# Patient Record
Sex: MALE | Race: WHITE | Employment: UNEMPLOYED | URBAN - METROPOLITAN AREA
[De-identification: names, ages, dates, MRNs, and addresses within clinical notes are randomized per-mention and may not be internally consistent; named-entity substitution may affect disease eponyms.]

---

## 2017-02-03 ENCOUNTER — OFFICE VISIT (OUTPATIENT)
Dept: FAMILY MEDICINE | Facility: CLINIC | Age: 57
End: 2017-02-03
Payer: COMMERCIAL

## 2017-02-03 VITALS
TEMPERATURE: 97.1 F | WEIGHT: 249 LBS | SYSTOLIC BLOOD PRESSURE: 142 MMHG | DIASTOLIC BLOOD PRESSURE: 86 MMHG | OXYGEN SATURATION: 93 % | BODY MASS INDEX: 35.73 KG/M2 | HEART RATE: 106 BPM

## 2017-02-03 DIAGNOSIS — H61.23 BILATERAL IMPACTED CERUMEN: Primary | ICD-10-CM

## 2017-02-03 DIAGNOSIS — I10 HYPERTENSION GOAL BP (BLOOD PRESSURE) < 140/90: ICD-10-CM

## 2017-02-03 PROCEDURE — 69209 REMOVE IMPACTED EAR WAX UNI: CPT | Performed by: PHYSICIAN ASSISTANT

## 2017-02-03 PROCEDURE — 99213 OFFICE O/P EST LOW 20 MIN: CPT | Mod: 25 | Performed by: PHYSICIAN ASSISTANT

## 2017-02-03 NOTE — MR AVS SNAPSHOT
After Visit Summary   2/3/2017    Junito Wang    MRN: [id number]           Patient Information     Date Of Birth          1960        Visit Information        Provider Department      2/3/2017 1:20 PM Tameka Reed PA-C [facility]        Today's Diagnoses     Bilateral impacted cerumen    -  1       Care Instructions      Earwax, Home Treatment    Everyone produces earwax from the lining of the ear canal. It serves to lubricate and protect the ear. The wax that forms in the canal naturally moves toward the outside of the ear and falls out. Sometimes the ear canal may contain too much wax. This can cause a blockage and loss of hearing. Directions are given below for home treatment.  Home care  If your doctor has advised you to remove a wax blockage yourself, follow these directions:    Unless a medicine was prescribed, you may use an over-the-counter product made for clearing earwax. These contain carbamide peroxide. Lie down with the blocked ear facing upward. Apply one dropper full of medicine and wait a few minutes. Grasp the outer ear and wiggle it to help the solution enter the canal.    Lean over a sink or basin with the blocked ear facing downward. Use a bulb syringe filled with warm (not hot or cold) water to rinse the ear several times. Use gentle pressure only.    If you are having trouble draining the water out of your ear canal, put a few drops of rubbing alcohol (isopropyl alcohol) into the ear canal. This will help remove the remaining water.    Repeat this procedure once a day for up to three days, or until your hearing is back to normal. Do not use this treatment for more than three days in a row.  Don ts    Don t use cold water to rinse the ear. This will make you dizzy.    Don t perform this procedure if you have an ear infection.    Don t perform this procedure if you have a ruptured eardrum.    Don t use cotton swabs, matches, hairpins, keys, or other  objects to  clean  the ear canal. This can cause infection of the ear canal or rupture the eardrum. Because of their size and shape, cotton swabs can push earwax deeper into the ear canal instead of removing it.  Follow-up care  Follow up with your health care provider if you are not improving after three cleaning attempts, or as advised.  When to seek medical advice  Call your health care provider right away if any of these occur:    Worsening ear pain    Fever of 101 F (38.3 C) or higher, or as directed by your health care provider    Hearing does not return to normal after three days of treatment    Fluid drainage or bleeding from the ear canal    Swelling, redness, or tenderness of the outer ear    Headache, neck pain, or stiff neck    4737-8187 [employer]. [address] [place] [address], [place] [address]. All rights reserved. This information is not intended as a substitute for professional medical care. Always follow your healthcare professional's instructions.              Follow-ups after your visit        Who to contact     If you have questions or need follow up information about today's clinic visit or your schedule please contact [facility] directly at [phone].  Normal or non-critical lab and imaging results will be communicated to you by MyChart, letter or phone within 4 business days after the clinic has received the results. If you do not hear from us within 7 days, please contact the clinic through Letsgofordinnerhart or phone. If you have a critical or abnormal lab result, we will notify you by phone as soon as possible.  Submit refill requests through ThemBid or call your pharmacy and they will forward the refill request to us. Please allow 3 business days for your refill to be completed.          Additional Information About Your Visit        LetsgofordinnerharReverbNation Information     ThemBid gives you secure access to your electronic health record. If you see a primary care provider, you can also  send messages to your care team and make appointments. If you have questions, please call your primary care clinic.  If you do not have a primary care provider, please call [phone] and they will assist you.        Care EveryWhere ID     This is your Care EveryWhere ID. This could be used by other organizations to access your [facility] medical records  [id number]        Your Vitals Were     Pulse Temperature Pulse Oximetry             106 97.1  F (36.2  C) (Oral) 93%          Blood Pressure from Last 3 Encounters:   02/03/17 142/86   07/27/16 156/86   11/23/15 135/85    Weight from Last 3 Encounters:   02/03/17 249 lb (112.946 kg)   07/27/16 246 lb 6.4 oz (111.766 kg)   11/23/15 247 lb (112.038 kg)              We Performed the Following     REMOVE COMFORT RICHARDSON        Primary Care Provider Office Phone # Fax #    Florian Wagner [phone]       [facility] [address] [facility] [address]        Thank you!     Thank you for choosing [facility]  for your care. Our goal is always to provide you with excellent care. Hearing back from our patients is one way we can continue to improve our services. Please take a few minutes to complete the written survey that you may receive in the mail after your visit with us. Thank you!             Your Updated Medication List - Protect others around you: Learn how to safely use, store and throw away your medicines at www.disposemymeds.org.          This list is accurate as of: 2/3/17  2:34 PM.  Always use your most recent med list.                   Brand Name Dispense Instructions for use    amLODIPine 10 MG tablet    NORVASC    90 tablet    Take 1 tablet (10 mg) by mouth daily       EXCEDRIN PO      PRN       GAVISCON PO          hydrochlorothiazide 25 MG tablet    HYDRODIURIL    30 tablet    Take 1 tablet (25 mg) by mouth daily       lisinopril 10 MG tablet    PRINIVIL/ZESTRIL    60 tablet    Take 1 tablet (10 mg)  by mouth 2 times daily       Multi-vitamin Tabs tablet   Generic drug:  multivitamin, therapeutic with minerals      1 TABLET DAILY

## 2017-02-03 NOTE — PROGRESS NOTES
SUBJECTIVE:                                                    Junito Wang is a 56 year old male who presents to clinic today for the following health issues:      ENT Symptoms             Symptoms: cc Present Absent Comment   Fever/Chills   x    Fatigue   x    Muscle Aches   x    Eye Irritation  x  Very watery   Sneezing   x    Nasal Akira/Drg   x    Sinus Pressure/Pain  x  Little, the last couple of days - maxillary pressure - causing few headaches - intermittent   Loss of smell   x    Dental pain  x     Sore Throat   x    Swollen Glands  x     Ear Pain/Fullness  x  R ear - feels fulls, and sometimes gets a buzzing sound every so often   Cough   x    Wheeze   x    Chest Pain   x    Shortness of breath   x    Rash   x    Other   x       Symptom duration:  1.5 weeks   Symptom severity:  mild   Treatments tried:  nothing   Contacts:  none     He has only been taking 1 tablet of his lisinopril daily due to running out of insurance. He now has insurance and is picking up a refill of his medication.     Problem list and histories reviewed & adjusted, as indicated.  Additional history: as documented    Patient Active Problem List   Diagnosis     Hypertension goal BP (blood pressure) < 140/90     Past Surgical History   Procedure Laterality Date     Abdomen surgery       fatty tissue     Appendectomy         Social History   Substance Use Topics     Smoking status: Current Every Day Smoker -- 1.00 packs/day for 35 years     Types: Cigarettes     Smokeless tobacco: Not on file      Comment: I am still on the fence with this but may need to quit     Alcohol Use: Yes     Family History   Problem Relation Age of Onset     Hypertension Mother      Neurologic Disorder Mother      stroke     HEART DISEASE Father      Heart Failure Father      Hypertension Brother      Hypertension Sister      Coronary Artery Disease Father       of heart attack     Coronary Artery Disease Brother      Quadruple Bi-pass      Coronary Artery Disease Sister      Stent     Hypertension Sister      Hypertension Brother      Hyperlipidemia Mother      Hyperlipidemia Brother      Hyperlipidemia Sister      CEREBROVASCULAR DISEASE Mother       of stroke     Thyroid Disease Mother      Coronary Artery Disease Sister      Stent     Coronary Artery Disease Brother      Quadruple Bi-pass     Hypertension Sister      Hypertension Brother      Hyperlipidemia Sister      Hyperlipidemia Brother          Current Outpatient Prescriptions   Medication Sig Dispense Refill     lisinopril (PRINIVIL/ZESTRIL) 10 MG tablet Take 1 tablet (10 mg) by mouth 2 times daily 60 tablet 1     hydrochlorothiazide (HYDRODIURIL) 25 MG tablet Take 1 tablet (25 mg) by mouth daily 30 tablet 1     amLODIPine (NORVASC) 10 MG tablet Take 1 tablet (10 mg) by mouth daily 90 tablet 0     Alum Hydroxide-Mag Carbonate (GAVISCON PO)        MULTI-VITAMIN OR TABS 1 TABLET DAILY       EXCEDRIN OR PRN       No Known Allergies  BP Readings from Last 3 Encounters:   17 142/86   16 156/86   11/23/15 135/85    Wt Readings from Last 3 Encounters:   17 249 lb (112.946 kg)   16 246 lb 6.4 oz (111.766 kg)   11/23/15 247 lb (112.038 kg)                  Problem list, Medication list, Allergies, and Medical/Social/Surgical histories reviewed in [facility] and updated as appropriate.    ROS:  Constitutional, HEENT, cardiovascular, pulmonary, and integumentary systems are negative, except as otherwise noted.    OBJECTIVE:                                                    /86 mmHg  Pulse 106  Temp(Src) 97.1  F (36.2  C) (Oral)  Wt 249 lb (112.946 kg)  SpO2 93%  Body mass index is 35.73 kg/(m^2).  GENERAL: healthy, alert and no distress  EYES: Eyes grossly normal to inspection  HENT: normal cephalic/atraumatic, both ears: occluded with wax, nose and mouth without ulcers or lesions, oropharynx clear and oral mucous membranes moist  NECK: no adenopathy, no asymmetry,  masses, or scars and thyroid normal to palpation  RESP: lungs clear to auscultation - no rales, rhonchi or wheezes  CV: regular rate and rhythm, normal S1 S2, no S3 or S4, no murmur, click or rub, no peripheral edema and peripheral pulses strong  MS: no gross musculoskeletal defects noted, no edema  SKIN: no suspicious lesions or rashes    Re-examination after ear flushing by medical assistant: cerumen was removed. Ear canals are clear and TM's unremarkable. Patient tolerating well.    Diagnostic Test Results:  none      ASSESSMENT/PLAN:                                                        ICD-10-CM    1. Bilateral impacted cerumen H61.23 REMOVE IMPACTED CERUMEN   2. Hypertension goal BP (blood pressure) < 140/90 I10      Patient was advised to start taking his blood pressure medication as prescribed and return to the pharmacy in 1 week for a recheck of his BP. He understood and agreed.     Patient Instructions     Earwax, Home Treatment    Everyone produces earwax from the lining of the ear canal. It serves to lubricate and protect the ear. The wax that forms in the canal naturally moves toward the outside of the ear and falls out. Sometimes the ear canal may contain too much wax. This can cause a blockage and loss of hearing. Directions are given below for home treatment.  Home care  If your doctor has advised you to remove a wax blockage yourself, follow these directions:    Unless a medicine was prescribed, you may use an over-the-counter product made for clearing earwax. These contain carbamide peroxide. Lie down with the blocked ear facing upward. Apply one dropper full of medicine and wait a few minutes. Grasp the outer ear and wiggle it to help the solution enter the canal.    Lean over a sink or basin with the blocked ear facing downward. Use a bulb syringe filled with warm (not hot or cold) water to rinse the ear several times. Use gentle pressure only.    If you are having trouble draining the water out  of your ear canal, put a few drops of rubbing alcohol (isopropyl alcohol) into the ear canal. This will help remove the remaining water.    Repeat this procedure once a day for up to three days, or until your hearing is back to normal. Do not use this treatment for more than three days in a row.  Don ts    Don t use cold water to rinse the ear. This will make you dizzy.    Don t perform this procedure if you have an ear infection.    Don t perform this procedure if you have a ruptured eardrum.    Don t use cotton swabs, matches, hairpins, keys, or other objects to  clean  the ear canal. This can cause infection of the ear canal or rupture the eardrum. Because of their size and shape, cotton swabs can push earwax deeper into the ear canal instead of removing it.  Follow-up care  Follow up with your health care provider if you are not improving after three cleaning attempts, or as advised.  When to seek medical advice  Call your health care provider right away if any of these occur:    Worsening ear pain    Fever of 101 F (38.3 C) or higher, or as directed by your health care provider    Hearing does not return to normal after three days of treatment    Fluid drainage or bleeding from the ear canal    Swelling, redness, or tenderness of the outer ear    Headache, neck pain, or stiff neck    6384-4257 [employer]. [address] [place] [address], [place] [address]. All rights reserved. This information is not intended as a substitute for professional medical care. Always follow your healthcare professional's instructions.              Tameka Reed PA-C  [facility]

## 2017-02-03 NOTE — PATIENT INSTRUCTIONS
Earwax, Home Treatment    Everyone produces earwax from the lining of the ear canal. It serves to lubricate and protect the ear. The wax that forms in the canal naturally moves toward the outside of the ear and falls out. Sometimes the ear canal may contain too much wax. This can cause a blockage and loss of hearing. Directions are given below for home treatment.  Home care  If your doctor has advised you to remove a wax blockage yourself, follow these directions:    Unless a medicine was prescribed, you may use an over-the-counter product made for clearing earwax. These contain carbamide peroxide. Lie down with the blocked ear facing upward. Apply one dropper full of medicine and wait a few minutes. Grasp the outer ear and wiggle it to help the solution enter the canal.    Lean over a sink or basin with the blocked ear facing downward. Use a bulb syringe filled with warm (not hot or cold) water to rinse the ear several times. Use gentle pressure only.    If you are having trouble draining the water out of your ear canal, put a few drops of rubbing alcohol (isopropyl alcohol) into the ear canal. This will help remove the remaining water.    Repeat this procedure once a day for up to three days, or until your hearing is back to normal. Do not use this treatment for more than three days in a row.  Don ts    Don t use cold water to rinse the ear. This will make you dizzy.    Don t perform this procedure if you have an ear infection.    Don t perform this procedure if you have a ruptured eardrum.    Don t use cotton swabs, matches, hairpins, keys, or other objects to  clean  the ear canal. This can cause infection of the ear canal or rupture the eardrum. Because of their size and shape, cotton swabs can push earwax deeper into the ear canal instead of removing it.  Follow-up care  Follow up with your health care provider if you are not improving after three cleaning attempts, or as advised.  When to seek medical  advice  Call your health care provider right away if any of these occur:    Worsening ear pain    Fever of 101 F (38.3 C) or higher, or as directed by your health care provider    Hearing does not return to normal after three days of treatment    Fluid drainage or bleeding from the ear canal    Swelling, redness, or tenderness of the outer ear    Headache, neck pain, or stiff neck    2535-4163 [employer]. [address] [place] [address]. All rights reserved. This information is not intended as a substitute for professional medical care. Always follow your healthcare professional's instructions.

## 2017-02-03 NOTE — NURSING NOTE
"Chief Complaint   Patient presents with     Otalgia       Initial /86 mmHg  Pulse 106  Temp(Src) 97.1  F (36.2  C) (Oral)  Wt 249 lb (112.946 kg)  SpO2 93% Estimated body mass index is 35.73 kg/(m^2) as calculated from the following:    Height as of 7/27/16: 5' 10\" (1.778 m).    Weight as of this encounter: 249 lb (112.946 kg).  BP completed using cuff size: andrews Urbina MA    "

## 2017-03-01 ENCOUNTER — ALLIED HEALTH/NURSE VISIT (OUTPATIENT)
Dept: FAMILY MEDICINE | Facility: CLINIC | Age: 57
End: 2017-03-01
Payer: COMMERCIAL

## 2017-03-01 VITALS — DIASTOLIC BLOOD PRESSURE: 74 MMHG | HEART RATE: 104 BPM | SYSTOLIC BLOOD PRESSURE: 136 MMHG

## 2017-03-01 DIAGNOSIS — Z01.30 BP CHECK: Primary | ICD-10-CM

## 2017-03-01 DIAGNOSIS — I10 ESSENTIAL HYPERTENSION WITH GOAL BLOOD PRESSURE LESS THAN 140/90: ICD-10-CM

## 2017-03-01 PROCEDURE — 99207 ZZC NO CHARGE NURSE ONLY: CPT | Performed by: FAMILY MEDICINE

## 2017-03-01 NOTE — TELEPHONE ENCOUNTER
Left message on voicemail for him to call back Olga RANDALL, [phone]   Per OV note was to have checked his bp at [place] pharmacy in 2 weeks (had run out of meds and his bp was elevated at appointment)

## 2017-03-01 NOTE — PROGRESS NOTES
Junito Wang was evaluated in a [facility] on March 1, 2017 at which time his blood pressure was:    BP Readings from Last 3 Encounters:   03/01/17 136/74   02/03/17 142/86   07/27/16 156/86       Reviewed lifestyle modifications for blood pressure control and reduction: including making healthy food choices, managing weight, getting regular exercise, smoking cessation, reducing alcohol consumption, monitoring blood pressure regularly.     Junito Wang is not experiencing symptoms.    Follow-Up: BP is at goal of < 140/90mmHg (patient 18+ years of age with or without diabetes).  Recommended follow-up at pharmacy in 6 months.     Completed by:  Agustin Marshall RPh.  [facility]  [phone]

## 2017-03-01 NOTE — MR AVS SNAPSHOT
After Visit Summary   3/1/2017    Junito Wang    MRN: [id number]           Patient Information     Date Of Birth          1960        Visit Information        Provider Department      3/1/2017 3:26 PM Florian Wagner MD [facility]        Today's Diagnoses     BP check    -  1       Follow-ups after your visit        Who to contact     If you have questions or need follow up information about today's clinic visit or your schedule please contact [facility] directly at [phone].  Normal or non-critical lab and imaging results will be communicated to you by MyChart, letter or phone within 4 business days after the clinic has received the results. If you do not hear from us within 7 days, please contact the clinic through Screenleaphart or phone. If you have a critical or abnormal lab result, we will notify you by phone as soon as possible.  Submit refill requests through Clipcopia or call your pharmacy and they will forward the refill request to us. Please allow 3 business days for your refill to be completed.          Additional Information About Your Visit        MyChart Information     Clipcopia gives you secure access to your electronic health record. If you see a primary care provider, you can also send messages to your care team and make appointments. If you have questions, please call your primary care clinic.  If you do not have a primary care provider, please call [phone] and they will assist you.        Care EveryWhere ID     This is your Care EveryWhere ID. This could be used by other organizations to access your [facility] medical records  [id number]        Your Vitals Were     Pulse                   104            Blood Pressure from Last 3 Encounters:   03/01/17 136/74   02/03/17 142/86   07/27/16 156/86    Weight from Last 3 Encounters:   02/03/17 249 lb (112.9 kg)   07/27/16 246 lb 6.4 oz (111.8 kg)   11/23/15 247 lb (112 kg)              Today, you  had the following     No orders found for display       Primary Care Provider Office Phone # Fax #    Florian Vinh Wagner [phone]       [facility] [address] [facility] [address]        Thank you!     Thank you for choosing [facility]  for your care. Our goal is always to provide you with excellent care. Hearing back from our patients is one way we can continue to improve our services. Please take a few minutes to complete the written survey that you may receive in the mail after your visit with us. Thank you!             Your Updated Medication List - Protect others around you: Learn how to safely use, store and throw away your medicines at www.disposemymeds.org.          This list is accurate as of: 3/1/17  3:28 PM.  Always use your most recent med list.                   Brand Name Dispense Instructions for use    amLODIPine 10 MG tablet    NORVASC    90 tablet    Take 1 tablet (10 mg) by mouth daily       EXCEDRIN PO      PRN       GAVISCON PO          hydrochlorothiazide 25 MG tablet    HYDRODIURIL    30 tablet    Take 1 tablet (25 mg) by mouth daily       lisinopril 10 MG tablet    PRINIVIL/ZESTRIL    60 tablet    Take 1 tablet (10 mg) by mouth 2 times daily       Multi-vitamin Tabs tablet   Generic drug:  multivitamin, therapeutic with minerals      1 TABLET DAILY

## 2017-03-02 RX ORDER — LISINOPRIL 10 MG/1
10 TABLET ORAL 2 TIMES DAILY
Qty: 60 TABLET | Refills: 0 | Status: SHIPPED | OUTPATIENT
Start: 2017-03-02 | End: 2017-05-15

## 2017-03-16 DIAGNOSIS — I10 ESSENTIAL HYPERTENSION WITH GOAL BLOOD PRESSURE LESS THAN 140/90: ICD-10-CM

## 2017-03-16 RX ORDER — AMLODIPINE BESYLATE 10 MG/1
10 TABLET ORAL DAILY
Qty: 90 TABLET | Refills: 0 | Status: SHIPPED | OUTPATIENT
Start: 2017-03-16 | End: 2017-06-10

## 2017-03-29 NOTE — PROGRESS NOTES
SUBJECTIVE:                                                    Junito Wang is a 56 year old male who presents to clinic today for the following health issues:    Ear Pain      Duration: 3 weeks off and on, last 8 days constant    Description  Left ear pain, feels like fluid in ear at times, pressure, feels like face/neck is swelling, headaches    Severity: moderate    Accompanying signs and symptoms: fevers at times    History (predisposing factors):  Frequent sinus infections and ear infections    Precipitating or alleviating factors: None    Therapies tried and outcome:  Aspirin      Problem list and histories reviewed & adjusted, as indicated.  Additional history: as documented    Patient Active Problem List   Diagnosis     Hypertension goal BP (blood pressure) < 140/90     Past Surgical History:   Procedure Laterality Date     ABDOMEN SURGERY      fatty tissue     APPENDECTOMY         Social History   Substance Use Topics     Smoking status: Current Every Day Smoker     Packs/day: 1.00     Years: 35.00     Types: Cigarettes     Smokeless tobacco: Not on file      Comment: I am still on the fence with this but may need to quit     Alcohol use Yes     Family History   Problem Relation Age of Onset     Hypertension Mother      Neurologic Disorder Mother      stroke     Hyperlipidemia Mother      CEREBROVASCULAR DISEASE Mother       of stroke     Thyroid Disease Mother      HEART DISEASE Father      Heart Failure Father      Coronary Artery Disease Father       of heart attack     Hypertension Brother      Coronary Artery Disease Brother      Quadruple Bi-pass/Quadruple Bi-pass     Hypertension Sister      Coronary Artery Disease Sister      Stent/Stent     Hypertension Brother      Hyperlipidemia Brother      Hypertension Sister      Hyperlipidemia Sister          Current Outpatient Prescriptions   Medication Sig Dispense Refill     fluticasone (FLONASE) 50 MCG/ACT spray Spray 1-2 sprays into  both nostrils daily 1 Bottle 11     amLODIPine (NORVASC) 10 MG tablet Take 1 tablet (10 mg) by mouth daily 90 tablet 0     lisinopril (PRINIVIL/ZESTRIL) 10 MG tablet Take 1 tablet (10 mg) by mouth 2 times daily 60 tablet 0     hydrochlorothiazide (HYDRODIURIL) 25 MG tablet Take 1 tablet (25 mg) by mouth daily 30 tablet 1     Alum Hydroxide-Mag Carbonate (GAVISCON PO)        MULTI-VITAMIN OR TABS 1 TABLET DAILY       EXCEDRIN OR PRN       No Known Allergies    ROS:  Constitutional, HEENT, cardiovascular, pulmonary, gi and gu systems are negative, except as otherwise noted.    OBJECTIVE:                                                    /88  Pulse 120  Temp 97.8  F (36.6  C) (Oral)  Wt 247 lb (112 kg)  SpO2 96%  BMI 35.44 kg/m2  Body mass index is 35.44 kg/(m^2).  GENERAL: healthy, alert and no distress  EYES: Eyes grossly normal to inspection, PERRL and conjunctivae and sclerae normal  HENT: ear canals and TM's normal, nose and mouth without ulcers or lesions  NECK: no adenopathy, no asymmetry, masses, or scars and thyroid normal to palpation  RESP: lungs clear to auscultation - no rales, rhonchi or wheezes  CV: regular rate and rhythm, normal S1 S2, no S3 or S4, no murmur, click or rub, no peripheral edema and peripheral pulses strong    Diagnostic Test Results:  none      ASSESSMENT/PLAN:                                                        ICD-10-CM    1. ETD (eustachian tube dysfunction), left H69.82 fluticasone (FLONASE) 50 MCG/ACT spray     OTOLARYNGOLOGY REFERRAL   Warning signs discussed.  side effects discussed  Symptomatic treatment: such as fluids,  OTC acetaminophen and /or non-steroidal anti-inflammatory medication.  Follow up  1-2 wks as needed  If not improving follow up with ENT.     Syo Han PA-C  [facility]

## 2017-03-30 ENCOUNTER — OFFICE VISIT (OUTPATIENT)
Dept: FAMILY MEDICINE | Facility: CLINIC | Age: 57
End: 2017-03-30
Payer: COMMERCIAL

## 2017-03-30 VITALS
SYSTOLIC BLOOD PRESSURE: 133 MMHG | TEMPERATURE: 97.8 F | DIASTOLIC BLOOD PRESSURE: 88 MMHG | BODY MASS INDEX: 35.44 KG/M2 | OXYGEN SATURATION: 96 % | HEART RATE: 120 BPM | WEIGHT: 247 LBS

## 2017-03-30 DIAGNOSIS — H69.92 ETD (EUSTACHIAN TUBE DYSFUNCTION), LEFT: Primary | ICD-10-CM

## 2017-03-30 PROCEDURE — 99213 OFFICE O/P EST LOW 20 MIN: CPT | Performed by: PHYSICIAN ASSISTANT

## 2017-03-30 RX ORDER — FLUTICASONE PROPIONATE 50 MCG
1-2 SPRAY, SUSPENSION (ML) NASAL DAILY
Qty: 1 BOTTLE | Refills: 11 | Status: SHIPPED | OUTPATIENT
Start: 2017-03-30 | End: 2017-10-30

## 2017-03-30 NOTE — NURSING NOTE
"Chief Complaint   Patient presents with     Ear Problem       Initial /88  Pulse 120  Temp 97.8  F (36.6  C) (Oral)  Wt 247 lb (112 kg)  SpO2 96%  BMI 35.44 kg/m2 Estimated body mass index is 35.44 kg/(m^2) as calculated from the following:    Height as of 7/27/16: 5' 10\" (1.778 m).    Weight as of this encounter: 247 lb (112 kg).  Medication Reconciliation: complete  Ursula Kearns CMA    "

## 2017-04-01 DIAGNOSIS — I10 ESSENTIAL HYPERTENSION WITH GOAL BLOOD PRESSURE LESS THAN 140/90: ICD-10-CM

## 2017-04-03 RX ORDER — HYDROCHLOROTHIAZIDE 25 MG/1
TABLET ORAL
Qty: 90 TABLET | Refills: 0 | Status: SHIPPED | OUTPATIENT
Start: 2017-04-03 | End: 2017-10-30

## 2017-04-11 ENCOUNTER — TELEPHONE (OUTPATIENT)
Dept: FAMILY MEDICINE | Facility: CLINIC | Age: 57
End: 2017-04-11

## 2017-04-11 NOTE — TELEPHONE ENCOUNTER
Panel Management Review      Patient has the following on his problem list:     Hypertension   Last three blood pressure readings:  BP Readings from Last 3 Encounters:   03/30/17 133/88   03/01/17 136/74   02/03/17 142/86     Blood pressure: Passed    HTN Guidelines:  Age 18-59 BP range:  Less than 140/90  Age 60-85 with Diabetes:  Less than 140/90  Age 60-85 without Diabetes:  less than 150/90      Composite cancer screening  Chart review shows that this patient is due/due soon for the following Fecal Colorectal (FIT)  Summary:    Patient is due/failing the following:   FIT    Action needed:   Patient needs non-fasting lab only appointment    Type of outreach:    Sent letter.    Questions for provider review:    None                                                                                                                                    Dm Roy CMA       Chart routed to closed .

## 2017-04-11 NOTE — LETTER
[facility]  [address] [facility] [address]  Phone: [phone]    04/11/17    Junito Wang  [address] [address] [facility] [address]      To whom it may concern:     Our records indicate that you have not scheduled for a(n)fit test which was recommended by your health care team. Monitoring and managing your preventative and chronic health conditions are very important to us.     If you have received your health care elsewhere, please provide us with that information so it can be documented in your chart.    Please call [phone] or message us through your [employer] account to schedule an appointment or provide information for your chart.     I look forward to seeing you and working with you on your health care needs.       *If you have already scheduled an appointment, please disregard this reminder      Sincerely,      Florian Wagner MD/esme

## 2017-04-24 ENCOUNTER — TELEPHONE (OUTPATIENT)
Dept: FAMILY MEDICINE | Facility: CLINIC | Age: 57
End: 2017-04-24

## 2017-04-24 DIAGNOSIS — Z12.11 COLON CANCER SCREENING: Primary | ICD-10-CM

## 2017-04-24 NOTE — TELEPHONE ENCOUNTER
Panel Management Review      Patient has the following on his problem list:     Hypertension   Last three blood pressure readings:  BP Readings from Last 3 Encounters:   03/30/17 133/88   03/01/17 136/74   02/03/17 142/86     Blood pressure: Passed    HTN Guidelines:  Age 18-59 BP range:  Less than 140/90  Age 60-85 with Diabetes:  Less than 140/90  Age 60-85 without Diabetes:  less than 150/90      Composite cancer screening  Chart review shows that this patient is due/due soon for the following Fecal Colorectal (FIT)  Summary:    Patient is due/failing the following:   FIT    Action needed:   Fit test     Type of outreach:    None, routed to provider for review.    Questions for provider review:    Please sign orders and close encounter                                                                                                                                    Dm Roy CMA       Chart routed to Provider .

## 2017-05-15 DIAGNOSIS — I10 ESSENTIAL HYPERTENSION WITH GOAL BLOOD PRESSURE LESS THAN 140/90: ICD-10-CM

## 2017-05-16 RX ORDER — LISINOPRIL 10 MG/1
TABLET ORAL
Qty: 180 TABLET | Refills: 0 | Status: SHIPPED | OUTPATIENT
Start: 2017-05-16 | End: 2017-10-30

## 2017-06-10 DIAGNOSIS — I10 ESSENTIAL HYPERTENSION WITH GOAL BLOOD PRESSURE LESS THAN 140/90: ICD-10-CM

## 2017-06-12 RX ORDER — AMLODIPINE BESYLATE 10 MG/1
TABLET ORAL
Qty: 30 TABLET | Refills: 2 | Status: SHIPPED | OUTPATIENT
Start: 2017-06-12 | End: 2017-09-20

## 2017-09-20 DIAGNOSIS — I10 ESSENTIAL HYPERTENSION WITH GOAL BLOOD PRESSURE LESS THAN 140/90: ICD-10-CM

## 2017-09-21 RX ORDER — AMLODIPINE BESYLATE 10 MG/1
10 TABLET ORAL DAILY
Qty: 30 TABLET | Refills: 0 | Status: SHIPPED | OUTPATIENT
Start: 2017-09-21 | End: 2017-10-24

## 2017-10-18 DIAGNOSIS — I10 HYPERTENSION GOAL BP (BLOOD PRESSURE) < 140/90: ICD-10-CM

## 2017-10-18 LAB
ANION GAP SERPL CALCULATED.3IONS-SCNC: 6 MMOL/L (ref 3–14)
BUN SERPL-MCNC: 18 MG/DL (ref 7–30)
CALCIUM SERPL-MCNC: 9.3 MG/DL (ref 8.5–10.1)
CHLORIDE SERPL-SCNC: 96 MMOL/L (ref 94–109)
CO2 SERPL-SCNC: 30 MMOL/L (ref 20–32)
CREAT SERPL-MCNC: 0.96 MG/DL (ref 0.66–1.25)
GFR SERPL CREATININE-BSD FRML MDRD: 81 ML/MIN/1.7M2
GLUCOSE SERPL-MCNC: 122 MG/DL (ref 70–99)
POTASSIUM SERPL-SCNC: 4.4 MMOL/L (ref 3.4–5.3)
SODIUM SERPL-SCNC: 132 MMOL/L (ref 133–144)

## 2017-10-18 PROCEDURE — 36415 COLL VENOUS BLD VENIPUNCTURE: CPT | Performed by: FAMILY MEDICINE

## 2017-10-18 PROCEDURE — 80048 BASIC METABOLIC PNL TOTAL CA: CPT | Performed by: FAMILY MEDICINE

## 2017-10-25 ASSESSMENT — ENCOUNTER SYMPTOMS: HYPERTENSION: 1

## 2017-10-25 NOTE — PROGRESS NOTES
SUBJECTIVE:                                                    Junito Wang is a 57 year old male who presents to clinic today for the following health issues:       Hypertension      History of Present Illness     Hypertension:     Outpatient blood pressures:  Are not being checked    Dietary sodium intake::  Low salt diet    Diet:  Regular (no restrictions)  Frequency of exercise:  1 day/week  Duration of exercise:  15-30 minutes  Taking medications regularly:  Yes  Medication side effects:  None  Additional concerns today:  YES      SUBJECTIVE:  57 year oldyear old male enters with a hx of hypertension.  Pt. Has been compliant with medications and medications were reviewed.  No side effects. No chest pain or sob. Low sodium diet.    Current Outpatient Prescriptions:      amLODIPine (NORVASC) 10 MG tablet, Take 1 tablet (10 mg) by mouth daily Patient needs provider appointment for more refills, Disp: 15 tablet, Rfl: 0     lisinopril (PRINIVIL/ZESTRIL) 10 MG tablet, TAKE 1 TABLET BY MOUTH 2 TIMES DAILY, Disp: 180 tablet, Rfl: 0     hydrochlorothiazide (HYDRODIURIL) 25 MG tablet, TAKE 1 TABLET BY MOUTH DAILY, Disp: 90 tablet, Rfl: 0     Alum Hydroxide-Mag Carbonate (GAVISCON PO), , Disp: , Rfl:      MULTI-VITAMIN OR TABS, 1 TABLET DAILY, Disp: , Rfl:      EXCEDRIN OR, PRN, Disp: , Rfl:   Past Medical History:   Diagnosis Date     Arthritis      Hypertension      Orders Only on 10/18/2017   Component Date Value Ref Range Status     Sodium 10/18/2017 132* 133 - 144 mmol/L Final     Potassium 10/18/2017 4.4  3.4 - 5.3 mmol/L Final     Chloride 10/18/2017 96  94 - 109 mmol/L Final     Carbon Dioxide 10/18/2017 30  20 - 32 mmol/L Final     Anion Gap 10/18/2017 6  3 - 14 mmol/L Final     Glucose 10/18/2017 122* 70 - 99 mg/dL Final    Non Fasting     Urea Nitrogen 10/18/2017 18  7 - 30 mg/dL Final     Creatinine 10/18/2017 0.96  0.66 - 1.25 mg/dL Final     GFR Estimate 10/18/2017 81  >60 mL/min/1.7m2 Final    Non  " GFR Calc     GFR Estimate If Black 10/18/2017 >90  >60 mL/min/1.7m2 Final    African American GFR Calc     Calcium 10/18/2017 9.3  8.5 - 10.1 mg/dL Final      Reviewed health maintenance  Patient Active Problem List   Diagnosis     Hypertension goal BP (blood pressure) < 140/90     Advanced directives, counseling/discussion         OBJECTIVE:  no apparent distress  /82  Pulse 111  Temp 97.6  F (36.4  C) (Oral)  Ht 5' 10\" (1.778 m)  Wt 242 lb (109.8 kg)  SpO2 97%  BMI 34.72 kg/m2     Head: Normocephalic. No masses, lesions, tenderness or abnormalities.  Neck::Neck supple. No adenopathy. Thyroid symmetric, normal size.    Cardiovascular: negative. No lifts, heaves, or thrills. RRR. No murmurs, clicks gallops or rubs  Respiratory. Good diaphragmatic excursion. Lungs clear  Gastrointestinal:Abdomen soft, non-tender.  No masses, organomegaly    Orders Only on 10/18/2017   Component Date Value Ref Range Status     Sodium 10/18/2017 132* 133 - 144 mmol/L Final     Potassium 10/18/2017 4.4  3.4 - 5.3 mmol/L Final     Chloride 10/18/2017 96  94 - 109 mmol/L Final     Carbon Dioxide 10/18/2017 30  20 - 32 mmol/L Final     Anion Gap 10/18/2017 6  3 - 14 mmol/L Final     Glucose 10/18/2017 122* 70 - 99 mg/dL Final    Non Fasting     Urea Nitrogen 10/18/2017 18  7 - 30 mg/dL Final     Creatinine 10/18/2017 0.96  0.66 - 1.25 mg/dL Final     GFR Estimate 10/18/2017 81  >60 mL/min/1.7m2 Final    Non  GFR Calc     GFR Estimate If Black 10/18/2017 >90  >60 mL/min/1.7m2 Final    African American GFR Calc     Calcium 10/18/2017 9.3  8.5 - 10.1 mg/dL Final         ICD-10-CM    1. Essential hypertension with goal blood pressure less than 140/90 I10 amLODIPine (NORVASC) 10 MG tablet     lisinopril (PRINIVIL/ZESTRIL) 10 MG tablet     hydrochlorothiazide (HYDRODIURIL) 25 MG tablet    PLAN: Follow up in 1 year         Problem list and histories reviewed & adjusted, as indicated.  Additional " history: as documented  Answers for HPI/ROS submitted by the patient on 10/23/2017   PHQ-2 Score: 0

## 2017-10-30 ENCOUNTER — OFFICE VISIT (OUTPATIENT)
Dept: FAMILY MEDICINE | Facility: CLINIC | Age: 57
End: 2017-10-30
Payer: COMMERCIAL

## 2017-10-30 VITALS
SYSTOLIC BLOOD PRESSURE: 139 MMHG | HEIGHT: 70 IN | OXYGEN SATURATION: 97 % | TEMPERATURE: 97.6 F | DIASTOLIC BLOOD PRESSURE: 82 MMHG | HEART RATE: 111 BPM | BODY MASS INDEX: 34.65 KG/M2 | WEIGHT: 242 LBS

## 2017-10-30 DIAGNOSIS — I10 ESSENTIAL HYPERTENSION WITH GOAL BLOOD PRESSURE LESS THAN 140/90: ICD-10-CM

## 2017-10-30 PROBLEM — Z71.89 ADVANCED DIRECTIVES, COUNSELING/DISCUSSION: Status: ACTIVE | Noted: 2017-10-30

## 2017-10-30 PROCEDURE — 99213 OFFICE O/P EST LOW 20 MIN: CPT | Performed by: FAMILY MEDICINE

## 2017-10-30 PROCEDURE — 82274 ASSAY TEST FOR BLOOD FECAL: CPT | Performed by: FAMILY MEDICINE

## 2017-10-30 RX ORDER — HYDROCHLOROTHIAZIDE 25 MG/1
25 TABLET ORAL DAILY
Qty: 90 TABLET | Refills: 3 | Status: SHIPPED | OUTPATIENT
Start: 2017-10-30 | End: 2019-01-31

## 2017-10-30 RX ORDER — AMLODIPINE BESYLATE 10 MG/1
10 TABLET ORAL DAILY
Qty: 90 TABLET | Refills: 3 | Status: SHIPPED | OUTPATIENT
Start: 2017-10-30 | End: 2019-01-03

## 2017-10-30 RX ORDER — LISINOPRIL 10 MG/1
TABLET ORAL
Qty: 180 TABLET | Refills: 3 | Status: SHIPPED | OUTPATIENT
Start: 2017-10-30 | End: 2019-01-03

## 2017-10-30 NOTE — NURSING NOTE
"Chief Complaint   Patient presents with     Hypertension       Initial BP (!) 176/93  Pulse 111  Temp 97.6  F (36.4  C) (Oral)  Ht 5' 10\" (1.778 m)  Wt 242 lb (109.8 kg)  SpO2 97%  BMI 34.72 kg/m2 Estimated body mass index is 34.72 kg/(m^2) as calculated from the following:    Height as of this encounter: 5' 10\" (1.778 m).    Weight as of this encounter: 242 lb (109.8 kg).  Medication Reconciliation: complete  Dm Roy CMA    "

## 2017-10-30 NOTE — MR AVS SNAPSHOT
"              After Visit Summary   10/30/2017    Junito Wang    MRN: [id number]           Patient Information     Date Of Birth          1960        Visit Information        Provider Department      10/30/2017 12:15 PM Florian Wagner MD [facility]        Today's Diagnoses     Essential hypertension with goal blood pressure less than 140/90           Follow-ups after your visit        Who to contact     If you have questions or need follow up information about today's clinic visit or your schedule please contact [facility] directly at [phone].  Normal or non-critical lab and imaging results will be communicated to you by Flipp[employer]art, letter or phone within 4 business days after the clinic has received the results. If you do not hear from us within 7 days, please contact the clinic through [employer] or phone. If you have a critical or abnormal lab result, we will notify you by phone as soon as possible.  Submit refill requests through [employer] or call your pharmacy and they will forward the refill request to us. Please allow 3 business days for your refill to be completed.          Additional Information About Your Visit        MyChart Information     [employer] gives you secure access to your electronic health record. If you see a primary care provider, you can also send messages to your care team and make appointments. If you have questions, please call your primary care clinic.  If you do not have a primary care provider, please call [phone] and they will assist you.        Care EveryWhere ID     This is your Care EveryWhere ID. This could be used by other organizations to access your [facility] medical records  [id number]        Your Vitals Were     Pulse Temperature Height Pulse Oximetry BMI (Body Mass Index)       111 97.6  F (36.4  C) (Oral) 5' 10\" (1.778 m) 97% 34.72 kg/m2        Blood Pressure from Last 3 Encounters:   10/30/17 139/82   03/30/17 133/88   03/01/17 " 136/74    Weight from Last 3 Encounters:   10/30/17 242 lb (109.8 kg)   03/30/17 247 lb (112 kg)   02/03/17 249 lb (112.9 kg)              Today, you had the following     No orders found for display         Today's Medication Changes          These changes are accurate as of: 10/30/17 12:30 PM.  If you have any questions, ask your nurse or doctor.               These medicines have changed or have updated prescriptions.        Dose/Directions    hydrochlorothiazide 25 MG tablet   Commonly known as:  HYDRODIURIL   This may have changed:  See the new instructions.   Used for:  Essential hypertension with goal blood pressure less than 140/90   Changed by:  Florian Wagner MD        Dose:  25 mg   Take 1 tablet (25 mg) by mouth daily   Quantity:  90 tablet   Refills:  3       lisinopril 10 MG tablet   Commonly known as:  PRINIVIL/ZESTRIL   This may have changed:  See the new instructions.   Used for:  Essential hypertension with goal blood pressure less than 140/90   Changed by:  Florian Wagner MD        TAKE 1 TABLET BY MOUTH 2 TIMES DAILY   Quantity:  180 tablet   Refills:  3            Where to get your medicines      These medications were sent to [id number] - [place], [place] - [address]  [address] [place] [address]    Hours:  test fax sent successfully 7/31/03  Phone:  [phone]     amLODIPine 10 MG tablet    hydrochlorothiazide 25 MG tablet    lisinopril 10 MG tablet                Primary Care Provider Office Phone # Fax #    Florian Wagner [phone] [address] [facility] [address]        Equal Access to Services     [facility] AH: Hadii aad ku hadasho Soomaali, waaxda luqadaha, qaybta kaalmada adeegyada, charito wood. So [facility] [phone].    ATENCIÓN: Si habla español, tiene a dong disposición servicios gratuitos de asistencia lingüística. Llame al [phone].    We comply with  applicable federal civil rights laws and [place] laws. We do not discriminate on the basis of race, color, national origin, age, disability, sex, sexual orientation, or gender identity.            Thank you!     Thank you for choosing [facility]  for your care. Our goal is always to provide you with excellent care. Hearing back from our patients is one way we can continue to improve our services. Please take a few minutes to complete the written survey that you may receive in the mail after your visit with us. Thank you!             Your Updated Medication List - Protect others around you: Learn how to safely use, store and throw away your medicines at www.disposemymeds.org.          This list is accurate as of: 10/30/17 12:30 PM.  Always use your most recent med list.                   Brand Name Dispense Instructions for use Diagnosis    amLODIPine 10 MG tablet    NORVASC    90 tablet    Take 1 tablet (10 mg) by mouth daily Patient needs provider appointment for more refills    Essential hypertension with goal blood pressure less than 140/90       EXCEDRIN PO      PRN        GAVISCON PO           hydrochlorothiazide 25 MG tablet    HYDRODIURIL    90 tablet    Take 1 tablet (25 mg) by mouth daily    Essential hypertension with goal blood pressure less than 140/90       lisinopril 10 MG tablet    PRINIVIL/ZESTRIL    180 tablet    TAKE 1 TABLET BY MOUTH 2 TIMES DAILY    Essential hypertension with goal blood pressure less than 140/90       Multi-vitamin Tabs tablet   Generic drug:  multivitamin, therapeutic with minerals      1 TABLET DAILY

## 2017-11-01 DIAGNOSIS — Z12.11 COLON CANCER SCREENING: ICD-10-CM

## 2017-11-02 LAB — HEMOCCULT STL QL IA: NEGATIVE

## 2018-01-07 ENCOUNTER — OFFICE VISIT (OUTPATIENT)
Dept: URGENT CARE | Facility: URGENT CARE | Age: 58
End: 2018-01-07
Payer: COMMERCIAL

## 2018-01-07 VITALS
OXYGEN SATURATION: 95 % | BODY MASS INDEX: 34.58 KG/M2 | HEART RATE: 111 BPM | WEIGHT: 241 LBS | DIASTOLIC BLOOD PRESSURE: 90 MMHG | SYSTOLIC BLOOD PRESSURE: 150 MMHG | TEMPERATURE: 97.3 F

## 2018-01-07 DIAGNOSIS — J01.90 ACUTE SINUSITIS WITH SYMPTOMS > 10 DAYS: Primary | ICD-10-CM

## 2018-01-07 PROCEDURE — 99213 OFFICE O/P EST LOW 20 MIN: CPT | Performed by: NURSE PRACTITIONER

## 2018-01-07 NOTE — NURSING NOTE
"Chief Complaint   Patient presents with     URI     cold 2 weeks, cough, headache, congestion       Initial /90  Pulse 111  Temp 97.3  F (36.3  C) (Oral)  Wt 241 lb (109.3 kg)  SpO2 95%  BMI 34.58 kg/m2 Estimated body mass index is 34.58 kg/(m^2) as calculated from the following:    Height as of 10/30/17: 5' 10\" (1.778 m).    Weight as of this encounter: 241 lb (109.3 kg).  Medication Reconciliation: complete     HUSSEIN WRIGHT      "

## 2018-01-07 NOTE — PROGRESS NOTES
SUBJECTIVE:                                                    Junito Wang is a 57 year old male who presents to clinic today for the following health issues:      RESPIRATORY SYMPTOMS      Duration: 2 weeks    Description  cough, headache and congestion/ sinus presusre pain    Severity: moderate, worse over the last 4-5 days    Accompanying signs and symptoms: None    History (predisposing factors):  none    Precipitating or alleviating factors: None    Therapies tried and outcome:  Robitussin dm for cough, decongestant    Problem list and histories reviewed & adjusted, as indicated.  Additional history: as documented    Patient Active Problem List   Diagnosis     Hypertension goal BP (blood pressure) < 140/90     Advanced directives, counseling/discussion     Past Surgical History:   Procedure Laterality Date     ABDOMEN SURGERY      fatty tissue     APPENDECTOMY         Social History   Substance Use Topics     Smoking status: Current Every Day Smoker     Packs/day: 1.00     Years: 36.00     Types: Cigarettes     Smokeless tobacco: Never Used      Comment: I am still on the fence with this but may need to quit     Alcohol use Yes     Family History   Problem Relation Age of Onset     Hypertension Mother      Neurologic Disorder Mother      stroke     Hyperlipidemia Mother      CEREBROVASCULAR DISEASE Mother       of stroke     Thyroid Disease Mother      HEART DISEASE Father      Heart Failure Father      Coronary Artery Disease Father       of heart attack     Hypertension Brother      Coronary Artery Disease Brother      Quadruple Bi-pass/Quadruple Bi-pass     Hypertension Sister      Coronary Artery Disease Sister      Stent/Stent     Hypertension Brother      Hyperlipidemia Brother      Hypertension Sister      Hyperlipidemia Sister              ROS:  Constitutional, HEENT, cardiovascular, pulmonary, gi and gu systems are negative, except as otherwise noted.      OBJECTIVE:   /90   Pulse 111  Temp 97.3  F (36.3  C) (Oral)  Wt 241 lb (109.3 kg)  SpO2 95%  BMI 34.58 kg/m2  Body mass index is 34.58 kg/(m^2).  GENERAL: alert and no distress  EYES: Eyes grossly normal to inspection, PERRL and conjunctivae and sclerae normal  HENT: normal cephalic/atraumatic, ear canals and TM's normal, nasal mucosa edematous , rhinorrhea yellow, oropharynx clear, oral mucous membranes moist and sinuses: frontal tenderness on bilateral  NECK: no adenopathy, no asymmetry, masses, or scars and thyroid normal to palpation  RESP: lungs clear to auscultation - no rales, rhonchi or wheezes  CV: regular rate and rhythm, normal S1 S2, no S3 or S4, no murmur, click or rub, no peripheral edema and peripheral pulses strong        ASSESSMENT/PLAN:     1. Acute sinusitis with symptoms > 10 days    - amoxicillin-clavulanate (AUGMENTIN) 875-125 MG per tablet; Take 1 tablet by mouth 2 times daily  Dispense: 20 tablet; Refill: 0    See Patient Instructions    ALEXEI Butler [facility]

## 2018-01-07 NOTE — MR AVS SNAPSHOT
After Visit Summary   1/7/2018    Junito Wang    MRN: [id number]           Patient Information     Date Of Birth          1960        Visit Information        Provider Department      1/7/2018 2:00 PM Lauren Zarate APRN CNP [facility]        Today's Diagnoses     Acute sinusitis with symptoms > 10 days    -  1       Follow-ups after your visit        Who to contact     If you have questions or need follow up information about today's clinic visit or your schedule please contact [facility] directly at [phone].  Normal or non-critical lab and imaging results will be communicated to you by WalkMehart, letter or phone within 4 business days after the clinic has received the results. If you do not hear from us within 7 days, please contact the clinic through [employer] or phone. If you have a critical or abnormal lab result, we will notify you by phone as soon as possible.  Submit refill requests through [employer]ang or call your pharmacy and they will forward the refill request to us. Please allow 3 business days for your refill to be completed.          Additional Information About Your Visit        MyChart Information     [employer]ang gives you secure access to your electronic health record. If you see a primary care provider, you can also send messages to your care team and make appointments. If you have questions, please call your primary care clinic.  If you do not have a primary care provider, please call [phone] and they will assist you.        Care EveryWhere ID     This is your Care EveryWhere ID. This could be used by other organizations to access your [place] medical records  [id number]        Your Vitals Were     Pulse Temperature Pulse Oximetry BMI (Body Mass Index)          111 97.3  F (36.3  C) (Oral) 95% 34.58 kg/m2         Blood Pressure from Last 3 Encounters:   01/07/18 150/90   10/30/17 139/82   03/30/17 133/88    Weight from Last 3 Encounters:    01/07/18 241 lb (109.3 kg)   10/30/17 242 lb (109.8 kg)   03/30/17 247 lb (112 kg)              Today, you had the following     No orders found for display         Today's Medication Changes          These changes are accurate as of: 1/7/18  3:04 PM.  If you have any questions, ask your nurse or doctor.               Start taking these medicines.        Dose/Directions    amoxicillin-clavulanate 875-125 MG per tablet   Commonly known as:  AUGMENTIN   Used for:  Acute sinusitis with symptoms > 10 days   Started by:  Lauren Zarate APRN CNP        Dose:  1 tablet   Take 1 tablet by mouth 2 times daily   Quantity:  20 tablet   Refills:  0            Where to get your medicines      These medications were sent to [id number] - [place], [place] - [address]  [address] [place] [address]    Hours:  test fax sent successfully 7/31/03  Phone:  [phone]     amoxicillin-clavulanate 875-125 MG per tablet                Primary Care Provider Office Phone # Fax #    Florian Wagner [phone] [id number] [facility] [address]        Equal Access to Services     CORI GREGORY AH: Hadii aad ku hadasho Soomaali, waaxda luqadaha, qaybta kaalmada adeegyada, waxay idiin hayshengn rc garcia . So [facility] [phone].    ATENCIÓN: Si habla español, tiene a dong disposición servicios gratuitos de asistencia lingüística. Llame al [phone].    We comply with applicable federal civil rights laws and [place] laws. We do not discriminate on the basis of race, color, national origin, age, disability, sex, sexual orientation, or gender identity.            Thank you!     Thank you for choosing [facility]  for your care. Our goal is always to provide you with excellent care. Hearing back from our patients is one way we can continue to improve our services. Please take a few minutes to complete the written survey that you may receive in the  mail after your visit with us. Thank you!             Your Updated Medication List - Protect others around you: Learn how to safely use, store and throw away your medicines at www.disposemymeds.org.          This list is accurate as of: 1/7/18  3:04 PM.  Always use your most recent med list.                   Brand Name Dispense Instructions for use Diagnosis    amLODIPine 10 MG tablet    NORVASC    90 tablet    Take 1 tablet (10 mg) by mouth daily Patient needs provider appointment for more refills    Essential hypertension with goal blood pressure less than 140/90       amoxicillin-clavulanate 875-125 MG per tablet    AUGMENTIN    20 tablet    Take 1 tablet by mouth 2 times daily    Acute sinusitis with symptoms > 10 days       EXCEDRIN PO      PRN        GAVISCON PO           hydrochlorothiazide 25 MG tablet    HYDRODIURIL    90 tablet    Take 1 tablet (25 mg) by mouth daily    Essential hypertension with goal blood pressure less than 140/90       lisinopril 10 MG tablet    PRINIVIL/ZESTRIL    180 tablet    TAKE 1 TABLET BY MOUTH 2 TIMES DAILY    Essential hypertension with goal blood pressure less than 140/90       Multi-vitamin Tabs tablet   Generic drug:  multivitamin, therapeutic with minerals      1 TABLET DAILY

## 2019-01-22 ENCOUNTER — DOCUMENTATION ONLY (OUTPATIENT)
Dept: LAB | Facility: CLINIC | Age: 59
End: 2019-01-22

## 2019-01-22 DIAGNOSIS — Z12.11 SPECIAL SCREENING FOR MALIGNANT NEOPLASMS, COLON: ICD-10-CM

## 2019-01-22 DIAGNOSIS — I10 HYPERTENSION GOAL BP (BLOOD PRESSURE) < 140/90: Primary | ICD-10-CM

## 2019-01-23 NOTE — PROGRESS NOTES
Please review and sign Pending Pre-visit Labs in [facility]. Labs 01/25/19 and BP OV 01/31/19   [address]

## 2019-01-25 DIAGNOSIS — I10 HYPERTENSION GOAL BP (BLOOD PRESSURE) < 140/90: ICD-10-CM

## 2019-01-25 LAB
ANION GAP SERPL CALCULATED.3IONS-SCNC: 10 MMOL/L (ref 3–14)
BUN SERPL-MCNC: 11 MG/DL (ref 7–30)
CALCIUM SERPL-MCNC: 8.8 MG/DL (ref 8.5–10.1)
CHLORIDE SERPL-SCNC: 102 MMOL/L (ref 94–109)
CO2 SERPL-SCNC: 25 MMOL/L (ref 20–32)
CREAT SERPL-MCNC: 0.94 MG/DL (ref 0.66–1.25)
GFR SERPL CREATININE-BSD FRML MDRD: 90 ML/MIN/{1.73_M2}
GLUCOSE SERPL-MCNC: 131 MG/DL (ref 70–99)
POTASSIUM SERPL-SCNC: 3.9 MMOL/L (ref 3.4–5.3)
SODIUM SERPL-SCNC: 137 MMOL/L (ref 133–144)

## 2019-01-25 PROCEDURE — 80048 BASIC METABOLIC PNL TOTAL CA: CPT | Performed by: FAMILY MEDICINE

## 2019-01-25 PROCEDURE — 36415 COLL VENOUS BLD VENIPUNCTURE: CPT | Performed by: FAMILY MEDICINE

## 2019-01-31 ENCOUNTER — OFFICE VISIT (OUTPATIENT)
Dept: FAMILY MEDICINE | Facility: CLINIC | Age: 59
End: 2019-01-31
Payer: COMMERCIAL

## 2019-01-31 VITALS
OXYGEN SATURATION: 97 % | RESPIRATION RATE: 18 BRPM | SYSTOLIC BLOOD PRESSURE: 183 MMHG | TEMPERATURE: 97.9 F | WEIGHT: 248 LBS | HEIGHT: 69 IN | HEART RATE: 104 BPM | DIASTOLIC BLOOD PRESSURE: 95 MMHG | BODY MASS INDEX: 36.73 KG/M2

## 2019-01-31 DIAGNOSIS — E66.01 MORBID OBESITY (H): ICD-10-CM

## 2019-01-31 DIAGNOSIS — R73.9 HIGH BLOOD SUGAR: Primary | ICD-10-CM

## 2019-01-31 DIAGNOSIS — I10 ESSENTIAL HYPERTENSION WITH GOAL BLOOD PRESSURE LESS THAN 140/90: ICD-10-CM

## 2019-01-31 LAB — HBA1C MFR BLD: 5.4 % (ref 0–5.6)

## 2019-01-31 PROCEDURE — 99214 OFFICE O/P EST MOD 30 MIN: CPT | Performed by: FAMILY MEDICINE

## 2019-01-31 PROCEDURE — 83036 HEMOGLOBIN GLYCOSYLATED A1C: CPT | Performed by: FAMILY MEDICINE

## 2019-01-31 PROCEDURE — 36415 COLL VENOUS BLD VENIPUNCTURE: CPT | Performed by: FAMILY MEDICINE

## 2019-01-31 RX ORDER — AMLODIPINE BESYLATE 10 MG/1
10 TABLET ORAL DAILY
Qty: 90 TABLET | Refills: 1 | Status: SHIPPED | OUTPATIENT
Start: 2019-01-31 | End: 2020-05-15

## 2019-01-31 RX ORDER — HYDROCHLOROTHIAZIDE 25 MG/1
25 TABLET ORAL DAILY
Qty: 90 TABLET | Refills: 1 | Status: SHIPPED | OUTPATIENT
Start: 2019-01-31 | End: 2020-05-15

## 2019-01-31 RX ORDER — LISINOPRIL 10 MG/1
TABLET ORAL
Qty: 180 TABLET | Refills: 1 | Status: SHIPPED | OUTPATIENT
Start: 2019-01-31 | End: 2020-05-15

## 2019-01-31 ASSESSMENT — MIFFLIN-ST. JEOR: SCORE: 1940.3

## 2019-01-31 ASSESSMENT — PAIN SCALES - GENERAL: PAINLEVEL: NO PAIN (0)

## 2019-01-31 NOTE — PROGRESS NOTES
SUBJECTIVE:  58 year oldyear old male enters with  hypertension.  Pt. Has been compliant with medications and medications were reviewed.  No side effects. No chest pain or sob. Low sodium diet. 4-8 beers per night    Current Outpatient Medications:      Alum Hydroxide-Mag Carbonate (GAVISCON PO), , Disp: , Rfl:      amLODIPine (NORVASC) 10 MG tablet, Take 1 tablet (10 mg) by mouth daily, Disp: 90 tablet, Rfl: 1     EXCEDRIN OR, PRN, Disp: , Rfl:      hydrochlorothiazide (HYDRODIURIL) 25 MG tablet, Take 1 tablet (25 mg) by mouth daily, Disp: 90 tablet, Rfl: 1     lisinopril (PRINIVIL/ZESTRIL) 10 MG tablet, TAKE ONE TABLET BY MOUTH TWICE DAILY, Disp: 180 tablet, Rfl: 1     MULTI-VITAMIN OR TABS, 1 TABLET DAILY, Disp: , Rfl:   Past Medical History:   Diagnosis Date     Arthritis      Hypertension      Orders Only on 01/25/2019   Component Date Value Ref Range Status     Sodium 01/25/2019 137  133 - 144 mmol/L Final     Potassium 01/25/2019 3.9  3.4 - 5.3 mmol/L Final     Chloride 01/25/2019 102  94 - 109 mmol/L Final     Carbon Dioxide 01/25/2019 25  20 - 32 mmol/L Final     Anion Gap 01/25/2019 10  3 - 14 mmol/L Final     Glucose 01/25/2019 131* 70 - 99 mg/dL Final    Fasting specimen     Urea Nitrogen 01/25/2019 11  7 - 30 mg/dL Final     Creatinine 01/25/2019 0.94  0.66 - 1.25 mg/dL Final     GFR Estimate 01/25/2019 90  >60 mL/min/[1.73_m2] Final    Comment: Non  GFR Calc  Starting 12/18/2018, serum creatinine based estimated GFR (eGFR) will be   calculated using the Chronic Kidney Disease Epidemiology Collaboration   (CKD-EPI) equation.       GFR Estimate If Black 01/25/2019 >90  >60 mL/min/[1.73_m2] Final    Comment:  GFR Calc  Starting 12/18/2018, serum creatinine based estimated GFR (eGFR) will be   calculated using the Chronic Kidney Disease Epidemiology Collaboration   (CKD-EPI) equation.       Calcium 01/25/2019 8.8  8.5 - 10.1 mg/dL Final      Reviewed health  "maintenance  Patient Active Problem List   Diagnosis     Hypertension goal BP (blood pressure) < 140/90     Advanced directives, counseling/discussion     Obesity (BMI 35.0-39.9) with comorbidity (H)         OBJECTIVE:  no apparent distress  BP (!) 183/95   Pulse 104   Temp 97.9  F (36.6  C) (Oral)   Resp 18   Ht 1.753 m (5' 9\")   Wt 112.5 kg (248 lb)   SpO2 97%   BMI 36.62 kg/m       Head: Normocephalic. No masses, lesions, tenderness or abnormalities.  Neck::Neck supple. No adenopathy. Thyroid symmetric, normal size.    Cardiovascular: negative. No lifts, heaves, or thrills. RRR. No murmurs, clicks gallops or rubs  Respiratory. Good diaphragmatic excursion. Lungs clear  Gastrointestinal:Abdomen soft, non-tender.  No masses, organomegaly    Orders Only on 01/25/2019   Component Date Value Ref Range Status     Sodium 01/25/2019 137  133 - 144 mmol/L Final     Potassium 01/25/2019 3.9  3.4 - 5.3 mmol/L Final     Chloride 01/25/2019 102  94 - 109 mmol/L Final     Carbon Dioxide 01/25/2019 25  20 - 32 mmol/L Final     Anion Gap 01/25/2019 10  3 - 14 mmol/L Final     Glucose 01/25/2019 131* 70 - 99 mg/dL Final    Fasting specimen     Urea Nitrogen 01/25/2019 11  7 - 30 mg/dL Final     Creatinine 01/25/2019 0.94  0.66 - 1.25 mg/dL Final     GFR Estimate 01/25/2019 90  >60 mL/min/[1.73_m2] Final    Comment: Non  GFR Calc  Starting 12/18/2018, serum creatinine based estimated GFR (eGFR) will be   calculated using the Chronic Kidney Disease Epidemiology Collaboration   (CKD-EPI) equation.       GFR Estimate If Black 01/25/2019 >90  >60 mL/min/[1.73_m2] Final    Comment:  GFR Calc  Starting 12/18/2018, serum creatinine based estimated GFR (eGFR) will be   calculated using the Chronic Kidney Disease Epidemiology Collaboration   (CKD-EPI) equation.       Calcium 01/25/2019 8.8  8.5 - 10.1 mg/dL Final         ICD-10-CM    1. High blood sugar R73.9 Hemoglobin A1c   2. Essential hypertension " with goal blood pressure less than 140/90 I10 amLODIPine (NORVASC) 10 MG tablet     hydrochlorothiazide (HYDRODIURIL) 25 MG tablet     lisinopril (PRINIVIL/ZESTRIL) 10 MG tablet   3. Morbid obesity (H) E66.01     PLAN: Follow up in 6 months   stop alcohol

## 2019-01-31 NOTE — NURSING NOTE
"Chief Complaint   Patient presents with     Hypertension       Initial BP (!) 183/95   Pulse 104   Temp 97.9  F (36.6  C) (Oral)   Resp 18   Ht 1.753 m (5' 9\")   Wt 112.5 kg (248 lb)   SpO2 97%   BMI 36.62 kg/m   Estimated body mass index is 36.62 kg/m  as calculated from the following:    Height as of this encounter: 1.753 m (5' 9\").    Weight as of this encounter: 112.5 kg (248 lb).  Medication Reconciliation: complete  Jennifer Katz M.A.    "

## 2019-04-22 ENCOUNTER — TELEPHONE (OUTPATIENT)
Dept: FAMILY MEDICINE | Facility: CLINIC | Age: 59
End: 2019-04-22

## 2019-04-22 NOTE — TELEPHONE ENCOUNTER
Panel Management Review      Patient has the following on his problem list:   Hypertension   Last three blood pressure readings:  BP Readings from Last 3 Encounters:   01/31/19 (!) 183/95   01/07/18 150/90   10/30/17 139/82     Blood pressure: FAILED    HTN Guidelines:  Less than 140/90      Composite cancer screening  Chart review shows that this patient is due/due soon for the following None  Summary:    Patient is due/failing the following:   Due in June    Action needed:   None needed    Type of outreach:    none needed    Questions for provider review:    None                                                                                                                                    Jennifer Katz M.A.       Chart routed to n/a .

## 2019-07-29 ENCOUNTER — TELEPHONE (OUTPATIENT)
Dept: FAMILY MEDICINE | Facility: CLINIC | Age: 59
End: 2019-07-29

## 2019-07-29 NOTE — TELEPHONE ENCOUNTER
Panel Management Review      Patient has the following on his problem list:     Hypertension   Last three blood pressure readings:  BP Readings from Last 3 Encounters:   01/31/19 (!) 183/95   01/07/18 150/90   10/30/17 139/82     Blood pressure: FAILED    HTN Guidelines:  Less than 140/90      Composite cancer screening  Chart review shows that this patient is due/due soon for the following None  Summary:    Patient is due/failing the following:   BP CHECK    Action needed:   Patient needs office visit for bp.    Type of outreach:    Sent [employer] message. and Sent letter.    Questions for provider review:    None                                                                                                                                    Jennifer Katz M.A.       Chart routed to n/a .

## 2020-02-24 ENCOUNTER — HEALTH MAINTENANCE LETTER (OUTPATIENT)
Age: 60
End: 2020-02-24

## 2020-05-15 ENCOUNTER — VIRTUAL VISIT (OUTPATIENT)
Dept: FAMILY MEDICINE | Facility: CLINIC | Age: 60
End: 2020-05-15
Payer: COMMERCIAL

## 2020-05-15 DIAGNOSIS — E87.1 HYPONATREMIA: ICD-10-CM

## 2020-05-15 DIAGNOSIS — R73.9 HIGH BLOOD SUGAR: ICD-10-CM

## 2020-05-15 DIAGNOSIS — I10 ESSENTIAL HYPERTENSION WITH GOAL BLOOD PRESSURE LESS THAN 140/90: ICD-10-CM

## 2020-05-15 DIAGNOSIS — J32.9 SINUSITIS, UNSPECIFIED CHRONICITY, UNSPECIFIED LOCATION: Primary | ICD-10-CM

## 2020-05-15 PROCEDURE — 99214 OFFICE O/P EST MOD 30 MIN: CPT | Mod: 95 | Performed by: PHYSICIAN ASSISTANT

## 2020-05-15 RX ORDER — LISINOPRIL 10 MG/1
TABLET ORAL
Qty: 180 TABLET | Refills: 1 | Status: SHIPPED | OUTPATIENT
Start: 2020-05-15 | End: 2020-11-16

## 2020-05-15 RX ORDER — FLUTICASONE PROPIONATE 50 MCG
1 SPRAY, SUSPENSION (ML) NASAL DAILY
Qty: 1 G | Refills: 0 | Status: SHIPPED | OUTPATIENT
Start: 2020-05-15 | End: 2020-08-03

## 2020-05-15 RX ORDER — HYDROCHLOROTHIAZIDE 25 MG/1
25 TABLET ORAL DAILY
Qty: 90 TABLET | Refills: 1 | Status: SHIPPED | OUTPATIENT
Start: 2020-05-15 | End: 2020-08-03

## 2020-05-15 RX ORDER — AMLODIPINE BESYLATE 10 MG/1
10 TABLET ORAL DAILY
Qty: 90 TABLET | Refills: 1 | Status: SHIPPED | OUTPATIENT
Start: 2020-05-15 | End: 2020-11-16

## 2020-05-15 NOTE — PROGRESS NOTES
Called and spoke to the patient @ [phone]. I have scheduled him for a fasting lab appt. And a BP check on 5/29/2020 through our drive up clinic.  Lauren Vo TC

## 2020-05-15 NOTE — PROGRESS NOTES
"Junito Wang is a 59 year old male who is being evaluated via a billable telephone visit.      The patient has been notified of following:     \"This telephone visit will be conducted via a call between you and your physician/provider. We have found that certain health care needs can be provided without the need for a physical exam.  This service lets us provide the care you need with a short phone conversation.  If a prescription is necessary we can send it directly to your pharmacy.  If lab work is needed we can place an order for that and you can then stop by our lab to have the test done at a later time.    Telephone visits are billed at different rates depending on your insurance coverage. During this emergency period, for some insurers they may be billed the same as an in-person visit.  Please reach out to your insurance provider with any questions.    If during the course of the call the physician/provider feels a telephone visit is not appropriate, you will not be charged for this service.\"    Patient has given verbal consent for Telephone visit?  Yes    What phone number would you like to be contacted at? [phone]    How would you like to obtain your AVS? Shaylahart    Ace     Junito Wang is a 59 year old male who presents to clinic today for the following health issues:    HPI  Hypertension Follow-up    Do you check your blood pressure regularly outside of the clinic? Yes     Are you following a low salt diet? No    Are your blood pressures ever more than 140 on the top number (systolic) OR more   than 90 on the bottom number (diastolic), for example 140/90? Yes  Has been monitoring recently gotten - 151/92, 148/92, 133/81, 143/88, 140/88, 139/86, 136/83, 136/82  Pt stopped taking all of his bp meds- ran out.     History of \"heavy beer\" drinking. Stopped 3 wks ago.     SINUS SYMPTOMS      Duration: 1.5 month    Description  Nasal congestion, upper teeth pain, facial pressure, mouth tastes " sweet    Severity: moderate - getting worse    Accompanying signs and symptoms: diarrhea     History (predisposing factors):  none    Precipitating or alleviating factors: None    Therapies tried and outcome:  none    Patient Active Problem List   Diagnosis     Hypertension goal BP (blood pressure) < 140/90     Advanced directives, counseling/discussion     Obesity (BMI 35.0-39.9) with comorbidity (H)     Past Surgical History:   Procedure Laterality Date     ABDOMEN SURGERY      fatty tissue     APPENDECTOMY         Social History     Tobacco Use     Smoking status: Current Every Day Smoker     Packs/day: 1.00     Years: 36.00     Pack years: 36.00     Types: Cigarettes     Smokeless tobacco: Never Used     Tobacco comment: I am still on the fence with this but may need to quit   Substance Use Topics     Alcohol use: Yes     Family History   Problem Relation Age of Onset     Hypertension Mother      Neurologic Disorder Mother         stroke     Hyperlipidemia Mother      Cerebrovascular Disease Mother          of stroke     Thyroid Disease Mother      Heart Disease Father      Heart Failure Father      Coronary Artery Disease Father          of heart attack     Hypertension Brother      Coronary Artery Disease Brother         Quadruple Bi-pass/Quadruple Bi-pass     Hypertension Sister      Coronary Artery Disease Sister         Stent/Stent     Hypertension Brother      Hyperlipidemia Brother      Hypertension Sister      Hyperlipidemia Sister          Current Outpatient Medications   Medication Sig Dispense Refill     amLODIPine (NORVASC) 10 MG tablet Take 1 tablet (10 mg) by mouth daily 90 tablet 1     amoxicillin-clavulanate (AUGMENTIN) 875-125 MG tablet Take 1 tablet by mouth 2 times daily for 10 days 20 tablet 0     fluticasone (FLONASE) 50 MCG/ACT nasal spray Spray 1 spray into both nostrils daily 1 g 0     hydrochlorothiazide (HYDRODIURIL) 25 MG tablet Take 1 tablet (25 mg) by mouth daily 90  tablet 1     lisinopril (ZESTRIL) 10 MG tablet TAKE ONE TABLET BY MOUTH TWICE DAILY 180 tablet 1     MULTI-VITAMIN OR TABS 1 TABLET DAILY       Alum Hydroxide-Mag Carbonate (GAVISCON PO)        EXCEDRIN OR PRN       No Known Allergies  BP Readings from Last 3 Encounters:   01/31/19 (!) 183/95   01/07/18 150/90   10/30/17 139/82    Wt Readings from Last 3 Encounters:   01/31/19 112.5 kg (248 lb)   01/07/18 109.3 kg (241 lb)   10/30/17 109.8 kg (242 lb)         Reviewed and updated as needed this visit by Provider  Tobacco  Allergies  Meds  Problems  Med Hx  Surg Hx  Fam Hx         Review of Systems   Constitutional, HEENT, cardiovascular, pulmonary, gi and gu systems are negative, except as otherwise noted.       Objective   Reported vitals:  There were no vitals taken for this visit.   healthy, alert and no distress  PSYCH: Alert and oriented times 3; coherent speech, normal   rate and volume, able to articulate logical thoughts, able   to abstract reason, no tangential thoughts, no hallucinations   or delusions  His affect is normal  RESP: No cough, no audible wheezing, able to talk in full sentences  Remainder of exam unable to be completed due to telephone visits    Diagnostic Test Results:  Labs reviewed in Epic        Assessment/Plan:    ICD-10-CM    1. Sinusitis, unspecified chronicity, unspecified location  J32.9 amoxicillin-clavulanate (AUGMENTIN) 875-125 MG tablet     fluticasone (FLONASE) 50 MCG/ACT nasal spray   2. Essential hypertension with goal blood pressure less than 140/90  I10 Lipid panel reflex to direct LDL Fasting     **Comprehensive metabolic panel FUTURE anytime     amLODIPine (NORVASC) 10 MG tablet     hydrochlorothiazide (HYDRODIURIL) 25 MG tablet     lisinopril (ZESTRIL) 10 MG tablet   Talk to patient but his concerns at this point we will get him treated for a sinus infection with Augmentin and Flonase.  We will get him back on his blood pressure medicine along with future fasting  labs in about 2 weeks with a blood pressure check with ancillary service with her drive up service.  If everything appears to be stable that we will recheck his blood pressure in 6 months    Return in about 2 weeks (around 5/29/2020) for Lab Work- Fasting, BP check with Ancillary.      Phone call duration:  5 minutes    Soy Han PA-C

## 2020-05-29 ENCOUNTER — ALLIED HEALTH/NURSE VISIT (OUTPATIENT)
Dept: NURSING | Facility: CLINIC | Age: 60
End: 2020-05-29
Payer: COMMERCIAL

## 2020-05-29 VITALS — DIASTOLIC BLOOD PRESSURE: 69 MMHG | HEART RATE: 109 BPM | SYSTOLIC BLOOD PRESSURE: 114 MMHG

## 2020-05-29 DIAGNOSIS — I10 HYPERTENSION GOAL BP (BLOOD PRESSURE) < 140/90: Primary | ICD-10-CM

## 2020-05-29 DIAGNOSIS — I10 ESSENTIAL HYPERTENSION WITH GOAL BLOOD PRESSURE LESS THAN 140/90: ICD-10-CM

## 2020-05-29 LAB
ALBUMIN SERPL-MCNC: 4.2 G/DL (ref 3.4–5)
ALP SERPL-CCNC: 117 U/L (ref 40–150)
ALT SERPL W P-5'-P-CCNC: 58 U/L (ref 0–70)
ANION GAP SERPL CALCULATED.3IONS-SCNC: 9 MMOL/L (ref 3–14)
AST SERPL W P-5'-P-CCNC: 33 U/L (ref 0–45)
BILIRUB SERPL-MCNC: 0.4 MG/DL (ref 0.2–1.3)
BUN SERPL-MCNC: 15 MG/DL (ref 7–30)
CALCIUM SERPL-MCNC: 9.2 MG/DL (ref 8.5–10.1)
CHLORIDE SERPL-SCNC: 94 MMOL/L (ref 94–109)
CHOLEST SERPL-MCNC: 197 MG/DL
CO2 SERPL-SCNC: 26 MMOL/L (ref 20–32)
CREAT SERPL-MCNC: 0.94 MG/DL (ref 0.66–1.25)
GFR SERPL CREATININE-BSD FRML MDRD: 88 ML/MIN/{1.73_M2}
GLUCOSE SERPL-MCNC: 121 MG/DL (ref 70–99)
HDLC SERPL-MCNC: 41 MG/DL
LDLC SERPL CALC-MCNC: 126 MG/DL
NONHDLC SERPL-MCNC: 156 MG/DL
POTASSIUM SERPL-SCNC: 3.6 MMOL/L (ref 3.4–5.3)
PROT SERPL-MCNC: 7.4 G/DL (ref 6.8–8.8)
SODIUM SERPL-SCNC: 129 MMOL/L (ref 133–144)
TRIGL SERPL-MCNC: 152 MG/DL

## 2020-05-29 PROCEDURE — 80053 COMPREHEN METABOLIC PANEL: CPT | Performed by: PHYSICIAN ASSISTANT

## 2020-05-29 PROCEDURE — 99207 ZZC NO CHARGE NURSE ONLY: CPT

## 2020-05-29 PROCEDURE — 36415 COLL VENOUS BLD VENIPUNCTURE: CPT | Performed by: PHYSICIAN ASSISTANT

## 2020-05-29 PROCEDURE — 80061 LIPID PANEL: CPT | Performed by: PHYSICIAN ASSISTANT

## 2020-05-29 NOTE — PROGRESS NOTES
Junito Wang is a 59 year old patient who comes in today for a Blood Pressure check.  Initial BP:  /69   Pulse 109      Disposition: results routed to provider  BP Readings from Last 3 Encounters:   05/29/20 114/69   01/31/19 (!) 183/95   01/07/18 150/90   iTla Steinberg CMA

## 2020-05-29 NOTE — PROGRESS NOTES
Patient consents to receive outdoor care: Yes    Upon arrival, patient instructed to proceed to designated location, place vehicle in park, turn off, and remove keys     If we are unable to safely and ergonomically able to provide care- is the patient able to safely able to get out of car and transfer to a chair? Yes    [facility]

## 2020-06-10 ENCOUNTER — ALLIED HEALTH/NURSE VISIT (OUTPATIENT)
Dept: NURSING | Facility: CLINIC | Age: 60
End: 2020-06-10
Payer: COMMERCIAL

## 2020-06-10 VITALS — DIASTOLIC BLOOD PRESSURE: 77 MMHG | SYSTOLIC BLOOD PRESSURE: 119 MMHG

## 2020-06-10 DIAGNOSIS — R73.9 HIGH BLOOD SUGAR: ICD-10-CM

## 2020-06-10 DIAGNOSIS — Z01.30 BP CHECK: Primary | ICD-10-CM

## 2020-06-10 DIAGNOSIS — E87.1 HYPONATREMIA: ICD-10-CM

## 2020-06-10 LAB
ALBUMIN SERPL-MCNC: 4 G/DL (ref 3.4–5)
ALP SERPL-CCNC: 105 U/L (ref 40–150)
ALT SERPL W P-5'-P-CCNC: 32 U/L (ref 0–70)
ANION GAP SERPL CALCULATED.3IONS-SCNC: 7 MMOL/L (ref 3–14)
AST SERPL W P-5'-P-CCNC: 15 U/L (ref 0–45)
BILIRUB SERPL-MCNC: 0.2 MG/DL (ref 0.2–1.3)
BUN SERPL-MCNC: 12 MG/DL (ref 7–30)
CALCIUM SERPL-MCNC: 9.5 MG/DL (ref 8.5–10.1)
CHLORIDE SERPL-SCNC: 107 MMOL/L (ref 94–109)
CO2 SERPL-SCNC: 24 MMOL/L (ref 20–32)
CREAT SERPL-MCNC: 0.92 MG/DL (ref 0.66–1.25)
GFR SERPL CREATININE-BSD FRML MDRD: >90 ML/MIN/{1.73_M2}
GLUCOSE SERPL-MCNC: 134 MG/DL (ref 70–99)
HBA1C MFR BLD: 5.2 % (ref 0–5.6)
POTASSIUM SERPL-SCNC: 4 MMOL/L (ref 3.4–5.3)
PROT SERPL-MCNC: 7.1 G/DL (ref 6.8–8.8)
SODIUM SERPL-SCNC: 138 MMOL/L (ref 133–144)

## 2020-06-10 PROCEDURE — 83036 HEMOGLOBIN GLYCOSYLATED A1C: CPT | Performed by: PHYSICIAN ASSISTANT

## 2020-06-10 PROCEDURE — 80053 COMPREHEN METABOLIC PANEL: CPT | Performed by: PHYSICIAN ASSISTANT

## 2020-06-10 PROCEDURE — 99207 ZZC DROP WITH A PROCEDURE: CPT | Mod: 25

## 2020-06-10 PROCEDURE — 36415 COLL VENOUS BLD VENIPUNCTURE: CPT | Performed by: PHYSICIAN ASSISTANT

## 2020-06-10 NOTE — PROGRESS NOTES
Patient consents to receive outdoor care: Yes    Upon arrival, patient instructed to proceed to designated location, place vehicle in park, turn off, and remove keys     If we are unable to safely and ergonomically able to provide care- is the patient able to safely able to get out of car and transfer to a chair? Yes    Patient came in for a blood pressure check. 119/77    MEMO [place]  .

## 2020-06-10 NOTE — PROGRESS NOTES
Patient consents to receive outdoor care: YES    YES    If we are unable to safely and ergonomically able to provide care- is the patient able to safely able to get out of car and transfer to a chair? YES    NO   Patient would like to receive their AVS UNKNOWN

## 2020-06-11 NOTE — RESULT ENCOUNTER NOTE
MrVerónica Wang,    All of your labs were normal/near normal for you.    Please contact the clinic if you have additional questions.  Thank you.    Sincerely,    Soy Han PA-C

## 2020-08-03 ENCOUNTER — OFFICE VISIT (OUTPATIENT)
Dept: FAMILY MEDICINE | Facility: CLINIC | Age: 60
End: 2020-08-03
Payer: COMMERCIAL

## 2020-08-03 VITALS
HEART RATE: 114 BPM | WEIGHT: 221 LBS | DIASTOLIC BLOOD PRESSURE: 85 MMHG | SYSTOLIC BLOOD PRESSURE: 129 MMHG | OXYGEN SATURATION: 98 % | TEMPERATURE: 98.9 F | BODY MASS INDEX: 32.64 KG/M2

## 2020-08-03 DIAGNOSIS — H92.01 RIGHT EAR PAIN: ICD-10-CM

## 2020-08-03 DIAGNOSIS — R19.5 DARK STOOLS: ICD-10-CM

## 2020-08-03 DIAGNOSIS — R10.13 EPIGASTRIC PAIN: Primary | ICD-10-CM

## 2020-08-03 LAB
BASOPHILS # BLD AUTO: 0 10E9/L (ref 0–0.2)
BASOPHILS NFR BLD AUTO: 0.4 %
DIFFERENTIAL METHOD BLD: ABNORMAL
EOSINOPHIL # BLD AUTO: 0.2 10E9/L (ref 0–0.7)
EOSINOPHIL NFR BLD AUTO: 1.6 %
ERYTHROCYTE [DISTWIDTH] IN BLOOD BY AUTOMATED COUNT: 18.5 % (ref 10–15)
HCT VFR BLD AUTO: 41.8 % (ref 40–53)
HGB BLD-MCNC: 13 G/DL (ref 13.3–17.7)
LYMPHOCYTES # BLD AUTO: 1.9 10E9/L (ref 0.8–5.3)
LYMPHOCYTES NFR BLD AUTO: 16.5 %
MCH RBC QN AUTO: 23.4 PG (ref 26.5–33)
MCHC RBC AUTO-ENTMCNC: 31.1 G/DL (ref 31.5–36.5)
MCV RBC AUTO: 75 FL (ref 78–100)
MONOCYTES # BLD AUTO: 0.8 10E9/L (ref 0–1.3)
MONOCYTES NFR BLD AUTO: 6.8 %
NEUTROPHILS # BLD AUTO: 8.4 10E9/L (ref 1.6–8.3)
NEUTROPHILS NFR BLD AUTO: 74.7 %
PLATELET # BLD AUTO: 424 10E9/L (ref 150–450)
RBC # BLD AUTO: 5.56 10E12/L (ref 4.4–5.9)
WBC # BLD AUTO: 11.3 10E9/L (ref 4–11)

## 2020-08-03 PROCEDURE — 85025 COMPLETE CBC W/AUTO DIFF WBC: CPT | Performed by: NURSE PRACTITIONER

## 2020-08-03 PROCEDURE — 99214 OFFICE O/P EST MOD 30 MIN: CPT | Performed by: NURSE PRACTITIONER

## 2020-08-03 PROCEDURE — 80053 COMPREHEN METABOLIC PANEL: CPT | Performed by: NURSE PRACTITIONER

## 2020-08-03 PROCEDURE — 36415 COLL VENOUS BLD VENIPUNCTURE: CPT | Performed by: NURSE PRACTITIONER

## 2020-08-03 NOTE — PROGRESS NOTES
"Subjective     Junito Wang is a 60 year old male who presents to clinic today for the following health issues:    HPI       Abdominal Pain      Duration: on and off since May    Description (location/character/radiation): feels like a pinch, bloated       Associated flank pain: None    Intensity:  moderate    Accompanying signs and symptoms:        Fever/Chills: no        Gas/Bloating: YES       Nausea/vomitting: YES       Diarrhea: no        Dysuria or Hematuria: no     History (previous similar pain/trauma/previous testing): black stools May 1st for a few days    Precipitating or alleviating factors:       Pain worse with eating/BM/urination: pain an hour after meals       Pain relieved by BM: YES    Therapies tried and outcome: None    LMP:  not applicable    Patient reports several months of epigastric pain.  Pain is sharp, \"like a pinch\".  He feels bloated, lots of gurgling in his stomach.  Makes him not want to eat.  Has lost 27 lbs since last clinic weight 01/31/2019.   In May of this year he had several days of dark black stools that were diarrhea.  He felt weak and dizzy at that time.  He fell once and landed on his back.  No loss of consciousness, denies hitting his head.  Since then stools have been normal, formed.   Occasionally has some nausea, no vomiting.  He reports EGD many years ago.  Was told he had a hiatal hernia.  Was on Prilosec a long time ago, no longer taking.  He denies any frequent NSAID use.   Was drinking 10-12 beers per day leading up to May of this year, quit drinking when he became sick.  He now has a beer every now and then, but denies daily drinking.           Also reports his right ear hurts.   On-going since treated for a sinus infection on 5/15/2020.  Treated with augmentin and flonase at that time. Sinus symptoms resolved.  No fever or chills.  No ringing in his ear.         Patient Active Problem List   Diagnosis     Hypertension goal BP (blood pressure) < 140/90     " Advanced directives, counseling/discussion     Obesity (BMI 35.0-39.9) with comorbidity (H)     Past Surgical History:   Procedure Laterality Date     ABDOMEN SURGERY      fatty tissue     APPENDECTOMY         Social History     Tobacco Use     Smoking status: Current Every Day Smoker     Packs/day: 1.00     Years: 36.00     Pack years: 36.00     Types: Cigarettes     Smokeless tobacco: Never Used     Tobacco comment: I am still on the fence with this but may need to quit   Substance Use Topics     Alcohol use: Yes     Family History   Problem Relation Age of Onset     Hypertension Mother      Neurologic Disorder Mother         stroke     Hyperlipidemia Mother      Cerebrovascular Disease Mother          of stroke     Thyroid Disease Mother      Heart Disease Father      Heart Failure Father      Coronary Artery Disease Father          of heart attack     Hypertension Brother      Coronary Artery Disease Brother         Quadruple Bi-pass/Quadruple Bi-pass     Hypertension Sister      Coronary Artery Disease Sister         Stent/Stent     Hypertension Brother      Hyperlipidemia Brother      Hypertension Sister      Hyperlipidemia Sister          Current Outpatient Medications   Medication Sig Dispense Refill     amLODIPine (NORVASC) 10 MG tablet Take 1 tablet (10 mg) by mouth daily 90 tablet 1     lisinopril (ZESTRIL) 10 MG tablet TAKE ONE TABLET BY MOUTH TWICE DAILY 180 tablet 1     MULTI-VITAMIN OR TABS 1 TABLET DAILY       omeprazole (PRILOSEC) 20 MG DR capsule Take 1 capsule (20 mg) by mouth daily 30 capsule 1     Alum Hydroxide-Mag Carbonate (GAVISCON PO)        EXCEDRIN OR PRN       No Known Allergies    Reviewed and updated as needed this visit by Provider  Tobacco  Allergies  Meds  Problems  Med Hx  Surg Hx  Fam Hx         Review of Systems   Constitutional, HEENT, cardiovascular, pulmonary, GI, , musculoskeletal, neuro, skin, endocrine and psych systems are negative, except as  otherwise noted.      Objective    /85   Pulse 114   Temp 98.9  F (37.2  C)   Wt 100.2 kg (221 lb)   SpO2 98%   BMI 32.64 kg/m    Body mass index is 32.64 kg/m .  Physical Exam   GENERAL: healthy, alert and no distress  EYES: Eyes grossly normal to inspection, PERRL and conjunctivae and sclerae normal  HENT: ear canals and TM's normal, nose and mouth without ulcers or lesions  NECK: no adenopathy, no asymmetry, masses, or scars and thyroid normal to palpation  RESP: lungs clear to auscultation - no rales, rhonchi or wheezes  CV: regular rate and rhythm, normal S1 S2, no S3 or S4, no murmur, click or rub, no peripheral edema and peripheral pulses strong  ABDOMEN: soft, nontender, no hepatosplenomegaly, no masses and bowel sounds normal  MS: no gross musculoskeletal defects noted, no edema  SKIN: no suspicious lesions or rashes  NEURO: Normal strength and tone, mentation intact and speech normal  PSYCH: mentation appears normal, affect normal/bright    Diagnostic Test Results:  Labs reviewed in Epic  CBC RESULTS:   Recent Labs   Lab Test 08/03/20  1520   WBC 11.3*   RBC 5.56   HGB 13.0*   HCT 41.8   MCV 75*   MCH 23.4*   MCHC 31.1*   RDW 18.5*        Recent Labs   Lab Test 08/03/20  1520 06/10/20  1119    138   POTASSIUM 4.0 4.0   CHLORIDE 104 107   CO2 25 24   ANIONGAP 7 7   * 134*   BUN 12 12   CR 0.88 0.92   ARDEN 8.9 9.5     Liver Function Studies -   Recent Labs   Lab Test 08/03/20  1520   PROTTOTAL 7.4   ALBUMIN 3.9   BILITOTAL 0.3   ALKPHOS 131   AST 17   ALT 26           Assessment & Plan       ICD-10-CM    1. Epigastric pain  R10.13 Comprehensive metabolic panel     CBC with platelets and differential     omeprazole (PRILOSEC) 20 MG DR capsule     GASTROENTEROLOGY ADULT REF PROCEDURE ONLY   2. Dark stools  R19.5 Comprehensive metabolic panel     CBC with platelets and differential     omeprazole (PRILOSEC) 20 MG DR capsule     GASTROENTEROLOGY ADULT REF PROCEDURE ONLY   3. Right  ear pain  H92.01      Labs stable, hemoglobin just slightly low.  Will have him start omeprazole, avoid alcohol and NSAIDs.  Referral to GI for upper and lower endoscopy placed.  He was instructed on symptoms that would warrant urgent evaluation.      Exam of right ear normal.   Asked him to resume Flonase and can try OTC decongestants.  If continuing to have pain, would refer to ENT.            See Patient Instructions  Patient Instructions   Will check several labs today.   Will get you set up upper and lower endoscopy.  Start omeprazole 20 mg once daily, take on an empty stomach in the morning and wait 30 minutes to eat.  Avoid alcohol or NSAIDS (ibuprofen, aleve, aspirin) until we know what is going on.          Return in about 4 weeks (around 8/31/2020) for If failure to improve.    Negra Kim, CNP  [facility]

## 2020-08-03 NOTE — PATIENT INSTRUCTIONS
Will check several labs today.   Will get you set up upper and lower endoscopy.  Start omeprazole 20 mg once daily, take on an empty stomach in the morning and wait 30 minutes to eat.  Avoid alcohol or NSAIDS (ibuprofen, aleve, aspirin) until we know what is going on.

## 2020-08-04 DIAGNOSIS — D64.9 ANEMIA, UNSPECIFIED TYPE: Primary | ICD-10-CM

## 2020-08-04 LAB
ALBUMIN SERPL-MCNC: 3.9 G/DL (ref 3.4–5)
ALP SERPL-CCNC: 131 U/L (ref 40–150)
ALT SERPL W P-5'-P-CCNC: 26 U/L (ref 0–70)
ANION GAP SERPL CALCULATED.3IONS-SCNC: 7 MMOL/L (ref 3–14)
AST SERPL W P-5'-P-CCNC: 17 U/L (ref 0–45)
BILIRUB SERPL-MCNC: 0.3 MG/DL (ref 0.2–1.3)
BUN SERPL-MCNC: 12 MG/DL (ref 7–30)
CALCIUM SERPL-MCNC: 8.9 MG/DL (ref 8.5–10.1)
CHLORIDE SERPL-SCNC: 104 MMOL/L (ref 94–109)
CO2 SERPL-SCNC: 25 MMOL/L (ref 20–32)
CREAT SERPL-MCNC: 0.88 MG/DL (ref 0.66–1.25)
GFR SERPL CREATININE-BSD FRML MDRD: >90 ML/MIN/{1.73_M2}
GLUCOSE SERPL-MCNC: 104 MG/DL (ref 70–99)
POTASSIUM SERPL-SCNC: 4 MMOL/L (ref 3.4–5.3)
PROT SERPL-MCNC: 7.4 G/DL (ref 6.8–8.8)
SODIUM SERPL-SCNC: 136 MMOL/L (ref 133–144)

## 2020-12-13 ENCOUNTER — HEALTH MAINTENANCE LETTER (OUTPATIENT)
Age: 60
End: 2020-12-13

## 2021-01-01 ENCOUNTER — PRE VISIT (OUTPATIENT)
Dept: RADIOLOGY | Facility: CLINIC | Age: 61
End: 2021-01-01

## 2021-01-01 ENCOUNTER — OFFICE VISIT (OUTPATIENT)
Dept: FAMILY MEDICINE | Facility: CLINIC | Age: 61
End: 2021-01-01
Payer: COMMERCIAL

## 2021-01-01 ENCOUNTER — INFUSION THERAPY VISIT (OUTPATIENT)
Dept: INFUSION THERAPY | Facility: CLINIC | Age: 61
End: 2021-01-01
Attending: NURSE PRACTITIONER
Payer: COMMERCIAL

## 2021-01-01 ENCOUNTER — VIRTUAL VISIT (OUTPATIENT)
Dept: ONCOLOGY | Facility: CLINIC | Age: 61
End: 2021-01-01
Attending: NURSE PRACTITIONER
Payer: COMMERCIAL

## 2021-01-01 ENCOUNTER — HOSPITAL ENCOUNTER (OUTPATIENT)
Dept: MEDSURG UNIT | Facility: CLINIC | Age: 61
End: 2021-12-23
Attending: RADIOLOGY
Payer: COMMERCIAL

## 2021-01-01 ENCOUNTER — HOME INFUSION (PRE-WILLOW HOME INFUSION) (OUTPATIENT)
Dept: PHARMACY | Facility: CLINIC | Age: 61
End: 2021-01-01

## 2021-01-01 ENCOUNTER — INFUSION THERAPY VISIT (OUTPATIENT)
Dept: INFUSION THERAPY | Facility: CLINIC | Age: 61
End: 2021-01-01
Attending: STUDENT IN AN ORGANIZED HEALTH CARE EDUCATION/TRAINING PROGRAM
Payer: COMMERCIAL

## 2021-01-01 ENCOUNTER — APPOINTMENT (OUTPATIENT)
Dept: LAB | Facility: CLINIC | Age: 61
End: 2021-01-01
Attending: STUDENT IN AN ORGANIZED HEALTH CARE EDUCATION/TRAINING PROGRAM
Payer: COMMERCIAL

## 2021-01-01 ENCOUNTER — LAB (OUTPATIENT)
Dept: ONCOLOGY | Facility: CLINIC | Age: 61
End: 2021-01-01
Payer: COMMERCIAL

## 2021-01-01 ENCOUNTER — ANCILLARY PROCEDURE (OUTPATIENT)
Dept: CT IMAGING | Facility: CLINIC | Age: 61
End: 2021-01-01
Attending: STUDENT IN AN ORGANIZED HEALTH CARE EDUCATION/TRAINING PROGRAM
Payer: COMMERCIAL

## 2021-01-01 ENCOUNTER — DOCUMENTATION ONLY (OUTPATIENT)
Dept: PHARMACY | Facility: CLINIC | Age: 61
End: 2021-01-01
Payer: COMMERCIAL

## 2021-01-01 ENCOUNTER — IMMUNIZATION (OUTPATIENT)
Dept: NURSING | Facility: CLINIC | Age: 61
End: 2021-01-01
Payer: COMMERCIAL

## 2021-01-01 ENCOUNTER — HOSPITAL ENCOUNTER (OUTPATIENT)
Dept: MRI IMAGING | Facility: CLINIC | Age: 61
End: 2021-12-15
Attending: RADIOLOGY
Payer: COMMERCIAL

## 2021-01-01 ENCOUNTER — TELEPHONE (OUTPATIENT)
Dept: VASCULAR SURGERY | Facility: CLINIC | Age: 61
End: 2021-01-01

## 2021-01-01 ENCOUNTER — INFUSION THERAPY VISIT (OUTPATIENT)
Dept: INFUSION THERAPY | Facility: CLINIC | Age: 61
End: 2021-01-01
Payer: COMMERCIAL

## 2021-01-01 ENCOUNTER — DOCUMENTATION ONLY (OUTPATIENT)
Dept: PHARMACY | Facility: CLINIC | Age: 61
End: 2021-01-01

## 2021-01-01 ENCOUNTER — MYC MEDICAL ADVICE (OUTPATIENT)
Dept: ONCOLOGY | Facility: CLINIC | Age: 61
End: 2021-01-01

## 2021-01-01 ENCOUNTER — VIRTUAL VISIT (OUTPATIENT)
Dept: ONCOLOGY | Facility: CLINIC | Age: 61
End: 2021-01-01
Attending: STUDENT IN AN ORGANIZED HEALTH CARE EDUCATION/TRAINING PROGRAM
Payer: COMMERCIAL

## 2021-01-01 ENCOUNTER — HOSPITAL ENCOUNTER (OUTPATIENT)
Dept: MRI IMAGING | Facility: CLINIC | Age: 61
End: 2021-12-15
Attending: STUDENT IN AN ORGANIZED HEALTH CARE EDUCATION/TRAINING PROGRAM
Payer: COMMERCIAL

## 2021-01-01 ENCOUNTER — HOSPITAL ENCOUNTER (OUTPATIENT)
Dept: NUCLEAR MEDICINE | Facility: CLINIC | Age: 61
Setting detail: NUCLEAR MEDICINE
Discharge: HOME | End: 2021-08-25
Attending: RADIOLOGY | Admitting: RADIOLOGY
Payer: COMMERCIAL

## 2021-01-01 ENCOUNTER — OFFICE VISIT (OUTPATIENT)
Dept: RADIATION ONCOLOGY | Facility: CLINIC | Age: 61
End: 2021-01-01
Attending: RADIOLOGY
Payer: COMMERCIAL

## 2021-01-01 ENCOUNTER — OFFICE VISIT (OUTPATIENT)
Dept: RADIOLOGY | Facility: CLINIC | Age: 61
End: 2021-01-01
Attending: RADIOLOGY
Payer: COMMERCIAL

## 2021-01-01 ENCOUNTER — HOSPITAL ENCOUNTER (OUTPATIENT)
Dept: MRI IMAGING | Facility: CLINIC | Age: 61
End: 2021-12-23
Attending: RADIOLOGY
Payer: COMMERCIAL

## 2021-01-01 ENCOUNTER — IMMUNIZATION (OUTPATIENT)
Dept: NURSING | Facility: CLINIC | Age: 61
End: 2021-01-01
Attending: FAMILY MEDICINE
Payer: COMMERCIAL

## 2021-01-01 ENCOUNTER — APPOINTMENT (OUTPATIENT)
Dept: MEDSURG UNIT | Facility: CLINIC | Age: 61
End: 2021-01-01
Attending: RADIOLOGY
Payer: COMMERCIAL

## 2021-01-01 ENCOUNTER — TEAM CONFERENCE (OUTPATIENT)
Dept: VASCULAR SURGERY | Facility: CLINIC | Age: 61
End: 2021-01-01

## 2021-01-01 ENCOUNTER — ANCILLARY PROCEDURE (OUTPATIENT)
Dept: MRI IMAGING | Facility: CLINIC | Age: 61
End: 2021-01-01
Attending: RADIOLOGY
Payer: COMMERCIAL

## 2021-01-01 ENCOUNTER — APPOINTMENT (OUTPATIENT)
Dept: MEDSURG UNIT | Facility: CLINIC | Age: 61
End: 2021-01-01
Attending: NURSE PRACTITIONER
Payer: COMMERCIAL

## 2021-01-01 ENCOUNTER — HOME INFUSION (PRE-WILLOW HOME INFUSION) (OUTPATIENT)
Dept: PHARMACY | Facility: CLINIC | Age: 61
End: 2021-01-01
Payer: COMMERCIAL

## 2021-01-01 ENCOUNTER — HEALTH MAINTENANCE LETTER (OUTPATIENT)
Age: 61
End: 2021-01-01

## 2021-01-01 ENCOUNTER — LAB (OUTPATIENT)
Dept: LAB | Facility: CLINIC | Age: 61
End: 2021-01-01
Attending: RADIOLOGY
Payer: COMMERCIAL

## 2021-01-01 ENCOUNTER — OFFICE VISIT (OUTPATIENT)
Dept: UROLOGY | Facility: CLINIC | Age: 61
End: 2021-01-01
Payer: COMMERCIAL

## 2021-01-01 ENCOUNTER — HOME INFUSION (PRE-WILLOW HOME INFUSION) (OUTPATIENT)
Dept: CONSULT | Facility: CLINIC | Age: 61
End: 2021-01-01
Payer: COMMERCIAL

## 2021-01-01 ENCOUNTER — HOSPITAL ENCOUNTER (OUTPATIENT)
Dept: MRI IMAGING | Facility: CLINIC | Age: 61
Discharge: HOME | End: 2021-09-13
Attending: RADIOLOGY | Admitting: RADIOLOGY
Payer: COMMERCIAL

## 2021-01-01 ENCOUNTER — TELEPHONE (OUTPATIENT)
Dept: ONCOLOGY | Facility: CLINIC | Age: 61
End: 2021-01-01

## 2021-01-01 ENCOUNTER — LAB (OUTPATIENT)
Dept: LAB | Facility: CLINIC | Age: 61
End: 2021-01-01
Payer: COMMERCIAL

## 2021-01-01 ENCOUNTER — APPOINTMENT (OUTPATIENT)
Dept: ONCOLOGY | Facility: CLINIC | Age: 61
End: 2021-01-01
Payer: COMMERCIAL

## 2021-01-01 ENCOUNTER — APPOINTMENT (OUTPATIENT)
Dept: GENERAL RADIOLOGY | Facility: CLINIC | Age: 61
End: 2021-01-01
Payer: COMMERCIAL

## 2021-01-01 ENCOUNTER — APPOINTMENT (OUTPATIENT)
Dept: INTERVENTIONAL RADIOLOGY/VASCULAR | Facility: CLINIC | Age: 61
End: 2021-01-01
Attending: RADIOLOGY
Payer: COMMERCIAL

## 2021-01-01 ENCOUNTER — HOSPITAL ENCOUNTER (OUTPATIENT)
Facility: CLINIC | Age: 61
Discharge: HOME | End: 2021-09-10
Attending: RADIOLOGY | Admitting: RADIOLOGY
Payer: COMMERCIAL

## 2021-01-01 ENCOUNTER — APPOINTMENT (OUTPATIENT)
Dept: LAB | Facility: CLINIC | Age: 61
End: 2021-01-01
Attending: NURSE PRACTITIONER
Payer: COMMERCIAL

## 2021-01-01 ENCOUNTER — HOSPITAL ENCOUNTER (OUTPATIENT)
Dept: NUCLEAR MEDICINE | Facility: CLINIC | Age: 61
Setting detail: NUCLEAR MEDICINE
Discharge: HOME | End: 2021-09-10
Attending: RADIOLOGY | Admitting: RADIOLOGY
Payer: COMMERCIAL

## 2021-01-01 ENCOUNTER — HOSPITAL ENCOUNTER (OUTPATIENT)
Facility: CLINIC | Age: 61
Discharge: HOME | End: 2021-08-25
Attending: RADIOLOGY | Admitting: RADIOLOGY
Payer: COMMERCIAL

## 2021-01-01 VITALS
HEART RATE: 105 BPM | BODY MASS INDEX: 24.35 KG/M2 | WEIGHT: 169.7 LBS | OXYGEN SATURATION: 96 % | TEMPERATURE: 98.9 F | DIASTOLIC BLOOD PRESSURE: 68 MMHG | SYSTOLIC BLOOD PRESSURE: 103 MMHG

## 2021-01-01 VITALS
BODY MASS INDEX: 23.79 KG/M2 | DIASTOLIC BLOOD PRESSURE: 83 MMHG | WEIGHT: 165.8 LBS | RESPIRATION RATE: 16 BRPM | OXYGEN SATURATION: 100 % | TEMPERATURE: 96.8 F | HEART RATE: 91 BPM | SYSTOLIC BLOOD PRESSURE: 129 MMHG

## 2021-01-01 VITALS
WEIGHT: 178.4 LBS | BODY MASS INDEX: 25.6 KG/M2 | RESPIRATION RATE: 16 BRPM | TEMPERATURE: 98 F | DIASTOLIC BLOOD PRESSURE: 77 MMHG | HEART RATE: 95 BPM | OXYGEN SATURATION: 98 % | SYSTOLIC BLOOD PRESSURE: 130 MMHG

## 2021-01-01 VITALS
DIASTOLIC BLOOD PRESSURE: 81 MMHG | RESPIRATION RATE: 20 BRPM | HEART RATE: 122 BPM | OXYGEN SATURATION: 98 % | SYSTOLIC BLOOD PRESSURE: 135 MMHG | TEMPERATURE: 99 F

## 2021-01-01 VITALS
HEIGHT: 70 IN | SYSTOLIC BLOOD PRESSURE: 135 MMHG | HEART RATE: 73 BPM | DIASTOLIC BLOOD PRESSURE: 99 MMHG | BODY MASS INDEX: 24.91 KG/M2 | OXYGEN SATURATION: 99 % | WEIGHT: 174 LBS | TEMPERATURE: 98.3 F | RESPIRATION RATE: 18 BRPM

## 2021-01-01 VITALS
HEART RATE: 120 BPM | WEIGHT: 174 LBS | DIASTOLIC BLOOD PRESSURE: 84 MMHG | BODY MASS INDEX: 24.97 KG/M2 | SYSTOLIC BLOOD PRESSURE: 155 MMHG | OXYGEN SATURATION: 98 % | TEMPERATURE: 97.1 F

## 2021-01-01 VITALS
HEART RATE: 93 BPM | SYSTOLIC BLOOD PRESSURE: 117 MMHG | WEIGHT: 165 LBS | DIASTOLIC BLOOD PRESSURE: 87 MMHG | RESPIRATION RATE: 16 BRPM | OXYGEN SATURATION: 99 % | BODY MASS INDEX: 23.68 KG/M2

## 2021-01-01 VITALS
OXYGEN SATURATION: 96 % | DIASTOLIC BLOOD PRESSURE: 87 MMHG | WEIGHT: 172.3 LBS | RESPIRATION RATE: 18 BRPM | HEART RATE: 97 BPM | TEMPERATURE: 98.3 F | SYSTOLIC BLOOD PRESSURE: 129 MMHG | BODY MASS INDEX: 24.72 KG/M2

## 2021-01-01 VITALS
BODY MASS INDEX: 24.42 KG/M2 | SYSTOLIC BLOOD PRESSURE: 125 MMHG | TEMPERATURE: 97.8 F | OXYGEN SATURATION: 99 % | WEIGHT: 170.2 LBS | RESPIRATION RATE: 18 BRPM | HEART RATE: 89 BPM | DIASTOLIC BLOOD PRESSURE: 78 MMHG

## 2021-01-01 VITALS
TEMPERATURE: 98.2 F | RESPIRATION RATE: 16 BRPM | WEIGHT: 176.4 LBS | SYSTOLIC BLOOD PRESSURE: 135 MMHG | DIASTOLIC BLOOD PRESSURE: 82 MMHG | BODY MASS INDEX: 25.31 KG/M2 | HEART RATE: 93 BPM | OXYGEN SATURATION: 98 %

## 2021-01-01 VITALS
DIASTOLIC BLOOD PRESSURE: 79 MMHG | TEMPERATURE: 97.3 F | HEART RATE: 108 BPM | OXYGEN SATURATION: 99 % | BODY MASS INDEX: 23.99 KG/M2 | RESPIRATION RATE: 16 BRPM | SYSTOLIC BLOOD PRESSURE: 120 MMHG | WEIGHT: 167.2 LBS

## 2021-01-01 VITALS
HEART RATE: 80 BPM | SYSTOLIC BLOOD PRESSURE: 125 MMHG | RESPIRATION RATE: 18 BRPM | OXYGEN SATURATION: 97 % | DIASTOLIC BLOOD PRESSURE: 102 MMHG

## 2021-01-01 VITALS
OXYGEN SATURATION: 97 % | WEIGHT: 167.1 LBS | BODY MASS INDEX: 23.98 KG/M2 | SYSTOLIC BLOOD PRESSURE: 117 MMHG | RESPIRATION RATE: 16 BRPM | TEMPERATURE: 97.7 F | HEART RATE: 99 BPM | DIASTOLIC BLOOD PRESSURE: 77 MMHG

## 2021-01-01 VITALS
SYSTOLIC BLOOD PRESSURE: 115 MMHG | BODY MASS INDEX: 24.25 KG/M2 | HEART RATE: 85 BPM | TEMPERATURE: 98.6 F | OXYGEN SATURATION: 98 % | RESPIRATION RATE: 16 BRPM | DIASTOLIC BLOOD PRESSURE: 78 MMHG | WEIGHT: 169 LBS

## 2021-01-01 VITALS
BODY MASS INDEX: 24.34 KG/M2 | WEIGHT: 170 LBS | SYSTOLIC BLOOD PRESSURE: 143 MMHG | HEIGHT: 70 IN | RESPIRATION RATE: 16 BRPM | OXYGEN SATURATION: 97 % | HEART RATE: 120 BPM | DIASTOLIC BLOOD PRESSURE: 91 MMHG | TEMPERATURE: 97.9 F

## 2021-01-01 VITALS
HEART RATE: 72 BPM | OXYGEN SATURATION: 96 % | SYSTOLIC BLOOD PRESSURE: 126 MMHG | DIASTOLIC BLOOD PRESSURE: 89 MMHG | RESPIRATION RATE: 20 BRPM

## 2021-01-01 VITALS
HEART RATE: 98 BPM | OXYGEN SATURATION: 100 % | WEIGHT: 171 LBS | BODY MASS INDEX: 24.54 KG/M2 | DIASTOLIC BLOOD PRESSURE: 82 MMHG | RESPIRATION RATE: 16 BRPM | SYSTOLIC BLOOD PRESSURE: 120 MMHG

## 2021-01-01 VITALS
BODY MASS INDEX: 25.48 KG/M2 | OXYGEN SATURATION: 99 % | HEART RATE: 79 BPM | WEIGHT: 178 LBS | HEIGHT: 70 IN | RESPIRATION RATE: 16 BRPM | SYSTOLIC BLOOD PRESSURE: 120 MMHG | DIASTOLIC BLOOD PRESSURE: 81 MMHG | TEMPERATURE: 98.1 F

## 2021-01-01 VITALS
DIASTOLIC BLOOD PRESSURE: 71 MMHG | BODY MASS INDEX: 25.55 KG/M2 | OXYGEN SATURATION: 98 % | RESPIRATION RATE: 16 BRPM | HEART RATE: 84 BPM | SYSTOLIC BLOOD PRESSURE: 109 MMHG | WEIGHT: 178.1 LBS | TEMPERATURE: 98.2 F

## 2021-01-01 DIAGNOSIS — C78.7 LIVER METASTASES: ICD-10-CM

## 2021-01-01 DIAGNOSIS — C15.9 METASTASIS FROM ESOPHAGEAL CANCER (H): ICD-10-CM

## 2021-01-01 DIAGNOSIS — C79.51 BONE METASTASIS: ICD-10-CM

## 2021-01-01 DIAGNOSIS — C79.9 METASTASIS FROM ESOPHAGEAL CANCER (H): ICD-10-CM

## 2021-01-01 DIAGNOSIS — C15.5 MALIGNANT NEOPLASM OF LOWER THIRD OF ESOPHAGUS (H): Primary | ICD-10-CM

## 2021-01-01 DIAGNOSIS — C79.31 METASTASIS TO BRAIN (H): Primary | ICD-10-CM

## 2021-01-01 DIAGNOSIS — C78.7 LIVER METASTASES: Primary | ICD-10-CM

## 2021-01-01 DIAGNOSIS — C15.9 MALIGNANT NEOPLASM OF ESOPHAGUS, UNSPECIFIED LOCATION (H): ICD-10-CM

## 2021-01-01 DIAGNOSIS — R21 RASH: Primary | ICD-10-CM

## 2021-01-01 DIAGNOSIS — C79.31 METASTASIS TO BRAIN (H): ICD-10-CM

## 2021-01-01 DIAGNOSIS — R11.0 NAUSEA: ICD-10-CM

## 2021-01-01 DIAGNOSIS — G56.32 RADIAL NEUROPATHY, LEFT: ICD-10-CM

## 2021-01-01 DIAGNOSIS — C15.5 MALIGNANT NEOPLASM OF LOWER THIRD OF ESOPHAGUS (H): ICD-10-CM

## 2021-01-01 DIAGNOSIS — Z11.59 ENCOUNTER FOR SCREENING FOR OTHER VIRAL DISEASES: ICD-10-CM

## 2021-01-01 DIAGNOSIS — C15.9 METASTASIS FROM ESOPHAGEAL CANCER (H): Primary | ICD-10-CM

## 2021-01-01 DIAGNOSIS — C79.9 METASTASIS FROM ESOPHAGEAL CANCER (H): Primary | ICD-10-CM

## 2021-01-01 DIAGNOSIS — R23.4 ACUTE DESQUAMATIVE ERUPTION OF SKIN: ICD-10-CM

## 2021-01-01 DIAGNOSIS — K12.31 MUCOSITIS DUE TO CHEMOTHERAPY: ICD-10-CM

## 2021-01-01 DIAGNOSIS — R33.9 INCOMPLETE BLADDER EMPTYING: Primary | ICD-10-CM

## 2021-01-01 DIAGNOSIS — D64.9 ANEMIA, UNSPECIFIED TYPE: ICD-10-CM

## 2021-01-01 DIAGNOSIS — N48.1 BALANITIS: ICD-10-CM

## 2021-01-01 DIAGNOSIS — C79.31 BRAIN METASTASIS: Primary | ICD-10-CM

## 2021-01-01 DIAGNOSIS — C79.51 BONE METASTASIS: Primary | ICD-10-CM

## 2021-01-01 DIAGNOSIS — C79.31 BRAIN METASTASIS: ICD-10-CM

## 2021-01-01 DIAGNOSIS — C15.9 MALIGNANT NEOPLASM OF ESOPHAGUS, UNSPECIFIED LOCATION (H): Primary | ICD-10-CM

## 2021-01-01 LAB
ALBUMIN SERPL-MCNC: 3.5 G/DL (ref 3.4–5)
ALBUMIN SERPL-MCNC: 3.5 G/DL (ref 3.4–5)
ALBUMIN SERPL-MCNC: 3.6 G/DL (ref 3.4–5)
ALBUMIN SERPL-MCNC: 3.7 G/DL (ref 3.4–5)
ALBUMIN SERPL-MCNC: 3.8 G/DL (ref 3.4–5)
ALBUMIN SERPL-MCNC: 3.8 G/DL (ref 3.4–5)
ALBUMIN SERPL-MCNC: 3.9 G/DL (ref 3.4–5)
ALBUMIN UR-MCNC: NEGATIVE MG/DL
ALBUMIN UR-MCNC: NEGATIVE MG/DL
ALP SERPL-CCNC: 104 U/L (ref 40–150)
ALP SERPL-CCNC: 114 U/L (ref 40–150)
ALP SERPL-CCNC: 115 U/L (ref 40–150)
ALP SERPL-CCNC: 124 U/L (ref 40–150)
ALP SERPL-CCNC: 128 U/L (ref 40–150)
ALP SERPL-CCNC: 129 U/L (ref 40–150)
ALP SERPL-CCNC: 144 U/L (ref 40–150)
ALP SERPL-CCNC: 147 U/L (ref 40–150)
ALP SERPL-CCNC: 85 U/L (ref 40–150)
ALP SERPL-CCNC: 93 U/L (ref 40–150)
ALP SERPL-CCNC: 94 U/L (ref 40–150)
ALT SERPL W P-5'-P-CCNC: 16 U/L (ref 0–70)
ALT SERPL W P-5'-P-CCNC: 16 U/L (ref 0–70)
ALT SERPL W P-5'-P-CCNC: 17 U/L
ALT SERPL W P-5'-P-CCNC: 17 U/L (ref 0–70)
ALT SERPL W P-5'-P-CCNC: 18 U/L (ref 0–70)
ALT SERPL W P-5'-P-CCNC: 18 U/L (ref 0–70)
ALT SERPL W P-5'-P-CCNC: 20 U/L (ref 0–70)
ALT SERPL W P-5'-P-CCNC: 21 U/L (ref 0–70)
ALT SERPL W P-5'-P-CCNC: 22 U/L (ref 0–70)
ALT SERPL W P-5'-P-CCNC: 27 U/L (ref 0–70)
ALT SERPL W P-5'-P-CCNC: 60 U/L (ref 0–70)
ANION GAP SERPL CALCULATED.3IONS-SCNC: 3 MMOL/L (ref 3–14)
ANION GAP SERPL CALCULATED.3IONS-SCNC: 3 MMOL/L (ref 3–14)
ANION GAP SERPL CALCULATED.3IONS-SCNC: 4 MMOL/L (ref 3–14)
ANION GAP SERPL CALCULATED.3IONS-SCNC: 6 MMOL/L (ref 3–14)
ANION GAP SERPL CALCULATED.3IONS-SCNC: 6 MMOL/L (ref 3–14)
APTT PPP: 26 SECONDS (ref 22–38)
APTT PPP: 27 SECONDS (ref 22–38)
AST SERPL W P-5'-P-CCNC: 13 U/L (ref 0–45)
AST SERPL W P-5'-P-CCNC: 13 U/L (ref 0–45)
AST SERPL W P-5'-P-CCNC: 16 U/L (ref 0–45)
AST SERPL W P-5'-P-CCNC: 16 U/L (ref 0–45)
AST SERPL W P-5'-P-CCNC: 18 U/L (ref 0–45)
AST SERPL W P-5'-P-CCNC: 18 U/L (ref 0–45)
AST SERPL W P-5'-P-CCNC: 20 U/L (ref 0–45)
AST SERPL W P-5'-P-CCNC: 20 U/L (ref 0–45)
AST SERPL W P-5'-P-CCNC: 24 U/L (ref 0–45)
AST SERPL W P-5'-P-CCNC: 32 U/L (ref 0–45)
AST SERPL W P-5'-P-CCNC: 52 U/L (ref 0–45)
BASOPHILS # BLD AUTO: 0 10E3/UL (ref 0–0.2)
BASOPHILS # BLD AUTO: 0.1 10E3/UL (ref 0–0.2)
BASOPHILS # BLD MANUAL: 0.2 10E3/UL (ref 0–0.2)
BASOPHILS NFR BLD AUTO: 1 %
BASOPHILS NFR BLD AUTO: 2 %
BASOPHILS NFR BLD AUTO: 2 %
BASOPHILS NFR BLD AUTO: 3 %
BASOPHILS NFR BLD MANUAL: 2 %
BILIRUB DIRECT SERPL-MCNC: 0.1 MG/DL (ref 0–0.2)
BILIRUB DIRECT SERPL-MCNC: <0.1 MG/DL (ref 0–0.2)
BILIRUB DIRECT SERPL-MCNC: <0.1 MG/DL (ref 0–0.2)
BILIRUB SERPL-MCNC: 0.1 MG/DL (ref 0.2–1.3)
BILIRUB SERPL-MCNC: 0.2 MG/DL (ref 0.2–1.3)
BILIRUB SERPL-MCNC: 0.2 MG/DL (ref 0.2–1.3)
BILIRUB SERPL-MCNC: 0.3 MG/DL (ref 0.2–1.3)
BILIRUB SERPL-MCNC: 0.3 MG/DL (ref 0.2–1.3)
BILIRUB SERPL-MCNC: 0.4 MG/DL (ref 0.2–1.3)
BILIRUB SERPL-MCNC: 0.4 MG/DL (ref 0.2–1.3)
BILIRUB SERPL-MCNC: 0.5 MG/DL (ref 0.2–1.3)
BILIRUB SERPL-MCNC: 0.6 MG/DL (ref 0.2–1.3)
BILIRUB SERPL-MCNC: 0.6 MG/DL (ref 0.2–1.3)
BILIRUB SERPL-MCNC: 0.7 MG/DL (ref 0.2–1.3)
BUN SERPL-MCNC: 11 MG/DL (ref 7–30)
BUN SERPL-MCNC: 13 MG/DL (ref 7–30)
BUN SERPL-MCNC: 13 MG/DL (ref 7–30)
BUN SERPL-MCNC: 14 MG/DL (ref 7–30)
BUN SERPL-MCNC: 16 MG/DL (ref 7–30)
BUN SERPL-MCNC: 16 MG/DL (ref 7–30)
BUN SERPL-MCNC: 18 MG/DL (ref 7–30)
BUN SERPL-MCNC: 19 MG/DL (ref 7–30)
CALCIUM SERPL-MCNC: 8 MG/DL (ref 8.5–10.1)
CALCIUM SERPL-MCNC: 8.6 MG/DL (ref 8.5–10.1)
CALCIUM SERPL-MCNC: 8.7 MG/DL (ref 8.5–10.1)
CALCIUM SERPL-MCNC: 8.8 MG/DL (ref 8.5–10.1)
CALCIUM SERPL-MCNC: 9 MG/DL (ref 8.5–10.1)
CALCIUM SERPL-MCNC: 9.1 MG/DL (ref 8.5–10.1)
CALCIUM SERPL-MCNC: 9.1 MG/DL (ref 8.5–10.1)
CALCIUM SERPL-MCNC: 9.3 MG/DL (ref 8.5–10.1)
CHLORIDE BLD-SCNC: 107 MMOL/L (ref 94–109)
CHLORIDE BLD-SCNC: 108 MMOL/L
CHLORIDE BLD-SCNC: 108 MMOL/L (ref 94–109)
CHLORIDE BLD-SCNC: 109 MMOL/L (ref 94–109)
CHLORIDE BLD-SCNC: 109 MMOL/L (ref 94–109)
CHLORIDE BLD-SCNC: 110 MMOL/L (ref 94–109)
CO2 SERPL-SCNC: 24 MMOL/L (ref 20–32)
CO2 SERPL-SCNC: 25 MMOL/L (ref 20–32)
CO2 SERPL-SCNC: 25 MMOL/L (ref 20–32)
CO2 SERPL-SCNC: 26 MMOL/L (ref 20–32)
CO2 SERPL-SCNC: 26 MMOL/L (ref 20–32)
CO2 SERPL-SCNC: 27 MMOL/L (ref 20–32)
CO2 SERPL-SCNC: 28 MMOL/L (ref 20–32)
CREAT SERPL-MCNC: 0.62 MG/DL
CREAT SERPL-MCNC: 0.66 MG/DL (ref 0.66–1.25)
CREAT SERPL-MCNC: 0.67 MG/DL (ref 0.66–1.25)
CREAT SERPL-MCNC: 0.7 MG/DL (ref 0.66–1.25)
CREAT SERPL-MCNC: 0.7 MG/DL (ref 0.66–1.25)
CREAT SERPL-MCNC: 0.72 MG/DL (ref 0.66–1.25)
CREAT SERPL-MCNC: 0.73 MG/DL (ref 0.66–1.25)
CREAT SERPL-MCNC: 0.76 MG/DL (ref 0.66–1.25)
CREAT SERPL-MCNC: 0.79 MG/DL (ref 0.66–1.25)
CREAT SERPL-MCNC: 0.82 MG/DL (ref 0.66–1.25)
EOSINOPHIL # BLD AUTO: 0.1 10E3/UL (ref 0–0.7)
EOSINOPHIL # BLD AUTO: 0.2 10E3/UL (ref 0–0.7)
EOSINOPHIL # BLD AUTO: 0.3 10E3/UL (ref 0–0.7)
EOSINOPHIL # BLD AUTO: 0.4 10E3/UL (ref 0–0.7)
EOSINOPHIL # BLD AUTO: 0.5 10E3/UL (ref 0–0.7)
EOSINOPHIL # BLD AUTO: 0.8 10E3/UL (ref 0–0.7)
EOSINOPHIL # BLD MANUAL: 0.2 10E3/UL (ref 0–0.7)
EOSINOPHIL NFR BLD AUTO: 11 %
EOSINOPHIL NFR BLD AUTO: 17 %
EOSINOPHIL NFR BLD AUTO: 2 %
EOSINOPHIL NFR BLD AUTO: 3 %
EOSINOPHIL NFR BLD AUTO: 3 %
EOSINOPHIL NFR BLD AUTO: 4 %
EOSINOPHIL NFR BLD AUTO: 5 %
EOSINOPHIL NFR BLD AUTO: 6 %
EOSINOPHIL NFR BLD AUTO: 6 %
EOSINOPHIL NFR BLD AUTO: 8 %
EOSINOPHIL NFR BLD AUTO: 9 %
EOSINOPHIL NFR BLD MANUAL: 3 %
ERYTHROCYTE [DISTWIDTH] IN BLOOD BY AUTOMATED COUNT: 15.6 % (ref 10–15)
ERYTHROCYTE [DISTWIDTH] IN BLOOD BY AUTOMATED COUNT: 15.7 % (ref 10–15)
ERYTHROCYTE [DISTWIDTH] IN BLOOD BY AUTOMATED COUNT: 16.1 % (ref 10–15)
ERYTHROCYTE [DISTWIDTH] IN BLOOD BY AUTOMATED COUNT: 16.4 % (ref 10–15)
ERYTHROCYTE [DISTWIDTH] IN BLOOD BY AUTOMATED COUNT: 16.5 % (ref 10–15)
ERYTHROCYTE [DISTWIDTH] IN BLOOD BY AUTOMATED COUNT: 16.9 % (ref 10–15)
ERYTHROCYTE [DISTWIDTH] IN BLOOD BY AUTOMATED COUNT: 16.9 % (ref 10–15)
ERYTHROCYTE [DISTWIDTH] IN BLOOD BY AUTOMATED COUNT: 17 % (ref 10–15)
ERYTHROCYTE [DISTWIDTH] IN BLOOD BY AUTOMATED COUNT: 17.2 % (ref 10–15)
ERYTHROCYTE [DISTWIDTH] IN BLOOD BY AUTOMATED COUNT: 18.8 % (ref 10–15)
ERYTHROCYTE [DISTWIDTH] IN BLOOD BY AUTOMATED COUNT: 20.2 % (ref 10–15)
GFR SERPL CREATININE-BSD FRML MDRD: >90 ML/MIN/1.73M2
GLUCOSE BLD-MCNC: 102 MG/DL (ref 70–99)
GLUCOSE BLD-MCNC: 103 MG/DL (ref 70–99)
GLUCOSE BLD-MCNC: 103 MG/DL (ref 70–99)
GLUCOSE BLD-MCNC: 105 MG/DL (ref 70–99)
GLUCOSE BLD-MCNC: 106 MG/DL (ref 70–99)
GLUCOSE BLD-MCNC: 107 MG/DL (ref 70–99)
GLUCOSE BLD-MCNC: 109 MG/DL (ref 79–116)
GLUCOSE BLD-MCNC: 111 MG/DL (ref 70–99)
GLUCOSE BLD-MCNC: 92 MG/DL (ref 70–99)
GLUCOSE BLD-MCNC: 94 MG/DL (ref 70–99)
GLUCOSE BLD-MCNC: 98 MG/DL (ref 70–99)
HCT VFR BLD AUTO: 34.8 % (ref 40–53)
HCT VFR BLD AUTO: 36.2 % (ref 40–53)
HCT VFR BLD AUTO: 36.2 % (ref 40–53)
HCT VFR BLD AUTO: 36.5 % (ref 40–53)
HCT VFR BLD AUTO: 36.9 % (ref 40–53)
HCT VFR BLD AUTO: 37.2 % (ref 40–53)
HCT VFR BLD AUTO: 37.5 % (ref 40–53)
HCT VFR BLD AUTO: 38.3 % (ref 40–53)
HCT VFR BLD AUTO: 38.5 % (ref 40–53)
HCT VFR BLD AUTO: 38.8 % (ref 40–53)
HCT VFR BLD AUTO: 39.1 % (ref 40–53)
HCT VFR BLD AUTO: 39.1 % (ref 40–53)
HCT VFR BLD AUTO: 39.3 % (ref 40–53)
HGB BLD-MCNC: 11.2 G/DL (ref 13.3–17.7)
HGB BLD-MCNC: 11.3 G/DL (ref 13.3–17.7)
HGB BLD-MCNC: 11.4 G/DL (ref 13.3–17.7)
HGB BLD-MCNC: 11.5 G/DL (ref 13.3–17.7)
HGB BLD-MCNC: 11.7 G/DL (ref 13.3–17.7)
HGB BLD-MCNC: 11.8 G/DL (ref 13.3–17.7)
HGB BLD-MCNC: 12.2 G/DL (ref 13.3–17.7)
HGB BLD-MCNC: 12.2 G/DL (ref 13.3–17.7)
HGB BLD-MCNC: 12.3 G/DL (ref 13.3–17.7)
HGB BLD-MCNC: 12.4 G/DL (ref 13.3–17.7)
HGB BLD-MCNC: 12.4 G/DL (ref 13.3–17.7)
HGB BLD-MCNC: 12.5 G/DL (ref 13.3–17.7)
HGB BLD-MCNC: 12.7 G/DL (ref 13.3–17.7)
HOLD SPECIMEN: NORMAL
HOLD SPECIMEN: NORMAL
IMM GRANULOCYTES # BLD: 0 10E3/UL
IMM GRANULOCYTES # BLD: 0.2 10E3/UL
IMM GRANULOCYTES NFR BLD: 0 %
IMM GRANULOCYTES NFR BLD: 1 %
IMM GRANULOCYTES NFR BLD: 3 %
INR PPP: 1.01 (ref 0.85–1.15)
INR PPP: 1.03 (ref 0.85–1.15)
INR PPP: 1.1 (ref 0.85–1.15)
KOH PREPARATION: NORMAL
KOH PREPARATION: NORMAL
LYMPHOCYTES # BLD AUTO: 0.4 10E3/UL (ref 0.8–5.3)
LYMPHOCYTES # BLD AUTO: 0.5 10E3/UL (ref 0.8–5.3)
LYMPHOCYTES # BLD AUTO: 0.5 10E3/UL (ref 0.8–5.3)
LYMPHOCYTES # BLD AUTO: 0.6 10E3/UL (ref 0.8–5.3)
LYMPHOCYTES # BLD AUTO: 0.7 10E3/UL (ref 0.8–5.3)
LYMPHOCYTES # BLD AUTO: 0.8 10E3/UL (ref 0.8–5.3)
LYMPHOCYTES # BLD AUTO: 1.2 10E3/UL (ref 0.8–5.3)
LYMPHOCYTES # BLD MANUAL: 1.4 10E3/UL (ref 0.8–5.3)
LYMPHOCYTES NFR BLD AUTO: 10 %
LYMPHOCYTES NFR BLD AUTO: 11 %
LYMPHOCYTES NFR BLD AUTO: 12 %
LYMPHOCYTES NFR BLD AUTO: 12 %
LYMPHOCYTES NFR BLD AUTO: 13 %
LYMPHOCYTES NFR BLD AUTO: 15 %
LYMPHOCYTES NFR BLD AUTO: 16 %
LYMPHOCYTES NFR BLD AUTO: 17 %
LYMPHOCYTES NFR BLD AUTO: 17 %
LYMPHOCYTES NFR BLD AUTO: 23 %
LYMPHOCYTES NFR BLD AUTO: 25 %
LYMPHOCYTES NFR BLD MANUAL: 19 %
MAGNESIUM SERPL-MCNC: 2.1 MG/DL (ref 1.6–2.3)
MAGNESIUM SERPL-MCNC: 2.2 MG/DL (ref 1.6–2.3)
MAGNESIUM SERPL-MCNC: 2.3 MG/DL (ref 1.6–2.3)
MAGNESIUM SERPL-MCNC: 2.3 MG/DL (ref 1.6–2.3)
MAGNESIUM SERPL-MCNC: 2.4 MG/DL (ref 1.6–2.3)
MAGNESIUM SERPL-MCNC: 2.5 MG/DL (ref 1.6–2.3)
MAGNESIUM SERPL-MCNC: 2.5 MG/DL (ref 1.6–2.3)
MCH RBC QN AUTO: 28.1 PG (ref 26.5–33)
MCH RBC QN AUTO: 28.6 PG (ref 26.5–33)
MCH RBC QN AUTO: 28.6 PG (ref 26.5–33)
MCH RBC QN AUTO: 28.7 PG (ref 26.5–33)
MCH RBC QN AUTO: 28.8 PG (ref 26.5–33)
MCH RBC QN AUTO: 28.9 PG (ref 26.5–33)
MCH RBC QN AUTO: 28.9 PG (ref 26.5–33)
MCH RBC QN AUTO: 29 PG (ref 26.5–33)
MCH RBC QN AUTO: 29.1 PG (ref 26.5–33)
MCHC RBC AUTO-ENTMCNC: 30.9 G/DL (ref 31.5–36.5)
MCHC RBC AUTO-ENTMCNC: 31.2 G/DL (ref 31.5–36.5)
MCHC RBC AUTO-ENTMCNC: 31.5 G/DL (ref 31.5–36.5)
MCHC RBC AUTO-ENTMCNC: 31.7 G/DL (ref 31.5–36.5)
MCHC RBC AUTO-ENTMCNC: 31.7 G/DL (ref 31.5–36.5)
MCHC RBC AUTO-ENTMCNC: 31.8 G/DL (ref 31.5–36.5)
MCHC RBC AUTO-ENTMCNC: 31.9 G/DL (ref 31.5–36.5)
MCHC RBC AUTO-ENTMCNC: 32 G/DL (ref 31.5–36.5)
MCHC RBC AUTO-ENTMCNC: 32 G/DL (ref 31.5–36.5)
MCHC RBC AUTO-ENTMCNC: 32.2 G/DL (ref 31.5–36.5)
MCHC RBC AUTO-ENTMCNC: 32.3 G/DL (ref 31.5–36.5)
MCHC RBC AUTO-ENTMCNC: 32.5 G/DL (ref 31.5–36.5)
MCHC RBC AUTO-ENTMCNC: 32.5 G/DL (ref 31.5–36.5)
MCV RBC AUTO: 88 FL (ref 78–100)
MCV RBC AUTO: 89 FL (ref 78–100)
MCV RBC AUTO: 90 FL (ref 78–100)
MCV RBC AUTO: 91 FL (ref 78–100)
MCV RBC AUTO: 92 FL (ref 78–100)
MCV RBC AUTO: 93 FL (ref 78–100)
MONOCYTES # BLD AUTO: 0.2 10E3/UL (ref 0–1.3)
MONOCYTES # BLD AUTO: 0.3 10E3/UL (ref 0–1.3)
MONOCYTES # BLD AUTO: 0.3 10E3/UL (ref 0–1.3)
MONOCYTES # BLD AUTO: 0.4 10E3/UL (ref 0–1.3)
MONOCYTES # BLD AUTO: 0.4 10E3/UL (ref 0–1.3)
MONOCYTES # BLD AUTO: 0.5 10E3/UL (ref 0–1.3)
MONOCYTES # BLD AUTO: 0.6 10E3/UL (ref 0–1.3)
MONOCYTES # BLD AUTO: 0.7 10E3/UL (ref 0–1.3)
MONOCYTES # BLD AUTO: 0.8 10E3/UL (ref 0–1.3)
MONOCYTES # BLD MANUAL: 0.5 10E3/UL (ref 0–1.3)
MONOCYTES NFR BLD AUTO: 10 %
MONOCYTES NFR BLD AUTO: 11 %
MONOCYTES NFR BLD AUTO: 12 %
MONOCYTES NFR BLD AUTO: 14 %
MONOCYTES NFR BLD AUTO: 5 %
MONOCYTES NFR BLD AUTO: 9 %
MONOCYTES NFR BLD MANUAL: 7 %
MYELOCYTES # BLD MANUAL: 0.1 10E3/UL
MYELOCYTES NFR BLD MANUAL: 1 %
NEUTROPHILS # BLD AUTO: 1.5 10E3/UL (ref 1.6–8.3)
NEUTROPHILS # BLD AUTO: 1.9 10E3/UL (ref 1.6–8.3)
NEUTROPHILS # BLD AUTO: 2.3 10E3/UL (ref 1.6–8.3)
NEUTROPHILS # BLD AUTO: 2.4 10E3/UL (ref 1.6–8.3)
NEUTROPHILS # BLD AUTO: 2.6 10E3/UL (ref 1.6–8.3)
NEUTROPHILS # BLD AUTO: 2.7 10E3/UL (ref 1.6–8.3)
NEUTROPHILS # BLD AUTO: 3.3 10E3/UL (ref 1.6–8.3)
NEUTROPHILS # BLD AUTO: 3.5 10E3/UL (ref 1.6–8.3)
NEUTROPHILS # BLD AUTO: 3.5 10E3/UL (ref 1.6–8.3)
NEUTROPHILS # BLD AUTO: 4 10E3/UL (ref 1.6–8.3)
NEUTROPHILS # BLD AUTO: 5.2 10E3/UL (ref 1.6–8.3)
NEUTROPHILS # BLD MANUAL: 5.1 10E3/UL (ref 1.6–8.3)
NEUTROPHILS NFR BLD AUTO: 58 %
NEUTROPHILS NFR BLD AUTO: 58 %
NEUTROPHILS NFR BLD AUTO: 62 %
NEUTROPHILS NFR BLD AUTO: 65 %
NEUTROPHILS NFR BLD AUTO: 65 %
NEUTROPHILS NFR BLD AUTO: 67 %
NEUTROPHILS NFR BLD AUTO: 68 %
NEUTROPHILS NFR BLD AUTO: 68 %
NEUTROPHILS NFR BLD AUTO: 70 %
NEUTROPHILS NFR BLD AUTO: 72 %
NEUTROPHILS NFR BLD AUTO: 74 %
NEUTROPHILS NFR BLD MANUAL: 68 %
NRBC # BLD AUTO: 0 10E3/UL
NRBC BLD AUTO-RTO: 0 /100
PATH REV: ABNORMAL
PLAT MORPH BLD: ABNORMAL
PLATELET # BLD AUTO: 217 10E3/UL (ref 150–450)
PLATELET # BLD AUTO: 246 10E3/UL (ref 150–450)
PLATELET # BLD AUTO: 250 10E3/UL (ref 150–450)
PLATELET # BLD AUTO: 250 10E3/UL (ref 150–450)
PLATELET # BLD AUTO: 258 10E3/UL (ref 150–450)
PLATELET # BLD AUTO: 267 10E3/UL (ref 150–450)
PLATELET # BLD AUTO: 267 10E3/UL (ref 150–450)
PLATELET # BLD AUTO: 272 10E3/UL (ref 150–450)
PLATELET # BLD AUTO: 277 10E3/UL (ref 150–450)
PLATELET # BLD AUTO: 281 10E3/UL (ref 150–450)
PLATELET # BLD AUTO: 288 10E3/UL (ref 150–450)
PLATELET # BLD AUTO: 293 10E3/UL (ref 150–450)
PLATELET # BLD AUTO: 311 10E3/UL (ref 150–450)
POTASSIUM BLD-SCNC: 3.9 MMOL/L (ref 3.4–5.3)
POTASSIUM BLD-SCNC: 3.9 MMOL/L (ref 3.4–5.3)
POTASSIUM BLD-SCNC: 4 MMOL/L (ref 3.4–5.3)
POTASSIUM BLD-SCNC: 4 MMOL/L (ref 3.4–5.3)
POTASSIUM BLD-SCNC: 4.1 MMOL/L (ref 3.4–5.3)
POTASSIUM BLD-SCNC: 4.2 MMOL/L (ref 3.4–5.3)
POTASSIUM BLD-SCNC: 4.3 MMOL/L (ref 3.4–5.3)
PROT SERPL-MCNC: 6.5 G/DL (ref 6.8–8.8)
PROT SERPL-MCNC: 6.6 G/DL (ref 6.8–8.8)
PROT SERPL-MCNC: 6.7 G/DL (ref 6.8–8.8)
PROT SERPL-MCNC: 6.8 G/DL (ref 6.8–8.8)
PROT SERPL-MCNC: 6.8 G/DL (ref 6.8–8.8)
PROT SERPL-MCNC: 7 G/DL (ref 6.8–8.8)
PROT SERPL-MCNC: 7.2 G/DL (ref 6.8–8.8)
RBC # BLD AUTO: 3.88 10E6/UL (ref 4.4–5.9)
RBC # BLD AUTO: 3.89 10E6/UL (ref 4.4–5.9)
RBC # BLD AUTO: 4 10E6/UL (ref 4.4–5.9)
RBC # BLD AUTO: 4.03 10E6/UL (ref 4.4–5.9)
RBC # BLD AUTO: 4.06 10E6/UL (ref 4.4–5.9)
RBC # BLD AUTO: 4.09 10E6/UL (ref 4.4–5.9)
RBC # BLD AUTO: 4.25 10E6/UL (ref 4.4–5.9)
RBC # BLD AUTO: 4.25 10E6/UL (ref 4.4–5.9)
RBC # BLD AUTO: 4.29 10E6/UL (ref 4.4–5.9)
RBC # BLD AUTO: 4.31 10E6/UL (ref 4.4–5.9)
RBC # BLD AUTO: 4.33 10E6/UL (ref 4.4–5.9)
RBC # BLD AUTO: 4.37 10E6/UL (ref 4.4–5.9)
RBC # BLD AUTO: 4.39 10E6/UL (ref 4.4–5.9)
RBC MORPH BLD: ABNORMAL
SARS-COV-2 RNA RESP QL NAA+PROBE: NEGATIVE
SODIUM SERPL-SCNC: 138 MMOL/L (ref 133–144)
SODIUM SERPL-SCNC: 138 MMOL/L (ref 133–144)
SODIUM SERPL-SCNC: 139 MMOL/L (ref 133–144)
SODIUM SERPL-SCNC: 140 MMOL/L (ref 133–144)
SODIUM SERPL-SCNC: 141 MMOL/L (ref 133–144)
WBC # BLD AUTO: 2.6 10E3/UL (ref 4–11)
WBC # BLD AUTO: 2.8 10E3/UL (ref 4–11)
WBC # BLD AUTO: 3.4 10E3/UL (ref 4–11)
WBC # BLD AUTO: 3.8 10E3/UL (ref 4–11)
WBC # BLD AUTO: 4.4 10E3/UL (ref 4–11)
WBC # BLD AUTO: 4.5 10E3/UL (ref 4–11)
WBC # BLD AUTO: 4.5 10E3/UL (ref 4–11)
WBC # BLD AUTO: 4.7 10E3/UL (ref 4–11)
WBC # BLD AUTO: 4.8 10E3/UL (ref 4–11)
WBC # BLD AUTO: 4.8 10E3/UL (ref 4–11)
WBC # BLD AUTO: 5.1 10E3/UL (ref 4–11)
WBC # BLD AUTO: 6 10E3/UL (ref 4–11)
WBC # BLD AUTO: 7.5 10E3/UL (ref 4–11)

## 2021-01-01 PROCEDURE — 85025 COMPLETE CBC W/AUTO DIFF WBC: CPT | Performed by: INTERNAL MEDICINE

## 2021-01-01 PROCEDURE — 999N000134 HC STATISTIC PP CARE STAGE 3

## 2021-01-01 PROCEDURE — 99213 OFFICE O/P EST LOW 20 MIN: CPT | Performed by: PHYSICIAN ASSISTANT

## 2021-01-01 PROCEDURE — 71260 CT THORAX DX C+: CPT | Mod: TC | Performed by: RADIOLOGY

## 2021-01-01 PROCEDURE — 77371 SRS MULTISOURCE: CPT | Performed by: RADIOLOGY

## 2021-01-01 PROCEDURE — 99215 OFFICE O/P EST HI 40 MIN: CPT | Mod: 95 | Performed by: NURSE PRACTITIONER

## 2021-01-01 PROCEDURE — 99152 MOD SED SAME PHYS/QHP 5/>YRS: CPT | Mod: GC | Performed by: RADIOLOGY

## 2021-01-01 PROCEDURE — 99207 PR NO CHARGE LOS: CPT

## 2021-01-01 PROCEDURE — C1887 CATHETER, GUIDING: HCPCS

## 2021-01-01 PROCEDURE — 81003 URINALYSIS AUTO W/O SCOPE: CPT | Performed by: STUDENT IN AN ORGANIZED HEALTH CARE EDUCATION/TRAINING PROGRAM

## 2021-01-01 PROCEDURE — 99215 OFFICE O/P EST HI 40 MIN: CPT | Performed by: STUDENT IN AN ORGANIZED HEALTH CARE EDUCATION/TRAINING PROGRAM

## 2021-01-01 PROCEDURE — 255N000002 HC RX 255 OP 636: Performed by: RADIOLOGY

## 2021-01-01 PROCEDURE — 999N001193 HC VIDEO/TELEPHONE VISIT; NO CHARGE

## 2021-01-01 PROCEDURE — 36247 INS CATH ABD/L-EXT ART 3RD: CPT | Mod: GC | Performed by: RADIOLOGY

## 2021-01-01 PROCEDURE — 96375 TX/PRO/DX INJ NEW DRUG ADDON: CPT

## 2021-01-01 PROCEDURE — 272N000504 HC NEEDLE CR4

## 2021-01-01 PROCEDURE — A9581 GADOXETATE DISODIUM INJ: HCPCS | Mod: JW | Performed by: RADIOLOGY

## 2021-01-01 PROCEDURE — 77334 RADIATION TREATMENT AID(S): CPT | Mod: 26 | Performed by: RADIOLOGY

## 2021-01-01 PROCEDURE — 83735 ASSAY OF MAGNESIUM: CPT | Performed by: NURSE PRACTITIONER

## 2021-01-01 PROCEDURE — 258N000003 HC RX IP 258 OP 636: Performed by: STUDENT IN AN ORGANIZED HEALTH CARE EDUCATION/TRAINING PROGRAM

## 2021-01-01 PROCEDURE — 96375 TX/PRO/DX INJ NEW DRUG ADDON: CPT | Performed by: NURSE PRACTITIONER

## 2021-01-01 PROCEDURE — 272N000143 HC KIT CR3

## 2021-01-01 PROCEDURE — 83735 ASSAY OF MAGNESIUM: CPT | Performed by: INTERNAL MEDICINE

## 2021-01-01 PROCEDURE — 85004 AUTOMATED DIFF WBC COUNT: CPT | Performed by: STUDENT IN AN ORGANIZED HEALTH CARE EDUCATION/TRAINING PROGRAM

## 2021-01-01 PROCEDURE — 96417 CHEMO IV INFUS EACH ADDL SEQ: CPT

## 2021-01-01 PROCEDURE — 99212 OFFICE O/P EST SF 10 MIN: CPT | Mod: GC | Performed by: RADIOLOGY

## 2021-01-01 PROCEDURE — 80053 COMPREHEN METABOLIC PANEL: CPT | Performed by: STUDENT IN AN ORGANIZED HEALTH CARE EDUCATION/TRAINING PROGRAM

## 2021-01-01 PROCEDURE — 74183 MRI ABD W/O CNTR FLWD CNTR: CPT | Performed by: RADIOLOGY

## 2021-01-01 PROCEDURE — 96367 TX/PROPH/DG ADDL SEQ IV INF: CPT

## 2021-01-01 PROCEDURE — 91300 PR COVID VAC PFIZER DIL RECON 30 MCG/0.3 ML IM: CPT

## 2021-01-01 PROCEDURE — 90682 RIV4 VACC RECOMBINANT DNA IM: CPT | Performed by: STUDENT IN AN ORGANIZED HEALTH CARE EDUCATION/TRAINING PROGRAM

## 2021-01-01 PROCEDURE — 70553 MRI BRAIN STEM W/O & W/DYE: CPT | Mod: 26 | Performed by: RADIOLOGY

## 2021-01-01 PROCEDURE — 258N000003 HC RX IP 258 OP 636: Performed by: NURSE PRACTITIONER

## 2021-01-01 PROCEDURE — 250N000011 HC RX IP 250 OP 636: Performed by: RADIOLOGY

## 2021-01-01 PROCEDURE — 36247 INS CATH ABD/L-EXT ART 3RD: CPT

## 2021-01-01 PROCEDURE — 96413 CHEMO IV INFUSION 1 HR: CPT

## 2021-01-01 PROCEDURE — 36415 COLL VENOUS BLD VENIPUNCTURE: CPT

## 2021-01-01 PROCEDURE — C1760 CLOSURE DEV, VASC: HCPCS

## 2021-01-01 PROCEDURE — 77300 RADIATION THERAPY DOSE PLAN: CPT | Performed by: RADIOLOGY

## 2021-01-01 PROCEDURE — 96416 CHEMO PROLONG INFUSE W/PUMP: CPT | Performed by: NURSE PRACTITIONER

## 2021-01-01 PROCEDURE — 77370 RADIATION PHYSICS CONSULT: CPT | Performed by: RADIOLOGY

## 2021-01-01 PROCEDURE — 70552 MRI BRAIN STEM W/DYE: CPT

## 2021-01-01 PROCEDURE — 250N000011 HC RX IP 250 OP 636: Performed by: STUDENT IN AN ORGANIZED HEALTH CARE EDUCATION/TRAINING PROGRAM

## 2021-01-01 PROCEDURE — U0005 INFEC AGEN DETEC AMPLI PROBE: HCPCS

## 2021-01-01 PROCEDURE — 70553 MRI BRAIN STEM W/O & W/DYE: CPT

## 2021-01-01 PROCEDURE — 85025 COMPLETE CBC W/AUTO DIFF WBC: CPT | Performed by: NURSE PRACTITIONER

## 2021-01-01 PROCEDURE — 96413 CHEMO IV INFUSION 1 HR: CPT | Performed by: NURSE PRACTITIONER

## 2021-01-01 PROCEDURE — 80048 BASIC METABOLIC PNL TOTAL CA: CPT | Performed by: NURSE PRACTITIONER

## 2021-01-01 PROCEDURE — A9585 GADOBUTROL INJECTION: HCPCS | Performed by: RADIOLOGY

## 2021-01-01 PROCEDURE — 83735 ASSAY OF MAGNESIUM: CPT | Performed by: STUDENT IN AN ORGANIZED HEALTH CARE EDUCATION/TRAINING PROGRAM

## 2021-01-01 PROCEDURE — 74183 MRI ABD W/O CNTR FLWD CNTR: CPT | Mod: 26 | Performed by: RADIOLOGY

## 2021-01-01 PROCEDURE — 250N000013 HC RX MED GY IP 250 OP 250 PS 637: Performed by: STUDENT IN AN ORGANIZED HEALTH CARE EDUCATION/TRAINING PROGRAM

## 2021-01-01 PROCEDURE — 74177 CT ABD & PELVIS W/CONTRAST: CPT | Performed by: RADIOLOGY

## 2021-01-01 PROCEDURE — 250N000009 HC RX 250: Performed by: STUDENT IN AN ORGANIZED HEALTH CARE EDUCATION/TRAINING PROGRAM

## 2021-01-01 PROCEDURE — C9113 INJ PANTOPRAZOLE SODIUM, VIA: HCPCS | Performed by: NURSE PRACTITIONER

## 2021-01-01 PROCEDURE — 36248 INS CATH ABD/L-EXT ART ADDL: CPT

## 2021-01-01 PROCEDURE — 78830 RP LOCLZJ TUM SPECT W/CT 1: CPT

## 2021-01-01 PROCEDURE — 85730 THROMBOPLASTIN TIME PARTIAL: CPT | Performed by: NURSE PRACTITIONER

## 2021-01-01 PROCEDURE — 77263 THER RADIOLOGY TX PLNG CPLX: CPT | Performed by: RADIOLOGY

## 2021-01-01 PROCEDURE — 74177 CT ABD & PELVIS W/CONTRAST: CPT | Mod: TC | Performed by: RADIOLOGY

## 2021-01-01 PROCEDURE — 36248 INS CATH ABD/L-EXT ART ADDL: CPT | Mod: XS | Performed by: RADIOLOGY

## 2021-01-01 PROCEDURE — 85610 PROTHROMBIN TIME: CPT | Performed by: NURSE PRACTITIONER

## 2021-01-01 PROCEDURE — 77300 RADIATION THERAPY DOSE PLAN: CPT | Mod: 26 | Performed by: STUDENT IN AN ORGANIZED HEALTH CARE EDUCATION/TRAINING PROGRAM

## 2021-01-01 PROCEDURE — C1769 GUIDE WIRE: HCPCS

## 2021-01-01 PROCEDURE — 77432 STEREOTACTIC RADIATION TRMT: CPT | Performed by: RADIOLOGY

## 2021-01-01 PROCEDURE — 82310 ASSAY OF CALCIUM: CPT | Performed by: NURSE PRACTITIONER

## 2021-01-01 PROCEDURE — 77470 SPECIAL RADIATION TREATMENT: CPT | Performed by: RADIOLOGY

## 2021-01-01 PROCEDURE — 77261 THER RADIOLOGY TX PLNG SMPL: CPT | Mod: GC | Performed by: RADIOLOGY

## 2021-01-01 PROCEDURE — 96367 TX/PROPH/DG ADDL SEQ IV INF: CPT | Performed by: NURSE PRACTITIONER

## 2021-01-01 PROCEDURE — 37243 VASC EMBOLIZE/OCCLUDE ORGAN: CPT | Mod: GC | Performed by: RADIOLOGY

## 2021-01-01 PROCEDURE — U0003 INFECTIOUS AGENT DETECTION BY NUCLEIC ACID (DNA OR RNA); SEVERE ACUTE RESPIRATORY SYNDROME CORONAVIRUS 2 (SARS-COV-2) (CORONAVIRUS DISEASE [COVID-19]), AMPLIFIED PROBE TECHNIQUE, MAKING USE OF HIGH THROUGHPUT TECHNOLOGIES AS DESCRIBED BY CMS-2020-01-R: HCPCS

## 2021-01-01 PROCEDURE — 99207 PR NO CHARGE NURSE ONLY: CPT

## 2021-01-01 PROCEDURE — 82248 BILIRUBIN DIRECT: CPT | Performed by: NURSE PRACTITIONER

## 2021-01-01 PROCEDURE — 80076 HEPATIC FUNCTION PANEL: CPT | Performed by: STUDENT IN AN ORGANIZED HEALTH CARE EDUCATION/TRAINING PROGRAM

## 2021-01-01 PROCEDURE — 272N000192 HC ACCESSORY CR2

## 2021-01-01 PROCEDURE — 77295 3-D RADIOTHERAPY PLAN: CPT | Mod: 26 | Performed by: RADIOLOGY

## 2021-01-01 PROCEDURE — 278N000001 HC RX 278: Performed by: RADIOLOGY

## 2021-01-01 PROCEDURE — 37243 VASC EMBOLIZE/OCCLUDE ORGAN: CPT

## 2021-01-01 PROCEDURE — 272N000566 HC SHEATH CR3

## 2021-01-01 PROCEDURE — 77300 RADIATION THERAPY DOSE PLAN: CPT | Mod: 26 | Performed by: RADIOLOGY

## 2021-01-01 PROCEDURE — 36591 DRAW BLOOD OFF VENOUS DEVICE: CPT

## 2021-01-01 PROCEDURE — 999N000102 HC STATISTIC NO CHARGE CLINIC VISIT

## 2021-01-01 PROCEDURE — 96416 CHEMO PROLONG INFUSE W/PUMP: CPT | Performed by: INTERNAL MEDICINE

## 2021-01-01 PROCEDURE — 99214 OFFICE O/P EST MOD 30 MIN: CPT | Mod: 95 | Performed by: NURSE PRACTITIONER

## 2021-01-01 PROCEDURE — 78830 RP LOCLZJ TUM SPECT W/CT 1: CPT | Mod: 26

## 2021-01-01 PROCEDURE — 250N000009 HC RX 250: Performed by: RADIOLOGY

## 2021-01-01 PROCEDURE — 85610 PROTHROMBIN TIME: CPT

## 2021-01-01 PROCEDURE — 0002A PR COVID VAC PFIZER DIL RECON 30 MCG/0.3 ML IM: CPT

## 2021-01-01 PROCEDURE — 96417 CHEMO IV INFUS EACH ADDL SEQ: CPT | Performed by: NURSE PRACTITIONER

## 2021-01-01 PROCEDURE — 75774 ARTERY X-RAY EACH VESSEL: CPT | Mod: 26 | Performed by: RADIOLOGY

## 2021-01-01 PROCEDURE — 96413 CHEMO IV INFUSION 1 HR: CPT | Performed by: INTERNAL MEDICINE

## 2021-01-01 PROCEDURE — 80053 COMPREHEN METABOLIC PANEL: CPT | Performed by: INTERNAL MEDICINE

## 2021-01-01 PROCEDURE — 80053 COMPREHEN METABOLIC PANEL: CPT

## 2021-01-01 PROCEDURE — 99153 MOD SED SAME PHYS/QHP EA: CPT

## 2021-01-01 PROCEDURE — 96375 TX/PRO/DX INJ NEW DRUG ADDON: CPT | Performed by: INTERNAL MEDICINE

## 2021-01-01 PROCEDURE — 77334 RADIATION TREATMENT AID(S): CPT | Performed by: RADIOLOGY

## 2021-01-01 PROCEDURE — 75726 ARTERY X-RAYS ABDOMEN: CPT | Mod: 26 | Performed by: RADIOLOGY

## 2021-01-01 PROCEDURE — G0463 HOSPITAL OUTPT CLINIC VISIT: HCPCS

## 2021-01-01 PROCEDURE — 36591 DRAW BLOOD OFF VENOUS DEVICE: CPT | Performed by: NURSE PRACTITIONER

## 2021-01-01 PROCEDURE — 79445 NUCLEAR RX INTRA-ARTERIAL: CPT | Mod: 26 | Performed by: RADIOLOGY

## 2021-01-01 PROCEDURE — 74183 MRI ABD W/O CNTR FLWD CNTR: CPT

## 2021-01-01 PROCEDURE — 250N000011 HC RX IP 250 OP 636: Performed by: NURSE PRACTITIONER

## 2021-01-01 PROCEDURE — 76937 US GUIDE VASCULAR ACCESS: CPT

## 2021-01-01 PROCEDURE — 77470 SPECIAL RADIATION TREATMENT: CPT | Mod: 26 | Performed by: RADIOLOGY

## 2021-01-01 PROCEDURE — 0001A PR COVID VAC PFIZER DIL RECON 30 MCG/0.3 ML IM: CPT

## 2021-01-01 PROCEDURE — 36591 DRAW BLOOD OFF VENOUS DEVICE: CPT | Performed by: STUDENT IN AN ORGANIZED HEALTH CARE EDUCATION/TRAINING PROGRAM

## 2021-01-01 PROCEDURE — 75726 ARTERY X-RAYS ABDOMEN: CPT

## 2021-01-01 PROCEDURE — 85025 COMPLETE CBC W/AUTO DIFF WBC: CPT | Performed by: STUDENT IN AN ORGANIZED HEALTH CARE EDUCATION/TRAINING PROGRAM

## 2021-01-01 PROCEDURE — 75774 ARTERY X-RAY EACH VESSEL: CPT | Mod: XU,XS

## 2021-01-01 PROCEDURE — 75774 ARTERY X-RAY EACH VESSEL: CPT | Mod: XU

## 2021-01-01 PROCEDURE — G0008 ADMIN INFLUENZA VIRUS VAC: HCPCS | Performed by: STUDENT IN AN ORGANIZED HEALTH CARE EDUCATION/TRAINING PROGRAM

## 2021-01-01 PROCEDURE — 70552 MRI BRAIN STEM W/DYE: CPT | Mod: 26 | Performed by: RADIOLOGY

## 2021-01-01 PROCEDURE — 85025 COMPLETE CBC W/AUTO DIFF WBC: CPT

## 2021-01-01 PROCEDURE — 99152 MOD SED SAME PHYS/QHP 5/>YRS: CPT

## 2021-01-01 PROCEDURE — 250N000013 HC RX MED GY IP 250 OP 250 PS 637: Performed by: RADIOLOGY

## 2021-01-01 PROCEDURE — A9540 TC99M MAA: HCPCS | Performed by: RADIOLOGY

## 2021-01-01 PROCEDURE — 80053 COMPREHEN METABOLIC PANEL: CPT | Performed by: NURSE PRACTITIONER

## 2021-01-01 PROCEDURE — 75726 ARTERY X-RAYS ABDOMEN: CPT | Mod: XU

## 2021-01-01 PROCEDURE — 343N000001 HC RX 343: Performed by: RADIOLOGY

## 2021-01-01 PROCEDURE — 99204 OFFICE O/P NEW MOD 45 MIN: CPT | Mod: 95 | Performed by: RADIOLOGY

## 2021-01-01 PROCEDURE — 76937 US GUIDE VASCULAR ACCESS: CPT | Mod: 26 | Performed by: RADIOLOGY

## 2021-01-01 PROCEDURE — 999N000142 HC STATISTIC PROCEDURE PREP ONLY

## 2021-01-01 PROCEDURE — 87220 TISSUE EXAM FOR FUNGI: CPT | Performed by: FAMILY MEDICINE

## 2021-01-01 PROCEDURE — 85027 COMPLETE CBC AUTOMATED: CPT | Performed by: NURSE PRACTITIONER

## 2021-01-01 PROCEDURE — 82947 ASSAY GLUCOSE BLOOD QUANT: CPT | Performed by: FAMILY MEDICINE

## 2021-01-01 PROCEDURE — 71260 CT THORAX DX C+: CPT | Performed by: RADIOLOGY

## 2021-01-01 PROCEDURE — 99213 OFFICE O/P EST LOW 20 MIN: CPT | Performed by: FAMILY MEDICINE

## 2021-01-01 PROCEDURE — 79445 NUCLEAR RX INTRA-ARTERIAL: CPT | Mod: 26 | Performed by: STUDENT IN AN ORGANIZED HEALTH CARE EDUCATION/TRAINING PROGRAM

## 2021-01-01 PROCEDURE — 36248 INS CATH ABD/L-EXT ART ADDL: CPT | Mod: GC | Performed by: RADIOLOGY

## 2021-01-01 PROCEDURE — 77295 3-D RADIOTHERAPY PLAN: CPT | Performed by: RADIOLOGY

## 2021-01-01 PROCEDURE — C2616 BRACHYTX, NON-STR,YTTRIUM-90: HCPCS | Performed by: RADIOLOGY

## 2021-01-01 PROCEDURE — S0028 INJECTION, FAMOTIDINE, 20 MG: HCPCS | Performed by: NURSE PRACTITIONER

## 2021-01-01 PROCEDURE — 99215 OFFICE O/P EST HI 40 MIN: CPT | Mod: 95 | Performed by: STUDENT IN AN ORGANIZED HEALTH CARE EDUCATION/TRAINING PROGRAM

## 2021-01-01 PROCEDURE — 36415 COLL VENOUS BLD VENIPUNCTURE: CPT | Performed by: FAMILY MEDICINE

## 2021-01-01 RX ORDER — ONDANSETRON 2 MG/ML
4 INJECTION INTRAMUSCULAR; INTRAVENOUS EVERY 6 HOURS PRN
Status: CANCELLED
Start: 2021-01-01

## 2021-01-01 RX ORDER — ALBUTEROL SULFATE 90 UG/1
1-2 AEROSOL, METERED RESPIRATORY (INHALATION)
Status: CANCELLED
Start: 2021-01-01

## 2021-01-01 RX ORDER — EPINEPHRINE 1 MG/ML
0.3 INJECTION, SOLUTION INTRAMUSCULAR; SUBCUTANEOUS EVERY 5 MIN PRN
Status: CANCELLED | OUTPATIENT
Start: 2021-01-01

## 2021-01-01 RX ORDER — HEPARIN SODIUM (PORCINE) LOCK FLUSH IV SOLN 100 UNIT/ML 100 UNIT/ML
5 SOLUTION INTRAVENOUS
Status: CANCELLED | OUTPATIENT
Start: 2021-01-01

## 2021-01-01 RX ORDER — HYDROMORPHONE HYDROCHLORIDE 2 MG/1
2-4 TABLET ORAL EVERY 4 HOURS PRN
Status: DISCONTINUED | OUTPATIENT
Start: 2021-01-01 | End: 2021-01-01 | Stop reason: HOSPADM

## 2021-01-01 RX ORDER — HEPARIN SODIUM,PORCINE 10 UNIT/ML
5 VIAL (ML) INTRAVENOUS
Status: CANCELLED | OUTPATIENT
Start: 2022-01-01

## 2021-01-01 RX ORDER — NALOXONE HYDROCHLORIDE 0.4 MG/ML
0.2 INJECTION, SOLUTION INTRAMUSCULAR; INTRAVENOUS; SUBCUTANEOUS
Status: CANCELLED | OUTPATIENT
Start: 2021-01-01

## 2021-01-01 RX ORDER — METHYLPREDNISOLONE SODIUM SUCCINATE 125 MG/2ML
125 INJECTION, POWDER, LYOPHILIZED, FOR SOLUTION INTRAMUSCULAR; INTRAVENOUS
Status: CANCELLED
Start: 2021-01-01

## 2021-01-01 RX ORDER — HEPARIN SODIUM (PORCINE) LOCK FLUSH IV SOLN 100 UNIT/ML 100 UNIT/ML
5 SOLUTION INTRAVENOUS
Status: DISCONTINUED | OUTPATIENT
Start: 2021-01-01 | End: 2021-01-01 | Stop reason: HOSPADM

## 2021-01-01 RX ORDER — HEPARIN SODIUM,PORCINE 10 UNIT/ML
5 VIAL (ML) INTRAVENOUS
Status: CANCELLED | OUTPATIENT
Start: 2021-01-01

## 2021-01-01 RX ORDER — LORAZEPAM 2 MG/ML
0.5 INJECTION INTRAMUSCULAR EVERY 4 HOURS PRN
Status: CANCELLED | OUTPATIENT
Start: 2021-01-01

## 2021-01-01 RX ORDER — MEPERIDINE HYDROCHLORIDE 25 MG/ML
25 INJECTION INTRAMUSCULAR; INTRAVENOUS; SUBCUTANEOUS EVERY 30 MIN PRN
Status: CANCELLED | OUTPATIENT
Start: 2021-01-01

## 2021-01-01 RX ORDER — ALBUTEROL SULFATE 0.83 MG/ML
2.5 SOLUTION RESPIRATORY (INHALATION)
Status: CANCELLED | OUTPATIENT
Start: 2021-01-01

## 2021-01-01 RX ORDER — FLUMAZENIL 0.1 MG/ML
0.2 INJECTION, SOLUTION INTRAVENOUS
Status: DISCONTINUED | OUTPATIENT
Start: 2021-01-01 | End: 2021-01-01 | Stop reason: HOSPADM

## 2021-01-01 RX ORDER — DIPHENHYDRAMINE HYDROCHLORIDE 50 MG/ML
50 INJECTION INTRAMUSCULAR; INTRAVENOUS
Status: CANCELLED
Start: 2021-01-01

## 2021-01-01 RX ORDER — DIPHENHYDRAMINE HCL 25 MG
50 CAPSULE ORAL ONCE
Status: CANCELLED
Start: 2021-01-01

## 2021-01-01 RX ORDER — FENTANYL CITRATE 50 UG/ML
25-50 INJECTION, SOLUTION INTRAMUSCULAR; INTRAVENOUS EVERY 5 MIN PRN
Status: DISCONTINUED | OUTPATIENT
Start: 2021-01-01 | End: 2021-01-01 | Stop reason: HOSPADM

## 2021-01-01 RX ORDER — DIPHENHYDRAMINE HCL 25 MG
50 CAPSULE ORAL ONCE
Status: COMPLETED | OUTPATIENT
Start: 2021-01-01 | End: 2021-01-01

## 2021-01-01 RX ORDER — MEPERIDINE HYDROCHLORIDE 25 MG/ML
25 INJECTION INTRAMUSCULAR; INTRAVENOUS; SUBCUTANEOUS EVERY 30 MIN PRN
Status: CANCELLED | OUTPATIENT
Start: 2022-01-01

## 2021-01-01 RX ORDER — ONDANSETRON 2 MG/ML
4 INJECTION INTRAMUSCULAR; INTRAVENOUS ONCE
Status: COMPLETED | OUTPATIENT
Start: 2021-01-01 | End: 2021-01-01

## 2021-01-01 RX ORDER — AMPICILLIN AND SULBACTAM 2; 1 G/1; G/1
3 INJECTION, POWDER, FOR SOLUTION INTRAMUSCULAR; INTRAVENOUS
Status: COMPLETED | OUTPATIENT
Start: 2021-01-01 | End: 2021-01-01

## 2021-01-01 RX ORDER — PALONOSETRON 0.05 MG/ML
0.25 INJECTION, SOLUTION INTRAVENOUS ONCE
Status: CANCELLED
Start: 2021-01-01

## 2021-01-01 RX ORDER — ZOLEDRONIC ACID 0.04 MG/ML
4 INJECTION, SOLUTION INTRAVENOUS ONCE
Status: CANCELLED
Start: 2021-01-01 | End: 2021-01-01

## 2021-01-01 RX ORDER — SODIUM CHLORIDE 9 MG/ML
1000 INJECTION, SOLUTION INTRAVENOUS CONTINUOUS PRN
Status: CANCELLED
Start: 2021-01-01

## 2021-01-01 RX ORDER — ONDANSETRON 2 MG/ML
4 INJECTION INTRAMUSCULAR; INTRAVENOUS EVERY 6 HOURS PRN
Status: DISCONTINUED | OUTPATIENT
Start: 2021-01-01 | End: 2021-01-01 | Stop reason: HOSPADM

## 2021-01-01 RX ORDER — HEPARIN SODIUM (PORCINE) LOCK FLUSH IV SOLN 100 UNIT/ML 100 UNIT/ML
5 SOLUTION INTRAVENOUS EVERY 8 HOURS
Status: DISCONTINUED | OUTPATIENT
Start: 2021-01-01 | End: 2021-01-01 | Stop reason: HOSPADM

## 2021-01-01 RX ORDER — ZOLEDRONIC ACID 0.04 MG/ML
4 INJECTION, SOLUTION INTRAVENOUS ONCE
Status: COMPLETED | OUTPATIENT
Start: 2021-01-01 | End: 2021-01-01

## 2021-01-01 RX ORDER — NALOXONE HYDROCHLORIDE 0.4 MG/ML
.1-.4 INJECTION, SOLUTION INTRAMUSCULAR; INTRAVENOUS; SUBCUTANEOUS
Status: CANCELLED | OUTPATIENT
Start: 2021-01-01

## 2021-01-01 RX ORDER — CIPROFLOXACIN 500 MG/1
500 TABLET, FILM COATED ORAL DAILY
Qty: 14 TABLET | Refills: 0 | Status: SHIPPED | OUTPATIENT
Start: 2021-01-01 | End: 2021-01-01

## 2021-01-01 RX ORDER — HEPARIN SODIUM (PORCINE) LOCK FLUSH IV SOLN 100 UNIT/ML 100 UNIT/ML
500 SOLUTION INTRAVENOUS ONCE
Status: COMPLETED | OUTPATIENT
Start: 2021-01-01 | End: 2021-01-01

## 2021-01-01 RX ORDER — NALOXONE HYDROCHLORIDE 0.4 MG/ML
0.4 INJECTION, SOLUTION INTRAMUSCULAR; INTRAVENOUS; SUBCUTANEOUS
Status: DISCONTINUED | OUTPATIENT
Start: 2021-01-01 | End: 2021-01-01 | Stop reason: HOSPADM

## 2021-01-01 RX ORDER — METHYLPREDNISOLONE 4 MG
4 TABLET, DOSE PACK ORAL 2 TIMES DAILY
Qty: 21 TABLET | Refills: 0 | Status: SHIPPED | OUTPATIENT
Start: 2021-01-01 | End: 2021-01-01

## 2021-01-01 RX ORDER — NALOXONE HYDROCHLORIDE 0.4 MG/ML
0.2 INJECTION, SOLUTION INTRAMUSCULAR; INTRAVENOUS; SUBCUTANEOUS
Status: DISCONTINUED | OUTPATIENT
Start: 2021-01-01 | End: 2021-01-01 | Stop reason: HOSPADM

## 2021-01-01 RX ORDER — PALONOSETRON 0.05 MG/ML
0.25 INJECTION, SOLUTION INTRAVENOUS ONCE
Status: COMPLETED | OUTPATIENT
Start: 2021-01-01 | End: 2021-01-01

## 2021-01-01 RX ORDER — GADOBUTROL 604.72 MG/ML
7.5 INJECTION INTRAVENOUS ONCE
Status: COMPLETED | OUTPATIENT
Start: 2021-01-01 | End: 2021-01-01

## 2021-01-01 RX ORDER — HEPARIN SODIUM 200 [USP'U]/100ML
2 INJECTION, SOLUTION INTRAVENOUS CONTINUOUS PRN
Status: DISCONTINUED | OUTPATIENT
Start: 2021-01-01 | End: 2021-01-01 | Stop reason: HOSPADM

## 2021-01-01 RX ORDER — SODIUM CHLORIDE 9 MG/ML
INJECTION, SOLUTION INTRAVENOUS CONTINUOUS
Status: DISCONTINUED | OUTPATIENT
Start: 2021-01-01 | End: 2021-01-01 | Stop reason: HOSPADM

## 2021-01-01 RX ORDER — ALBUTEROL SULFATE 0.83 MG/ML
2.5 SOLUTION RESPIRATORY (INHALATION)
Status: CANCELLED | OUTPATIENT
Start: 2022-01-01

## 2021-01-01 RX ORDER — ZOLEDRONIC ACID 0.04 MG/ML
4 INJECTION, SOLUTION INTRAVENOUS ONCE
Status: DISCONTINUED | OUTPATIENT
Start: 2021-01-01 | End: 2021-01-01 | Stop reason: HOSPADM

## 2021-01-01 RX ORDER — ACETAMINOPHEN 325 MG/1
650 TABLET ORAL EVERY 4 HOURS PRN
Status: DISCONTINUED | OUTPATIENT
Start: 2021-01-01 | End: 2021-01-01 | Stop reason: HOSPADM

## 2021-01-01 RX ORDER — HEPARIN SODIUM 200 [USP'U]/100ML
1 INJECTION, SOLUTION INTRAVENOUS CONTINUOUS PRN
Status: DISCONTINUED | OUTPATIENT
Start: 2021-01-01 | End: 2021-01-01 | Stop reason: HOSPADM

## 2021-01-01 RX ORDER — HEPARIN SODIUM (PORCINE) LOCK FLUSH IV SOLN 100 UNIT/ML 100 UNIT/ML
5 SOLUTION INTRAVENOUS
Status: CANCELLED | OUTPATIENT
Start: 2022-01-01

## 2021-01-01 RX ORDER — HYDROCODONE BITARTRATE AND ACETAMINOPHEN 5; 325 MG/1; MG/1
1-2 TABLET ORAL EVERY 6 HOURS PRN
Status: DISCONTINUED | OUTPATIENT
Start: 2021-01-01 | End: 2021-01-01 | Stop reason: HOSPADM

## 2021-01-01 RX ORDER — HEPARIN SODIUM (PORCINE) LOCK FLUSH IV SOLN 100 UNIT/ML 100 UNIT/ML
5 SOLUTION INTRAVENOUS ONCE
Status: COMPLETED | OUTPATIENT
Start: 2021-01-01 | End: 2021-01-01

## 2021-01-01 RX ORDER — LIDOCAINE 40 MG/G
1 CREAM TOPICAL SEE ADMIN INSTRUCTIONS
Status: COMPLETED | OUTPATIENT
Start: 2021-01-01 | End: 2021-01-01

## 2021-01-01 RX ORDER — GABAPENTIN 100 MG/1
200 CAPSULE ORAL AT BEDTIME
Qty: 120 CAPSULE | Refills: 3 | Status: SHIPPED | OUTPATIENT
Start: 2021-01-01 | End: 2022-01-01

## 2021-01-01 RX ORDER — ZOLEDRONIC ACID 0.04 MG/ML
4 INJECTION, SOLUTION INTRAVENOUS ONCE
Status: CANCELLED
Start: 2022-01-01 | End: 2022-01-01

## 2021-01-01 RX ORDER — LORAZEPAM 0.5 MG/1
.5-1 TABLET ORAL
Status: DISCONTINUED | OUTPATIENT
Start: 2021-01-01 | End: 2021-01-01 | Stop reason: HOSPADM

## 2021-01-01 RX ORDER — LIDOCAINE 40 MG/G
CREAM TOPICAL
Status: DISCONTINUED | OUTPATIENT
Start: 2021-01-01 | End: 2021-01-01 | Stop reason: HOSPADM

## 2021-01-01 RX ORDER — HEPARIN SODIUM (PORCINE) LOCK FLUSH IV SOLN 100 UNIT/ML 100 UNIT/ML
500 SOLUTION INTRAVENOUS SEE ADMIN INSTRUCTIONS
Status: DISCONTINUED | OUTPATIENT
Start: 2021-01-01 | End: 2021-01-01 | Stop reason: HOSPADM

## 2021-01-01 RX ORDER — IODIXANOL 320 MG/ML
150 INJECTION, SOLUTION INTRAVASCULAR ONCE
Status: COMPLETED | OUTPATIENT
Start: 2021-01-01 | End: 2021-01-01

## 2021-01-01 RX ORDER — HEPARIN SODIUM (PORCINE) LOCK FLUSH IV SOLN 100 UNIT/ML 100 UNIT/ML
5 SOLUTION INTRAVENOUS
Status: COMPLETED | OUTPATIENT
Start: 2021-01-01 | End: 2021-01-01

## 2021-01-01 RX ORDER — PROCHLORPERAZINE MALEATE 10 MG
10 TABLET ORAL EVERY 6 HOURS PRN
Qty: 30 TABLET | Refills: 2 | Status: SHIPPED | OUTPATIENT
Start: 2021-01-01 | End: 2022-01-01

## 2021-01-01 RX ORDER — PANTOPRAZOLE SODIUM 40 MG/1
40 TABLET, DELAYED RELEASE ORAL DAILY
Qty: 30 TABLET | Refills: 0 | Status: SHIPPED | OUTPATIENT
Start: 2021-01-01 | End: 2021-01-01

## 2021-01-01 RX ORDER — LORAZEPAM 0.5 MG/1
0.5 TABLET ORAL EVERY 4 HOURS PRN
Qty: 30 TABLET | Refills: 2 | Status: SHIPPED | OUTPATIENT
Start: 2021-01-01 | End: 2022-01-01

## 2021-01-01 RX ORDER — NALOXONE HYDROCHLORIDE 0.4 MG/ML
0.2 INJECTION, SOLUTION INTRAMUSCULAR; INTRAVENOUS; SUBCUTANEOUS
Status: CANCELLED | OUTPATIENT
Start: 2022-01-01

## 2021-01-01 RX ORDER — GABAPENTIN 300 MG/1
300 CAPSULE ORAL AT BEDTIME
Qty: 30 CAPSULE | Refills: 1 | Status: CANCELLED | OUTPATIENT
Start: 2021-01-01

## 2021-01-01 RX ORDER — OXYCODONE HYDROCHLORIDE 5 MG/1
5-10 TABLET ORAL EVERY 6 HOURS PRN
Qty: 10 TABLET | Refills: 0 | Status: SHIPPED | OUTPATIENT
Start: 2021-01-01 | End: 2021-01-01

## 2021-01-01 RX ORDER — ONDANSETRON 2 MG/ML
4 INJECTION INTRAMUSCULAR; INTRAVENOUS
Status: DISCONTINUED | OUTPATIENT
Start: 2021-01-01 | End: 2021-01-01 | Stop reason: HOSPADM

## 2021-01-01 RX ORDER — ALBUTEROL SULFATE 90 UG/1
1-2 AEROSOL, METERED RESPIRATORY (INHALATION)
Status: CANCELLED
Start: 2022-01-01

## 2021-01-01 RX ORDER — ONDANSETRON 4 MG/1
8 TABLET, ORALLY DISINTEGRATING ORAL EVERY 6 HOURS PRN
Status: DISCONTINUED | OUTPATIENT
Start: 2021-01-01 | End: 2021-01-01 | Stop reason: HOSPADM

## 2021-01-01 RX ORDER — ONDANSETRON 4 MG/1
4-8 TABLET, FILM COATED ORAL EVERY 6 HOURS PRN
Qty: 40 TABLET | Refills: 0 | Status: SHIPPED | OUTPATIENT
Start: 2021-01-01 | End: 2022-01-01

## 2021-01-01 RX ORDER — HEPARIN SODIUM,PORCINE 10 UNIT/ML
5 VIAL (ML) INTRAVENOUS
Status: COMPLETED | OUTPATIENT
Start: 2021-01-01 | End: 2021-01-01

## 2021-01-01 RX ORDER — DIPHENHYDRAMINE HYDROCHLORIDE 50 MG/ML
50 INJECTION INTRAMUSCULAR; INTRAVENOUS
Status: CANCELLED
Start: 2022-01-01

## 2021-01-01 RX ORDER — GADOBUTROL 604.72 MG/ML
10 INJECTION INTRAVENOUS ONCE
Status: COMPLETED | OUTPATIENT
Start: 2021-01-01 | End: 2021-01-01

## 2021-01-01 RX ORDER — IOPAMIDOL 755 MG/ML
100 INJECTION, SOLUTION INTRAVASCULAR ONCE
Status: COMPLETED | OUTPATIENT
Start: 2021-01-01 | End: 2021-01-01

## 2021-01-01 RX ORDER — METHYLPREDNISOLONE SODIUM SUCCINATE 125 MG/2ML
125 INJECTION, POWDER, LYOPHILIZED, FOR SOLUTION INTRAMUSCULAR; INTRAVENOUS
Status: CANCELLED
Start: 2022-01-01

## 2021-01-01 RX ORDER — LORAZEPAM 0.5 MG/1
.5-2 TABLET ORAL
Status: COMPLETED | OUTPATIENT
Start: 2021-01-01 | End: 2021-01-01

## 2021-01-01 RX ORDER — IOPAMIDOL 755 MG/ML
104 INJECTION, SOLUTION INTRAVASCULAR ONCE
Status: COMPLETED | OUTPATIENT
Start: 2021-01-01 | End: 2021-01-01

## 2021-01-01 RX ORDER — EPINEPHRINE 1 MG/ML
0.3 INJECTION, SOLUTION INTRAMUSCULAR; SUBCUTANEOUS EVERY 5 MIN PRN
Status: CANCELLED | OUTPATIENT
Start: 2022-01-01

## 2021-01-01 RX ORDER — IOPAMIDOL 755 MG/ML
86 INJECTION, SOLUTION INTRAVASCULAR ONCE
Status: COMPLETED | OUTPATIENT
Start: 2021-01-01 | End: 2021-01-01

## 2021-01-01 RX ORDER — BACITRACIN ZINC 500 [USP'U]/G
OINTMENT TOPICAL 2 TIMES DAILY
Qty: 113.4 G | Refills: 2 | Status: SHIPPED | OUTPATIENT
Start: 2021-01-01 | End: 2022-01-01

## 2021-01-01 RX ADMIN — FAMOTIDINE 20 MG: 10 INJECTION INTRAVENOUS at 09:23

## 2021-01-01 RX ADMIN — FAMOTIDINE 20 MG: 10 INJECTION INTRAVENOUS at 11:13

## 2021-01-01 RX ADMIN — SODIUM CHLORIDE 250 ML: 9 INJECTION, SOLUTION INTRAVENOUS at 13:50

## 2021-01-01 RX ADMIN — ONDANSETRON 4 MG: 2 INJECTION INTRAMUSCULAR; INTRAVENOUS at 08:52

## 2021-01-01 RX ADMIN — INFLUENZA A VIRUS A/WISCONSIN/588/2019 (H1N1) RECOMBINANT HEMAGGLUTININ ANTIGEN, INFLUENZA A VIRUS A/TASMANIA/503/2020 (H3N2) RECOMBINANT HEMAGGLUTININ ANTIGEN, INFLUENZA B VIRUS B/WASHINGTON/02/2019 RECOMBINANT HEMAGGLUTININ ANTIGEN, AND INFLUENZA B VIRUS B/PHUKET/3073/2013 RECOMBINANT HEMAGGLUTININ ANTIGEN 0.5 ML: 45; 45; 45; 45 INJECTION INTRAMUSCULAR at 09:58

## 2021-01-01 RX ADMIN — MIDAZOLAM 1 MG: 1 INJECTION INTRAMUSCULAR; INTRAVENOUS at 12:06

## 2021-01-01 RX ADMIN — HEPARIN SODIUM (PORCINE) LOCK FLUSH IV SOLN 100 UNIT/ML 5 ML: 100 SOLUTION at 08:15

## 2021-01-01 RX ADMIN — FENTANYL CITRATE 25 MCG: 50 INJECTION, SOLUTION INTRAMUSCULAR; INTRAVENOUS at 12:13

## 2021-01-01 RX ADMIN — HEPARIN SODIUM (PORCINE) LOCK FLUSH IV SOLN 100 UNIT/ML 5 ML: 100 SOLUTION at 08:47

## 2021-01-01 RX ADMIN — MIDAZOLAM 0.5 MG: 1 INJECTION INTRAMUSCULAR; INTRAVENOUS at 10:57

## 2021-01-01 RX ADMIN — Medication 250 ML: at 08:57

## 2021-01-01 RX ADMIN — Medication 5 ML: at 07:38

## 2021-01-01 RX ADMIN — MIDAZOLAM 1 MG: 1 INJECTION INTRAMUSCULAR; INTRAVENOUS at 10:53

## 2021-01-01 RX ADMIN — Medication 5 ML: at 08:51

## 2021-01-01 RX ADMIN — SODIUM CHLORIDE 250 ML: 9 INJECTION, SOLUTION INTRAVENOUS at 09:22

## 2021-01-01 RX ADMIN — Medication 5 ML: at 16:18

## 2021-01-01 RX ADMIN — MIDAZOLAM 0.5 MG: 1 INJECTION INTRAMUSCULAR; INTRAVENOUS at 12:00

## 2021-01-01 RX ADMIN — BUPIVACAINE HYDROCHLORIDE 30 ML: 7.5 INJECTION, SOLUTION EPIDURAL; RETROBULBAR at 06:05

## 2021-01-01 RX ADMIN — HEPARIN SODIUM 2 BAG: 200 INJECTION, SOLUTION INTRAVENOUS at 11:45

## 2021-01-01 RX ADMIN — PANTOPRAZOLE SODIUM 40 MG: 40 INJECTION, POWDER, FOR SOLUTION INTRAVENOUS at 09:02

## 2021-01-01 RX ADMIN — FENTANYL CITRATE 25 MCG: 50 INJECTION, SOLUTION INTRAMUSCULAR; INTRAVENOUS at 11:14

## 2021-01-01 RX ADMIN — Medication 250 ML: at 08:51

## 2021-01-01 RX ADMIN — FENTANYL CITRATE 25 MCG: 50 INJECTION, SOLUTION INTRAMUSCULAR; INTRAVENOUS at 12:01

## 2021-01-01 RX ADMIN — FENTANYL CITRATE 50 MCG: 50 INJECTION, SOLUTION INTRAMUSCULAR; INTRAVENOUS at 12:32

## 2021-01-01 RX ADMIN — Medication 5 ML: at 15:47

## 2021-01-01 RX ADMIN — MIDAZOLAM 0.5 MG: 1 INJECTION INTRAMUSCULAR; INTRAVENOUS at 11:14

## 2021-01-01 RX ADMIN — IODIXANOL 120 ML: 320 INJECTION, SOLUTION INTRAVASCULAR at 12:56

## 2021-01-01 RX ADMIN — FENTANYL CITRATE 25 MCG: 50 INJECTION, SOLUTION INTRAMUSCULAR; INTRAVENOUS at 10:57

## 2021-01-01 RX ADMIN — Medication 1000 ML: at 12:30

## 2021-01-01 RX ADMIN — MIDAZOLAM 1 MG: 1 INJECTION INTRAMUSCULAR; INTRAVENOUS at 12:32

## 2021-01-01 RX ADMIN — IOPAMIDOL 86 ML: 755 INJECTION, SOLUTION INTRAVASCULAR at 13:17

## 2021-01-01 RX ADMIN — MIDAZOLAM 0.5 MG: 1 INJECTION INTRAMUSCULAR; INTRAVENOUS at 12:01

## 2021-01-01 RX ADMIN — ONDANSETRON 4 MG: 2 INJECTION INTRAMUSCULAR; INTRAVENOUS at 09:38

## 2021-01-01 RX ADMIN — MIDAZOLAM 0.5 MG: 1 INJECTION INTRAMUSCULAR; INTRAVENOUS at 13:12

## 2021-01-01 RX ADMIN — DIPHENHYDRAMINE HYDROCHLORIDE 50 MG: 25 CAPSULE ORAL at 09:22

## 2021-01-01 RX ADMIN — HEPARIN SODIUM (PORCINE) LOCK FLUSH IV SOLN 100 UNIT/ML 5 ML: 100 SOLUTION at 08:32

## 2021-01-01 RX ADMIN — FENTANYL CITRATE 25 MCG: 50 INJECTION, SOLUTION INTRAMUSCULAR; INTRAVENOUS at 12:00

## 2021-01-01 RX ADMIN — DIPHENHYDRAMINE HYDROCHLORIDE 50 MG: 25 CAPSULE ORAL at 13:49

## 2021-01-01 RX ADMIN — FENTANYL CITRATE 25 MCG: 50 INJECTION, SOLUTION INTRAMUSCULAR; INTRAVENOUS at 11:52

## 2021-01-01 RX ADMIN — DEXAMETHASONE SODIUM PHOSPHATE 20 MG: 10 INJECTION, SOLUTION INTRAMUSCULAR; INTRAVENOUS at 13:58

## 2021-01-01 RX ADMIN — ONDANSETRON 4 MG: 2 INJECTION INTRAMUSCULAR; INTRAVENOUS at 10:42

## 2021-01-01 RX ADMIN — AMPICILLIN AND SULBACTAM 3 G: 1; 2 INJECTION, POWDER, FOR SOLUTION INTRAMUSCULAR; INTRAVENOUS at 09:02

## 2021-01-01 RX ADMIN — FENTANYL CITRATE 50 MCG: 50 INJECTION, SOLUTION INTRAMUSCULAR; INTRAVENOUS at 10:53

## 2021-01-01 RX ADMIN — DEXAMETHASONE SODIUM PHOSPHATE 20 MG: 10 INJECTION, SOLUTION INTRAMUSCULAR; INTRAVENOUS at 09:25

## 2021-01-01 RX ADMIN — Medication 5 ML: at 11:35

## 2021-01-01 RX ADMIN — FENTANYL CITRATE 25 MCG: 50 INJECTION, SOLUTION INTRAMUSCULAR; INTRAVENOUS at 12:32

## 2021-01-01 RX ADMIN — GADOBUTROL 8 ML: 604.72 INJECTION INTRAVENOUS at 13:06

## 2021-01-01 RX ADMIN — HEPARIN 5 ML: 100 SYRINGE at 10:08

## 2021-01-01 RX ADMIN — LORAZEPAM 1 MG: 0.5 TABLET ORAL at 06:05

## 2021-01-01 RX ADMIN — HYDROCORTISONE SODIUM SUCCINATE 100 MG: 100 INJECTION, POWDER, FOR SOLUTION INTRAMUSCULAR; INTRAVENOUS at 09:02

## 2021-01-01 RX ADMIN — SODIUM CHLORIDE 600 MG: 9 INJECTION, SOLUTION INTRAVENOUS at 10:04

## 2021-01-01 RX ADMIN — LIDOCAINE HYDROCHLORIDE 7 ML: 10 INJECTION, SOLUTION EPIDURAL; INFILTRATION; INTRACAUDAL; PERINEURAL at 12:09

## 2021-01-01 RX ADMIN — FENTANYL CITRATE 25 MCG: 50 INJECTION, SOLUTION INTRAMUSCULAR; INTRAVENOUS at 12:28

## 2021-01-01 RX ADMIN — PACLITAXEL 156 MG: 6 INJECTION, SOLUTION INTRAVENOUS at 10:35

## 2021-01-01 RX ADMIN — Medication 101.2 MCI.: at 14:22

## 2021-01-01 RX ADMIN — SODIUM CHLORIDE: 9 INJECTION, SOLUTION INTRAVENOUS at 09:57

## 2021-01-01 RX ADMIN — FENTANYL CITRATE 25 MCG: 50 INJECTION, SOLUTION INTRAMUSCULAR; INTRAVENOUS at 13:12

## 2021-01-01 RX ADMIN — ONDANSETRON 4 MG: 2 INJECTION INTRAMUSCULAR; INTRAVENOUS at 10:59

## 2021-01-01 RX ADMIN — HEPARIN SODIUM (PORCINE) LOCK FLUSH IV SOLN 100 UNIT/ML 5 ML: 100 SOLUTION at 08:46

## 2021-01-01 RX ADMIN — GADOBUTROL 7.5 ML: 604.72 INJECTION INTRAVENOUS at 09:58

## 2021-01-01 RX ADMIN — Medication 5 ML: at 15:35

## 2021-01-01 RX ADMIN — MIDAZOLAM 0.5 MG: 1 INJECTION INTRAMUSCULAR; INTRAVENOUS at 12:32

## 2021-01-01 RX ADMIN — IOPAMIDOL 100 ML: 755 INJECTION, SOLUTION INTRAVASCULAR at 11:51

## 2021-01-01 RX ADMIN — SODIUM CHLORIDE: 9 INJECTION, SOLUTION INTRAVENOUS at 09:02

## 2021-01-01 RX ADMIN — ZOLEDRONIC ACID 4 MG: 0.04 INJECTION, SOLUTION INTRAVENOUS at 09:00

## 2021-01-01 RX ADMIN — FENTANYL CITRATE 50 MCG: 50 INJECTION, SOLUTION INTRAMUSCULAR; INTRAVENOUS at 12:06

## 2021-01-01 RX ADMIN — MIDAZOLAM 0.5 MG: 1 INJECTION INTRAMUSCULAR; INTRAVENOUS at 12:13

## 2021-01-01 RX ADMIN — KIT FOR THE PREPARATION OF TECHNETIUM TC 99M ALBUMIN AGGREGATED 4 MILLICURIE: 2.5 INJECTION, POWDER, FOR SOLUTION INTRAVENOUS at 12:05

## 2021-01-01 RX ADMIN — Medication 5 ML: at 13:14

## 2021-01-01 RX ADMIN — MIDAZOLAM 0.5 MG: 1 INJECTION INTRAMUSCULAR; INTRAVENOUS at 11:53

## 2021-01-01 RX ADMIN — ONDANSETRON 4 MG: 2 INJECTION INTRAMUSCULAR; INTRAVENOUS at 10:07

## 2021-01-01 RX ADMIN — LIDOCAINE HYDROCHLORIDE 8 ML: 10 INJECTION, SOLUTION EPIDURAL; INFILTRATION; INTRACAUDAL; PERINEURAL at 11:15

## 2021-01-01 RX ADMIN — MIDAZOLAM 0.5 MG: 1 INJECTION INTRAMUSCULAR; INTRAVENOUS at 12:28

## 2021-01-01 RX ADMIN — Medication 5 ML: at 06:19

## 2021-01-01 RX ADMIN — PALONOSETRON 0.25 MG: 0.05 INJECTION, SOLUTION INTRAVENOUS at 12:54

## 2021-01-01 RX ADMIN — Medication 5 ML: at 13:57

## 2021-01-01 RX ADMIN — PACLITAXEL 156 MG: 6 INJECTION, SOLUTION INTRAVENOUS at 12:56

## 2021-01-01 RX ADMIN — DEXAMETHASONE SODIUM PHOSPHATE 20 MG: 10 INJECTION, SOLUTION INTRAMUSCULAR; INTRAVENOUS at 11:16

## 2021-01-01 RX ADMIN — ONDANSETRON 4 MG: 2 INJECTION INTRAMUSCULAR; INTRAVENOUS at 08:53

## 2021-01-01 RX ADMIN — SODIUM CHLORIDE 600 MG: 9 INJECTION, SOLUTION INTRAVENOUS at 11:48

## 2021-01-01 RX ADMIN — FAMOTIDINE 20 MG: 10 INJECTION INTRAVENOUS at 13:54

## 2021-01-01 RX ADMIN — GADOBUTROL 7.5 ML: 604.72 INJECTION INTRAVENOUS at 07:45

## 2021-01-01 RX ADMIN — PACLITAXEL 156 MG: 6 INJECTION, SOLUTION INTRAVENOUS at 14:27

## 2021-01-01 RX ADMIN — ONDANSETRON 4 MG: 2 INJECTION INTRAMUSCULAR; INTRAVENOUS at 10:06

## 2021-01-01 RX ADMIN — IODIXANOL 150 ML: 320 INJECTION, SOLUTION INTRAVASCULAR at 13:15

## 2021-01-01 RX ADMIN — HEPARIN SODIUM (PORCINE) LOCK FLUSH IV SOLN 100 UNIT/ML 5 ML: 100 SOLUTION at 08:14

## 2021-01-01 RX ADMIN — IOPAMIDOL 104 ML: 755 INJECTION, SOLUTION INTRAVASCULAR at 11:32

## 2021-01-01 RX ADMIN — LIDOCAINE 1 G: 40 CREAM TOPICAL at 06:05

## 2021-01-01 RX ADMIN — ZOLEDRONIC ACID 4 MG: 0.04 INJECTION, SOLUTION INTRAVENOUS at 08:57

## 2021-01-01 RX ADMIN — DIPHENHYDRAMINE HYDROCHLORIDE 50 MG: 25 CAPSULE ORAL at 11:11

## 2021-01-01 RX ADMIN — HEPARIN SODIUM (PORCINE) LOCK FLUSH IV SOLN 100 UNIT/ML 500 UNITS: 100 SOLUTION at 08:11

## 2021-01-01 RX ADMIN — HEPARIN SODIUM (PORCINE) LOCK FLUSH IV SOLN 100 UNIT/ML 500 UNITS: 100 SOLUTION at 11:38

## 2021-01-01 RX ADMIN — ZOLEDRONIC ACID 4 MG: 0.04 INJECTION, SOLUTION INTRAVENOUS at 10:46

## 2021-01-01 RX ADMIN — HEPARIN SODIUM 2 BAG: 200 INJECTION, SOLUTION INTRAVENOUS at 11:23

## 2021-01-01 RX ADMIN — ZOLEDRONIC ACID 4 MG: 0.04 INJECTION, SOLUTION INTRAVENOUS at 09:44

## 2021-01-01 ASSESSMENT — PAIN SCALES - GENERAL
PAINLEVEL: MILD PAIN (2)
PAINLEVEL: NO PAIN (1)
PAINLEVEL: MILD PAIN (2)
PAINLEVEL: NO PAIN (1)
PAINLEVEL: NO PAIN (0)
PAINLEVEL: MILD PAIN (2)
PAINLEVEL: NO PAIN (1)
PAINLEVEL: NO PAIN (0)
PAINLEVEL: MILD PAIN (2)
PAINLEVEL: NO PAIN (1)
PAINLEVEL: NO PAIN (0)
PAINLEVEL: MILD PAIN (2)

## 2021-01-01 ASSESSMENT — MIFFLIN-ST. JEOR
SCORE: 1587.36
SCORE: 1605.51
SCORE: 1623.65

## 2021-01-01 ASSESSMENT — ENCOUNTER SYMPTOMS
CONSTIPATION: 1
TINGLING: 1
CONSTITUTIONAL NEGATIVE: 1
FOCAL WEAKNESS: 1
COUGH: 1
BACK PAIN: 1
EYES NEGATIVE: 1
PSYCHIATRIC NEGATIVE: 1
CARDIOVASCULAR NEGATIVE: 1

## 2021-01-15 ENCOUNTER — ANCILLARY PROCEDURE (OUTPATIENT)
Dept: GENERAL RADIOLOGY | Facility: CLINIC | Age: 61
End: 2021-01-15
Attending: FAMILY MEDICINE
Payer: COMMERCIAL

## 2021-01-15 ENCOUNTER — OFFICE VISIT (OUTPATIENT)
Dept: FAMILY MEDICINE | Facility: CLINIC | Age: 61
End: 2021-01-15
Payer: COMMERCIAL

## 2021-01-15 VITALS
OXYGEN SATURATION: 98 % | BODY MASS INDEX: 28.49 KG/M2 | HEIGHT: 70 IN | SYSTOLIC BLOOD PRESSURE: 106 MMHG | DIASTOLIC BLOOD PRESSURE: 72 MMHG | TEMPERATURE: 98.2 F | WEIGHT: 199 LBS | HEART RATE: 84 BPM

## 2021-01-15 DIAGNOSIS — Z23 NEED FOR PROPHYLACTIC VACCINATION AND INOCULATION AGAINST INFLUENZA: ICD-10-CM

## 2021-01-15 DIAGNOSIS — Z11.59 ENCOUNTER FOR SCREENING FOR OTHER VIRAL DISEASES: Primary | ICD-10-CM

## 2021-01-15 DIAGNOSIS — M54.42 LOW BACK PAIN WITH LEFT-SIDED SCIATICA, UNSPECIFIED BACK PAIN LATERALITY, UNSPECIFIED CHRONICITY: Primary | ICD-10-CM

## 2021-01-15 DIAGNOSIS — M25.552 HIP PAIN, LEFT: ICD-10-CM

## 2021-01-15 PROCEDURE — 73502 X-RAY EXAM HIP UNI 2-3 VIEWS: CPT | Mod: LT | Performed by: RADIOLOGY

## 2021-01-15 PROCEDURE — 90682 RIV4 VACC RECOMBINANT DNA IM: CPT | Performed by: FAMILY MEDICINE

## 2021-01-15 PROCEDURE — 99213 OFFICE O/P EST LOW 20 MIN: CPT | Mod: 25 | Performed by: FAMILY MEDICINE

## 2021-01-15 PROCEDURE — 90471 IMMUNIZATION ADMIN: CPT | Performed by: FAMILY MEDICINE

## 2021-01-15 PROCEDURE — 72100 X-RAY EXAM L-S SPINE 2/3 VWS: CPT | Performed by: RADIOLOGY

## 2021-01-15 ASSESSMENT — MIFFLIN-ST. JEOR: SCORE: 1715.97

## 2021-01-15 NOTE — PROGRESS NOTES
Assessment & Plan    I had a long discussion with the patient about his condition , diagnosis treatment options. We discussed diffenetial diagnosis, and expected course.    Patient has hx of epigastric pain and was referred for EGD but unfortunely did not follow up, discussed with patient to avoid NSAIDs until EGD is done  No red flags  noted on exam or history   Will obtain X ray for lower back and right hip to r/o fracture or dislocation  Consider  referral to physical therapy   Low back pain with left-sided sciatica, unspecified back pain laterality, unspecified chronicity    - XR Lumbar Spine 2/3 Views    Hip pain, left    - XR Hip Left 2-3 Views    Need for prophylactic vaccination and inoculation against influenza    - INFLUENZA QUAD, RECOMBINANT, P-FREE (RIV4) (FLUBLOCK) [60066]                              No follow-ups on file.    Jase Tarango MD  [facility]    Ace Wild is a 60 year old who presents to clinic today for the following health issues     HPI       Musculoskeletal problem/pain  Onset/Duration: mid september  Description  Location: thigh/leg - left, has moved down into foot  Joint Swelling: no  Redness: no  Pain: YES  Warmth: no  Intensity:  moderate  Progression of Symptoms:  improving  Accompanying signs and symptoms:   Fevers: no  Numbness/tingling/weakness: YES- numbness, tingling  History  Trauma to the area: unsure, fell on back in 05/2020 unsure if that has anything to do with it  Recent illness:  no  Previous similar problem: no  Previous evaluation:  no  Precipitating or alleviating factors:  Aggravating factors include: sitting and walking  Therapies tried and outcome: heat, ice, exercises and acetaminophen    Pain in the left buttock and hip , started 09/2020.  He had a fall in 05/2020 , he on his back. He was better after  that .  Now he started to have pain and is constant.   He reports lower back pain ,  No incontinence of stool or urine   He  "reports numbness in the left thigh and gluteal area.  He is currently not working and is looking  for job       Patient was seen on 08/03/2020 for epigastirc pain and was referred for EGD but did not follow up.EGD was not obtained. Advised to call to schedule EGD, he has the pone number and is going to call to schedule .  Wt Readings from Last 4 Encounters:   01/15/21 90.3 kg (199 lb)   08/03/20 100.2 kg (221 lb)   01/31/19 112.5 kg (248 lb)   01/07/18 109.3 kg (241 lb)       Review of Systems   Constitutional, HEENT, cardiovascular, pulmonary, gi and gu systems are negative, except as otherwise noted.      Objective    /72   Pulse 84   Temp 98.2  F (36.8  C) (Oral)   Ht 1.765 m (5' 9.5\")   Wt 90.3 kg (199 lb)   SpO2 98%   BMI 28.97 kg/m    Body mass index is 28.97 kg/m .  Physical Exam                   "

## 2021-01-19 RX ORDER — SODIUM, POTASSIUM,MAG SULFATES 17.5-3.13G
1 SOLUTION, RECONSTITUTED, ORAL ORAL SEE ADMIN INSTRUCTIONS
Qty: 2 BOTTLE | Refills: 0 | Status: SHIPPED | OUTPATIENT
Start: 2021-01-19 | End: 2021-02-04

## 2021-01-19 RX ORDER — ACETAMINOPHEN 500 MG
500-1000 TABLET ORAL EVERY 6 HOURS PRN
COMMUNITY
End: 2021-03-16

## 2021-01-19 RX ORDER — BISACODYL 5 MG
5 TABLET, DELAYED RELEASE (ENTERIC COATED) ORAL SEE ADMIN INSTRUCTIONS
Qty: 1 TABLET | Refills: 0 | Status: SHIPPED | OUTPATIENT
Start: 2021-01-19 | End: 2021-02-04

## 2021-01-19 ASSESSMENT — MIFFLIN-ST. JEOR: SCORE: 1715.97

## 2021-01-22 ENCOUNTER — OFFICE VISIT (OUTPATIENT)
Dept: LAB | Facility: CLINIC | Age: 61
End: 2021-01-22
Payer: COMMERCIAL

## 2021-01-22 DIAGNOSIS — Z11.59 ENCOUNTER FOR SCREENING FOR OTHER VIRAL DISEASES: ICD-10-CM

## 2021-01-22 PROCEDURE — U0005 INFEC AGEN DETEC AMPLI PROBE: HCPCS | Performed by: SURGERY

## 2021-01-22 PROCEDURE — U0003 INFECTIOUS AGENT DETECTION BY NUCLEIC ACID (DNA OR RNA); SEVERE ACUTE RESPIRATORY SYNDROME CORONAVIRUS 2 (SARS-COV-2) (CORONAVIRUS DISEASE [COVID-19]), AMPLIFIED PROBE TECHNIQUE, MAKING USE OF HIGH THROUGHPUT TECHNOLOGIES AS DESCRIBED BY CMS-2020-01-R: HCPCS | Performed by: SURGERY

## 2021-01-23 LAB
SARS-COV-2 RNA RESP QL NAA+PROBE: NOT DETECTED
SPECIMEN SOURCE: NORMAL

## 2021-01-26 ENCOUNTER — HOSPITAL ENCOUNTER (OUTPATIENT)
Facility: AMBULATORY SURGERY CENTER | Age: 61
Discharge: HOME | End: 2021-01-26
Attending: SURGERY | Admitting: SURGERY
Payer: COMMERCIAL

## 2021-01-26 ENCOUNTER — TRANSFERRED RECORDS (OUTPATIENT)
Dept: HEALTH INFORMATION MANAGEMENT | Facility: CLINIC | Age: 61
End: 2021-01-26

## 2021-01-26 VITALS
DIASTOLIC BLOOD PRESSURE: 78 MMHG | HEIGHT: 70 IN | OXYGEN SATURATION: 100 % | WEIGHT: 199 LBS | BODY MASS INDEX: 28.49 KG/M2 | RESPIRATION RATE: 18 BRPM | SYSTOLIC BLOOD PRESSURE: 109 MMHG | HEART RATE: 84 BPM

## 2021-01-26 DIAGNOSIS — Z12.11 SCREEN FOR COLON CANCER: ICD-10-CM

## 2021-01-26 DIAGNOSIS — K22.89 MASS OF ESOPHAGUS DETERMINED BY ENDOSCOPY: Primary | ICD-10-CM

## 2021-01-26 LAB
COLONOSCOPY: NORMAL
UPPER GI ENDOSCOPY: NORMAL

## 2021-01-26 PROCEDURE — G8907 PT DOC NO EVENTS ON DISCHARG: HCPCS

## 2021-01-26 PROCEDURE — 88377 M/PHMTRC ALYS ISHQUANT/SEMIQ: CPT | Performed by: MEDICAL GENETICS

## 2021-01-26 PROCEDURE — 45385 COLONOSCOPY W/LESION REMOVAL: CPT

## 2021-01-26 PROCEDURE — 88360 TUMOR IMMUNOHISTOCHEM/MANUAL: CPT | Mod: 59 | Performed by: PATHOLOGY

## 2021-01-26 PROCEDURE — 45385 COLONOSCOPY W/LESION REMOVAL: CPT | Performed by: SURGERY

## 2021-01-26 PROCEDURE — 88342 IMHCHEM/IMCYTCHM 1ST ANTB: CPT | Performed by: PATHOLOGY

## 2021-01-26 PROCEDURE — G8918 PT W/O PREOP ORDER IV AB PRO: HCPCS

## 2021-01-26 PROCEDURE — 99153 MOD SED SAME PHYS/QHP EA: CPT | Mod: 59 | Performed by: SURGERY

## 2021-01-26 PROCEDURE — 88341 IMHCHEM/IMCYTCHM EA ADD ANTB: CPT | Performed by: PATHOLOGY

## 2021-01-26 PROCEDURE — 99152 MOD SED SAME PHYS/QHP 5/>YRS: CPT | Mod: 59 | Performed by: SURGERY

## 2021-01-26 PROCEDURE — 43239 EGD BIOPSY SINGLE/MULTIPLE: CPT

## 2021-01-26 PROCEDURE — 43239 EGD BIOPSY SINGLE/MULTIPLE: CPT | Mod: 51 | Performed by: SURGERY

## 2021-01-26 PROCEDURE — 99N1019 PR STATISTIC H-FISH PROCESS B/S: Performed by: SURGERY

## 2021-01-26 PROCEDURE — 99N1020 PR STATISTIC H-SEND OUTS PREP: Performed by: SURGERY

## 2021-01-26 PROCEDURE — 88305 TISSUE EXAM BY PATHOLOGIST: CPT | Performed by: PATHOLOGY

## 2021-01-26 RX ORDER — NALOXONE HYDROCHLORIDE 0.4 MG/ML
0.2 INJECTION, SOLUTION INTRAMUSCULAR; INTRAVENOUS; SUBCUTANEOUS
Status: DISCONTINUED | OUTPATIENT
Start: 2021-01-26 | End: 2021-01-27 | Stop reason: HOSPADM

## 2021-01-26 RX ORDER — LIDOCAINE 40 MG/G
CREAM TOPICAL
Status: DISCONTINUED | OUTPATIENT
Start: 2021-01-26 | End: 2021-01-27 | Stop reason: HOSPADM

## 2021-01-26 RX ORDER — NALOXONE HYDROCHLORIDE 0.4 MG/ML
0.4 INJECTION, SOLUTION INTRAMUSCULAR; INTRAVENOUS; SUBCUTANEOUS
Status: DISCONTINUED | OUTPATIENT
Start: 2021-01-26 | End: 2021-01-27 | Stop reason: HOSPADM

## 2021-01-26 RX ORDER — ONDANSETRON 2 MG/ML
4 INJECTION INTRAMUSCULAR; INTRAVENOUS
Status: DISCONTINUED | OUTPATIENT
Start: 2021-01-26 | End: 2021-01-27 | Stop reason: HOSPADM

## 2021-01-26 RX ORDER — ONDANSETRON 4 MG/1
4 TABLET, ORALLY DISINTEGRATING ORAL EVERY 6 HOURS PRN
Status: DISCONTINUED | OUTPATIENT
Start: 2021-01-26 | End: 2021-01-27 | Stop reason: HOSPADM

## 2021-01-26 RX ORDER — FENTANYL CITRATE 50 UG/ML
INJECTION, SOLUTION INTRAMUSCULAR; INTRAVENOUS PRN
Status: DISCONTINUED | OUTPATIENT
Start: 2021-01-26 | End: 2021-01-26 | Stop reason: HOSPADM

## 2021-01-26 RX ORDER — PROCHLORPERAZINE MALEATE 10 MG
10 TABLET ORAL EVERY 6 HOURS PRN
Status: DISCONTINUED | OUTPATIENT
Start: 2021-01-26 | End: 2021-01-27 | Stop reason: HOSPADM

## 2021-01-26 RX ORDER — ONDANSETRON 2 MG/ML
4 INJECTION INTRAMUSCULAR; INTRAVENOUS EVERY 6 HOURS PRN
Status: DISCONTINUED | OUTPATIENT
Start: 2021-01-26 | End: 2021-01-27 | Stop reason: HOSPADM

## 2021-01-26 RX ORDER — FLUMAZENIL 0.1 MG/ML
0.2 INJECTION, SOLUTION INTRAVENOUS
Status: DISCONTINUED | OUTPATIENT
Start: 2021-01-26 | End: 2021-01-27 | Stop reason: HOSPADM

## 2021-01-26 RX ORDER — SODIUM CHLORIDE, SODIUM LACTATE, POTASSIUM CHLORIDE, CALCIUM CHLORIDE 600; 310; 30; 20 MG/100ML; MG/100ML; MG/100ML; MG/100ML
INJECTION, SOLUTION INTRAVENOUS CONTINUOUS PRN
Status: COMPLETED | OUTPATIENT
Start: 2021-01-26 | End: 2021-01-26

## 2021-01-27 ENCOUNTER — ANCILLARY PROCEDURE (OUTPATIENT)
Dept: CT IMAGING | Facility: CLINIC | Age: 61
End: 2021-01-27
Attending: SURGERY
Payer: COMMERCIAL

## 2021-01-27 DIAGNOSIS — K22.89 MASS OF ESOPHAGUS DETERMINED BY ENDOSCOPY: ICD-10-CM

## 2021-01-27 PROCEDURE — 74177 CT ABD & PELVIS W/CONTRAST: CPT | Mod: TC | Performed by: RADIOLOGY

## 2021-01-27 PROCEDURE — 71260 CT THORAX DX C+: CPT | Mod: TC | Performed by: RADIOLOGY

## 2021-01-27 RX ORDER — IOPAMIDOL 755 MG/ML
97 INJECTION, SOLUTION INTRAVASCULAR ONCE
Status: COMPLETED | OUTPATIENT
Start: 2021-01-27 | End: 2021-01-27

## 2021-01-27 RX ADMIN — IOPAMIDOL 97 ML: 755 INJECTION, SOLUTION INTRAVASCULAR at 14:49

## 2021-01-29 DIAGNOSIS — C15.9 ADENOCARCINOMA OF ESOPHAGUS (H): Primary | ICD-10-CM

## 2021-01-29 LAB — COPATH REPORT: NORMAL

## 2021-02-01 ENCOUNTER — PRE VISIT (OUTPATIENT)
Dept: ONCOLOGY | Facility: CLINIC | Age: 61
End: 2021-02-01

## 2021-02-01 ENCOUNTER — TELEPHONE (OUTPATIENT)
Dept: SURGERY | Facility: CLINIC | Age: 61
End: 2021-02-01

## 2021-02-01 NOTE — TELEPHONE ENCOUNTER
Called patient to let him know since his newly diagnosed esophageal cancer it is common for patients to see both thoracic surgery and medical oncology to come up with a treatment plan. He will meet with thoracic surgery to see if surgery is an option. He is a bit overwhelmed with the process. No further questions or concerns at this time.    Sent a message to get him scheduled to see thoracic surgery

## 2021-02-01 NOTE — TELEPHONE ENCOUNTER
ONCOLOGY INTAKE: Records Information      APPT INFORMATION:  Referring provider:  Baldo PARHAM   Referring provider s clinic:  [facility]  Reason for visit/diagnosis:  esophageal CA, per IB schedule with thoracic surgery  Has patient been notified of appointment date and time?: Yes    RECORDS INFORMATION:  Were the records received with the referral (via [employer])? No    Has patient been seen for any external appt for this diagnosis? NA    If yes, where? NA    ADDITIONAL INFORMATION:  None

## 2021-02-01 NOTE — TELEPHONE ENCOUNTER
ONCOLOGY INTAKE: Records Information      APPT INFORMATION:  Referring provider:  Dr. Dominguez  Referring provider s clinic:  [facility]  Reason for visit/diagnosis:  Adenocarcinoma of esophagus  Has patient been notified of appointment date and time?: yes    RECORDS INFORMATION:  Were the records received with the referral (via [employer])? no    Has patient been seen for any external appt for this diagnosis? no    If yes, where? n/a    Has patient had any imaging or procedures outside of [place] for this condition? no      If Yes, where? n/a    ADDITIONAL INFORMATION:  none

## 2021-02-02 NOTE — TELEPHONE ENCOUNTER
RECORDS STATUS - ALL OTHER DIAGNOSIS      RECORDS RECEIVED FROM: [facility]   DATE RECEIVED: 2/2/21   NOTES STATUS DETAILS   OFFICE NOTE from referring provider Nain Salcedo MD in [facility]    OFFICE NOTE from medical oncologist     DISCHARGE SUMMARY from hospital NA    DISCHARGE REPORT from the ER NA    OPERATIVE REPORT Epic 1/26/21: Colonoscopy   MEDICATION LIST [facility]    CLINICAL TRIAL TREATMENTS TO DATE     LABS     PATHOLOGY REPORTS [facility] 1/26/21: Surg Path   ANYTHING RELATED TO DIAGNOSIS Epic    GENONOMIC TESTING     TYPE:     IMAGING (NEED IMAGES & REPORT)     CT SCANS PACS 1/27/21: [facility]   MRI     MAMMO     ULTRASOUND     PET

## 2021-02-03 DIAGNOSIS — C15.9 ADENOCARCINOMA OF ESOPHAGUS (H): Primary | ICD-10-CM

## 2021-02-04 ENCOUNTER — PRE VISIT (OUTPATIENT)
Dept: SURGERY | Facility: CLINIC | Age: 61
End: 2021-02-04

## 2021-02-04 ENCOUNTER — VIRTUAL VISIT (OUTPATIENT)
Dept: SURGERY | Facility: CLINIC | Age: 61
End: 2021-02-04
Attending: STUDENT IN AN ORGANIZED HEALTH CARE EDUCATION/TRAINING PROGRAM
Payer: COMMERCIAL

## 2021-02-04 DIAGNOSIS — C15.9 ADENOCARCINOMA OF ESOPHAGUS (H): Primary | ICD-10-CM

## 2021-02-04 PROCEDURE — 999N001193 HC VIDEO/TELEPHONE VISIT; NO CHARGE

## 2021-02-04 PROCEDURE — 99204 OFFICE O/P NEW MOD 45 MIN: CPT | Mod: 95 | Performed by: STUDENT IN AN ORGANIZED HEALTH CARE EDUCATION/TRAINING PROGRAM

## 2021-02-04 NOTE — LETTER
"    2/4/2021         RE: Junito Wang  [address] [address] [address]        Dear Colleague,    Thank you for referring your patient, Junito Wang, to the [facility]. Please see a copy of my visit note below.    Junito is a 60 year old who is being evaluated via a billable video visit.      How would you like to obtain your AVS? MyChart  If the video visit is dropped, the invitation should be resent by: Send to e-mail at: [email or web address]  Will anyone else be joining your video visit? No      Vitals - Patient Reported  Weight (Patient Reported): 90.3 kg (199 lb)  Height (Patient Reported): 177.8 cm (5' 10\")  BMI (Based on Pt Reported Ht/Wt): 28.55  Pain Score: Mild Pain (3)  Pain Loc: Abdomen    Negra Bonilla CMA February 4, 2021  8:56 AM     Video Start Time: 12:46 PM  Video-Visit Details    Type of service:  Video Visit    Video End Time:12:46 PM    Originating Location (pt. Location): Home    Distant Location (provider location):  [facility]     Platform used for Video Visit: AmWell    THORACIC SURGERY - NEW PATIENT OFFICE VISIT      Dear Dr. Wagner, Florian Justice,    I saw Mr. Wang in consultation for the evaluation and treatment of a newly diagnosed esophageal cancer.     HPI  Mr. Junito Wang is a 60 year old year-old male who was referred for findings of esophagus and GE junction ulcerated mass on Endoscopy and pathology was consistent with adenocarcinoma of the esophagus.  Patient states that he started having stomach discomfort in August 2020 when he started feeling that his stomach did not feel the same after eating food, he would feel better after burping a few times.  He did mention that he had 2 black stools in May 2020 which resolved on it's own.  Since October 2020, he started having difficulty swallowing food which progressively worsened over time. At present, he can swallow liquids and eat some crackers but most " of his diet consists of liquids (juice, water, milk, ensure etc.)  Also, he stated that he has lost 25 lbs weight since August, without diet or exercise.  On further questioning about chest wall lump seen on CT, he states that he had a pimple 40 years ago which was drained at that time and he had this swelling appear after that but denies significant increase in size of swelling for many years. Has occasional discomfort in the swelling which does not bother him much.  Denies respiratory symptoms, change in voice, diarrhoea, constipation, blood in stools or urinary symptoms.    Previsit Tests   CT chest      IMPRESSION: 1. A few enlarged mediastinal lymph nodes including 2.0 cm short axis subcarinal lymph node, likely metastatic.  2. A few scattered hypoattenuating foci in the liver for example 1.6 cm lesion in the right hepatic lobe, these are indeterminant, worrisome for metastases. Can be further evaluated with abdominal MRI or PET/CT.  3. Numerous small pulmonary nodules measure up to 4 mm, these are indeterminant, cannot completely exclude metastatic nodules, recommend short-term follow-up to ensure stability.  4. 4.4 cm lytic lesion centered in the posterior aspect of the left sacral ala, worrisome for osseous metastasis.  5. 5.6 cm hypoattenuating lesion of the subcutaneous tissue of the anterior chest wall, indeterminant, could represent benign lesion like sebaceous cyst versus less likely malignant/metastatic.  Also has left hip pain radiating to leg for which he is seeing another doctor.    Upper GI Endoscopy (1/26/2021):  A medium-large sized, ulcerating mass with bleeding was found in the distal esophagus, 36 to 40 cm from the incisors. The mass was partially obstructing and circumferential. Lumen appeared to be 5-6 mm at best however was able to eventually pass scope through. Biopsies were taken with a cold forceps for histology from esophageal portion as well as from the cardia with retroflexion.      Surgical pathology:  A. STOMACH, CARDIA MASS, BIOPSY:   - Gastric cardia columnar type mucosa with high-grade dysplasia (at least)     B. DISTAL ESOPHAGUS, BIOPSY:   - Moderately differentiated adenocarcinoma   - Detached fragments of esophageal squamous mucosa     C. SIGMOID COLON POLYP, HOT SNARED, POLYPECTOMY:   - Tubular adenoma   - Negative for high-grade dysplasia     D. 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    PLAN  I spent a total of 45 minutes with Mr. Wang, more than 50% of which were spent in counseling, coordination of care, and face-to-face time. I reviewed the plan as follows:    We discussed that his staging work up is not complete yet.  CT is suggestive of possible liver and bone mets.  He  may not be a surgical candidate if he has liver/bone mets.  If PET is negative, he still may need Froy-adjuvant Chemoradiation before planning any surgical intervention      - PET scan which is supposed to be scheduled today afternoon as per patient  -may need liver nodule biopsy if PET suggestive of metastatic disease  -advised about nutritional optimization and weight checks to meet his nutritional requirements  -also mentioned a  possible need of feeding tube placement in future if his nutrition deteriorates further due to worsening dysphagia    They had all their questions answered and were in agreement with the plan.  I appreciate the opportunity to participate in the care of your patient and will keep you updated.    Again, thank you for allowing me to participate in the care of your patient.      Sincerely,      Leann Bourgeois MD

## 2021-02-04 NOTE — PROGRESS NOTES
"Junito is a 60 year old who is being evaluated via a billable video visit.      How would you like to obtain your AVS? MyChart  If the video visit is dropped, the invitation should be resent by: Send to e-mail at: [email or web address]  Will anyone else be joining your video visit? No      Vitals - Patient Reported  Weight (Patient Reported): 90.3 kg (199 lb)  Height (Patient Reported): 177.8 cm (5' 10\")  BMI (Based on Pt Reported Ht/Wt): 28.55  Pain Score: Mild Pain (3)  Pain Loc: Abdomen    Negra Bonilla CMA February 4, 2021  8:56 AM     Video Start Time: 12:46 PM  Video-Visit Details    Type of service:  Video Visit    Video End Time:12:46 PM    Originating Location (pt. Location): Home    Distant Location (provider location):  [facility] CANCER [facility]     Platform used for Video Visit: [facility]    THORACIC SURGERY - NEW PATIENT OFFICE VISIT      Dear Dr. Wagner, Florian Justice,    I saw Mr. Wang in consultation for the evaluation and treatment of a newly diagnosed esophageal cancer.     HPI  Mr. Junito Wang is a 60 year old year-old male who was referred for findings of esophagus and GE junction ulcerated mass on Endoscopy and pathology was consistent with adenocarcinoma of the esophagus.  Patient states that he started having stomach discomfort in August 2020 when he started feeling that his stomach did not feel the same after eating food, he would feel better after burping a few times.  He did mention that he had 2 black stools in May 2020 which resolved on it's own.  Since October 2020, he started having difficulty swallowing food which progressively worsened over time. At present, he can swallow liquids and eat some crackers but most of his diet consists of liquids (juice, water, milk, ensure etc.)  Also, he stated that he has lost 25 lbs weight since August, without diet or exercise.  On further questioning about chest wall lump seen on CT, he states that he had a pimple 40 years ago " which was drained at that time and he had this swelling appear after that but denies significant increase in size of swelling for many years. Has occasional discomfort in the swelling which does not bother him much.  Denies respiratory symptoms, change in voice, diarrhoea, constipation, blood in stools or urinary symptoms.    Previsit Tests   CT chest      IMPRESSION: 1. A few enlarged mediastinal lymph nodes including 2.0 cm short axis subcarinal lymph node, likely metastatic.  2. A few scattered hypoattenuating foci in the liver for example 1.6 cm lesion in the right hepatic lobe, these are indeterminant, worrisome for metastases. Can be further evaluated with abdominal MRI or PET/CT.  3. Numerous small pulmonary nodules measure up to 4 mm, these are indeterminant, cannot completely exclude metastatic nodules, recommend short-term follow-up to ensure stability.  4. 4.4 cm lytic lesion centered in the posterior aspect of the left sacral ala, worrisome for osseous metastasis.  5. 5.6 cm hypoattenuating lesion of the subcutaneous tissue of the anterior chest wall, indeterminant, could represent benign lesion like sebaceous cyst versus less likely malignant/metastatic.  Also has left hip pain radiating to leg for which he is seeing another doctor.    Upper GI Endoscopy (1/26/2021):  A medium-large sized, ulcerating mass with bleeding was found in the distal esophagus, 36 to 40 cm from the incisors. The mass was partially obstructing and circumferential. Lumen appeared to be 5-6 mm at best however was able to eventually pass scope through. Biopsies were taken with a cold forceps for histology from esophageal portion as well as from the cardia with retroflexion.     Surgical pathology:  A. STOMACH, CARDIA MASS, BIOPSY:   - Gastric cardia columnar type mucosa with high-grade dysplasia (at least)     B. DISTAL ESOPHAGUS, BIOPSY:   - Moderately differentiated adenocarcinoma   - Detached fragments of esophageal squamous  mucosa     C. SIGMOID COLON POLYP, HOT SNARED, POLYPECTOMY:   - Tubular adenoma   - Negative for high-grade dysplasia     D. 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    PLAN  I spent a total of 45 minutes with Mr. Wang, more than 50% of which were spent in counseling, coordination of care, and face-to-face time. I reviewed the plan as follows:    We discussed that his staging work up is not complete yet.  CT is suggestive of possible liver and bone mets.  He  may not be a surgical candidate if he has liver/bone mets.  If PET is negative, he still may need Froy-adjuvant Chemoradiation before planning any surgical intervention      - PET scan which is supposed to be scheduled today afternoon as per patient  -may need liver nodule biopsy if PET suggestive of metastatic disease  -advised about nutritional optimization and weight checks to meet his nutritional requirements  -also mentioned a possible need of feeding tube placement in future if his nutrition deteriorates further due to worsening dysphagia    They had all their questions answered and were in agreement with the plan.  I appreciate the opportunity to participate in the care of your  patient and will keep you updated.  Sincerely,

## 2021-02-08 ENCOUNTER — VIRTUAL VISIT (OUTPATIENT)
Dept: ONCOLOGY | Facility: CLINIC | Age: 61
End: 2021-02-08
Attending: SURGERY
Payer: COMMERCIAL

## 2021-02-08 DIAGNOSIS — G56.32 RADIAL NEUROPATHY, LEFT: ICD-10-CM

## 2021-02-08 DIAGNOSIS — C15.9 ADENOCARCINOMA OF ESOPHAGUS (H): ICD-10-CM

## 2021-02-08 DIAGNOSIS — R64 CANCER CACHEXIA (H): Primary | ICD-10-CM

## 2021-02-08 PROCEDURE — 999N001193 HC VIDEO/TELEPHONE VISIT; NO CHARGE

## 2021-02-08 PROCEDURE — 99205 OFFICE O/P NEW HI 60 MIN: CPT | Mod: 95 | Performed by: STUDENT IN AN ORGANIZED HEALTH CARE EDUCATION/TRAINING PROGRAM

## 2021-02-08 RX ORDER — GABAPENTIN 250 MG/5ML
250 SOLUTION ORAL AT BEDTIME
Qty: 470 ML | Refills: 1 | Status: SHIPPED | OUTPATIENT
Start: 2021-02-08 | End: 2022-01-01

## 2021-02-08 NOTE — PROGRESS NOTES
MEDICAL ONCOLOGY INITIAL CONSULT NOTE    PATIENT NAME: Junito Wang  ENCOUNTER DATE: 2/7/2021    Care Team  Primary Oncologist: Jose Steven MD    REASON FOR CURRENT VISIT: New diagnosis of esophageal adenocarcinoma    HISTORY OF PRESENT ILLNESS:  Mr. 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), possible metastasis of liver and bone  (AJCC 8th edition)  -MMR proficient, HER2 by IHC is 2+, FISH pending  -PD-L1 CPS- 5-10%  -Full staging pending    Treatment:  Anticipating systemic treatment with chemo-immunotherapy if stage 4 is proven      Intent of treatment: Palliative/Curative    Oncologic course:  August 2020- stomach discomfort, belching   October 2020- progressive dysphagia with solids   1/26/21- distal esophageal biopsy shows Moderately differentiated adenocarcinoma; MMR normal expression, PDL1 expression is low with TPS 2%, CPS 5-10, Her2 is pending  1/27/21- CT CAP- Enlarged  2 cm subcarinal lymph node and 1.4 cm short axis right subcarinal lymph node, focal thickening of the superior wall along the right side of the mid esophagus. Numerous small pulmonary nodules, 3 mm nodule in the superior segment of the left lower lobe, 3 mm nodule in the anterior aspect of the right upper lobe , 4 mm nodule in the medial aspect of the right upper lobe and 4 mm right lower lobe nodule. Hypoattenuating foci in the liver, 1.6 cm hypoattenuating/hypoenhancing lesion in hepatic segment 8 and 1.4 cm lesion in hepatic segment 5 , indeterminate, likely metastatic.  The prostate gland is mildly enlarged measuring 5.3 cm in transverse diameter. Multiple prominent but not significantly enlarged retroperitoneal lymph nodes, indeterminate, could be reactive. 4.4 x 4.3 cm lytic lesion centered in the posterior aspect of the left sacral ala (series 3 image 243), 4.7 x 4.0 x 5.6 cm (axial and CC dimensions,  respectively) hypoattenuating lesion in the subcutaneous tissue of the anterior chest wall just anterior to the mediastinum, indeterminate, could represent sebaceous cyst.  2/4/21- Saw Dr. Bourgeois- who ordered PEt/CT      Interval Hx:    Mr. Wang presents today as a new patient after esophageal biopsy showed adenocarcinoma. He reports 3-6 months of progressive dysphagia. Last summer, he was able to eat hamburgers and meats without issue. In December, he noticed he could not longer easily eat noodle casseroles. Over the past several months, he has had progressive issues with regurgitating food. He currently can tolerate only liquids such as ensure, pedialyte, milk and water. Occasionally he can eat a peanut butter sandwich if he chews it quite thoroughly, but not always. With eating, he feels like he can't get food down until he burps or hiccups and the pressures is relieved and he can eat some more. He has lost about 45 pounds of weight over the past two years. January 2019 he weighed 248lbs. In August 2020 he weighed 221lbs, and in January 2021 he weighed 199lbs.     He has also noticed a gradually worsening left hip pain which makes it difficult for him to walk.  He first noticed the hip pain in September 2020. It was sharp, shooting pains, mostly at night. Pain is worse with leaning forward, improves when he sits back. He used to be very active shoveling snow and raking the leaves in his yard this past fall, but for the past 3-4 months he has been limited in his activities due to hip pain. He has tried ibuprofen but that does not help.      Regarding his social history, Mr. Wang reports that he is not working currently. 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 He left that job due to increasing difficulty lifting 70 pounds loads due to aging. Previously he was an acountant for 25 years. Then, he took care of his mother for 3-4 years. He lives in his own home  in [place]. His brother, Miguelangel, lives with him. He has never been  and has no children.     REVIEW OF SYSTEMS: 14 point ROS negative other than the symptoms noted above in the HPI.    PAST MEDICAL AND SURGICAL HISTORY:   Active Ambulatory Problems     Diagnosis Date Noted     Hypertension goal BP (blood pressure) < 140/90 2015     Advanced directives, counseling/discussion 10/30/2017     Obesity (BMI 35.0-39.9) with comorbidity (H) 2019     Resolved Ambulatory Problems     Diagnosis Date Noted     No Resolved Ambulatory Problems     Past Medical History:   Diagnosis Date     Arthritis      Hypertension        SOCIAL HISTORY:   Social History     Tobacco Use     Smoking status: Current Every Day Smoker     Packs/day: 1.00     Years: 36.00     Pack years: 36.00     Types: Cigarettes     Smokeless tobacco: Never Used     Tobacco comment: I am still on the fence with this but may need to quit   Substance Use Topics     Alcohol use: Yes     Comment: occasional     Drug use: No      He smoke 1 pack per day of cigarettes. He started smoking at age 17-18. Has quit before, longest time was 6 months. He has not had any alcohol for 6 weeks due to belly pain. He only drinks beer, stomach no longer tolerates. Prior to that, six 12 oz cans of beer per day.       FAMILY HISTORY:   Family History   Problem Relation Age of Onset     Hypertension Mother      Neurologic Disorder Mother         stroke     Hyperlipidemia Mother      Cerebrovascular Disease Mother          of stroke     Thyroid Disease Mother      Heart Disease Father      Heart Failure Father      Coronary Artery Disease Father          of heart attack     Hypertension Brother      Coronary Artery Disease Brother         Quadruple Bi-pass/Quadruple Bi-pass     Hypertension Sister      Coronary Artery Disease Sister         Stent/Stent     Hypertension Brother      Hyperlipidemia Brother      Hypertension Sister      Hyperlipidemia Sister         ALLERGIES: No Known Allergies    CURRENT MEDICATIONS:   Current Outpatient Medications:      acetaminophen (TYLENOL) 500 MG tablet, Take 500-1,000 mg by mouth every 6 hours as needed for mild pain, Disp: , Rfl:      amLODIPine (NORVASC) 10 MG tablet, TAKE ONE TABLET BY MOUTH ONE TIME DAILY , Disp: 90 tablet, Rfl: 2     lisinopril (ZESTRIL) 10 MG tablet, TAKE ONE TABLET BY MOUTH TWICE DAILY, Disp: 180 tablet, Rfl: 2     MULTI-VITAMIN OR TABS, 1 TABLET DAILY, Disp: , Rfl:      Multiple Vitamin (MULTIVITAMIN PO), Take by mouth daily, Disp: , Rfl:     PHYSICAL EXAMINATION:  Vital signs: There were no vitals taken for this visit.  ECOG performance status of 0-1. Fatigue 0.  GENERAL: Well-nourished healthy-appearing man in chair, no acute distress.   HEENT: No icterus, no pallor. Moist mucous membranes.   LUNGS: Normal work of breathing. No audible wheezing or stridor  NEUROLOGIC: Awake, alert, no dysarthria, No focal deficits observed from waist up    LABORATORY DATA:     CBC RESULTS:   Recent Labs   Lab Test 08/03/20  1520   WBC 11.3*   HGB 13.0*   HCT 41.8   MCV 75*   MCHC 31.1*   RDW 18.5*           Last Comprehensive Metabolic Panel:  Sodium   Date Value Ref Range Status   08/03/2020 136 133 - 144 mmol/L Final   06/10/2020 138 133 - 144 mmol/L Final   05/29/2020 129 (L) 133 - 144 mmol/L Final     Potassium   Date Value Ref Range Status   08/03/2020 4.0 3.4 - 5.3 mmol/L Final   06/10/2020 4.0 3.4 - 5.3 mmol/L Final   05/29/2020 3.6 3.4 - 5.3 mmol/L Final     Chloride   Date Value Ref Range Status   08/03/2020 104 94 - 109 mmol/L Final   06/10/2020 107 94 - 109 mmol/L Final   05/29/2020 94 94 - 109 mmol/L Final     Carbon Dioxide   Date Value Ref Range Status   08/03/2020 25 20 - 32 mmol/L Final   06/10/2020 24 20 - 32 mmol/L Final   05/29/2020 26 20 - 32 mmol/L Final     Anion Gap   Date Value Ref Range Status   08/03/2020 7 3 - 14 mmol/L Final   06/10/2020 7 3 - 14 mmol/L Final   05/29/2020 9 3 - 14  mmol/L Final     Glucose   Date Value Ref Range Status   08/03/2020 104 (H) 70 - 99 mg/dL Final   06/10/2020 134 (H) 70 - 99 mg/dL Final     Comment:     Fasting specimen   05/29/2020 121 (H) 70 - 99 mg/dL Final     Urea Nitrogen   Date Value Ref Range Status   08/03/2020 12 7 - 30 mg/dL Final   06/10/2020 12 7 - 30 mg/dL Final   05/29/2020 15 7 - 30 mg/dL Final     Creatinine   Date Value Ref Range Status   08/03/2020 0.88 0.66 - 1.25 mg/dL Final   06/10/2020 0.92 0.66 - 1.25 mg/dL Final   05/29/2020 0.94 0.66 - 1.25 mg/dL Final     GFR Estimate   Date Value Ref Range Status   08/03/2020 >90 >60 mL/min/[1.73_m2] Final     Comment:     Non  GFR Calc  Starting 12/18/2018, serum creatinine based estimated GFR (eGFR) will be   calculated using the Chronic Kidney Disease Epidemiology Collaboration   (CKD-EPI) equation.     06/10/2020 >90 >60 mL/min/[1.73_m2] Final     Comment:     Non  GFR Calc  Starting 12/18/2018, serum creatinine based estimated GFR (eGFR) will be   calculated using the Chronic Kidney Disease Epidemiology Collaboration   (CKD-EPI) equation.     05/29/2020 88 >60 mL/min/[1.73_m2] Final     Comment:     Non  GFR Calc  Starting 12/18/2018, serum creatinine based estimated GFR (eGFR) will be   calculated using the Chronic Kidney Disease Epidemiology Collaboration   (CKD-EPI) equation.       Calcium   Date Value Ref Range Status   08/03/2020 8.9 8.5 - 10.1 mg/dL Final   06/10/2020 9.5 8.5 - 10.1 mg/dL Final   05/29/2020 9.2 8.5 - 10.1 mg/dL Final     Bilirubin Total   Date Value Ref Range Status   08/03/2020 0.3 0.2 - 1.3 mg/dL Final   06/10/2020 0.2 0.2 - 1.3 mg/dL Final   05/29/2020 0.4 0.2 - 1.3 mg/dL Final     Alkaline Phosphatase   Date Value Ref Range Status   08/03/2020 131 40 - 150 U/L Final   06/10/2020 105 40 - 150 U/L Final   05/29/2020 117 40 - 150 U/L Final     ALT   Date Value Ref Range Status   08/03/2020 26 0 - 70 U/L Final   06/10/2020 32 0  - 70 U/L Final   05/29/2020 58 0 - 70 U/L Final     AST   Date Value Ref Range Status   08/03/2020 17 0 - 45 U/L Final   06/10/2020 15 0 - 45 U/L Final   05/29/2020 33 0 - 45 U/L Final       IMAGING STUDIES:    CT CHEST, ABDOMEN, PELVIS WITH CONTRAST  1/27/2021 2:52 PM     HISTORY: Esophageal cancer, initial workup; mass on endoscopy likely  malignant; Mass of esophagus determined by endoscopy.     TECHNIQUE: CT scan obtained of the chest, abdomen, and pelvis with  oral and IV contrast. 97 mL Isovue-370 IV injected. Radiation dose for  this scan was reduced using automated exposure control, adjustment of  the mA and/or kV according to patient size, or iterative  reconstruction technique.     COMPARISON:  Chest x-ray on 6/14/2015.     FINDINGS:  Chest/mediastinum: Thyroid gland is unremarkable. No cardiomegaly or  significant pericardial effusion. A few enlarged mediastinal lymph  nodes, for example there is 2 cm short axis subcarinal lymph node  (series 3 image 77) and 1.4 cm short axis right subcarinal lymph node  (series 3 image 83), likely metastatic. There is focal thickening of  the superior wall along the right side of the mid esophagus (series 3  image 110), could represent the known esophageal malignancy. Small  hiatal hernia.     Lung/pleura: No pleural effusion or pneumothorax. Numerous small  pulmonary nodules, for example 3 mm nodule in the superior segment of  the left lower lobe (series 5 image 84), 3 mm nodule in the anterior  aspect of the right upper lobe (series 5 image 102), 4 mm nodule in  the medial aspect of the right upper lobe (series 5 image 119) and 4  mm right lower lobe nodule (series 5 image 212).     Abdomen/pelvis: A few hypoattenuating foci in the liver, for example  1.6 cm hypoattenuating/hypoenhancing lesion in hepatic segment 8  (series 3 image 128) and 1.4 cm lesion in hepatic segment 5 (series 3  image 183), indeterminate, likely metastatic. The gallbladder is  unremarkable. No  main pancreatic ductal dilatation or definite solid  pancreatic mass. No splenomegaly. No discrete adrenal nodule. No  radiodense kidney stones or hydronephrosis in either kidney. There is  1.9 cm renal cyst arising from the interpolar region of the left  kidney (series 3 image 178).     No bony dilated bowel loops. No significant free fluid in the abdomen  and pelvis. No free peritoneal or portal venous gas. The prostate  gland is mildly enlarged measuring 5.3 cm in transverse diameter.  Scattered atherosclerotic vascular calcification of the abdominal  aorta and iliac vessels. Multiple prominent but not significantly  enlarged retroperitoneal lymph nodes, indeterminate, could be  reactive.     Bones and soft tissue: There is 4.4 x 4.3 cm lytic lesion centered in  the posterior aspect of the left sacral ala (series 3 image 243),  likely metastatic. There is 4.7 x 4.0 x 5.6 cm (axial and CC  dimensions, respectively) hypoattenuating lesion in the subcutaneous  tissue of the anterior chest wall just anterior to the mediastinum,  indeterminate, could represent sebaceous cyst.                                                                      IMPRESSION: In this patient with history of esophageal cancer, there  is;  1. A few enlarged mediastinal lymph nodes including 2.0 cm short axis  subcarinal lymph node, likely metastatic.  2. A few scattered hypoattenuating foci in the liver for example 1.6  cm lesion in the right hepatic lobe, these are indeterminant,  worrisome for metastases. Can be further evaluated with abdominal MRI  or PET/CT.  3. Numerous small pulmonary nodules measure up to 4 mm, these are  indeterminant, cannot completely exclude metastatic nodules, recommend  short-term follow-up to ensure stability.  4. 4.4 cm lytic lesion centered in the posterior aspect of the left  sacral ala, worrisome for osseous metastasis.  5. 5.6 cm hypoattenuating lesion of the subcutaneous tissue of the  anterior chest  wall, indeterminant, could represent benign lesion like  sebaceous cyst versus less likely malignant/metastatic.    ASSESSMENT AND PLAN:    Junito Wang is a 60 year old man with a history of hypertension, current smoker, who presented with 6 months of progressive dysphagia and 3-4 months of worsening left hip pain who was found to have esophageal adenocarcinoma with imaging concerning for a left sacral lytic bony metastasis.    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 This needs to be proven by biopsy. We will arrange for biopsy of the left hip lesion. I discussed about he diagnosis, natural history and prognosis of presumed stage 4 esophogeal cancer. Typically esophageal cancer does not invade the bone, so while this bone lesion most likely represents metastatic disease, there is a very rare but extant possibility that this symptomatic bone lesion represents a secondary primary. He has PET/CT planned for this Friday. If cancer is confined to esophagus, surgery could be an option with potential for curative treatment. If the PET/CT shows that metastatic disease, and is proven by biopsy surgery would not be an option, and we would focus instead on treatment with chemotherapy with the intention to slow progression and improve quality of life, without expectation of cure.  He verbalized understanding and is in agreement with the plan.    Plan  - Hip biopsy  - Follow-up in 1 week after the hip biopsy  - Will likely need repeat COVID-19 testing prior to the hip biopsy  - Agree with PET/CT for staging    # Left hip pain  Concerning for neuropathic pain associated with tumor compressing on nerves.   - Trial gabapentin oral solution once daily at bedtime   - If this is helpful, can  "then increase to three times daily    # Severe malnutrition  Significant weight loss associated with progressive disease over the past year.   - Referral to Nutritionist to review nutrition needs, especially in setting of plan for chemotherapy in the future  - Moving forward, could consider esophgeal stent vs enteral nutrition to supplement nutrition if unable to take sufficient orally    Patient seen and discussed with attending, Dr. Steven.    Debbie Quijano MD  Internal Medicine, PGY-2  Pager: [phone]          Junito is a 60 year old who is being evaluated via a billable video visit.      How would you like to obtain your AVS? MyChart  If the video visit is dropped, the invitation should be resent by: Send to e-mail at: [email or web address]  Will anyone else be joining your video visit? No    Vitals - Patient Reported  Weight (Patient Reported): 90.3 kg (199 lb)  Height (Patient Reported): 176.5 cm (5' 9.5\")  BMI (Based on Pt Reported Ht/Wt): 28.97  Pain Score: Mild Pain (3)  Pain Loc: (PATIENT REPORTS PAIN IN LEFT HIP)      I have reviewed and updated patient's allergy and medication list.    Concerns: NONE  Refills: NONE      Cherelle Peña CMA    Video Start Time: 3:08  Video-Visit Details    Type of service:  Video Visit    Video End Time:3:50    Originating Location (pt. Location): Home    Distant Location (provider location):  Home    Platform used for Video Visit: [employer]      "

## 2021-02-08 NOTE — LETTER
2/8/2021         RE: Junito Wang  [id number] [facility] [id number]        Dear Colleague,    Thank you for referring your patient, Junito Wang, to the [facility]. Please see a copy of my visit note below.        MEDICAL ONCOLOGY INITIAL CONSULT NOTE    PATIENT NAME: Junito Wang  ENCOUNTER DATE: 2/7/2021    Care Team  Primary Oncologist: Jose Steven MD    REASON FOR CURRENT VISIT: New diagnosis of esophageal adenocarcinoma    HISTORY OF PRESENT ILLNESS:  Mr. 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), possible metastasis of liver and bone  (AJCC 8th edition)  -MMR proficient, HER2 by IHC is 2+, FISH pending  -PD-L1 CPS- 5-10%  -Full staging pending    Treatment:  Anticipating systemic treatment with chemo-immunotherapy if stage 4 is proven      Intent of treatment: Palliative/Curative    Oncologic course:  August 2020- stomach discomfort, belching   October 2020- progressive dysphagia with solids   1/26/21- distal esophageal biopsy shows Moderately differentiated adenocarcinoma; MMR normal expression, PDL1 expression is low with TPS 2%, CPS 5-10, Her2 is pending  1/27/21- CT CAP- Enlarged  2 cm subcarinal lymph node and 1.4 cm short axis right subcarinal lymph node, focal thickening of the superior wall along the right side of the mid esophagus. Numerous small pulmonary nodules, 3 mm nodule in the superior segment of the left lower lobe, 3 mm nodule in the anterior aspect of the right upper lobe , 4 mm nodule in the medial aspect of the right upper lobe and 4 mm right lower lobe nodule. Hypoattenuating foci in the liver, 1.6 cm hypoattenuating/hypoenhancing lesion in hepatic segment 8 and 1.4 cm lesion in hepatic segment 5 , indeterminate, likely metastatic.  The prostate gland is mildly enlarged measuring 5.3 cm in transverse diameter.  Multiple prominent but not significantly enlarged retroperitoneal lymph nodes, indeterminate, could be reactive. 4.4 x 4.3 cm lytic lesion centered in the posterior aspect of the left sacral ala (series 3 image 243), 4.7 x 4.0 x 5.6 cm (axial and CC dimensions, respectively) hypoattenuating lesion in the subcutaneous tissue of the anterior chest wall just anterior to the mediastinum, indeterminate, could represent sebaceous cyst.  2/4/21- Saw Dr. Bourgeois- who ordered PEt/CT      Interval Hx:    Mr. Wang presents today as a new patient after esophageal biopsy showed adenocarcinoma. He reports 3-6 months of progressive dysphagia. Last summer, he was able to eat hamburgers and meats without issue. In December, he noticed he could not longer easily eat noodle casseroles. Over the past several months, he has had progressive issues with regurgitating food. He currently can tolerate only liquids such as ensure, pedialyte, milk and water. Occasionally he can eat a peanut butter sandwich if he chews it quite thoroughly, but not always. With eating, he feels like he can't get food down until he burps or hiccups and the pressures is relieved and he can eat some more. He has lost about 45 pounds of weight over the past two years. January 2019 he weighed 248lbs. In August 2020 he weighed 221lbs, and in January 2021 he weighed 199lbs.     He has also noticed a gradually worsening left hip pain which makes it difficult for him to walk.  He first noticed the hip pain in September 2020. It was sharp, shooting pains, mostly at night. Pain is worse with leaning forward, improves when he sits back. He used to be very active shoveling snow and raking the leaves in his yard this past fall, but for the past 3-4 months he has been limited in his activities due to hip pain. He has tried ibuprofen but that does not help.      Regarding his social history, Mr. Wang reports that he is not working currently. He most recently worked in late  2018--as a  and  for a construction company. He left that job due to increasing difficulty lifting 70 pounds loads due to aging. Previously he was an acountant for 25 years. Then, he took care of his mother for 3-4 years. He lives in his own home in [place]. His brother, Miguelangel, lives with him. He has never been  and has no children.     REVIEW OF SYSTEMS: 14 point ROS negative other than the symptoms noted above in the HPI.    PAST MEDICAL AND SURGICAL HISTORY:   Active Ambulatory Problems     Diagnosis Date Noted     Hypertension goal BP (blood pressure) < 140/90 2015     Advanced directives, counseling/discussion 10/30/2017     Obesity (BMI 35.0-39.9) with comorbidity (H) 2019     Resolved Ambulatory Problems     Diagnosis Date Noted     No Resolved Ambulatory Problems     Past Medical History:   Diagnosis Date     Arthritis      Hypertension        SOCIAL HISTORY:   Social History     Tobacco Use     Smoking status: Current Every Day Smoker     Packs/day: 1.00     Years: 36.00     Pack years: 36.00     Types: Cigarettes     Smokeless tobacco: Never Used     Tobacco comment: I am still on the fence with this but may need to quit   Substance Use Topics     Alcohol use: Yes     Comment: occasional     Drug use: No      He smoke 1 pack per day of cigarettes. He started smoking at age [age]. Has quit before, longest time was 6 months. He has not had any alcohol for 6 weeks due to belly pain. He only drinks beer, stomach no longer tolerates. Prior to that, six 12 oz cans of beer per day.       FAMILY HISTORY:   Family History   Problem Relation Age of Onset     Hypertension Mother      Neurologic Disorder Mother         stroke     Hyperlipidemia Mother      Cerebrovascular Disease Mother          of stroke     Thyroid Disease Mother      Heart Disease Father      Heart Failure Father      Coronary Artery Disease Father          of heart attack     Hypertension Brother       Coronary Artery Disease Brother         Quadruple Bi-pass/Quadruple Bi-pass     Hypertension Sister      Coronary Artery Disease Sister         Stent/Stent     Hypertension Brother      Hyperlipidemia Brother      Hypertension Sister      Hyperlipidemia Sister        ALLERGIES: No Known Allergies    CURRENT MEDICATIONS:   Current Outpatient Medications:      acetaminophen (TYLENOL) 500 MG tablet, Take 500-1,000 mg by mouth every 6 hours as needed for mild pain, Disp: , Rfl:      amLODIPine (NORVASC) 10 MG tablet, TAKE ONE TABLET BY MOUTH ONE TIME DAILY , Disp: 90 tablet, Rfl: 2     lisinopril (ZESTRIL) 10 MG tablet, TAKE ONE TABLET BY MOUTH TWICE DAILY, Disp: 180 tablet, Rfl: 2     MULTI-VITAMIN OR TABS, 1 TABLET DAILY, Disp: , Rfl:      Multiple Vitamin (MULTIVITAMIN PO), Take by mouth daily, Disp: , Rfl:     PHYSICAL EXAMINATION:  Vital signs: There were no vitals taken for this visit.  ECOG performance status of 0-1. Fatigue 0.  GENERAL: Well-nourished healthy-appearing man in chair, no acute distress.   HEENT: No icterus, no pallor. Moist mucous membranes.   LUNGS: Normal work of breathing. No audible wheezing or stridor  NEUROLOGIC: Awake, alert, no dysarthria, No focal deficits observed from waist up    LABORATORY DATA:     CBC RESULTS:   Recent Labs   Lab Test 08/03/20  1520   WBC 11.3*   HGB 13.0*   HCT 41.8   MCV 75*   MCHC 31.1*   RDW 18.5*           Last Comprehensive Metabolic Panel:  Sodium   Date Value Ref Range Status   08/03/2020 136 133 - 144 mmol/L Final   06/10/2020 138 133 - 144 mmol/L Final   05/29/2020 129 (L) 133 - 144 mmol/L Final     Potassium   Date Value Ref Range Status   08/03/2020 4.0 3.4 - 5.3 mmol/L Final   06/10/2020 4.0 3.4 - 5.3 mmol/L Final   05/29/2020 3.6 3.4 - 5.3 mmol/L Final     Chloride   Date Value Ref Range Status   08/03/2020 104 94 - 109 mmol/L Final   06/10/2020 107 94 - 109 mmol/L Final   05/29/2020 94 94 - 109 mmol/L Final     Carbon Dioxide   Date Value  Ref Range Status   08/03/2020 25 20 - 32 mmol/L Final   06/10/2020 24 20 - 32 mmol/L Final   05/29/2020 26 20 - 32 mmol/L Final     Anion Gap   Date Value Ref Range Status   08/03/2020 7 3 - 14 mmol/L Final   06/10/2020 7 3 - 14 mmol/L Final   05/29/2020 9 3 - 14 mmol/L Final     Glucose   Date Value Ref Range Status   08/03/2020 104 (H) 70 - 99 mg/dL Final   06/10/2020 134 (H) 70 - 99 mg/dL Final     Comment:     Fasting specimen   05/29/2020 121 (H) 70 - 99 mg/dL Final     Urea Nitrogen   Date Value Ref Range Status   08/03/2020 12 7 - 30 mg/dL Final   06/10/2020 12 7 - 30 mg/dL Final   05/29/2020 15 7 - 30 mg/dL Final     Creatinine   Date Value Ref Range Status   08/03/2020 0.88 0.66 - 1.25 mg/dL Final   06/10/2020 0.92 0.66 - 1.25 mg/dL Final   05/29/2020 0.94 0.66 - 1.25 mg/dL Final     GFR Estimate   Date Value Ref Range Status   08/03/2020 >90 >60 mL/min/[1.73_m2] Final     Comment:     Non  GFR Calc  Starting 12/18/2018, serum creatinine based estimated GFR (eGFR) will be   calculated using the Chronic Kidney Disease Epidemiology Collaboration   (CKD-EPI) equation.     06/10/2020 >90 >60 mL/min/[1.73_m2] Final     Comment:     Non  GFR Calc  Starting 12/18/2018, serum creatinine based estimated GFR (eGFR) will be   calculated using the Chronic Kidney Disease Epidemiology Collaboration   (CKD-EPI) equation.     05/29/2020 88 >60 mL/min/[1.73_m2] Final     Comment:     Non  GFR Calc  Starting 12/18/2018, serum creatinine based estimated GFR (eGFR) will be   calculated using the Chronic Kidney Disease Epidemiology Collaboration   (CKD-EPI) equation.       Calcium   Date Value Ref Range Status   08/03/2020 8.9 8.5 - 10.1 mg/dL Final   06/10/2020 9.5 8.5 - 10.1 mg/dL Final   05/29/2020 9.2 8.5 - 10.1 mg/dL Final     Bilirubin Total   Date Value Ref Range Status   08/03/2020 0.3 0.2 - 1.3 mg/dL Final   06/10/2020 0.2 0.2 - 1.3 mg/dL Final   05/29/2020 0.4 0.2 - 1.3  mg/dL Final     Alkaline Phosphatase   Date Value Ref Range Status   08/03/2020 131 40 - 150 U/L Final   06/10/2020 105 40 - 150 U/L Final   05/29/2020 117 40 - 150 U/L Final     ALT   Date Value Ref Range Status   08/03/2020 26 0 - 70 U/L Final   06/10/2020 32 0 - 70 U/L Final   05/29/2020 58 0 - 70 U/L Final     AST   Date Value Ref Range Status   08/03/2020 17 0 - 45 U/L Final   06/10/2020 15 0 - 45 U/L Final   05/29/2020 33 0 - 45 U/L Final       IMAGING STUDIES:    CT CHEST, ABDOMEN, PELVIS WITH CONTRAST  1/27/2021 2:52 PM     HISTORY: Esophageal cancer, initial workup; mass on endoscopy likely  malignant; Mass of esophagus determined by endoscopy.     TECHNIQUE: CT scan obtained of the chest, abdomen, and pelvis with  oral and IV contrast. 97 mL Isovue-370 IV injected. Radiation dose for  this scan was reduced using automated exposure control, adjustment of  the mA and/or kV according to patient size, or iterative  reconstruction technique.     COMPARISON:  Chest x-ray on 6/14/2015.     FINDINGS:  Chest/mediastinum: Thyroid gland is unremarkable. No cardiomegaly or  significant pericardial effusion. A few enlarged mediastinal lymph  nodes, for example there is 2 cm short axis subcarinal lymph node  (series 3 image 77) and 1.4 cm short axis right subcarinal lymph node  (series 3 image 83), likely metastatic. There is focal thickening of  the superior wall along the right side of the mid esophagus (series 3  image 110), could represent the known esophageal malignancy. Small  hiatal hernia.     Lung/pleura: No pleural effusion or pneumothorax. Numerous small  pulmonary nodules, for example 3 mm nodule in the superior segment of  the left lower lobe (series 5 image 84), 3 mm nodule in the anterior  aspect of the right upper lobe (series 5 image 102), 4 mm nodule in  the medial aspect of the right upper lobe (series 5 image 119) and 4  mm right lower lobe nodule (series 5 image 212).     Abdomen/pelvis: A few  hypoattenuating foci in the liver, for example  1.6 cm hypoattenuating/hypoenhancing lesion in hepatic segment 8  (series 3 image 128) and 1.4 cm lesion in hepatic segment 5 (series 3  image 183), indeterminate, likely metastatic. The gallbladder is  unremarkable. No main pancreatic ductal dilatation or definite solid  pancreatic mass. No splenomegaly. No discrete adrenal nodule. No  radiodense kidney stones or hydronephrosis in either kidney. There is  1.9 cm renal cyst arising from the interpolar region of the left  kidney (series 3 image 178).     No bony dilated bowel loops. No significant free fluid in the abdomen  and pelvis. No free peritoneal or portal venous gas. The prostate  gland is mildly enlarged measuring 5.3 cm in transverse diameter.  Scattered atherosclerotic vascular calcification of the abdominal  aorta and iliac vessels. Multiple prominent but not significantly  enlarged retroperitoneal lymph nodes, indeterminate, could be  reactive.     Bones and soft tissue: There is 4.4 x 4.3 cm lytic lesion centered in  the posterior aspect of the left sacral ala (series 3 image 243),  likely metastatic. There is 4.7 x 4.0 x 5.6 cm (axial and CC  dimensions, respectively) hypoattenuating lesion in the subcutaneous  tissue of the anterior chest wall just anterior to the mediastinum,  indeterminate, could represent sebaceous cyst.                                                                      IMPRESSION: In this patient with history of esophageal cancer, there  is;  1. A few enlarged mediastinal lymph nodes including 2.0 cm short axis  subcarinal lymph node, likely metastatic.  2. A few scattered hypoattenuating foci in the liver for example 1.6  cm lesion in the right hepatic lobe, these are indeterminant,  worrisome for metastases. Can be further evaluated with abdominal MRI  or PET/CT.  3. Numerous small pulmonary nodules measure up to 4 mm, these are  indeterminant, cannot completely exclude  metastatic nodules, recommend  short-term follow-up to ensure stability.  4. 4.4 cm lytic lesion centered in the posterior aspect of the left  sacral ala, worrisome for osseous metastasis.  5. 5.6 cm hypoattenuating lesion of the subcutaneous tissue of the  anterior chest wall, indeterminant, could represent benign lesion like  sebaceous cyst versus less likely malignant/metastatic.    ASSESSMENT AND PLAN:    Junito Wang is a 60 year old man with a history of hypertension, current smoker, who presented with 6 months of progressive dysphagia and 3-4 months of worsening left hip pain who was found to have esophageal adenocarcinoma with imaging concerning for a left sacral lytic bony metastasis.    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 This needs to be proven by biopsy. We will arrange for biopsy of the left hip lesion. I discussed about he diagnosis, natural history and prognosis of presumed stage 4 esophogeal cancer. Typically esophageal cancer does not invade the bone, so while this bone lesion most likely represents metastatic disease, there is a very rare but extant possibility that this symptomatic bone lesion represents a secondary primary. He has PET/CT planned for this Friday. If cancer is confined to esophagus, surgery could be an option with potential for curative treatment. If the PET/CT shows that metastatic disease, and is proven by biopsy surgery would not be an option, and we would focus instead on treatment with chemotherapy with the intention to slow progression and improve quality of life, without expectation of cure.  He verbalized understanding and is in agreement with the plan.    Plan  - Hip biopsy  - Follow-up in 1 week after the hip biopsy  - Will  "likely need repeat COVID-19 testing prior to the hip biopsy  - Agree with PET/CT for staging    # Left hip pain  Concerning for neuropathic pain associated with tumor compressing on nerves.   - Trial gabapentin oral solution once daily at bedtime   - If this is helpful, can then increase to three times daily    # Severe malnutrition  Significant weight loss associated with progressive disease over the past year.   - Referral to Nutritionist to review nutrition needs, especially in setting of plan for chemotherapy in the future  - Moving forward, could consider esophgeal stent vs enteral nutrition to supplement nutrition if unable to take sufficient orally    Patient seen and discussed with attending, Dr. Steven.    Debbie Quijano MD  Internal Medicine, PGY-2  Pager: [phone]      Chart review: 20 minutes  Virtual visit: 45 minutes  Care coordination: 10 minutes      Junito is a 60 year old who is being evaluated via a billable video visit.      How would you like to obtain your AVS? MyChart  If the video visit is dropped, the invitation should be resent by: Send to e-mail at: [email or web address]  Will anyone else be joining your video visit? No    Vitals - Patient Reported  Weight (Patient Reported): 90.3 kg (199 lb)  Height (Patient Reported): 176.5 cm (5' 9.5\")  BMI (Based on Pt Reported Ht/Wt): 28.97  Pain Score: Mild Pain (3)  Pain Loc: (PATIENT REPORTS PAIN IN LEFT HIP)      I have reviewed and updated patient's allergy and medication list.    Concerns: NONE  Refills: NONE      Cherelle Peña CMA    Video Start Time: 3:08  Video-Visit Details    Type of service:  Video Visit    Video End Time:3:50    Originating Location (pt. Location): Home    Distant Location (provider location):  Home    Platform used for Video Visit: AmWell          Again, thank you for allowing me to participate in the care of your patient.        Sincerely,        Jose Steven MD    "

## 2021-02-08 NOTE — PROGRESS NOTES
Hypertension.     Colonoscopy and endoscopy -- cancer in esophagus    Pain in left hip, radiates down left side to foot. No medications for that.     Can't take big pills because oesphogeal opening is too small    Ensure, pedialyte, milk, water.     Has had issues with other foods. For example, tried to eat a can of tuna, then threw up.     Gradually worse since November 2020 until now. Haven't been able ot eat a steak in a long time. Started noticing issues with tuna caseroles since ecember 2020. 2 weeks ago, had a peanut buter sandwich and that was fine.     Appendectomy 20 years ago  Tonsillectomy.     Follows up with doctors when he needs to. But, doesn't go in frequently.      Has hip pain, difficulty walking. Shovels snow. Raking. In the past 3-4 months, increasing leg pain.     Not working currently. Worked until late 2018, hasn't found another job since. 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     Smoke cigarettes 1PPD, Started at age [age]. Has quit before, longest time was 6 months.   No alcohol for 6 weeks. Only drinks beer, stomach no longer tolerates. Prior to that, 6 12 oz cans per day.     Lives in a house in [place] with his brother, Miguelangel. Never been , no children.      In the past 2-3 months, noticed symptoms of difficulty swallowing solid foods dramatically worse.    First noticed hip pain in September 2020. It was sharp, shooting pains, mostly at night. Pain is worse with leaning forward, improves when he sits back.     With eating, feels like he can't get food down until he burps or hiccups and the pressures is relieved and he can eat some more. He has lost about 45 pounds of weight over the past two years. January 2019 was 248. August 2020 he was 221, January 2021 he was 199.       # Esophageal cancer  # Left hip lesion noted on CT   Determination of treatment  course depends on staging. PET/CT planned for this Friday. If cancer is confined to esophagus, surgery could be an option with potential for curative treatment. If the PET/CT shows that metastatic disease, surgery would not be an option, and we would focus instead on treatment with chemotherapy with the intention to slow progression and improve quality of life, without expectation of cure. Mr. Wang's disease is concerning for metastatic disease, initial CT shows possible tumor in liver and in left hip bone. To further evaluate if this is truly Stage IV disease, it would be helpful to biopsy the lesion on the hip. Typically esophageal cancer does not invade the bone, so while this bone lesion most likely represents metastatic disease, there is a very rare but extant possibility that this symptomatic bone lesion represents a secondary primary.   - Hip biopsy  - Follow-up in 1 week after the hip biopsy  - Will likely need repeat COVID-19 testing prior to the hip biopsy  - Agree with PET/CT for staging    # Left hip pain  Concerning for neuropathic pain associated with tumor compressing on nerves.   - Trial gabapentin oral solution once daily at bedtime   - If this is helpful, can then increase to three times daily    # Severe malnutrition  Significant weight loss associated with progressive disease over the past year.   - Referral to Nutritionist to review nutrition needs, especially in setting of plan for chemotherapy in the future  - Moving forward, could consider enteral nutrition to supplement nutrition if unable to take sufficient orally

## 2021-02-09 DIAGNOSIS — K22.89 MASS OF ESOPHAGUS DETERMINED BY ENDOSCOPY: Primary | ICD-10-CM

## 2021-02-09 NOTE — PROGRESS NOTES
"Outpatient IR Biopsy Referral    Junito is a 60 year old male with history of hypertension, current smoker, who presented with 6 months of progressive dysphagia and 3-4 months of worsening left hip pain who was found to have esophageal adenocarcinoma with imaging concerning for a left sacral lytic bony metastasis. Request for IR biopsy of liver lesion or pelvic lesion. Team specifies for hip lesion due to the fact that it is likely metastatic disease but although less likely may represent a second primary.    PET/CT scheduled for this Friday.    Case and imaging was reviewed with Dr. Coker from IR and biopsy of the left pelvic mass is approved. S3 I 239     In ordered the procedure and surgical path. Requesting team to enter any additional requested studies or make IR aware- Epic message sent. Please use the IR order set \"IR RAD Biopsy or Fluid Aspirate Specimens\" to select your necessary diagnostic labs and pend for admission. If there are labs you desire that are not found in this order set or you have questions regarding specific diagnostic labs please call the associated lab personnel. IR will not enter diagnostic labs on the day of the procedure.     Message to IR schedulers to coordinate.    Irene Mart PA-C  Interventional Radiology   IR on-call pager: [phone]      "

## 2021-02-11 DIAGNOSIS — Z11.59 ENCOUNTER FOR SCREENING FOR OTHER VIRAL DISEASES: Primary | ICD-10-CM

## 2021-02-12 ENCOUNTER — HOSPITAL ENCOUNTER (OUTPATIENT)
Dept: PET IMAGING | Facility: CLINIC | Age: 61
Discharge: HOME | End: 2021-02-12
Attending: CLINICAL NURSE SPECIALIST | Admitting: CLINICAL NURSE SPECIALIST
Payer: COMMERCIAL

## 2021-02-12 DIAGNOSIS — C15.9 ADENOCARCINOMA OF ESOPHAGUS (H): ICD-10-CM

## 2021-02-12 LAB
RADIOLOGIST FLAGS: NORMAL
RADIOLOGIST FLAGS: NORMAL

## 2021-02-12 PROCEDURE — 78816 PET IMAGE W/CT FULL BODY: CPT | Mod: PI

## 2021-02-12 PROCEDURE — 343N000001 HC RX 343: Performed by: CLINICAL NURSE SPECIALIST

## 2021-02-12 PROCEDURE — 71260 CT THORAX DX C+: CPT

## 2021-02-12 PROCEDURE — 74177 CT ABD & PELVIS W/CONTRAST: CPT | Mod: 26

## 2021-02-12 PROCEDURE — 250N000011 HC RX IP 250 OP 636: Performed by: CLINICAL NURSE SPECIALIST

## 2021-02-12 PROCEDURE — 71260 CT THORAX DX C+: CPT | Mod: 26

## 2021-02-12 PROCEDURE — 78816 PET IMAGE W/CT FULL BODY: CPT | Mod: 26

## 2021-02-12 PROCEDURE — A9552 F18 FDG: HCPCS | Performed by: CLINICAL NURSE SPECIALIST

## 2021-02-12 RX ORDER — IOPAMIDOL 755 MG/ML
5-140 INJECTION, SOLUTION INTRAVASCULAR ONCE
Status: COMPLETED | OUTPATIENT
Start: 2021-02-12 | End: 2021-02-12

## 2021-02-12 RX ADMIN — IOPAMIDOL 122 ML: 755 INJECTION, SOLUTION INTRAVENOUS at 14:59

## 2021-02-12 RX ADMIN — FLUDEOXYGLUCOSE F-18 12.03 MCI.: 500 INJECTION, SOLUTION INTRAVENOUS at 14:03

## 2021-02-17 DIAGNOSIS — C15.9 ADENOCARCINOMA OF ESOPHAGUS (H): Primary | ICD-10-CM

## 2021-02-22 ENCOUNTER — VIRTUAL VISIT (OUTPATIENT)
Dept: ONCOLOGY | Facility: CLINIC | Age: 61
End: 2021-02-22
Attending: STUDENT IN AN ORGANIZED HEALTH CARE EDUCATION/TRAINING PROGRAM
Payer: COMMERCIAL

## 2021-02-22 DIAGNOSIS — C15.9 ADENOCARCINOMA OF ESOPHAGUS (H): Primary | ICD-10-CM

## 2021-02-22 DIAGNOSIS — Z11.59 ENCOUNTER FOR SCREENING FOR OTHER VIRAL DISEASES: ICD-10-CM

## 2021-02-22 DIAGNOSIS — R64 CANCER CACHEXIA (H): ICD-10-CM

## 2021-02-22 LAB
SARS-COV-2 RNA RESP QL NAA+PROBE: NORMAL
SPECIMEN SOURCE: NORMAL

## 2021-02-22 PROCEDURE — U0003 INFECTIOUS AGENT DETECTION BY NUCLEIC ACID (DNA OR RNA); SEVERE ACUTE RESPIRATORY SYNDROME CORONAVIRUS 2 (SARS-COV-2) (CORONAVIRUS DISEASE [COVID-19]), AMPLIFIED PROBE TECHNIQUE, MAKING USE OF HIGH THROUGHPUT TECHNOLOGIES AS DESCRIBED BY CMS-2020-01-R: HCPCS | Performed by: PHYSICIAN ASSISTANT

## 2021-02-22 PROCEDURE — U0005 INFEC AGEN DETEC AMPLI PROBE: HCPCS | Performed by: PHYSICIAN ASSISTANT

## 2021-02-22 PROCEDURE — 97802 MEDICAL NUTRITION INDIV IN: CPT | Mod: TEL | Performed by: DIETITIAN, REGISTERED

## 2021-02-22 NOTE — LETTER
"    2/22/2021         RE: Junito Wang  [address] [facility] [address]        Dear Colleague,    Thank you for referring your patient, Junito Wang, to the [facility]. Please see a copy of my visit note below.    The patient has been notified of the following:      \"We have found that certain health care needs can be provided without the need for a face to face visit.  This service lets us provide the care you need with a phone conversation.       I will have full access to your [place] medical record during this entire phone call.   I will be taking notes for your medical record.      Since this is like an office visit, we will bill your insurance company for this service.       There are potential benefits and risks of telephone visits (e.g. limits to patient confidentiality) that differ from in-person visits.?  Confidentiality still applies for telephone services, and nobody will record the visit.  It is important to be in a quiet, private space that is free of distractions (including cell phone or other devices) during the visit.??      If during the course of the call I believe a telephone visit is not appropriate, you will not be charged for this service\"     Consent has been obtained for this service by care team member: Yes     CLINICAL NUTRITION SERVICES - ASSESSMENT NOTE    Junito Wang 60 year old referred for MNT related to esophageal cancer    Time Spent: 60 minutes  Visit Type: phone  Referring Physician: Maurizio 2/8/21  Pt accompanied by: self      NUTRITION HISTORY  Factors affecting nutrition intake include:swallowing difficulties  Current diet: sips of ONS (Ensure Plus)  Current appetite/intake: poor      Due to inability to meet nutrition needs via po intake and cancer cachexia, recommend the following per MD discretion:     RECOMMENDATIONS FOR MD/PROVIDER TO ORDER:   Enteral Nutrition   Formula: Isosource 1.5 rob  Volume: 7 cartons/day (1750mL, " "1330ml free water)  Provisions:  2625kcal (35kcal/kg), 119g protein (1.5g/kg), 308g CHO, 26g fiber        Suggested Tube feeding schedule via gravity feeding  Day 1: 1/2 carton formula TID spread 3-4 hours apart   Day 2: 1/2 carton formula TID spread 3-4 hours apart   Day 3: 1 cartons formula TID spread 3-4 hours apart    Day 4: 1 cartons formula TID spread 3-4 hours apart    Day 5: 1 1/2 cartons formula TID spread 3-4 hours apart    Day 6: 1 1/2 cartons formula TID spread 3-4 hours apart    Day 7: 2 cartons formula TID spread 3-4 hours apart; plus 1 carton add'l prn    Flush with 30-60mL water before and after each feeding  Flush with additional 120 mL water QID for additional hydration      Suspect patient may be at risk for refeeding syndrome, thus, will need to monitor labs daily (phos, K+, Mg)    ANTHROPOMETRICS  Height: 69\"  Weight: 199 lbs/90kg  BMI: 29  Weight Status:  Overweight BMI 25-29.9  IBW: 166 lbs (124%)  Weight History: down ~50 lb x past 2 years; down 20 lb x past 5 months (10%)  Wt Readings from Last 9 Encounters:   01/19/21 90.3 kg (199 lb)   01/15/21 90.3 kg (199 lb)   08/03/20 100.2 kg (221 lb)   01/31/19 112.5 kg (248 lb)   01/07/18 109.3 kg (241 lb)   10/30/17 109.8 kg (242 lb)   03/30/17 112 kg (247 lb)   02/03/17 112.9 kg (249 lb)   07/27/16 111.8 kg (246 lb 6.4 oz)     Dosing Weight: 80kg (adjusted)    Medications/vitamins/minerals/herbals:   Reviewed    Labs:   Labs reviewed    NUTRITION FOCUSED PHYSICAL ASSESSMENT FOR DIAGNOSING MALNUTRITION:  Not observed   Consult for education only      ASSESSED NUTRITION NEEDS:  Estimated Energy Needs: 7496-8493 kcals (30-35 Kcal/Kg)  Justification: repletion/increased needs for impending cancer treatment  Estimated Protein Needs: 100-125 grams protein (1.2-1.5 g pro/Kg)  Justification: Repletion/increased needs for impending cancer treatment  Estimated Fluid Needs: 2500  mL   Justification: maintenance    MALNUTRITION:  % Weight Loss:  > 10% in 6 " months (severe malnutrition)  % Intake:  </= 50% for >/= 1 month (severe malnutrition)  Subcutaneous Fat Loss:  None observed  Muscle Loss:  None observed  Fluid Retention:  None noted    Malnutrition Diagnosis: Severe malnutrition  In Context of:  Acute illness or injury on Chronic illness or disease    NUTRITION DIAGNOSIS:  Inadequate oral intake related to dysphagia as evidenced by 10% wt loss x past 5 months with inability to take more than 25% of est nutrition needs PO    INTERVENTIONS  Provided written & verbal education:   - Reviewed oral nutrition supplement options (Mass Pro weight maggy). Encouraged utilizing these ONS in home made shakes/smoothies to prevent flavor fatigue.  Encouraged pt to start consuming 2 ONS or home made shakes/smoothies daily.       Pt verbalize understanding of materials provided during consult.   Patient Understanding: Excellent  Expected patient engagement: Excellent     Implementation  Collaboration and Referral of Nutrition care - message sent to Dr. Steven regarding suggestions for EN via G tube.    Goals  1.  Aim for 2 Mass Pro wt gain shakes/day (GNC) for ~1300cal each    Follow-Up Plans: Pt has RD contact information for questions.        MONITORING AND EVALUATION:  -Food/beverage intake  -EN access  -Weight trends    Kari Brown, MISAEL, LD

## 2021-02-22 NOTE — PROGRESS NOTES
"The patient has been notified of the following:      \"We have found that certain health care needs can be provided without the need for a face to face visit.  This service lets us provide the care you need with a phone conversation.       I will have full access to your [place] medical record during this entire phone call.   I will be taking notes for your medical record.      Since this is like an office visit, we will bill your insurance company for this service.       There are potential benefits and risks of telephone visits (e.g. limits to patient confidentiality) that differ from in-person visits.?  Confidentiality still applies for telephone services, and nobody will record the visit.  It is important to be in a quiet, private space that is free of distractions (including cell phone or other devices) during the visit.??      If during the course of the call I believe a telephone visit is not appropriate, you will not be charged for this service\"     Consent has been obtained for this service by care team member: Yes     CLINICAL NUTRITION SERVICES - ASSESSMENT NOTE    Junito Wang 60 year old referred for MNT related to esophageal cancer    Time Spent: 60 minutes  Visit Type: phone  Referring Physician: Maurizio 2/8/21  Pt accompanied by: self      NUTRITION HISTORY  Factors affecting nutrition intake include:swallowing difficulties  Current diet: sips of ONS (Ensure Plus)  Current appetite/intake: poor      Due to inability to meet nutrition needs via po intake and cancer cachexia, recommend the following per MD discretion:     RECOMMENDATIONS FOR MD/PROVIDER TO ORDER:   Enteral Nutrition   Formula: Isosource 1.5 rob  Volume: 7 cartons/day (1750mL, 1330ml free water)  Provisions:  2625kcal (35kcal/kg), 119g protein (1.5g/kg), 308g CHO, 26g fiber        Suggested Tube feeding schedule via gravity feeding  Day 1: 1/2 carton formula TID spread 3-4 hours apart   Day 2: 1/2 carton formula TID spread 3-4 hours " "apart   Day 3: 1 cartons formula TID spread 3-4 hours apart    Day 4: 1 cartons formula TID spread 3-4 hours apart    Day 5: 1 1/2 cartons formula TID spread 3-4 hours apart    Day 6: 1 1/2 cartons formula TID spread 3-4 hours apart    Day 7: 2 cartons formula TID spread 3-4 hours apart; plus 1 carton add'l prn    Flush with 30-60mL water before and after each feeding  Flush with additional 120 mL water QID for additional hydration      Suspect patient may be at risk for refeeding syndrome, thus, will need to monitor labs daily (phos, K+, Mg)    ANTHROPOMETRICS  Height: 69\"  Weight: 199 lbs/90kg  BMI: 29  Weight Status:  Overweight BMI 25-29.9  IBW: 166 lbs (124%)  Weight History: down ~50 lb x past 2 years; down 20 lb x past 5 months (10%)  Wt Readings from Last 9 Encounters:   01/19/21 90.3 kg (199 lb)   01/15/21 90.3 kg (199 lb)   08/03/20 100.2 kg (221 lb)   01/31/19 112.5 kg (248 lb)   01/07/18 109.3 kg (241 lb)   10/30/17 109.8 kg (242 lb)   03/30/17 112 kg (247 lb)   02/03/17 112.9 kg (249 lb)   07/27/16 111.8 kg (246 lb 6.4 oz)     Dosing Weight: 80kg (adjusted)    Medications/vitamins/minerals/herbals:   Reviewed    Labs:   Labs reviewed    NUTRITION FOCUSED PHYSICAL ASSESSMENT FOR DIAGNOSING MALNUTRITION:  Not observed   Consult for education only      ASSESSED NUTRITION NEEDS:  Estimated Energy Needs: 1398-8368 kcals (30-35 Kcal/Kg)  Justification: repletion/increased needs for impending cancer treatment  Estimated Protein Needs: 100-125 grams protein (1.2-1.5 g pro/Kg)  Justification: Repletion/increased needs for impending cancer treatment  Estimated Fluid Needs: 2500  mL   Justification: maintenance    MALNUTRITION:  % Weight Loss:  > 10% in 6 months (severe malnutrition)  % Intake:  </= 50% for >/= 1 month (severe malnutrition)  Subcutaneous Fat Loss:  None observed  Muscle Loss:  None observed  Fluid Retention:  None noted    Malnutrition Diagnosis: Severe malnutrition  In Context of:  Acute illness " or injury on Chronic illness or disease    NUTRITION DIAGNOSIS:  Inadequate oral intake related to dysphagia as evidenced by 10% wt loss x past 5 months with inability to take more than 25% of est nutrition needs PO    INTERVENTIONS  Provided written & verbal education:   - Reviewed oral nutrition supplement options (Mass Pro weight maggy). Encouraged utilizing these ONS in home made shakes/smoothies to prevent flavor fatigue.  Encouraged pt to start consuming 2 ONS or home made shakes/smoothies daily.       Pt verbalize understanding of materials provided during consult.   Patient Understanding: Excellent  Expected patient engagement: Excellent     Implementation  Collaboration and Referral of Nutrition care - message sent to Dr. Steven regarding suggestions for EN via G tube.    Goals  1.  Aim for 2 Mass Pro wt gain shakes/day (GNC) for ~1300cal each    Follow-Up Plans: Pt has RD contact information for questions.        MONITORING AND EVALUATION:  -Food/beverage intake  -EN access  -Weight trends    Kari Brown RD, LD

## 2021-02-23 LAB
LABORATORY COMMENT REPORT: NORMAL
SARS-COV-2 RNA RESP QL NAA+PROBE: NEGATIVE
SPECIMEN SOURCE: NORMAL

## 2021-02-24 ENCOUNTER — TELEPHONE (OUTPATIENT)
Dept: INTERVENTIONAL RADIOLOGY/VASCULAR | Facility: CLINIC | Age: 61
End: 2021-02-24

## 2021-02-24 ENCOUNTER — DOCUMENTATION ONLY (OUTPATIENT)
Dept: INTERVENTIONAL RADIOLOGY/VASCULAR | Facility: CLINIC | Age: 61
End: 2021-02-24

## 2021-02-24 NOTE — PROGRESS NOTES
INTERVENTIONAL RADIOLOGY CONSULT    Junito Wang is a 60 year old male with metastatic esophageal cancer, dysphagia and malnutrition. 10% weight loss in 5 months. IR consulted for G tube placement for nutrition.    Reviewed with Jim Puckett MD with approval for G tube placement. There is a safe window on CT. Consider scheduling on same day as left sacral mass biopsy.        Den Rachel PA-C  Interventional Radiology  [phone] (daytime IR pager)

## 2021-02-25 ENCOUNTER — HOSPITAL ENCOUNTER (OUTPATIENT)
Dept: MRI IMAGING | Facility: CLINIC | Age: 61
Discharge: HOME | End: 2021-02-25
Attending: STUDENT IN AN ORGANIZED HEALTH CARE EDUCATION/TRAINING PROGRAM | Admitting: STUDENT IN AN ORGANIZED HEALTH CARE EDUCATION/TRAINING PROGRAM
Payer: COMMERCIAL

## 2021-02-25 ENCOUNTER — PREP FOR PROCEDURE (OUTPATIENT)
Dept: SURGERY | Facility: CLINIC | Age: 61
End: 2021-02-25

## 2021-02-25 DIAGNOSIS — C15.5 MALIGNANT NEOPLASM OF LOWER THIRD OF ESOPHAGUS (H): Primary | ICD-10-CM

## 2021-02-25 DIAGNOSIS — C15.9 ADENOCARCINOMA OF ESOPHAGUS (H): ICD-10-CM

## 2021-02-25 DIAGNOSIS — R13.19 ESOPHAGEAL DYSPHAGIA: ICD-10-CM

## 2021-02-25 PROCEDURE — 255N000002 HC RX 255 OP 636: Performed by: STUDENT IN AN ORGANIZED HEALTH CARE EDUCATION/TRAINING PROGRAM

## 2021-02-25 PROCEDURE — A9585 GADOBUTROL INJECTION: HCPCS | Performed by: STUDENT IN AN ORGANIZED HEALTH CARE EDUCATION/TRAINING PROGRAM

## 2021-02-25 PROCEDURE — 70553 MRI BRAIN STEM W/O & W/DYE: CPT | Mod: 26 | Performed by: STUDENT IN AN ORGANIZED HEALTH CARE EDUCATION/TRAINING PROGRAM

## 2021-02-25 PROCEDURE — 70553 MRI BRAIN STEM W/O & W/DYE: CPT

## 2021-02-25 RX ORDER — GADOBUTROL 604.72 MG/ML
10 INJECTION INTRAVENOUS ONCE
Status: COMPLETED | OUTPATIENT
Start: 2021-02-25 | End: 2021-02-25

## 2021-02-25 RX ADMIN — GADOBUTROL 9 ML: 604.72 INJECTION INTRAVENOUS at 13:44

## 2021-02-26 ENCOUNTER — APPOINTMENT (OUTPATIENT)
Dept: MEDSURG UNIT | Facility: CLINIC | Age: 61
End: 2021-02-26
Attending: STUDENT IN AN ORGANIZED HEALTH CARE EDUCATION/TRAINING PROGRAM
Payer: COMMERCIAL

## 2021-02-26 ENCOUNTER — APPOINTMENT (OUTPATIENT)
Dept: INTERVENTIONAL RADIOLOGY/VASCULAR | Facility: CLINIC | Age: 61
End: 2021-02-26
Attending: PHYSICIAN ASSISTANT
Payer: COMMERCIAL

## 2021-02-26 ENCOUNTER — HOSPITAL ENCOUNTER (OUTPATIENT)
Facility: CLINIC | Age: 61
Discharge: HOME | End: 2021-02-26
Attending: STUDENT IN AN ORGANIZED HEALTH CARE EDUCATION/TRAINING PROGRAM | Admitting: RADIOLOGY
Payer: COMMERCIAL

## 2021-02-26 VITALS
RESPIRATION RATE: 16 BRPM | WEIGHT: 177 LBS | BODY MASS INDEX: 25.34 KG/M2 | HEART RATE: 80 BPM | OXYGEN SATURATION: 97 % | DIASTOLIC BLOOD PRESSURE: 67 MMHG | TEMPERATURE: 96.8 F | SYSTOLIC BLOOD PRESSURE: 101 MMHG | HEIGHT: 70 IN

## 2021-02-26 DIAGNOSIS — K22.89 MASS OF ESOPHAGUS DETERMINED BY ENDOSCOPY: ICD-10-CM

## 2021-02-26 LAB
BASOPHILS # BLD AUTO: 0.1 10E9/L (ref 0–0.2)
BASOPHILS NFR BLD AUTO: 0.9 %
DIFFERENTIAL METHOD BLD: ABNORMAL
EOSINOPHIL # BLD AUTO: 0.1 10E9/L (ref 0–0.7)
EOSINOPHIL NFR BLD AUTO: 1.6 %
ERYTHROCYTE [DISTWIDTH] IN BLOOD BY AUTOMATED COUNT: 16.7 % (ref 10–15)
HCT VFR BLD AUTO: 40.4 % (ref 40–53)
HGB BLD-MCNC: 12.5 G/DL (ref 13.3–17.7)
IMM GRANULOCYTES # BLD: 0 10E9/L (ref 0–0.4)
IMM GRANULOCYTES NFR BLD: 0.5 %
INR PPP: 1.12 (ref 0.86–1.14)
LYMPHOCYTES # BLD AUTO: 1.7 10E9/L (ref 0.8–5.3)
LYMPHOCYTES NFR BLD AUTO: 18.6 %
MCH RBC QN AUTO: 24.2 PG (ref 26.5–33)
MCHC RBC AUTO-ENTMCNC: 30.9 G/DL (ref 31.5–36.5)
MCV RBC AUTO: 78 FL (ref 78–100)
MONOCYTES # BLD AUTO: 0.7 10E9/L (ref 0–1.3)
MONOCYTES NFR BLD AUTO: 7.3 %
NEUTROPHILS # BLD AUTO: 6.3 10E9/L (ref 1.6–8.3)
NEUTROPHILS NFR BLD AUTO: 71.1 %
NRBC # BLD AUTO: 0 10*3/UL
NRBC BLD AUTO-RTO: 0 /100
PLATELET # BLD AUTO: 406 10E9/L (ref 150–450)
RBC # BLD AUTO: 5.17 10E12/L (ref 4.4–5.9)
WBC # BLD AUTO: 8.9 10E9/L (ref 4–11)

## 2021-02-26 PROCEDURE — 20220 BONE BIOPSY TROCAR/NDL SUPFC: CPT | Mod: GC | Performed by: RADIOLOGY

## 2021-02-26 PROCEDURE — 88307 TISSUE EXAM BY PATHOLOGIST: CPT | Mod: TC | Performed by: STUDENT IN AN ORGANIZED HEALTH CARE EDUCATION/TRAINING PROGRAM

## 2021-02-26 PROCEDURE — 99152 MOD SED SAME PHYS/QHP 5/>YRS: CPT | Mod: GC | Performed by: RADIOLOGY

## 2021-02-26 PROCEDURE — 77012 CT SCAN FOR NEEDLE BIOPSY: CPT | Mod: 26 | Performed by: RADIOLOGY

## 2021-02-26 PROCEDURE — 88307 TISSUE EXAM BY PATHOLOGIST: CPT | Mod: 26 | Performed by: PATHOLOGY

## 2021-02-26 PROCEDURE — 85025 COMPLETE CBC W/AUTO DIFF WBC: CPT | Performed by: PHYSICIAN ASSISTANT

## 2021-02-26 PROCEDURE — 85610 PROTHROMBIN TIME: CPT | Performed by: PHYSICIAN ASSISTANT

## 2021-02-26 PROCEDURE — 272N000653 HC COIL/EMBOLIC DEVICE CR8

## 2021-02-26 PROCEDURE — 77012 CT SCAN FOR NEEDLE BIOPSY: CPT

## 2021-02-26 PROCEDURE — 250N000011 HC RX IP 250 OP 636: Performed by: STUDENT IN AN ORGANIZED HEALTH CARE EDUCATION/TRAINING PROGRAM

## 2021-02-26 PROCEDURE — 250N000009 HC RX 250: Performed by: STUDENT IN AN ORGANIZED HEALTH CARE EDUCATION/TRAINING PROGRAM

## 2021-02-26 PROCEDURE — 999N000133 HC STATISTIC PP CARE STAGE 2

## 2021-02-26 PROCEDURE — 272N000505 HC NEEDLE CR5

## 2021-02-26 PROCEDURE — 258N000003 HC RX IP 258 OP 636: Performed by: PHYSICIAN ASSISTANT

## 2021-02-26 PROCEDURE — 99152 MOD SED SAME PHYS/QHP 5/>YRS: CPT

## 2021-02-26 RX ORDER — LIDOCAINE 40 MG/G
CREAM TOPICAL
Status: DISCONTINUED | OUTPATIENT
Start: 2021-02-26 | End: 2021-02-26 | Stop reason: HOSPADM

## 2021-02-26 RX ORDER — NALOXONE HYDROCHLORIDE 0.4 MG/ML
0.2 INJECTION, SOLUTION INTRAMUSCULAR; INTRAVENOUS; SUBCUTANEOUS
Status: DISCONTINUED | OUTPATIENT
Start: 2021-02-26 | End: 2021-02-26 | Stop reason: HOSPADM

## 2021-02-26 RX ORDER — FLUMAZENIL 0.1 MG/ML
0.2 INJECTION, SOLUTION INTRAVENOUS
Status: DISCONTINUED | OUTPATIENT
Start: 2021-02-26 | End: 2021-02-26 | Stop reason: HOSPADM

## 2021-02-26 RX ORDER — FENTANYL CITRATE 50 UG/ML
25-50 INJECTION, SOLUTION INTRAMUSCULAR; INTRAVENOUS EVERY 5 MIN PRN
Status: DISCONTINUED | OUTPATIENT
Start: 2021-02-26 | End: 2021-02-26 | Stop reason: HOSPADM

## 2021-02-26 RX ORDER — NALOXONE HYDROCHLORIDE 0.4 MG/ML
0.4 INJECTION, SOLUTION INTRAMUSCULAR; INTRAVENOUS; SUBCUTANEOUS
Status: DISCONTINUED | OUTPATIENT
Start: 2021-02-26 | End: 2021-02-26 | Stop reason: HOSPADM

## 2021-02-26 RX ORDER — SODIUM CHLORIDE 9 MG/ML
INJECTION, SOLUTION INTRAVENOUS CONTINUOUS
Status: DISCONTINUED | OUTPATIENT
Start: 2021-02-26 | End: 2021-02-26 | Stop reason: HOSPADM

## 2021-02-26 RX ADMIN — MIDAZOLAM 0.5 MG: 1 INJECTION INTRAMUSCULAR; INTRAVENOUS at 13:31

## 2021-02-26 RX ADMIN — FENTANYL CITRATE 25 MCG: 50 INJECTION, SOLUTION INTRAMUSCULAR; INTRAVENOUS at 13:31

## 2021-02-26 RX ADMIN — LIDOCAINE HYDROCHLORIDE 3 ML: 10 INJECTION, SOLUTION EPIDURAL; INFILTRATION; INTRACAUDAL; PERINEURAL at 13:36

## 2021-02-26 RX ADMIN — SODIUM CHLORIDE: 9 INJECTION, SOLUTION INTRAVENOUS at 12:01

## 2021-02-26 ASSESSMENT — MIFFLIN-ST. JEOR: SCORE: 1624.12

## 2021-02-26 NOTE — PROGRESS NOTES
Patient tolerated recovery stage well. VSS, puncture site low back, left of spine,  clean/dry/intact, no hematoma, and denies pain. Patient tolerated PO food fluids, does not tolerate food.  Teaching was done and discharge instructions were given. Patient ambulated and PIV was removed. Patient discharged from the hospital via wheel chair to home with his brother.

## 2021-02-26 NOTE — PROGRESS NOTES
Returned from IR to 2A post procedure.  Denies pain.  Puncture site low back just to left of spine C/D/I.  Provided with po  fluids, does not want to attempt food as he has been unable to eat lately.

## 2021-02-26 NOTE — PROGRESS NOTES
Prepped for bone biopsy.  Has constant left hip pain, currently at 3/10, which he describes as sharp aching.  Brother available for post procedure transport.  H & P current.  Needs consent.  Labs pending.    Has difficulty eating:  Has lost 15+ pounds since January, food gags him, he is able to tolerate small amounts fluids.  Has noticed increase in fatigue over last month.

## 2021-02-26 NOTE — IR NOTE
Patient Name: Junito Wang  Medical Record Number: [id number]  Today's Date: 2/26/2021    Procedure: CT guided bone biopsy  Proceduralist: Dr RIVAS White, Dr. TONIA Trinh    Sedation start time: 1330  Sedation end time: 1347  Sedation medications administered: 0.5 mg versed, 25 mcg fentanyl  Total sedation time: 17 min      Procedure start time: 1329  Procedure end time: 1347    Report given to: Belén HAMMOND   : n/a    Other Notes: Pt arrived to IR room CT2 from . Consent reviewed, pt confirmed. Pt denies any questions or concerns regarding procedure. Pt positioned prone and monitored per protocol.4 cores taken, specimens sent as ordered. Site cleansed and dressed per protocol. Pt tolerated procedure without any noted complications. Pt transferred back to .

## 2021-02-26 NOTE — IP AVS SNAPSHOT
[facility] [address] [address]                                    After Visit Summary   2/26/2021    Junito Wang    MRN: [id number]           After Visit Summary Signature Page    I have received my discharge instructions, and my questions have been answered. I have discussed any challenges I see with this plan with the nurse or doctor.    ..........................................................................................................................................  Patient/Patient Representative Signature      ..........................................................................................................................................  Patient Representative Print Name and Relationship to Patient    ..................................................               ................................................  Date                                   Time    ..........................................................................................................................................  Reviewed by Signature/Title    ...................................................              ..............................................  Date                                               Time          22EPIC Rev 08/18

## 2021-02-26 NOTE — PROCEDURES
[facility]    Procedure: IR Procedure Note    Date/Time: 2/26/2021 1:50 PM  Performed by: Dwain Trinh MD  Authorized by: Dwain Trinh MD   IR Fellow Physician:  Radiology Resident Physician: Ziggy  Other(s) attending procedure: Christopher    UNIVERSAL PROTOCOL   Site Marked: Yes  Prior Images Obtained and Reviewed:  Yes  Required items: Required blood products, implants, devices and special equipment available    Patient identity confirmed:  Verbally with patient, arm band, provided demographic data and hospital-assigned identification number  Patient was reevaluated immediately before administering moderate or deep sedation or anesthesia  Confirmation Checklist:  Patient's identity using two indicators, relevant allergies, procedure was appropriate and matched the consent or emergent situation and correct equipment/implants were available  Time out: Immediately prior to the procedure a time out was called    Universal Protocol: the Joint Commission Universal Protocol was followed    Preparation: Patient was prepped and draped in usual sterile fashion    ESBL (mL):  1         ANESTHESIA    Anesthesia: Local infiltration  Local Anesthetic:  Lidocaine 1% without epinephrine  Anesthetic Total (mL):  5      SEDATION    Patient Sedated: Yes    Sedation:  Midazolam and fentanyl  Vital signs: Vital signs monitored during sedation    See dictated procedure note for full details.  Findings: 25 mcg fentanyl  0.5 mg versed     Specimens: core needle biopsy specimens sent for pathological analysis    Complications: None    Condition: Stable    Plan: 2A. Please see discharge orders     PROCEDURE   Patient Tolerance:  Patient tolerated the procedure well with no immediate complications  Describe Procedure: CT guided sacral mass biopsy  Length of time physician/provider present for 1:1 monitoring during sedation: 20

## 2021-02-26 NOTE — DISCHARGE INSTRUCTIONS
[facility]    Interventional Radiology  Patient Instructions Following Biopsy    AFTER YOU GO HOME  ? If you were given sedation DO NOT drive or operate machinery at home or at work for at least 24 hours  ? DO relax and take it easy for 48 hours, no strenuous activity for 24 hours  ? DO drink plenty of fluids  ? DO resume your regular diet, unless otherwise instructed by your Primary Physician  ? Keep the dressing dry and in place for 24 hours.  ? DO NOT SMOKE FOR AT LEAST 24 HOURS, if you have been given any medications that were to help you relax or sedate you during your procedure  ? DO NOT drink alcoholic beverages the day of your procedure  ? DO NOT do any strenuous exercise or lifting (> 10 lbs) for at least 7 days following your procedure  ? DO NOT take a bath or shower for at least 12 hours following your procedure  ? Remove dressing after shower the next day. Replace with Band aid for 2 days.  Never leave a wet dressing in place.  ? DO NOT make any important or legal decisions for 24 hours following your procedure  ? There should be minimum drainage from the biopsy site    CALL THE PHYSICIAN IF:  ? You start bleeding from the procedure site.  If you do start to bleed from that site, lie down flat and hold pressure on the site for a minimum of 10 minutes.  Your physician will tell you if you need to return to the hospital  ? You develop nausea or vomiting  ? You have excessive swelling, redness, or tenderness at the site  ? You have drainage that looks like it is infected.  ? You experience severe pain  ? You develop hives or a rash or unexplained itching  ? You develop shortness of breath  ? You develop a temperature of 101 degrees F or greater  ?     [facility] INTERVENTIONAL RADIOLOGY DEPARTMENT  Procedure Physician:         Dr. White          Date of procedure: February 26, 2021  Telephone Numbers: [phone] Monday-Friday 8:00 am to 4:30 pm  [phone] After 4:30 pm Monday-Friday,  Weekends & Holidays.   Ask for the Interventional Radiologist on call.  Someone is on call 24 hrs/day  [facility] toll free number: [phone] Monday-Friday 8:00 am to 4:30 pm  [facility] Emergency Dept: [phone]

## 2021-03-01 ENCOUNTER — VIRTUAL VISIT (OUTPATIENT)
Dept: ONCOLOGY | Facility: CLINIC | Age: 61
End: 2021-03-01
Attending: DIETITIAN, REGISTERED
Payer: COMMERCIAL

## 2021-03-01 ENCOUNTER — HOSPITAL ENCOUNTER (OUTPATIENT)
Facility: CLINIC | Age: 61
End: 2021-03-01
Attending: THORACIC SURGERY (CARDIOTHORACIC VASCULAR SURGERY) | Admitting: THORACIC SURGERY (CARDIOTHORACIC VASCULAR SURGERY)
Payer: COMMERCIAL

## 2021-03-01 ENCOUNTER — TELEPHONE (OUTPATIENT)
Dept: SURGERY | Facility: CLINIC | Age: 61
End: 2021-03-01

## 2021-03-01 DIAGNOSIS — Z11.59 ENCOUNTER FOR SCREENING FOR OTHER VIRAL DISEASES: ICD-10-CM

## 2021-03-01 DIAGNOSIS — R64 CANCER CACHEXIA (H): ICD-10-CM

## 2021-03-01 DIAGNOSIS — C15.9 ADENOCARCINOMA OF ESOPHAGUS (H): Primary | ICD-10-CM

## 2021-03-01 DIAGNOSIS — C15.5 MALIGNANT NEOPLASM OF LOWER THIRD OF ESOPHAGUS (H): ICD-10-CM

## 2021-03-01 DIAGNOSIS — R13.19 ESOPHAGEAL DYSPHAGIA: ICD-10-CM

## 2021-03-01 LAB — COPATH REPORT: NORMAL

## 2021-03-01 PROCEDURE — 97803 MED NUTRITION INDIV SUBSEQ: CPT | Mod: TEL | Performed by: DIETITIAN, REGISTERED

## 2021-03-01 PROCEDURE — 97803 MED NUTRITION INDIV SUBSEQ: CPT | Mod: 95 | Performed by: DIETITIAN, REGISTERED

## 2021-03-01 NOTE — PROGRESS NOTES
"The patient has been notified of the following:      \"We have found that certain health care needs can be provided without the need for a face to face visit.  This service lets us provide the care you need with a phone conversation.       I will have full access to your [place] medical record during this entire phone call.   I will be taking notes for your medical record.      Since this is like an office visit, we will bill your insurance company for this service.       There are potential benefits and risks of telephone visits (e.g. limits to patient confidentiality) that differ from in-person visits.?  Confidentiality still applies for telephone services, and nobody will record the visit.  It is important to be in a quiet, private space that is free of distractions (including cell phone or other devices) during the visit.??      If during the course of the call I believe a telephone visit is not appropriate, you will not be charged for this service\"     Consent has been obtained for this service by care team member: Yes     CLINICAL NUTRITION SERVICES - REASSESSMENT NOTE   EVALUATION OF PREVIOUS PLAN OF CARE:   Referring Physician: Maurizio 2/8/21  Time spent with patient: 15 minutes.    Current diet: liquids only  Current /intake: poor  PEG Tube: scheduled on 3/9/21 through thoracic       Monitoring from previous assessment (2/22/21):  -Food/beverage intake - Junito has been drinking 1 Ensure Plus and 1 quart of water daily.  He tells me that he is not able to tolerate more than one bottle a day as it takes him almost 8-10 hours to drink one bottle without gagging and vomiting.  He takes a small sip and if he's not able to burp, he will gag and vomit. Overtime if he is able to burp, he is can take another sip.    He has not obtained Mass Pro Weight Gain powder  He has been tolerating about 4 cups of water (10-12 hours throughout the day).    -EN access - scheduled for 3/9/21; team working on getting is placed " sooner  -Weight trends - down 22 lb x past one month, 12% wt loss  Wt Readings from Last 7 Encounters:   02/26/21 80.3 kg (177 lb)   01/19/21 90.3 kg (199 lb)   01/15/21 90.3 kg (199 lb)   08/03/20 100.2 kg (221 lb)   01/31/19 112.5 kg (248 lb)   01/07/18 109.3 kg (241 lb)   10/30/17 109.8 kg (242 lb)     Previous Goals:   1.  Aim for 2 Mass Pro wt gain shakes/day (GNC) for ~1300cal each - not met  Evaluation: Not met   Previous Nutrition Diagnosis:   Inadequate oral intake related to dysphagia as evidenced by 10% wt loss x past 5 months with inability to take more than 25% of est nutrition needs PO  Evaluation: Declining   NEW FINDINGS:   Ongoing weight loss  CURRENT NUTRITION DIAGNOSIS   Inadequate oral intake related to dysphagia as evidenced by 12% wt loss x past one month with inability to take more than 15% of estimated nutrition needs    INTERVENTIONS   Recommendations / Nutrition Prescription   1. Enteral Nutrition; PEG tube placement as soon as possible (EN recs provided on 2/22); pt is at high risk for refeeding syndrome.   2. Strive for 3-4 Ensure Plus/day and >6 cups water until feeding tube in place  Interventions:  --Reviewed nutrition shakes - Mass Pro Weight Gain for Higinio from [employer]; encouraged to obtain these shakes for increase in calories with less volume.   --Discussed current PO intake/tolerance and risk with refeeding once initiation of    --Collaboration and Referral of Nutrition care with MD and RNCC    Goal:    1.  Aim for 2 Mass Pro wt gain shakes/day (GNC) for ~1300cal each     Follow-Up Plans: RD to follow-up in one week        MONITORING AND EVALUATION:  -Food/beverage intake  -EN access  -Weight trends     Kari Brown, MISAEL, LD

## 2021-03-01 NOTE — TELEPHONE ENCOUNTER
Spoke with patient to schedule procedure with Dr. Federico Zavala   Procedure was scheduled on 03/09 at [facility]  Patient will have H&P with At [facility] (Florian Wagner) PCP    Patient is aware a COVID-19 test is needed before their procedure. The test should be with-in 4 days of their procedure.   Test Details: Date 03/05 Location [facility]    Patient is aware a / is needed day of surgery.   Surgery letter was sent via [employer], patient has my direct contact information for any further questions.

## 2021-03-01 NOTE — LETTER
"    3/1/2021         RE: Junito Wang  [id number] [facility] [id number]        Dear Colleague,    Thank you for referring your patient, Junito Wang, to the [facility]. Please see a copy of my visit note below.    The patient has been notified of the following:      \"We have found that certain health care needs can be provided without the need for a face to face visit.  This service lets us provide the care you need with a phone conversation.       I will have full access to your [facility] medical record during this entire phone call.   I will be taking notes for your medical record.      Since this is like an office visit, we will bill your insurance company for this service.       There are potential benefits and risks of telephone visits (e.g. limits to patient confidentiality) that differ from in-person visits.?  Confidentiality still applies for telephone services, and nobody will record the visit.  It is important to be in a quiet, private space that is free of distractions (including cell phone or other devices) during the visit.??      If during the course of the call I believe a telephone visit is not appropriate, you will not be charged for this service\"     Consent has been obtained for this service by care team member: Yes     CLINICAL NUTRITION SERVICES - REASSESSMENT NOTE   EVALUATION OF PREVIOUS PLAN OF CARE:   Referring Physician: Maurizio 2/8/21  Time spent with patient: 15 minutes.    Current diet: liquids only  Current /intake: poor  PEG Tube: scheduled on 3/9/21 through thoracic       Monitoring from previous assessment (2/22/21):  -Food/beverage intake - Junito has been drinking 1 Ensure Plus and 1 quart of water daily.  He tells me that he is not able to tolerate more than one bottle a day as it takes him almost 8-10 hours to drink one bottle without gagging and vomiting.  He takes a small sip and if he's not able to burp, he will gag and vomit. " Overtime if he is able to burp, he is can take another sip.    He has not obtained Mass Pro Weight Gain powder  He has been tolerating about 4 cups of water (10-12 hours throughout the day).    -EN access - scheduled for 3/9/21; team working on getting is placed sooner  -Weight trends - down 22 lb x past one month, 12% wt loss  Wt Readings from Last 7 Encounters:   02/26/21 80.3 kg (177 lb)   01/19/21 90.3 kg (199 lb)   01/15/21 90.3 kg (199 lb)   08/03/20 100.2 kg (221 lb)   01/31/19 112.5 kg (248 lb)   01/07/18 109.3 kg (241 lb)   10/30/17 109.8 kg (242 lb)     Previous Goals:   1.  Aim for 2 Mass Pro wt gain shakes/day ([facility]) for ~1300cal each - not met  Evaluation: Not met   Previous Nutrition Diagnosis:   Inadequate oral intake related to dysphagia as evidenced by 10% wt loss x past 5 months with inability to take more than 25% of est nutrition needs PO  Evaluation: Declining   NEW FINDINGS:   Ongoing weight loss  CURRENT NUTRITION DIAGNOSIS   Inadequate oral intake related to dysphagia as evidenced by 12% wt loss x past one month with inability to take more than 15% of estimated nutrition needs    INTERVENTIONS   Recommendations / Nutrition Prescription   1. Enteral Nutrition; PEG tube placement as soon as possible (EN recs provided on 2/22); pt is at high risk for refeeding syndrome.   2. Strive for 3-4 Ensure Plus/day and >6 cups water until feeding tube in place  Interventions:  --Reviewed nutrition shakes - Mass Pro Weight Gain for Higinio from [employer]; encouraged to obtain these shakes for increase in calories with less volume.   --Discussed current PO intake/tolerance and risk with refeeding once initiation of    --Collaboration and Referral of Nutrition care with MD and RNCC    Goal:    1.  Aim for 2 Mass Pro wt gain shakes/day ([facility]) for ~1300cal each     Follow-Up Plans: RD to follow-up in one week        MONITORING AND EVALUATION:  -Food/beverage intake  -EN access  -Weight trends     Kari Ram  MISAEL Brown, LD

## 2021-03-02 ENCOUNTER — PATIENT OUTREACH (OUTPATIENT)
Dept: ONCOLOGY | Facility: CLINIC | Age: 61
End: 2021-03-02

## 2021-03-02 ENCOUNTER — APPOINTMENT (OUTPATIENT)
Dept: GENERAL RADIOLOGY | Facility: CLINIC | Age: 61
End: 2021-03-02
Attending: NEUROLOGICAL SURGERY
Payer: COMMERCIAL

## 2021-03-02 ENCOUNTER — HOSPITAL ENCOUNTER (INPATIENT)
Facility: CLINIC | Age: 61
LOS: 8 days | Discharge: HOME | End: 2021-03-10
Attending: EMERGENCY MEDICINE | Admitting: STUDENT IN AN ORGANIZED HEALTH CARE EDUCATION/TRAINING PROGRAM
Payer: COMMERCIAL

## 2021-03-02 ENCOUNTER — TELEPHONE (OUTPATIENT)
Dept: ONCOLOGY | Facility: CLINIC | Age: 61
End: 2021-03-02

## 2021-03-02 ENCOUNTER — PREP FOR PROCEDURE (OUTPATIENT)
Dept: SURGERY | Facility: CLINIC | Age: 61
End: 2021-03-02

## 2021-03-02 ENCOUNTER — ANESTHESIA (OUTPATIENT)
Dept: SURGERY | Facility: CLINIC | Age: 61
End: 2021-03-02
Payer: COMMERCIAL

## 2021-03-02 ENCOUNTER — APPOINTMENT (OUTPATIENT)
Dept: ULTRASOUND IMAGING | Facility: CLINIC | Age: 61
End: 2021-03-02
Attending: EMERGENCY MEDICINE
Payer: COMMERCIAL

## 2021-03-02 ENCOUNTER — ANESTHESIA EVENT (OUTPATIENT)
Dept: SURGERY | Facility: CLINIC | Age: 61
End: 2021-03-02
Payer: COMMERCIAL

## 2021-03-02 ENCOUNTER — APPOINTMENT (OUTPATIENT)
Dept: MRI IMAGING | Facility: CLINIC | Age: 61
End: 2021-03-02
Attending: EMERGENCY MEDICINE
Payer: COMMERCIAL

## 2021-03-02 DIAGNOSIS — C15.5 MALIGNANT NEOPLASM OF LOWER THIRD OF ESOPHAGUS (H): ICD-10-CM

## 2021-03-02 DIAGNOSIS — I10 ESSENTIAL HYPERTENSION WITH GOAL BLOOD PRESSURE LESS THAN 140/90: ICD-10-CM

## 2021-03-02 DIAGNOSIS — M54.50 ACUTE LOW BACK PAIN, UNSPECIFIED BACK PAIN LATERALITY, UNSPECIFIED WHETHER SCIATICA PRESENT: ICD-10-CM

## 2021-03-02 DIAGNOSIS — R29.898 LEFT LEG WEAKNESS: ICD-10-CM

## 2021-03-02 DIAGNOSIS — R13.19 ESOPHAGEAL DYSPHAGIA: ICD-10-CM

## 2021-03-02 DIAGNOSIS — D72.829 LEUKOCYTOSIS, UNSPECIFIED TYPE: ICD-10-CM

## 2021-03-02 DIAGNOSIS — C15.9 MALIGNANT NEOPLASM OF ESOPHAGUS, UNSPECIFIED LOCATION (H): ICD-10-CM

## 2021-03-02 DIAGNOSIS — D64.9 ANEMIA, UNSPECIFIED TYPE: ICD-10-CM

## 2021-03-02 DIAGNOSIS — R33.9 URINE RETENTION: ICD-10-CM

## 2021-03-02 LAB
ABO + RH BLD: NORMAL
ABO + RH BLD: NORMAL
ALBUMIN SERPL-MCNC: 3.2 G/DL (ref 3.4–5)
ALBUMIN UR-MCNC: 30 MG/DL
ALP SERPL-CCNC: 234 U/L (ref 40–150)
ALT SERPL W P-5'-P-CCNC: 24 U/L (ref 0–70)
ANION GAP SERPL CALCULATED.3IONS-SCNC: 8 MMOL/L (ref 3–14)
APPEARANCE UR: CLEAR
AST SERPL W P-5'-P-CCNC: 39 U/L (ref 0–45)
BASOPHILS # BLD AUTO: 0 10E9/L (ref 0–0.2)
BASOPHILS NFR BLD AUTO: 0.1 %
BILIRUB SERPL-MCNC: 0.7 MG/DL (ref 0.2–1.3)
BILIRUB UR QL STRIP: NEGATIVE
BLD GP AB SCN SERPL QL: NORMAL
BLOOD BANK CMNT PATIENT-IMP: NORMAL
BUN SERPL-MCNC: 26 MG/DL (ref 7–30)
CALCIUM SERPL-MCNC: 9.6 MG/DL (ref 8.5–10.1)
CHLORIDE SERPL-SCNC: 101 MMOL/L (ref 94–109)
CO2 SERPL-SCNC: 23 MMOL/L (ref 20–32)
COLOR UR AUTO: YELLOW
CREAT SERPL-MCNC: 0.69 MG/DL (ref 0.66–1.25)
DIFFERENTIAL METHOD BLD: ABNORMAL
EOSINOPHIL # BLD AUTO: 0 10E9/L (ref 0–0.7)
EOSINOPHIL NFR BLD AUTO: 0 %
ERYTHROCYTE [DISTWIDTH] IN BLOOD BY AUTOMATED COUNT: 17.5 % (ref 10–15)
FLUAV RNA RESP QL NAA+PROBE: NEGATIVE
FLUBV RNA RESP QL NAA+PROBE: NEGATIVE
GFR SERPL CREATININE-BSD FRML MDRD: >90 ML/MIN/{1.73_M2}
GLUCOSE SERPL-MCNC: 104 MG/DL (ref 70–99)
GLUCOSE UR STRIP-MCNC: NEGATIVE MG/DL
HCT VFR BLD AUTO: 37.3 % (ref 40–53)
HGB BLD-MCNC: 11.6 G/DL (ref 13.3–17.7)
HGB UR QL STRIP: NEGATIVE
IMM GRANULOCYTES # BLD: 0.1 10E9/L (ref 0–0.4)
IMM GRANULOCYTES NFR BLD: 0.8 %
INTERPRETATION ECG - MUSE: NORMAL
KETONES UR STRIP-MCNC: 40 MG/DL
LABORATORY COMMENT REPORT: NORMAL
LEUKOCYTE ESTERASE UR QL STRIP: NEGATIVE
LYMPHOCYTES # BLD AUTO: 0.9 10E9/L (ref 0.8–5.3)
LYMPHOCYTES NFR BLD AUTO: 5.4 %
MCH RBC QN AUTO: 23.6 PG (ref 26.5–33)
MCHC RBC AUTO-ENTMCNC: 31.1 G/DL (ref 31.5–36.5)
MCV RBC AUTO: 76 FL (ref 78–100)
MONOCYTES # BLD AUTO: 1.1 10E9/L (ref 0–1.3)
MONOCYTES NFR BLD AUTO: 6.8 %
MUCOUS THREADS #/AREA URNS LPF: PRESENT /LPF
NEUTROPHILS # BLD AUTO: 13.9 10E9/L (ref 1.6–8.3)
NEUTROPHILS NFR BLD AUTO: 86.9 %
NITRATE UR QL: POSITIVE
NRBC # BLD AUTO: 0 10*3/UL
NRBC BLD AUTO-RTO: 0 /100
PH UR STRIP: 6 PH (ref 5–7)
PLATELET # BLD AUTO: 424 10E9/L (ref 150–450)
POTASSIUM SERPL-SCNC: 3.6 MMOL/L (ref 3.4–5.3)
PROT SERPL-MCNC: 7.5 G/DL (ref 6.8–8.8)
RADIOLOGIST FLAGS: ABNORMAL
RBC # BLD AUTO: 4.91 10E12/L (ref 4.4–5.9)
RBC #/AREA URNS AUTO: <1 /HPF (ref 0–2)
RSV RNA SPEC QL NAA+PROBE: NEGATIVE
SARS-COV-2 RNA RESP QL NAA+PROBE: NEGATIVE
SODIUM SERPL-SCNC: 133 MMOL/L (ref 133–144)
SOURCE: ABNORMAL
SP GR UR STRIP: 1.03 (ref 1–1.03)
SPECIMEN EXP DATE BLD: NORMAL
SPECIMEN SOURCE: NORMAL
SPERM #/AREA URNS HPF: PRESENT /HPF
SQUAMOUS #/AREA URNS AUTO: 0 /HPF (ref 0–1)
TSH SERPL DL<=0.005 MIU/L-ACNC: 1.4 MU/L (ref 0.4–4)
UROBILINOGEN UR STRIP-MCNC: NORMAL MG/DL (ref 0–2)
WBC # BLD AUTO: 16 10E9/L (ref 4–11)
WBC #/AREA URNS AUTO: 1 /HPF (ref 0–5)

## 2021-03-02 PROCEDURE — 88307 TISSUE EXAM BY PATHOLOGIST: CPT | Mod: TC | Performed by: NEUROLOGICAL SURGERY

## 2021-03-02 PROCEDURE — 85384 FIBRINOGEN ACTIVITY: CPT | Performed by: STUDENT IN AN ORGANIZED HEALTH CARE EDUCATION/TRAINING PROGRAM

## 2021-03-02 PROCEDURE — 93971 EXTREMITY STUDY: CPT | Mod: 26 | Performed by: RADIOLOGY

## 2021-03-02 PROCEDURE — 86901 BLOOD TYPING SEROLOGIC RH(D): CPT | Performed by: STUDENT IN AN ORGANIZED HEALTH CARE EDUCATION/TRAINING PROGRAM

## 2021-03-02 PROCEDURE — 96365 THER/PROPH/DIAG IV INF INIT: CPT | Performed by: EMERGENCY MEDICINE

## 2021-03-02 PROCEDURE — 85025 COMPLETE CBC W/AUTO DIFF WBC: CPT | Performed by: EMERGENCY MEDICINE

## 2021-03-02 PROCEDURE — 250N000009 HC RX 250: Performed by: NEUROLOGICAL SURGERY

## 2021-03-02 PROCEDURE — 00NY0ZZ RELEASE LUMBAR SPINAL CORD, OPEN APPROACH: ICD-10-PCS | Performed by: NEUROLOGICAL SURGERY

## 2021-03-02 PROCEDURE — 258N000003 HC RX IP 258 OP 636

## 2021-03-02 PROCEDURE — 360N000084 HC SURGERY LEVEL 4 W/ FLUORO, PER MIN: Performed by: NEUROLOGICAL SURGERY

## 2021-03-02 PROCEDURE — 99207 PR CDG-HISTORY COMP: MEETS EXP. PROBLEM FOCUSED-DOWN CODED LACK OF ROS: CPT | Performed by: STUDENT IN AN ORGANIZED HEALTH CARE EDUCATION/TRAINING PROGRAM

## 2021-03-02 PROCEDURE — A9585 GADOBUTROL INJECTION: HCPCS | Performed by: EMERGENCY MEDICINE

## 2021-03-02 PROCEDURE — 250N000011 HC RX IP 250 OP 636: Performed by: STUDENT IN AN ORGANIZED HEALTH CARE EDUCATION/TRAINING PROGRAM

## 2021-03-02 PROCEDURE — 72158 MRI LUMBAR SPINE W/O & W/DYE: CPT | Mod: 26 | Performed by: STUDENT IN AN ORGANIZED HEALTH CARE EDUCATION/TRAINING PROGRAM

## 2021-03-02 PROCEDURE — 370N000017 HC ANESTHESIA TECHNICAL FEE, PER MIN: Performed by: NEUROLOGICAL SURGERY

## 2021-03-02 PROCEDURE — 272N000004 HC RX 272: Performed by: NEUROLOGICAL SURGERY

## 2021-03-02 PROCEDURE — 250N000009 HC RX 250: Performed by: STUDENT IN AN ORGANIZED HEALTH CARE EDUCATION/TRAINING PROGRAM

## 2021-03-02 PROCEDURE — 87088 URINE BACTERIA CULTURE: CPT | Performed by: EMERGENCY MEDICINE

## 2021-03-02 PROCEDURE — 250N000025 HC SEVOFLURANE, PER MIN: Performed by: NEUROLOGICAL SURGERY

## 2021-03-02 PROCEDURE — 250N000011 HC RX IP 250 OP 636

## 2021-03-02 PROCEDURE — 93971 EXTREMITY STUDY: CPT | Mod: LT

## 2021-03-02 PROCEDURE — 81001 URINALYSIS AUTO W/SCOPE: CPT | Performed by: EMERGENCY MEDICINE

## 2021-03-02 PROCEDURE — 72158 MRI LUMBAR SPINE W/O & W/DYE: CPT

## 2021-03-02 PROCEDURE — 84443 ASSAY THYROID STIM HORMONE: CPT | Performed by: EMERGENCY MEDICINE

## 2021-03-02 PROCEDURE — 85379 FIBRIN DEGRADATION QUANT: CPT | Performed by: STUDENT IN AN ORGANIZED HEALTH CARE EDUCATION/TRAINING PROGRAM

## 2021-03-02 PROCEDURE — 87086 URINE CULTURE/COLONY COUNT: CPT | Performed by: EMERGENCY MEDICINE

## 2021-03-02 PROCEDURE — C9803 HOPD COVID-19 SPEC COLLECT: HCPCS | Performed by: EMERGENCY MEDICINE

## 2021-03-02 PROCEDURE — 99285 EMERGENCY DEPT VISIT HI MDM: CPT | Performed by: EMERGENCY MEDICINE

## 2021-03-02 PROCEDURE — 87636 SARSCOV2 & INF A&B AMP PRB: CPT | Performed by: EMERGENCY MEDICINE

## 2021-03-02 PROCEDURE — 120N000002 HC R&B MED SURG/OB UMMC

## 2021-03-02 PROCEDURE — 99285 EMERGENCY DEPT VISIT HI MDM: CPT | Mod: 25 | Performed by: EMERGENCY MEDICINE

## 2021-03-02 PROCEDURE — 250N000006 HC OR RX SURGIFLO W/THROMBIN KIT 2ML 1991 OPNP: Performed by: NEUROLOGICAL SURGERY

## 2021-03-02 PROCEDURE — 255N000002 HC RX 255 OP 636: Performed by: EMERGENCY MEDICINE

## 2021-03-02 PROCEDURE — 86900 BLOOD TYPING SEROLOGIC ABO: CPT | Performed by: STUDENT IN AN ORGANIZED HEALTH CARE EDUCATION/TRAINING PROGRAM

## 2021-03-02 PROCEDURE — 250N000011 HC RX IP 250 OP 636: Performed by: NEUROLOGICAL SURGERY

## 2021-03-02 PROCEDURE — 250N000009 HC RX 250

## 2021-03-02 PROCEDURE — 88307 TISSUE EXAM BY PATHOLOGIST: CPT | Mod: 26 | Performed by: PATHOLOGY

## 2021-03-02 PROCEDURE — 250N000011 HC RX IP 250 OP 636: Performed by: EMERGENCY MEDICINE

## 2021-03-02 PROCEDURE — 86850 RBC ANTIBODY SCREEN: CPT | Performed by: STUDENT IN AN ORGANIZED HEALTH CARE EDUCATION/TRAINING PROGRAM

## 2021-03-02 PROCEDURE — 85610 PROTHROMBIN TIME: CPT | Performed by: STUDENT IN AN ORGANIZED HEALTH CARE EDUCATION/TRAINING PROGRAM

## 2021-03-02 PROCEDURE — 80053 COMPREHEN METABOLIC PANEL: CPT | Performed by: EMERGENCY MEDICINE

## 2021-03-02 PROCEDURE — 00BT0ZZ EXCISION OF SPINAL MENINGES, OPEN APPROACH: ICD-10-PCS | Performed by: NEUROLOGICAL SURGERY

## 2021-03-02 PROCEDURE — 710N000010 HC RECOVERY PHASE 1, LEVEL 2, PER MIN: Performed by: NEUROLOGICAL SURGERY

## 2021-03-02 PROCEDURE — 99221 1ST HOSP IP/OBS SF/LOW 40: CPT | Mod: AI | Performed by: STUDENT IN AN ORGANIZED HEALTH CARE EDUCATION/TRAINING PROGRAM

## 2021-03-02 PROCEDURE — 87186 SC STD MICRODIL/AGAR DIL: CPT | Performed by: EMERGENCY MEDICINE

## 2021-03-02 PROCEDURE — 85730 THROMBOPLASTIN TIME PARTIAL: CPT | Performed by: STUDENT IN AN ORGANIZED HEALTH CARE EDUCATION/TRAINING PROGRAM

## 2021-03-02 PROCEDURE — 272N000001 HC OR GENERAL SUPPLY STERILE: Performed by: NEUROLOGICAL SURGERY

## 2021-03-02 PROCEDURE — 999N000179 XR SURGERY CARM FLUORO LESS THAN 5 MIN W STILLS: Mod: TC

## 2021-03-02 RX ORDER — CEFAZOLIN SODIUM 2 G/100ML
2 INJECTION, SOLUTION INTRAVENOUS SEE ADMIN INSTRUCTIONS
Status: DISCONTINUED | OUTPATIENT
Start: 2021-03-02 | End: 2021-03-03

## 2021-03-02 RX ORDER — HYDROMORPHONE HYDROCHLORIDE 1 MG/ML
.3-.5 INJECTION, SOLUTION INTRAMUSCULAR; INTRAVENOUS; SUBCUTANEOUS EVERY 5 MIN PRN
Status: CANCELLED | OUTPATIENT
Start: 2021-03-02

## 2021-03-02 RX ORDER — GADOBUTROL 604.72 MG/ML
7.5 INJECTION INTRAVENOUS ONCE
Status: COMPLETED | OUTPATIENT
Start: 2021-03-02 | End: 2021-03-02

## 2021-03-02 RX ORDER — SODIUM CHLORIDE, SODIUM LACTATE, POTASSIUM CHLORIDE, CALCIUM CHLORIDE 600; 310; 30; 20 MG/100ML; MG/100ML; MG/100ML; MG/100ML
INJECTION, SOLUTION INTRAVENOUS CONTINUOUS
Status: CANCELLED | OUTPATIENT
Start: 2021-03-02

## 2021-03-02 RX ORDER — FENTANYL CITRATE 50 UG/ML
25-50 INJECTION, SOLUTION INTRAMUSCULAR; INTRAVENOUS
Status: CANCELLED | OUTPATIENT
Start: 2021-03-02

## 2021-03-02 RX ORDER — NALOXONE HYDROCHLORIDE 0.4 MG/ML
0.4 INJECTION, SOLUTION INTRAMUSCULAR; INTRAVENOUS; SUBCUTANEOUS
Status: CANCELLED | OUTPATIENT
Start: 2021-03-02 | End: 2021-03-03

## 2021-03-02 RX ORDER — PROPOFOL 10 MG/ML
INJECTION, EMULSION INTRAVENOUS PRN
Status: DISCONTINUED | OUTPATIENT
Start: 2021-03-02 | End: 2021-03-03

## 2021-03-02 RX ORDER — CEFAZOLIN SODIUM 2 G/100ML
2 INJECTION, SOLUTION INTRAVENOUS
Status: DISCONTINUED | OUTPATIENT
Start: 2021-03-02 | End: 2021-03-03

## 2021-03-02 RX ORDER — NALOXONE HYDROCHLORIDE 0.4 MG/ML
0.2 INJECTION, SOLUTION INTRAMUSCULAR; INTRAVENOUS; SUBCUTANEOUS
Status: CANCELLED | OUTPATIENT
Start: 2021-03-02 | End: 2021-03-03

## 2021-03-02 RX ORDER — ONDANSETRON 2 MG/ML
4 INJECTION INTRAMUSCULAR; INTRAVENOUS EVERY 30 MIN PRN
Status: CANCELLED | OUTPATIENT
Start: 2021-03-02

## 2021-03-02 RX ORDER — FENTANYL CITRATE 50 UG/ML
INJECTION, SOLUTION INTRAMUSCULAR; INTRAVENOUS PRN
Status: DISCONTINUED | OUTPATIENT
Start: 2021-03-02 | End: 2021-03-03

## 2021-03-02 RX ORDER — ONDANSETRON 2 MG/ML
INJECTION INTRAMUSCULAR; INTRAVENOUS PRN
Status: DISCONTINUED | OUTPATIENT
Start: 2021-03-02 | End: 2021-03-03

## 2021-03-02 RX ORDER — CEFTRIAXONE 1 G/1
1 INJECTION, POWDER, FOR SOLUTION INTRAMUSCULAR; INTRAVENOUS ONCE
Status: COMPLETED | OUTPATIENT
Start: 2021-03-02 | End: 2021-03-02

## 2021-03-02 RX ORDER — SODIUM CHLORIDE, SODIUM LACTATE, POTASSIUM CHLORIDE, CALCIUM CHLORIDE 600; 310; 30; 20 MG/100ML; MG/100ML; MG/100ML; MG/100ML
INJECTION, SOLUTION INTRAVENOUS CONTINUOUS PRN
Status: DISCONTINUED | OUTPATIENT
Start: 2021-03-02 | End: 2021-03-03

## 2021-03-02 RX ORDER — ONDANSETRON 4 MG/1
4 TABLET, ORALLY DISINTEGRATING ORAL EVERY 30 MIN PRN
Status: CANCELLED | OUTPATIENT
Start: 2021-03-02

## 2021-03-02 RX ORDER — DEXAMETHASONE SODIUM PHOSPHATE 4 MG/ML
INJECTION, SOLUTION INTRA-ARTICULAR; INTRALESIONAL; INTRAMUSCULAR; INTRAVENOUS; SOFT TISSUE PRN
Status: DISCONTINUED | OUTPATIENT
Start: 2021-03-02 | End: 2021-03-03

## 2021-03-02 RX ADMIN — PHENYLEPHRINE HYDROCHLORIDE 100 MCG: 10 INJECTION INTRAVENOUS at 22:45

## 2021-03-02 RX ADMIN — PHENYLEPHRINE HYDROCHLORIDE 100 MCG: 10 INJECTION INTRAVENOUS at 23:11

## 2021-03-02 RX ADMIN — SODIUM CHLORIDE, POTASSIUM CHLORIDE, SODIUM LACTATE AND CALCIUM CHLORIDE: 600; 310; 30; 20 INJECTION, SOLUTION INTRAVENOUS at 21:33

## 2021-03-02 RX ADMIN — CEFTRIAXONE 1 G: 1 INJECTION, POWDER, FOR SOLUTION INTRAMUSCULAR; INTRAVENOUS at 20:51

## 2021-03-02 RX ADMIN — ROCURONIUM BROMIDE 80 MG: 10 INJECTION INTRAVENOUS at 21:37

## 2021-03-02 RX ADMIN — HYDROMORPHONE HYDROCHLORIDE 0.5 MG: 1 INJECTION, SOLUTION INTRAMUSCULAR; INTRAVENOUS; SUBCUTANEOUS at 22:50

## 2021-03-02 RX ADMIN — PHENYLEPHRINE HYDROCHLORIDE 100 MCG: 10 INJECTION INTRAVENOUS at 23:37

## 2021-03-02 RX ADMIN — PHENYLEPHRINE HYDROCHLORIDE 100 MCG: 10 INJECTION INTRAVENOUS at 21:50

## 2021-03-02 RX ADMIN — GADOBUTROL 7.5 ML: 604.72 INJECTION INTRAVENOUS at 18:34

## 2021-03-02 RX ADMIN — SUGAMMADEX 200 MG: 100 INJECTION, SOLUTION INTRAVENOUS at 23:56

## 2021-03-02 RX ADMIN — FENTANYL CITRATE 100 MCG: 50 INJECTION, SOLUTION INTRAMUSCULAR; INTRAVENOUS at 21:37

## 2021-03-02 RX ADMIN — DEXAMETHASONE SODIUM PHOSPHATE 8 MG: 4 INJECTION, SOLUTION INTRA-ARTICULAR; INTRALESIONAL; INTRAMUSCULAR; INTRAVENOUS; SOFT TISSUE at 22:19

## 2021-03-02 RX ADMIN — ONDANSETRON 4 MG: 2 INJECTION INTRAMUSCULAR; INTRAVENOUS at 23:18

## 2021-03-02 RX ADMIN — PHENYLEPHRINE HYDROCHLORIDE 50 MCG: 10 INJECTION INTRAVENOUS at 21:37

## 2021-03-02 RX ADMIN — PHENYLEPHRINE HYDROCHLORIDE 100 MCG: 10 INJECTION INTRAVENOUS at 22:58

## 2021-03-02 RX ADMIN — PROPOFOL 130 MG: 10 INJECTION, EMULSION INTRAVENOUS at 21:37

## 2021-03-02 ASSESSMENT — MIFFLIN-ST. JEOR: SCORE: 1596.9

## 2021-03-02 NOTE — ED PROVIDER NOTES
ED Provider Note  [facility]      History     Chief Complaint   Patient presents with     Fatigue     HPI  Junito Wang is a 60 year old male with a history of recent diagnosis of metastatic cancer; esophageal cancer with spread to the liver, left hip, lymph nodes, and possibly temple.  He had a left hip biopsy on Friday, and has increasing pain in his back since the biopsy.  Yesterday he started developed left leg weakness as well and is having difficulty walking so he is crawling around the house. Difficulty voiding. Subjective decreased sensation as well.  No fevers or chills, no other trauma. However he did say when he was walking he felt a crack in his back; this occurred yesterday though after the weakness began.  No UTI symptoms,    Past Medical History  Past Medical History:   Diagnosis Date     Arthritis      Hypertension      Malignant neoplasm of lower third of esophagus (H) 3/1/2021     Past Surgical History:   Procedure Laterality Date     ABDOMEN SURGERY  1992    fatty tissue     APPENDECTOMY  1987     COLONOSCOPY WITH CO2 INSUFFLATION N/A 1/26/2021    Procedure: COLONOSCOPY, WITH CO2 INSUFFLATION;  Surgeon: Nain Dominguez MD;  Location: [facility] OR     COMBINED ESOPHAGOSCOPY, GASTROSCOPY, DUODENOSCOPY (EGD) WITH CO2 INSUFFLATION N/A 1/26/2021    Procedure: ESOPHAGOGASTRODUODENOSCOPY, WITH CO2 INSUFFLATION;  Surgeon: Nain Dominguez MD;  Location: [facility] OR     ESOPHAGOSCOPY, GASTROSCOPY, DUODENOSCOPY (EGD), COMBINED N/A 1/26/2021    Procedure: Esophagogastroduodenoscopy, With Biopsy;  Surgeon: Nain Dominguez MD;  Location: [facility] OR     GI SURGERY      Upper Endoscopy     acetaminophen (TYLENOL) 500 MG tablet  amLODIPine (NORVASC) 10 MG tablet  gabapentin (NEURONTIN) 250 MG/5ML solution  lisinopril (ZESTRIL) 10 MG tablet  MULTI-VITAMIN OR TABS      No Known Allergies  Family History  Family History   Problem Relation Age of Onset     Hypertension Mother       "Neurologic Disorder Mother         stroke     Hyperlipidemia Mother      Cerebrovascular Disease Mother          of stroke     Thyroid Disease Mother      Heart Disease Father      Heart Failure Father      Coronary Artery Disease Father          of heart attack     Hypertension Brother      Coronary Artery Disease Brother         Quadruple Bi-pass/Quadruple Bi-pass     Hypertension Sister      Coronary Artery Disease Sister         Stent/Stent     Hypertension Brother      Hyperlipidemia Brother      Hypertension Sister      Hyperlipidemia Sister      Social History   Social History     Tobacco Use     Smoking status: Current Every Day Smoker     Packs/day: 1.00     Years: 36.00     Pack years: 36.00     Types: Cigarettes     Smokeless tobacco: Never Used     Tobacco comment: I am still on the fence with this but may need to quit   Substance Use Topics     Alcohol use: Yes     Comment: occasional     Drug use: No      Past medical history, past surgical history, medications, allergies, family history, and social history were reviewed with the patient. No additional pertinent items.       Review of Systems  A complete review of systems was performed with pertinent positives and negatives noted in the HPI, and all other systems negative.    Physical Exam   BP: 92/58  Pulse: 112  Temp: 99  F (37.2  C)  Resp: 20  Height: 177.8 cm (5' 10\")  Weight: 77.6 kg (171 lb)  SpO2: 98 %  Physical Exam  Gen: Alert and responsive, in no acute distress  Head: Atraumatic  Neuro: CNII-XII in tact, Upper extremities 5/5 strength, sensation in tact, Right lower extremity 5/5 strength sensation in tack. Left lower extremity 4/5 strength throughout, sensation decreased to light touch on left lower extremity, otherwise intact on the other limbs.  CV: tachycardic, regular rhythm  Resp: breathing comfortably, no accessory  Muscle use  Back: Midline tenderness over lower L-spine, no C or T spine tenderness, no CVA tenderness  Abd: No " abdominal tenderness, soft       ED Course         Results for orders placed or performed during the hospital encounter of 03/02/21   CBC with platelets differential     Status: Abnormal   Result Value Ref Range    WBC 16.0 (H) 4.0 - 11.0 10e9/L    RBC Count 4.91 4.4 - 5.9 10e12/L    Hemoglobin 11.6 (L) 13.3 - 17.7 g/dL    Hematocrit 37.3 (L) 40.0 - 53.0 %    MCV 76 (L) 78 - 100 fl    MCH 23.6 (L) 26.5 - 33.0 pg    MCHC 31.1 (L) 31.5 - 36.5 g/dL    RDW 17.5 (H) 10.0 - 15.0 %    Platelet Count 424 150 - 450 10e9/L    Diff Method Automated Method     % Neutrophils 86.9 %    % Lymphocytes 5.4 %    % Monocytes 6.8 %    % Eosinophils 0.0 %    % Basophils 0.1 %    % Immature Granulocytes 0.8 %    Nucleated RBCs 0 0 /100    Absolute Neutrophil 13.9 (H) 1.6 - 8.3 10e9/L    Absolute Lymphocytes 0.9 0.8 - 5.3 10e9/L    Absolute Monocytes 1.1 0.0 - 1.3 10e9/L    Absolute Eosinophils 0.0 0.0 - 0.7 10e9/L    Absolute Basophils 0.0 0.0 - 0.2 10e9/L    Abs Immature Granulocytes 0.1 0 - 0.4 10e9/L    Absolute Nucleated RBC 0.0    Comprehensive metabolic panel     Status: Abnormal   Result Value Ref Range    Sodium 133 133 - 144 mmol/L    Potassium 3.6 3.4 - 5.3 mmol/L    Chloride 101 94 - 109 mmol/L    Carbon Dioxide 23 20 - 32 mmol/L    Anion Gap 8 3 - 14 mmol/L    Glucose 104 (H) 70 - 99 mg/dL    Urea Nitrogen 26 7 - 30 mg/dL    Creatinine 0.69 0.66 - 1.25 mg/dL    GFR Estimate >90 >60 mL/min/[1.73_m2]    GFR Estimate If Black >90 >60 mL/min/[1.73_m2]    Calcium 9.6 8.5 - 10.1 mg/dL    Bilirubin Total 0.7 0.2 - 1.3 mg/dL    Albumin 3.2 (L) 3.4 - 5.0 g/dL    Protein Total 7.5 6.8 - 8.8 g/dL    Alkaline Phosphatase 234 (H) 40 - 150 U/L    ALT 24 0 - 70 U/L    AST 39 0 - 45 U/L   EKG 12-lead, tracing only     Status: None (Preliminary result)   Result Value Ref Range    Interpretation ECG Click View Image link to view waveform and result      Medications - No data to display     Assessments & Plan (with Medical Decision Making)  "  Concern for  Cauda-Equina Syndrome    Immediately after evaluation of patient, risks and benefits of MRI were discussed with patient to evaluate for cauda equina syndrome.  Patient has no fevers or chills to suggest infectious etiology. No UTI symptoms. Basic labs and UA were ordered and pending, as well as ultrasound given his physicians concern for DVT. Patient declined analgesia.  He is awaiting labs and imaging at this time. He will be signed out to oncoming provider \"Crystal\" to follow up on his lab results and disposition. Patient will likely be coming into the hospital for further evaluation and care.     I have reviewed the nursing notes. I have reviewed the findings, diagnosis, plan and need for follow up with the patient.    New Prescriptions    No medications on file       Final diagnoses:   None       --  Justa Lemus  [facility] EMERGENCY DEPARTMENT  3/2/2021     Justa Lemus MD  03/05/21 1143    "

## 2021-03-02 NOTE — TELEPHONE ENCOUNTER
Reason for Call:  Other call back    Detailed comments: pt called in asking to speak to you. Said you spoke yesterday    Phone Number Patient can be reached at: Home number on file [phone] (home)     Best Time asap    Can we leave a detailed message on this number? YES    Call taken on 3/2/2021 at 9:34 AM by Kierra Haile

## 2021-03-02 NOTE — ED NOTES
"     Emergency Department Patient Sign-out       Brief HPI:  This is a 60 year old male signed out to me by Dr. Lemus .  See initial ED Provider note for details of the presentation.            Significant Events prior to my assuming care: The pt is a 60 yom with PMH of esophageal cancer metastatic to hip, lymph nodes (diagnosed in January) who presented with low back pain and new weakness/numbness in L leg. A/w fatigue, shakiness. Unable to walk. Has biopsy on Friday. No fevers or chills. Recent Covid test prior to biopsy was negative. Labs, MRI L spine, and LE US pending. Pt to be admitted when workup complete for pain control.       Exam:   Patient Vitals for the past 24 hrs:   BP Temp Temp src Pulse Resp SpO2 Height Weight   03/02/21 1700 108/76 -- -- 95 -- 100 % -- --   03/02/21 1645 -- -- -- 94 -- 100 % -- --   03/02/21 1630 104/74 -- -- 99 -- -- -- --   03/02/21 1530 102/73 -- -- 95 -- -- -- --   03/02/21 1500 100/69 -- -- 99 -- -- -- --   03/02/21 1419 92/58 99  F (37.2  C) Oral 112 20 98 % 1.778 m (5' 10\") 77.6 kg (171 lb)           ED RESULTS:   Results for orders placed or performed during the hospital encounter of 03/02/21 (from the past 24 hour(s))   CBC with platelets differential     Status: Abnormal    Collection Time: 03/02/21  2:26 PM   Result Value Ref Range    WBC 16.0 (H) 4.0 - 11.0 10e9/L    RBC Count 4.91 4.4 - 5.9 10e12/L    Hemoglobin 11.6 (L) 13.3 - 17.7 g/dL    Hematocrit 37.3 (L) 40.0 - 53.0 %    MCV 76 (L) 78 - 100 fl    MCH 23.6 (L) 26.5 - 33.0 pg    MCHC 31.1 (L) 31.5 - 36.5 g/dL    RDW 17.5 (H) 10.0 - 15.0 %    Platelet Count 424 150 - 450 10e9/L    Diff Method Automated Method     % Neutrophils 86.9 %    % Lymphocytes 5.4 %    % Monocytes 6.8 %    % Eosinophils 0.0 %    % Basophils 0.1 %    % Immature Granulocytes 0.8 %    Nucleated RBCs 0 0 /100    Absolute Neutrophil 13.9 (H) 1.6 - 8.3 10e9/L    Absolute Lymphocytes 0.9 0.8 - 5.3 10e9/L    Absolute Monocytes 1.1 0.0 - 1.3 " 10e9/L    Absolute Eosinophils 0.0 0.0 - 0.7 10e9/L    Absolute Basophils 0.0 0.0 - 0.2 10e9/L    Abs Immature Granulocytes 0.1 0 - 0.4 10e9/L    Absolute Nucleated RBC 0.0    Comprehensive metabolic panel     Status: Abnormal    Collection Time: 03/02/21  2:26 PM   Result Value Ref Range    Sodium 133 133 - 144 mmol/L    Potassium 3.6 3.4 - 5.3 mmol/L    Chloride 101 94 - 109 mmol/L    Carbon Dioxide 23 20 - 32 mmol/L    Anion Gap 8 3 - 14 mmol/L    Glucose 104 (H) 70 - 99 mg/dL    Urea Nitrogen 26 7 - 30 mg/dL    Creatinine 0.69 0.66 - 1.25 mg/dL    GFR Estimate >90 >60 mL/min/[1.73_m2]    GFR Estimate If Black >90 >60 mL/min/[1.73_m2]    Calcium 9.6 8.5 - 10.1 mg/dL    Bilirubin Total 0.7 0.2 - 1.3 mg/dL    Albumin 3.2 (L) 3.4 - 5.0 g/dL    Protein Total 7.5 6.8 - 8.8 g/dL    Alkaline Phosphatase 234 (H) 40 - 150 U/L    ALT 24 0 - 70 U/L    AST 39 0 - 45 U/L   TSH with free T4 reflex     Status: None    Collection Time: 03/02/21  2:26 PM   Result Value Ref Range    TSH 1.40 0.40 - 4.00 mU/L   EKG 12-lead, tracing only     Status: None    Collection Time: 03/02/21  2:38 PM   Result Value Ref Range    Interpretation ECG Click View Image link to view waveform and result    US Lower Extremity Venous Duplex Left     Status: None    Collection Time: 03/02/21  3:48 PM    Narrative    EXAMINATION: DOPPLER VENOUS ULTRASOUND OF THE LEFT LOWER EXTREMITY,  3/2/2021 3:48 PM     COMPARISON: None.    HISTORY: Left leg pain and weakness, evaluate for DVT    TECHNIQUE:  Gray-scale evaluation with compression, spectral flow, and  color Doppler assessment of the deep venous system of the left leg  from groin to knee, and then at the ankle.    FINDINGS:  In the left lower extremity, the common femoral, femoral, popliteal  and posterior tibial veins demonstrate normal compressibility and  blood flow.    The right common femoral vein was evaluated for comparison, and  demonstrates normal blood flow and waveforms, and is without  evidence  of thrombus.      Impression    IMPRESSION:  No evidence left lower extremity deep venous thrombosis.    I have personally reviewed the examination and initial interpretation  and I agree with the findings.    AKANKSHA PIMENTEL MD   Asymptomatic Influenza A/B & SARS-CoV2 (COVID-19) Virus PCR Multiplex     Status: None    Collection Time: 03/02/21  4:40 PM    Specimen: Nasopharyngeal   Result Value Ref Range    Flu A/B & SARS-COV-2 PCR Source Nasopharyngeal     SARS-CoV-2 PCR Result NEGATIVE     Influenza A PCR Negative NEG^Negative    Influenza B PCR Negative NEG^Negative    Respiratory Syncytial Virus PCR Negative NEG^Negative    Flu A/B & SARS-CoV-2 PCR Comment (Note)    MR Lumbar Spine w/o & w Contrast     Status: Abnormal (Preliminary result)    Collection Time: 03/02/21  6:35 PM   Result Value Ref Range    Radiologist flags Metastatic disease causing severe spinal canal (Urgent)     Impression    Impression:   1. Posteriorly infiltrating heterogeneously enhancing mass within the  lumbar spinal canal causing multilevel spinal cord narrowing, severe  from L2-3 inferiorly suspicious for metastatic disease.  2. Increased size of the large sacral mass consistent with  biopsy-proven metastatic adenocarcinoma which obliterates the left  S1-S3 neural foramina.    [Urgent Result: Metastatic disease causing severe spinal canal  stenosis]    Finding was identified on 3/2/2021 6:47 PM.     Dr. Gonzalez was contacted by Dr. Mosley at 3/2/2021 7:16 PM and  verbalized understanding of the urgent finding.          ED MEDICATIONS:   Medications - No data to display      Impression:    ICD-10-CM    1. Left leg weakness  R29.898 TSH with free T4 reflex     Asymptomatic Influenza A/B & SARS-CoV2 (COVID-19) Virus PCR Multiplex   2. Acute low back pain, unspecified back pain laterality, unspecified whether sciatica present  M54.5    3. Malignant neoplasm of esophagus, unspecified location (H)  C15.9    4. Leukocytosis,  unspecified type  D72.829    5. Anemia, unspecified type  D64.9        Plan:    Pending studies include labs, MRI, US.    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 Sacral mass appears increased in size also.     1929 pt d/w neurosurgery, they will come see the patient to the emergency department.  Agree with plan to admit to internal medicine.  No further recommendations at this time    1948 Again discussed pt with radiology, resident discussed pt with radiology attending, based on rapid increase in size seen on MRI, findings likely represent epidural hematoma rather than metastatic disease, likely related ot recent biopsy. This is causing the central canal stenosis as discussed above.    Discussed patient with his RN, patient was unable to urinate in the emergency department, straight catheterization for urine collection was performed and over 1100 mL was drained from the bladder.  Will place Andrews catheter.  This is increases concern for urinary retention, potentially due to epidural hematoma/developing cauda equina. Will d/w neurosurgery.     Urinalysis shows ketonuria, proteinuria, positive nitrites. Antibiotics were ordered to cover for possible UTI. Urine culture is pending.    2002 Pt d/w NSG, they have seen the pt in the ER, updated them on new MRI read and urinary retention. Still recommend admission to IM/oncology service. No further recommendations at this time. Will call back with further recommendations after disuccsion with attending physician.     Patient discussed with internal medicine/oncology, to be admitted to their service for further management. Plan was discussed with patient who understands and agrees with plan.    2055 received call  from neurosurgery, they will plan to take the patient directly to the OR from the ER for decompression.    MD Carlos Lynch Michelle C, MD  03/02/21 2019       Crystal Gonzalez MD  03/02/21 2055

## 2021-03-02 NOTE — TELEPHONE ENCOUNTER
Patient was rescheduled to tomorrow 03/03 with Dr. Curiel due to urgency.   Patient stated he was on his way to ED due to feeling very bad.     Message was sent to his care team.

## 2021-03-02 NOTE — ED TRIAGE NOTES
"Pt comes in feeling \"shaky at times,\" fatigued, decreased appetite, back pain, leg pain. Pt also reports on Monday he woke up and couldn't walk. Left leg was numb below knee cap. In triage, pt observed to be lifting left leg manually in order to move it. Pt had biopsy on Friday and is wondering if he has a blood clot in the leg. Pt has been crawling at home due to inability to walk.  "

## 2021-03-02 NOTE — PROGRESS NOTES
Nutrition Services:     Returned call to Junito today.  He tells me that he already spoke with me this morning but had mistaken and had actually spoken with Carolee Chappell, MELANIE.      Junito tells me that he is advised to go to the ED with his current state of health.  He and his brother are headed there now.     No immediate action needed by writer.  Will follow-up with Junito in one week.     Kari Brown RD, [facility] [place]  [phone]

## 2021-03-02 NOTE — PROGRESS NOTES
RN CARE COORDINATION    Outgoing Call:    Received IB from Galina, with Thoracic Team, notifying us she spoke with Junito this morning about the plan for his  PEG tube to be placed on 3/3 and he told her he was going to go the ED because he was not feeling well and his family was concerned about him.     Spoke with Junito. He stated he has barely been eating for the past month d/t difficulty swallowing. The most he has been able to eat has been 1.5 bottles of Ensure. Most days he is unable to take in more than a few sips of Ensure and water a day. Today he has developed shaking and is feeling extremely weak. He is dizzy at times. He has not had a BM for almost two weeks. Minimal urine output. Junito stated he is not currently taking any medications d/t inability to swallow.     In addition to the poor PO intake, Junito stated he has been unable to walk since the morning of 3/1. He stated he had a left pelvic bone biopsy on 2/26 and the next day developed numbness in his left leg below his knee and soreness in his upper left leg and lower bilateral back. He was able on ambulate on 2/27 and 2/28 but woke up yesterday and was unable to walk. He has been crawling around his home or walking with help from family.       Plan:    Spoke with Dr. Steven. He recommended Junito report to the [facility] ED for work-up for possible cord compression and FTT. Junito is agreeable to plan and stated his brother will bring him within the next 90 minutes. Reassured Junito he will continue to be monitored by oncology team while inpatient and follow-up with Dr. Steven will be scheduled upon discharge.         Report given to the [facility] ED provider.          GEOFFREY HatchN, RN  RN Care Coordinator  [facility]

## 2021-03-03 ENCOUNTER — APPOINTMENT (OUTPATIENT)
Dept: GENERAL RADIOLOGY | Facility: CLINIC | Age: 61
End: 2021-03-03
Attending: NURSE PRACTITIONER
Payer: COMMERCIAL

## 2021-03-03 DIAGNOSIS — C15.9 ADENOCARCINOMA OF ESOPHAGUS (H): Primary | ICD-10-CM

## 2021-03-03 PROBLEM — R33.9 URINE RETENTION: Status: ACTIVE | Noted: 2021-03-03

## 2021-03-03 PROBLEM — M54.50 ACUTE LOW BACK PAIN, UNSPECIFIED BACK PAIN LATERALITY, UNSPECIFIED WHETHER SCIATICA PRESENT: Status: ACTIVE | Noted: 2021-03-03

## 2021-03-03 PROBLEM — D64.9 ANEMIA, UNSPECIFIED TYPE: Status: ACTIVE | Noted: 2021-03-03

## 2021-03-03 PROBLEM — D72.829 LEUKOCYTOSIS, UNSPECIFIED TYPE: Status: ACTIVE | Noted: 2021-03-03

## 2021-03-03 PROBLEM — R29.898 LEFT LEG WEAKNESS: Status: ACTIVE | Noted: 2021-03-03

## 2021-03-03 LAB
ALBUMIN SERPL-MCNC: 2.4 G/DL (ref 3.4–5)
ALP SERPL-CCNC: 173 U/L (ref 40–150)
ALT SERPL W P-5'-P-CCNC: 29 U/L (ref 0–70)
ANION GAP SERPL CALCULATED.3IONS-SCNC: 6 MMOL/L (ref 3–14)
APTT PPP: 23 SEC (ref 22–37)
AST SERPL W P-5'-P-CCNC: 37 U/L (ref 0–45)
BASOPHILS # BLD AUTO: 0 10E9/L (ref 0–0.2)
BASOPHILS NFR BLD AUTO: 0.1 %
BILIRUB SERPL-MCNC: 0.9 MG/DL (ref 0.2–1.3)
BUN SERPL-MCNC: 25 MG/DL (ref 7–30)
CALCIUM SERPL-MCNC: 8.6 MG/DL (ref 8.5–10.1)
CHLORIDE SERPL-SCNC: 109 MMOL/L (ref 94–109)
CO2 SERPL-SCNC: 24 MMOL/L (ref 20–32)
CREAT SERPL-MCNC: 0.57 MG/DL (ref 0.66–1.25)
D DIMER PPP FEU-MCNC: 3.7 UG/ML FEU (ref 0–0.5)
DIFFERENTIAL METHOD BLD: ABNORMAL
EOSINOPHIL # BLD AUTO: 0 10E9/L (ref 0–0.7)
EOSINOPHIL NFR BLD AUTO: 0 %
ERYTHROCYTE [DISTWIDTH] IN BLOOD BY AUTOMATED COUNT: 17.2 % (ref 10–15)
FIBRINOGEN PPP-MCNC: 663 MG/DL (ref 200–420)
GFR SERPL CREATININE-BSD FRML MDRD: >90 ML/MIN/{1.73_M2}
GLUCOSE BLDC GLUCOMTR-MCNC: 115 MG/DL (ref 70–99)
GLUCOSE BLDC GLUCOMTR-MCNC: 139 MG/DL (ref 70–99)
GLUCOSE SERPL-MCNC: 123 MG/DL (ref 70–99)
HCT VFR BLD AUTO: 31.1 % (ref 40–53)
HGB BLD-MCNC: 9.5 G/DL (ref 13.3–17.7)
IMM GRANULOCYTES # BLD: 0.1 10E9/L (ref 0–0.4)
IMM GRANULOCYTES NFR BLD: 0.6 %
INR PPP: 1.32 (ref 0.86–1.14)
INR PPP: 1.37 (ref 0.86–1.14)
LYMPHOCYTES # BLD AUTO: 0.5 10E9/L (ref 0.8–5.3)
LYMPHOCYTES NFR BLD AUTO: 5 %
MAGNESIUM SERPL-MCNC: 2.4 MG/DL (ref 1.6–2.3)
MCH RBC QN AUTO: 23.5 PG (ref 26.5–33)
MCHC RBC AUTO-ENTMCNC: 30.5 G/DL (ref 31.5–36.5)
MCV RBC AUTO: 77 FL (ref 78–100)
MONOCYTES # BLD AUTO: 0.4 10E9/L (ref 0–1.3)
MONOCYTES NFR BLD AUTO: 3.7 %
NEUTROPHILS # BLD AUTO: 9.7 10E9/L (ref 1.6–8.3)
NEUTROPHILS NFR BLD AUTO: 90.6 %
NRBC # BLD AUTO: 0 10*3/UL
NRBC BLD AUTO-RTO: 0 /100
PHOSPHATE SERPL-MCNC: 3.2 MG/DL (ref 2.5–4.5)
PLATELET # BLD AUTO: 348 10E9/L (ref 150–450)
POTASSIUM SERPL-SCNC: 4.2 MMOL/L (ref 3.4–5.3)
POTASSIUM SERPL-SCNC: 4.2 MMOL/L (ref 3.4–5.3)
PROT SERPL-MCNC: 6 G/DL (ref 6.8–8.8)
RBC # BLD AUTO: 4.05 10E12/L (ref 4.4–5.9)
SODIUM SERPL-SCNC: 138 MMOL/L (ref 133–144)
TRIGL SERPL-MCNC: 105 MG/DL
TROPONIN I SERPL-MCNC: <0.015 UG/L (ref 0–0.04)
WBC # BLD AUTO: 10.7 10E9/L (ref 4–11)

## 2021-03-03 PROCEDURE — 36569 INSJ PICC 5 YR+ W/O IMAGING: CPT

## 2021-03-03 PROCEDURE — 250N000011 HC RX IP 250 OP 636: Performed by: NURSE PRACTITIONER

## 2021-03-03 PROCEDURE — 84478 ASSAY OF TRIGLYCERIDES: CPT | Performed by: STUDENT IN AN ORGANIZED HEALTH CARE EDUCATION/TRAINING PROGRAM

## 2021-03-03 PROCEDURE — 71045 X-RAY EXAM CHEST 1 VIEW: CPT | Mod: 26

## 2021-03-03 PROCEDURE — 258N000003 HC RX IP 258 OP 636: Performed by: STUDENT IN AN ORGANIZED HEALTH CARE EDUCATION/TRAINING PROGRAM

## 2021-03-03 PROCEDURE — 250N000013 HC RX MED GY IP 250 OP 250 PS 637: Performed by: STUDENT IN AN ORGANIZED HEALTH CARE EDUCATION/TRAINING PROGRAM

## 2021-03-03 PROCEDURE — 120N000002 HC R&B MED SURG/OB UMMC

## 2021-03-03 PROCEDURE — 999N000065 XR CHEST PORT 1 VW

## 2021-03-03 PROCEDURE — 250N000013 HC RX MED GY IP 250 OP 250 PS 637: Performed by: NEUROLOGICAL SURGERY

## 2021-03-03 PROCEDURE — 36415 COLL VENOUS BLD VENIPUNCTURE: CPT | Performed by: STUDENT IN AN ORGANIZED HEALTH CARE EDUCATION/TRAINING PROGRAM

## 2021-03-03 PROCEDURE — 272N000458 ZZ HC KIT, 5 FR DL BIOFLO OPEN ENDED PICC

## 2021-03-03 PROCEDURE — 84484 ASSAY OF TROPONIN QUANT: CPT | Performed by: STUDENT IN AN ORGANIZED HEALTH CARE EDUCATION/TRAINING PROGRAM

## 2021-03-03 PROCEDURE — 250N000013 HC RX MED GY IP 250 OP 250 PS 637: Performed by: NURSE PRACTITIONER

## 2021-03-03 PROCEDURE — 99223 1ST HOSP IP/OBS HIGH 75: CPT | Mod: GC | Performed by: INTERNAL MEDICINE

## 2021-03-03 PROCEDURE — 80053 COMPREHEN METABOLIC PANEL: CPT | Performed by: STUDENT IN AN ORGANIZED HEALTH CARE EDUCATION/TRAINING PROGRAM

## 2021-03-03 PROCEDURE — 250N000009 HC RX 250: Performed by: NEUROLOGICAL SURGERY

## 2021-03-03 PROCEDURE — 250N000009 HC RX 250: Performed by: STUDENT IN AN ORGANIZED HEALTH CARE EDUCATION/TRAINING PROGRAM

## 2021-03-03 PROCEDURE — 74018 RADEX ABDOMEN 1 VIEW: CPT

## 2021-03-03 PROCEDURE — 84100 ASSAY OF PHOSPHORUS: CPT | Performed by: STUDENT IN AN ORGANIZED HEALTH CARE EDUCATION/TRAINING PROGRAM

## 2021-03-03 PROCEDURE — 250N000011 HC RX IP 250 OP 636: Performed by: STUDENT IN AN ORGANIZED HEALTH CARE EDUCATION/TRAINING PROGRAM

## 2021-03-03 PROCEDURE — 74018 RADEX ABDOMEN 1 VIEW: CPT | Mod: 26 | Performed by: RADIOLOGY

## 2021-03-03 PROCEDURE — 999N001017 HC STATISTIC GLUCOSE BY METER IP

## 2021-03-03 PROCEDURE — 83735 ASSAY OF MAGNESIUM: CPT | Performed by: STUDENT IN AN ORGANIZED HEALTH CARE EDUCATION/TRAINING PROGRAM

## 2021-03-03 PROCEDURE — 85610 PROTHROMBIN TIME: CPT | Performed by: STUDENT IN AN ORGANIZED HEALTH CARE EDUCATION/TRAINING PROGRAM

## 2021-03-03 PROCEDURE — 84132 ASSAY OF SERUM POTASSIUM: CPT | Performed by: STUDENT IN AN ORGANIZED HEALTH CARE EDUCATION/TRAINING PROGRAM

## 2021-03-03 PROCEDURE — 85025 COMPLETE CBC W/AUTO DIFF WBC: CPT | Performed by: STUDENT IN AN ORGANIZED HEALTH CARE EDUCATION/TRAINING PROGRAM

## 2021-03-03 RX ORDER — HYDROMORPHONE HYDROCHLORIDE 1 MG/ML
.3-.5 INJECTION, SOLUTION INTRAMUSCULAR; INTRAVENOUS; SUBCUTANEOUS EVERY 5 MIN PRN
Status: DISCONTINUED | OUTPATIENT
Start: 2021-03-03 | End: 2021-03-03 | Stop reason: HOSPADM

## 2021-03-03 RX ORDER — HYDROMORPHONE HYDROCHLORIDE 1 MG/ML
0.3 INJECTION, SOLUTION INTRAMUSCULAR; INTRAVENOUS; SUBCUTANEOUS
Status: DISCONTINUED | OUTPATIENT
Start: 2021-03-03 | End: 2021-03-10 | Stop reason: HOSPADM

## 2021-03-03 RX ORDER — ONDANSETRON 2 MG/ML
4-8 INJECTION INTRAMUSCULAR; INTRAVENOUS EVERY 6 HOURS PRN
Status: DISCONTINUED | OUTPATIENT
Start: 2021-03-03 | End: 2021-03-10 | Stop reason: HOSPADM

## 2021-03-03 RX ORDER — OXYCODONE HCL 5 MG/5 ML
5-10 SOLUTION, ORAL ORAL
Status: DISCONTINUED | OUTPATIENT
Start: 2021-03-03 | End: 2021-03-10 | Stop reason: HOSPADM

## 2021-03-03 RX ORDER — POLYETHYLENE GLYCOL 3350 17 G/17G
17 POWDER, FOR SOLUTION ORAL DAILY
Status: DISCONTINUED | OUTPATIENT
Start: 2021-03-03 | End: 2021-03-03

## 2021-03-03 RX ORDER — PROCHLORPERAZINE 25 MG
25 SUPPOSITORY, RECTAL RECTAL EVERY 12 HOURS PRN
Status: DISCONTINUED | OUTPATIENT
Start: 2021-03-03 | End: 2021-03-10 | Stop reason: HOSPADM

## 2021-03-03 RX ORDER — CALCIUM CARBONATE 500 MG/1
500 TABLET, CHEWABLE ORAL 3 TIMES DAILY PRN
Status: DISCONTINUED | OUTPATIENT
Start: 2021-03-03 | End: 2021-03-10 | Stop reason: HOSPADM

## 2021-03-03 RX ORDER — AMOXICILLIN 250 MG
3 CAPSULE ORAL 2 TIMES DAILY
Status: DISCONTINUED | OUTPATIENT
Start: 2021-03-03 | End: 2021-03-03

## 2021-03-03 RX ORDER — LABETALOL HYDROCHLORIDE 5 MG/ML
10-40 INJECTION, SOLUTION INTRAVENOUS EVERY 10 MIN PRN
Status: DISCONTINUED | OUTPATIENT
Start: 2021-03-03 | End: 2021-03-10 | Stop reason: HOSPADM

## 2021-03-03 RX ORDER — METHOCARBAMOL 750 MG/1
750 TABLET, FILM COATED ORAL 4 TIMES DAILY
Status: DISCONTINUED | OUTPATIENT
Start: 2021-03-03 | End: 2021-03-10 | Stop reason: HOSPADM

## 2021-03-03 RX ORDER — FENTANYL CITRATE 50 UG/ML
25-50 INJECTION, SOLUTION INTRAMUSCULAR; INTRAVENOUS
Status: DISCONTINUED | OUTPATIENT
Start: 2021-03-03 | End: 2021-03-03 | Stop reason: HOSPADM

## 2021-03-03 RX ORDER — GABAPENTIN 250 MG/5ML
250 SOLUTION ORAL AT BEDTIME
Status: DISCONTINUED | OUTPATIENT
Start: 2021-03-03 | End: 2021-03-10 | Stop reason: HOSPADM

## 2021-03-03 RX ORDER — ONDANSETRON 2 MG/ML
4 INJECTION INTRAMUSCULAR; INTRAVENOUS EVERY 30 MIN PRN
Status: DISCONTINUED | OUTPATIENT
Start: 2021-03-03 | End: 2021-03-03 | Stop reason: HOSPADM

## 2021-03-03 RX ORDER — SODIUM CHLORIDE, SODIUM LACTATE, POTASSIUM CHLORIDE, CALCIUM CHLORIDE 600; 310; 30; 20 MG/100ML; MG/100ML; MG/100ML; MG/100ML
INJECTION, SOLUTION INTRAVENOUS CONTINUOUS
Status: DISCONTINUED | OUTPATIENT
Start: 2021-03-03 | End: 2021-03-03 | Stop reason: HOSPADM

## 2021-03-03 RX ORDER — PROCHLORPERAZINE MALEATE 10 MG
10 TABLET ORAL EVERY 6 HOURS PRN
Status: DISCONTINUED | OUTPATIENT
Start: 2021-03-03 | End: 2021-03-10 | Stop reason: HOSPADM

## 2021-03-03 RX ORDER — LIDOCAINE 40 MG/G
CREAM TOPICAL
Status: DISCONTINUED | OUTPATIENT
Start: 2021-03-03 | End: 2021-03-10 | Stop reason: HOSPADM

## 2021-03-03 RX ORDER — OXYCODONE HYDROCHLORIDE 5 MG/1
5-10 TABLET ORAL
Status: DISCONTINUED | OUTPATIENT
Start: 2021-03-03 | End: 2021-03-03

## 2021-03-03 RX ORDER — NALOXONE HYDROCHLORIDE 0.4 MG/ML
0.4 INJECTION, SOLUTION INTRAMUSCULAR; INTRAVENOUS; SUBCUTANEOUS
Status: DISCONTINUED | OUTPATIENT
Start: 2021-03-03 | End: 2021-03-03

## 2021-03-03 RX ORDER — HEPARIN SODIUM,PORCINE 10 UNIT/ML
5-10 VIAL (ML) INTRAVENOUS EVERY 24 HOURS
Status: DISCONTINUED | OUTPATIENT
Start: 2021-03-03 | End: 2021-03-10 | Stop reason: HOSPADM

## 2021-03-03 RX ORDER — HYDRALAZINE HYDROCHLORIDE 20 MG/ML
10-20 INJECTION INTRAMUSCULAR; INTRAVENOUS EVERY 30 MIN PRN
Status: DISCONTINUED | OUTPATIENT
Start: 2021-03-03 | End: 2021-03-10 | Stop reason: HOSPADM

## 2021-03-03 RX ORDER — ACETAMINOPHEN 325 MG/10.15ML
975 LIQUID ORAL EVERY 8 HOURS
Status: DISPENSED | OUTPATIENT
Start: 2021-03-03 | End: 2021-03-06

## 2021-03-03 RX ORDER — ONDANSETRON 4 MG/1
4 TABLET, ORALLY DISINTEGRATING ORAL EVERY 30 MIN PRN
Status: DISCONTINUED | OUTPATIENT
Start: 2021-03-03 | End: 2021-03-03 | Stop reason: HOSPADM

## 2021-03-03 RX ORDER — AMOXICILLIN 250 MG
2 CAPSULE ORAL 2 TIMES DAILY
Status: DISCONTINUED | OUTPATIENT
Start: 2021-03-03 | End: 2021-03-03

## 2021-03-03 RX ORDER — SODIUM CHLORIDE 9 MG/ML
INJECTION, SOLUTION INTRAVENOUS CONTINUOUS
Status: ACTIVE | OUTPATIENT
Start: 2021-03-03 | End: 2021-03-03

## 2021-03-03 RX ORDER — AMLODIPINE BESYLATE 10 MG/1
10 TABLET ORAL DAILY
Status: DISCONTINUED | OUTPATIENT
Start: 2021-03-03 | End: 2021-03-03

## 2021-03-03 RX ORDER — LISINOPRIL 10 MG/1
10 TABLET ORAL 2 TIMES DAILY
Status: DISCONTINUED | OUTPATIENT
Start: 2021-03-03 | End: 2021-03-03

## 2021-03-03 RX ORDER — BISACODYL 10 MG
10 SUPPOSITORY, RECTAL RECTAL DAILY PRN
Status: DISCONTINUED | OUTPATIENT
Start: 2021-03-03 | End: 2021-03-04

## 2021-03-03 RX ORDER — ACETAMINOPHEN 325 MG/10.15ML
650 LIQUID ORAL EVERY 4 HOURS PRN
Status: DISCONTINUED | OUTPATIENT
Start: 2021-03-06 | End: 2021-03-10 | Stop reason: HOSPADM

## 2021-03-03 RX ORDER — FAMOTIDINE 40 MG/5ML
20 POWDER, FOR SUSPENSION ORAL 2 TIMES DAILY
Status: DISCONTINUED | OUTPATIENT
Start: 2021-03-03 | End: 2021-03-10 | Stop reason: HOSPADM

## 2021-03-03 RX ORDER — LIDOCAINE 4 G/G
2 PATCH TOPICAL
Status: DISCONTINUED | OUTPATIENT
Start: 2021-03-03 | End: 2021-03-10 | Stop reason: HOSPADM

## 2021-03-03 RX ORDER — DEXTROSE MONOHYDRATE 100 MG/ML
INJECTION, SOLUTION INTRAVENOUS CONTINUOUS PRN
Status: DISCONTINUED | OUTPATIENT
Start: 2021-03-03 | End: 2021-03-07

## 2021-03-03 RX ORDER — ACETAMINOPHEN 325 MG/1
650 TABLET ORAL EVERY 4 HOURS PRN
Status: DISCONTINUED | OUTPATIENT
Start: 2021-03-06 | End: 2021-03-03

## 2021-03-03 RX ORDER — NALOXONE HYDROCHLORIDE 0.4 MG/ML
0.2 INJECTION, SOLUTION INTRAMUSCULAR; INTRAVENOUS; SUBCUTANEOUS
Status: DISCONTINUED | OUTPATIENT
Start: 2021-03-03 | End: 2021-03-03

## 2021-03-03 RX ORDER — AMOXICILLIN 250 MG
1 CAPSULE ORAL 2 TIMES DAILY
Status: DISCONTINUED | OUTPATIENT
Start: 2021-03-03 | End: 2021-03-03

## 2021-03-03 RX ORDER — HEPARIN SODIUM,PORCINE 10 UNIT/ML
2-5 VIAL (ML) INTRAVENOUS
Status: ACTIVE | OUTPATIENT
Start: 2021-03-03 | End: 2021-03-06

## 2021-03-03 RX ORDER — HEPARIN SODIUM,PORCINE 10 UNIT/ML
5-10 VIAL (ML) INTRAVENOUS
Status: DISCONTINUED | OUTPATIENT
Start: 2021-03-03 | End: 2021-03-10 | Stop reason: HOSPADM

## 2021-03-03 RX ORDER — ACETAMINOPHEN 325 MG/1
975 TABLET ORAL EVERY 8 HOURS
Status: DISCONTINUED | OUTPATIENT
Start: 2021-03-03 | End: 2021-03-03

## 2021-03-03 RX ORDER — NALOXONE HYDROCHLORIDE 0.4 MG/ML
0.4 INJECTION, SOLUTION INTRAMUSCULAR; INTRAVENOUS; SUBCUTANEOUS
Status: DISCONTINUED | OUTPATIENT
Start: 2021-03-03 | End: 2021-03-05

## 2021-03-03 RX ORDER — ONDANSETRON 4 MG/1
4-8 TABLET, ORALLY DISINTEGRATING ORAL EVERY 6 HOURS PRN
Status: DISCONTINUED | OUTPATIENT
Start: 2021-03-03 | End: 2021-03-10 | Stop reason: HOSPADM

## 2021-03-03 RX ORDER — POLYETHYLENE GLYCOL 3350 17 G/17G
17 POWDER, FOR SOLUTION ORAL 3 TIMES DAILY
Status: DISCONTINUED | OUTPATIENT
Start: 2021-03-03 | End: 2021-03-10 | Stop reason: HOSPADM

## 2021-03-03 RX ORDER — NALOXONE HYDROCHLORIDE 0.4 MG/ML
0.2 INJECTION, SOLUTION INTRAMUSCULAR; INTRAVENOUS; SUBCUTANEOUS
Status: DISCONTINUED | OUTPATIENT
Start: 2021-03-03 | End: 2021-03-05

## 2021-03-03 RX ORDER — LIDOCAINE 40 MG/G
CREAM TOPICAL
Status: ACTIVE | OUTPATIENT
Start: 2021-03-03 | End: 2021-03-06

## 2021-03-03 RX ADMIN — BISACODYL 10 MG: 10 SUPPOSITORY RECTAL at 06:42

## 2021-03-03 RX ADMIN — LIDOCAINE HYDROCHLORIDE 1 ML: 10 INJECTION, SOLUTION EPIDURAL; INFILTRATION; INTRACAUDAL; PERINEURAL at 18:04

## 2021-03-03 RX ADMIN — I.V. FAT EMULSION 250 ML: 20 EMULSION INTRAVENOUS at 21:18

## 2021-03-03 RX ADMIN — POLYETHYLENE GLYCOL 3350 17 G: 17 POWDER, FOR SOLUTION ORAL at 13:44

## 2021-03-03 RX ADMIN — Medication: at 21:19

## 2021-03-03 RX ADMIN — DOCUSATE SODIUM 286 ML: 50 LIQUID ORAL at 10:24

## 2021-03-03 RX ADMIN — SODIUM CHLORIDE: 9 INJECTION, SOLUTION INTRAVENOUS at 01:32

## 2021-03-03 RX ADMIN — SENNOSIDES A AND B 15 ML: 415.36 LIQUID ORAL at 21:34

## 2021-03-03 RX ADMIN — Medication 10 ML: at 19:00

## 2021-03-03 RX ADMIN — GABAPENTIN 250 MG: 250 SUSPENSION ORAL at 21:33

## 2021-03-03 RX ADMIN — ACETAMINOPHEN 975 MG: 325 SOLUTION ORAL at 18:59

## 2021-03-03 RX ADMIN — SODIUM CHLORIDE: 9 INJECTION, SOLUTION INTRAVENOUS at 08:19

## 2021-03-03 RX ADMIN — HYDROMORPHONE HYDROCHLORIDE 0.5 MG: 1 INJECTION, SOLUTION INTRAMUSCULAR; INTRAVENOUS; SUBCUTANEOUS at 00:33

## 2021-03-03 RX ADMIN — POLYETHYLENE GLYCOL 3350 17 G: 17 POWDER, FOR SOLUTION ORAL at 20:06

## 2021-03-03 ASSESSMENT — ACTIVITIES OF DAILY LIVING (ADL)
CONCENTRATING,_REMEMBERING_OR_MAKING_DECISIONS_DIFFICULTY: NO
WALKING_OR_CLIMBING_STAIRS_DIFFICULTY: YES
HEARING_DIFFICULTY_OR_DEAF: NO
ADLS_ACUITY_SCORE: 18
ADLS_ACUITY_SCORE: 16
EATING/SWALLOWING: EATING;SWALLOWING LIQUIDS;SWALLOWING SOLID FOOD
ADLS_ACUITY_SCORE: 18
DIFFICULTY_EATING/SWALLOWING: YES
WEAR_GLASSES_OR_BLIND: YES
ADLS_ACUITY_SCORE: 18
DIFFICULTY_COMMUNICATING: NO
PATIENT_/_FAMILY_COMMUNICATION_STYLE: SPOKEN LANGUAGE (ENGLISH OR BILINGUAL)
TOILETING_ISSUES: YES
DRESSING/BATHING_DIFFICULTY: NO
ADLS_ACUITY_SCORE: 17
VISION_MANAGEMENT: GLASSES

## 2021-03-03 ASSESSMENT — VISUAL ACUITY: OU: BASELINE;GLASSES

## 2021-03-03 NOTE — CONSULTS
RADIATION ONCOLOGY CONSULT NOTE  Date of Visit: Mar 2, 2021    Junito Wang  MRN: [id number]  : 1960    Junito Wang is being seen today for initial consultation at the request of Dr. Natarajan ref. provider found for consideration of postoperative radiotherapy for the lumbar spine in the setting diffusely metastatic esophageal adenocarcinoma. All pertinent labs, imaging, and pathology findings have been reviewed.     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 Wang is a 60 year old current smoking gentleman with a new diagnosis of metastatic esophageal adenocarcinoma (Siewert III).  His oncologic diagnosis came to light in 2020 when he noticed progressive dysphagia first with solid food.  This is associated with a 45 pound weight loss over 2 years, a total of 20% body weight loss. He had also noticed hip pain starting in 2020 that was making it progressively more challenging for him to walk.  This pain woke him up at night.  Pain was on remitted with oral analgesics.  Pain noted to be sharp, and shooting.     He underwent upper endoscopy on 2021 with Dr. Kim who noted a medium to large sized ulcerating mass with bleeding in the distal esophagus located 36 to 40 cm from the incisors.  The mass was partially obstructing and circumferential.  There was associated hematin material in the cardia.  Pathology from this demonstrated moderately differentiated adenocarcinoma.  PD-L1 low.  HER2-kishan by IHC was equivocal score 2+.     Staging workup included PET/CT on 21 that demonstrated hypermetabolic mass in the distal esophagus extending into the gastric cardia with an SUV max of 16.7.  There were multiple hypermetabolic mediastinal lymph nodes in both the paraesophageal and subcarinal spaces.  There was also a hypoenhancing  hypermetabolic mass in the liver in segment 7 measuring 1.8 x 1.7 cm with SUV max 8.3.  There are 2 additional nodules with FDG avidity 1 in segment 5 and the other in the margins of segments 5 and 6.  The patient also had a hypermetabolic lytic mass in the left sacrum extending into the left presacral space that measured 5.7 x 3.8 x 7.5 cm with an SUV max of 10.8.  The mass obliterated the left S1-3 neural foramina with possible mass-effect on the exiting nerves from S4.  The mass was noted to be eroding through the left sacroiliac articulation.  There was also a hypermetabolic mass in the proximal right humerus measuring 2.1 x 1.6 cm with SUV max 7.2.  There was also an hypermetabolic nodule in the medial left temporal lobe was also noted.  This led work-up to also include an MRI of the brain on 2/25/21 that demonstrated a 1.9 x 1.4 x 1.2 cm enhancing lesion in the left choroid plexus adjacent to the left mesial temporal lobe.     He is established oncologic care with Dr. Steven of medical oncology.  Given the fact that the patient was having significant bony pain Dr. Steven recommended he undergo bone biopsy of the left pelvic mass. Bone biopsy of the left pelvic mass on 2/26/21 (Specimen #[id number]); pathology demonstrated metastatic adenocarcinoma, consistent with known esophageal carcinoma.  The tentative plan for this likely metastatic diagnosis was for initiation of palliative chemotherapy/immunotherapy.  The patient stated that there was a plan for elective feeding tube placement given his poor nutritional status.     Regarding the patient's inpatient admission, the patient was noted to have progressive weakness in the left leg to the point where he was actually needing to be able to crawl to get around the house.  This was also associated with numbness across the entire foot and around mostly the lateral aspect of his leg below the knee.  He had also noted some difficulty urinating since the biopsy as  well.  Taken together this led him to present to the emergency department.  In the emergency room, saddle anesthesia was also noted.  MRI of the lumbar spine on 3/2/2021 demonstrated a new posteriorly infiltrating T2 flair hyperintense appearance with internal septations within the lumbar spinal cord causing severe compression of the thecal sac and spinal canal extending from T12 down to the sacrum.  This appeared new in comparison to the recent PET/CT, which made the findings most suggestive of an early subacute epidural hematoma.  Neurosurgery was consulted who recommended emergent laminectomy and decompression of the epidural hematoma.  Intraoperatively, it was noted that there was abnormal discolored tissue seen compressing the cord once the dura was unroofed.  Tumor was noted to be clearly eroding the dura caudally, thus a muscle patch graft was placed over it surface and DuraSeal injected above it.  No epidural hematoma was noted in the operative report.    The patient is seen this morning at bedside to discuss postoperative radiotherapy to the lumbar spine.  He has normal neurologic function of his lower extremities.  The patient mass mainly about if he would be able to eat regular food again.     REVIEW OF SYSTEMS: A 12 point review of systems was obtained. Pertinent findings are noted in the HPI and are otherwise unremarkable.     CHEMOTHERAPY HISTORY: no  PACEMAKER: no  PAST RADIATION THERAPY HISTORY: no    PAST MEDICAL HISTORY:  - Arthritis  - Hypertension  - Esophageal cancer as above    PAST SURGICAL HISTORY:  - Appendectomy  - Abdomen surgery    ALLERGIES:  - No known allergies    MEDICATIONS:  No current outpatient medications on file.     FAMILY HISTORY:  - No known family history of cancer     SOCIAL HISTORY:  - Single, resides in [place], [place]  - Current 1 pack per day smoker, x 36 years  - Alcohol: occasional  - Illicits: no    PHYSICAL EXAM:  VITALS: /67 (BP Location: Right arm)   Pulse  "70   Temp 96.3  F (35.7  C) (Oral)   Resp 16   Ht 1.778 m (5' 10\")   Wt 77.6 kg (171 lb)   SpO2 98%   BMI 24.54 kg/m    GEN: Appears well, alert, oriented, remains flat on back  HEENT: EOMI, normal conjunctiva  NECK: Supple, full ROM, no cervical or clavicular lymphadenopathy  CV: RRR, no murmurs/rubs/gallops, warm and well-perfused  RESP: CTAB, no wheezes/rales/rhonchi, breathing comfortably on room air  ABDOMEN: Soft, NT, ND, bowel sounds present  SKIN: Normal color and turgor  NEURO: able to move bilateral lower extremities, gait not assessed, hemovac in place  PSYCH: Appropriate mood and affect    ECOG PERFORMANCE STATUS: 0-1    RADIOLOGY/IMAGING:             IMPRESSION: 60-year-old gentleman with metastatic esophageal adenocarcinoma of the distal esophagus and GE junction metastatic to liver, bone, brain.  He has lost more than 20% body weight.  He is now status post laminectomy and decompression with neurosurgery for cauda equina syndrome, for which his neurologic function has improved drastically.    RECOMMENDATION:  . The patient has undergone decompression for epidural tumor with resolution of neurologic compromise, and now is neurologically intact.   We recommend postoperataive radiotherapy once his wound has healed    He has diffusely metastatic disease including the liver right supraclavicular node and brain and bone. We discussed with him the need for postoperative radiotherapy in about a 2weeks  time following surgery. This will require likely 5-10 sessions treatment. The patient resides in [place] so we will have him set up for consult and simulation at [place].  An appointment has been made for March11th     The patient also likely will benefit from radiosurgery (  gamma knife)  to the right temporal  ( choroid plexus )  lesion, which  can be arranged when he is seen at  next week . He will  will be able to receive at [facility]. The patient was not aware of his brain MRI findings " which we broached the discussion of, but did not go into significant detail.    We discussed with Mr. Wang that pending discussion with medical oncology, that there is a role at times for palliative radiotherapy to the esophageal primary in the setting of significant dysphagia, which the patient has, but the tumor is very large and over a long length .       We will discuss with Dr. Steven, his outpatient medical oncologist, his plans for chemotherapy/immunotherapy.  Also we wonder if stenting is possible.   We believe he is scheduled to have a  jejunostomy feeding tube     The patient was seen and discussed with staff, Dr. Qureshi. Thank you for involving us in the care of this patient.  Please feel free to contact us with questions or concerns at any time.    Kalli Lopez MD PGY2  Department of Radiation Oncology  [phone]    I was personally present with the resident during the history and exam.  I   discussed the case with the resident and agree with the findings and plan   of care as documented in the resident's note.    Emilia Qureshi   [phone]     CC  Patient Care Team:  Florian Wagner MD as PCP - General (Family Practice)  Jase Tarango MD as Assigned PCP  Roderick Sánchez, RN as Specialty Care Coordinator (Oncology)  Jose Steven MD as MD (Hematology & Oncology)  Jose Steven MD as Assigned Cancer Care Provider

## 2021-03-03 NOTE — BRIEF OP NOTE
[facility]    Brief Operative Note    Pre-operative diagnosis: Cauda equina syndrome (H) [G83.4]  Post-operative diagnosis Same as pre-operative diagnosis    Procedure: Procedure(s):  Lumbar 2 to Lumbar 5 Laminectomy  Surgeon: Surgeon(s) and Role:     * Soy Gusman MD - Primary     * Leschke, John M, MD - Resident - Assisting  Anesthesia: General   Estimated blood loss: 100cc  Drains: Hemovac  Specimens:   ID Type Source Tests Collected by Time Destination   A : posterior muscle mass Tissue Back SURGICAL PATHOLOGY EXAM Soy Gusman MD 3/2/2021 10:26 PM    B : Epidural Mass Tissue Back SURGICAL PATHOLOGY EXAM Soy Gusman MD 3/2/2021 11:04 PM      Findings:   epidural tumor with dural invasion at L5-S1. dural defect repaired with muscle graft and duraseal. .  Complications: None.  Implants: * No implants in log *

## 2021-03-03 NOTE — PROGRESS NOTES
Discussed with neurosurgery team ;  Patient is going for OR overnight for decompression and will be neurosurgery as primary for acute post op management for now       REJI Wu  Internal Medicine Hospitalist service

## 2021-03-03 NOTE — PLAN OF CARE
PT Cancel/HOLD 6A: Per chart, pt on bedrest orders. Not appropriate for therapy at this time. Will initiate once pt no longer on bedrest

## 2021-03-03 NOTE — PROGRESS NOTES
CLINICAL NUTRITION SERVICES - ASSESSMENT NOTE     Nutrition Prescription    RECOMMENDATIONS FOR MDs/PROVIDERS TO ORDER:  - Recommend ordering aggressive electrolyte replacement d/t high refeeding risk with initiation of PN therapy.  - Once enteral access obtained and tube ready to use, please order consult for RD to order TF per MNT protocol.    Malnutrition Status:    Severe malnutrition in the context of acute on chronic illness    Recommendations already ordered by Registered Dietitian (RD):  - Order entered to initiate PN with goal vol of 1920 ml/day with 110 g Dex daily (374 kcal), 115 g AA daily (460 kcal) and 250 ml of 20% IV lipids daily = 1334 kcals/day (17 kcal/kg/day), 1.5 g PRO/kg/day, GIR 1.0 with 37% kcals from Fat.  Goal Dex = 300 g (to meet 25 kcals/kg)  - Add thiamine 100 mg and folate 1 mg to PN bag d/t refeeding risk.  - Order lab add on to check current TG level    Future/Additional Recommendations:  - Monitor lytes for refeeding with initiation and adv of PN to goal dextrose of 300 g daily.  - Monitor wt trends and need to adjusted estimated nutritional needs if wt loss persists   - Monitor for ability to place PEG and transition to enteral feedings with TF of Isosource 1.5 with goal of 65 ml/hr to provide Isosource 1.5 @ goal 65 ml/hr (1560 ml/day) to provide 2340 kcals (30 kcal/kg/day), 106 g PRO (1.4 g/kg/day), 1186 ml free H2O, 275 g CHO and 23 g Fiber daily.       REASON FOR ASSESSMENT  Junito Wang is a/an 60 year old male assessed by the dietitian for positive Admission Nutrition Risk Screen for wt loss of 34 lbs or more and eating poorly d/t decreased appetite and Provider Order - metastatic cancer with concern for malnutrition; s/p emergent spinal decompression and consult d/t hx of esophageal CA, was supposed to get PEG today but had to be cancelled due to emergent surgery.  Thoracic surgery stating unable to place until next week.  Can't tolerate NG due to pain, please advise.   -  "F/U consult received for Pharmacy/Nutrition to start & manage PN d/t h/o esophageal Ca per RD recommendation to NP.     NUTRITION HISTORY  Unable to obtain from pt secondary to sleeping at time of visit. Per chart review, noted pt has been followed by outpatient RD regarding MNT related to esophageal cancer with initial phone visit on 2/22/21 and again on 3/1/21. Reported that pt was only taking sips of Ensure Plus for over the past month d/t swallowing difficulties and per last phone call on 2/22, reported that pt was to increase intake to 1 can per day.  Per H&P, reported that pt has has difficulty eating d/t esophageal adenocarcinoma and hasn't eaten any solid foods in approximately 2 mos.     CURRENT NUTRITION ORDERS  Diet: NPO as of 0930 today. Previously on a CL diet with adv to Regular as of 0415 today and had been NPO on admit (3/2)    Intake/Tolerance: Unknown, no meals ordered yet this admission.  - Reported that pt unable to swallow anything    LABS  K+ 4.2 (WNL), Mg++ 2.4 (High) and PO4 3.2 (WNL)   (5/29/20)  (+) Ketones 40 per UA on 3/2; reflective of starvation ketosis based on nutrition hx.   Cr 0.57 (low); reflective of poor muscle mass    MEDICATIONS  Lytes replacement for K+, Mg++ and PO4 ordered    ANTHROPOMETRICS  Height: 177.8 cm (5' 10\")  Most Recent Weight: 77.6 kg (171 lb) - 3/2 (admit)  IBW: 75.5 kg  BMI: Normal BMI  Weight History: Per review of EMR, wt loss 50 lbs (23 % loss) x past 7 mos.   Wt Readings from Last 10 Encounters:   03/02/21 77.6 kg (171 lb)   02/26/21 80.3 kg (177 lb)   01/19/21 90.3 kg (199 lb)   01/15/21 90.3 kg (199 lb)   08/03/20 100.2 kg (221 lb)   01/31/19 112.5 kg (248 lb)   01/07/18 109.3 kg (241 lb)   10/30/17 109.8 kg (242 lb)   03/30/17 112 kg (247 lb)   02/03/17 112.9 kg (249 lb)       Dosing Weight: 78 kg (based on admit wt of 77.6 kg on 3/2)    ASSESSED NUTRITION NEEDS  Estimated Energy Needs:9887-3860 kcals/day (25 - 30 kcals/kg)  Justification: " Maintenance  Estimated Protein Needs:  grams protein/day (1.2 - 1.5 grams of pro/kg)  Justification: Post-op and Repletion  Estimated Fluid Needs: (1 mL/kcal)   Justification: Maintenance    PHYSICAL FINDINGS  See malnutrition section below.  Unable to assess at this time     MALNUTRITION  % Intake: </=50% for >/= 1 month (severe)  % Weight Loss: > 10% in 6 months (severe)  Subcutaneous Fat Loss: Unable to assess d/t pt sleeping at time of visit  Muscle Loss: Unable to assess d/t pt sleeping at time of visit  Fluid Accumulation/Edema: None noted per chart review  Malnutrition Diagnosis: Severe malnutrition in the context of acute on chronic illness    NUTRITION DIAGNOSIS  Inadequate protein-energy intake related to altered swallow function inhibiting oral intakes as evidenced by wt loss 50 lbs (23 % loss) x past 7 mos, consuming < 50 % of po intakes ofr > past month, positive ketones on admit, NPO status and consult received for PN therapy until enteral access can be obtained.    INTERVENTIONS  Implementation  Nutrition Education: Unable to provide at this time  Collaboration with NP regarding nutrition POC d/t inability to place PEG for enteral feeding at this time. Recommended placing PICC line for initiation of PN therapy as a bridge for providing a means of nutritional intakes until enteral feeding access can be obtained.    Goals  Total avg nutritional intake to meet a minimum of 25 kcal/kg and 1.2 g PRO/kg daily (per dosing wt 78 kg).     Monitoring/Evaluation  Progress toward goals will be monitored and evaluated per protocol.  Umm Grady RD,LD  6A pager [phone]

## 2021-03-03 NOTE — PROGRESS NOTES
Status: s/p L2-L5 laminectomy d/t epidural hemmorhage and enlarging sacral pass causing severe spinal stenosis. Came up to 6A at 0330  Vitals: VSS. Soft BPs at times. 2L NC.    Neuros: A&Ox4.  LLE 4/5.  AOE 5/5.  Denies N/T since post-op. Follows commands.  IV: L PIV infusing NS @ 100mL/hr  Labs/Electrolytes: WDL  Resp/trach: 2L NC.  Denies SOB  Diet: NPO ex meds.  Refusing meds/pills, has pain after swallowing d/t esophageal cancer MD aware.  Per previous shift report, has not eated solid foods in over 2 months.  Bowel status: LBM 2/16, per patient report.  BS hypo.  Tender on palpation to R upper and lower quadrants (ascending colon area), firmness present.  Pink lady and Water enemas given.  No results thus far.  Took Miralax in water at 1345.   : Garza in place d/t flat bedrest.  Will check with primary team to verify that garza can still be in place, charge nurse aware.    Skin: Blanchable redness to coccyx with minor dry skin peeling, prior to admission wound. Mepilex in place. Golf ball sized mass/cyst on chest. Back incision covered with primapore, drainage marked. Hemovac to full suction.  Pain: abdomen pain with palpation, otherwise no pain.  Back pain with turning.  Declining pain meds.  Activity: Flat bedrest, okay to log roll.    Social: BrotherVerónica Whiet is designated visitor. No updates given this shift.   Plan: PEG placement sometime this admission. Thorasic surgery consult done today.  Dr. Grant from Radiation therapy will follow up with patient next Thursday in [place].

## 2021-03-03 NOTE — ANESTHESIA POSTPROCEDURE EVALUATION
Patient: Junito Wang    Procedure(s):  Lumbar 2 to Lumbar 5 Laminectomy    Diagnosis:Cauda equina syndrome (H) [G83.4]  Diagnosis Additional Information: No value filed.    Anesthesia Type:  General    Note:  Disposition: Admission   Postop Pain Control: Uneventful            Sign Out: Well controlled pain   PONV: No   Neuro/Psych: Uneventful            Sign Out: Acceptable/Baseline neuro status   Airway/Respiratory: Uneventful            Sign Out: Acceptable/Baseline resp. status   CV/Hemodynamics: Uneventful            Sign Out: Acceptable CV status   Other NRE: NONE   DID A NON-ROUTINE EVENT OCCUR? No         Last vitals:  Vitals:    03/02/21 1645 03/02/21 1700 03/02/21 1730   BP:  108/76 101/76   Pulse: 94 95 90   Resp:      Temp:      SpO2: 100% 100% 100%       Last vitals prior to Anesthesia Care Transfer:  CRNA VITALS  3/2/2021 2331 - 3/3/2021 0016      3/3/2021             Resp Rate (observed):  (!) 1          Electronically Signed By: Soy Mendoza MD  March 3, 2021  12:16 AM

## 2021-03-03 NOTE — H&P
[facility]    History and Physical - Hospitalist Service       Date of Admission:  3/2/2021    Assessment & Plan   Junito Wang is a 60-year-old male past medical history of hypertension, and now recently diagnosed esophageal adenocarcinoma following 3 to 6 months of progressive dysphagia associated with 45 pound weight loss in the last 1 year. He presented to the ED with LLE weakness found to have early subacute epidural hemorrhage and an enlarging sacral mass invading the lumbosacral plexus obliterating the L S1-S3 foramina.    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  - Consult oncology    Severe malnutrition evidenced by weight loss associated with progressive malignancy present on presentation  - Consult nutrition    Hypertension  -Hold antihypertensives given soft pressures on admission    Mild neutrophilic leukocytosis, less concerning for septic process at this time  -hold off from additional antibiotics unless change in hemodynamics or sign of infection  -follow cultures    Elevated alkaline phosphatase, mild stable, 234       Diet: NPO for Medical/Clinical Reasons Except for: Meds, Ice Chips    DVT Prophylaxis: Pneumatic Compression Devices  Andrews Catheter: in place, indication:    Code Status: Full Code      Disposition Plan   Expected discharge: 4 - 7 days, recommended to transitional care unit once recovered from surgery and  evaluated by PT / OT.  Entered: Edgardo Arana DO 03/02/2021, 11:16 PM     The patient's care was discussed with the Patient.    Edgardo Arana DO  [facility]  Contact information available via [facility] Paging/Directory  Please see sign in/sign out for up to date coverage information    ______________________________________________________________________    Chief Complaint   Lower extremity weakness.    History is obtained from the patient    History of Present Illness   60-year-old male past medical history of hypertension, and now recently diagnosed esophageal adenocarcinoma following 3 to 6 months of progressive dysphagia associated with 45 pound weight loss in the last 1 year.    Patient presented to the emergency room today after having at least 1 day of left leg weakness with difficulty walking compensated by crawling around the house.  He noted some decrease in sensation in that leg as well.  He did not have any traumatic events that led to this stating it was spontaneous.     In the emergency room he was afebrile mildly tachycardic mildly hypotensive on presentation but this resolved. He was seen by neurosurgery who felt prudent to take patient to the OR for surgical management.     At bedside he states ongoing LLE weakness for last two days that is associated with decreased mobility and makes it hard to ambulate.    Review of Systems    The 5 point Review of Systems is negative other than noted in the HPI or here.     Past Medical History    I have reviewed this patient's medical history and updated it with pertinent information if needed.   Past Medical History:   Diagnosis Date     Arthritis      Hypertension      Malignant neoplasm of lower third of esophagus (H) 3/1/2021       Past Surgical History   I have reviewed this patient's surgical history and updated it with pertinent information if needed.  Past Surgical History:   Procedure Laterality Date      ABDOMEN SURGERY  1992    fatty tissue     APPENDECTOMY       COLONOSCOPY WITH CO2 INSUFFLATION N/A 2021    Procedure: COLONOSCOPY, WITH CO2 INSUFFLATION;  Surgeon: Nain Dominguez MD;  Location: [facility] OR     COMBINED ESOPHAGOSCOPY, GASTROSCOPY, DUODENOSCOPY (EGD) WITH CO2 INSUFFLATION N/A 2021    Procedure: ESOPHAGOGASTRODUODENOSCOPY, WITH CO2 INSUFFLATION;  Surgeon: Nain Dominguez MD;  Location: [facility] OR     ESOPHAGOSCOPY, GASTROSCOPY, DUODENOSCOPY (EGD), COMBINED N/A 2021    Procedure: Esophagogastroduodenoscopy, With Biopsy;  Surgeon: Nain Dominguez MD;  Location: [facility] OR     GI SURGERY      Upper Endoscopy       Social History   I have reviewed this patient's social history and updated it with pertinent information if needed.  Social History     Tobacco Use     Smoking status: Current Every Day Smoker     Packs/day: 1.00     Years: 36.00     Pack years: 36.00     Types: Cigarettes     Smokeless tobacco: Never Used     Tobacco comment: I am still on the fence with this but may need to quit   Substance Use Topics     Alcohol use: Yes     Comment: occasional     Drug use: No       Family History   I have reviewed this patient's family history and updated it with pertinent information if needed.  Family History   Problem Relation Age of Onset     Hypertension Mother      Neurologic Disorder Mother         stroke     Hyperlipidemia Mother      Cerebrovascular Disease Mother          of stroke     Thyroid Disease Mother      Heart Disease Father      Heart Failure Father      Coronary Artery Disease Father          of heart attack     Hypertension Brother      Coronary Artery Disease Brother         Quadruple Bi-pass/Quadruple Bi-pass     Hypertension Sister      Coronary Artery Disease Sister         Stent/Stent     Hypertension Brother      Hyperlipidemia Brother      Hypertension Sister      Hyperlipidemia Sister        Prior to Admission Medications   Prior to Admission  Medications   Prescriptions Last Dose Informant Patient Reported? Taking?   MULTI-VITAMIN OR TABS   Yes No   Si TABLET DAILY   acetaminophen (TYLENOL) 500 MG tablet   Yes No   Sig: Take 500-1,000 mg by mouth every 6 hours as needed for mild pain   amLODIPine (NORVASC) 10 MG tablet   No No   Sig: TAKE ONE TABLET BY MOUTH ONE TIME DAILY    gabapentin (NEURONTIN) 250 MG/5ML solution   No No   Sig: Take 5 mLs (250 mg) by mouth At Bedtime   lisinopril (ZESTRIL) 10 MG tablet   No No   Sig: TAKE ONE TABLET BY MOUTH TWICE DAILY      Facility-Administered Medications: None     Allergies   No Known Allergies    Physical Exam   Vital Signs: Temp: 99  F (37.2  C) Temp src: Oral BP: 101/76 Pulse: 90   Resp: 20 SpO2: 100 % O2 Device: None (Room air)    Weight: 171 lbs 0 oz    General: non-distressed adult  HEENT: Normocephalic, atraumatic, pupils equal round reactive, extraocular muscles intact, membranes moist  CV: normal capillary refill, heart regular rate and rhythm  Respiratory: Non-labored breathing, bronchovesicular lung sounds  Abdominal: soft, non-tender, no rebound, no guarding, no rigidity, +bowel sounds  Genitourinary: no suprapubic tenderness  Musculoskeletal: normal bulk and tone  Skin: no rash, normal turgor  Neurologic: no facial droop, CN 2-12 intact, upper extremities 5/5 strength, RLE 5/5 strength, LLE 3-4/5 strength, sensation in tact to light touch on extremities, no cerebellar signs (no dissdiadochokinesia, normal heel to shin and finger to nose test).  Psychiatric: normal mood and affect    Data   Data reviewed today: I reviewed all medications, new labs and imaging results over the last 24 hours. I personally reviewed no images or EKG's today.    Recent Labs   Lab 21  1426 21  1142   WBC 16.0* 8.9   HGB 11.6* 12.5*   MCV 76* 78    406   INR  --  1.12     --    POTASSIUM 3.6  --    CHLORIDE 101  --    CO2 23  --    BUN 26  --    CR 0.69  --    ANIONGAP 8  --    ARDEN 9.6  --     *  --    ALBUMIN 3.2*  --    PROTTOTAL 7.5  --    BILITOTAL 0.7  --    ALKPHOS 234*  --    ALT 24  --    AST 39  --

## 2021-03-03 NOTE — ANESTHESIA PREPROCEDURE EVALUATION
Anesthesia Pre-Procedure Evaluation    Patient: Junito Wang   MRN: [id number] : 1960        Preoperative Diagnosis: Cauda equina syndrome (H) [G83.4]   Procedure : Procedure(s):  LAMINECTOMY, SPINE, LUMBAR, 3 OR MORE LEVELS, POSTERIOR APPROACH, USING MICROSCOPE     Past Medical History:   Diagnosis Date     Arthritis      Hypertension      Malignant neoplasm of lower third of esophagus (H) 3/1/2021      Past Surgical History:   Procedure Laterality Date     ABDOMEN SURGERY      fatty tissue     APPENDECTOMY       COLONOSCOPY WITH CO2 INSUFFLATION N/A 2021    Procedure: COLONOSCOPY, WITH CO2 INSUFFLATION;  Surgeon: Nain Dominguez MD;  Location: [facility]     COMBINED ESOPHAGOSCOPY, GASTROSCOPY, DUODENOSCOPY (EGD) WITH CO2 INSUFFLATION N/A 2021    Procedure: ESOPHAGOGASTRODUODENOSCOPY, WITH CO2 INSUFFLATION;  Surgeon: Nain Dominguez MD;  Location: [facility]     ESOPHAGOSCOPY, GASTROSCOPY, DUODENOSCOPY (EGD), COMBINED N/A 2021    Procedure: Esophagogastroduodenoscopy, With Biopsy;  Surgeon: Nain Dominguez MD;  Location: [facility] OR     GI SURGERY      Upper Endoscopy      No Known Allergies   Social History     Tobacco Use     Smoking status: Current Every Day Smoker     Packs/day: 1.00     Years: 36.00     Pack years: 36.00     Types: Cigarettes     Smokeless tobacco: Never Used     Tobacco comment: I am still on the fence with this but may need to quit   Substance Use Topics     Alcohol use: Yes     Comment: occasional      Wt Readings from Last 1 Encounters:   21 77.6 kg (171 lb)        Anesthesia Evaluation   Pt has had prior anesthetic. Type: General.    No history of anesthetic complications       ROS/MED HX  ENT/Pulmonary:  - neg pulmonary ROS     Neurologic: Comment: Lumbar epidural hematoma      Cardiovascular:  - neg cardiovascular ROS   (+) hypertension-----    METS/Exercise Tolerance:     Hematologic:  - neg hematologic  ROS     Musculoskeletal:  - neg  musculoskeletal ROS     GI/Hepatic:  - neg GI/hepatic ROS     Renal/Genitourinary:  - neg Renal ROS     Endo:  - neg endo ROS     Psychiatric/Substance Use:  - neg psychiatric ROS     Infectious Disease:  - neg infectious disease ROS     Malignancy:   (+) Malignancy, History of GI.    Other:  - neg other ROS          Physical Exam    Airway  airway exam normal           Respiratory Devices and Support         Dental  no notable dental history         Cardiovascular   cardiovascular exam normal          Pulmonary   pulmonary exam normal                OUTSIDE LABS:  CBC:   Lab Results   Component Value Date    WBC 16.0 (H) 03/02/2021    WBC 8.9 02/26/2021    HGB 11.6 (L) 03/02/2021    HGB 12.5 (L) 02/26/2021    HCT 37.3 (L) 03/02/2021    HCT 40.4 02/26/2021     03/02/2021     02/26/2021     BMP:   Lab Results   Component Value Date     03/02/2021     08/03/2020    POTASSIUM 3.6 03/02/2021    POTASSIUM 4.0 08/03/2020    CHLORIDE 101 03/02/2021    CHLORIDE 104 08/03/2020    CO2 23 03/02/2021    CO2 25 08/03/2020    BUN 26 03/02/2021    BUN 12 08/03/2020    CR 0.69 03/02/2021    CR 0.88 08/03/2020     (H) 03/02/2021     (H) 08/03/2020     COAGS:   Lab Results   Component Value Date    INR 1.12 02/26/2021     POC: No results found for: BGM, HCG, HCGS  HEPATIC:   Lab Results   Component Value Date    ALBUMIN 3.2 (L) 03/02/2021    PROTTOTAL 7.5 03/02/2021    ALT 24 03/02/2021    AST 39 03/02/2021    ALKPHOS 234 (H) 03/02/2021    BILITOTAL 0.7 03/02/2021     OTHER:   Lab Results   Component Value Date    A1C 5.2 06/10/2020    ARDEN 9.6 03/02/2021    TSH 1.40 03/02/2021       Anesthesia Plan    ASA Status:  3, emergent    NPO Status:  NPO Appropriate    Anesthesia Type: General.     - Airway: ETT   Induction: Intravenous.   Maintenance: Inhalation.   Techniques and Equipment:     - Lines/Monitors: 2nd IV     Consents    Anesthesia Plan(s) and associated risks, benefits, and realistic  alternatives discussed. Questions answered and patient/representative(s) expressed understanding.     - Discussed with:  Patient      - Extended Intubation/Ventilatory Support Discussed: No.      - Patient is DNR/DNI Status: No    Use of blood products discussed: No .     Postoperative Care    Pain management: IV analgesics.   PONV prophylaxis: Ondansetron (or other 5HT-3)     Comments:                Soy Mendoza MD

## 2021-03-03 NOTE — H&P
[facility]       NEUROSURGERY H&P NOTE    HPI: Junito Wang is a 60 year old male with a recent diagnosis of esophageal adenocarcinoma.  He underwent biopsy of a sacral lesion with IR on Friday.  Since then he noted progressive weakness in his left leg to the point where he needed to crawl to get around his house he also noted numbness and his entire foot and around mostly the lateral aspect of his leg below the knee.  He also noticed he started having trouble peeing since the biopsy as well.  Due to these findings he presented to the emergency department.  He also notes saddle anesthesia.  He denies any trouble with his bowels though he has not been eating well enough due to his esophageal problems so he has not been having as regular bowel movements as he usually does.  He never had any episodes of bowel incontinence.  He denies any other symptoms at this time.      PAST MEDICAL HISTORY:   Past Medical History:   Diagnosis Date     Arthritis      Hypertension      Malignant neoplasm of lower third of esophagus (H) 3/1/2021       PAST SURGICAL HISTORY:   Past Surgical History:   Procedure Laterality Date     ABDOMEN SURGERY  1992    fatty tissue     APPENDECTOMY  1987     COLONOSCOPY WITH CO2 INSUFFLATION N/A 1/26/2021    Procedure: COLONOSCOPY, WITH CO2 INSUFFLATION;  Surgeon: Nain Dominguez MD;  Location: [facility] OR     COMBINED ESOPHAGOSCOPY, GASTROSCOPY, DUODENOSCOPY (EGD) WITH CO2 INSUFFLATION N/A 1/26/2021    Procedure: ESOPHAGOGASTRODUODENOSCOPY, WITH CO2 INSUFFLATION;  Surgeon: Nain Dominguez MD;  Location:  OR     ESOPHAGOSCOPY, GASTROSCOPY, DUODENOSCOPY (EGD), COMBINED N/A 1/26/2021    Procedure: Esophagogastroduodenoscopy, With Biopsy;  Surgeon: Nain Dominguez MD;  Location:  OR     GI SURGERY      Upper Endoscopy       FAMILY HISTORY:   Family History   Problem Relation Age of Onset     Hypertension Mother      Neurologic Disorder  "Mother         stroke     Hyperlipidemia Mother      Cerebrovascular Disease Mother          of stroke     Thyroid Disease Mother      Heart Disease Father      Heart Failure Father      Coronary Artery Disease Father          of heart attack     Hypertension Brother      Coronary Artery Disease Brother         Quadruple Bi-pass/Quadruple Bi-pass     Hypertension Sister      Coronary Artery Disease Sister         Stent/Stent     Hypertension Brother      Hyperlipidemia Brother      Hypertension Sister      Hyperlipidemia Sister        SOCIAL HISTORY:   Social History     Tobacco Use     Smoking status: Current Every Day Smoker     Packs/day: 1.00     Years: 36.00     Pack years: 36.00     Types: Cigarettes     Smokeless tobacco: Never Used     Tobacco comment: I am still on the fence with this but may need to quit   Substance Use Topics     Alcohol use: Yes     Comment: occasional       MEDICATIONS:  Medications Prior to Admission   Medication Sig Dispense Refill Last Dose     acetaminophen (TYLENOL) 500 MG tablet Take 500-1,000 mg by mouth every 6 hours as needed for mild pain        amLODIPine (NORVASC) 10 MG tablet TAKE ONE TABLET BY MOUTH ONE TIME DAILY  90 tablet 2      gabapentin (NEURONTIN) 250 MG/5ML solution Take 5 mLs (250 mg) by mouth At Bedtime 470 mL 1      lisinopril (ZESTRIL) 10 MG tablet TAKE ONE TABLET BY MOUTH TWICE DAILY 180 tablet 2      MULTI-VITAMIN OR TABS 1 TABLET DAILY          Allergies:  No Known Allergies    ROS: 10 point ROS were all negative except for pertinent positives noted in my HPI.    Physical exam:   Blood pressure 101/76, pulse 90, temperature 99  F (37.2  C), temperature source Oral, resp. rate 20, height 1.778 m (5' 10\"), weight 77.6 kg (171 lb), SpO2 100 %.  CV: HR and BP as noted above  PULM: breathing comfortably  ABD: soft, non-distended  NEUROLOGIC:  -- Awake; Alert; oriented x 3  -- Follows commands briskly  -- Speech fluent, spontaneous. No aphasia or " dysarthria.  -- no gaze preference. No apparent hemineglect.  Cranial Nerves:  -- PERRL 3-2mm bilat and brisk, extraocular movements intact  -- face symmetrical, tongue midline  -- palate elevates symmetrically, uvula midline  -- hearing grossly intact bilat  -- Trapezii 5/5 strength bilat symmetric    Motor:  Normal bulk / tone; no tremor, rigidity, or bradykinesia.  No muscle wasting or fasciculations     Delt Bi Tri Hand Flexion/  Extension Iliopsoas Quadriceps Hamstrings Tibialis Anterior Gastroc    C5 C6 C7 C8/T1 L2 L3 L4-S1 L4 S1   R 5 5 5 5 5 5 5 5 5   L 5 5 5 5 5 5 4 4 2   Sensory:  Numbness present along the majority of his left leg in an L5/S1 distribution    Reflexes:  No hyperreflexia noted    Gait: Deferred    No sensation in the left perianal area.  Rectal tone intact      LABS:  Recent Labs   Lab 03/02/21  1426      POTASSIUM 3.6   CHLORIDE 101   CO2 23   ANIONGAP 8   *   BUN 26   CR 0.69   ARDEN 9.6       Recent Labs   Lab 03/02/21  1426   WBC 16.0*   RBC 4.91   HGB 11.6*   HCT 37.3*   MCV 76*   MCH 23.6*   MCHC 31.1*   RDW 17.5*          IMAGING:  Recent Results (from the past 24 hour(s))   US Lower Extremity Venous Duplex Left    Narrative    EXAMINATION: DOPPLER VENOUS ULTRASOUND OF THE LEFT LOWER EXTREMITY,  3/2/2021 3:48 PM     COMPARISON: None.    HISTORY: Left leg pain and weakness, evaluate for DVT    TECHNIQUE:  Gray-scale evaluation with compression, spectral flow, and  color Doppler assessment of the deep venous system of the left leg  from groin to knee, and then at the ankle.    FINDINGS:  In the left lower extremity, the common femoral, femoral, popliteal  and posterior tibial veins demonstrate normal compressibility and  blood flow.    The right common femoral vein was evaluated for comparison, and  demonstrates normal blood flow and waveforms, and is without evidence  of thrombus.      Impression    IMPRESSION:  No evidence left lower extremity deep venous  thrombosis.    I have personally reviewed the examination and initial interpretation  and I agree with the findings.    AKANKSHA PIMENTEL MD   MR Lumbar Spine w/o & w Contrast   Result Value    Radiologist flags Epidural hematoma causing severe spinal canal (Urgent)    Narrative    MR LUMBAR SPINE W/O & W CONTRAST 3/2/2021 6:35 PM    Provided History: Lumbar radiculopathy, cancer or infection suspected.    Comparison: CT images of recent biopsy from 2/26/2021 and PET/CT  2/12/2021    Technique: Sagittal T1, T2, STIR, axial T1 without contrast. Following  contrast administration, axial and sagittal fat-saturated T1-weighted  images were obtained.    Contrast: 7.5mL Gadavist    Findings:     Redemonstration of a large heterogeneously enhancing left sacral mass  consistent with biopsy-proven metastatic adenocarcinoma. The mass  erodes into the left sacroiliac articulation and extends into the left  presacral space and posteriorly involves the iliopsoas musculature  measuring approximately 6.3 x 4.9 x 9.5 cm, increased in size from the  PET CT where it measured approximately 5.7 x 3.8 x 7.5 cm. The mass  infiltrates the lumbosacral plexus and obliterates the left S1-S3  neural foramina.    Extending superiorly from sacral mass, there is new expansile  appearance involving the anterior and posterior epidural space from  T12 to the S1 biopsy site. This appearance demonstrates heterogeneous  T2/FLAIR hyperintensity with septations, foci of intrinsic T1  hyperintensity and predominantly heterogeneous and peripheral  enhancement. The lesion causes severe compression of the thecal sac  and severe spinal canal narrowing from L2 to S1.    Also visualized is a enhancing right iliac lesion measuring  approximately 3.0 x 1.3 cm (series 8 image 10), this was also  hypermetabolic on recent PET CT 2/12/2021.    Regarding numbering convention, there are 5 lumbar-type vertebrae  assumed for the purposes of this dictation.  The tip of  the conus  medullaris is at L1.  Regarding alignment, the lumbar vertebral column  is normally aligned.  There is mild multilevel disc narrowing.   Regarding bone marrow signal intensity, no abnormality is visualized  on STIR images      Impression    Impression:   1. New posteriorly infiltrating T2/FLAIR hyperintense appearance with  internal septations within the lumbar spinal canal causing severe  compression on the thecal sac and spinal canal from T12 down to the  sacral level. Retrospectively, there was no lesion corresponding to  this area on recent PET/CT and in the CT scan of recent biopsy. In the  setting of recent sacral mass biopsy, above mentioned imaging features  are most suggestive of an early subacute epidural hematoma. Abscess is  considered unlikely given the lack of fever and rapid onset of  findings. Metastatic infiltration of the epidural space is also  unlikely in such a short period time.    2. Mildly increased size of the large sacral mass consistent with  biopsy-proven metastatic adenocarcinoma, likely from intratumoral  hemorrhage. The tumor invades the lumbosacral plexus and obliterates  the left S1 S3 neural foramina.    [Urgent Result: Epidural hematoma causing severe spinal canal  stenosis]    Finding was identified on 3/2/2021 6:47 PM.     Dr. Gonzalez was contacted by Dr. Mosley at 3/2/2021 7:16 PM and  verbalized understanding of the urgent finding.     I have personally reviewed the examination and initial interpretation  and I agree with the findings.    NASIM WATSON MD   XR Surgery DONTRELL Fluoro L/T 5 Min w Stills    Narrative    This exam was marked as non-reportable because it will not be read by a   radiologist or a [facility] non-radiologist provider.         XR Abdomen Port 1 View    Narrative    Exam: XR ABDOMEN PORT 1 , 3/3/2021 9:10 AM    Indication: constipation, s/p laminectomy for cauda equina, no bowel  movement for 2 weeks    Comparison: PET/CT of  2/12/2021.    Findings:   Portable supine abdominal radiograph. Spinal catheter in place  overlying the spine and left abdomen, not completely included. There  is a moderate to large volume of stool mainly in the right and  transverse colon. Gas-filled nondistended colonic loops in the splenic  flexure region with gas present to the rectum. No air filled distended  small bowel loops. Mottled gas in the stomach. Laminectomy changes in  the spine noted. Please see recent MRI and PET/CT for osseous lesions,  not as well delineated on the radiograph. There is slight callus  formation surrounding right lateral ninth rib. Free air is not well  evaluated on supine imaging.      Impression    Impression: Moderate to large volume of stool consistent with  constipation. No small bowel obstruction. Postsurgical changes in the  spine with catheter in place.    AKANKSHA PIMENTEL MD       ASSESSMENT:  60 year old male with cauda equina syndrome.  Bladder noted to be distended on MRI.  He was unable to void, bladder scan showed 1100 cc.    In addition to the assessment of diagnoses detailed above, this 60 year old male  patient admitted from the Emergency Department has the following conditions contributing to the complexity of their medical care:    Severe malnutrition and Metastatic cancer,    PLAN:  Emergent OR for laminectomy and decompression  NPO  Pre-op labs  Will need oncology and radiation oncology assistance in post-operative management    The patient was discussed with Dr. Leschke, neurosurgery chief resident, and Dr. Gusman, neurosurgery staff, and they agree with the above.    Ed Bach MD  Neurosurgery Resident, PGY-2    Please contact neurosurgery resident on call with questions.    Dial * * *[phone], enter [phone] when prompted.

## 2021-03-03 NOTE — PLAN OF CARE
Status: s/p L2-L5 laminectomy d/t epidural hemmorhage and enlarging sacral pass causing severe spinal stenosis. Came up to 6A at 0330.   Vitals: Soft Bps 90s/60s. MAP maintained above 65. 2L NC. Not wearing capno d/t not capturing respirations d/t mouth breathing.   Neuros: A&Ox4. LLE 4 AOE 5. Denies N/T since surgery. Follows commands.  IV: PIV infusing NS @100ml/hr   Labs/Electrolytes: UA abnormal, cultures in progress. Coagulation labs abnormal as well.   Resp/trach: 2L NC. Denies SOB   Diet: Clear liquids. Pt unable to really swallow anything d/t esophageal cancer. Has not eaten solid foods in about 2 months.   Bowel status: BS active in LUE. All other quadrants, absent. No BM for 2 weeks. Tender on palpation to ascending colon.  : Garza. Adequate output   Skin: 2 RN skin check done. Blanchable redness to coccyx with minor dry skin peeling, prior to admission wound. Mepilex in place. Golf ball sized mass/cyst on chest. Back incision covered with primapore, drainage marked. Hemovac to full suction.   Pain: Denies.    Activity: Flat bedrest but okay to log roll. Prior to admission, was crawling because both of his legs progressively became numb.   Social: BrotherVerónica White is designated visitor. No updates given overnight  Plan: PEG placement sometime this admission, day team to decide when. Continue to follow POC.     [facility]:  [facility]  Belongings/meds:  Wallet, cell phone, clothes and shoes  2 RN Skin Assessment Completed by:  Leana DICKEY and Kellie MACK  Non-intact findings documented (yes/no/NA): Yes. Back incision covered with primapore, Hemovac, golf ball size mass on sternum, blanchable coccyx with small area that looks to be like a healed previous PI.     Notable events this shift: Pt doesn't feel constipated but has not had a BM in over 2 weeks. Tender and mass (likely stool) in abdomen is easily palpable. Paged neurosurgery to clarify if requiring garza and they said to keep it d/t retention. Suppository  entered. Will give if pharmacy verifies it prior to shift change.

## 2021-03-03 NOTE — ED NOTES
[facility]   ED Nurse to Floor Handoff     Junito Wang is a 60 year old male who speaks English and lives alone,  in a home  They arrived in the ED by car from home    ED Chief Complaint: Fatigue    ED Dx;   Final diagnoses:   Left leg weakness   Acute low back pain, unspecified back pain laterality, unspecified whether sciatica present   Malignant neoplasm of esophagus, unspecified location (H)   Leukocytosis, unspecified type   Anemia, unspecified type   Urine retention         Needed?: No    Allergies: No Known Allergies.  Past Medical Hx:   Past Medical History:   Diagnosis Date     Arthritis      Hypertension      Malignant neoplasm of lower third of esophagus (H) 3/1/2021      Baseline Mental status: WDL  Current Mental Status changes: at basesline    Infection present or suspected this encounter: yes urinary and cultures pending  Sepsis suspected: No  Isolation type: No active isolations  Patient tested for COVID 19 prior to admission: YES     Activity level - Baseline/Home:  Independent  Activity Level - Current:   Stand with Assist    Bariatric equipment needed?: No    In the ED these meds were given:   Medications   cefTRIAXone (ROCEPHIN) 1 g vial to attach to  mL bag for ADULTS or NS 50 mL bag for PEDS (has no administration in time range)   gadobutrol (GADAVIST) injection 7.5 mL (7.5 mLs Intravenous Given 3/2/21 1834)       Drips running?  No    Home pump  No    Current LDAs  Peripheral IV 01/27/21 Anterior;Right (Active)   Number of days: 34       Labs results:   Labs Ordered and Resulted from Time of ED Arrival Up to the Time of Departure from the ED   CBC WITH PLATELETS DIFFERENTIAL - Abnormal; Notable for the following components:       Result Value    WBC 16.0 (*)     Hemoglobin 11.6 (*)     Hematocrit 37.3 (*)     MCV 76 (*)     MCH 23.6 (*)     MCHC 31.1 (*)     RDW 17.5 (*)     Absolute Neutrophil 13.9 (*)     All other components  within normal limits   COMPREHENSIVE METABOLIC PANEL - Abnormal; Notable for the following components:    Glucose 104 (*)     Albumin 3.2 (*)     Alkaline Phosphatase 234 (*)     All other components within normal limits   ROUTINE UA WITH MICROSCOPIC - Abnormal; Notable for the following components:    Ketones Urine 40 (*)     Protein Albumin Urine 30 (*)     Nitrite Urine Positive (*)     Mucous Urine Present (*)     sperm Present (*)     All other components within normal limits   TSH WITH FREE T4 REFLEX   INFLUENZA A/B & SARS-COV2 PCR MULTIPLEX   PERIPHERAL IV CATHETER   CARDIAC CONTINUOUS MONITORING   IP ACUTE INDWELLING URINARY CATHETER (CARRION)   URINE CULTURE AEROBIC BACTERIAL       Imaging Studies:   Recent Results (from the past 24 hour(s))   US Lower Extremity Venous Duplex Left    Narrative    EXAMINATION: DOPPLER VENOUS ULTRASOUND OF THE LEFT LOWER EXTREMITY,  3/2/2021 3:48 PM     COMPARISON: None.    HISTORY: Left leg pain and weakness, evaluate for DVT    TECHNIQUE:  Gray-scale evaluation with compression, spectral flow, and  color Doppler assessment of the deep venous system of the left leg  from groin to knee, and then at the ankle.    FINDINGS:  In the left lower extremity, the common femoral, femoral, popliteal  and posterior tibial veins demonstrate normal compressibility and  blood flow.    The right common femoral vein was evaluated for comparison, and  demonstrates normal blood flow and waveforms, and is without evidence  of thrombus.      Impression    IMPRESSION:  No evidence left lower extremity deep venous thrombosis.    I have personally reviewed the examination and initial interpretation  and I agree with the findings.    AKANKSHA PIMENTEL MD   MR Lumbar Spine w/o & w Contrast   Result Value    Radiologist flags Epidural hematoma causing severe spinal canal (Urgent)    Narrative    MR LUMBAR SPINE W/O & W CONTRAST 3/2/2021 6:35 PM    Provided History: Lumbar radiculopathy, cancer or  infection suspected.    Comparison: CT images of recent biopsy from 2/26/2021 and PET/CT  2/12/2021    Technique: Sagittal T1, T2, STIR, axial T1 without contrast. Following  contrast administration, axial and sagittal fat-saturated T1-weighted  images were obtained.    Contrast: 7.5mL Gadavist    Findings:     Redemonstration of a large heterogeneously enhancing left sacral mass  consistent with biopsy-proven metastatic adenocarcinoma. The mass  erodes into the left sacroiliac articulation and extends into the left  presacral space and posteriorly involves the iliopsoas musculature  measuring approximately 6.3 x 4.9 x 9.5 cm, increased in size from the  PET CT where it measured approximately 5.7 x 3.8 x 7.5 cm. The mass  infiltrates the lumbosacral plexus and obliterates the left S1-S3  neural foramina.    Extending superiorly from sacral mass, there is new expansile  appearance involving the anterior and posterior epidural space from  T12 to the S1 biopsy site. This appearance demonstrates heterogeneous  T2/FLAIR hyperintensity with septations, foci of intrinsic T1  hyperintensity and predominantly heterogeneous and peripheral  enhancement. The lesion causes severe compression of the thecal sac  and severe spinal canal narrowing from L2 to S1.    Also visualized is a enhancing right iliac lesion measuring  approximately 3.0 x 1.3 cm (series 8 image 10), this was also  hypermetabolic on recent PET CT 2/12/2021.    Regarding numbering convention, there are 5 lumbar-type vertebrae  assumed for the purposes of this dictation.  The tip of the conus  medullaris is at L1.  Regarding alignment, the lumbar vertebral column  is normally aligned.  There is mild multilevel disc narrowing.   Regarding bone marrow signal intensity, no abnormality is visualized  on STIR images      Impression    Impression:   1. New posteriorly infiltrating T2/FLAIR hyperintense appearance with  internal septations within the lumbar spinal canal  "causing severe  compression on the thecal sac and spinal canal from T12 down to the  sacral level. Retrospectively, there was no lesion corresponding to  this area on recent PET/CT and in the CT scan of recent biopsy. In the  setting of recent sacral mass biopsy, above mentioned imaging features  are most suggestive of an early subacute epidural hematoma. Abscess is  considered unlikely given the lack of fever and rapid onset of  findings. Metastatic infiltration of the epidural space is also  unlikely in such a short period time.    2. Mildly increased size of the large sacral mass consistent with  biopsy-proven metastatic adenocarcinoma, likely from intratumoral  hemorrhage. The tumor invades the lumbosacral plexus and obliterates  the left S1 S3 neural foramina.    [Urgent Result: Epidural hematoma causing severe spinal canal  stenosis]    Finding was identified on 3/2/2021 6:47 PM.     Dr. Gonzalez was contacted by Dr. Mosley at 3/2/2021 7:16 PM and  verbalized understanding of the urgent finding.     I have personally reviewed the examination and initial interpretation  and I agree with the findings.    NASIM WATSON MD       Recent vital signs:   /76   Pulse 90   Temp 99  F (37.2  C) (Oral)   Resp 20   Ht 1.778 m (5' 10\")   Wt 77.6 kg (171 lb)   SpO2 100%   BMI 24.54 kg/m      Catrachita Coma Scale Score: 15 (03/02/21 1431)       Cardiac Rhythm: Other  Pt needs tele? No  Skin/wound Issues: Cyst on chest    Code Status: Full Code    Pain control: pt had none    Nausea control: pt had none    Abnormal labs/tests/findings requiring intervention: see results    Family present during ED course? No   Family Comments/Social Situation comments:     Tasks needing completion: None    Luci Wyatt, RN    [phone] [facility]      "

## 2021-03-03 NOTE — OP NOTE
OPERATION REPORT    DATE OF PROCEDURE 3/2/2021     STAFF SURGEON:   Soy Gusman MD      RESIDENT SURGEON:  John (Jack) Leschke, MD     PREOPERATIVE DIAGNOSIS:    Cauda equina syndrome   Lumbar spinal stenosis   Epidural metastatic disease     POSTOPERATIVE DIAGNOSIS: same      PROCEDURES PERFORMED:    Lumbar decomrpession L2-5 for for resection of metastatic disease     FINDINGS:  diffuse epidural tumor with erosion of caudal dura at L5-S1. Well decompressed. Dural defect replaced with muscle graft and duraseal.     ESTIMATED BLOOD LOSS:  100cc     INDICATIONS FOR OPERATION:  The patient is a 60 year old with known esophageal adenocarcinoma who presented with <24h of inability to ambulate with acute onset left leg weakness/urinary retention with perianal sensory deficits. MRI showed extensive epidural signal change suggestive of tumor vs. Hemorrhage. Emergent surgery was offered in a attempt to preserve functional ability. The risks, benefits and alternatives were discussed with the patient and written consent was obtained for the above operation.      DESCRIPTION OF PROCEDURE:  The patient was brought to the operating room where general endotracheal anesthesia was induced.  IV access obtained.  The patient was positioned prone on the Patrick frame. All pressure points were padded. The incision was planned and prepped and draped in a standard fashion after localization with a cross table xray.  Perioperative antibiotics were administered.      After conducting appropriate time-out, 10cc of lidocaine with epinephrine was infiltrated in the planned incision extending from L2-5.     The incision was opened with a #10 blade and subperiosteal dissection was carried out with Monopolar cautery. Hemostasis was achieved with use of the bipolar cautery. Dissection was carried out across the laminae at L2-5 and a repeat xray confirmed our level.     The laminectomy was then performed. The high-speed drill was used to thin  the gutters in the lamina until the ligamentum flavum could be unroofed. The B1 with the footplate was used to detach each lamina sequentially. A combination of curettes and Kerrison instruments were used to methodically remove ligamentum flavum and expose the dura.      The dura was unroofed and several areas of abnormal discolored tissue were seen compressing the cord. These were removed with currettes and penfield instruments. The dura was well decompressed after these maneuvers. The tumor had clearly eroded the dura caudally therefore a muscle patch graft was placed over its surface and duraseal was injected above it.     A medium hemovac was placed in the epidural space and tunneled beneath the fascia.      The cavity was irrigated and hemostasis was achieved.      Closure was performed with 0 vicryl in the fascia, 2-0 vicryl for the subcutaneous layer and 3-0 Monocryl for the skin.      The wound was cleaned and a sterile dressing was applied.       The patient was transferred to the postanesthesia care unit.       Counts were correct and there were no complications during the procedure.     Dr. Gusman was present during the operation        John Leschke, MD  Resident Physician   Neurosurgery Service

## 2021-03-03 NOTE — ANESTHESIA CARE TRANSFER NOTE
Patient: Junito Wang    Procedure(s):  Lumbar 2 to Lumbar 5 Laminectomy    Diagnosis: Cauda equina syndrome (H) [G83.4]  Diagnosis Additional Information: No value filed.    Anesthesia Type:   No value filed.     Note:    Oropharynx: oropharynx clear of all foreign objects  Level of Consciousness: awake  Oxygen Supplementation: nasal cannula    Independent Airway: airway patency satisfactory and stable  Dentition: dentition unchanged  Vital Signs Stable: post-procedure vital signs reviewed and stable  Report to RN Given: handoff report given  Patient transferred to: PACU  Comments: VSS. Patient comfortable. All questions answered to RN.   Handoff Report: Identifed the Patient, Identified the Reponsible Provider, Reviewed the pertinent medical history, Discussed the surgical course, Reviewed Intra-OP anesthesia mangement and issues during anesthesia, Set expectations for post-procedure period and Allowed opportunity for questions and acknowledgement of understanding      Vitals: (Last set prior to Anesthesia Care Transfer)  CRNA VITALS  3/2/2021 2331 - 3/3/2021 0010      3/3/2021             Resp Rate (observed):  (!) 1        Electronically Signed By: Heriberto Jones MD  March 3, 2021  12:10 AM

## 2021-03-03 NOTE — CONSULTS
Oncology  Consult Note   Date of Service: 03/03/2021    Patient: Junito Wang  MRN: [id number]  Admission Date: 3/2/2021  Hospital Day # 1  Cancer Diagnosis: Metastatic esophageal adenocarcinoma   Primary Outpatient Oncologist: Dr. Steven  Current Treatment Plan: Treatment regimen pending     Reason for Consult: Esophageal cancer with spinal metastasis      History of Present Illness:    Junito Wang is a 60 year old man with a history of metastatic esophageal adenocarcinoma diagnosed on esophageal biopsy 1/26/2021. He first noted some epigastric pain and mild difficulty with eating in Fall 2020. His primary care provider prescribed him a PPI and scheduled him for an EGD and colonoscopy. By the time the endoscopy was schedule in November 2020, the patient's stomach was feeling much better and then his left leg started to bother him, so he cancelled the endoscopy appointment. He underwent EGD and colonoscopy on 1/26/21 which showed partially obstructing esophageal tumor which was biopsied and showed moderately differentiated adenocarcinoma. He was seen in clinic (video visit) with Dr. Steven on 2/8/21 to establish care. At that time, he was referred for hip biopsy and PET/CT. PET/CT revealed concern for brain metastasis, so MRI Brain was ordered. Hip biopsy was completed on 2/26/21, and pathology showed adenocarcinoma consistent with esophageal primary. Mr. Wang states that since his hip biopsy, he has had increasing back pain and developed left leg weakness and decreased sensation on 3/1, to the point that he had been crawling on his floor to get around his house. He presented to the ED on 3/2 and was found to have cauda equina syndrome. He was taken to OR overnight with Neurosurgery for laminectomy and decompression.     This morning he states that he is feeling much better. He is able to lift his left leg, and most of his sensation has returned. He does report some pain this morning associated with a  "having a XR plate placed under his back. He has not been able to pass stool. He does report an area of decreased sensation remains on his left outer hip/buttock area.       Oncologic History:  Per Dr. Steven's most recent outpatient clinic note on 2/8/21:  \"August 2020- stomach discomfort, belching   October 2020- progressive dysphagia with solids   1/26/21- distal esophageal biopsy shows Moderately differentiated adenocarcinoma; MMR normal expression, PDL1 expression is low with TPS 2%, CPS 5-10, Her2 is pending  1/27/21- CT CAP- Enlarged  2 cm subcarinal lymph node and 1.4 cm short axis right subcarinal lymph node, focal thickening of the superior wall along the right side of the mid esophagus. Numerous small pulmonary nodules, 3 mm nodule in the superior segment of the left lower lobe, 3 mm nodule in the anterior aspect of the right upper lobe , 4 mm nodule in the medial aspect of the right upper lobe and 4 mm right lower lobe nodule. Hypoattenuating foci in the liver, 1.6 cm hypoattenuating/hypoenhancing lesion in hepatic segment 8 and 1.4 cm lesion in hepatic segment 5 , indeterminate, likely metastatic.  The prostate gland is mildly enlarged measuring 5.3 cm in transverse diameter. Multiple prominent but not significantly enlarged retroperitoneal lymph nodes, indeterminate, could be reactive. 4.4 x 4.3 cm lytic lesion centered in the posterior aspect of the left sacral ala (series 3 image 243), 4.7 x 4.0 x 5.6 cm (axial and CC dimensions, respectively) hypoattenuating lesion in the subcutaneous tissue of the anterior chest wall just anterior to the mediastinum, indeterminate, could represent sebaceous cyst.  2/4/21- Saw Dr. Bourgeois- who ordered PET/CT\"  2/12/21: PET/CT showed hypermetabolic nodules in the left temporal lobe and a right level 4A lymph node, as well as multiple hypermetabolic mediastinal lymph nodes which are similar to slightly increased from comparison on 1/26/21, and and hypermetabolic masses " (range of 0.6 to 1.8 cm diameter) in the liver. Additionally there are hypermetabolic lytic mass (5.7 x 3.8 x 7.5 cm) in the left sacrum extending into the left presacral space and obliterating the left S1-3 neural foramina with likely mass effect on exiting nerves of S4. This mass erodes into the left sacroiliac articulation. Additionally, there is a lytic hypermetabolic mass (1.7 x 0.9 cm)within the posterior right iliac bone near the right sacroiliac joint and another mass (2.1 x 1.6 cm) in the proximal right humerus.   2/25/21: MRI Brain shows enhancing lesion of left choroid plexus (1.9 x 1.4 x 1.2 cm), without mass effect or midline shift  2/26/21: CT guided biopsy of left sacral mass-- 4 core biopsies -- pathology consistent with metastatic adenocarcinoma, consistent with known esophageal carcinoma.   3/2/21: Subacute epidural hematoma seen on MRI Lumbar spine 3/2/21--concerning for complication of sacral biopsy based on imaging. Patient taken to OR emergently with findings of epidural tumor with dural invasion at L5-S1, dural defect repaired with muscle graft and duraseal.     No history of blood clots or anticoagulation.    Review of Systems: Pertinent positive and negative systems described in HPI; the remainder of the 14 systems are negative    Past Medical History:  Past Medical History:   Diagnosis Date     Arthritis      Hypertension      Malignant neoplasm of lower third of esophagus (H) 3/1/2021       Past Surgical History:  Past Surgical History:   Procedure Laterality Date     ABDOMEN SURGERY  1992    fatty tissue     APPENDECTOMY  1987     COLONOSCOPY WITH CO2 INSUFFLATION N/A 1/26/2021    Procedure: COLONOSCOPY, WITH CO2 INSUFFLATION;  Surgeon: Nain Dominguez MD;  Location:  OR     COMBINED ESOPHAGOSCOPY, GASTROSCOPY, DUODENOSCOPY (EGD) WITH CO2 INSUFFLATION N/A 1/26/2021    Procedure: ESOPHAGOGASTRODUODENOSCOPY, WITH CO2 INSUFFLATION;  Surgeon: Nain Dominguez MD;  Location:   OR     ESOPHAGOSCOPY, GASTROSCOPY, DUODENOSCOPY (EGD), COMBINED N/A 2021    Procedure: Esophagogastroduodenoscopy, With Biopsy;  Surgeon: Nain Dominguez MD;  Location:  OR     GI SURGERY      Upper Endoscopy       Social History:  Social History     Socioeconomic History     Marital status: Single     Spouse name: None     Number of children: None     Years of education: None     Highest education level: None   Occupational History     None   Social Needs     Financial resource strain: None     Food insecurity     Worry: None     Inability: None     Transportation needs     Medical: None     Non-medical: None   Tobacco Use     Smoking status: Current Every Day Smoker     Packs/day: 1.00     Years: 36.00     Pack years: 36.00     Types: Cigarettes     Smokeless tobacco: Never Used     Tobacco comment: I am still on the fence with this but may need to quit   Substance and Sexual Activity     Alcohol use: Yes     Comment: occasional     Drug use: No     Sexual activity: Never   Lifestyle     Physical activity     Days per week: None     Minutes per session: None     Stress: None   Relationships     Social connections     Talks on phone: None     Gets together: None     Attends [employer] service: None     Active member of club or organization: None     Attends meetings of clubs or organizations: None     Relationship status: None     Intimate partner violence     Fear of current or ex partner: None     Emotionally abused: None     Physically abused: None     Forced sexual activity: None   Other Topics Concern     Parent/sibling w/ CABG, MI or angioplasty before 65F 55M? Yes     Comment: Father   Social History Narrative     None        Family History  Family History   Problem Relation Age of Onset     Hypertension Mother      Neurologic Disorder Mother         stroke     Hyperlipidemia Mother      Cerebrovascular Disease Mother          of stroke     Thyroid Disease Mother      Heart Disease Father   "    Heart Failure Father      Coronary Artery Disease Father          of heart attack     Hypertension Brother      Coronary Artery Disease Brother         Quadruple Bi-pass/Quadruple Bi-pass     Hypertension Sister      Coronary Artery Disease Sister         Stent/Stent     Hypertension Brother      Hyperlipidemia Brother      Hypertension Sister      Hyperlipidemia Sister        Outpatient Medications:  No current facility-administered medications on file prior to encounter.   acetaminophen (TYLENOL) 500 MG tablet, Take 500-1,000 mg by mouth every 6 hours as needed for mild pain  amLODIPine (NORVASC) 10 MG tablet, TAKE ONE TABLET BY MOUTH ONE TIME DAILY   gabapentin (NEURONTIN) 250 MG/5ML solution, Take 5 mLs (250 mg) by mouth At Bedtime  lisinopril (ZESTRIL) 10 MG tablet, TAKE ONE TABLET BY MOUTH TWICE DAILY  MULTI-VITAMIN OR TABS, 1 TABLET DAILY         Physical Exam:    BP 93/68   Pulse 71   Temp 95.8  F (35.4  C) (Oral)   Resp 18   Ht 1.778 m (5' 10\")   Wt 77.6 kg (171 lb)   SpO2 99%   BMI 24.54 kg/m    Gen: Well appearing, in NAD  HEENT: EOMI, PERRL, mmm, oropharynx clear  CV: Normal rate, regular rhythm. No m/r/g  Pulm: CTAB, no wheezing, normal work of breathing  Abd: Soft, non-distended, non-tender, bowel sounds present  Ext: Warm and well perfused. No lower extremity edema  Skin: No rash, cyanosis or petechial lesion  Neuro: Alert and answering questions appropriately. No facial asymmetry. Moving all extremities. Able to lift either leg against gravity and against slight resistance bilaterally with equal strength. Dorsiflexion of left foot slightly weaker than right foot. Sensation intact at bilateral ankles, knees, thighs. Does report area of decreased sensation on left lateral hip.     Labs & Studies: I personally reviewed the following studies:  ROUTINE LABS (Last four results):  CMP  Recent Labs   Lab 21  0537 21  1426    133   POTASSIUM 4.2  4.2 3.6   CHLORIDE 109 101 "   CO2 24 23   ANIONGAP 6 8   * 104*   BUN 25 26   CR 0.57* 0.69   GFRESTIMATED >90 >90   GFRESTBLACK >90 >90   ARDEN 8.6 9.6   MAG 2.4*  --    PHOS 3.2  --    PROTTOTAL 6.0* 7.5   ALBUMIN 2.4* 3.2*   BILITOTAL 0.9 0.7   ALKPHOS 173* 234*   AST 37 39   ALT 29 24     CBC  Recent Labs   Lab 03/03/21  0537 03/02/21  1426 02/26/21  1142   WBC 10.7 16.0* 8.9   RBC 4.05* 4.91 5.17   HGB 9.5* 11.6* 12.5*   HCT 31.1* 37.3* 40.4   MCV 77* 76* 78   MCH 23.5* 23.6* 24.2*   MCHC 30.5* 31.1* 30.9*   RDW 17.2* 17.5* 16.7*    424 406     INR  Recent Labs   Lab 03/03/21  0537 03/02/21  2341 02/26/21  1142   INR 1.37* 1.32* 1.12       MRI Brain 2/25/21:  Impression: Enhancing lesion in the left choroid plexus adjacent to the medial left temporal lobe, measuring 1.9 x 1.4 x 1.2 cm, concerning for metastasis.    PET/CT 2/26/21:  IMPRESSION: In this patient with a new diagnosis of adenocarcinoma of the esophagus:  1. Hypermetabolic distal esophageal mass extending into the gastroesophageal junction and gastric cardia consistent with the biopsy-proven adenocarcinoma.  2. Evidence of stage IVb metastatic disease:  2a. Hypermetabolic metastatic adenopathy in the neck, mediastinum, and upper abdomen.  2b. Hypermetabolic intrahepatic metastases.  2c. Hypermetabolic intracranial nodule in the left temporal lobe, likely metastatic. Recommend further evaluation with MRI brain with contrast.   2d. Hypermetabolic intraosseous lesions in the pelvis, including large sacral mass obliterating the left S1-3 neural foramina.  3. Unchanged sub-6 mm pulmonary nodules, indeterminant, cannot exclude metastases.    MRI Lumbar spine 3/2/21:  Impression:   1. New posteriorly infiltrating T2/FLAIR hyperintense appearance with internal septations within the lumbar spinal canal causing severe compression on the thecal sac and spinal canal from T12 down to the sacral level. Retrospectively, there was no lesion corresponding to this area on recent  PET/CT and in the CT scan of recent biopsy. In the setting of recent sacral mass biopsy, above mentioned imaging features are most suggestive of an early subacute epidural hematoma. Abscess is considered unlikely given the lack of fever and rapid onset of findings. Metastatic infiltration of the epidural space is also unlikely in such a short period time.      2. Mildly increased size of the large sacral mass consistent with biopsy-proven metastatic adenocarcinoma, likely from intratumoral hemorrhage. The tumor invades the lumbosacral plexus and obliterates the left S1 S3 neural foramina.     Assessment & Plan:   Juntio Wang is a 60 year old man with stage IV esophageal adenocarcinoma with metastasis to mediastinal lymph nodes, brain, left hip with spinal canal invasion.     Stage IV esophageal adenocarcinoma   Given progression of disease, goal of treatment would be to prolong life and reduce symptoms rather than curative. Per most recent outpatient note, given that cancer has been confirmed stage 4, no role for surgical resection, and will be planning for chemotherapy with possible radiation therapy for symptom control as an outpatient.     Recommendations:   - Appreciate Neurosurgery primary team cares regarding cauda equina syndrome  - Patient has PEG tube placement schedule for 3/5/21--this will be important to have placed prior to beginning chemotherapy--please let us know if there is a contraindication to PEG tube placement in the setting of this recent neurosurgery.     We will continue to follow this patient. Please do not hesitate to page with any questions or concerns.    Patient was seen and plan of care was discussed with attending physician Dr. Garrison.    Debbie Quijano MD  Internal Medicine, PGY-2  Pager: [phone]    Physician Attestation   I, Flor Garrison MD, saw this patient with the resident and agree with the resident/fellow's findings and plan of care as documented in  the note.      I personally reviewed vital signs, medications, labs and imaging.    Key findings: 59 yo male with esophageal adenocarcinoma metastatic to lymph nodes, liver, brain and bone admitted with cord compression at the levels of T12 through the sacrum, he is now s/p lumbar decompression and resection of metastatic disease at L2-L5.  On exam today continues to have LLE weakness.  He has been seen by radiation oncology and radiation in approximately 1 month advised.  We discussed with patient plan for palliative chemotherapy +/- immunotherapy.  Discussed in cases of significant dysphagia will also recommend palliative radiation to the esophagus.  He expresses current dysphagia and is awaiting PEG tube placement.    Flor Garrison MD  Date of Service (when I saw the patient): 03/03/21    90 minutes spent on the date of the encounter doing chart review, review of outside records, review of test results, interpretation of tests, patient visit, documentation and discussion with other provider(s).

## 2021-03-03 NOTE — OR NURSING
"Text page sent to Dr. Ed Bach, on call neurosurgery resident, stating:    \"Please call ROBERT Mai RN, re: Martita and activity restriction clarification. Thank you [phone]\"     @[id number] - Dr. Bach returned page and states patient is to remain FLAT and ok to log roll.  "

## 2021-03-03 NOTE — PLAN OF CARE
OT 6A: Cancel, pt not yet appropriate, on bedrest orders. OT will initiate once pt is no longer on bedrest.

## 2021-03-03 NOTE — OR NURSING
Dr. Ed Bach, on call neurosurgery resident, at bedside to assess patient. Patient had questions regarding PEG tube insertion that was suppose to be planned for today (3/3/2021) and also medications in pill form. Per Dr. Bach, the team will need to discuss PEG tube placement and decide if it should be delayed a day or two. Writing RN notified Dr. Bach, that patient has not been able to swallow any pills so it is possible home medications will need to be switched to liquid/IV form.

## 2021-03-03 NOTE — PROVIDER NOTIFICATION
No BM result from pink lady enema.  Patient is not taking any pills orally, has pain after swallowing from esophageal CA.  Lauren Sigala from Neurosurgery was notified.

## 2021-03-03 NOTE — ANESTHESIA PROCEDURE NOTES
Airway   Date/Time: 3/2/2021 9:39 PM   Patient location during procedure: OR  Staff -   Anesthesiologist:  Soy Mendoza MD  Resident/Fellow: Heriberto Jones MD  Performed By: resident    Indications and Patient Condition  Indications for airway management: pro-procedural  Induction type:RSIMask difficulty assessment: 1 - vent by mask    Final Airway Details  Final airway type: endotracheal airway  Successful airway:ETT - single  Endotracheal Airway Details   ETT size (mm): 7.5  Cuffed: yes  Successful intubation technique: direct laryngoscopy  Grade View of Cords: 1  Adjucts: stylet  Measured from: gums/teeth  Secured at (cm): 21  Secured with: pink tape (tegaderm)  Bite block used: None    Post intubation assessment   Placement verified by: capnometry and equal breath sounds   Number of attempts at approach: 1  Number of other approaches attempted: 0  Secured with:pink tape (tegaderm)  Ease of procedure: easy  Dentition: Intact

## 2021-03-03 NOTE — PROCEDURES
[facility]    Double Lumen PICC Placement    Date/Time: 3/3/2021 5:56 PM  Performed by: Macey Francisco RN  Authorized by: Soy Gusman MD   Indications: vascular access    UNIVERSAL PROTOCOL   Site Marked: Yes  Prior Images Obtained and Reviewed:  Yes  Required items: Required blood products, implants, devices and special equipment available    Patient identity confirmed:  Verbally with patient and arm band  NA - No sedation, light sedation, or local anesthesia  Confirmation Checklist:  Patient's identity using two indicators, relevant allergies, procedure was appropriate and matched the consent or emergent situation and correct equipment/implants were available  Time out: Immediately prior to the procedure a time out was called    Universal Protocol: the Joint Commission Universal Protocol was followed    Preparation: Patient was prepped and draped in usual sterile fashion           ANESTHESIA    Local Anesthetic: Lidocaine 1% without epinephrine  Anesthetic Total (mL):  1      SEDATION    Patient Sedated: No        Preparation: skin prepped with ChloraPrep  Skin prep agent: skin prep agent completely dried prior to procedure  Sterile barriers: maximum sterile barriers were used: cap, mask, sterile gown, sterile gloves, and large sterile sheet  Hand hygiene: hand hygiene performed prior to central venous catheter insertion  Type of line used: Power PICC  Catheter type: double lumen  Lumen type: non-valved  Catheter size: 5 Fr  Brand: Bard  Lot number: [id number]  Placement method: venipuncture, MST, ultrasound and tip confirmation system  Number of attempts: 1  Successful placement: yes  Orientation: right  Location: brachial vein (medial) (Vein Diameter 0.67)  Arm circumference: adults 10 cm  Extremity circumference: 26  Visible catheter length: 3  Total catheter length: 41  Dressing and securement: blood cleaned with CHG, blood removed, chlorhexidine disc  applied, site cleaned, statlock and sterile dressing applied  Post procedure assessment: blood return through all ports, free fluid flow and placement verified by x-ray  PROCEDURE   Patient Tolerance:  Patient tolerated the procedure well with no immediate complications

## 2021-03-04 ENCOUNTER — APPOINTMENT (OUTPATIENT)
Dept: GENERAL RADIOLOGY | Facility: CLINIC | Age: 61
End: 2021-03-04
Attending: NURSE PRACTITIONER
Payer: COMMERCIAL

## 2021-03-04 LAB
ALBUMIN SERPL-MCNC: 2.2 G/DL (ref 3.4–5)
ALP SERPL-CCNC: 157 U/L (ref 40–150)
ALT SERPL W P-5'-P-CCNC: 53 U/L (ref 0–70)
ANION GAP SERPL CALCULATED.3IONS-SCNC: 5 MMOL/L (ref 3–14)
AST SERPL W P-5'-P-CCNC: 53 U/L (ref 0–45)
BILIRUB DIRECT SERPL-MCNC: 0.1 MG/DL (ref 0–0.2)
BILIRUB SERPL-MCNC: 0.3 MG/DL (ref 0.2–1.3)
BUN SERPL-MCNC: 21 MG/DL (ref 7–30)
CALCIUM SERPL-MCNC: 8.5 MG/DL (ref 8.5–10.1)
CHLORIDE SERPL-SCNC: 108 MMOL/L (ref 94–109)
CO2 SERPL-SCNC: 24 MMOL/L (ref 20–32)
CREAT SERPL-MCNC: 0.51 MG/DL (ref 0.66–1.25)
ERYTHROCYTE [DISTWIDTH] IN BLOOD BY AUTOMATED COUNT: 17.3 % (ref 10–15)
GFR SERPL CREATININE-BSD FRML MDRD: >90 ML/MIN/{1.73_M2}
GLUCOSE SERPL-MCNC: 120 MG/DL (ref 70–99)
HCT VFR BLD AUTO: 30.5 % (ref 40–53)
HGB BLD-MCNC: 9.5 G/DL (ref 13.3–17.7)
INR PPP: 1.59 (ref 0.86–1.14)
MAGNESIUM SERPL-MCNC: 2.6 MG/DL (ref 1.6–2.3)
MCH RBC QN AUTO: 24.4 PG (ref 26.5–33)
MCHC RBC AUTO-ENTMCNC: 31.1 G/DL (ref 31.5–36.5)
MCV RBC AUTO: 78 FL (ref 78–100)
PHOSPHATE SERPL-MCNC: 1.9 MG/DL (ref 2.5–4.5)
PHOSPHATE SERPL-MCNC: 2.2 MG/DL (ref 2.5–4.5)
PLATELET # BLD AUTO: 323 10E9/L (ref 150–450)
POTASSIUM SERPL-SCNC: 3.6 MMOL/L (ref 3.4–5.3)
PROT SERPL-MCNC: 5.8 G/DL (ref 6.8–8.8)
RBC # BLD AUTO: 3.89 10E12/L (ref 4.4–5.9)
SODIUM SERPL-SCNC: 137 MMOL/L (ref 133–144)
WBC # BLD AUTO: 10.9 10E9/L (ref 4–11)

## 2021-03-04 PROCEDURE — 250N000009 HC RX 250: Performed by: NEUROLOGICAL SURGERY

## 2021-03-04 PROCEDURE — 80053 COMPREHEN METABOLIC PANEL: CPT | Performed by: NEUROLOGICAL SURGERY

## 2021-03-04 PROCEDURE — 36592 COLLECT BLOOD FROM PICC: CPT | Performed by: THORACIC SURGERY (CARDIOTHORACIC VASCULAR SURGERY)

## 2021-03-04 PROCEDURE — 85027 COMPLETE CBC AUTOMATED: CPT | Performed by: NEUROLOGICAL SURGERY

## 2021-03-04 PROCEDURE — 120N000002 HC R&B MED SURG/OB UMMC

## 2021-03-04 PROCEDURE — 99407 BEHAV CHNG SMOKING > 10 MIN: CPT

## 2021-03-04 PROCEDURE — 83735 ASSAY OF MAGNESIUM: CPT | Performed by: NEUROLOGICAL SURGERY

## 2021-03-04 PROCEDURE — 84100 ASSAY OF PHOSPHORUS: CPT | Performed by: NEUROLOGICAL SURGERY

## 2021-03-04 PROCEDURE — 250N000013 HC RX MED GY IP 250 OP 250 PS 637: Performed by: STUDENT IN AN ORGANIZED HEALTH CARE EDUCATION/TRAINING PROGRAM

## 2021-03-04 PROCEDURE — 250N000011 HC RX IP 250 OP 636: Performed by: STUDENT IN AN ORGANIZED HEALTH CARE EDUCATION/TRAINING PROGRAM

## 2021-03-04 PROCEDURE — 999N000004 HC CANCELLED SURGERY UP TO 46-60 MINS: Performed by: THORACIC SURGERY (CARDIOTHORACIC VASCULAR SURGERY)

## 2021-03-04 PROCEDURE — 999N000033 HC STATISTIC CHRONIC PULMONARY DISEASE SPECIALIST

## 2021-03-04 PROCEDURE — 250N000013 HC RX MED GY IP 250 OP 250 PS 637: Performed by: NEUROLOGICAL SURGERY

## 2021-03-04 PROCEDURE — 258N000003 HC RX IP 258 OP 636: Performed by: THORACIC SURGERY (CARDIOTHORACIC VASCULAR SURGERY)

## 2021-03-04 PROCEDURE — 999N000141 HC STATISTIC PRE-PROCEDURE NURSING ASSESSMENT: Performed by: THORACIC SURGERY (CARDIOTHORACIC VASCULAR SURGERY)

## 2021-03-04 PROCEDURE — 258N000003 HC RX IP 258 OP 636: Performed by: STUDENT IN AN ORGANIZED HEALTH CARE EDUCATION/TRAINING PROGRAM

## 2021-03-04 PROCEDURE — 99233 SBSQ HOSP IP/OBS HIGH 50: CPT | Mod: GC | Performed by: INTERNAL MEDICINE

## 2021-03-04 PROCEDURE — 82248 BILIRUBIN DIRECT: CPT | Performed by: NEUROLOGICAL SURGERY

## 2021-03-04 PROCEDURE — 250N000011 HC RX IP 250 OP 636: Performed by: NURSE PRACTITIONER

## 2021-03-04 PROCEDURE — 250N000009 HC RX 250: Performed by: THORACIC SURGERY (CARDIOTHORACIC VASCULAR SURGERY)

## 2021-03-04 PROCEDURE — 74018 RADEX ABDOMEN 1 VIEW: CPT | Mod: 26 | Performed by: RADIOLOGY

## 2021-03-04 PROCEDURE — 36415 COLL VENOUS BLD VENIPUNCTURE: CPT | Performed by: NEUROLOGICAL SURGERY

## 2021-03-04 PROCEDURE — 84100 ASSAY OF PHOSPHORUS: CPT | Performed by: THORACIC SURGERY (CARDIOTHORACIC VASCULAR SURGERY)

## 2021-03-04 PROCEDURE — 85610 PROTHROMBIN TIME: CPT | Performed by: NEUROLOGICAL SURGERY

## 2021-03-04 PROCEDURE — 74018 RADEX ABDOMEN 1 VIEW: CPT

## 2021-03-04 PROCEDURE — 250N000013 HC RX MED GY IP 250 OP 250 PS 637: Performed by: NURSE PRACTITIONER

## 2021-03-04 RX ORDER — NICOTINE 21 MG/24HR
1 PATCH, TRANSDERMAL 24 HOURS TRANSDERMAL DAILY
Status: DISCONTINUED | OUTPATIENT
Start: 2021-03-04 | End: 2021-03-04

## 2021-03-04 RX ORDER — BISACODYL 10 MG
10 SUPPOSITORY, RECTAL RECTAL DAILY
Status: DISCONTINUED | OUTPATIENT
Start: 2021-03-05 | End: 2021-03-10 | Stop reason: HOSPADM

## 2021-03-04 RX ORDER — NICOTINE 21 MG/24HR
1 PATCH, TRANSDERMAL 24 HOURS TRANSDERMAL DAILY
Status: DISCONTINUED | OUTPATIENT
Start: 2021-03-04 | End: 2021-03-10 | Stop reason: HOSPADM

## 2021-03-04 RX ADMIN — I.V. FAT EMULSION 250 ML: 20 EMULSION INTRAVENOUS at 22:09

## 2021-03-04 RX ADMIN — FAMOTIDINE 20 MG: 40 POWDER, FOR SUSPENSION ORAL at 08:57

## 2021-03-04 RX ADMIN — BISACODYL 10 MG: 10 SUPPOSITORY RECTAL at 08:57

## 2021-03-04 RX ADMIN — FAMOTIDINE 20 MG: 40 POWDER, FOR SUSPENSION ORAL at 21:48

## 2021-03-04 RX ADMIN — SODIUM PHOSPHATE, MONOBASIC, MONOHYDRATE 9 MMOL: 276; 142 INJECTION, SOLUTION INTRAVENOUS at 11:20

## 2021-03-04 RX ADMIN — Medication 5 ML: at 09:37

## 2021-03-04 RX ADMIN — PHYTONADIONE 2 MG: 10 INJECTION, EMULSION INTRAMUSCULAR; INTRAVENOUS; SUBCUTANEOUS at 12:37

## 2021-03-04 RX ADMIN — GABAPENTIN 250 MG: 250 SUSPENSION ORAL at 21:48

## 2021-03-04 RX ADMIN — SENNOSIDES A AND B 15 ML: 415.36 LIQUID ORAL at 21:50

## 2021-03-04 RX ADMIN — DOCUSATE SODIUM 286 ML: 50 LIQUID ORAL at 15:07

## 2021-03-04 RX ADMIN — POLYETHYLENE GLYCOL 3350 17 G: 17 POWDER, FOR SOLUTION ORAL at 08:56

## 2021-03-04 RX ADMIN — Medication: at 22:09

## 2021-03-04 RX ADMIN — Medication 1 PATCH: at 18:45

## 2021-03-04 ASSESSMENT — ACTIVITIES OF DAILY LIVING (ADL)
ADLS_ACUITY_SCORE: 21
ADLS_ACUITY_SCORE: 21
ADLS_ACUITY_SCORE: 19
ADLS_ACUITY_SCORE: 21
ADLS_ACUITY_SCORE: 21

## 2021-03-04 NOTE — PLAN OF CARE
S/p L2-L5 laminectomy for metastatic neoplasm. Hx malignant neoplasm of esophagus. Back incision covered, dressing marked no change. Accordion drain to suction patent. Neruos: LLE 4/5 with numbness improving. VSS on RA. Tylenol for pain, patient states pain minimal. PICC red line TPN with lipids, purple line HL. NPO ex. meds, has some difficulty/pain with swallowing. No BM since 2/16, CT shows stool burden. Available BM medication given. Had 1 medium mucus BM. RLQ abdomin firm and  tender, BS active. Andrews patent. Strict flat bedrest. Assist with reposition q2h. Plan PEG  placement in near  future. Continue to monitor.

## 2021-03-04 NOTE — PLAN OF CARE
S/p L2-L5 laminectomy for metastatic neoplasm. Back incision covered, dressing marked no change. Accordion drain to suction patent; drainage serosanguinous this shift. Neruos: LLE 4/5 with numbness improving. VSS. Denies pain this shift. R DL PICC red line TPN with lipids, purple line TKO.  NPO ex. meds, has some difficulty/pain with swallowing; pain to stomach and problems with regurgitation with swallowing only small amts; plan for swallow eval tomorrow.  No BM since 2/16, CT shows stool burden; RLQ abdomin firm and  tender, BS active. Available BM medication given as well as pink lady enema- with no results as of yet. Andrews patent. Strict flat bedrest until this evening. Assist with reposition q2h. Continue to monitor.

## 2021-03-04 NOTE — PROGRESS NOTES
[facility], [place]   03/04/2021  Neurosurgery Progress Note:    Assessment:  Junito Wang is a 60 year old male with esophageal cancer who presented with Cauda Equina due to tumor compression. He is no s/p laminectomy and decompression. Exam improving after surgery.    Plan:  - Serial neuro exams  - Pain control  - Hemovac  - Flat for 48 hours  - Advance diet as tolerated  - Appreciate onc and rad/onc assistance  - PEG Planning  - Monitor hemovac  - Bowel regimen  - PRN antiemetics  - IVF until taking adequate PO  - PT/OT  - SCDs for DVT proph    -----------------------------------  Ed Bach MD  Neurosurgery Resident, PGY-2    Please contact neurosurgery resident on call with questions.    Dial * * *[phone], enter [phone] when prompted.   -----------------------------------    Interval History: MEGHANA    Objective:   Temp:  [95.8  F (35.4  C)-98.5  F (36.9  C)] 96.5  F (35.8  C)  Pulse:  [63-73] 70  Resp:  [12-20] 16  BP: ()/(57-78) 104/57  SpO2:  [91 %-100 %] 95 %  I/O last 3 completed shifts:  In: 2695 [P.O.:350; I.V.:2345]  Out: 915 [Urine:780; Drains:135]    Gen: Appears comfortable, NAD  Wound: clean, dry, intact  Neurologic:  - Alert & Oriented to person, place, time, and situation  - Follows commands briskly  - Speech fluent, spontaneous. No aphasia or dysarthria.  - No gaze preference. No apparent hemineglect.  - PERRL, EOMI  - Face symmetric     Del Tr Bi WE WF Gr   R 5 5 5 5 5 5   L 5 5 5 5 5 5    HF KE KF DF PF    R 5 5 5 5 5    L 5 5 5 4+ 4      Sensation improving in left leg    LABS:  Recent Labs   Lab 03/03/21  0537 03/02/21  1426    133   POTASSIUM 4.2  4.2 3.6   CHLORIDE 109 101   CO2 24 23   ANIONGAP 6 8   * 104*   BUN 25 26   CR 0.57* 0.69   ARDEN 8.6 9.6       Recent Labs   Lab 03/03/21  0537   WBC 10.7   RBC 4.05*   HGB 9.5*   HCT 31.1*   MCV 77*   MCH 23.5*   MCHC 30.5*   RDW 17.2*          IMAGING:  Recent Results (from the past 24  hour(s))   XR Abdomen Port 1 View    Narrative    Exam: XR ABDOMEN PORT 1 VW, 3/3/2021 9:10 AM    Indication: constipation, s/p laminectomy for cauda equina, no bowel  movement for 2 weeks    Comparison: PET/CT of 2/12/2021.    Findings:   Portable supine abdominal radiograph. Spinal catheter in place  overlying the spine and left abdomen, not completely included. There  is a moderate to large volume of stool mainly in the right and  transverse colon. Gas-filled nondistended colonic loops in the splenic  flexure region with gas present to the rectum. No air filled distended  small bowel loops. Mottled gas in the stomach. Laminectomy changes in  the spine noted. Please see recent MRI and PET/CT for osseous lesions,  not as well delineated on the radiograph. There is slight callus  formation surrounding right lateral ninth rib. Free air is not well  evaluated on supine imaging.      Impression    Impression: Moderate to large volume of stool consistent with  constipation. No small bowel obstruction. Postsurgical changes in the  spine with catheter in place.    AKANKSHA PIMENTEL MD   XR Chest Port 1 View    Narrative    XR CHEST PORT 1 VW  3/3/2021 6:26 PM      HISTORY: PICC line    COMPARISON: 6/14/2015    FINDINGS: Frontal upright view of the chest. Right arm PICC tip  terminates in the low SVC. The cardiac silhouette size and pulmonary  vasculature are within normal limits. There is no significant pleural  effusion or pneumothorax. There are no focal pulmonary opacities. The  visualized upper abdomen and bones appear normal.      Impression    IMPRESSION:   1. Right arm PICC tip terminates in the lower SVC.  2. No acute cardiopulmonary abnormality.    I have personally reviewed the examination and initial interpretation  and I agree with the findings.    ROSIO RIOS MD

## 2021-03-04 NOTE — CONSULTS
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 He is improving after surgery but needs enteral access due worsening dysphagia and severe malnutrition.    Reason for Consult: Patient identified as current everyday smoker per patient chart.     Patient open to sharing about his tobacco use and in learning about more options and resources for quitting, staying quit, and in developing a relapse prevention plan.     Symptoms of craving or withdrawal: Patient is currently experiencing symptoms of withdrawal.  Specifically, restlessness, sleeplessness, anxiety, and intense cravings to smoke. Requesting Nicotine Replacement Therapy while inpatient to help manage symptoms of withdrawal and cravings.     Motivational Interviewing:     MI Intervention: Expressed Empathy/Understanding, Supported Autonomy, Collaboration, Evocation, Permission to raise concern or advise, Open-ended questions, Reflections: simple and complex, Change talk (evoked) and Reframe    During consult, patient very tearful regarding prognosis, expressing desire to go home as soon as possible, admitting to frequently thinking about having cigarette.     Patients last cigarette/vape/e-cig/smokeless tobacco: Day of admission.      Patients main reason for smoking include: Social, physical, emotional,     Top Reasons to quit smoking:  "Recognizes that now, with diagnosis, he should quit.     Quit Attempts: Patient has had multiple quit attempts in the past, the last one was in 2018 when he used the cut-back method and was down to 3-4 cigarettes a day. This lasted for 3-4 days when he found himself smoking 1 ppd again.     Triggers: Anxiety, boredom, routine    Types of Tobacco and Amount   Cigarettes 1 ppd   E-Cigs    Smokeless Tobacco    Cigars    Pipes    Waterpipes      Fagerstrom Test for Nicotine Dependence   How soon after waking do you smoke your first cigarette Within 5 minutes=3  5-30 minutes=2  31-60 minute=1  >61 minutes=0      1        Do you find it difficult to refrain from smoking in places where it is forbidden? e.g. [employer], restaurants, etc? Yes=1  No=0   1        Which cigarette would you hate to give up? The first in the morning=1  Any other=0 1        How many cigarettes a day do you smoke? 10 or less=0  11-20=1  21-30=2  31 or more=3 1   Do you smoke more frequently in the morning?   Yes=1  No=0 0   Do you smoke even if you are sick in bed most of the day? Yes=1  No=0 1                                                                                                                                         Total Score 5   SCORE 1-2 = Low Dependence          3-4 = Low to Mod Dependence     5-7 = Moderate Dependence       8+ = High Dependence     Stage of Behavior Change:   Pre-contemplation - No intention    Contemplation - Change on the horizon x   Preparation - Getting Ready    Action - Consistently changed (within 6 months)    Maintenance - Staying quit (more than 6 months)    Relapse - Recycling      Patients Motivation to Quit Scale    Importance (1-10): 9-10   Confidence  (1-10): 7   Can Patient Imagine a Future without Smoking: Not sure   Quit Attempt Date:  TBD   Final Quit Date:  TBD     Education/Recommendations    Education:    -Provided educational workbook, \"Quitting for Good with Treatment and Support.\" Discussed " health risks of continued smoking.   -Provided motivation and encouragement.   -Shared that it's never too late to quit and that the benefits are immedate and profoundly impactful. Shared that slipping up here and there doesn't necessarily mean he failed; rather, it's an opportunity to reflect and modify his behaviors and habits and to employ alternate strategies to deal with strong triggers.    Recommendations:   -Encouraged patient to use workbook to help him/her understand why he/she smokes, come up with 5 reasons to quit, and to imagine what his/her future looks like without smoking. -Encouraged him/her to develop strategies to distract himself/herself to get past cravings.     Developed Smoking Cessation Relapse Prevention Plan that includes recommendations of:     -Due to patients inability to swallow at this point, patient will not be able to utilize gum, lozenges or pharmacotherapy for his NRT and cessation plan. Would recommend 21 mg patch to assist patient in cessation efforts.     - 21 mg patch daily, continue the initial patch for 4-6 weeks of smoking abstinence based on withdrawal symptoms.Taper every 4-6 weeks in 7 mg steps as tolerated.     -Agrees to participate in our post-hosp smoking cessation follow-up calls for treatment and support, starting at 48 hrs post discharge, then at 14 days, 1 month, and at 6 months.     Time Spent: I spent 45 minutes with patient. Will notify provider of recommendations.    -Will continue to follow patient.     Kierra Jones, RRT, CTTS  Chronic Pulmonary Disease Specialist  Office: [phone]  Pager: [phone]  Hours: M-F 8-4:30

## 2021-03-04 NOTE — PROGRESS NOTES
Oncology Daily Progress Note   Date of Service: 03/04/2021    Patient: Junito Wang  MRN: [id number]  Admission Date: 3/2/2021  Hospital Day # 2  Cancer Diagnosis: Metastatic esophageal adenocarcinoma   Primary Outpatient Oncologist: Dr. 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     Stage IV esophageal adenocarcinoma   Given progression of disease, goal of treatment would be to prolong life and reduce symptoms rather than curative. Per most recent outpatient note, given that cancer has been confirmed stage 4, no role for surgical resection, and will be planning for palliative chemoimmunotherapy with radiation therapy for symptom control as an outpatient. Discussed with patient the possibility of esophageal stent vs XRT for esophageal debulking to address dysphagia. Patient agrees that trial of radiation therapy would be preferable and if this is not helpful, then would pursue stent placement. Discussed patient's care with primary oncologist, Dr. Steven, who agrees that next step for treatment as outpatient would include chemotherapy and immunotherapy, with addition of radiation therapy, after patient has had time to adequately heal from neurosurgery.   - Patient prefers to pursue outpatient radiation therapy for dysphagia first before proceeding to esophageal stent as second line treatment for dysphagia.     Coagulopathy  Elevated INR in the absence of anticoagulation. Concerning for malnutrition with likely Vitamin K deficiency.   - Recommend  "Alexandra Brock is a 45 y.o. female who presents for  evaluation of   Chief Complaint   Patient presents with   • Diabetes       Referring provider    No referring provider defined for this encounter.    Primary Care Provider    Jose Stephens MD\\      44 y o female    Nontoxic MNG  Duration , since Sept 2017  Timing, goiter is constant  Quality, stable  Quantity       Sept 2017 - stable when compared to Nov 2018   US   Right Lobe 5.9 cm x 1.7 x 2.5 cm , multiple nodules , largest 0.9 cm     Left Lobe 5.9 cm x 1.8 x 2.2 cm, multiple nodules, largest  Is inf measuring 1.2 cm     TSH 0.52, T4 nl at 1.07     I reviewed US from 11-18 and same size     No alleviating or aggravating factors     Symptoms, no compressive symptoms     ====    Type 2 DM    Duration 5 years    Timing - Diabetes is Constant    Quality -  uncontrolled    Severity -  moderate    Complications - none    Current symptoms/problems  none     Alleviating Factors: Compliance      Side Effects  none    Current diet  in general, a \"healthy\" diet      Current exercise walking    Current monitoring regimen: home blood tests - checking 4 x daily   Home blood sugar records:   Hypoglycemia yes             Past Medical History:   Diagnosis Date   • Diabetes education, encounter for 07/28/2016    Per Denae Montiel at [facility] Diabetic Education Dept.   • History of medical problems     TIA   • Hyperlipidemia    • Hypertension      Family History   Problem Relation Age of Onset   • Arthritis Mother    • Diabetes Mother    • Heart disease Mother    • Mental illness Mother    • Osteoporosis Mother    • Thyroid disease Mother    • Arthritis Father    • Diabetes Sister    • Mental illness Sister    • Migraines Sister    • Mental illness Son    • Cancer Maternal Aunt    • Diabetes Maternal Aunt    • Heart disease Maternal Aunt    • Diabetes Maternal Uncle    • Hyperlipidemia Maternal Uncle    • Mental illness Maternal Uncle    • Stroke Maternal " "supplementation of Vitamin K PO daily while inpatient and not getting other nutrition (or IV if patient unable to take PO)     Patient was seen and plan of care was discussed with attending physician Dr. Garrison.    Thank you for the opportunity to partake in this patients plan of care. Please do not hesitate to page with questions. We will continue to follow.     Debbie Quijano MD  Internal Medicine, PGY-2  Pager: [phone]  ___________________________________________________________________    Subjective & Interval History:    No acute events noted overnight. Still on bedrest in the setting of recent neurosurgery. Pain is well controlled, however, continues to find taking any liquids quite challenging. He is looking forward to eventually being discharged home.       Physical Exam:    /72 (BP Location: Left arm)   Pulse 68   Temp 96.6  F (35.9  C) (Oral)   Resp 16   Ht 1.778 m (5' 10\")   Wt 77.6 kg (171 lb)   SpO2 98%   BMI 24.54 kg/m    Gen: Well appearing, in NAD  HEENT: EOMI, PERRL, oral mucosa slightly dry  CV: Warm and well perfused.  Pulm: Normal work of breathing on room air  Abd: Non-distended  Skin: No rash, cyanosis or petechial lesion on exposed skin  Neuro: Alert and answering questions appropriately. Moving all extremities without issue or focal neurologic deficits observed.     Labs & Studies: I personally reviewed the following studies:  ROUTINE LABS (Last four results):  CMP  Recent Labs   Lab 03/04/21  0943 03/04/21  0620 03/03/21  0537 03/02/21  1426   NA  --  137 138 133   POTASSIUM  --  3.6 4.2  4.2 3.6   CHLORIDE  --  108 109 101   CO2  --  24 24 23   ANIONGAP  --  5 6 8   GLC  --  120* 123* 104*   BUN  --  21 25 26   CR  --  0.51* 0.57* 0.69   GFRESTIMATED  --  >90 >90 >90   GFRESTBLACK  --  >90 >90 >90   ARDEN  --  8.5 8.6 9.6   MAG  --  2.6* 2.4*  --    PHOS 1.9* 2.2* 3.2  --    PROTTOTAL  --  5.8* 6.0* 7.5   ALBUMIN  --  2.2* 2.4* 3.2*   BILITOTAL  --  0.3 0.9 0.7 " Uncle    • Arthritis Maternal Grandmother    • Heart disease Maternal Grandmother    • Stroke Maternal Grandmother    • Diabetes Maternal Grandfather      Social History     Tobacco Use   • Smoking status: Former Smoker     Packs/day: 0.50     Years: 24.00     Pack years: 12.00     Types: Cigarettes     Last attempt to quit: 2017     Years since quittin.7   • Smokeless tobacco: Never Used   • Tobacco comment: last cigarette 17, pt trying to quit   Substance Use Topics   • Alcohol use: No   • Drug use: No         Current Outpatient Medications:   •  atenolol (TENORMIN) 25 MG tablet, TAKE 1 TABLET BY MOUTH 2 (TWO) TIMES A DAY., Disp: 60 tablet, Rfl: 5  •  digoxin (LANOXIN) 125 MCG tablet, TAKE 1 TABLET BY MOUTH DAILY., Disp: 30 tablet, Rfl: 5  •  Dulaglutide 0.75 MG/0.5ML solution pen-injector, Inject 0.75 mg under the skin into the appropriate area as directed 1 (One) Time Per Week., Disp: 4 pen, Rfl: 11  •  Empagliflozin (JARDIANCE) 25 MG tablet, Take 25 mg by mouth Daily., Disp: 30 tablet, Rfl: 6  •  Insulin Degludec (TRESIBA FLEXTOUCH) 200 UNIT/ML solution pen-injector, Inject 75 Units under the skin into the appropriate area as directed Every Night., Disp: 4 pen, Rfl: 11  •  Insulin Lispro (HUMALOG KWIKPEN) 100 UNIT/ML solution pen-injector, Inject 5-30 Units under the skin into the appropriate area as directed 3 (Three) Times a Day With Meals., Disp: 9 pen, Rfl: 5  •  Insulin Pen Needle (PEN NEEDLES) 31G X 6 MM misc, 1 each 4 (Four) Times a Day., Disp: 200 each, Rfl: 5  •  mirtazapine (REMERON) 45 MG tablet, Take 1 tablet by mouth Every Night., Disp: , Rfl: 1  •  Paliperidone Palmitate (INVEGA SUSTENNA IM), Inject  into the appropriate muscle as directed by prescriber., Disp: , Rfl:   •  pantoprazole (PROTONIX) 40 MG EC tablet, TAKE 1 TABLET BY MOUTH DAILY., Disp: 30 tablet, Rfl: 5  •  prazosin (MINIPRESS) 2 MG capsule, Take 2 capsules by mouth Every Night., Disp: , Rfl: 1  •  promethazine    ALKPHOS  --  157* 173* 234*   AST  --  53* 37 39   ALT  --  53 29 24     CBC  Recent Labs   Lab 03/04/21  0620 03/03/21  0537 03/02/21  1426 02/26/21  1142   WBC 10.9 10.7 16.0* 8.9   RBC 3.89* 4.05* 4.91 5.17   HGB 9.5* 9.5* 11.6* 12.5*   HCT 30.5* 31.1* 37.3* 40.4   MCV 78 77* 76* 78   MCH 24.4* 23.5* 23.6* 24.2*   MCHC 31.1* 30.5* 31.1* 30.9*   RDW 17.3* 17.2* 17.5* 16.7*    348 424 406     INR  Recent Labs   Lab 03/04/21  0620 03/03/21  0537 03/02/21  2341 02/26/21  1142   INR 1.59* 1.37* 1.32* 1.12       Medications list for reference:  Current Facility-Administered Medications   Medication     [START ON 3/6/2021] acetaminophen (TYLENOL) solution 650 mg     acetaminophen (TYLENOL) solution 975 mg     amLODIPine benzoate (KATERZIA) suspension 10 mg     bisacodyl (DULCOLAX) Suppository 10 mg     calcium carbonate (TUMS) chewable tablet 500 mg     dextrose 10% infusion     sennosides (SENOKOT) syrup 15 mL    Or     docusate (COLACE) 50 MG/5ML liquid 150 mg     famotidine (PEPCID) suspension 20 mg     gabapentin (NEURONTIN) solution 250 mg     heparin lock flush 10 UNIT/ML injection 2-5 mL     heparin lock flush 10 UNIT/ML injection 5-10 mL     heparin lock flush 10 UNIT/ML injection 5-10 mL     HOLD ace-inhibitors and angiotension-receptor blockers on day of surgery     hydrALAZINE (APRESOLINE) injection 10-20 mg     HYDROmorphone (PF) (DILAUDID) injection 0.3 mg     labetalol (NORMODYNE/TRANDATE) injection 10-40 mg     Lidocaine (LIDOCARE) 4 % Patch 2 patch    And     lidocaine patch in PLACE     lidocaine (LMX4) cream     lidocaine (LMX4) cream     lidocaine 1 % 0.1-1 mL     lidocaine 1 % 0.1-5 mL     lipids (INTRALIPID) 20 % infusion 250 mL     Lisinopril (QBRELIS) solution 10 mg     Medication Instruction     menthol (ICY HOT) 5 % patch 1 patch    And     menthol (ICY HOT) Patch in Place     methocarbamol (ROBAXIN) tablet 750 mg     naloxone (NARCAN) injection 0.2 mg     naloxone (NARCAN) injection  (PHENERGAN) 12.5 MG tablet, Take 1 tablet by mouth Every 8 (Eight) Hours As Needed for Nausea or Vomiting., Disp: 30 tablet, Rfl: 3  •  QUEtiapine (SEROquel) 400 MG tablet, Take 2 tablets by mouth Every Night., Disp: , Rfl: 2  •  RisperiDONE Microspheres (RISPERDAL CONSTA IM), Inject  into the shoulder, thigh, or buttocks Dose Adjusted By Provider As Needed. Q 2 wks at , Disp: , Rfl:   •  SUMAtriptan (IMITREX) 50 MG tablet, TAKE 1 TABLET BY MOUTH 1 (ONE) TIME AS NEEDED FOR MIGRAINE FOR UP TO 1 DOSE., Disp: 12 tablet, Rfl: 5  •  tiZANidine (ZANAFLEX) 2 MG tablet, TAKE 1 TABLET BY MOUTH EVERY 8 (EIGHT) HOURS AS NEEDED FOR MUSCLE SPASMS., Disp: 30 tablet, Rfl: 3  •  TRINTELLIX 20 MG tablet, Take 1 tablet by mouth Daily., Disp: , Rfl: 2  •  warfarin (COUMADIN) 6 MG tablet, Take 1 tablet by mouth Daily. Take 6 mg daily except on Monday and Wed take 9 mg, Disp: 45 tablet, Rfl: 5    Review of Systems    Review of Systems   Constitutional: Negative for activity change, appetite change, chills, diaphoresis, fatigue, fever and unexpected weight change.   HENT: Negative for congestion, dental problem, drooling, ear discharge, ear pain, facial swelling, mouth sores, postnasal drip, rhinorrhea, sinus pressure, sore throat, tinnitus, trouble swallowing and voice change.    Eyes: Negative for photophobia, pain, discharge, redness, itching and visual disturbance.   Respiratory: Negative for apnea, cough, choking, chest tightness, shortness of breath, wheezing and stridor.    Cardiovascular: Negative for chest pain, palpitations and leg swelling.   Gastrointestinal: Negative for abdominal distention, abdominal pain, constipation, diarrhea, nausea and vomiting.   Endocrine: Negative for cold intolerance, heat intolerance, polydipsia, polyphagia and polyuria.   Genitourinary: Negative for decreased urine volume, difficulty urinating, dysuria, flank pain, frequency, hematuria and urgency.   Musculoskeletal: Negative for arthralgias,  "back pain, gait problem, joint swelling, myalgias, neck pain and neck stiffness.   Skin: Negative for color change, pallor, rash and wound.   Allergic/Immunologic: Negative for immunocompromised state.   Neurological: Negative for dizziness, tremors, seizures, syncope, facial asymmetry, speech difficulty, weakness, light-headedness, numbness and headaches.   Hematological: Negative for adenopathy.   Psychiatric/Behavioral: Negative for agitation, behavioral problems, confusion, decreased concentration, dysphoric mood, hallucinations, self-injury, sleep disturbance and suicidal ideas. The patient is not nervous/anxious and is not hyperactive.         Objective:   /76   Pulse 107   Ht 176.5 cm (69.5\")   Wt 99.2 kg (218 lb 9.6 oz)   SpO2 99%   BMI 31.82 kg/m²     Physical Exam   Constitutional: She is oriented to person, place, and time. She appears well-developed.   HENT:   Head: Normocephalic.   Right Ear: External ear normal.   Left Ear: External ear normal.   Nose: Nose normal.   Eyes: Conjunctivae and EOM are normal. No scleral icterus.   Neck: Normal range of motion. Neck supple. No tracheal deviation present. No thyromegaly present.   Cardiovascular: Normal rate, regular rhythm, normal heart sounds and intact distal pulses. Exam reveals no gallop and no friction rub.   No murmur heard.  Pulmonary/Chest: Effort normal and breath sounds normal. No stridor. No respiratory distress. She has no wheezes. She has no rales. She exhibits no tenderness.   Abdominal: Soft. Bowel sounds are normal. She exhibits no distension and no mass. There is no tenderness. There is no rebound and no guarding.   Musculoskeletal: Normal range of motion. She exhibits no tenderness or deformity.   Lymphadenopathy:     She has no cervical adenopathy.   Neurological: She is alert and oriented to person, place, and time. She displays normal reflexes. She exhibits normal muscle tone. Coordination normal.   Skin: No rash noted. No " 0.4 mg     nicotine (NICODERM CQ) 14 MG/24HR 24 hr patch 1 patch     nicotine Patch in Place     ondansetron (ZOFRAN-ODT) ODT tab 4-8 mg    Or     ondansetron (ZOFRAN) injection 4-8 mg     oxyCODONE (ROXICODONE) solution 5-10 mg     parenteral nutrition - ADULT compounded formula     parenteral nutrition - ADULT compounded formula     phytonadione (AQUA-MEPHYTON) 2 mg in sodium chloride 0.9 % 50 mL intermittent infusion     polyethylene glycol (MIRALAX) Packet 17 g     prochlorperazine (COMPAZINE) injection 10 mg    Or     prochlorperazine (COMPAZINE) tablet 10 mg    Or     prochlorperazine (COMPAZINE) suppository 25 mg     sodium chloride (PF) 0.9% PF flush 10-20 mL     sodium chloride (PF) 0.9% PF flush 3 mL     sodium chloride (PF) 0.9% PF flush 3 mL     sodium chloride (PF) 0.9% PF flush 5-50 mL     sodium phosphate (NaPHOS) 9 mMol in 250 mL D5W (pre-mix) infusion        erythema. No pallor.   Psychiatric: She has a normal mood and affect. Her behavior is normal. Judgment and thought content normal.       Lab Review    Results for orders placed or performed in visit on 04/09/19   Hemoglobin A1c   Result Value Ref Range    Hemoglobin A1C 7.06 (H) 4.80 - 5.60 %           Assessment/Plan       ICD-10-CM ICD-9-CM   1. Type 2 diabetes mellitus with other specified complication, with long-term current use of insulin (CMS/[facility]) E11.69 250.80    Z79.4 V58.67   2. Nontoxic multinodular goiter E04.2 241.1   3. Menopause Z78.0 627.2         I reviewed and summarized records from Jose Stephens MD from present year  and I reviewed / ordered labs.   From review of records :    Patient has nontoxic Multinodular Goiter documented in Sept 2017 with right subcm nodules and left 1.2 inf cm nodule.   TSH is normal   Repeat from Nov 2018 stable    Repeat US in Nov 2019    Lab Results   Component Value Date    TSH 0.710 07/19/2018         Also has T2DM       Trulicity once weekly     Jardiance 25 mg daily     Intolerant to metformin     -------    Tresiba 75 units at night    If you ever wake with a sugar less than 80 , decrease to 65       -------    Humalog   Taking 15units with meals, increase to 20    Remember that your goal is to be in the 100s    If you are higher than that you are free to take more humalog as high as 25 to 30  But   If you eat a small meal you may need less insulin , for example 10 units    Bottom line , different meals need different humalog    Experience will let you determine how much you should take     You can take as little as 5 and as much as 30     dexcom     --    MNG stable   Last US from Nov 2018, repeat Nov 2019     --    Has dyslipidemia, advised not to take with coumadin?    No orders of the defined types were placed in this encounter.        A copy of my note was sent to Jose Stephens MD    Please see my above opinion and suggestions.     MDM

## 2021-03-04 NOTE — PLAN OF CARE
SLP: Patient on flat bedrest today per RN and team requests reschedule swallow evaluation for 3-5-2021.

## 2021-03-04 NOTE — PROGRESS NOTES
CLINICAL NUTRITION SERVICES - BRIEF NOTE      Nutrition Prescription     Recommendations already ordered by Registered Dietitian (RD):  --Continue current TPN regimen: goal vol of 1920 ml/day with 110 g Dex daily (374 kcal), 115 g AA daily (460 kcal) and 250 ml of 20% IV lipids daily = 1334 kcals/day (17 kcal/kg/day), 1.5 g PRO/kg/day, GIR 1.0 with 37% kcals from Fat.       Future/Additional Recommendations:  --Monitor lytes and ability to advance Dex to goal. Ultimate goal Dex = 300 g.       --300 g Dex (1020 kcal), 115 g AA (460 kcal), and 250 ml 20% IV lipids daily = 1980 kcal (25 kcal/kg), 1.5 g/kg protein, and 25% kcals from fat.     *Please see full assessment note from 3/3/2021    New Findings:  Labs: K+ 3.6 (low normal <- 4.2), Cr 0.51 (L), Mg++ 2.6 (H), Phos 2.2 (L <- 3.2), -140    Addendum 12:11: Phos recheck came back at 1.9 (L). Spoke with PharmD, plan is to not increase Dex with new TPN bag.     RD to follow per protocol.    Luci Mccrary, MS, RD, LD, CNSC  6A/7D RD Pager: [phone]

## 2021-03-04 NOTE — PLAN OF CARE
Status: s/p L2-L5 laminectomy d/t epidural hemmorhage and enlarging sacral pass causing severe spinal stenosis.   Vitals: VSS. Soft BPs at times. Currently on RA stating in high 90's.   Neuros: A&Ox4.  LLE 4/5.  AOE 5/5.  Denies N/T since post-op. Follows commands.  IV: L PIV infusing NS @ 100mL/hr, PICC placed today  Labs/Electrolytes: WDL  Resp/trach: 2L NC.  Denies SOB  Diet: NPO ex meds.  Refusing meds/pills, has pain after swallowing d/t esophageal cancer MD aware.  Per previous shift report, has not eated solid foods in over 2 months.  Bowel status: LBM 2/16, per patient report.  BS hypo.  Tender on palpation to R upper and lower quadrants (ascending colon area), firmness present.  Pink lady and Water enemas given.  No results thus far.  Took Miralax in water at 1345.   : Garza in place d/t flat bedrest.  Will check with primary team to verify that garza can still be in place, charge nurse aware.    Skin: Blanchable redness to coccyx with minor dry skin peeling, prior to admission wound. Mepilex in place. Golf ball sized mass/cyst on chest. Back incision covered with primapore, drainage marked. Hemovac to full suction.  Pain: abdomen pain with palpation, otherwise no pain.  Back pain with turning.  Declining pain meds.  Activity: Flat bedrest, okay to log roll.    Social: No visitors.   Plan: PEG placement sometime this admission. Thorasic surgery consult done today

## 2021-03-04 NOTE — CONSULTS
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 His neurological symptoms are improving since the surgery and he states he is able to move both legs again and just feels tingling in the L leg which has been his baseline since he was found to have spinal metastases.     Prior to this emergent admission he was scheduled to have a PEG tube placed for enteral feeding access by Dr. Curiel on 3/3/21. This had to be cancelled as he was immediately post op from spine surgery. Thoracic is being asked to evaluate him for rescheduling of feeding tube placement during this admission.     On EGD 1/26/21 he was found to have an ulcerating mass with bleeding in the distal esophagus that was circumferential and partially obstructing but was traversable. As his cancer workup was positive for metastasis he was deemed surgically unresectable. He is complaining of worsening dysphagia. He has pain with swallowing anything and has not been able to eat solid food for about 2 months. He has not been starting on TPN yet although is being followed by nutrition due to his severe malnutrition status. He additionally has not had a bowel movement in several days despite enemas.         PMH:  Past Medical History:   Diagnosis Date     Arthritis      Hypertension      Malignant neoplasm of lower third of esophagus (H) 3/1/2021       PSH:  Past Surgical History:   Procedure Laterality Date     ABDOMEN SURGERY  1992    fatty tissue     APPENDECTOMY  1987     COLONOSCOPY WITH CO2 INSUFFLATION N/A 1/26/2021    Procedure: COLONOSCOPY, WITH CO2 INSUFFLATION;  Surgeon: Nain Dominguez MD;  Location: [facility] OR     COMBINED ESOPHAGOSCOPY, GASTROSCOPY, DUODENOSCOPY (EGD) WITH CO2 INSUFFLATION N/A 1/26/2021     Procedure: ESOPHAGOGASTRODUODENOSCOPY, WITH CO2 INSUFFLATION;  Surgeon: Nain Dominguez MD;  Location:  OR     ESOPHAGOSCOPY, GASTROSCOPY, DUODENOSCOPY (EGD), COMBINED N/A 1/26/2021    Procedure: Esophagogastroduodenoscopy, With Biopsy;  Surgeon: Nain Dominguez MD;  Location: [facility] OR     GI SURGERY      Upper Endoscopy     LAMINECTOMY LUMBAR POSTERIOR MICROSCOPIC THREE+ LEVELS N/A 3/2/2021    Procedure: Lumbar 2 to Lumbar 5 Laminectomy;  Surgeon: Soy Gusman MD;  Location: [facility] OR       FH:  family history includes Cerebrovascular Disease in his mother; Coronary Artery Disease in his brother, father, and sister; Heart Disease in his father; Heart Failure in his father; Hyperlipidemia in his brother, mother, and sister; Hypertension in his brother, brother, mother, sister, and sister; Neurologic Disorder in his mother; Thyroid Disease in his mother.      Allergies:  No Known Allergies    Home Meds:  Medications Prior to Admission   Medication Sig Dispense Refill Last Dose     acetaminophen (TYLENOL) 500 MG tablet Take 500-1,000 mg by mouth every 6 hours as needed for mild pain        amLODIPine (NORVASC) 10 MG tablet TAKE ONE TABLET BY MOUTH ONE TIME DAILY  90 tablet 2      gabapentin (NEURONTIN) 250 MG/5ML solution Take 5 mLs (250 mg) by mouth At Bedtime 470 mL 1      lisinopril (ZESTRIL) 10 MG tablet TAKE ONE TABLET BY MOUTH TWICE DAILY 180 tablet 2      MULTI-VITAMIN OR TABS 1 TABLET DAILY          ROS: Negative other than HPI     Physical Exam:  Temp:  [96.3  F (35.7  C)-98.5  F (36.9  C)] 96.6  F (35.9  C)  Pulse:  [66-70] 68  Resp:  [16] 16  BP: (102-119)/(57-72) 118/72  SpO2:  [95 %-98 %] 98 %    Gen: NAD, supine in bed  Lungs: CTAB, non-labored breathing on room air, no cough or wheeze  CV: regular rhythm, normal rate, no cuanosis  Abd: Soft, nondistended, nontender to palpation, appendectomy scar, no surgical scar in LUQ  Ext: Moving all extremities spontaneously, decreased sensation  in LLE - says his new baseline pre-op, no edema or discoloration  Neuro: AOx3    Labs:  ABG No lab results found in last 7 days.  CBC  Recent Labs   Lab 03/04/21  0620 03/03/21  0537 03/02/21  1426 02/26/21  1142   WBC 10.9 10.7 16.0* 8.9   HGB 9.5* 9.5* 11.6* 12.5*    348 424 406     BMP  Recent Labs   Lab 03/04/21  0620 03/03/21  0537 03/02/21  1426    138 133   POTASSIUM 3.6 4.2  4.2 3.6   CHLORIDE 108 109 101   CO2 24 24 23   BUN 21 25 26   CR 0.51* 0.57* 0.69   * 123* 104*     LFT  Recent Labs   Lab 03/04/21  0620 03/03/21  0537 03/02/21  2341 03/02/21  1426 02/26/21  1142   AST 53* 37  --  39  --    ALT 53 29  --  24  --    ALKPHOS 157* 173*  --  234*  --    BILITOTAL 0.3 0.9  --  0.7  --    ALBUMIN 2.2* 2.4*  --  3.2*  --    INR 1.59* 1.37* 1.32*  --  1.12     PancreasNo lab results found in last 7 days.    Imaging:  PET CT 2/12/21  IMPRESSION: In this patient with a new diagnosis of adenocarcinoma of  the esophagus:     1. Hypermetabolic distal esophageal mass extending into the  gastroesophageal junction and gastric cardia consistent with the  biopsy-proven adenocarcinoma.     2. Evidence of stage IVb metastatic disease:  2a. Hypermetabolic metastatic adenopathy in the neck, mediastinum, and  upper abdomen.  2b. Hypermetabolic intrahepatic metastases.  2c. Hypermetabolic intracranial nodule in the left temporal lobe,  likely metastatic. Recommend further evaluation with MRI brain with  contrast.  2d. Hypermetabolic intraosseous lesions in the pelvis, including large  sacral mass obliterating the left S1-3 neural foramina.     3. Unchanged sub-6 mm pulmonary nodules, indeterminant, cannot exclude  metastases.      A/P: Patient is a 60 year old male with esophageal adneocarcinoma metastatic to spine who was admitted with cauda equina and taken emergently to the OR on 3/2/21 for laminectomy and decompression. He is improving after surgery but needs enteral access due worsening dysphagia  and severe malnutrition.    - PEG in OR tomorrow 3/5/21  - NPO   - Appreciate nutrition evaluation and consideration of TPN  - Rest of cares per neurosurgery primary    Patient and plan discussed with attending.    Thank you for the opportunity to participate in the care of this patient.    Lizet Munoz DO  Thoracic Surgery PGY1

## 2021-03-05 ENCOUNTER — ANESTHESIA EVENT (OUTPATIENT)
Dept: SURGERY | Facility: CLINIC | Age: 61
End: 2021-03-05
Payer: COMMERCIAL

## 2021-03-05 ENCOUNTER — ANESTHESIA (OUTPATIENT)
Dept: SURGERY | Facility: CLINIC | Age: 61
End: 2021-03-05
Payer: COMMERCIAL

## 2021-03-05 ENCOUNTER — APPOINTMENT (OUTPATIENT)
Dept: OCCUPATIONAL THERAPY | Facility: CLINIC | Age: 61
End: 2021-03-05
Payer: COMMERCIAL

## 2021-03-05 LAB
ANION GAP SERPL CALCULATED.3IONS-SCNC: 6 MMOL/L (ref 3–14)
BACTERIA SPEC CULT: ABNORMAL
BUN SERPL-MCNC: 20 MG/DL (ref 7–30)
CALCIUM SERPL-MCNC: 8.6 MG/DL (ref 8.5–10.1)
CHLORIDE SERPL-SCNC: 106 MMOL/L (ref 94–109)
CO2 SERPL-SCNC: 24 MMOL/L (ref 20–32)
CREAT SERPL-MCNC: 0.45 MG/DL (ref 0.66–1.25)
ERYTHROCYTE [DISTWIDTH] IN BLOOD BY AUTOMATED COUNT: 17.1 % (ref 10–15)
GFR SERPL CREATININE-BSD FRML MDRD: >90 ML/MIN/{1.73_M2}
GLUCOSE BLDC GLUCOMTR-MCNC: 107 MG/DL (ref 70–99)
GLUCOSE BLDC GLUCOMTR-MCNC: 118 MG/DL (ref 70–99)
GLUCOSE BLDC GLUCOMTR-MCNC: 124 MG/DL (ref 70–99)
GLUCOSE SERPL-MCNC: 108 MG/DL (ref 70–99)
HCT VFR BLD AUTO: 30.2 % (ref 40–53)
HGB BLD-MCNC: 9.2 G/DL (ref 13.3–17.7)
Lab: ABNORMAL
MAGNESIUM SERPL-MCNC: 2.3 MG/DL (ref 1.6–2.3)
MCH RBC QN AUTO: 23.5 PG (ref 26.5–33)
MCHC RBC AUTO-ENTMCNC: 30.5 G/DL (ref 31.5–36.5)
MCV RBC AUTO: 77 FL (ref 78–100)
PHOSPHATE SERPL-MCNC: 2.9 MG/DL (ref 2.5–4.5)
PLATELET # BLD AUTO: 341 10E9/L (ref 150–450)
POTASSIUM SERPL-SCNC: 3.6 MMOL/L (ref 3.4–5.3)
RBC # BLD AUTO: 3.92 10E12/L (ref 4.4–5.9)
SODIUM SERPL-SCNC: 136 MMOL/L (ref 133–144)
SPECIMEN SOURCE: ABNORMAL
WBC # BLD AUTO: 7.3 10E9/L (ref 4–11)

## 2021-03-05 PROCEDURE — 999N000141 HC STATISTIC PRE-PROCEDURE NURSING ASSESSMENT: Performed by: THORACIC SURGERY (CARDIOTHORACIC VASCULAR SURGERY)

## 2021-03-05 PROCEDURE — 250N000009 HC RX 250: Performed by: NURSE ANESTHETIST, CERTIFIED REGISTERED

## 2021-03-05 PROCEDURE — 3E0436Z INTRODUCTION OF NUTRITIONAL SUBSTANCE INTO CENTRAL VEIN, PERCUTANEOUS APPROACH: ICD-10-PCS | Performed by: INTERNAL MEDICINE

## 2021-03-05 PROCEDURE — 250N000013 HC RX MED GY IP 250 OP 250 PS 637: Performed by: STUDENT IN AN ORGANIZED HEALTH CARE EDUCATION/TRAINING PROGRAM

## 2021-03-05 PROCEDURE — 250N000011 HC RX IP 250 OP 636: Performed by: STUDENT IN AN ORGANIZED HEALTH CARE EDUCATION/TRAINING PROGRAM

## 2021-03-05 PROCEDURE — 250N000013 HC RX MED GY IP 250 OP 250 PS 637: Performed by: NEUROLOGICAL SURGERY

## 2021-03-05 PROCEDURE — 250N000011 HC RX IP 250 OP 636: Performed by: NURSE ANESTHETIST, CERTIFIED REGISTERED

## 2021-03-05 PROCEDURE — 97530 THERAPEUTIC ACTIVITIES: CPT | Mod: GO

## 2021-03-05 PROCEDURE — 80048 BASIC METABOLIC PNL TOTAL CA: CPT | Performed by: STUDENT IN AN ORGANIZED HEALTH CARE EDUCATION/TRAINING PROGRAM

## 2021-03-05 PROCEDURE — 272N000001 HC OR GENERAL SUPPLY STERILE: Performed by: THORACIC SURGERY (CARDIOTHORACIC VASCULAR SURGERY)

## 2021-03-05 PROCEDURE — 0DH63UZ INSERTION OF FEEDING DEVICE INTO STOMACH, PERCUTANEOUS APPROACH: ICD-10-PCS | Performed by: THORACIC SURGERY (CARDIOTHORACIC VASCULAR SURGERY)

## 2021-03-05 PROCEDURE — 250N000013 HC RX MED GY IP 250 OP 250 PS 637: Performed by: NURSE PRACTITIONER

## 2021-03-05 PROCEDURE — 258N000003 HC RX IP 258 OP 636: Performed by: ANESTHESIOLOGY

## 2021-03-05 PROCEDURE — 370N000017 HC ANESTHESIA TECHNICAL FEE, PER MIN: Performed by: THORACIC SURGERY (CARDIOTHORACIC VASCULAR SURGERY)

## 2021-03-05 PROCEDURE — 83735 ASSAY OF MAGNESIUM: CPT | Performed by: STUDENT IN AN ORGANIZED HEALTH CARE EDUCATION/TRAINING PROGRAM

## 2021-03-05 PROCEDURE — 36592 COLLECT BLOOD FROM PICC: CPT | Performed by: STUDENT IN AN ORGANIZED HEALTH CARE EDUCATION/TRAINING PROGRAM

## 2021-03-05 PROCEDURE — 250N000009 HC RX 250: Performed by: NEUROLOGICAL SURGERY

## 2021-03-05 PROCEDURE — 85027 COMPLETE CBC AUTOMATED: CPT | Performed by: STUDENT IN AN ORGANIZED HEALTH CARE EDUCATION/TRAINING PROGRAM

## 2021-03-05 PROCEDURE — 999N001017 HC STATISTIC GLUCOSE BY METER IP

## 2021-03-05 PROCEDURE — 258N000003 HC RX IP 258 OP 636: Performed by: STUDENT IN AN ORGANIZED HEALTH CARE EDUCATION/TRAINING PROGRAM

## 2021-03-05 PROCEDURE — 99233 SBSQ HOSP IP/OBS HIGH 50: CPT | Performed by: INTERNAL MEDICINE

## 2021-03-05 PROCEDURE — 99222 1ST HOSP IP/OBS MODERATE 55: CPT | Performed by: NURSE PRACTITIONER

## 2021-03-05 PROCEDURE — 250N000025 HC SEVOFLURANE, PER MIN: Performed by: THORACIC SURGERY (CARDIOTHORACIC VASCULAR SURGERY)

## 2021-03-05 PROCEDURE — 97165 OT EVAL LOW COMPLEX 30 MIN: CPT | Mod: GO

## 2021-03-05 PROCEDURE — 250N000009 HC RX 250: Performed by: THORACIC SURGERY (CARDIOTHORACIC VASCULAR SURGERY)

## 2021-03-05 PROCEDURE — 250N000011 HC RX IP 250 OP 636: Performed by: NURSE PRACTITIONER

## 2021-03-05 PROCEDURE — 84100 ASSAY OF PHOSPHORUS: CPT | Performed by: STUDENT IN AN ORGANIZED HEALTH CARE EDUCATION/TRAINING PROGRAM

## 2021-03-05 PROCEDURE — 710N000010 HC RECOVERY PHASE 1, LEVEL 2, PER MIN: Performed by: THORACIC SURGERY (CARDIOTHORACIC VASCULAR SURGERY)

## 2021-03-05 PROCEDURE — 360N000075 HC SURGERY LEVEL 2, PER MIN: Performed by: THORACIC SURGERY (CARDIOTHORACIC VASCULAR SURGERY)

## 2021-03-05 PROCEDURE — 120N000002 HC R&B MED SURG/OB UMMC

## 2021-03-05 RX ORDER — ONDANSETRON 2 MG/ML
INJECTION INTRAMUSCULAR; INTRAVENOUS PRN
Status: DISCONTINUED | OUTPATIENT
Start: 2021-03-05 | End: 2021-03-05

## 2021-03-05 RX ORDER — SODIUM CHLORIDE, SODIUM LACTATE, POTASSIUM CHLORIDE, CALCIUM CHLORIDE 600; 310; 30; 20 MG/100ML; MG/100ML; MG/100ML; MG/100ML
INJECTION, SOLUTION INTRAVENOUS CONTINUOUS
Status: DISCONTINUED | OUTPATIENT
Start: 2021-03-05 | End: 2021-03-05 | Stop reason: HOSPADM

## 2021-03-05 RX ORDER — LIDOCAINE HYDROCHLORIDE 20 MG/ML
INJECTION, SOLUTION INFILTRATION; PERINEURAL PRN
Status: DISCONTINUED | OUTPATIENT
Start: 2021-03-05 | End: 2021-03-05

## 2021-03-05 RX ORDER — ONDANSETRON 2 MG/ML
4 INJECTION INTRAMUSCULAR; INTRAVENOUS EVERY 30 MIN PRN
Status: DISCONTINUED | OUTPATIENT
Start: 2021-03-05 | End: 2021-03-05 | Stop reason: HOSPADM

## 2021-03-05 RX ORDER — POTASSIUM CHLORIDE 20MEQ/15ML
20 LIQUID (ML) ORAL ONCE
Status: COMPLETED | OUTPATIENT
Start: 2021-03-05 | End: 2021-03-05

## 2021-03-05 RX ORDER — LIDOCAINE 40 MG/G
CREAM TOPICAL
Status: DISCONTINUED | OUTPATIENT
Start: 2021-03-05 | End: 2021-03-05 | Stop reason: HOSPADM

## 2021-03-05 RX ORDER — PROPOFOL 10 MG/ML
INJECTION, EMULSION INTRAVENOUS PRN
Status: DISCONTINUED | OUTPATIENT
Start: 2021-03-05 | End: 2021-03-05

## 2021-03-05 RX ORDER — NALOXONE HYDROCHLORIDE 0.4 MG/ML
0.4 INJECTION, SOLUTION INTRAMUSCULAR; INTRAVENOUS; SUBCUTANEOUS
Status: ACTIVE | OUTPATIENT
Start: 2021-03-05 | End: 2021-03-06

## 2021-03-05 RX ORDER — NALOXONE HYDROCHLORIDE 0.4 MG/ML
0.2 INJECTION, SOLUTION INTRAMUSCULAR; INTRAVENOUS; SUBCUTANEOUS
Status: ACTIVE | OUTPATIENT
Start: 2021-03-05 | End: 2021-03-06

## 2021-03-05 RX ORDER — ONDANSETRON 4 MG/1
4 TABLET, ORALLY DISINTEGRATING ORAL EVERY 30 MIN PRN
Status: DISCONTINUED | OUTPATIENT
Start: 2021-03-05 | End: 2021-03-05 | Stop reason: HOSPADM

## 2021-03-05 RX ORDER — DEXAMETHASONE SODIUM PHOSPHATE 4 MG/ML
INJECTION, SOLUTION INTRA-ARTICULAR; INTRALESIONAL; INTRAMUSCULAR; INTRAVENOUS; SOFT TISSUE PRN
Status: DISCONTINUED | OUTPATIENT
Start: 2021-03-05 | End: 2021-03-05

## 2021-03-05 RX ORDER — FENTANYL CITRATE 50 UG/ML
INJECTION, SOLUTION INTRAMUSCULAR; INTRAVENOUS PRN
Status: DISCONTINUED | OUTPATIENT
Start: 2021-03-05 | End: 2021-03-05

## 2021-03-05 RX ORDER — FENTANYL CITRATE 50 UG/ML
25-50 INJECTION, SOLUTION INTRAMUSCULAR; INTRAVENOUS
Status: DISCONTINUED | OUTPATIENT
Start: 2021-03-05 | End: 2021-03-05 | Stop reason: HOSPADM

## 2021-03-05 RX ADMIN — PHYTONADIONE 2 MG: 10 INJECTION, EMULSION INTRAMUSCULAR; INTRAVENOUS; SUBCUTANEOUS at 07:56

## 2021-03-05 RX ADMIN — Medication 100 MG: at 12:54

## 2021-03-05 RX ADMIN — SODIUM CHLORIDE, POTASSIUM CHLORIDE, SODIUM LACTATE AND CALCIUM CHLORIDE: 600; 310; 30; 20 INJECTION, SOLUTION INTRAVENOUS at 12:49

## 2021-03-05 RX ADMIN — POLYETHYLENE GLYCOL 3350 17 G: 17 POWDER, FOR SOLUTION ORAL at 20:44

## 2021-03-05 RX ADMIN — LISINOPRIL 10 MG: 1 SOLUTION ORAL at 20:44

## 2021-03-05 RX ADMIN — POTASSIUM CHLORIDE 20 MEQ: 20 SOLUTION ORAL at 15:29

## 2021-03-05 RX ADMIN — FENTANYL CITRATE 25 MCG: 50 INJECTION, SOLUTION INTRAMUSCULAR; INTRAVENOUS at 13:00

## 2021-03-05 RX ADMIN — Medication 5 ML: at 06:25

## 2021-03-05 RX ADMIN — GABAPENTIN 250 MG: 250 SUSPENSION ORAL at 22:46

## 2021-03-05 RX ADMIN — SENNOSIDES A AND B 15 ML: 415.36 LIQUID ORAL at 22:44

## 2021-03-05 RX ADMIN — ONDANSETRON 4 MG: 2 INJECTION INTRAMUSCULAR; INTRAVENOUS at 13:00

## 2021-03-05 RX ADMIN — Medication 1 PATCH: at 08:00

## 2021-03-05 RX ADMIN — ACETAMINOPHEN 975 MG: 325 SOLUTION ORAL at 18:22

## 2021-03-05 RX ADMIN — BISACODYL 10 MG: 10 SUPPOSITORY RECTAL at 08:01

## 2021-03-05 RX ADMIN — LIDOCAINE HYDROCHLORIDE 60 MG: 20 INJECTION, SOLUTION INFILTRATION; PERINEURAL at 12:53

## 2021-03-05 RX ADMIN — FAMOTIDINE 20 MG: 40 POWDER, FOR SUSPENSION ORAL at 20:44

## 2021-03-05 RX ADMIN — FAMOTIDINE 20 MG: 40 POWDER, FOR SUSPENSION ORAL at 08:00

## 2021-03-05 RX ADMIN — SUGAMMADEX 200 MG: 100 INJECTION, SOLUTION INTRAVENOUS at 13:14

## 2021-03-05 RX ADMIN — LIDOCAINE 2 PATCH: 560 PATCH PERCUTANEOUS; TOPICAL; TRANSDERMAL at 07:56

## 2021-03-05 RX ADMIN — Medication: at 22:38

## 2021-03-05 RX ADMIN — I.V. FAT EMULSION 250 ML: 20 EMULSION INTRAVENOUS at 21:00

## 2021-03-05 RX ADMIN — POLYETHYLENE GLYCOL 3350 17 G: 17 POWDER, FOR SOLUTION ORAL at 18:22

## 2021-03-05 RX ADMIN — ROCURONIUM BROMIDE 30 MG: 10 INJECTION INTRAVENOUS at 12:56

## 2021-03-05 RX ADMIN — METHOCARBAMOL 750 MG: 750 TABLET, FILM COATED ORAL at 20:44

## 2021-03-05 RX ADMIN — Medication 5 ML: at 18:22

## 2021-03-05 RX ADMIN — DEXAMETHASONE SODIUM PHOSPHATE 4 MG: 4 INJECTION, SOLUTION INTRA-ARTICULAR; INTRALESIONAL; INTRAMUSCULAR; INTRAVENOUS; SOFT TISSUE at 13:06

## 2021-03-05 RX ADMIN — PROPOFOL 170 MG: 10 INJECTION, EMULSION INTRAVENOUS at 12:53

## 2021-03-05 ASSESSMENT — ACTIVITIES OF DAILY LIVING (ADL)
ADLS_ACUITY_SCORE: 19
PREVIOUS_RESPONSIBILITIES: MEAL PREP;HOUSEKEEPING;LAUNDRY;SHOPPING;YARDWORK;MEDICATION MANAGEMENT;FINANCES;DRIVING
ADLS_ACUITY_SCORE: 21
ADLS_ACUITY_SCORE: 19

## 2021-03-05 NOTE — ANESTHESIA CARE TRANSFER NOTE
Patient: Junito Wang    Procedure(s):  Percutaneous endoscopic gastrostomy tube placement    Diagnosis: Esophageal dysphagia [R13.10]  Diagnosis Additional Information: No value filed.    Anesthesia Type:   General     Note:      Level of Consciousness: awake    Level of Supplemental Oxygen (L/min / FiO2): 2  Independent Airway: airway patency satisfactory and stable  Dentition: dentition changed    Report to RN Given: handoff report given  Patient transferred to: PACU  Comments: Pt. [place] and breathing spontaneously.  Vitals stable.  Report given to oncoming nurse.  Transfer of care occurred.   Handoff Report: Identifed the Patient, Identified the Reponsible Provider, Reviewed the pertinent medical history, Discussed the surgical course, Reviewed Intra-OP anesthesia mangement and issues during anesthesia, Set expectations for post-procedure period and Allowed opportunity for questions and acknowledgement of understanding      Vitals: (Last set prior to Anesthesia Care Transfer)  CRNA VITALS  3/5/2021 1251 - 3/5/2021 1331      3/5/2021             Resp Rate (observed):  (!) 2        Electronically Signed By: ALEXEI Christianson CRNA  March 5, 2021  1:31 PM

## 2021-03-05 NOTE — CONSULTS
BRIEF SOCIAL WORK NOTE:    SW consulted for HCD. SW met with Pt at bedside to inquire about his desire to complete HCD; he confirms that he is interested but is not interested in completing it at this moment as he just returned from a procedure and was experiencing some pain. SW left short form HCD at bedside and encouraged him to review. SW explained that if Pt chooses to complete while hospitalized that SW can assist with arranging a notary, however, no notaries are available over the weekend so could not occur until Monday at the earliest if he remains hospitalized through the weekend. Pt expressed understanding.    Per chart review Pt is recommended a discharge home with OP OT. Anticipate no SW needs for discharge. SW remains available if further needs arise during this hospitalization but no immediate f/u indicated.    MARIBEL Navas, LICSW  6B Intermediate Care Unit   [facility]  Phone: [phone]  Pager: [phone]

## 2021-03-05 NOTE — PROGRESS NOTES
Brief thoracic surgery progress Note    Was planned for PEG this evening. Due to an emergency case coming in, our OR time was bumped. Scheduled slot for 3/5/21 evening but will try to move the case up as early as possible in the morning. Explained this to Mr. Wang who understood.     - Keep NPO tonight  - Consent obtained    Lizet Munoz DO  Thoracic Surgery PGY1

## 2021-03-05 NOTE — PLAN OF CARE
Status: POD 0 Gtube insertion; hx esophageal cancer who presented with Cauda Equina due to tumor compression. He is no s/p laminectomy and decompression  VS: VSS on RA  Neuros: A&Ox4. LLE 4/5 w/numbness  GI: NPO. G tube open to gravity, okay for meds and hydration. No BM this shift, hypoactive BS, supp given, miralax given, pt feels bowels are more active  : Due to void, garza pulled at 1500  IV: TPN @80 via R PICC, second lumen hep locked. L PIV, SL  Activity: Ax1-2, pivoting to chair w/gb and walker. Assisting w/repositioning in bed  Pain: Denies pain other than acid reflux discomfort, managed w/antacids and HOB elevation  Skin: Back incision covered w/primapore, no extension of drainage. Hemovac w/small amt of serosang output; monitor for large increase in drainage. Chest pustule, PTA  Labs/Tests: K+ 3.6, replaced. AM draw tmrw. Palliative consult completed  Social: Brother updated via PACU RN  Plan of care: Continue to encourage activity as tolerated. Plan to start TF tmrw. No discharge plan at this time

## 2021-03-05 NOTE — PLAN OF CARE
SLP: SLP order received, pt NPO and having G tube placed today. Will reschedule to determine SLP needs and complete evaluation as warranted.

## 2021-03-05 NOTE — BRIEF OP NOTE
[facility]    Brief Operative Note    Pre-operative diagnosis: Esophageal dysphagia [R13.10]  Post-operative diagnosis Same as pre-operative diagnosis    Procedure: Procedure(s):  Percutaneous endoscopic gastrostomy tube placement  Surgeon: Surgeon(s) and Role:     * Jose Curiel MD - Primary     * Wendi Jain MD - Resident - Assisting     * Lizet Munoz MD - Resident - Assisting   Ignacio Forbes MD - Fellow - Assisting  Anesthesia: General   Estimated blood loss: None  Drains: Gastrostomy tube   Specimens: * No specimens in log *  Findings:    bumper at 3.5 cm at the skin  Complications: None.  Implants: * No implants in log *      Plan:  Return to previous floor  G tube ok for meds and hydration today  Trickle feeds may start tomorrow  G tube to gravity today, may clamp starting tomorrow if tolerated    Lizet Munoz DO  Thoracic Surgery PGY1

## 2021-03-05 NOTE — CONSULTS
"[facility]  Palliative Care Consultation Note    Patient: Junito Wang  Date of Admission:  3/2/2021    Requesting Clinician / Team: Neuro surgery  Reason for consult: Symptom management  Goals of care  Patient and family support    Recommendations/discussion:  Pt seen and examined. Reviewed with primary team and with unit staff.  - He has restorative goals recognizing he has a poor prognosis. He hopes to recover some swallow function as \" there are some things he has his heart set on eating yet.\" Wants as much quality time as he is able. Believes his family is aware of his illness.   - able to state that he put off coming for diagnostic work up due to CoVid. I offered empathetic listening and guidance that this was not his fault and he didn't deserve cancer.   - plans to complete HCD soon.   - open to PC clinic after discharge for further goals of care discussions.  - verbalizes reluctance to take meds like low dose oxycodone, \" I have heard all the negative stories.\" While I appreciated his respect for these meds I encouraged to use as needed for sufficient pain control to function and rest.       Thank you for the opportunity to participate in the care of this patient and family. Our team: will continue to follow.     During regular M-F work hours -- if you are not sure who specifically to contact -- please contact us by sending a text page to our team consult pager at [phone].    After regular work hours and on weekends/holidays, you can call our answering service at [phone]. Also, who's on call for us is available in [facility] [employer].       Roderick LINDA NP ACHPN  Nurse Practitioner- Lead Advanced Practice Provider  [facility] Palliative Medicine Consult Service   [phone]    TT spent: 50 minutes of which 40 minutes were spent in direct face to face contact with patient/family. Greater than 50% of time spent counseling and/or coordinating " "care.     Assessments:  Junito Wang is a 60 year old male with past medical history of hypertension, and  recently diagnosed esophageal adenocarcinoma following 3 to 6 months of progressive dysphagia associated with 45 pound weight loss in the last 1 year. He presented to [facility] ED with LLE weakness found to have early subacute epidural hemorrhage and an enlarging sacral mass invading the lumbosacral plexus obliterating the L S1-S3 foramina. He was  taken emergently to the OR on 3/2/21 for laminectomy and decompression. Some functional recovery of LE movement since.     Today, the patient was seen for PC consultation for pain sx management and support in the setting of Esophageal Cancer with mets.     Prognosis, Goals, & Planning:      Functional Status just prior to hospitalization: 3 (Capable of only limited self-care; needs help with ADLs; in bed/chair >50% of waking hours)      Prognosis, Goals, and/or Advance Care Planning were addressed today: Yes        Summary/Comments: He is aware of his poor prognosis and open to treatments in an attempt to control sx and promote quality of life.    Patient's decision making preferences: independently          Patient has decision-making capacity today for complex decisions: Yes            I have concerns about the patient/family's health literacy today: No           Patient has a completed Health Care Directive: No.       Code status: Full Code    Coping, Meaning, & Spirituality:   Mood, coping, and/or meaning in the context of serious illness were addressed today: Yes  Summary/Comments: when facing difficult times he notes he \" just muddles through.\"  [facility] [employer] [facility] but has not of late.   Plans to have his brother (with whom he lives) to be is decision maker  He likes gardening. Not a fan of winter.   Social:     Living situation: Lives with brother in family homestead in [place]. His work/living space is downstairs.     Key family / " caregivers: 3 brothers and 1 sister live in 60 mile radius. Supportive.     Occupational history: Worked as  for years- now out on disablity- some part time work discontinued due to illness.    Current in-home services: None except family support.    History of Present Illness:  His neurological symptoms have improved since the surgery and he states he is able to move both legs again and just feels tingling in the L leg which has been his baseline since he was found to have spinal metastases.  Further he has been experiencing worsening dysphagia. He has pain with swallowing anything and had not been able to eat solid food for about 2 months. Has been started on TPN but hope to start enteral feedings.   Key Palliative Symptom Data:  # Pain severity the last 12 hours: low  # Nausea severity the last 12 hours: low  # Anxiety severity the last 12 hours: low    Patient is on opioids: assessed and bowels ok/no needed changes to plan of care today.    ROS:  Comprehensive ROS is reviewed and is negative except as here & per HPI     Past Medical History:  Past Medical History:   Diagnosis Date     Arthritis      Hypertension      Malignant neoplasm of lower third of esophagus (H) 3/1/2021        Past Surgical History:  Past Surgical History:   Procedure Laterality Date     ABDOMEN SURGERY  1992    fatty tissue     APPENDECTOMY  1987     COLONOSCOPY WITH CO2 INSUFFLATION N/A 1/26/2021    Procedure: COLONOSCOPY, WITH CO2 INSUFFLATION;  Surgeon: Nain Dominguez MD;  Location:  OR     COMBINED ESOPHAGOSCOPY, GASTROSCOPY, DUODENOSCOPY (EGD) WITH CO2 INSUFFLATION N/A 1/26/2021    Procedure: ESOPHAGOGASTRODUODENOSCOPY, WITH CO2 INSUFFLATION;  Surgeon: Nain Dominguez MD;  Location:  OR     ESOPHAGOSCOPY, GASTROSCOPY, DUODENOSCOPY (EGD), COMBINED N/A 1/26/2021    Procedure: Esophagogastroduodenoscopy, With Biopsy;  Surgeon: Nain Dominguez MD;  Location:  OR     GI SURGERY      Upper Endoscopy      LAMINECTOMY LUMBAR POSTERIOR MICROSCOPIC THREE+ LEVELS N/A 3/2/2021    Procedure: Lumbar 2 to Lumbar 5 Laminectomy;  Surgeon: Soy Gusman MD;  Location:  OR         Family History:  Family History   Problem Relation Age of Onset     Hypertension Mother      Neurologic Disorder Mother         stroke     Hyperlipidemia Mother      Cerebrovascular Disease Mother          of stroke     Thyroid Disease Mother      Heart Disease Father      Heart Failure Father      Coronary Artery Disease Father          of heart attack     Hypertension Brother      Coronary Artery Disease Brother         Quadruple Bi-pass/Quadruple Bi-pass     Hypertension Sister      Coronary Artery Disease Sister         Stent/Stent     Hypertension Brother      Hyperlipidemia Brother      Hypertension Sister      Hyperlipidemia Sister         Allergies:  No Known Allergies     Medications:  I have reviewed this patient's medication profile and medications from this hospitalization.   APAP 975 mg q 8 hours scheduled  Gabapentin 250 mg at hs  Lidoderm patches  Robaxin 750 mg 4X daily  Dilaudid 0.3mg IV q 3 hours prn  Oxycodone 5-10 mg q3 hours prn    Physical Exam:  Vital Signs: Temp: 97.2  F (36.2  C) Temp src: Oral BP: 112/73 Pulse: 65   Resp: 16 SpO2: 98 % O2 Device: None (Room air)    Weight: 171 lbs 0 oz  GEN: Appears mildly anxious. Up in bedside chair.alert, oriented. Anticipating PEG tube this afternoon.   HEENT: symmetric features, no trauma.EOMI, normal conjunctiva. Dentition OK.  NECK: Supple, full ROM, no apparent  cervical lymphadenopathy  CV: S1 S2 with RRR, no murmur- AP in 70's at rest.  Ext. warm and well-perfused  RESP: CTAB, no wheeze, breathing comfortably on room air  ABDOMEN: Soft, NT, ND, bowel sounds hypoactive but present. Catheter in place with clear yellow urine.   SKIN: No apparent rashes. Normal color and turgor  NEURO:Moving bilateral lower extremities, gait not assessed, hemovac in place  PSYCH:  Appropriate mood and affect    Data reviewed:  ROUTINE LABS (Last four results)  CMP  Recent Labs   Lab 03/05/21  0629 03/04/21  0943 03/04/21  0620 03/03/21 0537 03/02/21  1426     --  137 138 133   POTASSIUM 3.6  --  3.6 4.2  4.2 3.6   CHLORIDE 106  --  108 109 101   CO2 24  --  24 24 23   ANIONGAP 6  --  5 6 8   *  --  120* 123* 104*   BUN 20  --  21 25 26   CR 0.45*  --  0.51* 0.57* 0.69   GFRESTIMATED >90  --  >90 >90 >90   GFRESTBLACK >90  --  >90 >90 >90   ARDEN 8.6  --  8.5 8.6 9.6   MAG 2.3  --  2.6* 2.4*  --    PHOS 2.9 1.9* 2.2* 3.2  --    PROTTOTAL  --   --  5.8* 6.0* 7.5   ALBUMIN  --   --  2.2* 2.4* 3.2*   BILITOTAL  --   --  0.3 0.9 0.7   ALKPHOS  --   --  157* 173* 234*   AST  --   --  53* 37 39   ALT  --   --  53 29 24     CBC  Recent Labs   Lab 03/05/21  0629 03/04/21  0620 03/03/21 0537 03/02/21  1426   WBC 7.3 10.9 10.7 16.0*   RBC 3.92* 3.89* 4.05* 4.91   HGB 9.2* 9.5* 9.5* 11.6*   HCT 30.2* 30.5* 31.1* 37.3*   MCV 77* 78 77* 76*   MCH 23.5* 24.4* 23.5* 23.6*   MCHC 30.5* 31.1* 30.5* 31.1*   RDW 17.1* 17.3* 17.2* 17.5*    323 348 424     INR  Recent Labs   Lab 03/04/21  0620 03/03/21 0537 03/02/21 2341 02/26/21  1142   INR 1.59* 1.37* 1.32* 1.12     Arterial Blood GasNo lab results found in last 7 days.

## 2021-03-05 NOTE — PROGRESS NOTES
[facility] [place]   03/05/2021  Neurosurgery Progress Note:    Assessment:  Junito Wang is a 60 year old male with esophageal cancer who presented with Cauda Equina due to tumor compression. He is no s/p laminectomy and decompression. Exam improving after surgery.    Plan:  - Serial neuro exams  - Pain control  - Hemovac  - Flat for 48 hours; liberalize today  - Advance diet as tolerated  - Appreciate onc and rad/onc assistance  - PEG Planning; likely today  - Monitor hemovac  - Bowel regimen  - PRN antiemetics  - IVF until taking adequate PO  - PT/OT  - SCDs for DVT proph    -----------------------------------  Ed Bach MD  Neurosurgery Resident, PGY-2    Please contact neurosurgery resident on call with questions.    Dial * * [phone], enter [phone] when prompted.   -----------------------------------    Interval History: NAEON; PEG was not placed    Objective:   Temp:  [96.8  F (36  C)-98.4  F (36.9  C)] 97.2  F (36.2  C)  Pulse:  [65-78] 65  Resp:  [16-18] 16  BP: (104-116)/(62-76) 112/73  SpO2:  [97 %-98 %] 98 %  I/O last 3 completed shifts:  In: 40 [I.V.:40]  Out: 1527 [Urine:1450; Drains:77]    Gen: Appears comfortable, NAD  Wound: clean, dry, intact  Neurologic:  - Alert & Oriented to person, place, time, and situation  - Follows commands briskly  - Speech fluent, spontaneous. No aphasia or dysarthria.  - No gaze preference. No apparent hemineglect.  - PERRL, EOMI  - Face symmetric     Del Tr Bi WE WF Gr   R 5 5 5 5 5 5   L 5 5 5 5 5 5    HF KE KF DF PF    R 5 5 5 5 5    L 5 5 5 4+ 4      Sensation improving in left leg    LABS:  Recent Labs   Lab 03/05/21  0629 03/04/21  0620 03/03/21  0537    137 138   POTASSIUM 3.6 3.6 4.2  4.2   CHLORIDE 106 108 109   CO2 24 24 24   ANIONGAP 6 5 6   * 120* 123*   BUN 20 21 25   CR 0.45* 0.51* 0.57*   ARDEN 8.6 8.5 8.6       Recent Labs   Lab 03/05/21  0629   WBC 7.3   RBC 3.92*   HGB 9.2*   HCT 30.2*   MCV 77*   MCH 23.5*    MCHC 30.5*   RDW 17.1*          IMAGING:  Recent Results (from the past 24 hour(s))   XR Abdomen Port 1 View    Narrative    Exam: XR ABDOMEN PORT 1 VW, 3/4/2021 10:53 AM    Indication: constipation, assess for ileus    Comparison: Portable supine views of the abdomen.    Findings:   Portable supine views of the abdomen. Partially visualized spinal  catheter in place. Moderate to large stool burden, predominantly  within the ascending and transverse colon, similar to prior.  Nonobstructive bowel gas pattern. No pneumatosis or portal venous gas.  Lung bases are clear. Degenerative changes of the spine.       Impression    Impression: Nonobstructive bowel gas pattern. Moderate to large stool  burden, predominantly within the ascending and transverse colon.    I have personally reviewed the examination and initial interpretation  and I agree with the findings.    ALIREZA LEYVA MD

## 2021-03-05 NOTE — ANESTHESIA POSTPROCEDURE EVALUATION
Patient: Junito Wang    Procedure(s):  Percutaneous endoscopic gastrostomy tube placement    Diagnosis:Esophageal dysphagia [R13.10]  Diagnosis Additional Information: No value filed.    Anesthesia Type:  General    Note:  Disposition: Admission   Postop Pain Control: Uneventful            Sign Out: Well controlled pain   PONV: No   Neuro/Psych: Uneventful            Sign Out: Acceptable/Baseline neuro status   Airway/Respiratory: Uneventful            Sign Out: Acceptable/Baseline resp. status   CV/Hemodynamics: Uneventful            Sign Out: Acceptable CV status   Other NRE: NONE   DID A NON-ROUTINE EVENT OCCUR?          Last vitals:  Vitals:    03/05/21 1448 03/05/21 1515 03/05/21 1606   BP: 117/66 108/71 113/73   Pulse: 68 72 65   Resp: 16 18 18   Temp:      SpO2: 96% 100% 100%       Last vitals prior to Anesthesia Care Transfer:  CRNA VITALS  3/5/2021 1251 - 3/5/2021 1351      3/5/2021             Resp Rate (observed):  (!) 2          Electronically Signed By: Zaid Avilez MD  March 5, 2021  4:41 PM

## 2021-03-05 NOTE — ANESTHESIA PREPROCEDURE EVALUATION
Anesthesia Pre-Procedure Evaluation    Patient: Junito Wang   MRN: [id number] : 1960        Preoperative Diagnosis: Cauda equina syndrome (H) [G83.4]   Procedure : Procedure(s):  LAMINECTOMY, SPINE, LUMBAR, 3 OR MORE LEVELS, POSTERIOR APPROACH, USING MICROSCOPE     Past Medical History:   Diagnosis Date     Arthritis      Hypertension      Malignant neoplasm of lower third of esophagus (H) 3/1/2021      Past Surgical History:   Procedure Laterality Date     ABDOMEN SURGERY      fatty tissue     APPENDECTOMY       COLONOSCOPY WITH CO2 INSUFFLATION N/A 2021    Procedure: COLONOSCOPY, WITH CO2 INSUFFLATION;  Surgeon: Nain Dominguez MD;  Location: [facility]     COMBINED ESOPHAGOSCOPY, GASTROSCOPY, DUODENOSCOPY (EGD) WITH CO2 INSUFFLATION N/A 2021    Procedure: ESOPHAGOGASTRODUODENOSCOPY, WITH CO2 INSUFFLATION;  Surgeon: Nain Dominguez MD;  Location: [facility]     ESOPHAGOSCOPY, GASTROSCOPY, DUODENOSCOPY (EGD), COMBINED N/A 2021    Procedure: Esophagogastroduodenoscopy, With Biopsy;  Surgeon: Nain Dominguez MD;  Location: [facility]     GI SURGERY      Upper Endoscopy     LAMINECTOMY LUMBAR POSTERIOR MICROSCOPIC THREE+ LEVELS N/A 3/2/2021    Procedure: Lumbar 2 to Lumbar 5 Laminectomy;  Surgeon: Soy Gusman MD;  Location:  OR      No Known Allergies   Social History     Tobacco Use     Smoking status: Current Every Day Smoker     Packs/day: 1.00     Years: 44.00     Pack years: 44.00     Types: Cigarettes     Start date:      Smokeless tobacco: Never Used     Tobacco comment: Recognizes importance of quitting; willing to try Nicotine patches   Substance Use Topics     Alcohol use: Yes     Comment: occasional      Wt Readings from Last 1 Encounters:   21 77.6 kg (171 lb)        Anesthesia Evaluation   Pt has had prior anesthetic. Type: General.    No history of anesthetic complications       ROS/MED HX  ENT/Pulmonary:  - neg pulmonary ROS     Neurologic:  Comment: Lumbar epidural hematoma      Cardiovascular:  - neg cardiovascular ROS   (+) hypertension-----    METS/Exercise Tolerance:     Hematologic:  - neg hematologic  ROS     Musculoskeletal:  - neg musculoskeletal ROS     GI/Hepatic:  - neg GI/hepatic ROS   (+) GERD, Symptomatic,     Renal/Genitourinary:  - neg Renal ROS     Endo:  - neg endo ROS     Psychiatric/Substance Use:  - neg psychiatric ROS     Infectious Disease:  - neg infectious disease ROS     Malignancy:   (+) Malignancy, History of GI.    Other:  - neg other ROS          Physical Exam    Airway  airway exam normal      Mallampati: II   TM distance: > 3 FB   Neck ROM: full   Mouth opening: > 3 cm    Respiratory Devices and Support         Dental  no notable dental history         Cardiovascular   cardiovascular exam normal          Pulmonary   pulmonary exam normal            Other findings: Denies loose teeth,    OUTSIDE LABS:  CBC:   Lab Results   Component Value Date    WBC 7.3 03/05/2021    WBC 10.9 03/04/2021    HGB 9.2 (L) 03/05/2021    HGB 9.5 (L) 03/04/2021    HCT 30.2 (L) 03/05/2021    HCT 30.5 (L) 03/04/2021     03/05/2021     03/04/2021     BMP:   Lab Results   Component Value Date     03/05/2021     03/04/2021    POTASSIUM 3.6 03/05/2021    POTASSIUM 3.6 03/04/2021    CHLORIDE 106 03/05/2021    CHLORIDE 108 03/04/2021    CO2 24 03/05/2021    CO2 24 03/04/2021    BUN 20 03/05/2021    BUN 21 03/04/2021    CR 0.45 (L) 03/05/2021    CR 0.51 (L) 03/04/2021     (H) 03/05/2021     (H) 03/04/2021     COAGS:   Lab Results   Component Value Date    PTT 23 03/02/2021    INR 1.59 (H) 03/04/2021    FIBR 663 (H) 03/02/2021     POC:   Lab Results   Component Value Date     (H) 03/05/2021     HEPATIC:   Lab Results   Component Value Date    ALBUMIN 2.2 (L) 03/04/2021    PROTTOTAL 5.8 (L) 03/04/2021    ALT 53 03/04/2021    AST 53 (H) 03/04/2021    ALKPHOS 157 (H) 03/04/2021    BILITOTAL 0.3 03/04/2021      OTHER:   Lab Results   Component Value Date    A1C 5.2 06/10/2020    ARDEN 8.6 03/05/2021    PHOS 2.9 03/05/2021    MAG 2.3 03/05/2021    TSH 1.40 03/02/2021       Anesthesia Plan    ASA Status:  3, emergent    NPO Status:  NPO Appropriate    Anesthesia Type: General.     - Airway: ETT   Induction: Intravenous, RSI.   Maintenance: Inhalation.   Techniques and Equipment:     - Airway: Video-Laryngoscope     - Lines/Monitors: 2nd IV     Consents    Anesthesia Plan(s) and associated risks, benefits, and realistic alternatives discussed. Questions answered and patient/representative(s) expressed understanding.     - Discussed with:  Patient      - Specific Concerns: Aspiration, hyperkalemia with succinylcholine use.     - Extended Intubation/Ventilatory Support Discussed: Yes.      - Patient is DNR/DNI Status: No    Use of blood products discussed: Yes.     - Discussed with: Patient.     - Consented: consented to blood products     Postoperative Care    Pain management: IV analgesics.   PONV prophylaxis: Ondansetron (or other 5HT-3)     Comments:    Patient states that he has esophageal reflux ad he gets bloated very easily with even the pills and little liquids that he is taking. He has not had anything to eat since yesterday but still feels full in his stomach. Need for RSI and concern for aspiration and hyperkalemia with succinylcholine use discussed at length with the patient. He voiced understanding and agreed to proceed as planned. Questions were sought and answered                Zaid Avilez MD

## 2021-03-05 NOTE — PROGRESS NOTES
03/05/21 0800   Quick Adds   Type of Visit Initial Occupational Therapy Evaluation   Living Environment   People in home sibling(s)   Current Living Arrangements house   Home Accessibility stairs to enter home;stairs within home   Number of Stairs, Main Entrance 3   Stair Railings, Main Entrance railing on right side (ascending)   Number of Stairs, Within Home, Primary 7   Stair Railings, Within Home, Primary railing on right side (ascending)   Transportation Anticipated car, drives self   Living Environment Comments Pt lives with brother who is retired and is able to assist PRN. Pt has tub/shower, grab bars in shower, standard toilet seat, shower chair   Self-Care   Usual Activity Tolerance good   Current Activity Tolerance moderate   Regular Exercise No   Equipment Currently Used at Home none   Activity/Exercise/Self-Care Comment Pretty sedentary within last year, stays active through household chores, yardwork, stairs in house, but doesn't do any formal exercise   Disability/Function   Hearing Difficulty or Deaf no   Wear Glasses or Blind yes   Concentrating, Remembering or Making Decisions Difficulty no   Difficulty Communicating no   Difficulty Eating/Swallowing yes   Eating/Swallowing eating;swallowing liquids;swallowing solid food   Walking or Climbing Stairs Difficulty no   Dressing/Bathing Difficulty no   Toileting issues no   Doing Errands Independently Difficulty (such as shopping) yes   Fall history within last six months no   Change in Functional Status Since Onset of Current Illness/Injury yes   General Information   Onset of Illness/Injury or Date of Surgery 03/02/21   Referring Physician Ed Bach MD   Patient/Family Therapy Goal Statement (OT) To get stronger, eat again   Additional Occupational Profile Info/Pertinent History of Current Problem Junito Wang is a 60 year old male with esophageal cancer who presented with Cauda Equina due to tumor compression. He is no s/p  laminectomy and decompression.    Existing Precautions/Restrictions fall;spinal   Left Upper Extremity (Weight-bearing Status) partial weight-bearing (PWB)  (10#)   Right Upper Extremity (Weight-bearing Status) partial weight-bearing (PWB)  (10#)   Left Lower Extremity (Weight-bearing Status) full weight-bearing (FWB)   Right Lower Extremity (Weight-bearing Status) full weight-bearing (FWB)   General Observations and Info Activity: up ad madelyn   Cognitive Status Examination   Orientation Status orientation to person, place and time   Affect/Mental Status (Cognitive) WFL   Follows Commands WFL   Visual Perception   Visual Impairment/Limitations WFL   Sensory   Sensory Quick Adds No deficits were identified   Pain Assessment   Patient Currently in Pain No   Integumentary/Edema   Integumentary/Edema no deficits were identifed   Range of Motion Comprehensive   General Range of Motion bilateral upper extremity ROM WNL   Strength Comprehensive (MMT)   General Manual Muscle Testing (MMT) Assessment no strength deficits identified   Comment, General Manual Muscle Testing (MMT) Assessment BUE WFL per observation, not formally tested   Coordination   Upper Extremity Coordination No deficits were identified   Bed Mobility   Bed Mobility supine-sit   Supine-Sit [place] (Bed Mobility) contact guard   Comment (Bed Mobility) Pt able to complete logroll supne<>sitting EOB with VC and CGA   Transfers   Transfers bed-chair transfer   Transfer Skill: Bed to Chair/Chair to Bed   Bed-Chair [place] (Transfers) contact guard   Assistive Device (Bed-Chair Transfers) standard walker   Transfer Comments Bed<>chair CGA with VC for safety and techique   Lower Body Dressing Assessment/Training   Comment (Lower Body Dressing) Anticipate max A x 1 for LB dressing tasks at this time.    Instrumental Activities of Daily Living (IADL)   Previous Responsibilities meal prep;housekeeping;laundry;shopping;yardwork;medication  management;finances;driving   IADL Comments Pt completed IADLs independently, brother able to assist PRN   Clinical Impression   Criteria for Skilled Therapeutic Interventions Met (OT) yes   OT Diagnosis Decreased ADL/IADL I   OT Problem List-Impairments impacting ADL activity tolerance impaired;eating/swallowing;mobility;pain;post-surgical precautions   Assessment of Occupational Performance 5 or more Performance Deficits   Identified Performance Deficits Dressing, bathing, functional transfers, functional mobility, home mgmt   Planned Therapy Interventions (OT) ADL retraining;IADL retraining;transfer training;home program guidelines;progressive activity/exercise;risk factor education   Clinical Decision Making Complexity (OT) low complexity   Therapy Frequency (OT) 6x/week   Predicted Duration of Therapy 1 week   Risk & Benefits of therapy have been explained evaluation/treatment results reviewed;care plan/treatment goals reviewed;risks/benefits reviewed;current/potential barriers reviewed;participants voiced agreement with care plan;participants included;patient   Comment-Clinical Impression Pt would benefit from continued skilled OT to maximize ADL/IADL I. See daily flowsheet for tx details   OT Discharge Planning    OT Discharge Recommendation (DC Rec) Home with assist;home with outpatient occupational therapy   OT Rationale for DC Rec Pt is below baseline but anticipate pt will be able to go home with brother's assist PRN. Pt would benefit from OP OT to maximize ADL/IADL I   OT Brief overview of current status  CGA with FWW in room   Total Evaluation Time (Minutes)   Total Evaluation Time (Minutes) 10

## 2021-03-05 NOTE — PLAN OF CARE
S/p 3-2-21 L2-L5 laminectomy for metastatic neoplasm. Hx malignant neoplasm of esophagus. Back incision covered, dressing marked no change. Accordion drain to suction patent, to monitor for large sudden outputs of clear drainage in drain or any signs of possible  CSF leak. Neruos: LLE 4/5 with numbness improving. VSS on RA. Patient states pain minimal, declines medication intervention. PICC red line TPN 80 ml/hr with lipids 20.8 ml/hr, purple line HL. NPO ex. meds, has difficulty with painful swallowing, refuses most PO medication. No BM since 2/16, CT shows stool burden. Soap suds enema given with no results. Available BM medication given except miralax d/t patient unable to drink that much fluid. RLQ abdomin firm and  tender, BS active. Andrews patent. Strict flat bedrest until this morning. Assist with reposition q2h. Plan PEG  placement. Continue to monitor.

## 2021-03-05 NOTE — PROGRESS NOTES
Oncology Daily Progress Note   Date of Service: 03/05/2021    Patient: Junito Wang  MRN: [id number]  Admission Date: 3/2/2021  Hospital Day # 3  Cancer Diagnosis: Metastatic esophageal adenocarcinoma   Primary Outpatient Oncologist: Dr. Steven  Current Treatment Plan: Plan to initiate outpatient chemo-immunotherapy + XRT to L spine, gamma knife to brain mets     Recommendations:   - PEG tube placed in the OR today. Tolerated well.   - Plan to proceed with outpatient XRT to the L spine, gamma knife; followed by chemo-immunotherapy. Per discussion, dysphagia is likely to improve with initiation of systemic therapy so would hold on radiation to esophageal mass at this time as risks of cytopenias would increase. Will continue to evaluate. No stenting per GI.    - Worked with PT today; encouraged regarding progress/strength.  - Continue to supplement nutrition with Vitamin K PO (or IV if PO not tolerated).    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     # Stage IV esophageal adenocarcinoma   Given progression of disease, goal of treatment would be to prolong life and reduce symptoms rather than curative. Per most recent outpatient note, given that cancer has been confirmed stage 4, no role for surgical resection, and will be planning for palliative chemoimmunotherapy with radiation therapy for symptom control as an outpatient.   - Consideration given to esophageal stent vs XRT for esophageal debulking to address dysphagia; however given likely improvement with chemo-immunotherapy, as well as risks of worsening cytopenias, will hold at this time. Outpatient radiation oncology follow-up arranged for 3/11.  - PEG tube placed in OR today (3/5) for nutritional support. Will initiate tube feeds.  - Requested [facility] follow-up today.   - Continue PT/OT for rehab/    #  "Coagulopathy  Elevated INR in the absence of anticoagulation. Concerning for malnutrition with likely Vitamin K deficiency.   - Recommend supplementation of Vitamin K PO daily while inpatient and not getting other nutrition (or IV if patient unable to take PO)     Patient was seen and plan of care was discussed with attending physician Dr. Garrison.    Thank you for the opportunity to partake in this patients plan of care. Please do not hesitate to page with questions. We will continue to follow.     Shireen Cummings PA-C  Hematology/Oncology  #[id number]  ___________________________________________________________________    Subjective & Interval History:    No acute events noted overnight. Was able to sit in chair this morning and walk using wheelchair. Strength improved of left leg. PEG placement in OR went well today. Mild abdominal pain after procedure. Tearful regarding course of illness but encouragement provided. States he did not sleep well. Will update his sister this afternoon. Discussed plan of care as above.     Physical Exam:    /71 (BP Location: Left arm)   Pulse 72   Temp 98.3  F (36.8  C) (Oral)   Resp 18   Ht 1.778 m (5' 10\")   Wt 77.6 kg (171 lb)   SpO2 100%   BMI 24.54 kg/m    Gen: Well appearing, in NAD  HEENT: EOMI, PERRL, oral mucosa slightly dry  CV: Warm and well perfused.  Pulm: Normal work of breathing on room air  Abd: Non-distended  Skin: No rash, cyanosis or petechial lesion on exposed skin  Neuro: Alert and answering questions appropriately. Moving all extremities without issue or focal neurologic deficits observed.     Labs & Studies: I personally reviewed the following studies:  ROUTINE LABS (Last four results):  CMP  Recent Labs   Lab 03/05/21  0629 03/04/21  0943 03/04/21  0620 03/03/21  0537 03/02/21  1426     --  137 138 133   POTASSIUM 3.6  --  3.6 4.2  4.2 3.6   CHLORIDE 106  --  108 109 101   CO2 24  --  24 24 23   ANIONGAP 6  --  5 6 8   *  --  120* 123* " 104*   BUN 20  --  21 25 26   CR 0.45*  --  0.51* 0.57* 0.69   GFRESTIMATED >90  --  >90 >90 >90   GFRESTBLACK >90  --  >90 >90 >90   ARDEN 8.6  --  8.5 8.6 9.6   MAG 2.3  --  2.6* 2.4*  --    PHOS 2.9 1.9* 2.2* 3.2  --    PROTTOTAL  --   --  5.8* 6.0* 7.5   ALBUMIN  --   --  2.2* 2.4* 3.2*   BILITOTAL  --   --  0.3 0.9 0.7   ALKPHOS  --   --  157* 173* 234*   AST  --   --  53* 37 39   ALT  --   --  53 29 24     CBC  Recent Labs   Lab 03/05/21  0629 03/04/21 0620 03/03/21  0537 03/02/21  1426   WBC 7.3 10.9 10.7 16.0*   RBC 3.92* 3.89* 4.05* 4.91   HGB 9.2* 9.5* 9.5* 11.6*   HCT 30.2* 30.5* 31.1* 37.3*   MCV 77* 78 77* 76*   MCH 23.5* 24.4* 23.5* 23.6*   MCHC 30.5* 31.1* 30.5* 31.1*   RDW 17.1* 17.3* 17.2* 17.5*    323 348 424     INR  Recent Labs   Lab 03/04/21  0620 03/03/21  0537 03/02/21  2341   INR 1.59* 1.37* 1.32*       Medications list for reference:  Current Facility-Administered Medications   Medication     [START ON 3/6/2021] acetaminophen (TYLENOL) solution 650 mg     acetaminophen (TYLENOL) solution 975 mg     amLODIPine benzoate (KATERZIA) suspension 10 mg     bisacodyl (DULCOLAX) Suppository 10 mg     calcium carbonate (TUMS) chewable tablet 500 mg     dextrose 10% infusion     sennosides (SENOKOT) syrup 15 mL    Or     docusate (COLACE) 50 MG/5ML liquid 150 mg     famotidine (PEPCID) suspension 20 mg     gabapentin (NEURONTIN) solution 250 mg     heparin lock flush 10 UNIT/ML injection 2-5 mL     heparin lock flush 10 UNIT/ML injection 5-10 mL     heparin lock flush 10 UNIT/ML injection 5-10 mL     HOLD ace-inhibitors and angiotension-receptor blockers on day of surgery     hydrALAZINE (APRESOLINE) injection 10-20 mg     HYDROmorphone (PF) (DILAUDID) injection 0.3 mg     labetalol (NORMODYNE/TRANDATE) injection 10-40 mg     Lidocaine (LIDOCARE) 4 % Patch 2 patch    And     lidocaine patch in PLACE     lidocaine (LMX4) cream     lidocaine (LMX4) cream     lidocaine 1 % 0.1-1 mL     lidocaine 1 %  0.1-5 mL     lipids (INTRALIPID) 20 % infusion 250 mL     Lisinopril (QBRELIS) solution 10 mg     Medication Instruction     menthol (ICY HOT) 5 % patch 1 patch    And     menthol (ICY HOT) Patch in Place     methocarbamol (ROBAXIN) tablet 750 mg     naloxone (NARCAN) injection 0.2 mg     naloxone (NARCAN) injection 0.2 mg     naloxone (NARCAN) injection 0.4 mg     naloxone (NARCAN) injection 0.4 mg     nicotine (NICODERM CQ) 21 MG/24HR 24 hr patch 1 patch     nicotine Patch in Place     ondansetron (ZOFRAN-ODT) ODT tab 4-8 mg    Or     ondansetron (ZOFRAN) injection 4-8 mg     oxyCODONE (ROXICODONE) solution 5-10 mg     parenteral nutrition - ADULT compounded formula     parenteral nutrition - ADULT compounded formula     pink lady enema (COMPOUNDED: docusate, magnesium citrate, mineral oil, sodium phosphate)     polyethylene glycol (MIRALAX) Packet 17 g     prochlorperazine (COMPAZINE) injection 10 mg    Or     prochlorperazine (COMPAZINE) tablet 10 mg    Or     prochlorperazine (COMPAZINE) suppository 25 mg     sodium chloride (PF) 0.9% PF flush 10-20 mL     sodium chloride (PF) 0.9% PF flush 3 mL     sodium chloride (PF) 0.9% PF flush 3 mL     sodium chloride (PF) 0.9% PF flush 5-50 mL

## 2021-03-06 ENCOUNTER — APPOINTMENT (OUTPATIENT)
Dept: OCCUPATIONAL THERAPY | Facility: CLINIC | Age: 61
End: 2021-03-06
Payer: COMMERCIAL

## 2021-03-06 ENCOUNTER — APPOINTMENT (OUTPATIENT)
Dept: SPEECH THERAPY | Facility: CLINIC | Age: 61
End: 2021-03-06
Attending: NURSE PRACTITIONER
Payer: COMMERCIAL

## 2021-03-06 ENCOUNTER — APPOINTMENT (OUTPATIENT)
Dept: PHYSICAL THERAPY | Facility: CLINIC | Age: 61
End: 2021-03-06
Payer: COMMERCIAL

## 2021-03-06 LAB
ANION GAP SERPL CALCULATED.3IONS-SCNC: 6 MMOL/L (ref 3–14)
BUN SERPL-MCNC: 20 MG/DL (ref 7–30)
CALCIUM SERPL-MCNC: 8.9 MG/DL (ref 8.5–10.1)
CHLORIDE SERPL-SCNC: 106 MMOL/L (ref 94–109)
CO2 SERPL-SCNC: 25 MMOL/L (ref 20–32)
CREAT SERPL-MCNC: 0.48 MG/DL (ref 0.66–1.25)
ERYTHROCYTE [DISTWIDTH] IN BLOOD BY AUTOMATED COUNT: 17 % (ref 10–15)
GFR SERPL CREATININE-BSD FRML MDRD: >90 ML/MIN/{1.73_M2}
GLUCOSE BLDC GLUCOMTR-MCNC: 106 MG/DL (ref 70–99)
GLUCOSE BLDC GLUCOMTR-MCNC: 111 MG/DL (ref 70–99)
GLUCOSE BLDC GLUCOMTR-MCNC: 125 MG/DL (ref 70–99)
GLUCOSE BLDC GLUCOMTR-MCNC: 93 MG/DL (ref 70–99)
GLUCOSE SERPL-MCNC: 118 MG/DL (ref 70–99)
HCT VFR BLD AUTO: 30.3 % (ref 40–53)
HGB BLD-MCNC: 9.4 G/DL (ref 13.3–17.7)
MAGNESIUM SERPL-MCNC: 2.4 MG/DL (ref 1.6–2.3)
MCH RBC QN AUTO: 24.2 PG (ref 26.5–33)
MCHC RBC AUTO-ENTMCNC: 31 G/DL (ref 31.5–36.5)
MCV RBC AUTO: 78 FL (ref 78–100)
PHOSPHATE SERPL-MCNC: 3.3 MG/DL (ref 2.5–4.5)
PLATELET # BLD AUTO: 327 10E9/L (ref 150–450)
POTASSIUM SERPL-SCNC: 4.2 MMOL/L (ref 3.4–5.3)
RBC # BLD AUTO: 3.88 10E12/L (ref 4.4–5.9)
SODIUM SERPL-SCNC: 136 MMOL/L (ref 133–144)
WBC # BLD AUTO: 7.8 10E9/L (ref 4–11)

## 2021-03-06 PROCEDURE — 97116 GAIT TRAINING THERAPY: CPT | Mod: GP | Performed by: REHABILITATION PRACTITIONER

## 2021-03-06 PROCEDURE — 250N000013 HC RX MED GY IP 250 OP 250 PS 637: Performed by: NURSE PRACTITIONER

## 2021-03-06 PROCEDURE — 97110 THERAPEUTIC EXERCISES: CPT | Mod: GP | Performed by: REHABILITATION PRACTITIONER

## 2021-03-06 PROCEDURE — 97535 SELF CARE MNGMENT TRAINING: CPT | Mod: GO

## 2021-03-06 PROCEDURE — 250N000009 HC RX 250: Performed by: NEUROLOGICAL SURGERY

## 2021-03-06 PROCEDURE — 120N000002 HC R&B MED SURG/OB UMMC

## 2021-03-06 PROCEDURE — 36415 COLL VENOUS BLD VENIPUNCTURE: CPT | Performed by: STUDENT IN AN ORGANIZED HEALTH CARE EDUCATION/TRAINING PROGRAM

## 2021-03-06 PROCEDURE — 84100 ASSAY OF PHOSPHORUS: CPT | Performed by: STUDENT IN AN ORGANIZED HEALTH CARE EDUCATION/TRAINING PROGRAM

## 2021-03-06 PROCEDURE — 250N000013 HC RX MED GY IP 250 OP 250 PS 637: Performed by: STUDENT IN AN ORGANIZED HEALTH CARE EDUCATION/TRAINING PROGRAM

## 2021-03-06 PROCEDURE — 83735 ASSAY OF MAGNESIUM: CPT | Performed by: STUDENT IN AN ORGANIZED HEALTH CARE EDUCATION/TRAINING PROGRAM

## 2021-03-06 PROCEDURE — 250N000011 HC RX IP 250 OP 636: Performed by: NURSE PRACTITIONER

## 2021-03-06 PROCEDURE — 92610 EVALUATE SWALLOWING FUNCTION: CPT | Mod: GN

## 2021-03-06 PROCEDURE — 999N001017 HC STATISTIC GLUCOSE BY METER IP

## 2021-03-06 PROCEDURE — 85027 COMPLETE CBC AUTOMATED: CPT | Performed by: STUDENT IN AN ORGANIZED HEALTH CARE EDUCATION/TRAINING PROGRAM

## 2021-03-06 PROCEDURE — 97161 PT EVAL LOW COMPLEX 20 MIN: CPT | Mod: GP | Performed by: REHABILITATION PRACTITIONER

## 2021-03-06 PROCEDURE — 80048 BASIC METABOLIC PNL TOTAL CA: CPT | Performed by: STUDENT IN AN ORGANIZED HEALTH CARE EDUCATION/TRAINING PROGRAM

## 2021-03-06 PROCEDURE — 250N000013 HC RX MED GY IP 250 OP 250 PS 637: Performed by: NEUROLOGICAL SURGERY

## 2021-03-06 PROCEDURE — 97530 THERAPEUTIC ACTIVITIES: CPT | Mod: GP | Performed by: REHABILITATION PRACTITIONER

## 2021-03-06 RX ORDER — LIDOCAINE 4 G/G
2 PATCH TOPICAL
Status: DISCONTINUED | OUTPATIENT
Start: 2021-03-06 | End: 2021-03-06 | Stop reason: CLARIF

## 2021-03-06 RX ADMIN — POLYETHYLENE GLYCOL 3350 17 G: 17 POWDER, FOR SOLUTION ORAL at 08:56

## 2021-03-06 RX ADMIN — FAMOTIDINE 20 MG: 40 POWDER, FOR SUSPENSION ORAL at 08:55

## 2021-03-06 RX ADMIN — Medication: at 22:38

## 2021-03-06 RX ADMIN — POLYETHYLENE GLYCOL 3350 17 G: 17 POWDER, FOR SOLUTION ORAL at 13:36

## 2021-03-06 RX ADMIN — BISACODYL 10 MG: 10 SUPPOSITORY RECTAL at 08:57

## 2021-03-06 RX ADMIN — DOCUSATE SODIUM 150 MG: 50 LIQUID ORAL at 21:50

## 2021-03-06 RX ADMIN — Medication 5 ML: at 19:01

## 2021-03-06 RX ADMIN — LISINOPRIL 10 MG: 1 SOLUTION ORAL at 08:55

## 2021-03-06 RX ADMIN — METHOCARBAMOL 750 MG: 750 TABLET, FILM COATED ORAL at 16:15

## 2021-03-06 RX ADMIN — AMLODIPINE 10 MG: 1 SUSPENSION ORAL at 08:56

## 2021-03-06 RX ADMIN — FAMOTIDINE 20 MG: 40 POWDER, FOR SUSPENSION ORAL at 20:17

## 2021-03-06 RX ADMIN — METHOCARBAMOL 750 MG: 750 TABLET, FILM COATED ORAL at 08:57

## 2021-03-06 RX ADMIN — MENTHOL 1 PATCH: 205.5 PATCH TOPICAL at 21:06

## 2021-03-06 RX ADMIN — GABAPENTIN 250 MG: 250 SUSPENSION ORAL at 21:50

## 2021-03-06 RX ADMIN — POLYETHYLENE GLYCOL 3350 17 G: 17 POWDER, FOR SOLUTION ORAL at 20:17

## 2021-03-06 RX ADMIN — METHOCARBAMOL 750 MG: 750 TABLET, FILM COATED ORAL at 13:36

## 2021-03-06 RX ADMIN — I.V. FAT EMULSION 250 ML: 20 EMULSION INTRAVENOUS at 20:17

## 2021-03-06 RX ADMIN — METHOCARBAMOL 750 MG: 750 TABLET, FILM COATED ORAL at 20:17

## 2021-03-06 RX ADMIN — LISINOPRIL 10 MG: 1 SOLUTION ORAL at 20:17

## 2021-03-06 RX ADMIN — ACETAMINOPHEN 975 MG: 325 SOLUTION ORAL at 10:32

## 2021-03-06 RX ADMIN — Medication 1 PATCH: at 08:59

## 2021-03-06 ASSESSMENT — ACTIVITIES OF DAILY LIVING (ADL)
ADLS_ACUITY_SCORE: 19

## 2021-03-06 NOTE — PROGRESS NOTES
03/06/21 1219   Quick Adds   Type of Visit Initial PT Evaluation   Living Environment   People in home sibling(s)   Current Living Arrangements house   Home Accessibility stairs to enter home;stairs within home   Number of Stairs, Main Entrance 3   Stair Railings, Main Entrance railing on right side (ascending)   Number of Stairs, Within Home, Primary 7   Stair Railings, Within Home, Primary railing on right side (ascending)   Transportation Anticipated car, drives self   Living Environment Comments Pt lives with his brother, who is available to help him at home.   Self-Care   Usual Activity Tolerance good   Current Activity Tolerance moderate   Regular Exercise No   Equipment Currently Used at Home walker, rolling  (Pt reports he has a walker at home that belonged to his Mom)   Activity/Exercise/Self-Care Comment Pt reports he was IND mobile at baseline, until LLE weakness and he was crawling for mobility. Pt reports he has a WW that belonged to his mother, pt reports walker is adjustable, so he can adjust to his height.   Disability/Function   Hearing Difficulty or Deaf no   Wear Glasses or Blind yes   Vision Management wears glasses   Concentrating, Remembering or Making Decisions Difficulty no   Difficulty Communicating no   Difficulty Eating/Swallowing yes   Eating/Swallowing eating;swallowing liquids;swallowing solid food   Eating/Swallowing Management Pt not eating   Walking or Climbing Stairs Difficulty no   Dressing/Bathing Difficulty no   Toileting issues no   Doing Errands Independently Difficulty (such as shopping) yes   Errands Management Pt reports his brother assists him, and he has not been driving.   Fall history within last six months no   Change in Functional Status Since Onset of Current Illness/Injury yes   General Information   Onset of Illness/Injury or Date of Surgery 03/02/21   Referring Physician Ed Bach MD   Pertinent History of Current Problem (include personal  factors and/or comorbidities that impact the POC) Pt is a 60-year-old male past medical history of hypertension, and now recently diagnosed esophageal adenocarcinoma following 3 to 6 months of progressive dysphagia associated with 45 pound weight loss in the last 1 year. He presented to the ED with LLE weakness found to have early subacute epidural hemorrhage and an enlarging sacral mass invading the lumbosacral plexus obliterating the L S1-S3 foramina.   Existing Precautions/Restrictions fall;spinal   Cognition   Orientation Status (Cognition) oriented x 4   Affect/Mental Status (Cognition) WNL   Follows Commands (Cognition) WNL   Safety Deficit (Cognition) other (see comments)  (bed alarm)   Pain Assessment   Patient Currently in Pain Yes, see Vital Sign flowsheet  (Pt reports minimal pain)   Integumentary/Edema   Integumentary/Edema other (describe)   Integumentary/Edema Comments Hemovac drain at spine   Posture    Posture Forward head position;Protracted shoulders   Range of Motion (ROM)   ROM Quick Adds ROM WNL   Strength   Strength Comments RLE 4+/5 throughout, LLE 3/5 throughout   Bed Mobility   Comment (Bed Mobility) Pt tx supine->sit with SBA.    Transfers   Transfer Safety Comments Pt tx sit->stand with CGA.    Gait/Stairs (Locomotion)   [place] Level (Gait) contact guard   Assistive Device (Gait) walker, front-wheeled   Distance in Feet (Required for LE Total Joints) 50   Deviations/Abnormal Patterns (Gait) weight shifting decreased;base of support, narrow   Comment (Gait/Stairs) Pt amb ~ 50 feet with WW and CGA->Min A, pt with LLE lagging behind, NBOS, small steps, cues to widen base and increase step length.   Balance   Balance other (describe)   Balance Comments Minimal sway in static standing   Clinical Impression   Criteria for Skilled Therapeutic Intervention yes, treatment indicated   PT Diagnosis (PT) Impaired Functional Mobility   Influenced by the following impairments decreased strength,  activity intolerance, impaired balance   Functional limitations due to impairments bed mob, transfers, gait   Clinical Presentation Stable/Uncomplicated   Clinical Presentation Rationale PMHx, clinical judgement, current presentation   Clinical Decision Making (Complexity) low complexity   Therapy Frequency (PT) Daily   Predicted Duration of Therapy Intervention (days/wks) 3/13/21   Planned Therapy Interventions (PT) bed mobility training;gait training;neuromuscular re-education;stair training;strengthening;transfer training   Risk & Benefits of therapy have been explained care plan/treatment goals reviewed   PT Discharge Planning    PT Discharge Recommendation (DC Rec) home with assist;home with outpatient physical therapy   PT Rationale for DC Rec Pt is mobilizing well with CGA, has 24/7 assist of his brother at home. Anticipate pt will be safe to discharge home when medically ready for discharge. Pt will benefit from additional skilled PT to address gait and balance.   PT Brief overview of current status  Nursing Staff: up with A x 1 + WW + gait belt   Total Evaluation Time   Total Evaluation Time (Minutes) 10

## 2021-03-06 NOTE — PROGRESS NOTES
[facility], [place]   03/06/2021  Neurosurgery Progress Note:    Assessment:  Junito Wang is a 60 year old male with esophageal cancer who presented with Cauda Equina due to tumor compression. He is no s/p laminectomy and decompression. Exam improving after surgery.    Plan:  - Serial neuro exams  - Pain control  - Hemovac; likely remove today  - Advance diet as tolerated  - Appreciate onc and rad/onc assistance  - PEG; start advancing TF today  - Monitor hemovac  - Bowel regimen  - PRN antiemetics  - IVF until taking adequate nutrition  - PT/OT  - SCDs for DVT proph    -----------------------------------  Ed Bach MD  Neurosurgery Resident, PGY-2    Please contact neurosurgery resident on call with questions.    Dial * * [phone], enter [phone] when prompted.   -----------------------------------    Interval History: PEG placed    Objective:   Temp:  [96  F (35.6  C)-98.3  F (36.8  C)] 96.5  F (35.8  C)  Pulse:  [54-76] 54  Resp:  [10-18] 16  BP: (103-117)/(65-77) 117/72  SpO2:  [94 %-100 %] 98 %  I/O last 3 completed shifts:  In: 280 [I.V.:100; NG/GT:180]  Out: 2455 [Urine:2405; Drains:50]    Gen: Appears comfortable, NAD  Wound: clean, dry, intact  Neurologic:  - Alert & Oriented to person, place, time, and situation  - Follows commands briskly  - Speech fluent, spontaneous. No aphasia or dysarthria.  - No gaze preference. No apparent hemineglect.  - PERRL, EOMI  - Face symmetric     Del Tr Bi WE WF Gr   R 5 5 5 5 5 5   L 5 5 5 5 5 5    HF KE KF DF PF    R 5 5 5 5 5    L 5 5 5 4+ 4      Sensation improving in left leg    LABS:  Recent Labs   Lab 03/06/21  0640 03/05/21  0629 03/04/21  0620    136 137   POTASSIUM 4.2 3.6 3.6   CHLORIDE 106 106 108   CO2 25 24 24   ANIONGAP 6 6 5   * 108* 120*   BUN 20 20 21   CR 0.48* 0.45* 0.51*   ARDEN 8.9 8.6 8.5       Recent Labs   Lab 03/06/21  0640   WBC 7.8   RBC 3.88*   HGB 9.4*   HCT 30.3*   MCV 78   MCH 24.2*   MCHC 31.0*    RDW 17.0*          IMAGING:  No results found for this or any previous visit (from the past 24 hour(s)).

## 2021-03-06 NOTE — PROGRESS NOTES
Surgery Progress Note  03/06/2021       Subjective:  Did well after PEG. No concerns overnight. Overall feeling better. Abdominal pain is well controlled. Taking in Clears without issue. Stomach feels better since starting to receive meds and some fluid through tube.      Objective:  Temp:  [96  F (35.6  C)-98.3  F (36.8  C)] 96.5  F (35.8  C)  Pulse:  [54-76] 54  Resp:  [10-18] 16  BP: (103-117)/(65-77) 117/72  SpO2:  [94 %-100 %] 98 %    I/O last 3 completed shifts:  In: 280 [I.V.:100; NG/GT:180]  Out: 2455 [Urine:2405; Drains:50]      Gen: Awake, alert, NAD  Resp: NLB on RA, no cough or wheeze  Abd: soft, nondistended, nontender. G tube in place 3.5 cm at skin. No drainage around tube. 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 He is now status post PEG placement 3/5/21.      - Bumper 3.5 cm at skin  - Ok to clamp G tube today  - Ok to start trickle feeds today and advance tomorrow  - Ok for meds through tube  - Thoracic will follow peripherally. Please call if questions about the G tube.     Discussed with fellow who will discuss with staff.  - - - - - - - - - - - - - - - - - -  Lizet Munoz  Thoracic Surgery PGY-1

## 2021-03-06 NOTE — PLAN OF CARE
Shift: [id number]    Status: Admitted for progressive weakness after a biopsy with IR, resulting in cauda equina, requiring laminectomy and decompression for epidural lesion. History significant for metastatic stg 4 esophageal cancer, elevated INR.    Neuro: Alert and oriented x4, able to make needs known, calls appropriately. LLE 4/5 (w/ Numbness and tingling), RLE/BUE 5/5  Cardiac/VS: VSS  Respiratory: RA  GI: Denies N/V  Diet/Appetite: NPO, TPN, lipids, G tube to gravity drainage  : Andrews out yesterday, required st cath overnight x1  Activity: Ax2 w/ GB/walker  Pain: Denies pain  Skin: No new deficits   LDA(s): hemovac, PICC line, PEG tube      Plan: Continue plan of care

## 2021-03-06 NOTE — PLAN OF CARE
Vitals stable,alert and oriented x4,LLE 4/5,RLE 5/5..NPO,bowel sound hyper active,G-tube to gravity drainage,200 ml ,denied nausea ,no vomiting.TPN running at 80 ml/hr.No bowel movement or flatus  this shift,received dulcolax suppository this am,would get enema this evening.No spontaneous void with many attempts,bladder scan for 900 ml,was straight cath at 1445 for 825 ml of urine.20 ml sero sanguinous fluid output from Hemovac drain.Back dressing covered ,dressing with marked  old drainage.Up with assist of 1 walker and gait belt.Continue per plan of care

## 2021-03-06 NOTE — PLAN OF CARE
[id number]  Status: POD 0 Gtube insertion; hx esophageal cancer who presented with Cauda Equina due to tumor compression. He is no s/p laminectomy and decompression  VS: VSS on RA  Neuros: A&Ox4. LLE 4/5 w/numbness   GI: NPO. G tube open to gravity, okay for meds and hydration. No BM this shift, BM meds given  : Andrews pulled at 1500, Bladder scanned for 560 and straight cathed for 800cc at 2230  IV: TPN @80cc, and lipids via R PICC, second lumen hep locked. L PIV, SL  Activity: Ax1-2, pivoting to chair w/gb and walker. Assisting w/repositioning in bed  Pain: c/o L hip pain and requested icyhot patch- waiting for order to be filled by pharmacy.  Skin: Back incision covered w/primapore, no extension of drainage. Hemovac w/small amt of serosang output; monitor for large increase in drainage. Chest pustule, PTA  Labs/Tests: K+ 3.6, replaced. AM draw tmrw. Palliative consult completed  Plan of care: Continue to encourage activity as tolerated. Plan to start TF tmrw. No discharge plan at this time

## 2021-03-06 NOTE — PROGRESS NOTES
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 He is now status post PEG placement 3/5/21. SLP consulted for a bedside swallow eval.   General Observations alert, pleasant, cooperative   Past History of Dysphagia Hx of esophageal ca and dysphagia. States he does not have oropharyngeal difficulties, but has not been able to get solid food down (except for a couple bites of a peanut butter/jelly sandwich and spaghettios) since December/January.   Pain Assessment   Patient Currently in Pain Yes, see Vital Sign flowsheet   Type of Evaluation   Type of Evaluation Swallow Evaluation   Oral Motor   Oral Musculature generally intact   Structural Abnormalities none present   Dentition (Oral Motor)   Dentition (Oral Motor) natural dentition;adequate dentition   Facial Symmetry (Oral Motor)   Facial Symmetry (Oral Motor) WNL   Lip Function (Oral Motor)   Lip Range of Motion (Oral Motor) WNL   Tongue Function (Oral Motor)   Tongue ROM (Oral Motor) WNL   Jaw Function (Oral Motor)   Jaw Function (Oral Motor) WNL   Cough/Swallow/Gag Reflex (Oral Motor)   Soft Palate/Velum (Oral Motor) WNL   Volitional Throat Clear/Cough (Oral Motor) WNL   Volitional Swallow (Oral Motor) WNL   Vocal Quality/Secretion Management (Oral Motor)   Vocal Quality (Oral Motor) WNL   General Swallowing Observations   Current Diet/Method of Nutritional Intake (General Swallowing Observations, NIS) gastrostomy tube (PEG);thin liquids;clear liquid diet   Respiratory Support (General Swallowing Observations) none   Clinical Swallow  "Evaluation   Feeding Assistance no assistance needed   Clinical Swallow Eval: Thin Liquid Texture Trial   Mode of Presentation, Thin Liquids cup;self-fed   Volume of Liquid or Food Presented x2 sips of thin H20   Oral Phase of Swallow WFL   Pharyngeal Phase of Swallow intact   Diagnostic Statement Prompt pharyngeal swallow; no overt signs/symptoms of aspiration. Pt refused further PO trials due to esophageal discomfort.   Esophageal Phase of Swallow   Patient reports or presents with symptoms of esophageal dysphagia Yes   Esophageal comments Pt reported H20 \"just sat there\" in his stomach. Pt felt he could not attempt any more PO.   Swallowing Recommendations   Diet Consistency Recommendations thin liquids;clear liquid diet   Supervision Level for Intake patient independent   Recommended Feeding/Eating Techniques (Swallow Eval) patient is independent, no specific recommendations   Medication Administration Recommendations, Swallowing (SLP) pt prefers to take via TF   Instrumental Assessment Recommendations instrumental evaluation not recommended at this time   SLP Therapy Assessment/Plan   Criteria for Skilled Therapeutic Interventions Met (SLP Eval) yes;treatment indicated   SLP Diagnosis WNL oropharyngeal swallowing function   Rehab Potential (SLP Eval) good, to achieve stated therapy goals   Therapy Frequency (SLP Eval) evaluation only   Comment, Therapy Assessment/Plan (SLP) Clinical dysphagia examination completed per MD order. The patient presents with intact oropharyngeal swallowing mechanism, marked by good strength, coordination and symmetry. He reports no prior hx of oropharyngeal swallowing difficulties, states his dysphagia has been related to esophageal ca and discomfort, which has limited him to mostly only drinking liquids. From an oropharyngeal swallowing standpoint the patient does not require any type of diet modification. He prefers a clear liquid diet at this time for ease and comfort with " primary nutrition/hydration/medication via PEG. SLP will sign off as oropharyngeal swallowing is intact and ongoing tx is not needed.     Therapy Plan Review/Discharge Plan (SLP)   Therapy Plan Review (SLP) evaluation/treatment results reviewed;care plan/treatment goals reviewed;patient;participants included   Demonstrates Need for Referral to Another Service (SLP) clinical nutrition services/dietitian   SLP Discharge Planning    SLP Discharge Recommendation (DC Rec)   (defer to OT and PT)   SLP Rationale for DC Rec patient does not present with oropharyngeal dysphagia, does not have needs for ongoing SLP intervention   SLP Brief overview of current status  Oropharyngeal swallowing is intact. At this time pt prefers a clear liquid diet for comfort due to esophageal ca. Diet modification not needed from an oropharyngeal swallowing standpoint.    Total Evaluation Time   Total Evaluation Time (Minutes) 20

## 2021-03-07 ENCOUNTER — APPOINTMENT (OUTPATIENT)
Dept: PHYSICAL THERAPY | Facility: CLINIC | Age: 61
End: 2021-03-07
Payer: COMMERCIAL

## 2021-03-07 LAB
ANION GAP SERPL CALCULATED.3IONS-SCNC: 5 MMOL/L (ref 3–14)
BUN SERPL-MCNC: 22 MG/DL (ref 7–30)
CALCIUM SERPL-MCNC: 9 MG/DL (ref 8.5–10.1)
CHLORIDE SERPL-SCNC: 105 MMOL/L (ref 94–109)
CO2 SERPL-SCNC: 25 MMOL/L (ref 20–32)
COPATH REPORT: NORMAL
CREAT SERPL-MCNC: 0.53 MG/DL (ref 0.66–1.25)
ERYTHROCYTE [DISTWIDTH] IN BLOOD BY AUTOMATED COUNT: 17.1 % (ref 10–15)
GFR SERPL CREATININE-BSD FRML MDRD: >90 ML/MIN/{1.73_M2}
GLUCOSE BLDC GLUCOMTR-MCNC: 110 MG/DL (ref 70–99)
GLUCOSE BLDC GLUCOMTR-MCNC: 111 MG/DL (ref 70–99)
GLUCOSE SERPL-MCNC: 95 MG/DL (ref 70–99)
HCT VFR BLD AUTO: 31.6 % (ref 40–53)
HGB BLD-MCNC: 9.5 G/DL (ref 13.3–17.7)
MAGNESIUM SERPL-MCNC: 2.3 MG/DL (ref 1.6–2.3)
MCH RBC QN AUTO: 23.2 PG (ref 26.5–33)
MCHC RBC AUTO-ENTMCNC: 30.1 G/DL (ref 31.5–36.5)
MCV RBC AUTO: 77 FL (ref 78–100)
PHOSPHATE SERPL-MCNC: 3.3 MG/DL (ref 2.5–4.5)
PLATELET # BLD AUTO: 424 10E9/L (ref 150–450)
POTASSIUM SERPL-SCNC: 4 MMOL/L (ref 3.4–5.3)
RBC # BLD AUTO: 4.1 10E12/L (ref 4.4–5.9)
SODIUM SERPL-SCNC: 136 MMOL/L (ref 133–144)
TRIGL SERPL-MCNC: 125 MG/DL
WBC # BLD AUTO: 9.4 10E9/L (ref 4–11)

## 2021-03-07 PROCEDURE — 250N000013 HC RX MED GY IP 250 OP 250 PS 637: Performed by: STUDENT IN AN ORGANIZED HEALTH CARE EDUCATION/TRAINING PROGRAM

## 2021-03-07 PROCEDURE — 272N000078 HC NUTRITION PRODUCT INTERMEDIATE LITER

## 2021-03-07 PROCEDURE — 250N000011 HC RX IP 250 OP 636: Performed by: NURSE PRACTITIONER

## 2021-03-07 PROCEDURE — 84100 ASSAY OF PHOSPHORUS: CPT | Performed by: STUDENT IN AN ORGANIZED HEALTH CARE EDUCATION/TRAINING PROGRAM

## 2021-03-07 PROCEDURE — 120N000002 HC R&B MED SURG/OB UMMC

## 2021-03-07 PROCEDURE — 84478 ASSAY OF TRIGLYCERIDES: CPT | Performed by: NEUROLOGICAL SURGERY

## 2021-03-07 PROCEDURE — 97116 GAIT TRAINING THERAPY: CPT | Mod: GP | Performed by: REHABILITATION PRACTITIONER

## 2021-03-07 PROCEDURE — 83735 ASSAY OF MAGNESIUM: CPT | Performed by: STUDENT IN AN ORGANIZED HEALTH CARE EDUCATION/TRAINING PROGRAM

## 2021-03-07 PROCEDURE — 36592 COLLECT BLOOD FROM PICC: CPT | Performed by: STUDENT IN AN ORGANIZED HEALTH CARE EDUCATION/TRAINING PROGRAM

## 2021-03-07 PROCEDURE — 258N000003 HC RX IP 258 OP 636: Performed by: STUDENT IN AN ORGANIZED HEALTH CARE EDUCATION/TRAINING PROGRAM

## 2021-03-07 PROCEDURE — 36592 COLLECT BLOOD FROM PICC: CPT | Performed by: NEUROLOGICAL SURGERY

## 2021-03-07 PROCEDURE — 999N001017 HC STATISTIC GLUCOSE BY METER IP

## 2021-03-07 PROCEDURE — 85027 COMPLETE CBC AUTOMATED: CPT | Performed by: STUDENT IN AN ORGANIZED HEALTH CARE EDUCATION/TRAINING PROGRAM

## 2021-03-07 PROCEDURE — 80048 BASIC METABOLIC PNL TOTAL CA: CPT | Performed by: STUDENT IN AN ORGANIZED HEALTH CARE EDUCATION/TRAINING PROGRAM

## 2021-03-07 PROCEDURE — 250N000011 HC RX IP 250 OP 636: Performed by: STUDENT IN AN ORGANIZED HEALTH CARE EDUCATION/TRAINING PROGRAM

## 2021-03-07 PROCEDURE — 97530 THERAPEUTIC ACTIVITIES: CPT | Mod: GP | Performed by: REHABILITATION PRACTITIONER

## 2021-03-07 RX ORDER — PHYTONADIONE 5 MG/1
5 TABLET ORAL DAILY
Status: DISCONTINUED | OUTPATIENT
Start: 2021-03-08 | End: 2021-03-07

## 2021-03-07 RX ORDER — DEXTROSE MONOHYDRATE 100 MG/ML
INJECTION, SOLUTION INTRAVENOUS CONTINUOUS PRN
Status: DISCONTINUED | OUTPATIENT
Start: 2021-03-07 | End: 2021-03-10 | Stop reason: HOSPADM

## 2021-03-07 RX ORDER — HEPARIN SODIUM 5000 [USP'U]/.5ML
5000 INJECTION, SOLUTION INTRAVENOUS; SUBCUTANEOUS EVERY 8 HOURS
Status: DISCONTINUED | OUTPATIENT
Start: 2021-03-08 | End: 2021-03-10 | Stop reason: HOSPADM

## 2021-03-07 RX ORDER — LANOLIN ALCOHOL/MO/W.PET/CERES
100 CREAM (GRAM) TOPICAL DAILY
Status: DISCONTINUED | OUTPATIENT
Start: 2021-03-08 | End: 2021-03-10 | Stop reason: HOSPADM

## 2021-03-07 RX ORDER — SODIUM CHLORIDE 9 MG/ML
INJECTION, SOLUTION INTRAVENOUS CONTINUOUS
Status: DISCONTINUED | OUTPATIENT
Start: 2021-03-07 | End: 2021-03-09

## 2021-03-07 RX ORDER — MAGNESIUM CARB/ALUMINUM HYDROX 105-160MG
296 TABLET,CHEWABLE ORAL ONCE
Status: COMPLETED | OUTPATIENT
Start: 2021-03-07 | End: 2021-03-07

## 2021-03-07 RX ADMIN — SODIUM CHLORIDE: 9 INJECTION, SOLUTION INTRAVENOUS at 15:52

## 2021-03-07 RX ADMIN — POLYETHYLENE GLYCOL 3350 17 G: 17 POWDER, FOR SOLUTION ORAL at 08:57

## 2021-03-07 RX ADMIN — METHOCARBAMOL 750 MG: 750 TABLET, FILM COATED ORAL at 12:22

## 2021-03-07 RX ADMIN — MAGNESIUM CITRATE 296 ML: 1.75 LIQUID ORAL at 18:03

## 2021-03-07 RX ADMIN — METHOCARBAMOL 750 MG: 750 TABLET, FILM COATED ORAL at 18:03

## 2021-03-07 RX ADMIN — PHYTONADIONE 2 MG: 10 INJECTION, EMULSION INTRAMUSCULAR; INTRAVENOUS; SUBCUTANEOUS at 12:22

## 2021-03-07 RX ADMIN — Medication 5 ML: at 21:15

## 2021-03-07 RX ADMIN — FAMOTIDINE 20 MG: 40 POWDER, FOR SUSPENSION ORAL at 08:55

## 2021-03-07 RX ADMIN — METHOCARBAMOL 750 MG: 750 TABLET, FILM COATED ORAL at 08:54

## 2021-03-07 RX ADMIN — FAMOTIDINE 20 MG: 40 POWDER, FOR SUSPENSION ORAL at 21:25

## 2021-03-07 RX ADMIN — GABAPENTIN 250 MG: 250 SUSPENSION ORAL at 21:26

## 2021-03-07 RX ADMIN — MAGNESIUM HYDROXIDE 30 ML: 400 SUSPENSION ORAL at 14:21

## 2021-03-07 RX ADMIN — POLYETHYLENE GLYCOL 3350 17 G: 17 POWDER, FOR SOLUTION ORAL at 14:22

## 2021-03-07 RX ADMIN — Medication 1 PATCH: at 08:56

## 2021-03-07 RX ADMIN — SODIUM CHLORIDE 1000 ML: 9 INJECTION, SOLUTION INTRAVENOUS at 12:28

## 2021-03-07 ASSESSMENT — ACTIVITIES OF DAILY LIVING (ADL)
ADLS_ACUITY_SCORE: 19

## 2021-03-07 ASSESSMENT — MIFFLIN-ST. JEOR: SCORE: 1612.32

## 2021-03-07 NOTE — PROGRESS NOTES
"CLINICAL NUTRITION SERVICES - BRIEF NOTE      Nutrition Prescription     RECOMMENDATIONS FOR MDs/PROVIDERS TO ORDER:  --Additional water flushes as per primary team.   --If NSG team would like RD to manage TF orders without co-sign, please place consult: Nutrition Services Adult IP Consult, with drop down reason \"Registered Dietitian to Order TF per Medical Nutrition Therapy Guidelines.\"     Recommendations already ordered by Registered Dietitian (RD):  --GOAL: Isosource 1.5 @ goal 65 ml/hr (1560 ml/day) to provide 2340 kcals (30 kcal/kg/day), 106 g PRO (1.4 g/kg/day), 1186 ml free H2O, 275 g CHO and 23 g Fiber daily.  --Start TF @ 10 ml/hr and advance by 10 ml q 8 hrs until goal rate (final advancement can be by 15 ml).  --Do not start or advance TF rate unless K+ >3.0, Mg++ > 1.5,  and Phos > 1.9.  --Minimum 60 ml q 4 hrs water flushes for tube patency.  --If gastric enteral access: HOB > 30 degrees  --Certavite, 100 mg Thiamine.    --Continue current TPN regimen and not planning to renew TPN this evening.     --Weigh pt.      Future/Additional Recommendations:  --Monitor TF tolerance, weight trends.     *Please see full assessment note from 3/3/2021    New Findings:  Consult received per provider for \"Tube feed recommendation; d/c TPN, patient has PEG now.\"     Paged NSG team, okay for slow TF advancement to goal rate today and wean TPN. TPN change request sent to PharmD. Spoke with PharmD, pt has not been at goal Dex yet due to hypophosphatemia. Dex increased to 170 g on 3/6 with goal of 300 g. Lytes WNL. Plan to continue TPN as currently ordered and not renew TPN this evening. Last BM 3/3 per chart.     Labs: K+ 4.0 (WNL), Cr 0.53 (L), Mg++ 2.3 (WNL), Phos 3.3 (WNL)  Meds: bisacodyl, senokot, mag citrate x 1, pink lady enema, miralax TID    Interventions  Collaboration with other providers - NSG team, PharmD  Enteral Nutrition - Initiate  Parenteral Nutrition/IV Fluids - weaning TPN today    RD to follow per " protocol.    Luci Mccrary MS, RD, LD, CNSC  Weekend Pager: [phone]  6A/7D RD Pager: [phone]

## 2021-03-07 NOTE — PLAN OF CARE
Status: Pt POD #2 G-tube insertion; hx esophageal cancer who presented with Cauda Equina due to tumor compression. He is now s/p laminectomy and decompression.  Vitals: VSS on RA. Continuous pulse ox in place.  Neuros: A&Ox4. N/T to LLE. LLE 4/5. All other extremities 5/5.  IV: PIV SL. R DL PICC infusing TPN and lipids.  Labs/Electrolytes: WNL.  Resp/trach: Stable on RA. LS clear all fields.  Diet: NPO. Nutrition consult placed. Plan to start TF today.  Bowel status: No BM in 2 weeks per pt report. Mag citrate and pink lady enema available for today.  : Unable to void. Straight cathed for 1025 mL @ 0530.  Skin: Back incision w/ primapore, CDI. Hemovac x 1 to back. G-tube clamped. Nicotine patch in place on L arm. Large bump/cyst in center of chest prior to hospitalization.  Pain: Denies.  Activity: Up w/ 1 GB and walker.  Social: No calls or visitors this shift.  Plan: Continue to monitor and follow POC.

## 2021-03-07 NOTE — PROGRESS NOTES
[facility], [place]   03/07/2021  Neurosurgery Progress Note:    Assessment:  Junito Wang is a 60 year old man with esophageal cancer who presented with Cauda Equina syndrome due to tumor compression. He is now s/p laminectomy and decompression. Exam improving after surgery. Hemovac removed (3/7/21).    Plan:  - Serial neuro exams  - Pain control  - Nutrition consulted; appreciate recs  -- Start tube feeds via PEG  -- IVF and TPN until taking adequate nutrition from TF  - Appreciate onc and rad/onc assistance  - Bowel regimen  - PRN antiemetics  - PT/OT: home A  - SLP: ok for clear liquid diet  -- follow-up with thoracic regarding PO intake limitations in the setting of esophageal cancer  - SCDs for DVT proph    -----------------------------------  Brando Mackenzie MD  Neurosurgery Resident, PGY-2    Please contact neurosurgery resident on call with questions.    Dial * * *[phone], enter [phone] when prompted.   -----------------------------------    Interval History: PEG placed    Objective:   Temp:  [95.9  F (35.5  C)-97.2  F (36.2  C)] 96.8  F (36  C)  Pulse:  [63-71] 63  Resp:  [16] 16  BP: ()/(62-67) 100/63  SpO2:  [97 %-99 %] 98 %  I/O last 3 completed shifts:  In: 210 [I.V.:20; NG/GT:190]  Out: 3007 [Urine:2750; Emesis/NG output:200; Drains:57]    Gen: Appears comfortable, NAD  Wound: clean, dry, intact; drain removed  Neurologic:  - Alert & Oriented to person, place, time, and situation  - Follows commands briskly  - Speech fluent, spontaneous. No aphasia or dysarthria.  - No gaze preference. No apparent hemineglect.  - PERRL, EOMI  - Face symmetric     Del Tr Bi WE WF Gr   R 5 5 5 5 5 5   L 5 5 5 5 5 5    HF KE KF DF PF    R 5 5 5 5 5    L 5 5 5 4+ 4      Sensation improving in left leg    LABS:  Recent Labs   Lab 03/07/21  0701 03/06/21  0640 03/05/21  0629    136 136   POTASSIUM 4.0 4.2 3.6   CHLORIDE 105 106 106   CO2 25 25 24   ANIONGAP 5 6 6   GLC 95 118*  108*   BUN 22 20 20   CR 0.53* 0.48* 0.45*   ARDEN 9.0 8.9 8.6       Recent Labs   Lab 03/07/21  0701   WBC 9.4   RBC 4.10*   HGB 9.5*   HCT 31.6*   MCV 77*   MCH 23.2*   MCHC 30.1*   RDW 17.1*          IMAGING:  No results found for this or any previous visit (from the past 24 hour(s)).     I have reviewed the history. I have seen and examined the patient myself and agree with the assessment and plan above.  ANALI Mercedes MD

## 2021-03-07 NOTE — PLAN OF CARE
Status: POD 1 Gtube insertion; hx esophageal cancer who presented with Cauda Equina due to tumor compression. He is now s/p laminectomy and decompression  VS: VSS on RA  Neuros: A&Ox4. LLE 4/5 w/numbness   GI: NPO. G tube clamped, okay for meds and hydration. No BM this shift, BM meds given. Enema in pt bin for tomorrow AM.  : Pt voided once this shift with retention. Straight cathed 2230 for 900cc  IV: TPN @80cc, and lipids via R PICC, second lumen hep locked. L PIV, SL  Activity: A1+ GB+ Walker. Pt ambulated in halls x1 this shift.  Pain: c/o mild hip pain. Icy hot patches applied.   Skin: Back incision covered w/primapore, CDI. Hemovac w/ serosang output. Chest pustule, PTA  Labs/Tests: WNL  Plan of care: Continue to encourage activity and independence with cares. Plan to start TF tmrw. No discharge plan at this time

## 2021-03-07 NOTE — PROGRESS NOTES
Neurosurgery Brief Progress Note    hemovac drain removal    Drain not deemed to be necessary with low output. Drain site was prepped in usual sterile fashion with chloraprep. The drain was taken off suction. The sutures securing the drain were cut and the site was re-prepped. The drain was removed with tip intact. Chloroprep applied. Primipore was then placed on top. No immediate complication was observed and the patient tolerated the procedure well.     Rosetta Martínez MD  Neurosurgery Resident, PGY-1    Please contact neurosurgery resident on call with questions.    Dial * * [phone], enter [phone] when prompted.

## 2021-03-08 ENCOUNTER — APPOINTMENT (OUTPATIENT)
Dept: PHYSICAL THERAPY | Facility: CLINIC | Age: 61
End: 2021-03-08
Payer: COMMERCIAL

## 2021-03-08 LAB
ALBUMIN SERPL-MCNC: 2.4 G/DL (ref 3.4–5)
ALP SERPL-CCNC: 175 U/L (ref 40–150)
ALT SERPL W P-5'-P-CCNC: 53 U/L (ref 0–70)
ANION GAP SERPL CALCULATED.3IONS-SCNC: 4 MMOL/L (ref 3–14)
AST SERPL W P-5'-P-CCNC: 25 U/L (ref 0–45)
BILIRUB SERPL-MCNC: 0.7 MG/DL (ref 0.2–1.3)
BUN SERPL-MCNC: 21 MG/DL (ref 7–30)
CALCIUM SERPL-MCNC: 8.7 MG/DL (ref 8.5–10.1)
CHLORIDE SERPL-SCNC: 108 MMOL/L (ref 94–109)
CO2 SERPL-SCNC: 25 MMOL/L (ref 20–32)
COPATH REPORT: NORMAL
CREAT SERPL-MCNC: 0.55 MG/DL (ref 0.66–1.25)
ERYTHROCYTE [DISTWIDTH] IN BLOOD BY AUTOMATED COUNT: 17.4 % (ref 10–15)
GFR SERPL CREATININE-BSD FRML MDRD: >90 ML/MIN/{1.73_M2}
GLUCOSE BLDC GLUCOMTR-MCNC: 101 MG/DL (ref 70–99)
GLUCOSE BLDC GLUCOMTR-MCNC: 106 MG/DL (ref 70–99)
GLUCOSE BLDC GLUCOMTR-MCNC: 110 MG/DL (ref 70–99)
GLUCOSE BLDC GLUCOMTR-MCNC: 118 MG/DL (ref 70–99)
GLUCOSE SERPL-MCNC: 84 MG/DL (ref 70–99)
HCT VFR BLD AUTO: 30.1 % (ref 40–53)
HGB BLD-MCNC: 9.2 G/DL (ref 13.3–17.7)
INR PPP: 1.16 (ref 0.86–1.14)
MAGNESIUM SERPL-MCNC: 2.5 MG/DL (ref 1.6–2.3)
MCH RBC QN AUTO: 23.6 PG (ref 26.5–33)
MCHC RBC AUTO-ENTMCNC: 30.6 G/DL (ref 31.5–36.5)
MCV RBC AUTO: 77 FL (ref 78–100)
PHOSPHATE SERPL-MCNC: 3.4 MG/DL (ref 2.5–4.5)
PLATELET # BLD AUTO: 441 10E9/L (ref 150–450)
POTASSIUM SERPL-SCNC: 4.1 MMOL/L (ref 3.4–5.3)
PREALB SERPL IA-MCNC: 20 MG/DL (ref 15–45)
PROT SERPL-MCNC: 5.4 G/DL (ref 6.8–8.8)
RBC # BLD AUTO: 3.9 10E12/L (ref 4.4–5.9)
SODIUM SERPL-SCNC: 137 MMOL/L (ref 133–144)
WBC # BLD AUTO: 10.3 10E9/L (ref 4–11)

## 2021-03-08 PROCEDURE — 97110 THERAPEUTIC EXERCISES: CPT | Mod: GP

## 2021-03-08 PROCEDURE — 36415 COLL VENOUS BLD VENIPUNCTURE: CPT | Performed by: NEUROLOGICAL SURGERY

## 2021-03-08 PROCEDURE — 250N000013 HC RX MED GY IP 250 OP 250 PS 637: Performed by: STUDENT IN AN ORGANIZED HEALTH CARE EDUCATION/TRAINING PROGRAM

## 2021-03-08 PROCEDURE — 83735 ASSAY OF MAGNESIUM: CPT | Performed by: NEUROLOGICAL SURGERY

## 2021-03-08 PROCEDURE — 250N000011 HC RX IP 250 OP 636: Performed by: STUDENT IN AN ORGANIZED HEALTH CARE EDUCATION/TRAINING PROGRAM

## 2021-03-08 PROCEDURE — 84134 ASSAY OF PREALBUMIN: CPT | Performed by: NEUROLOGICAL SURGERY

## 2021-03-08 PROCEDURE — 250N000011 HC RX IP 250 OP 636: Performed by: NURSE PRACTITIONER

## 2021-03-08 PROCEDURE — 272N000078 HC NUTRITION PRODUCT INTERMEDIATE LITER

## 2021-03-08 PROCEDURE — 97116 GAIT TRAINING THERAPY: CPT | Mod: GP

## 2021-03-08 PROCEDURE — 85610 PROTHROMBIN TIME: CPT | Performed by: NEUROLOGICAL SURGERY

## 2021-03-08 PROCEDURE — 999N001017 HC STATISTIC GLUCOSE BY METER IP

## 2021-03-08 PROCEDURE — 80053 COMPREHEN METABOLIC PANEL: CPT | Performed by: NEUROLOGICAL SURGERY

## 2021-03-08 PROCEDURE — 258N000003 HC RX IP 258 OP 636: Performed by: STUDENT IN AN ORGANIZED HEALTH CARE EDUCATION/TRAINING PROGRAM

## 2021-03-08 PROCEDURE — 97530 THERAPEUTIC ACTIVITIES: CPT | Mod: GP

## 2021-03-08 PROCEDURE — 84100 ASSAY OF PHOSPHORUS: CPT | Performed by: NEUROLOGICAL SURGERY

## 2021-03-08 PROCEDURE — 85027 COMPLETE CBC AUTOMATED: CPT | Performed by: NEUROLOGICAL SURGERY

## 2021-03-08 PROCEDURE — 250N000013 HC RX MED GY IP 250 OP 250 PS 637: Performed by: NEUROLOGICAL SURGERY

## 2021-03-08 PROCEDURE — 120N000002 HC R&B MED SURG/OB UMMC

## 2021-03-08 RX ADMIN — SODIUM CHLORIDE: 9 INJECTION, SOLUTION INTRAVENOUS at 15:23

## 2021-03-08 RX ADMIN — HEPARIN SODIUM 5000 UNITS: 5000 INJECTION, SOLUTION INTRAVENOUS; SUBCUTANEOUS at 15:59

## 2021-03-08 RX ADMIN — GABAPENTIN 250 MG: 250 SUSPENSION ORAL at 22:56

## 2021-03-08 RX ADMIN — HEPARIN SODIUM 5000 UNITS: 5000 INJECTION, SOLUTION INTRAVENOUS; SUBCUTANEOUS at 23:00

## 2021-03-08 RX ADMIN — FAMOTIDINE 20 MG: 40 POWDER, FOR SUSPENSION ORAL at 08:27

## 2021-03-08 RX ADMIN — Medication 5 ML: at 19:00

## 2021-03-08 RX ADMIN — METHOCARBAMOL 750 MG: 750 TABLET, FILM COATED ORAL at 08:26

## 2021-03-08 RX ADMIN — MULTIVITAMIN 15 ML: LIQUID ORAL at 08:26

## 2021-03-08 RX ADMIN — METHOCARBAMOL 750 MG: 750 TABLET, FILM COATED ORAL at 12:22

## 2021-03-08 RX ADMIN — Medication 1 PATCH: at 08:33

## 2021-03-08 RX ADMIN — Medication 5 ML: at 06:16

## 2021-03-08 RX ADMIN — HEPARIN SODIUM 5000 UNITS: 5000 INJECTION, SOLUTION INTRAVENOUS; SUBCUTANEOUS at 08:28

## 2021-03-08 RX ADMIN — FAMOTIDINE 20 MG: 40 POWDER, FOR SUSPENSION ORAL at 20:02

## 2021-03-08 RX ADMIN — METHOCARBAMOL 750 MG: 750 TABLET, FILM COATED ORAL at 15:59

## 2021-03-08 RX ADMIN — Medication 5 MG: at 08:27

## 2021-03-08 RX ADMIN — METHOCARBAMOL 750 MG: 750 TABLET, FILM COATED ORAL at 20:02

## 2021-03-08 RX ADMIN — THIAMINE HCL TAB 100 MG 100 MG: 100 TAB at 08:27

## 2021-03-08 ASSESSMENT — ACTIVITIES OF DAILY LIVING (ADL)
ADLS_ACUITY_SCORE: 19

## 2021-03-08 NOTE — PROGRESS NOTES
Pt has been alert and oriented X4, VSS, neuro intact, denied any pain/nausea. Pt has been NPO, TF rate just increased to  40 mL/hr at 1400 with 60 mL of water flushes q 4 hrs in btwn. TF rate increase by 10 mL q shift until goal rate of 65 mL/hr acchived. Pt appeared to tolerate TF well. Pt had multiple episodes of diarrhea this am, possible from the amt of bowel med given yesterday to correct his constipation. Andrews patent with 300 mL of UO this shift. IVF running at 75 mL/hr.  Dressing on low back incision site cdi. Pt has been stable, able to make needs known.

## 2021-03-08 NOTE — PROGRESS NOTES
[facility], [place]   03/08/2021  Neurosurgery Progress Note:    Assessment:  Junito Wang is a 60 year old male with esophageal cancer who presented with Cauda Equina due to tumor compression. He is no s/p laminectomy and decompression. Exam improving after surgery. Hemovac removed (3/7/21).    Plan:  - Serial neuro exams  - Pain control  - Nutrition consulted; appreciate recs  -- Start tube feeds via PEG  -- IVF and TPN until taking adequate nutrition from TF  - Appreciate onc and rad/onc assistance  - Bowel regimen  - PRN antiemetics  - PT/OT: home A  - SLP: ok for clear liquid diet  -- follow-up with thoracic regarding PO intake limitations in the setting of esophageal cancer  - SCDs for DVT proph    -----------------------------------  Ed Bach MD  Neurosurgery Resident, PGY-2    Please contact neurosurgery resident on call with questions.    Dial * * [phone], enter [phone] when prompted.   -----------------------------------    Interval History: NAEON    Objective:   Temp:  [95.4  F (35.2  C)-97.3  F (36.3  C)] 96.8  F (36  C)  Pulse:  [72-84] 73  Resp:  [16] 16  BP: ()/(43-64) 97/64  SpO2:  [97 %-99 %] 99 %  I/O last 3 completed shifts:  In: 1835 [I.V.:1535; NG/GT:60]  Out: 1825 [Urine:1825]    Gen: Appears comfortable, NAD  Wound: clean, dry, intact; drain removed  Neurologic:  - Alert & Oriented to person, place, time, and situation  - Follows commands briskly  - Speech fluent, spontaneous. No aphasia or dysarthria.  - No gaze preference. No apparent hemineglect.  - PERRL, EOMI  - Face symmetric     Del Tr Bi WE WF Gr   R 5 5 5 5 5 5   L 5 5 5 5 5 5    HF KE KF DF PF    R 5 5 5 5 5    L 5 5 5 4+ 4      Sensation improving in left leg    LABS:  Recent Labs   Lab 03/08/21  0621 03/07/21  0701 03/06/21  0640    136 136   POTASSIUM 4.1 4.0 4.2   CHLORIDE 108 105 106   CO2 25 25 25   ANIONGAP 4 5 6   GLC 84 95 118*   BUN 21 22 20   CR 0.55* 0.53* 0.48*   ARDEN 8.7  9.0 8.9       Recent Labs   Lab 03/08/21  0621   WBC 10.3   RBC 3.90*   HGB 9.2*   HCT 30.1*   MCV 77*   MCH 23.6*   MCHC 30.6*   RDW 17.4*          IMAGING:  No results found for this or any previous visit (from the past 24 hour(s)).

## 2021-03-08 NOTE — PLAN OF CARE
Care from 7 am to 7pm  A&Ox4.No change in neuro status.B/P runs low; pt did not receive his morning lisinopril and amlodipine; pt requested those two meds to be discontinued as his B/P has been on the lower side for the last two weeks per pt; provider is notified.B/P improved after 1 L NS bolus. Ns started at 75 ml/hr. NPO. TPN is infusing at 80ml/hr. TF started at 2 pm via PEG tube at a rate of 10 ml/hr; it needs to be advanced by 10 ml Q 8 hrs to a goal rate of 65 ml/hr if pt tolerates.Meds goes via PEG tube. Pt had mirlaex2, milk of magnesia and mag citrate; but no Bm yet; last Bm two weeks ago per pt. Refused pink lady enema for today.straight cath for 950 ml at 4:30 pm. Back incision dressing with dried drainage; border marked; some more drainage after hemovac was removed.Up walking in [place] with assist of one; gait belt and a walker. Continue with POC.  Addendum  Pt just had large formed and watery brown stool; pt could not make it to the bathroom.

## 2021-03-08 NOTE — OP NOTE
Preoperative diagnosis:                        Esophageal cancer  Postoperative diagnosis:                      Esophageal cancer    Procedure:   1. EGD  2. PEG insertion    Anesthesia: General   Surgeon: Jose Curiel   Resident surgeon: Lizet La Ranjan Gupta  EBL:  Less than 5 cc    Complications: none immediate     Description of procedure  The patient was brought to the room and placed supine upon the table.  After confirming the patient's identity and the consent, appropriate monitoring devices were placed.  General anesthesia was administered and the patient was intubated.   The endoscope was passed into the posterior pharynx and into the esophagus without difficulty.   The GE junction was located at 40 cm from the incisors and there was no evidence of a hernia.  The stomach was easily entered.  The stomach was insufflated and the proposed PEG site showed 1 to 1 transmission of pressure.  The finder needle entered the stomach easily and a snare was used to pull the guide wire out of the mouth.  A 20 Fr PEG tube was then passed through the esophagus into the stomach and the PEG was secured with a bumper.  The PEG appeared appropriately placed against the stomach wall.  The air was aspirated and the endoscope was withdrawn.  A sterile dressing was placed.  The catheter was placed to gravity drainage.

## 2021-03-08 NOTE — PLAN OF CARE
Status: POD #3 G-tube placment with hx esophageal cancer who presented with cauda equina now s/p laminectomy and decompression.  Vitals: VSS on RA. Continuous pulse ox. BP low earlier today. BP meds held per MD.    Neuros: A&O x4. N/T to LLE. LLE 4/5.   IV: PIV SL. R DL PICC infusing NS at 75 mL/hr.   Labs/Electrolytes: WNL.  Resp/trach: Stable on RA.   Diet: NPO. TF running at 20 mL/hr increase at 6am to 30 mL/hr. Manual flush.  Bowel status: Large BM x2-3 this evening.   : Unable to void. Andrews replaced this evening.   Skin: Back incision w/ primapore, dressing changed per MD d/t soil with stool. Mepilex on coccyx. Icy hot on bilateral hips.  Pain: Denies.  Activity: Up w/ 1 GB and walker.  Plan: Continue to monitor and follow POC.

## 2021-03-08 NOTE — CONSULTS
Care Management Initial Consult    General Information  Assessment completed with: VM-chart review  Type of CM/SW Visit: Initial Assessment  Readmission within the last 30 days: current reason for admission unrelated to previous admission   Return Category: New Diagnosis    Reason for Consult: discharge planning  Advance Care Planning: No ACP documents on file.       Communication Assessment  Patient's communication style: spoken language (English or Bilingual)    Hearing Difficulty or Deaf: no   Wear Glasses or Blind: yes    Cognitive  Cognitive/Neuro/Behavioral: WDL     Living Environment:   Current living Arrangements: house         Family/Social Support:  Care provided by: self       Current Resources:   Patient receiving home care services: No  Community Resources: None  Equipment currently used at home: walker, rolling (Pt reports he has a walker at home that belonged to his Mom)  Supplies currently used at home: None    Lifestyle & Psychosocial Needs:    Socioeconomic History     Marital status: Single     Spouse name: Not on file     Number of children: Not on file     Years of education: Not on file     Highest education level: Not on file     Tobacco Use     Smoking status: Current Every Day Smoker     Packs/day: 1.00     Years: 44.00     Pack years: 44.00     Types: Cigarettes     Start date: 1977     Smokeless tobacco: Never Used     Tobacco comment: Recognizes importance of quitting; willing to try Nicotine patches   Substance and Sexual Activity     Alcohol use: Yes     Comment: occasional     Drug use: No     Sexual activity: Never       Functional Status:  Prior to admission patient needed assistance: Independent.      Additional Information:  Writer attempted to complete care management assessment with patient, out of the room for gtube check. Per chart review, patient lives locally with his brother, independently. Patient had a gtube placed and has started enteral feedings, will need enteral  feedings at discharge. Benefit check sent to [facility]. Patient will need patient learning for enteral feeds, order placed at this time. Hemovac removed 3/7. RNCC will continue to follow for discharge planning, will need to f/u with patient to complete assessment.     [facility] (Enteral feedings)  Phone: [phone]  Fax: [phone]    Crystal Park RNCC, BSN    [facility]    Medicine Group  [address] [place] [address]    [email or web address]    Office: [phone] Pager: [phone]  To contact the weekend RNCC, page [phone].

## 2021-03-08 NOTE — PROGRESS NOTES
BRIEF PROGRESS NOTE    PEG tube and the insertion site check complete.  Tube was excessively tight upon arrival, now with appropriate position and with appropriate tension.  No erythema or induration.  Pt educated on how to maintain appropriate tension and demonstrated understanding.  No other scheduled PEG follow up necessary.    Boni Cary PA-C  P: [phone]

## 2021-03-08 NOTE — PLAN OF CARE
Status: Pt POD #3 G-tube insertion; hx esophageal cancer who presented with Cauda Equina due to tumor compression. He is now s/p laminectomy and decompression.  Vitals: VSS on RA. Continuous pulse ox in place.  Neuros: A&Ox4. N/T to LLE. LLE 4/5. All other extremities 5/5.  IV: PIV SL. R DL PICC infusing NS @ 75 mL/hr.  Labs/Electrolytes: WNL.  Resp/trach: Stable on RA. LS clear all fields.  Diet: NPO. TF infusing through PEG @ 30 mL/hr. Increase by 10 mL q 8 hrs. To be increased to 40 mL @ 1400. Goal rate of 65 mL/hr.  Bowel status: No BM this shift. Large BM on evening shift last night. BS active all quads.  : Andrews in place for retention.   Skin: Back incision w/ primapore, CDI. Nicotine patch in place. Large bump/cyst in center of chest prior to hospitalization.  Pain: Denies.  Activity: Up w/ 1 GB and walker.  Social: No calls or visitors this shift.  Plan: Continue to monitor and follow POC.

## 2021-03-09 ENCOUNTER — HOME INFUSION (PRE-WILLOW HOME INFUSION) (OUTPATIENT)
Dept: PHARMACY | Facility: CLINIC | Age: 61
End: 2021-03-09

## 2021-03-09 ENCOUNTER — APPOINTMENT (OUTPATIENT)
Dept: OCCUPATIONAL THERAPY | Facility: CLINIC | Age: 61
End: 2021-03-09
Payer: COMMERCIAL

## 2021-03-09 LAB
ANION GAP SERPL CALCULATED.3IONS-SCNC: 7 MMOL/L (ref 3–14)
BUN SERPL-MCNC: 17 MG/DL (ref 7–30)
CALCIUM SERPL-MCNC: 8.1 MG/DL (ref 8.5–10.1)
CHLORIDE SERPL-SCNC: 108 MMOL/L (ref 94–109)
CO2 SERPL-SCNC: 23 MMOL/L (ref 20–32)
CREAT SERPL-MCNC: 0.64 MG/DL (ref 0.66–1.25)
ERYTHROCYTE [DISTWIDTH] IN BLOOD BY AUTOMATED COUNT: 17.5 % (ref 10–15)
GFR SERPL CREATININE-BSD FRML MDRD: >90 ML/MIN/{1.73_M2}
GLUCOSE SERPL-MCNC: 112 MG/DL (ref 70–99)
HCT VFR BLD AUTO: 28.8 % (ref 40–53)
HGB BLD-MCNC: 8.8 G/DL (ref 13.3–17.7)
MAGNESIUM SERPL-MCNC: 2.7 MG/DL (ref 1.6–2.3)
MCH RBC QN AUTO: 24 PG (ref 26.5–33)
MCHC RBC AUTO-ENTMCNC: 30.6 G/DL (ref 31.5–36.5)
MCV RBC AUTO: 79 FL (ref 78–100)
PHOSPHATE SERPL-MCNC: 2.8 MG/DL (ref 2.5–4.5)
PLATELET # BLD AUTO: 410 10E9/L (ref 150–450)
POTASSIUM SERPL-SCNC: 3.9 MMOL/L (ref 3.4–5.3)
RBC # BLD AUTO: 3.67 10E12/L (ref 4.4–5.9)
SODIUM SERPL-SCNC: 138 MMOL/L (ref 133–144)
WBC # BLD AUTO: 8.1 10E9/L (ref 4–11)

## 2021-03-09 PROCEDURE — 83735 ASSAY OF MAGNESIUM: CPT | Performed by: STUDENT IN AN ORGANIZED HEALTH CARE EDUCATION/TRAINING PROGRAM

## 2021-03-09 PROCEDURE — 99233 SBSQ HOSP IP/OBS HIGH 50: CPT | Performed by: INTERNAL MEDICINE

## 2021-03-09 PROCEDURE — 258N000003 HC RX IP 258 OP 636: Performed by: STUDENT IN AN ORGANIZED HEALTH CARE EDUCATION/TRAINING PROGRAM

## 2021-03-09 PROCEDURE — 97535 SELF CARE MNGMENT TRAINING: CPT | Mod: GO

## 2021-03-09 PROCEDURE — 99253 IP/OBS CNSLTJ NEW/EST LOW 45: CPT | Mod: GC | Performed by: PHYSICAL MEDICINE & REHABILITATION

## 2021-03-09 PROCEDURE — 250N000011 HC RX IP 250 OP 636: Performed by: NURSE PRACTITIONER

## 2021-03-09 PROCEDURE — 250N000013 HC RX MED GY IP 250 OP 250 PS 637: Performed by: STUDENT IN AN ORGANIZED HEALTH CARE EDUCATION/TRAINING PROGRAM

## 2021-03-09 PROCEDURE — 250N000011 HC RX IP 250 OP 636: Performed by: STUDENT IN AN ORGANIZED HEALTH CARE EDUCATION/TRAINING PROGRAM

## 2021-03-09 PROCEDURE — 36592 COLLECT BLOOD FROM PICC: CPT | Performed by: STUDENT IN AN ORGANIZED HEALTH CARE EDUCATION/TRAINING PROGRAM

## 2021-03-09 PROCEDURE — 250N000013 HC RX MED GY IP 250 OP 250 PS 637: Performed by: NEUROLOGICAL SURGERY

## 2021-03-09 PROCEDURE — 80048 BASIC METABOLIC PNL TOTAL CA: CPT | Performed by: STUDENT IN AN ORGANIZED HEALTH CARE EDUCATION/TRAINING PROGRAM

## 2021-03-09 PROCEDURE — 97530 THERAPEUTIC ACTIVITIES: CPT | Mod: GO

## 2021-03-09 PROCEDURE — 999N001017 HC STATISTIC GLUCOSE BY METER IP

## 2021-03-09 PROCEDURE — 99232 SBSQ HOSP IP/OBS MODERATE 35: CPT | Performed by: NURSE PRACTITIONER

## 2021-03-09 PROCEDURE — 85027 COMPLETE CBC AUTOMATED: CPT | Performed by: STUDENT IN AN ORGANIZED HEALTH CARE EDUCATION/TRAINING PROGRAM

## 2021-03-09 PROCEDURE — 84100 ASSAY OF PHOSPHORUS: CPT | Performed by: STUDENT IN AN ORGANIZED HEALTH CARE EDUCATION/TRAINING PROGRAM

## 2021-03-09 PROCEDURE — 120N000002 HC R&B MED SURG/OB UMMC

## 2021-03-09 PROCEDURE — 272N000078 HC NUTRITION PRODUCT INTERMEDIATE LITER

## 2021-03-09 RX ORDER — AMLODIPINE BESYLATE 10 MG/1
10 TABLET ORAL DAILY
Qty: 90 TABLET | Refills: 2 | Status: SHIPPED | OUTPATIENT
Start: 2021-03-09 | End: 2021-03-17

## 2021-03-09 RX ORDER — LANOLIN ALCOHOL/MO/W.PET/CERES
100 CREAM (GRAM) TOPICAL DAILY
Qty: 30 TABLET | Refills: 1 | Status: SHIPPED | OUTPATIENT
Start: 2021-03-09 | End: 2021-06-22

## 2021-03-09 RX ORDER — OXYCODONE HCL 5 MG/5 ML
5-10 SOLUTION, ORAL ORAL
Qty: 20 ML | Refills: 0 | Status: SHIPPED | OUTPATIENT
Start: 2021-03-09 | End: 2021-03-17

## 2021-03-09 RX ORDER — POLYETHYLENE GLYCOL 3350 17 G/17G
17 POWDER, FOR SOLUTION ORAL 3 TIMES DAILY
Qty: 14 PACKET | Refills: 0 | Status: SHIPPED | OUTPATIENT
Start: 2021-03-09 | End: 2021-03-11

## 2021-03-09 RX ORDER — LISINOPRIL 10 MG/1
10 TABLET ORAL 2 TIMES DAILY
Qty: 180 TABLET | Refills: 2 | COMMUNITY
Start: 2021-03-09 | End: 2021-03-17

## 2021-03-09 RX ORDER — METHOCARBAMOL 750 MG/1
750 TABLET, FILM COATED ORAL 4 TIMES DAILY PRN
Qty: 30 TABLET | Refills: 0 | Status: SHIPPED | OUTPATIENT
Start: 2021-03-09 | End: 2021-03-17

## 2021-03-09 RX ORDER — LIDOCAINE 4 G/G
2 PATCH TOPICAL EVERY 24 HOURS
Qty: 14 PATCH | Refills: 0 | Status: SHIPPED | OUTPATIENT
Start: 2021-03-09 | End: 2021-03-16

## 2021-03-09 RX ORDER — BISACODYL 10 MG
10 SUPPOSITORY, RECTAL RECTAL DAILY
Qty: 30 SUPPOSITORY | Refills: 0 | Status: SHIPPED | OUTPATIENT
Start: 2021-03-10 | End: 2021-03-11

## 2021-03-09 RX ADMIN — METHOCARBAMOL 750 MG: 750 TABLET, FILM COATED ORAL at 09:09

## 2021-03-09 RX ADMIN — FAMOTIDINE 20 MG: 40 POWDER, FOR SUSPENSION ORAL at 09:09

## 2021-03-09 RX ADMIN — HEPARIN SODIUM 5000 UNITS: 5000 INJECTION, SOLUTION INTRAVENOUS; SUBCUTANEOUS at 15:52

## 2021-03-09 RX ADMIN — Medication 1 PATCH: at 14:31

## 2021-03-09 RX ADMIN — Medication 10 ML: at 18:24

## 2021-03-09 RX ADMIN — GABAPENTIN 250 MG: 250 SUSPENSION ORAL at 21:52

## 2021-03-09 RX ADMIN — MULTIVITAMIN 15 ML: LIQUID ORAL at 09:08

## 2021-03-09 RX ADMIN — HEPARIN SODIUM 5000 UNITS: 5000 INJECTION, SOLUTION INTRAVENOUS; SUBCUTANEOUS at 09:09

## 2021-03-09 RX ADMIN — METHOCARBAMOL 750 MG: 750 TABLET, FILM COATED ORAL at 12:25

## 2021-03-09 RX ADMIN — METHOCARBAMOL 750 MG: 750 TABLET, FILM COATED ORAL at 15:52

## 2021-03-09 RX ADMIN — Medication 5 MG: at 09:09

## 2021-03-09 RX ADMIN — HEPARIN SODIUM 5000 UNITS: 5000 INJECTION, SOLUTION INTRAVENOUS; SUBCUTANEOUS at 23:49

## 2021-03-09 RX ADMIN — FAMOTIDINE 20 MG: 40 POWDER, FOR SUSPENSION ORAL at 21:52

## 2021-03-09 RX ADMIN — Medication 5 ML: at 06:04

## 2021-03-09 RX ADMIN — SODIUM CHLORIDE: 9 INJECTION, SOLUTION INTRAVENOUS at 04:14

## 2021-03-09 RX ADMIN — METHOCARBAMOL 750 MG: 750 TABLET, FILM COATED ORAL at 21:52

## 2021-03-09 RX ADMIN — THIAMINE HCL TAB 100 MG 100 MG: 100 TAB at 09:09

## 2021-03-09 ASSESSMENT — ACTIVITIES OF DAILY LIVING (ADL)
ADLS_ACUITY_SCORE: 19

## 2021-03-09 NOTE — PROGRESS NOTES
Home Infusion  Junito is hoping to discharge tomorrow and will be going home on bolus enteral feeds.  He has never done home enteral therapy before however his bedside nurse will be doing some teaching with him today (PLC is booked until Fri) and he has a family member who has done TF before and another relative who is a nurse.  Benefits have been checked and pt will have 100% coverage through his [employer] [place] policy.    Met with Junito at bedside to relay benefits and offer choice of agency.  Junito would like to use [facility] for his enteral therapy.  Provided him with information about enteral therapy with [facility].   Inquired about administration method preferred (gravity vs syringe) and inventoried supply needs. Junito is not sure which method he wants to do at this time but will trial gravity with the nurse this afternoon and then decide.   Explained about delivery of supplies, storage of formula, and 24/7 availability of [facility] while on enteral therapy.   I will deliver formula and supplies to pt's home tomorrow after orders are signed and nurse  will contact him to set up nurse visits if needed.    Junito verbalized understanding of all information given.  He is willing and able to learn and manage home enteral therapy.  Questions answered.  Will touch base with Junito tomorrow to finalize details.    Kalli CAUSEY  [facility] Liaison  [phone] SUGAR [email or web address]  FH main: [phone]

## 2021-03-09 NOTE — PLAN OF CARE
Status: Pt POD #4 G-tube insertion; hx esophageal cancer who presented with Cauda Equina due to tumor compression. He is now s/p laminectomy and decompression POD #7.  Vitals: VSS on RA. Continuous pulse ox on.   Neuros: A&Ox4. Denies N/T this shift. LLE 4/5. Moderate plantar/dorsi with LLE. All other extremities 5/5.  IV: PIV SL. R DL PICC infusing NS @ 75 mL/hr. Other  Lumen heparin locked  Labs/Electrolytes: WNL  Resp/trach: Stable on RA. LS clear all fields.  Diet: NPO d/t swallowing difficulties. TF infusing through PEG @ 60 mL/hr. Increase to 65ml/hr (goal) at 1400.   Bowel status: No BM this shift. Refused stool softeners. Hx of constipation. Last BM 3/8  : Garza in place d/t neurogenic bladder.   Skin: Back incision w/ primapore, CDI. Nicotine patch in place. Large bump/cyst in center of chest prior to hospitalization.  Pain: Denies.  Activity: Up w/ SB/1A GB and walker.  Social: No calls or visitors this shift.  Plan: Continue to monitor and follow POC.    New events this shift: Pt requested garza out, confirmed with neurosurgery residents pt is likely to discharge with it d/t neurogenic bladder. Pt wanting to be involved with cares and motivated to discharge soon. Entered PLC for TF. Unsure if pt will transition to bolus TF prior to discharge. Discharge home once teachings are complete and medically cleared to go.

## 2021-03-09 NOTE — PROGRESS NOTES
[facility], [place]   03/09/2021  Neurosurgery Progress Note:    Assessment:  Junito Wang is a 60 year old male with esophageal cancer who presented with Cauda Equina due to tumor compression. He is no s/p laminectomy and decompression. Exam improving after surgery. Hemovac removed (3/7/21).    Plan:  - Serial neuro exams  - Pain control  - Nutrition consulted; appreciate recs  -- Start tube feeds via PEG  -- IVF and TPN until taking adequate nutrition from TF  - Appreciate onc and rad/onc assistance  - Bowel regimen  - PRN antiemetics  - PT/OT: home A  - SLP: ok for clear liquid diet  -- follow-up with thoracic regarding PO intake limitations in the setting of esophageal cancer  - PMR consult for neurogenic bladder and garza management outpatient  - SCDs for DVT proph  - Possible discharge today    -----------------------------------  Ed Bach MD  Neurosurgery Resident, PGY-2    Please contact neurosurgery resident on call with questions.    Dial * * *[phone], enter [phone] when prompted.   -----------------------------------    Interval History: NAEON    Objective:   Temp:  [96.4  F (35.8  C)-98.4  F (36.9  C)] 97.7  F (36.5  C)  Pulse:  [66-88] 66  Resp:  [12-16] 12  BP: ()/(58-71) 101/66  SpO2:  [97 %-99 %] 99 %  I/O last 3 completed shifts:  In: 3605 [I.V.:2325; NG/GT:300]  Out: 300 [Urine:300]    Gen: Appears comfortable, NAD  Wound: clean, dry, intact; drain removed  Neurologic:  - Alert & Oriented to person, place, time, and situation  - Follows commands briskly  - Speech fluent, spontaneous. No aphasia or dysarthria.  - No gaze preference. No apparent hemineglect.  - PERRL, EOMI  - Face symmetric     Del Tr Bi WE WF Gr   R 5 5 5 5 5 5   L 5 5 5 5 5 5    HF KE KF DF PF    R 5 5 5 5 5    L 5 5 5 4+ 4      Sensation improving in left leg    LABS:  Recent Labs   Lab 03/09/21  0608 03/08/21  0621 03/07/21  0701    137 136   POTASSIUM 3.9 4.1 4.0   CHLORIDE 108 108  105   CO2 23 25 25   ANIONGAP 7 4 5   * 84 95   BUN 17 21 22   CR 0.64* 0.55* 0.53*   ARDEN 8.1* 8.7 9.0       Recent Labs   Lab 03/09/21  0608   WBC 8.1   RBC 3.67*   HGB 8.8*   HCT 28.8*   MCV 79   MCH 24.0*   MCHC 30.6*   RDW 17.5*          IMAGING:  No results found for this or any previous visit (from the past 24 hour(s)).

## 2021-03-09 NOTE — PLAN OF CARE
[id number]      Status: Pt POD #3 G-tube insertion; hx esophageal cancer who presented with Cauda Equina due to tumor compression. He is now s/p laminectomy and decompression.  Vitals: VSS on RA, cont pulse ox   Neuros: Alert and oriented x4. Denies N/T, 5/5 throughout   IV: L PIV saline locked, PICC hep locked   Diet: NPO, TF running at 40mL/hr with goal of 65  Bowel status: Several loose BM on 3/8.   : Andrews in place with good output   Pain: denies  Activity: A1, Gb   Plan: Continue to monitor and follow POC.

## 2021-03-09 NOTE — PROGRESS NOTES
CLINICAL NUTRITION SERVICES - BRIEF NOTE      Nutrition Prescription     RECOMMENDATIONS FOR MDs/PROVIDERS TO ORDER:    Recommendations already ordered by Registered Dietitian (RD):  - Orders entered to initiate first bolus feeding of Isosource 1.5  with 0.5 cans (125 ml) and if tolerates after approx 4 hrs without GI complaints and/or residuals < 500 ml, rec adv each subsequent bolus by 0.5 cans (125 ml) every ~4 hrs until reach goal regimen of Isosource 1.5, 2 cans (500 ml) TID = 6 cans daily (1500 ml/day) = 2250 kcals (29 kcal/kg), 102 g PRO (1.3 g/kg/day), 1140 ml H2O, 264 g CHO and 23 g Fiber daily.     *Do not give feedings at night while patient asleep (during the day only).    Example: Change H2O flushes to 140 ml H2O before and after each bolus TID (to provide an addtl 840 ml H2O) to meet est fld needs.     Future/Additional Recommendations:  --Monitor TF tolerance with transition to bolus feeds      Provider consult received for RD to order TF per MNT protocol.     Per discussion with RN, informed that pt reached his goal rate of 65 ml/hr on continuous feedings of Isosource 1.5 via PEG around noon today and then TF stopped in anticipation of starting bolus feeds this afternoon.     Interventions  Collaboration with other providers - NSG team   Enteral Nutrition - as outlined above  Nutrition Education - Discussed with pt plan for transitioning to bolus TF this afternoon. Pt receptive to plan.      Umm Grady RD,LD  6A pager [phone]

## 2021-03-09 NOTE — PLAN OF CARE
Hypotensive but within parameters. Denies pain. Neuros unchanged; LLE 4/5 with numbness to foot. Moderate d/p flexion. NPO. Pt has G-tube, clamped at 1200. Bolus feeds to start this afternoon. Andrews with good output. Had BM 3/8. Back incision with primipore intact. Pt has cyst on chest. Up with assist of 1, GB walker. Worked with OT. Pt is hoping to go home tomorrow. CC and [facility] spoke with pt this afternoon.

## 2021-03-09 NOTE — PROGRESS NOTES
Oncology Daily Progress Note   Date of Service: 03/09/2021    Patient: Junito Wang  MRN: [id number]  Admission Date: 3/2/2021  Hospital Day # 7  Cancer Diagnosis: Metastatic esophageal adenocarcinoma   Primary Outpatient Oncologist: Dr. Steven  Current Treatment Plan: Plan to initiate outpatient chemo-immunotherapy + XRT to L spine, gamma knife to brain mets     Recommendations:   - agree with discharge from an Oncology standpoint   - plan to proceed with outpatient XRT to the L spine, gamma knife; followed by chemo-immunotherapy. Per discussion, dysphagia is likely to improve with initiation of systemic therapy so would hold on radiation to esophageal mass at this time as risks of cytopenias would increase. Will continue to evaluate. No stenting per GI.    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 S/p PEG tube placement 3/5.     # Stage IV esophageal adenocarcinoma   Given progression of disease, goal of treatment would be to prolong life and reduce symptoms rather than curative. Per most recent outpatient note, given that cancer has been confirmed stage 4, no role for surgical resection, and will be planning for palliative chemoimmunotherapy with radiation therapy for symptom control as an outpatient.   - Consideration given to esophageal stent vs XRT for esophageal debulking to address dysphagia; however given likely improvement with chemo-immunotherapy, as well as risks of worsening cytopenias, will hold at this time. Outpatient radiation oncology follow-up and simulation arranged for 3/11 in [place]  - PEG tube placed in OR (3/5) for nutritional support.   - Continue PT/OT for rehab    # Coagulopathy  Elevated INR in the absence of  "anticoagulation. Concerning for malnutrition with likely Vitamin K deficiency.   - Recommend supplementation of Vitamin K PO daily while inpatient and not getting other nutrition (or IV if patient unable to take PO)     Patient was seen and plan of care was discussed with attending physician Dr. Garrison.    Thank you for the opportunity to partake in this patients plan of care. Please do not hesitate to page with questions. We will continue to follow.     Erica Cordoba PA-C  Hematology/Oncology  Pager # [phone]   ___________________________________________________________________    Subjective & Interval History:    No acute events noted overnight. Able to walk using a walker. Strength improved of left leg. Tolerating tube feeds at goal. He was going to discharge today, but due to need for education on tube feeds and garza cares he is going to discharge tomorrow. He feels he is going \"stir crazy\" in the hospital and really would like to discharge home with his brother. He is motivated to continue to get stronger and work with therapies. Discussed plan of care as above.     Physical Exam:    BP 98/66 (BP Location: Left arm)   Pulse 80   Temp 97.5  F (36.4  C) (Oral)   Resp 16   Ht 1.778 m (5' 10\")   Wt 79.1 kg (174 lb 6.4 oz)   SpO2 95%   BMI 25.02 kg/m    Gen: Well appearing, in NAD  HEENT: EOMI, PERRL, oral mucosa slightly dry  CV: Warm and well perfused.  Pulm: Normal work of breathing on room air  Abd: Non-distended  Skin: No rash, cyanosis or petechial lesion on exposed skin  Neuro: Alert and answering questions appropriately. Moving all extremities without issue or focal neurologic deficits observed.     Labs & Studies: I personally reviewed the following studies:  ROUTINE LABS (Last four results):  CMP  Recent Labs   Lab 03/09/21  0608 03/08/21  0621 03/07/21  0701 03/06/21  0640 03/04/21  0620 03/04/21  0620 03/03/21  0537 03/02/21  1426 03/02/21  1426    137 136 136   < > 137 138  --  133 "   POTASSIUM 3.9 4.1 4.0 4.2   < > 3.6 4.2  4.2  --  3.6   CHLORIDE 108 108 105 106   < > 108 109  --  101   CO2 23 25 25 25   < > 24 24  --  23   ANIONGAP 7 4 5 6   < > 5 6  --  8   * 84 95 118*   < > 120* 123*  --  104*   BUN 17 21 22 20   < > 21 25  --  26   CR 0.64* 0.55* 0.53* 0.48*   < > 0.51* 0.57*  --  0.69   GFRESTIMATED >90 >90 >90 >90   < > >90 >90  --  >90   GFRESTBLACK >90 >90 >90 >90   < > >90 >90  --  >90   ARDEN 8.1* 8.7 9.0 8.9   < > 8.5 8.6  --  9.6   MAG 2.7* 2.5* 2.3 2.4*   < > 2.6* 2.4*   < >  --    PHOS 2.8 3.4 3.3 3.3   < > 2.2* 3.2   < >  --    PROTTOTAL  --  5.4*  --   --   --  5.8* 6.0*  --  7.5   ALBUMIN  --  2.4*  --   --   --  2.2* 2.4*  --  3.2*   BILITOTAL  --  0.7  --   --   --  0.3 0.9  --  0.7   ALKPHOS  --  175*  --   --   --  157* 173*  --  234*   AST  --  25  --   --   --  53* 37  --  39   ALT  --  53  --   --   --  53 29  --  24    < > = values in this interval not displayed.     CBC  Recent Labs   Lab 03/09/21  0608 03/08/21  0621 03/07/21  0701 03/06/21  0640   WBC 8.1 10.3 9.4 7.8   RBC 3.67* 3.90* 4.10* 3.88*   HGB 8.8* 9.2* 9.5* 9.4*   HCT 28.8* 30.1* 31.6* 30.3*   MCV 79 77* 77* 78   MCH 24.0* 23.6* 23.2* 24.2*   MCHC 30.6* 30.6* 30.1* 31.0*   RDW 17.5* 17.4* 17.1* 17.0*    441 424 327     INR  Recent Labs   Lab 03/08/21  0621 03/04/21  0620 03/03/21  0537 03/02/21  2341   INR 1.16* 1.59* 1.37* 1.32*       Medications list for reference:  Current Facility-Administered Medications   Medication     acetaminophen (TYLENOL) solution 650 mg     [Held by provider] amLODIPine benzoate (KATERZIA) suspension 10 mg     bisacodyl (DULCOLAX) Suppository 10 mg     calcium carbonate (TUMS) chewable tablet 500 mg     dextrose 10% infusion     sennosides (SENOKOT) syrup 15 mL    Or     docusate (COLACE) 50 MG/5ML liquid 150 mg     famotidine (PEPCID) suspension 20 mg     gabapentin (NEURONTIN) solution 250 mg     heparin ANTICOAGULANT injection 5,000 Units     heparin lock  flush 10 UNIT/ML injection 5-10 mL     heparin lock flush 10 UNIT/ML injection 5-10 mL     HOLD ace-inhibitors and angiotension-receptor blockers on day of surgery     hydrALAZINE (APRESOLINE) injection 10-20 mg     HYDROmorphone (PF) (DILAUDID) injection 0.3 mg     labetalol (NORMODYNE/TRANDATE) injection 10-40 mg     Lidocaine (LIDOCARE) 4 % Patch 2 patch    And     lidocaine patch in PLACE     lidocaine (LMX4) cream     lidocaine 1 % 0.1-1 mL     [Held by provider] Lisinopril (QBRELIS) solution 10 mg     magnesium hydroxide (MILK OF MAGNESIA) suspension 30 mL     Medication Instruction     menthol (ICY HOT) 5 % patch 2 patch    And     menthol (ICY HOT) Patch in Place     methocarbamol (ROBAXIN) tablet 750 mg     multivitamins w/minerals (CERTAVITE) liquid 15 mL     nicotine (NICODERM CQ) 21 MG/24HR 24 hr patch 1 patch     nicotine Patch in Place     ondansetron (ZOFRAN-ODT) ODT tab 4-8 mg    Or     ondansetron (ZOFRAN) injection 4-8 mg     oxyCODONE (ROXICODONE) solution 5-10 mg     phytonadione (MEPHYTON/VITAMIN K) 1 MG/ML oral solution 5 mg     pink lady enema (COMPOUNDED: docusate, magnesium citrate, mineral oil, sodium phosphate)     polyethylene glycol (MIRALAX) Packet 17 g     prochlorperazine (COMPAZINE) injection 10 mg    Or     prochlorperazine (COMPAZINE) tablet 10 mg    Or     prochlorperazine (COMPAZINE) suppository 25 mg     sodium chloride (PF) 0.9% PF flush 10-20 mL     sodium chloride (PF) 0.9% PF flush 3 mL     sodium chloride (PF) 0.9% PF flush 3 mL     sodium chloride 0.9% infusion     thiamine (B-1) tablet 100 mg

## 2021-03-09 NOTE — PROGRESS NOTES
"Care Management Follow Up    Length of Stay (days): 7    Expected Discharge Date: 03/10/21     Concerns to be Addressed:  Transition to gravity tube feedings; teaching.      Patient plan of care discussed at interdisciplinary rounds: Yes    Anticipated Discharge Disposition:  Home   Anticipated Discharge Services:  Outpatient PT/OT  Anticipated Discharge DME:  Home tube feeding    Education Provided on the Discharge Plan:  Yes  Patient/Family in Agreement with the Plan:  Yes    Referrals Placed by CM/SW:   Home Infusion  Private pay costs discussed:  No    Additional Information  Informed by [facility] of pt's insurance coverage for tube feeding: \"Patient has coverage for enteral therapy under their BCBS PMAP plan and should be covered at 100% as long as it is through a tube.\"     In 6A Discharge Rounds Lauren Sigala, JUAN, reported pt soon ready for discharge. Outpt PT/OT now recommended. Discussed with Umm Grady, Dietician, transitioning from a feeding pump to gravity feedings. Will need tube feeding teaching. Anticipate discharge tomorrow.   PLC scheduled for Friday, 3-12.     Introduced myself to pt and explained I help with discharge planning. Pt alert, resting in bed. Reviewed the plan is to transition off the feeding pump to gravity feedings. Pt was already aware this was the plan. Will start tube feeding teaching. Pt motivated to learn. Pt said he lives with his brother. His brother had a family member in the past who had a feeding tube, he may be able to help.  Informed pt  [facility] will deliver the tube feeding supplies to his home.  Pt declined the need for a home care nurse.   Pt would like to do outpt PT/OT at [place].   Pt's primary is Dr Florian Wagner at [facility]. Pt said Dr Wagner works one day a week, so he hasn't seen him recently. Pt said he has seen 2 other providers recently at that clinic.   Confirmed pt's home address and phone number.   Pt wasn't sure what the " bills would be and joked he may have to access a detention account when he gets home. I informed pt if he was having problems with finances to ask for the Outpt SW when he follows up in clinic. At this time pt did not anticipate it would be a problem.   Updated Kalli Garcia RN [facility] Liaison with the plan.         Radha Jung RN Care Coordinator  [address], [facility]

## 2021-03-09 NOTE — PROGRESS NOTES
"[facility]  Palliative Care Daily Progress Note       Recommendations/discussion and counseling     Recommendations/discussion:  Pt seen and examined. Reviewed with primary team and with unit staff. He is post venting G tube placement.   - He has restorative goals recognizing he has a poor prognosis.   - Palliative care continues to offer empathetic listening and guidance   - plans to complete HCD soon.   - open to PC clinic after discharge for further goals of care discussions.  - recently verbalized reluctance to take pain mgt meds like low dose oxycodone, \" I have heard all the negative stories.\" While I appreciated his respect for these meds I encouraged to use as needed for sufficient pain control to function and rest.        Thank you for the opportunity to participate in the care of this patient and family. Our team: will continue to follow.     During regular M-F work hours -- if you are not sure who specifically to contact -- please contact us by sending a text page to our team consult pager at [phone].    After regular work hours and on weekends/holidays, you can call our answering service at [phone]. Also, who's on call for us is available in [employer].       Roderick LINDA NP ACHPN  Nurse Practitioner- Lead Advanced Practice Provider  [facility] Palliative Medicine Consult Service   [phone]     TT spent: 26 minutes of which 15 minutes were spent in direct face to face contact with patient/family. Greater than 50% of time spent counseling and/or coordinating care.       Assessments          Junito Wang is a 60 year old male with past medical history of hypertension, and  recently diagnosed esophageal adenocarcinoma following 3 to 6 months of progressive dysphagia associated with 45 pound weight loss in the last 1 year. He presented to [facility] ED with LLE weakness found to have early subacute epidural hemorrhage and an enlarging " sacral mass invading the lumbosacral plexus obliterating the L S1-S3 foramina. He was taken emergently to the OR on 3/2/21 for laminectomy and decompression. Ongoing functional recovery of LE movement since. now able to be up walking with walker assist.      Today, 3/9/21 the patient was seen for PC follow up for pain sx management and support in the setting of Esophageal Cancer with mets.     Prognosis, Goals, or Advance Care Planning was addressed today with: Yes.  Mood, coping, and/or meaning in the context of serious illness were addressed today: No.  Summary/Comments: feeling better. Hopes to discharge home tomorrow after further enteral feeding teaching and management. OP follow up planned with oncology and will PC.             Interval History:     Chart review/discussion with patient and unit/clinical team members:   Has continued to improve. Able to be up walking on unit       Key Palliative Symptoms:  # Pain severity the last 12 hours: none  We are not managing dyspnea in this patient  # Nausea severity the last 12 hours: low  We are not managing anxiety in this patient    Patient is on opioids: assessed and bowels ok/no needed changes to plan of care today.           Review of Systems:     Besides above, an additional 4 system ROS was reviewed and is unremarkable          Medications:     I have reviewed this patient's medication profile and medications during this hospitalization.           Physical Exam:   Vitals were reviewed  Temp: 97.3  F (36.3  C) Temp src: Oral BP: 101/64 Pulse: 75   Resp: 16 SpO2: 98 % O2 Device: None (Room air)    GEN: Appears alert and pleasant. No anxiety as previous. In bed, oriented. Anticipating PEG tube teaching and supportive care education this afternoon.   HEENT: symmetric features, no trauma.EOMI, normal conjunctiva. Dentition OK.  CV: S1 S2 with RRR, no murmur- AP again in 70's at rest.  Ext. warm and well-perfused  RESP: CTAB, no wheeze, breathing comfortably on room  air  ABDOMEN: Soft, NT, ND, bowel sounds present. Catheter in place with clear yellow urine. G tube site is c/d/i and tube appears patent.   SKIN: No apparent rashes. Normal color and turgor  NEURO:Moving bilateral lower extremities, gait not assessed  PSYCH: Appropriate mood and affect             Data Reviewed:     ROUTINE LABS (Last four results)  BMP  Recent Labs   Lab 03/09/21  0608 03/08/21  0621 03/07/21  0701 03/06/21  0640    137 136 136   POTASSIUM 3.9 4.1 4.0 4.2   CHLORIDE 108 108 105 106   ARDEN 8.1* 8.7 9.0 8.9   CO2 23 25 25 25   BUN 17 21 22 20   CR 0.64* 0.55* 0.53* 0.48*   * 84 95 118*     CBC  Recent Labs   Lab 03/09/21  0608 03/08/21  0621 03/07/21  0701 03/06/21  0640   WBC 8.1 10.3 9.4 7.8   RBC 3.67* 3.90* 4.10* 3.88*   HGB 8.8* 9.2* 9.5* 9.4*   HCT 28.8* 30.1* 31.6* 30.3*   MCV 79 77* 77* 78   MCH 24.0* 23.6* 23.2* 24.2*   MCHC 30.6* 30.6* 30.1* 31.0*   RDW 17.5* 17.4* 17.1* 17.0*    441 424 327     INR  Recent Labs   Lab 03/08/21  0621 03/04/21  0620 03/03/21  0537 03/02/21  2341   INR 1.16* 1.59* 1.37* 1.32*
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
## 2021-03-09 NOTE — DISCHARGE SUMMARY
[facility] Discharge Summary and Instructions    Junito Wang MRN# [id number]   Age: 60 year old YOB: 1960     Date of Admission:  3/2/2021  Date of Discharge::  3/10/2021  Admitting Physician:  Soy Gusman MD  Discharge Physician:  Soy Gusman MD          Admission Diagnoses:   Urine retention [R33.9]  Left leg weakness [R29.898]  Anemia, unspecified type [D64.9]  Leukocytosis, unspecified type [D72.829]  Malignant neoplasm of esophagus, unspecified location (H) [C15.9]  Acute low back pain, unspecified back pain laterality, unspecified whether sciatica present [M54.5]  Esophageal dysphagia [R13.10]          Discharge Diagnosis:     Urine retention [R33.9]  Left leg weakness [R29.898]  Anemia, unspecified type [D64.9]  Leukocytosis, unspecified type [D72.829]  Malignant neoplasm of esophagus, unspecified location (H) [C15.9]  Acute low back pain, unspecified back pain laterality, unspecified whether sciatica present [M54.5]  Esophageal dysphagia [R13.10]          Procedures:   3/2/2021  Lumbar decomrpession L2-5 for for resection of metastatic disease    3/05/2021  PEG insertion           Brief History of Illness:   The patient is a 60 year old with known esophageal adenocarcinoma who presented with <24h of inability to ambulate with acute onset left leg weakness/urinary retention with perianal sensory deficits. MRI showed extensive epidural signal change suggestive of tumor vs. Hemorrhage. Emergent surgery was offered in a attempt to preserve functional ability. The risks, benefits and alternatives were discussed with the patient and written consent was obtained for the above operation           Hospital Course:   Following surgery the patient was placed on 48 hours of bedrest due to concern for intraoperative dural leak secondary to tumor erosion.   Once bedrest was discontinued the patient was mobilized and did not endorse headache.     Patient had known swallowing  difficulties pre-operatively and was scheduled for PEG placement prior to emergent neurosurgical procedure.  Following surgery patient was evaluated by Speech, patient was cleared for clear liquid diet however due to pain his ability to tolerate was limited.  Since patient will need long term enteral access percutaneous endogastric tube was placed on 3/5/2021.  Dysphagia hopefully will improve with systemic therapy and therefore surgical resection was not recommended.     Patient was started continuous feeds and was transitioned to bolus tube feedings prior and was tolerating prior to discharge.    Patient's home blood pressure medications were held throughout his stay due to hypotension.  These medications will not be restarted upon discharge.  Patient will require follow-up with PCP within 7 days of discharge to address this.   Patient was followed by both PT and OT throughout his stay.  It was felt the patient was mobilizing safely and was safe to discharge home with outpatient therapy.    Oncology followed patient throughout his stay, plan for radiation to the lumbar spine and gamma knife to the brain lesion 4 weeks after surgery.  Upon completion of radiation, will begin palliative chemotherapy and immunotherapy, likely FOLFOX + Nivolumab.   Patient will follow-up with Radiation Oncology on 3/11/2021.    Prior to admission the patient had not passed a bowel movement for 2 weeks, the patient was given an aggressive bowel regimen while hospitalized and was successfully able to pass bowel movements prior to discharge.  Patient will be discharged on an aggressive bowel regimen as well.            Discharge Medications:     Current Discharge Medication List      START taking these medications    Details   bisacodyl (DULCOLAX) 10 MG suppository Place 1 suppository (10 mg) rectally daily  Qty: 30 suppository, Refills: 0    Associated Diagnoses: Malignant neoplasm of lower third of esophagus (H)      docusate (COLACE)  50 MG/5ML liquid Take 15 mLs (150 mg) by mouth At Bedtime  Qty: 237 mL, Refills: 0    Associated Diagnoses: Malignant neoplasm of lower third of esophagus (H)      Lidocaine (LIDOCARE) 4 % Patch Place 2 patches onto the skin every 24 hours To prevent lidocaine toxicity, patient should be patch free for 12 hrs daily.  Qty: 14 patch, Refills: 0    Associated Diagnoses: Malignant neoplasm of lower third of esophagus (H)      methocarbamol (ROBAXIN) 750 MG tablet Take 1 tablet (750 mg) by mouth 4 times daily as needed for muscle spasms  Qty: 30 tablet, Refills: 0    Associated Diagnoses: Malignant neoplasm of lower third of esophagus (H)      oxyCODONE (ROXICODONE) 5 MG/5ML solution Take 5-10 mLs (5-10 mg) by mouth every 3 hours as needed (pain control or improvement in physical function. Hold dose for analgesic side effects.)  Qty: 20 mL, Refills: 0    Associated Diagnoses: Malignant neoplasm of lower third of esophagus (H)      polyethylene glycol (MIRALAX) 17 g packet Take 17 g by mouth 3 times daily  Qty: 14 packet, Refills: 0    Associated Diagnoses: Malignant neoplasm of lower third of esophagus (H)      sennosides (SENOKOT) 8.8 MG/5ML syrup Take 15 mLs by mouth At Bedtime  Qty: 236 mL, Refills: 0    Associated Diagnoses: Malignant neoplasm of lower third of esophagus (H)      syringe disposable 60 ML MISC 1 Syringe daily  Qty: 40 each, Refills: 0    Associated Diagnoses: Malignant neoplasm of lower third of esophagus (H)      thiamine (B-1) 100 MG tablet 1 tablet (100 mg) by Per Feeding Tube route daily  Qty: 30 tablet, Refills: 1    Associated Diagnoses: Malignant neoplasm of lower third of esophagus (H)         CONTINUE these medications which have CHANGED    Details   amLODIPine (NORVASC) 10 MG tablet Take 1 tablet (10 mg) by mouth daily  Qty: 90 tablet, Refills: 2    Comments: Hold until you have been seen by your PCP and it is determined you are requiring this medication  Associated Diagnoses: Essential  hypertension with goal blood pressure less than 140/90      lisinopril (ZESTRIL) 10 MG tablet Take 1 tablet (10 mg) by mouth 2 times daily  Qty: 180 tablet, Refills: 2    Comments: Continue to hold medication until seen by your PCP and it is determined the medication is needed  Associated Diagnoses: Essential hypertension with goal blood pressure less than 140/90         CONTINUE these medications which have NOT CHANGED    Details   acetaminophen (TYLENOL) 500 MG tablet Take 500-1,000 mg by mouth every 6 hours as needed for mild pain      gabapentin (NEURONTIN) 250 MG/5ML solution Take 5 mLs (250 mg) by mouth At Bedtime  Qty: 470 mL, Refills: 1    Associated Diagnoses: Adenocarcinoma of esophagus (H); Cancer cachexia (H); Radial neuropathy, left      MULTI-VITAMIN OR TABS 1 TABLET DAILY         Temp:  [96.4  F (35.8  C)-98.4  F (36.9  C)] 97.7  F (36.5  C)  Pulse:  [66-88] 66  Resp:  [12-16] 12  BP: ()/(58-71) 101/66  SpO2:  [97 %-99 %] 99 %  I/O last 3 completed shifts:  In: 3605 [I.V.:2325; NG/GT:300]  Out: 300 [Urine:300]     Gen: Appears comfortable, NAD  Wound: clean, dry, intact; drain removed  Neurologic:  - Alert & Oriented to person, place, time, and situation  - Follows commands briskly  - Speech fluent, spontaneous. No aphasia or dysarthria.  - No gaze preference. No apparent hemineglect.  - PERRL, EOMI  - Face symmetric       Del Tr Bi WE WF Gr   R 5 5 5 5 5 5   L 5 5 5 5 5 5     HF KE KF DF PF     R 5 5 5 5 5     L 5 5 5 4+ 4                    Discharge Instructions and Follow-Up:     Discharge diet: Isosource 1.5     Discharge activity: You may advance activity as tolerated. No strenuous exercise or heay lifting greater than 10 lbs for 4 weeks or until seen and cleared in clinic.   Discharge follow-up: Follow-up with Neurosurgery [facility] in 2 weeks for wound check/post-hospital evaluation    Follow-up with Radiation Oncology on 3/11/21 @ 1:00 pm     Follow-up with Oncology on 3/16/21 @ 09:10 am      Follow-up with your PCP with in 7 days of discharge in regards to management of HTN and other underlying medical issues      Wound care: Ok to shower,however no scrubbing of the wound and no soaking of the wound, meaning no bathtubs or swimming pools. Pat dry only. Leave wound open to air.  Patient to have wound checked 2 weeks following surgery.    Wound location: Lumbar spine  Closure technique: nylon  Dressing needs: none  Post-op care at follow-up: Keep clean     Please call if you have:  1. increased pain, redness, drainage, swelling at your incision  2. fevers > 101.5 F degrees  3. with any questions or concerns.  You may reach the Neurosurgery clinic at [phone] during regular work hours. ER at [phone].    and ask for the Neurosurgery Resident on call at [phone], during off hours or weekends.         Discharge Disposition:     Discharged to home        ALEXEI Abernathy, CNP  Department of Neurosurgery  Pager: [phone]

## 2021-03-10 ENCOUNTER — HOME INFUSION (PRE-WILLOW HOME INFUSION) (OUTPATIENT)
Dept: PHARMACY | Facility: CLINIC | Age: 61
End: 2021-03-10

## 2021-03-10 ENCOUNTER — TELEPHONE (OUTPATIENT)
Dept: NEUROSURGERY | Facility: CLINIC | Age: 61
End: 2021-03-10

## 2021-03-10 ENCOUNTER — PATIENT OUTREACH (OUTPATIENT)
Dept: CARE COORDINATION | Facility: CLINIC | Age: 61
End: 2021-03-10

## 2021-03-10 ENCOUNTER — APPOINTMENT (OUTPATIENT)
Dept: RADIATION ONCOLOGY | Facility: CLINIC | Age: 61
End: 2021-03-10
Payer: COMMERCIAL

## 2021-03-10 ENCOUNTER — TRANSCRIBE ORDERS (OUTPATIENT)
Dept: OTHER | Age: 61
End: 2021-03-10

## 2021-03-10 VITALS
TEMPERATURE: 97 F | HEART RATE: 84 BPM | HEIGHT: 70 IN | OXYGEN SATURATION: 94 % | RESPIRATION RATE: 16 BRPM | SYSTOLIC BLOOD PRESSURE: 113 MMHG | WEIGHT: 174.4 LBS | DIASTOLIC BLOOD PRESSURE: 68 MMHG | BODY MASS INDEX: 24.97 KG/M2

## 2021-03-10 DIAGNOSIS — R33.9 URINE RETENTION: Primary | ICD-10-CM

## 2021-03-10 LAB
ANION GAP SERPL CALCULATED.3IONS-SCNC: 5 MMOL/L (ref 3–14)
BUN SERPL-MCNC: 13 MG/DL (ref 7–30)
CALCIUM SERPL-MCNC: 8.6 MG/DL (ref 8.5–10.1)
CHLORIDE SERPL-SCNC: 107 MMOL/L (ref 94–109)
CO2 SERPL-SCNC: 27 MMOL/L (ref 20–32)
CREAT SERPL-MCNC: 0.73 MG/DL (ref 0.66–1.25)
ERYTHROCYTE [DISTWIDTH] IN BLOOD BY AUTOMATED COUNT: 17.6 % (ref 10–15)
GFR SERPL CREATININE-BSD FRML MDRD: >90 ML/MIN/{1.73_M2}
GLUCOSE SERPL-MCNC: 87 MG/DL (ref 70–99)
HCT VFR BLD AUTO: 28.7 % (ref 40–53)
HGB BLD-MCNC: 8.9 G/DL (ref 13.3–17.7)
MAGNESIUM SERPL-MCNC: 2.8 MG/DL (ref 1.6–2.3)
MCH RBC QN AUTO: 24.4 PG (ref 26.5–33)
MCHC RBC AUTO-ENTMCNC: 31 G/DL (ref 31.5–36.5)
MCV RBC AUTO: 79 FL (ref 78–100)
PHOSPHATE SERPL-MCNC: 3 MG/DL (ref 2.5–4.5)
PLATELET # BLD AUTO: 429 10E9/L (ref 150–450)
POTASSIUM SERPL-SCNC: 4.1 MMOL/L (ref 3.4–5.3)
RBC # BLD AUTO: 3.65 10E12/L (ref 4.4–5.9)
SODIUM SERPL-SCNC: 139 MMOL/L (ref 133–144)
WBC # BLD AUTO: 7.5 10E9/L (ref 4–11)

## 2021-03-10 PROCEDURE — 36592 COLLECT BLOOD FROM PICC: CPT | Performed by: STUDENT IN AN ORGANIZED HEALTH CARE EDUCATION/TRAINING PROGRAM

## 2021-03-10 PROCEDURE — 250N000011 HC RX IP 250 OP 636: Performed by: STUDENT IN AN ORGANIZED HEALTH CARE EDUCATION/TRAINING PROGRAM

## 2021-03-10 PROCEDURE — 250N000013 HC RX MED GY IP 250 OP 250 PS 637: Performed by: NEUROLOGICAL SURGERY

## 2021-03-10 PROCEDURE — 250N000013 HC RX MED GY IP 250 OP 250 PS 637: Performed by: STUDENT IN AN ORGANIZED HEALTH CARE EDUCATION/TRAINING PROGRAM

## 2021-03-10 PROCEDURE — 83735 ASSAY OF MAGNESIUM: CPT | Performed by: STUDENT IN AN ORGANIZED HEALTH CARE EDUCATION/TRAINING PROGRAM

## 2021-03-10 PROCEDURE — 80048 BASIC METABOLIC PNL TOTAL CA: CPT | Performed by: STUDENT IN AN ORGANIZED HEALTH CARE EDUCATION/TRAINING PROGRAM

## 2021-03-10 PROCEDURE — 85027 COMPLETE CBC AUTOMATED: CPT | Performed by: STUDENT IN AN ORGANIZED HEALTH CARE EDUCATION/TRAINING PROGRAM

## 2021-03-10 PROCEDURE — 250N000011 HC RX IP 250 OP 636: Performed by: NURSE PRACTITIONER

## 2021-03-10 PROCEDURE — 84100 ASSAY OF PHOSPHORUS: CPT | Performed by: STUDENT IN AN ORGANIZED HEALTH CARE EDUCATION/TRAINING PROGRAM

## 2021-03-10 PROCEDURE — 97110 THERAPEUTIC EXERCISES: CPT | Mod: GP | Performed by: REHABILITATION PRACTITIONER

## 2021-03-10 PROCEDURE — 97530 THERAPEUTIC ACTIVITIES: CPT | Mod: GP | Performed by: REHABILITATION PRACTITIONER

## 2021-03-10 PROCEDURE — 77263 THER RADIOLOGY TX PLNG CPLX: CPT | Performed by: RADIOLOGY

## 2021-03-10 RX ORDER — FAMOTIDINE 40 MG/5ML
20 POWDER, FOR SUSPENSION ORAL 2 TIMES DAILY
Qty: 50 ML | Refills: 1 | Status: SHIPPED | OUTPATIENT
Start: 2021-03-10 | End: 2021-03-31

## 2021-03-10 RX ADMIN — FAMOTIDINE 20 MG: 40 POWDER, FOR SUSPENSION ORAL at 07:56

## 2021-03-10 RX ADMIN — THIAMINE HCL TAB 100 MG 100 MG: 100 TAB at 07:56

## 2021-03-10 RX ADMIN — METHOCARBAMOL 750 MG: 750 TABLET, FILM COATED ORAL at 07:56

## 2021-03-10 RX ADMIN — Medication 5 MG: at 07:56

## 2021-03-10 RX ADMIN — HEPARIN SODIUM 5000 UNITS: 5000 INJECTION, SOLUTION INTRAVENOUS; SUBCUTANEOUS at 07:56

## 2021-03-10 RX ADMIN — Medication 5 ML: at 06:27

## 2021-03-10 RX ADMIN — MULTIVITAMIN 15 ML: LIQUID ORAL at 07:56

## 2021-03-10 ASSESSMENT — ACTIVITIES OF DAILY LIVING (ADL)
ADLS_ACUITY_SCORE: 19

## 2021-03-10 NOTE — PLAN OF CARE
Occupational Therapy Discharge Summary    Reason for therapy discharge:    Discharged to home.    Progress towards therapy goal(s). See goals on Care Plan in [facility] electronic health record for goal details.  Goals partially met.  Barriers to achieving goals:   discharge from facility.    Therapy recommendation(s):    No further therapy is recommended.

## 2021-03-10 NOTE — PLAN OF CARE
[id number]  Status: Patient admitted on 3/2 with Cauda Equina due to tumor compression, s/p laminectomy and decompression on 3/3  Vitals: VSS  Neuros: Intact ex LLE 4/5 with numbness to foot  IV: PIV saline locked, PICC heparin locked  Labs/Electrolytes: WNL  Resp/trach: Lung sounds clear throguhout  Diet: NPO, bolus TF started at 1600 with 1/2 can, patient completing medication administration independently, next bolus TF at 2000  Bowel status: No BM, BS+  : Garza catheter in place for neurogenic bladder  Skin: Back incision with primipore, CDI  Pain: Back pain controlled with scheduled Robaxin  Activity: Up with A1, walker, gaitbelt  Social: Calm and cooperative with cares  Plan: Discharge home tomorrow, handouts given to gravity tube feeding and garza catheter care at home, continue to monitor and follow POC

## 2021-03-10 NOTE — PLAN OF CARE
Status: Pt s/p G-tube insertion; hx esophageal cancer who presented with Cauda Equina due to tumor compression. He is now s/p laminectomy and decompression on 3/3.  Vitals: VSS on RA.   Neuros: A&Ox4. N/T to LLE. LLE 4/5. All other extremities 5/5.  IV: PIV SL. R DL PICC HL.  Labs/Electrolytes: WNL.  Resp/trach: Stable on RA. LS clear all fields.  Diet: NPO. Receives TF bolus cans, goal of 6 cans per day. Completed 1 can last evening. No complaints of nausea or vomiting. Pt able to complete TF cares w/ meds and cans on his own.  Bowel status: No BM this shift. BS active all quads.  : Andrews in place for neurogenic bladder.   Skin: Back incision w/ primapore, CDI. Nicotine patch in place on R shoulder. Large bump/cyst in center of chest prior to hospitalization.  Pain: Denies.  Activity: Up w/ 1 GB and walker.  Social: No calls or visitors this shift.  Plan: Plan for discharge home today. Continue to monitor and follow POC.

## 2021-03-10 NOTE — PLAN OF CARE
Physical Therapy Discharge Summary    Reason for therapy discharge:    Discharged to home with outpatient therapy.    Progress towards therapy goal(s). See goals on Care Plan in [facility] electronic health record for goal details.  Goals partially met.  Barriers to achieving goals:   discharge from facility.    Therapy recommendation(s):    Recommend OP PT to maximize pt's strength and safety.

## 2021-03-10 NOTE — PLAN OF CARE
VSS. Denies pain. Neuros unchanged; LLE 4/5 with numbness to foot. Moderate d/p flexion. NPO. Pt has G-tube, tolerating bolus feeds. Garza with good output. Had BM 3/8. Back incision SHRAVAN, sutures intact. Pt has cyst on chest. Up with SBA, GB walker. Pt is discharging home at this time. Bedside RN did more G-tube and garza teaching, pt doing well and demonstrates adequate cares. Pt was sent home with supplies and extra TF. Discharge instructions and medications gone over, questions answered at that time. Pt was brought to pharmacy and front door where brother was picking up at 1130

## 2021-03-11 ENCOUNTER — OFFICE VISIT (OUTPATIENT)
Dept: RADIATION ONCOLOGY | Facility: CLINIC | Age: 61
End: 2021-03-11
Payer: COMMERCIAL

## 2021-03-11 ENCOUNTER — APPOINTMENT (OUTPATIENT)
Dept: RADIATION ONCOLOGY | Facility: CLINIC | Age: 61
End: 2021-03-11
Payer: COMMERCIAL

## 2021-03-11 VITALS
BODY MASS INDEX: 24.97 KG/M2 | DIASTOLIC BLOOD PRESSURE: 65 MMHG | SYSTOLIC BLOOD PRESSURE: 104 MMHG | HEART RATE: 114 BPM | RESPIRATION RATE: 20 BRPM | TEMPERATURE: 98.6 F | WEIGHT: 174 LBS | OXYGEN SATURATION: 97 %

## 2021-03-11 DIAGNOSIS — C79.51 BONE METASTASIS: Primary | ICD-10-CM

## 2021-03-11 DIAGNOSIS — C79.31 BRAIN METASTASIS: ICD-10-CM

## 2021-03-11 PROCEDURE — 99205 OFFICE O/P NEW HI 60 MIN: CPT | Mod: 25 | Performed by: RADIOLOGY

## 2021-03-11 PROCEDURE — 77290 THER RAD SIMULAJ FIELD CPLX: CPT | Performed by: RADIOLOGY

## 2021-03-11 PROCEDURE — 77334 RADIATION TREATMENT AID(S): CPT | Performed by: RADIOLOGY

## 2021-03-11 SDOH — HEALTH STABILITY: MENTAL HEALTH: HOW OFTEN DO YOU HAVE A DRINK CONTAINING ALCOHOL?: NOT ASKED

## 2021-03-11 SDOH — HEALTH STABILITY: MENTAL HEALTH: HOW OFTEN DO YOU HAVE 6 OR MORE DRINKS ON ONE OCCASION?: NOT ASKED

## 2021-03-11 SDOH — HEALTH STABILITY: MENTAL HEALTH: HOW MANY STANDARD DRINKS CONTAINING ALCOHOL DO YOU HAVE ON A TYPICAL DAY?: NOT ASKED

## 2021-03-11 ASSESSMENT — PAIN SCALES - GENERAL: PAINLEVEL: MILD PAIN (2)

## 2021-03-11 NOTE — PROGRESS NOTES
[facility]: Post-Discharge Note  SITUATION                                                      Admission:    Admission Date: 03/02/21   Reason for Admission: Lumbar decomrpession L2-5 for for resection of metastatic disease  Discharge:   Discharge Date: 03/10/21  Discharge Diagnosis: Lumbar decomrpession L2-5 for for resection of metastatic disease    BACKGROUND                                                      The patient is a 60 year old with known esophageal adenocarcinoma who presented with <24h of inability to ambulate with acute onset left leg weakness/urinary retention with perianal sensory deficits. MRI showed extensive epidural signal change suggestive of tumor vs. Hemorrhage. Emergent surgery was offered in a attempt to preserve functional ability. The risks, benefits and alternatives were discussed with the patient and written consent was obtained for the above operation          ASSESSMENT      Discharge Assessment  Patient reports symptoms are: Improved  Does the patient have all of their medications?: Yes  Does patient know what their new medications are for?: Yes  Does patient have a follow-up appointment scheduled?: Yes  Does patient have any other questions or concerns?: No    Post-op  Did the patient have surgery or a procedure: Yes  Incision: healing  Drainage: No  Bleeding: none  Fever: No  Chills: No  Redness: No  Warmth: No  Swelling: No  Incision site pain: No  Eating & Drinking: NPO  PO Intake: full liquids  Bowel Function: loose stools  Urinary Status: voiding without complaint/concerns        PLAN                                                      Outpatient Plan:  Following surgery the patient was placed on 48 hours of bedrest due to concern for intraoperative dural leak secondary to tumor erosion.   Once bedrest was discontinued the patient was mobilized and did not endorse headache.     Patient had known swallowing difficulties pre-operatively and was scheduled for PEG  placement prior to emergent neurosurgical procedure.  Following surgery patient was evaluated by Speech, patient was cleared for clear liquid diet however due to pain his ability to tolerate was limited.  Since patient will need long term enteral access percutaneous endogastric tube was placed on 3/5/2021.  Dysphagia hopefully will improve with systemic therapy and therefore surgical resection was not recommended.     Patient was started continuous feeds and was transitioned to bolus tube feedings prior and was tolerating prior to discharge.    Patient's home blood pressure medications were held throughout his stay due to hypotension.  These medications will not be restarted upon discharge.  Patient will require follow-up with PCP within 7 days of discharge to address this.   Patient was followed by both PT and OT throughout his stay.  It was felt the patient was mobilizing safely and was safe to discharge home with outpatient therapy.    Oncology followed patient throughout his stay, plan for radiation to the lumbar spine and gamma knife to the brain lesion 4 weeks after surgery.  Upon completion of radiation, will begin palliative chemotherapy and immunotherapy, likely FOLFOX + Nivolumab.   Patient will follow-up with Radiation Oncology on 3/11/2021.    Prior to admission the patient had not passed a bowel movement for 2 weeks, the patient was given an aggressive bowel regimen while hospitalized and was successfully able to pass bowel movements prior to discharge.  Patient will be discharged on an aggressive bowel regimen as well.        Future Appointments   Date Time Provider Department Center   3/11/2021  1:00 PM Emilia Qureshi MD [facility]   3/11/2021  2:15 PM Emilia Qureshi MD [facility]   3/16/2021  9:10 AM Jaky Pugh CNP [facility]   3/16/2021  1:30 PM Raiza Ponce APRN CNP [facility]   3/17/2021 10:40 AM Domonique Pedraza APRN [facility]            Sandy Mendez, CMA

## 2021-03-11 NOTE — NURSING NOTE
"INITIAL PATIENT ASSESSMENT      Diagnosis: metastatic esophageal    Prior radiation therapy: None    Prior chemotherapy: None    Prior hormonal therapy:No    Pain Eval:  Current history of pain associated with this visit:   Intensity: 2/10  Current: dull and aching  Location: \"very low back\", patient reports he believes back pain is related to using walker that is too short for him  Treatment: patient reports taking Robaxin po one tablet two times daily    Psychosocial  Living arrangements: lives at home in [place] with brother  Fall Risk: ambulates with assistive device - walker  MIPS Falls Risk Screening Completed: Yes Result: Negative   referral needs: Not needed    Implantable Cardiac Device: No  Authorization To Share Protected Health Information: YES - Date: 3/11/2021      Reproductive note: Not recorded for male patient.    Review of Systems     Constitutional: Positive for weight loss. Negative for chills, diaphoresis, fever and malaise/fatigue.        Patient reports approximate 25 pound weight loss related to decreased oral intake.  Currently has PEG and reports goal of six containers of Isosource daily, reports has administered two thus far today and completed five containers yesterday.   HENT: Positive for tinnitus. Negative for congestion, ear discharge, ear pain, hearing loss, nosebleeds, sinus pain and sore throat.         Patient reports tinnitus of left ear at baseline.   Eyes: Negative.    Respiratory: Positive for cough and shortness of breath. Negative for hemoptysis, sputum production, wheezing and stridor.         Patient reports intermittent dry cough, shortness of breath with minimal activity and wearing mask, relief with rest.   Cardiovascular: Negative.    Gastrointestinal: Positive for diarrhea and heartburn. Negative for abdominal pain, blood in stool, constipation, melena, nausea and vomiting.        Patient reports intermittent heartburn, taking Pepcid as prescribed.  " Patient reports diarrhea since receiving Magnesium Citrate while inpatient on Sunday.   Genitourinary: Positive for dysuria. Negative for flank pain, frequency, hematuria and urgency.        Patient currently has garza catheter post hospital discharge.   Musculoskeletal: Positive for back pain. Negative for falls, joint pain, myalgias and neck pain.   Skin: Negative.    Neurological: Positive for tingling. Negative for dizziness, tremors, sensory change, speech change, focal weakness, seizures, loss of consciousness, weakness and headaches.        Patient reports numbness of left buttock, left lateral thigh and left foot which he reports he experienced prior to hospital admission and surgery.   Endo/Heme/Allergies: Negative.    Psychiatric/Behavioral: Negative.        Nurse face-to-face time: Level 5:  over 15 min face to face time.    Elizabeth Ameczua RN BSN OCN CBCN

## 2021-03-11 NOTE — PATIENT INSTRUCTIONS
What to expect at your Simulation visit:    You will meet with a Radiation Therapist and other team members who will be doing a planning session called a  simulation  with you. This process will determine your daily treatment.    ~ You will lie on a flat table and have a treatment planning CT scan.  It is important during the scan to hold very still and breathe normally.    ~ Your therapist may construct a body mold to help you hold still for your treatments.    ~ If you are having treatment to the head or neck area you will be fitted with a plastic mesh mask that fits very snugly over your face and neck.     ~ Your therapist will be taking some digital photos that will go in your treatment chart.      ~Your therapist will make marks on your skin and take measurements. Your therapist may ask you about making small tattoos (a permanent small dot) over these marks.  These marks are used to position you daily for your radiation therapy treatments. Please do not wash off any marks until all of your radiation therapy treatments are complete unless you are instructed to do so by your therapist.    ~ Once the simulation is completed it can take from 3 to 10 business days before you start radiation therapy treatments.    ~ You may meet with a nurse who will go over management of treatment side effects and self care during your treatments. The nurse will help to plan care with other departments and physicians if needed.      Please contact [facility] RN with questions or concerns following today's appointment: [phone].    Thank you!

## 2021-03-11 NOTE — PROGRESS NOTES
Radiation Oncology Consultation:  Date on this visit: 3/11/2021    Junito Wang  is referred by Dr. Mariann egan for a radiation oncology consultation. 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 Wang is a 60 year old male who presents with a diagnosis of metastatic esophageal adenocarcinoma (Siewert III).  His oncologic diagnosis came to light in October 2020 when he noticed progressive dysphagia first with solid food.  This is associated with a 45 pound weight loss over 2 years, a total of 20% body weight loss.     He had also noticed left hip pain starting in September 2020 that was making it progressively more challenging for him to walk.  This pain woke him up at night.  Pain was on remitted with oral analgesics.  Pain noted to be sharp, and shooting.      He underwent upper endoscopy on 1/26/2021 with Dr. Kim who noted a medium to large sized ulcerating mass with bleeding in the distal esophagus located 36 to 40 cm from the incisors.  The mass was partially obstructing and circumferential.  There was associated hematin material in the cardia.  Pathology from this demonstrated moderately differentiated adenocarcinoma.  PD-L1 low.  HER2-kishan by IHC was equivocal score 2+.   He is not a candidate for a stent.         Staging workup included PET/CT on 2/12/21 that demonstrated hypermetabolic mass in the distal esophagus extending into the gastric cardia with an SUV max of 16.7.  There were multiple hypermetabolic mediastinal lymph nodes in both the paraesophageal and subcarinal spaces.  There was also a hypoenhancing hypermetabolic mass in the liver in segment 7 measuring 1.8 x 1.7 cm with SUV max 8.3.  There are 2 additional nodules  with FDG avidity 1 in segment 5 and the other in the margins of segments 5 and 6.  The patient also had a hypermetabolic lytic mass in the left sacrum extending into the left presacral space that measured 5.7 x 3.8 x 7.5 cm with an SUV max of 10.8.  The mass obliterated the left S1-3 neural foramina with possible mass-effect on the exiting nerves from S4.  The mass was noted to be eroding through the left sacroiliac articulation.  There was also a hypermetabolic mass in the proximal right humerus measuring 2.1 x 1.6 cm with SUV max 7.2.  There was also an hypermetabolic nodule in the medial left temporal lobe was also noted.  This led work-up to also include an MRI of the brain on 2/25/21 that demonstrated a 1.9 x 1.4 x 1.2 cm enhancing lesion in the left choroid plexus adjacent to the left mesial temporal lobe.      He is established oncologic care with Dr. Steven of medical oncology.  Given the fact that the patient was having significant bony pain Dr. Steven recommended he undergo bone biopsy of the left pelvic mass. Bone biopsy of the left pelvic mass on 2/26/21 (Specimen #[id number]); pathology demonstrated metastatic adenocarcinoma, consistent with known esophageal carcinoma.  The tentative plan for this likely metastatic diagnosis was for initiation of palliative chemotherapy/immunotherapy.         He   was noted to have progressive weakness in the left leg to the point where he was actually needing to be able to crawl to get around the house.  This was also associated with numbness across the entire foot and around mostly the lateral aspect of his leg below the knee.  He had also noted some difficulty urinating.  So he presented at the  emergency department.  In the emergency room, saddle anesthesia was also noted.  MRI of the lumbar spine on 3/2/2021 demonstrated a new posteriorly infiltrating T2 flair hyperintense appearance with internal septations within the lumbar spinal cord causing severe  "compression of the thecal sac and spinal canal extending from T12 down to the sacrum.      This appeared new in comparison to the recent PET/CT, which made the findings most suggestive of an early subacute epidural hematoma.  Neurosurgery was consulted who recommended emergent laminectomy and decompression of the epidural hematoma.  Intraoperatively, it was noted that there was abnormal discolored tissue seen compressing the cord once the dura was unroofed.  Tumor was noted to be clearly eroding the dura caudally, thus a muscle patch graft was placed over it surface and DuraSeal injected above it.  No epidural hematoma was noted in the operative report.  He recovered in the hospital and was discharged yesterday.  He is controlling his bowels but has an indwelling garza catheter.    While hospitalized he hd an elective feeding tube placement given his poor nutritional status. Tube feeding is going well.          REVIEW OF SYSTEMS: A 12 point review of systems was obtained. Pertinent findings are noted in the HPI and are otherwise unremarkable.      CHEMOTHERAPY HISTORY: no  PACEMAKER: no  PAST RADIATION THERAPY HISTORY: no     PAST MEDICAL HISTORY:  - Arthritis  - Hypertension  - Esophageal cancer as above     PAST SURGICAL HISTORY:  - Appendectomy  - Abdomen surgery     ALLERGIES:  - No known allergies     MEDICATIONS:  Current Outpatient Prescriptions   No current outpatient medications on file.         FAMILY HISTORY:  - No known family history of cancer      SOCIAL HISTORY:  - Single, resides in [place], [place]  - Current 1 pack per day smoker, x 36 years  - Alcohol: occasional  - Illicits: no     PHYSICAL EXAM:  VITALS: /67 (BP Location: Right arm)   Pulse 70   Temp 96.3  F (35.7  C) (Oral)   Resp 16   Ht 1.778 m (5' 10\")   Wt 77.6 kg (171 lb)   SpO2 98%   BMI 24.54 kg/m    GEN: Appears chronically ill , alert, oriented, in a wheel chair   NECK: Supple, full ROM, no cervical or clavicular " lymphadenopathy  RESP: CTAB, no wheezes/rales/rhonchi, breathing comfortably on room air  ABDOMEN: Soft, NT, ND,   SKIN: Normal color and turgor    scar/wound  on 3/11/21  nontender and no warmth      NEURO: able to move bilateral lower extremities, gait not assessed,   PSYCH: Appropriate mood and affect     ECOG PERFORMANCE STATUS: 0-1     RADIOLOGY/IMAGING:             IMPRESSION: 60-year-old gentleman with metastatic esophageal adenocarcinoma of the distal esophagus and GE junction metastatic to liver, bone, brain.      He has lost more than 20% body weight.      He is now status post laminectomy and decompression with neurosurgery for cauda equina syndrome, for which his neurologic function has improved drastically.  He is here for possible postop RT    He has a single choroid plexus brain metastasis and is a candidate for gammaknife radiosugery    He might benefit from palliative esophageal irradiation, but the plan is to start  chemotherapy asap and only consider radiotherapy if he develops other esophageeal symptoms.      RECOMMENDATION:  . The patient has undergone decompression for epidural tumor with resolution of neurologic compromise, and now is neurologically intact.   We recommend postoperataive radiotherapy once his wound has healed   This will require likely 5-10 sessions treatment.     The patient also likely will benefit from radiosurgery (  gamma knife)  to the right temporal  ( choroid plexus )  lesion, which  can be arranged when he is seen at  next week.     He will  will be able to receive at [facility].           The risks and benefits of radiotherapy were discussed with the patient.  Potential acute and long term side effects were explained.   The patient agrees to proceed with radiation therapy.  A written consent was obtained for both the lumbosacral radiation and the Gammaknife radiosurgery.     Thank you for allowing me to participate in Junito Wang 's care .  Please do not  hesitate to call me with questions.    Emilia Qureshi MD  [phone]   Pager   [phone]  [facility]   [phone] [place]  [phone] [facility]  Primary Physician: Florian Wagner   Patient Care Team:  Florian Wagner MD as PCP - General (Family Practice)  Jase Tarango MD as Assigned PCP  Roderick Sánchez RN as Specialty Care Coordinator (Oncology)  Jose Steven MD as MD (Hematology & Oncology)  Jose Steven MD as Assigned Cancer Care Provider

## 2021-03-11 NOTE — LETTER
3/11/2021         RE: Junito Wang  [address] [address]        Dear Colleague,    Thank you for referring your patient, Junito Wang, to the [facility] RADIATION ONCOLOGY [place]. Please see a copy of my visit note below.    Radiation Oncology Consultation:  Date on this visit: 3/11/2021    Junito Wang  is referred by Dr. Mariann egan for a radiation oncology consultation. 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 Wang is a 60 year old male who presents with a diagnosis of metastatic esophageal adenocarcinoma (Siewert III).  His oncologic diagnosis came to light in October 2020 when he noticed progressive dysphagia first with solid food.  This is associated with a 45 pound weight loss over 2 years, a total of 20% body weight loss.     He had also noticed left hip pain starting in September 2020 that was making it progressively more challenging for him to walk.  This pain woke him up at night.  Pain was on remitted with oral analgesics.  Pain noted to be sharp, and shooting.      He underwent upper endoscopy on 1/26/2021 with Dr. Kim who noted a medium to large sized ulcerating mass with bleeding in the distal esophagus located 36 to 40 cm from the incisors.  The mass was partially obstructing and circumferential.  There was associated hematin material in the cardia.  Pathology from this demonstrated moderately differentiated adenocarcinoma.  PD-L1 low.  HER2-kishan by IHC was equivocal score 2+.   He is not a candidate for a stent.         Staging workup included PET/CT on 2/12/21 that demonstrated hypermetabolic mass in the distal esophagus extending into the gastric cardia with an SUV max  of 16.7.  There were multiple hypermetabolic mediastinal lymph nodes in both the paraesophageal and subcarinal spaces.  There was also a hypoenhancing hypermetabolic mass in the liver in segment 7 measuring 1.8 x 1.7 cm with SUV max 8.3.  There are 2 additional nodules with FDG avidity 1 in segment 5 and the other in the margins of segments 5 and 6.  The patient also had a hypermetabolic lytic mass in the left sacrum extending into the left presacral space that measured 5.7 x 3.8 x 7.5 cm with an SUV max of 10.8.  The mass obliterated the left S1-3 neural foramina with possible mass-effect on the exiting nerves from S4.  The mass was noted to be eroding through the left sacroiliac articulation.  There was also a hypermetabolic mass in the proximal right humerus measuring 2.1 x 1.6 cm with SUV max 7.2.  There was also an hypermetabolic nodule in the medial left temporal lobe was also noted.  This led work-up to also include an MRI of the brain on 2/25/21 that demonstrated a 1.9 x 1.4 x 1.2 cm enhancing lesion in the left choroid plexus adjacent to the left mesial temporal lobe.      He is established oncologic care with Dr. Steven of medical oncology.  Given the fact that the patient was having significant bony pain Dr. Steven recommended he undergo bone biopsy of the left pelvic mass. Bone biopsy of the left pelvic mass on 2/26/21 (Specimen #[id number]); pathology demonstrated metastatic adenocarcinoma, consistent with known esophageal carcinoma.  The tentative plan for this likely metastatic diagnosis was for initiation of palliative chemotherapy/immunotherapy.         He   was noted to have progressive weakness in the left leg to the point where he was actually needing to be able to crawl to get around the house.  This was also associated with numbness across the entire foot and around mostly the lateral aspect of his leg below the knee.  He had also noted some difficulty urinating.  So he presented at the   emergency department.  In the emergency room, saddle anesthesia was also noted.  MRI of the lumbar spine on 3/2/2021 demonstrated a new posteriorly infiltrating T2 flair hyperintense appearance with internal septations within the lumbar spinal cord causing severe compression of the thecal sac and spinal canal extending from T12 down to the sacrum.      This appeared new in comparison to the recent PET/CT, which made the findings most suggestive of an early subacute epidural hematoma.  Neurosurgery was consulted who recommended emergent laminectomy and decompression of the epidural hematoma.  Intraoperatively, it was noted that there was abnormal discolored tissue seen compressing the cord once the dura was unroofed.  Tumor was noted to be clearly eroding the dura caudally, thus a muscle patch graft was placed over it surface and DuraSeal injected above it.  No epidural hematoma was noted in the operative report.  He recovered in the hospital and was discharged yesterday.  He is controlling his bowels but has an indwelling garza catheter.    While hospitalized he hd an elective feeding tube placement given his poor nutritional status. Tube feeding is going well.          REVIEW OF SYSTEMS: A 12 point review of systems was obtained. Pertinent findings are noted in the HPI and are otherwise unremarkable.      CHEMOTHERAPY HISTORY: no  PACEMAKER: no  PAST RADIATION THERAPY HISTORY: no     PAST MEDICAL HISTORY:  - Arthritis  - Hypertension  - Esophageal cancer as above     PAST SURGICAL HISTORY:  - Appendectomy  - Abdomen surgery     ALLERGIES:  - No known allergies     MEDICATIONS:  Current Outpatient Prescriptions   No current outpatient medications on file.         FAMILY HISTORY:  - No known family history of cancer      SOCIAL HISTORY:  - Single, resides in [place], [place]  - Current 1 pack per day smoker, x 36 years  - Alcohol: occasional  - Illicits: no     PHYSICAL EXAM:  VITALS: /67 (BP Location: Right  "arm)   Pulse 70   Temp 96.3  F (35.7  C) (Oral)   Resp 16   Ht 1.778 m (5' 10\")   Wt 77.6 kg (171 lb)   SpO2 98%   BMI 24.54 kg/m    GEN: Appears chronically ill , alert, oriented, in a wheel chair   NECK: Supple, full ROM, no cervical or clavicular lymphadenopathy  RESP: CTAB, no wheezes/rales/rhonchi, breathing comfortably on room air  ABDOMEN: Soft, NT, ND,   SKIN: Normal color and turgor    scar/wound  on 3/11/21  nontender and no warmth      NEURO: able to move bilateral lower extremities, gait not assessed,   PSYCH: Appropriate mood and affect     ECOG PERFORMANCE STATUS: 0-1     RADIOLOGY/IMAGING:             IMPRESSION: 60-year-old gentleman with metastatic esophageal adenocarcinoma of the distal esophagus and GE junction metastatic to liver, bone, brain.      He has lost more than 20% body weight.      He is now status post laminectomy and decompression with neurosurgery for cauda equina syndrome, for which his neurologic function has improved drastically.  He is here for possible postop RT    He has a single choroid plexus brain metastasis and is a candidate for gammaknife radiosugery    He might benefit from palliative esophageal irradiation, but the plan is to start  chemotherapy asap and only consider radiotherapy if he develops other esophageeal symptoms.      RECOMMENDATION:  . The patient has undergone decompression for epidural tumor with resolution of neurologic compromise, and now is neurologically intact.   We recommend postoperataive radiotherapy once his wound has healed   This will require likely 5-10 sessions treatment.     The patient also likely will benefit from radiosurgery (  gamma knife)  to the right temporal  ( choroid plexus )  lesion, which  can be arranged when he is seen at  next week.     He will  will be able to receive at [facility].           The risks and benefits of radiotherapy were discussed with the patient.  Potential acute and long term side effects were " explained.   The patient agrees to proceed with radiation therapy.  A written consent was obtained for both the lumbosacral radiation and the Gammaknife radiosurgery.     Thank you for allowing me to participate in Junito Wang 's care .  Please do not hesitate to call me with questions.    Emilia Qureshi MD  [phone]   Pager   [phone]  [facility]   [phone] [place]  [phone] [facility]  Primary Physician: Florian Wagner   Patient Care Team:  Florian Wagner MD as PCP - General (Family Practice)  Jase Tarango MD as Assigned PCP  Roderick Sánchez RN as Specialty Care Coordinator (Oncology)  Jose Steven MD as MD (Hematology & Oncology)  Jose Steven MD as Assigned Cancer Care Provider                    Again, thank you for allowing me to participate in the care of your patient.        Sincerely,        Emilia Qureshi MD

## 2021-03-11 NOTE — PROGRESS NOTES
This is a recent snapshot of the patient's [facility] medical record.  For current drug dose and complete information and questions, call [phone] or In [facility] pool, fv home infusion ([id number])  CSN Number:  [id number]

## 2021-03-12 ENCOUNTER — TELEPHONE (OUTPATIENT)
Dept: UROLOGY | Facility: CLINIC | Age: 61
End: 2021-03-12

## 2021-03-12 ENCOUNTER — HOME INFUSION (PRE-WILLOW HOME INFUSION) (OUTPATIENT)
Dept: PHARMACY | Facility: CLINIC | Age: 61
End: 2021-03-12

## 2021-03-12 ENCOUNTER — TELEPHONE (OUTPATIENT)
Dept: INTERNAL MEDICINE | Facility: CLINIC | Age: 61
End: 2021-03-12

## 2021-03-12 DIAGNOSIS — C79.31 METASTASIS TO BRAIN (H): Primary | ICD-10-CM

## 2021-03-12 NOTE — TELEPHONE ENCOUNTER
Patient called and stated had back surgery for cancer and will be having radiation in the next week but unable to void after surgery has garza catheter and wants this out if possible Madai Jett LPN Staff Nurse  Patient states able to do video call

## 2021-03-12 NOTE — TELEPHONE ENCOUNTER
[facility] Call Center    Phone Message    May a detailed message be left on voicemail: yes     Reason for Call: Other: Pt has a referrel with the clinical question of urinary retention.  Please follow up with the Pt.  Thank you     Action Taken: Message routed to:  [facility] ([facility] uro    Travel Screening: Not Applicable

## 2021-03-12 NOTE — TELEPHONE ENCOUNTER
Julio, Negra Stefanie, CNP  [facility]             Patient needs hospital follow-up visit please. Negra Kim CNP    Previous Messages    ----- Message -----   From: Winifred Montez   Sent: 3/12/2021   7:53 AM CST   To: Negra Kim CNP   Subject: FW: follow up appointment- PC follow up ( PH*     I received this message below but I am forwarding this to you because this patient was Discharged from the Hospital and needs to see his Primary in 7 days from Discharge. I do not schedule for primary I work at the Cancer Center in [place] and this patient already has an Oncology appointment with Jaky Pugh.     Thanks   Winifred Camejo   ----- Message -----   From: Roderick Toro APRN CNP   Sent: 3/9/2021   4:48 PM CST   To: [facility] Referrals   Subject: follow up appointment- PC follow up ( PHI)       Hi.. He is scheduled to be discharged from [facility] inpatient PC service on 3/10   He should have close PC follow up for ongoing sx management and support.   He has metastatic esophageal cancer with recent cord syndrome.         Called and spoke to patient. Hospital F/U scheduled on 3/17/21, with Negra Kim.Soraida Adams MA/YENIFER

## 2021-03-14 ENCOUNTER — MEDICAL CORRESPONDENCE (OUTPATIENT)
Dept: HEALTH INFORMATION MANAGEMENT | Facility: CLINIC | Age: 61
End: 2021-03-14

## 2021-03-15 ENCOUNTER — APPOINTMENT (OUTPATIENT)
Dept: RADIATION ONCOLOGY | Facility: CLINIC | Age: 61
End: 2021-03-15
Payer: COMMERCIAL

## 2021-03-15 ENCOUNTER — PATIENT OUTREACH (OUTPATIENT)
Dept: RADIATION ONCOLOGY | Facility: CLINIC | Age: 61
End: 2021-03-15

## 2021-03-15 LAB — LAB SCANNED RESULT: NORMAL

## 2021-03-15 PROCEDURE — 77307 TELETHX ISODOSE PLAN CPLX: CPT | Performed by: RADIOLOGY

## 2021-03-15 PROCEDURE — 77334 RADIATION TREATMENT AID(S): CPT | Performed by: RADIOLOGY

## 2021-03-15 NOTE — TELEPHONE ENCOUNTER
MEDICAL RECORDS REQUEST   [facility]  Urology Clinic  [address] [place] [address]  PHONE: [phone]  Fax: [phone]        FUTURE VISIT INFORMATION                                                   Junito Wang, : 1960 scheduled for future visit at [facility]    APPOINTMENT INFORMATION:    Date: 3/19/2021    Provider:  Alysia HAMMOND    Reason for Visit/Diagnosis: Urinary retention     REFERRAL INFORMATION:    Referring provider:  N/A    Specialty: N/A    Referring providers clinic:      Clinic contact number:  N/A    RECORDS REQUESTED FOR VISIT                                                     NOTES  STATUS/DETAILS   OFFICE NOTE from referring provider  yes   OFFICE NOTE from other specialist  no   DISCHARGE SUMMARY from hospital  yes   DISCHARGE REPORT from the ER  yes   OPERATIVE REPORT  no   MEDICATION LIST  no     PRE-VISIT CHECKLIST      Record collection complete Yes   Appointment appropriately scheduled           (right time/right provider) Yes   MyChart activation Yes   Questionnaire complete If no, please explain: In process      Completed by: Jo Orozco

## 2021-03-15 NOTE — PROGRESS NOTES
This is a recent snapshot of the patient's [facility] medical record.  For current drug dose and complete information and questions, call [phone] or In [facility] pool, fv home infusion ([id number])  CSN Number:  [id number]

## 2021-03-15 NOTE — TELEPHONE ENCOUNTER
From: Raiza Ponce APRN CNP   Sent: 3/12/2021  12:01 PM CST   To: Emilia Qureshi MD     Ok to start radiation.   Ok to remove sutures.     Thank you,   Feel free to reach out to me if any additional questions, or if you ever have   Any neurosurgery questions.     Thanks,   Raiza   [phone]       Per Dr. Qureshi and ALEXEI Martinez CNP with Neurosurgery, okay to remove sutures and for patient to start radiation treatment.  Patient scheduled to begin radiation treatment on Wednesday, 3/17/2021 at 1210.  This RN contacted patient, reviewed per Dr. Titus sanchez to cancel appointment with neurosurgery on 3/16/2021 as patient able to have sutures removed at [place].  Patient scheduled for RN suture removal on Wednesday, 3/17/2021 at 1130 prior to start of radiation treatment.  Patient verbalized understanding and had no questions.  This RN sent message to Domonique Pedraza per patient request to reschedule appointment at [facility] on 3/17/2021 due to start of radiation treatment.    Elizabeth Amezcua, RN BSN OCN CBCN

## 2021-03-15 NOTE — PROGRESS NOTES
Assessment & Plan     Hospital discharge follow-up  He is feeling well. Denies concerning symptoms.   Ambulating without difficulty with walker.   Tolerating tube feedings.   Had first BM since home today, formed.   Denies concerns with catheter. His urine is a little dark today and HR is slightly elevated- possibly a bit dry?  He feels well.  Will have him slightly increase his water intake through G-tube.  Continue to monitor.  Was instructed on symptoms that would warrant more urgent evalutation.      Malignant neoplasm of lower third of esophagus (H)  Per oncology- plan is for palliative chemoimmunotherapy with radiation therapy for symptom control.    Hypertension goal BP (blood pressure) < 140/90  BP well controlled since his medications were discontinued (amlodipine and lisinopril)- will not have him resume these.   Continue to monitor.        Tobacco Cessation:   reports that he has been smoking cigarettes. He started smoking about 44 years ago. He has a 44.00 pack-year smoking history. He has never used smokeless tobacco.      See Patient Instructions  Patient Instructions   Increase water through G Tube slightly since your heart rate is a little elevated and urine is ji in color today.   Aim for additional 200 ml daily.   Let us know if you if you develop any fevers, chills, chest pain, palpitations, dizziness, shortness of breath, decreased urine in you garza bag or very dark urine.       Blood pressure medication is still not needed, I will take these meds of your list.       Follow-up with specialists as planned.              No follow-ups on file.    Negra Kim, [facility] [place]    Ace Wild is a 60 year old who presents for the following health issues     HPI       Hospital Follow-up Visit:    Hospital/Nursing Home/IP Rehab Facility: [facility]   Date of Admission: 3/2/2021  Date of Discharge: 3/10/21  Reason(s) for Admission: Cauda equina due to  "tumor compression, presented because his left leg \"wouldn't work\".  Metastatic esophageal cancer.       Was your hospitalization related to COVID-19? No   Problems taking medications regularly:  None  Medication changes since discharge: None  Problems adhering to non-medication therapy:  None    Summary of hospitalization:  [facility] discharge summary reviewed    Date of Admission:                  3/2/2021  Date of Discharge::                 3/10/2021  Admitting Physician:               Soy Gusman MD  Discharge Physician:              Soy Gusman MD          Admission Diagnoses:   Urine retention [R33.9]  Left leg weakness [R29.898]  Anemia, unspecified type [D64.9]  Leukocytosis, unspecified type [D72.829]  Malignant neoplasm of esophagus, unspecified location (H) [C15.9]  Acute low back pain, unspecified back pain laterality, unspecified whether sciatica present [M54.5]  Esophageal dysphagia [R13.10]          Discharge Diagnosis:      Urine retention [R33.9]  Left leg weakness [R29.898]  Anemia, unspecified type [D64.9]  Leukocytosis, unspecified type [D72.829]  Malignant neoplasm of esophagus, unspecified location (H) [C15.9]  Acute low back pain, unspecified back pain laterality, unspecified whether sciatica present [M54.5]  Esophageal dysphagia [R13.10]          Procedures:   3/2/2021  Lumbar decomrpession L2-5 for for resection of metastatic disease     3/05/2021  PEG insertion            Brief History of Illness:   The patient is a 60 year old with known esophageal adenocarcinoma who presented with <24h of inability to ambulate with acute onset left leg weakness/urinary retention with perianal sensory deficits. MRI showed extensive epidural signal change suggestive of tumor vs. Hemorrhage. Emergent surgery was offered in a attempt to preserve functional ability. The risks, benefits and alternatives were discussed with the patient and written consent was obtained for the above " operation           Hospital Course:   Following surgery the patient was placed on 48 hours of bedrest due to concern for intraoperative dural leak secondary to tumor erosion.   Once bedrest was discontinued the patient was mobilized and did not endorse headache.     Patient had known swallowing difficulties pre-operatively and was scheduled for PEG placement prior to emergent neurosurgical procedure.  Following surgery patient was evaluated by Speech, patient was cleared for clear liquid diet however due to pain his ability to tolerate was limited.  Since patient will need long term enteral access percutaneous endogastric tube was placed on 3/5/2021.  Dysphagia hopefully will improve with systemic therapy and therefore surgical resection was not recommended.     Patient was started continuous feeds and was transitioned to bolus tube feedings prior and was tolerating prior to discharge.    Patient's home blood pressure medications were held throughout his stay due to hypotension.  These medications will not be restarted upon discharge.  Patient will require follow-up with PCP within 7 days of discharge to address this.   Patient was followed by both PT and OT throughout his stay.  It was felt the patient was mobilizing safely and was safe to discharge home with outpatient therapy.    Oncology followed patient throughout his stay, plan for radiation to the lumbar spine and gamma knife to the brain lesion 4 weeks after surgery.  Upon completion of radiation, will begin palliative chemotherapy and immunotherapy, likely FOLFOX + Nivolumab.   Patient will follow-up with Radiation Oncology on 3/11/2021.    Prior to admission the patient had not passed a bowel movement for 2 weeks, the patient was given an aggressive bowel regimen while hospitalized and was successfully able to pass bowel movements prior to discharge.  Patient will be discharged on an aggressive bowel regimen as well.          Diagnostic Tests/Treatments  reviewed.  Follow up needed: Has follow-up with several specialist.  No labs recommended.  Review BP- consider restarting medication?   Other Healthcare Providers Involved in Patient s Care:         Homecare  Update since discharge: improved.     Patient states he is feeling well.    Had a BM today- this was first BM since home from the hospital.   Andrews catheter intact, somewhat irritating but denies any redness/swelling/discharge. No hematuria.   Urine is darker ji today in office, he states urine is usually more light yellow. Has follow-up with urology later this week.   Doing tube feedings, 6 cans per day.  He states he is doing 2 cans at a time, 3 times per day.  For water he is flushing with 120 ml before tube feedings and 120 ml after feedings, for a total of 720 ml water associated with tube feeding.  He flushes with 30 mls before and after medications twice daily. Hospital notes state that he was cleared for clear liquids, but he has pain with drinking.  He is ambulating with a walker.   Saw oncology yesterday.  Per notes, plan is for palliative chemoimmunotherapy with radiation therapy for symptom control.  Today he had his first radiation treatment of his lumbar spine.  He also had the sutures in his back taken out.   Heart rate is slightly elevated today, 111 initially and 102 after rest.  He states it was also elevated at his appointment for radiation, 119 at that appointment.   Patient denies any fever, chills, chest pain, palpitations, shortness of breath, cough, or dizziness.        Post Discharge Medication Reconciliation: discharge medications reconciled, continue medications without change.  Plan of care communicated with patient              Review of Systems   Constitutional, HEENT, cardiovascular, pulmonary, gi and gu systems are negative, except as otherwise noted.      Objective    /64   Pulse 102   Wt 77.7 kg (171 lb 5 oz)   SpO2 98%   BMI 24.58 kg/m    Body mass index is 24.58  kg/m .  Physical Exam   GENERAL: healthy, alert and no distress  EYES: Eyes grossly normal to inspection, PERRL and conjunctivae and sclerae normal  NECK: no adenopathy, no asymmetry, masses, or scars and thyroid normal to palpation  RESP: lungs clear to auscultation - no rales, rhonchi or wheezes  CV: regular rate and rhythm, normal S1 S2, no S3 or S4, no murmur, click or rub, no peripheral edema and peripheral pulses strong  ABDOMEN: soft, nontender, no hepatosplenomegaly, no masses and bowel sounds normal.  G tube site intact.   - garza catheter in place, urine ji  MS: no gross musculoskeletal defects noted, no edema  SKIN: no suspicious lesions or rashes  NEURO: Normal strength and tone, mentation intact and speech normal  PSYCH: mentation appears normal, affect normal/bright    Labs reviewed in Care Everywhere

## 2021-03-16 ENCOUNTER — VIRTUAL VISIT (OUTPATIENT)
Dept: ONCOLOGY | Facility: CLINIC | Age: 61
End: 2021-03-16
Attending: STUDENT IN AN ORGANIZED HEALTH CARE EDUCATION/TRAINING PROGRAM
Payer: COMMERCIAL

## 2021-03-16 ENCOUNTER — HOME INFUSION (PRE-WILLOW HOME INFUSION) (OUTPATIENT)
Dept: PHARMACY | Facility: CLINIC | Age: 61
End: 2021-03-16

## 2021-03-16 ENCOUNTER — MEDICAL CORRESPONDENCE (OUTPATIENT)
Dept: HEALTH INFORMATION MANAGEMENT | Facility: CLINIC | Age: 61
End: 2021-03-16

## 2021-03-16 ENCOUNTER — PRE VISIT (OUTPATIENT)
Dept: NEUROSURGERY | Facility: CLINIC | Age: 61
End: 2021-03-16

## 2021-03-16 DIAGNOSIS — Z11.59 ENCOUNTER FOR SCREENING FOR OTHER VIRAL DISEASES: ICD-10-CM

## 2021-03-16 DIAGNOSIS — C15.9 ADENOCARCINOMA OF ESOPHAGUS (H): Primary | ICD-10-CM

## 2021-03-16 PROCEDURE — 99215 OFFICE O/P EST HI 40 MIN: CPT | Mod: 95 | Performed by: NURSE PRACTITIONER

## 2021-03-16 NOTE — PROGRESS NOTES
Junito is a 60 year old who is being evaluated via a billable video visit.      How would you like to obtain your AVS? [employer]  If the video visit is dropped, the invitation should be resent by: Send to e-mail at: [email or web address]  Will anyone else be joining your video visit? No        Video-Visit Details    Type of service:  Video Visit    Video Start Time: 9:10 AM    Video End Time:9:45 AM    Originating Location (pt. Location): Home    Distant Location (provider location):  [facility]     Platform used for Video Visit: Dar Sky CMA on 3/16/2021 at 8:59 AM        March 16, 2021     Reason for Visit: follow up metastatic esophogeal cancer    Oncology HPI:   August 2020- stomach discomfort, belching   October 2020- progressive dysphagia with solids   1/26/21- distal esophageal biopsy shows Moderately differentiated adenocarcinoma; MMR normal expression, PDL1 expression is low with TPS 2%, CPS 5-10, Her2 is pending  1/27/21- CT CAP- Enlarged  2 cm subcarinal lymph node and 1.4 cm short axis right subcarinal lymph node, focal thickening of the superior wall along the right side of the mid esophagus. Numerous small pulmonary nodules, 3 mm nodule in the superior segment of the left lower lobe, 3 mm nodule in the anterior aspect of the right upper lobe , 4 mm nodule in the medial aspect of the right upper lobe and 4 mm right lower lobe nodule. Hypoattenuating foci in the liver, 1.6 cm hypoattenuating/hypoenhancing lesion in hepatic segment 8 and 1.4 cm lesion in hepatic segment 5 , indeterminate, likely metastatic.  The prostate gland is mildly enlarged measuring 5.3 cm in transverse diameter. Multiple prominent but not significantly enlarged retroperitoneal lymph nodes, indeterminate, could be reactive. 4.4 x 4.3 cm lytic lesion centered in the posterior aspect of the left sacral ala (series 3 image 243), 4.7 x 4.0 x 5.6 cm (axial and CC dimensions, respectively)  "hypoattenuating lesion in the subcutaneous tissue of the anterior chest wall just anterior to the mediastinum, indeterminate, could represent sebaceous cyst.  2/4/21- Saw Dr. Bourgeois- who ordered PEt/CT  3/2/21-3/10/21 [facility] with <24h of inability to ambulate with acute onset left leg weakness/urinary retention with perianal sensory deficits. s/p L2-L5 laminectomy and decompression    Interval history:   Feeling well since getting home from the hospital. Was having some looser stools since bowel clean out that have now normalized to soft. Has not had BM yet today. Starting radiation tomorrow with 5 planned treatments, for back and hip. GK scheduled for Wednesday.      Getting around okay at home, using his walker, can go up stairs. Occasional pain in low back that is 1-2/10 in low back that does not require analgesic. He finds muscle relaxants and tylenol ineffective but also not necessary. His back \"feels tickly\" too which is not a new feeling. Has a garza catheter that causes him some generalized positional discomfort, better with changing position. No other urinary concerns.     Goal TF is 6 cans per day, a couple days he has only had 5 (due to waking up late, delays, etc), otherwise getting in the 6 amount. Swallowing is going fine though he is taking nothing by mouth. Breathing without difficulty he has been walking around his appointments independently. Had a fever of 100 on Saturday but since has not had a temperature--he checks multiple times per day. No dizziness or LH at home. Has some tinnitus in L ear that is chronic.      10 point review of systems otherwise negative     Current Outpatient Medications   Medication Sig Dispense Refill     acetaminophen (TYLENOL) 500 MG tablet Take 500-1,000 mg by mouth every 6 hours as needed for mild pain       amLODIPine (NORVASC) 10 MG tablet Take 1 tablet (10 mg) by mouth daily (Patient not taking: Reported on 3/11/2021) 90 tablet 2     famotidine (PEPCID) 40 MG/5ML " suspension Take 2.5 mLs (20 mg) by mouth 2 times daily 50 mL 1     gabapentin (NEURONTIN) 250 MG/5ML solution Take 5 mLs (250 mg) by mouth At Bedtime 470 mL 1     Lidocaine (LIDOCARE) 4 % Patch Place 2 patches onto the skin every 24 hours To prevent lidocaine toxicity, patient should be patch free for 12 hrs daily. (Patient not taking: Reported on 3/11/2021) 14 patch 0     lisinopril (ZESTRIL) 10 MG tablet Take 1 tablet (10 mg) by mouth 2 times daily 180 tablet 2     methocarbamol (ROBAXIN) 750 MG tablet Take 1 tablet (750 mg) by mouth 4 times daily as needed for muscle spasms 30 tablet 0     oxyCODONE (ROXICODONE) 5 MG/5ML solution Take 5-10 mLs (5-10 mg) by mouth every 3 hours as needed (pain control or improvement in physical function. Hold dose for analgesic side effects.) (Patient not taking: Reported on 3/11/2021) 20 mL 0     syringe disposable 60 ML MISC 1 Syringe daily 40 each 0     thiamine (B-1) 100 MG tablet 1 tablet (100 mg) by Per Feeding Tube route daily 30 tablet 1        No Known Allergies    Exam:  There were no vitals taken for this visit.  Wt Readings from Last 4 Encounters:   03/11/21 78.9 kg (174 lb)   03/07/21 79.1 kg (174 lb 6.4 oz)   02/26/21 80.3 kg (177 lb)   01/19/21 90.3 kg (199 lb)     Video physical exam  General: Patient appears well in no acute distress.   Skin: No visualized rash or lesions on visualized skin  Eyes: EOMI, no erythema, sclera icterus or discharge noted  Resp: Appears to be breathing comfortably without accessory muscle usage, speaking in full sentences, no cough  MSK: Appears to have normal range of motion based on visualized movements  Neurologic: No apparent tremors, facial movements symmetric  Psych: affect good, alert and oriented    The rest of a comprehensive physical examination is deferred due to PHE (public health emergency) video restrictions    Labs: recent labs reviewed    Imaging: recent imaging reviewed    Impression/plan:   # Stage IV esophageal  adenocarcinoma   Given progression of disease, goal of treatment would be to prolong life and reduce symptoms rather than curative.  Plan is for palliative chemoimmunotherapy with radiation therapy for symptom control.  -He will start radiation tomorrow, for 5 planned sessions and then has GK scheduled for 3/24. I will meet with him ~1 week after to discuss initiation of FOLFOX + nivo. Will need line for 5FU, order place for port today. Needs to be placed prior to 3/31.    #brain mets.   Recently met with dr. Qureshi. Plan for GK 3/24    #Bone mets  Bone biopsy of left pelvic mass 2/26/21 confirmed mets. Plan for radiation to lumbar spine with Dr. Qureshi. Consider adding zometa next cycle.     #Dypshagia, malnutrition  PEG tube placed in OR  (3/5) for nutritional support. Tolerating tube feeds. Not doing anything orally though my review of SLP note indicates he could do clear liquid? Junito declined follow up with them for clarification at this time though this might be beneficial as we see benefit from systemic treatment.   --Of note, consideration given to esophageal stent vs XRT for esophageal debulking to address dysphagia inpatient; however given likely improvement with chemo-immunotherapy, as well as risks of worsening cytopenias, elected not to.     #Cauda Equina due to tumor compression. He is s/p L2-L5 laminectomy and decompression.   Followed by neurosurgery with improved exam after surgery. He is now ambulating with a walker. Has appointment to get sutures removed tomorrow. Follow up with their team per recs. He is aware of his activity and lifting restrictions    #Constipation  Had overflow of loose stool after bowel regimen inpatient but this is slowing down. He understands to add laxative and softener back in if he becomes constipated.     #Urinary retention  Andrews in place. He has follow up with urology later this week for TOV    80 minutes spent on the date of the encounter doing chart review,  review of outside records, review of test results, interpretation of tests, patient visit and documentation     Jaky Pugh CNP on 3/16/2021 at 1:59 PM

## 2021-03-16 NOTE — TELEPHONE ENCOUNTER
FUTURE VISIT INFORMATION      FUTURE VISIT INFORMATION:    Date: 3/19/2021    Time: 3pm    [place] [facility] [place]ERR[place] INFORMATION:    Referring provider:      Referring providers clinic:      Reason for visit/diagnosis  Gamma Knife    RECORDS REQUESTED FROM:       Clinic name Comments Records Status Imaging Status   INternal MR Brain-2/25/2021    MR Lumbar -3/2/2021 Epic PACS

## 2021-03-16 NOTE — LETTER
3/16/2021         RE: Junito Wang  [address] [address] [facility] [address]        Dear Colleague,    Thank you for referring your patient, Junito Wang, to the [facility] CANCER [facility]. Please see a copy of my visit note below.    Junito is a 60 year old who is being evaluated via a billable video visit.      How would you like to obtain your AVS? MyChart  If the video visit is dropped, the invitation should be resent by: Send to e-mail at: [email or web address]  Will anyone else be joining your video visit? No        Video-Visit Details    Type of service:  Video Visit    Video Start Time: 9:10 AM    Video End Time:9:45 AM    Originating Location (pt. Location): Home    Distant Location (provider location):  [facility] CANCER [facility]     Platform used for Video Visit: Dar Sky CMA on 3/16/2021 at 8:59 AM        March 16, 2021     Reason for Visit: follow up metastatic esophogeal cancer    Oncology HPI:   August 2020- stomach discomfort, belching   October 2020- progressive dysphagia with solids   1/26/21- distal esophageal biopsy shows Moderately differentiated adenocarcinoma; MMR normal expression, PDL1 expression is low with TPS 2%, CPS 5-10, Her2 is pending  1/27/21- CT CAP- Enlarged  2 cm subcarinal lymph node and 1.4 cm short axis right subcarinal lymph node, focal thickening of the superior wall along the right side of the mid esophagus. Numerous small pulmonary nodules, 3 mm nodule in the superior segment of the left lower lobe, 3 mm nodule in the anterior aspect of the right upper lobe , 4 mm nodule in the medial aspect of the right upper lobe and 4 mm right lower lobe nodule. Hypoattenuating foci in the liver, 1.6 cm hypoattenuating/hypoenhancing lesion in hepatic segment 8 and 1.4 cm lesion in hepatic segment 5 , indeterminate, likely metastatic.  The prostate gland is mildly enlarged measuring 5.3 cm in transverse diameter. Multiple prominent but  "not significantly enlarged retroperitoneal lymph nodes, indeterminate, could be reactive. 4.4 x 4.3 cm lytic lesion centered in the posterior aspect of the left sacral ala (series 3 image 243), 4.7 x 4.0 x 5.6 cm (axial and CC dimensions, respectively) hypoattenuating lesion in the subcutaneous tissue of the anterior chest wall just anterior to the mediastinum, indeterminate, could represent sebaceous cyst.  2/4/21- Saw Dr. Bourgeois- who ordered PEt/CT  3/2/21-3/10/21 [facility] with <24h of inability to ambulate with acute onset left leg weakness/urinary retention with perianal sensory deficits. s/p L2-L5 laminectomy and decompression    Interval history:   Feeling well since getting home from the hospital. Was having some looser stools since bowel clean out that have now normalized to soft. Has not had BM yet today. Starting radiation tomorrow with 5 planned treatments, for back and hip. GK scheduled for Wednesday.      Getting around okay at home, using his walker, can go up stairs. Occasional pain in low back that is 1-2/10 in low back that does not require analgesic. He finds muscle relaxants and tylenol ineffective but also not necessary. His back \"feels tickly\" too which is not a new feeling. Has a garza catheter that causes him some generalized positional discomfort, better with changing position. No other urinary concerns.     Goal TF is 6 cans per day, a couple days he has only had 5 (due to waking up late, delays, etc), otherwise getting in the 6 amount. Swallowing is going fine though he is taking nothing by mouth. Breathing without difficulty he has been walking around his appointments independently. Had a fever of 100 on Saturday but since has not had a temperature--he checks multiple times per day. No dizziness or LH at home. Has some tinnitus in L ear that is chronic.      10 point review of systems otherwise negative     Current Outpatient Medications   Medication Sig Dispense Refill     acetaminophen " (TYLENOL) 500 MG tablet Take 500-1,000 mg by mouth every 6 hours as needed for mild pain       amLODIPine (NORVASC) 10 MG tablet Take 1 tablet (10 mg) by mouth daily (Patient not taking: Reported on 3/11/2021) 90 tablet 2     famotidine (PEPCID) 40 MG/5ML suspension Take 2.5 mLs (20 mg) by mouth 2 times daily 50 mL 1     gabapentin (NEURONTIN) 250 MG/5ML solution Take 5 mLs (250 mg) by mouth At Bedtime 470 mL 1     Lidocaine (LIDOCARE) 4 % Patch Place 2 patches onto the skin every 24 hours To prevent lidocaine toxicity, patient should be patch free for 12 hrs daily. (Patient not taking: Reported on 3/11/2021) 14 patch 0     lisinopril (ZESTRIL) 10 MG tablet Take 1 tablet (10 mg) by mouth 2 times daily 180 tablet 2     methocarbamol (ROBAXIN) 750 MG tablet Take 1 tablet (750 mg) by mouth 4 times daily as needed for muscle spasms 30 tablet 0     oxyCODONE (ROXICODONE) 5 MG/5ML solution Take 5-10 mLs (5-10 mg) by mouth every 3 hours as needed (pain control or improvement in physical function. Hold dose for analgesic side effects.) (Patient not taking: Reported on 3/11/2021) 20 mL 0     syringe disposable 60 ML MISC 1 Syringe daily 40 each 0     thiamine (B-1) 100 MG tablet 1 tablet (100 mg) by Per Feeding Tube route daily 30 tablet 1        No Known Allergies    Exam:  There were no vitals taken for this visit.  Wt Readings from Last 4 Encounters:   03/11/21 78.9 kg (174 lb)   03/07/21 79.1 kg (174 lb 6.4 oz)   02/26/21 80.3 kg (177 lb)   01/19/21 90.3 kg (199 lb)     Video physical exam  General: Patient appears well in no acute distress.   Skin: No visualized rash or lesions on visualized skin  Eyes: EOMI, no erythema, sclera icterus or discharge noted  Resp: Appears to be breathing comfortably without accessory muscle usage, speaking in full sentences, no cough  MSK: Appears to have normal range of motion based on visualized movements  Neurologic: No apparent tremors, facial movements symmetric  Psych: affect good,  alert and oriented    The rest of a comprehensive physical examination is deferred due to PHE (public health emergency) video restrictions    Labs: recent labs reviewed    Imaging: recent imaging reviewed    Impression/plan:   # Stage IV esophageal adenocarcinoma   Given progression of disease, goal of treatment would be to prolong life and reduce symptoms rather than curative.  Plan is for palliative chemoimmunotherapy with radiation therapy for symptom control.  -He will start radiation tomorrow, for 5 planned sessions and then has GK scheduled for 3/24. I will meet with him ~1 week after to discuss initiation of FOLFOX + nivo. Will need line for 5FU, order place for port today. Needs to be placed prior to 3/31.    #brain mets.   Recently met with dr. Qureshi. Plan for GK 3/24    #Bone mets  Bone biopsy of left pelvic mass 2/26/21 confirmed mets. Plan for radiation to lumbar spine with Dr. Qureshi. Consider adding zometa next cycle.     #Dypshagia, malnutrition  PEG tube placed in OR  (3/5) for nutritional support. Tolerating tube feeds. Not doing anything orally though my review of SLP note indicates he could do clear liquid? Junito declined follow up with them for clarification at this time though this might be beneficial as we see benefit from systemic treatment.   --Of note, consideration given to esophageal stent vs XRT for esophageal debulking to address dysphagia inpatient; however given likely improvement with chemo-immunotherapy, as well as risks of worsening cytopenias, elected not to.     #Cauda Equina due to tumor compression. He is s/p L2-L5 laminectomy and decompression.   Followed by neurosurgery with improved exam after surgery. He is now ambulating with a walker. Has appointment to get sutures removed tomorrow. Follow up with their team per recs. He is aware of his activity and lifting restrictions    #Constipation  Had overflow of loose stool after bowel regimen inpatient but this is slowing  down. He understands to add laxative and softener back in if he becomes constipated.     #Urinary retention  Andrews in place. He has follow up with urology later this week for TOV    80 minutes spent on the date of the encounter doing chart review, review of outside records, review of test results, interpretation of tests, patient visit and documentation     Jaky Pugh CNP on 3/16/2021 at 1:59 PM

## 2021-03-17 ENCOUNTER — OFFICE VISIT (OUTPATIENT)
Dept: FAMILY MEDICINE | Facility: CLINIC | Age: 61
End: 2021-03-17
Payer: COMMERCIAL

## 2021-03-17 ENCOUNTER — APPOINTMENT (OUTPATIENT)
Dept: RADIATION ONCOLOGY | Facility: CLINIC | Age: 61
End: 2021-03-17
Payer: COMMERCIAL

## 2021-03-17 ENCOUNTER — ALLIED HEALTH/NURSE VISIT (OUTPATIENT)
Dept: RADIATION ONCOLOGY | Facility: CLINIC | Age: 61
End: 2021-03-17
Payer: COMMERCIAL

## 2021-03-17 ENCOUNTER — VIRTUAL VISIT (OUTPATIENT)
Dept: SURGERY | Facility: CLINIC | Age: 61
End: 2021-03-17
Attending: CLINICAL NURSE SPECIALIST
Payer: COMMERCIAL

## 2021-03-17 VITALS
TEMPERATURE: 98.6 F | RESPIRATION RATE: 18 BRPM | DIASTOLIC BLOOD PRESSURE: 75 MMHG | OXYGEN SATURATION: 97 % | HEART RATE: 119 BPM | SYSTOLIC BLOOD PRESSURE: 121 MMHG

## 2021-03-17 VITALS
WEIGHT: 171.31 LBS | SYSTOLIC BLOOD PRESSURE: 108 MMHG | OXYGEN SATURATION: 98 % | DIASTOLIC BLOOD PRESSURE: 64 MMHG | BODY MASS INDEX: 24.58 KG/M2 | HEART RATE: 102 BPM

## 2021-03-17 DIAGNOSIS — Z43.1 PEG (PERCUTANEOUS ENDOSCOPIC GASTROSTOMY) ADJUSTMENT/REPLACEMENT/REMOVAL (H): Primary | ICD-10-CM

## 2021-03-17 DIAGNOSIS — C15.5 MALIGNANT NEOPLASM OF LOWER THIRD OF ESOPHAGUS (H): ICD-10-CM

## 2021-03-17 DIAGNOSIS — Z09 HOSPITAL DISCHARGE FOLLOW-UP: Primary | ICD-10-CM

## 2021-03-17 DIAGNOSIS — I10 HYPERTENSION GOAL BP (BLOOD PRESSURE) < 140/90: ICD-10-CM

## 2021-03-17 DIAGNOSIS — C79.51 BONE METASTASIS: Primary | ICD-10-CM

## 2021-03-17 PROBLEM — E66.01 MORBID OBESITY (H): Status: RESOLVED | Noted: 2019-01-31 | Resolved: 2021-03-17

## 2021-03-17 PROCEDURE — 999N001193 HC VIDEO/TELEPHONE VISIT; NO CHARGE

## 2021-03-17 PROCEDURE — 77280 THER RAD SIMULAJ FIELD SMPL: CPT | Performed by: RADIOLOGY

## 2021-03-17 PROCEDURE — 77412 RADIATION TX DELIVERY LVL 3: CPT | Performed by: RADIOLOGY

## 2021-03-17 PROCEDURE — 99214 OFFICE O/P EST MOD 30 MIN: CPT | Performed by: NURSE PRACTITIONER

## 2021-03-17 PROCEDURE — 99212 OFFICE O/P EST SF 10 MIN: CPT | Mod: 95 | Performed by: CLINICAL NURSE SPECIALIST

## 2021-03-17 ASSESSMENT — PAIN SCALES - GENERAL: PAINLEVEL: NO PAIN (0)

## 2021-03-17 NOTE — PATIENT INSTRUCTIONS
Please contact [facility] RN with questions or concerns following today's appointment: [phone].    Thank you!

## 2021-03-17 NOTE — LETTER
"    3/17/2021         RE: Junito Wang  [address] [facility] [address]        Dear Colleague,    Thank you for referring your patient, Junito Wang, to the [facility] CANCER CLINIC. Please see a copy of my visit note below.    Junito is a 60 year old who is being evaluated via a billable telephone visit.      What phone number would you like to be contacted at? [phone]  How would you like to obtain your AVS? MyChart     Vitals - Patient Reported  Weight (Patient Reported): 77.8 kg (171 lb 8 oz)  Height (Patient Reported): 177.8 cm (5' 10\")  BMI (Based on Pt Reported Ht/Wt): 24.61  Pain Score: No Pain (0)     I have reviewed and updated the patient's allergies and medication list.    Concerns: none  Refills: none     Whit Herzog CMA    REASON FOR VISIT:  Post-op PEG assessment, teaching via telephone consultation.    PROCEDURES PERFORMED:  1. EGD  2. PEG insertion    DATE ABOVE PROCEDURES PERFORMED:  3/5/2021    SURGEON:  Dr. Jose Curiel    History of Present Illness:  Esophageal cancer.  Patient was diagnosed with moderately differentiated esophageal adenocarcinoma, metastatic to spine and brain.  He was recently admitted with cauda equina and taken emergently to the OR on 3/2/21 for a laminectomy and decompression.  On 1/26/21 he was found to have an ulcerating mass with bleeding in the distal esophagus that was circumferential and partially obstructive but was traversable.  He has been having worsening dysphagia and pain with swallowing.  He is starting radiation today to his back and hip, next week is getting a brain MRI and radiation to the brain.    Assessment:  I spoke by telephone to Mr. Wang.   He has been managing his PEG tube without problems.  He is taking 5 cans of IsoSource 1.5 daily by several bolus/gravity feedings and using his PEG for all medications and water flushes.   He is unable to swallow \"much of anything\" but can take sips of water.       I " reviewed the care of the PEG site, told him some drainage is normal and it is important to clean daily and apply a moisture barrier cream, then gauze dressing.   I told him he could ask for Aquafor at the radiation department today.   He is flushing the tube and said he has all of the equipment needed.         I gave him our clinic # to call if he has any issues or questions related to the PEG tube.    All questions were answered.    Plan:  Call or return PRN    Total time:  20 minutes    ALEXEI Narayanan, CNS

## 2021-03-17 NOTE — PATIENT INSTRUCTIONS
Clean PEG site daily  Some drainage is normal-   This comes directly from the stomach and is acidic.     Use a moisture barrier cream (such as Aquafor or Desitin) to protect the skin around the PEG site  Cover the cream with a gauze dressing.    Call with any PEG related questions or concerns-  [phone] to talk with our triage nurses.

## 2021-03-17 NOTE — NURSING NOTE
Patient presents to clinic today for nurse only visit at [facility].  Patient has been cleared and orders provided per ALEXEI Martinez CNP for suture removal of back incision.  Incision clean, dry and intact, small amount of superficial scabbing on incisional scar.  No redness or warmth to incision, patient denies fevers or chills.  Sutures removed, patient tolerated well without complaints.  Reviewed patient's questions in regards to side effects from radiation treatment.  Patient reports he has not had a bowel movement for the past two days, has Miralax available at home and reviewed restarting daily.  Patient also has appointment with PCP scheduled this afternoon and reports he will review bowel regimen with PCP as well.  Patient had no further questions at this time.    Elizabeth Amezcua RN BSN OCN CBCN

## 2021-03-17 NOTE — PROGRESS NOTES
"Junito is a 60 year old who is being evaluated via a billable telephone visit.      What phone number would you like to be contacted at? [phone]  How would you like to obtain your AVS? Shay[place]atre     Vitals - Patient Reported  Weight (Patient Reported): 77.8 kg (171 lb 8 oz)  Height (Patient Reported): 177.8 cm (5' 10\")  BMI (Based on Pt Reported Ht/Wt): 24.61  Pain Score: No Pain (0)     I have reviewed and updated the patient's allergies and medication list.    Concerns: none  Refills: none     Whit Herzog CMA    REASON FOR VISIT:  Post-op PEG assessment, teaching via telephone consultation.    PROCEDURES PERFORMED:  1. EGD  2. PEG insertion    DATE ABOVE PROCEDURES PERFORMED:  3/5/2021    SURGEON:  Dr. Jose Curiel    History of Present Illness:  Esophageal cancer.  Patient was diagnosed with moderately differentiated esophageal adenocarcinoma, metastatic to spine and brain.  He was recently admitted with cauda equina and taken emergently to the OR on 3/2/21 for a laminectomy and decompression.  On 1/26/21 he was found to have an ulcerating mass with bleeding in the distal esophagus that was circumferential and partially obstructive but was traversable.  He has been having worsening dysphagia and pain with swallowing.  He is starting radiation today to his back and hip, next week is getting a brain MRI and radiation to the brain.    Assessment:  I spoke by telephone to Mr. Wang.   He has been managing his PEG tube without problems.  He is taking 5 cans of IsoSource 1.5 daily by several bolus/gravity feedings and using his PEG for all medications and water flushes.   He is unable to swallow \"much of anything\" but can take sips of water.       I reviewed the care of the PEG site, told him some drainage is normal and it is important to clean daily and apply a moisture barrier cream, then gauze dressing.   I told him he could ask for Aquafor at the radiation department today.   He is flushing the tube and said " he has all of the equipment needed.         I gave him our clinic # to call if he has any issues or questions related to the PEG tube.    All questions were answered.    Plan:  Call or return PRN    Total time:  20 minutes    ALEXEI Narayanan, CNS

## 2021-03-17 NOTE — PROGRESS NOTES
This is a recent snapshot of the patient's [facility] medical record.  For current drug dose and complete information and questions, call [phone] or In Basket pool, [facility] home infusion ([id number])  CSN Number:  [id number]

## 2021-03-18 ENCOUNTER — APPOINTMENT (OUTPATIENT)
Dept: RADIATION ONCOLOGY | Facility: CLINIC | Age: 61
End: 2021-03-18
Payer: COMMERCIAL

## 2021-03-18 ENCOUNTER — OFFICE VISIT (OUTPATIENT)
Dept: RADIATION ONCOLOGY | Facility: CLINIC | Age: 61
End: 2021-03-18
Payer: COMMERCIAL

## 2021-03-18 VITALS
HEART RATE: 109 BPM | DIASTOLIC BLOOD PRESSURE: 77 MMHG | BODY MASS INDEX: 25.35 KG/M2 | SYSTOLIC BLOOD PRESSURE: 120 MMHG | TEMPERATURE: 98.4 F | OXYGEN SATURATION: 98 % | RESPIRATION RATE: 20 BRPM | WEIGHT: 176.7 LBS

## 2021-03-18 DIAGNOSIS — C79.51 BONE METASTASIS: Primary | ICD-10-CM

## 2021-03-18 PROCEDURE — 77014 PR CT GUIDE FOR PLACEMENT RADIATION THERAPY FIELDS: CPT | Mod: TC | Performed by: RADIOLOGY

## 2021-03-18 PROCEDURE — 99207 PR DROP WITH A PROCEDURE: CPT | Performed by: RADIOLOGY

## 2021-03-18 PROCEDURE — 77412 RADIATION TX DELIVERY LVL 3: CPT | Performed by: RADIOLOGY

## 2021-03-18 ASSESSMENT — PAIN SCALES - GENERAL: PAINLEVEL: NO PAIN (0)

## 2021-03-18 NOTE — PROGRESS NOTES
RADIATION ONCOLOGY WEEKLY ON TREATMENT VISIT   Encounter Date: 2021    Patient Name: Junito Wang  MRN: [id number]  : 1960     Disease and Stage: esophageal cancer metastatic to brain, bone , liver and spine  Treatment Site: lumbosacral spine  Current Dose/Planned Total Dose: [800] / [2000] cGy  Daily Fraction Size: [400] cGy/day, [5] times/week  Concurrent Chemotherapy: No  Drug and Frequency: na    Subjective: Mr. Wang presents to clinic today for his weekly on-treatment visit.  His pain is well controlled.  He is taking in 6 cans of tube feedings/day.  No headaches.    Still has indwelling catheterl  Not trying any po intake, but tolerating his saliva without problems    ROS:   Nutrition Alteration  Diet Type: Gastrostomy Tube Feedings  Nutrition Note: patient has PEG, reports use of 5-6 cartons of Isosource per day, increased free water flushing per PCP   Skin  Skin Reaction: 0 - No changes  Skin Intervention: incision dry and intact        Cardiovascular  Respiratory effort: 2 - Mild dyspnea on exertion  Gastrointestinal  Nausea: 0 - None  Diarrhea: 0 - None  GI Note: patient reports last bowel movement yesterday x2, has Miralax available as needed  Genitourinary   Note: patient has indwelling garza catheter  Psychosocial  Mood - Anxiety: 0 - Normal  Mood - Depression: 0 - Normal  Pyschosocial Note: energy level at baseline  Pain Assessment  0-10 Pain Scale: 0  Pain Note: patient reports continued numbness of buttock    Objective:   Scar along back well healed.  No erythema      Treatment-related toxicities (CTCAE v4.0):  1. none    Assessment:    Mr. Wang is a 60 year old male with metastatiac adenocarcinoma of the esophagus     Plan:   1. Continue radiotherapy  2. Gammaknife for brain met next randolph  3. Chemo to start at the end of the month    Mosaiq chart and setup information reviewed  IGRT images reviewed    Medication Review  Med list reviewed with patient?: Yes  Med list printed  and given: Offered and declined    Emilia Qureshi  [phone] pager   Department of Radiation Oncology  [facility]

## 2021-03-18 NOTE — LETTER
3/18/2021         RE: Junito Wang  [address] [facility] [address]        Dear Colleague,    Thank you for referring your patient, Junito Wang, to the [facility] RADIATION ONCOLOGY [place]. Please see a copy of my visit note below.    RADIATION ONCOLOGY WEEKLY ON TREATMENT VISIT   Encounter Date: 2021    Patient Name: Junito Wang  MRN: [id number]  : 1960     Disease and Stage: esophageal cancer metastatic to brain, bone , liver and spine  Treatment Site: lumbosacral spine  Current Dose/Planned Total Dose: [800] / [2000] cGy  Daily Fraction Size: [400] cGy/day, [5] times/week  Concurrent Chemotherapy: No  Drug and Frequency: na    Subjective: Mr. aWng presents to clinic today for his weekly on-treatment visit.  His pain is well controlled.  He is taking in 6 cans of tube feedings/day.  No headaches.    Still has indwelling catheterl  Not trying any po intake, but tolerating his saliva without problems    ROS:   Nutrition Alteration  Diet Type: Gastrostomy Tube Feedings  Nutrition Note: patient has PEG, reports use of 5-6 cartons of Isosource per day, increased free water flushing per PCP   Skin  Skin Reaction: 0 - No changes  Skin Intervention: incision dry and intact        Cardiovascular  Respiratory effort: 2 - Mild dyspnea on exertion  Gastrointestinal  Nausea: 0 - None  Diarrhea: 0 - None  GI Note: patient reports last bowel movement yesterday x2, has Miralax available as needed  Genitourinary   Note: patient has indwelling garza catheter  Psychosocial  Mood - Anxiety: 0 - Normal  Mood - Depression: 0 - Normal  Pyschosocial Note: energy level at baseline  Pain Assessment  0-10 Pain Scale: 0  Pain Note: patient reports continued numbness of buttock    Objective:   Scar along back well healed.  No erythema      Treatment-related toxicities (CTCAE v4.0):  1. none    Assessment:    Mr. Wang is a 60 year old male with metastatiac adenocarcinoma of the  esophagus     Plan:   1. Continue radiotherapy  2. Gammaknife for brain met next weel  3. Chemo to start at the end of the month    Mosaiq chart and setup information reviewed  IGRT images reviewed    Medication Review  Med list reviewed with patient?: Yes  Med list printed and given: Offered and declined    Emilia Qureshi  [phone] pager   Department of Radiation Oncology  [facility]      Again, thank you for allowing me to participate in the care of your patient.        Sincerely,        Emilia Qureshi MD

## 2021-03-18 NOTE — PATIENT INSTRUCTIONS
Please contact [facility] RN with questions or concerns following today's appointment: [phone].    Thank you!

## 2021-03-19 ENCOUNTER — VIRTUAL VISIT (OUTPATIENT)
Dept: NEUROSURGERY | Facility: CLINIC | Age: 61
End: 2021-03-19
Attending: NEUROLOGICAL SURGERY
Payer: COMMERCIAL

## 2021-03-19 ENCOUNTER — VIRTUAL VISIT (OUTPATIENT)
Dept: UROLOGY | Facility: CLINIC | Age: 61
End: 2021-03-19
Payer: COMMERCIAL

## 2021-03-19 ENCOUNTER — PRE VISIT (OUTPATIENT)
Dept: UROLOGY | Facility: CLINIC | Age: 61
End: 2021-03-19

## 2021-03-19 ENCOUNTER — APPOINTMENT (OUTPATIENT)
Dept: RADIATION ONCOLOGY | Facility: CLINIC | Age: 61
End: 2021-03-19
Payer: COMMERCIAL

## 2021-03-19 DIAGNOSIS — R33.9 URINARY RETENTION: Primary | ICD-10-CM

## 2021-03-19 DIAGNOSIS — C79.31 BRAIN METASTASIS: Primary | ICD-10-CM

## 2021-03-19 PROCEDURE — 77412 RADIATION TX DELIVERY LVL 3: CPT | Performed by: RADIOLOGY

## 2021-03-19 PROCEDURE — 999N001193 HC VIDEO/TELEPHONE VISIT; NO CHARGE

## 2021-03-19 PROCEDURE — 77014 PR CT GUIDE FOR PLACEMENT RADIATION THERAPY FIELDS: CPT | Performed by: RADIOLOGY

## 2021-03-19 PROCEDURE — 99203 OFFICE O/P NEW LOW 30 MIN: CPT | Mod: 95 | Performed by: NEUROLOGICAL SURGERY

## 2021-03-19 PROCEDURE — 99203 OFFICE O/P NEW LOW 30 MIN: CPT | Mod: 95 | Performed by: NURSE PRACTITIONER

## 2021-03-19 NOTE — PROGRESS NOTES
Junito is a 60 year old who is being evaluated via a billable video visit.      Video Visit Technology for this patient: Dar Video Visit- Please send an invite to patient's [email or web address]    How would you like to obtain your AVS? MyChart  If the video visit is dropped, the invitation should be resent by: Send to e-mail at: [email or web address]  Will anyone else be joining your video visit? No    Video Start Time: 4:09 PM  Video-Visit Details    Type of service:  Video Visit    Video End Time:4:25 PM    Originating Location (pt. Location): Home    Distant Location (provider location):  [facility] UROLOGY CLINIC [place]     Platform used for Video Visit: Dar Wild LILA Wang complains of   Chief Complaint   Patient presents with     Consult For     urinary retention       Assessment/Plan:  60 year old male with a history of esophageal cancer, with recent urinary retention in the setting of extensive epidural changes suggestive of tumor vs hemorrhage causing inability to ambulate and leg weakness, as well as severe constipation. Underwent emergency surgery. Is now completing a course of radiation to this site, which will complete early next week. Has a Andrews in place and would like to complete a voiding trial the following week after his radiation has completed. We discussed that if he were to fail his voiding trial, he could be taught to CIC, but he is strongly opposed to doing this.   -Will have him follow up for a nurse visit to complete TOV.   -If patient successfully passes TOV, he should be  scheduled for follow up visit with me in 2 months.   -If patient fails TOV, he should have Andrews replaced and be  scheduled for urodynamics.       Whit Hatfield, CNP  Department of Urology      Subjective:   60 year old male with a history of esophageal adenocarcinoma who was seen in the ED on 3/2/21 for acute onset inability to ambulate with left leg weakness and urinary retention, along with perianal  sensory deficits. MRI showed extensive epidural change suggestive of tumor vs hemorrhage. Patient underwent emergency surgery in an attempt to preserve functional ability. Reportedly had not had a bowel movement for 2 weeks prior to admission, and was therefore put on an aggressive bowel regimen, and was able to have bowel movements prior to discharge.     Currently has a Andrews in place, which was placed 3/7. He is currently getting radiation therapy to his sacral area. Has two more sessions of this next Monday and Tuesday. He reports that his buttocks area is numb from the radiation, and he has doubts that he would be able to pass a voiding trial if he were to do one right away next week. He'd like to wait until the following week and attempt one at that time.       Objective:     Exam:   Patient is a 60 year old male   General Appearance: Well groomed, hygenic  Respiratory: No cough, no respiratory distress or labored breathing  Musculoskeletal: Grossly normal with no gross deficits  Skin: No discoloration or apparent rashes  Neurologic: No tremors  Psychiatric: Alert and oriented  Further examination is deferred due to the nature of our visit.

## 2021-03-19 NOTE — PATIENT INSTRUCTIONS
UROLOGY CLINIC VISIT PATIENT INSTRUCTIONS    -Follow up for a nurse visit in our clinic to complete a voiding trial.   -If you successfully pass the trial, the catheter can remain out, and you should follow up with me again in 2 months to check in.   -If you fail the trial, we should pursue urodynamics testing to evaluate bladder muscle function.     If you have any issues, questions or concerns in the meantime, do not hesitate to contact us at [phone] or via [employer].     It was a pleasure meeting with you today.  Thank you for allowing me and my team the privilege of caring for you today.  YOU are the reason we are here, and I truly hope we provided you with the excellent service you deserve.  Please let us know if there is anything else we can do for you so that we can be sure you are leaving completely satisfied with your care experience.    Whit Hatfield, CNP

## 2021-03-19 NOTE — LETTER
3/19/2021       RE: Junito Wang  [address] [facility] [address]     Dear Colleague,    Thank you for referring your patient, Junito Wang, to the [facility]. Please see a copy of my visit note below.    Junito is a 60 year old who is being evaluated via a billable video visit.      Video Visit Technology for this patient: Dar Video Visit- Please send an invite to patient's [email or web address]    How would you like to obtain your AVS? MyChart  If the video visit is dropped, the invitation should be resent by: Send to e-mail at: [email or web address]  Will anyone else be joining your video visit? No    Video Start Time: 4:09 PM  Video-Visit Details    Type of service:  Video Visit    Video End Time:4:25 PM    Originating Location (pt. Location): Home    Distant Location (provider location):  [facility] UROLOG[facility]     Platform used for Video Visit: Dar      Junito Wang complains of   Chief Complaint   Patient presents with     Consult For     urinary retention       Assessment/Plan:  60 year old male with a history of esophageal cancer, with recent urinary retention in the setting of extensive epidural changes suggestive of tumor vs hemorrhage causing inability to ambulate and leg weakness, as well as severe constipation. Underwent emergency surgery. Is now completing a course of radiation to this site, which will complete early next week. Has a Andrews in place and would like to complete a voiding trial the following week after his radiation has completed. We discussed that if he were to fail his voiding trial, he could be taught to CIC, but he is strongly opposed to doing this.   -Will have him follow up for a nurse visit to complete TOV.   -If patient successfully passes TOV, he should be  scheduled for follow up visit with me in 2 months.   -If patient fails TOV, he should have  Andrews replaced and be  scheduled for urodynamics.       Whit Hatfield, CNP  Department of Urology      Subjective:   60 year old male with a history of esophageal adenocarcinoma who was seen in the ED on 3/2/21 for acute onset inability to ambulate with left leg weakness and urinary retention, along with perianal sensory deficits. MRI showed extensive epidural change suggestive of tumor vs hemorrhage. Patient underwent emergency surgery in an attempt to preserve functional ability. Reportedly had not had a bowel movement for 2 weeks prior to admission, and was therefore put on an aggressive bowel regimen, and was able to have bowel movements prior to discharge.     Currently has a Andrews in place, which was placed 3/7. He is currently getting radiation therapy to his sacral area. Has two more sessions of this next Monday and Tuesday. He reports that his buttocks area is numb from the radiation, and he has doubts that he would be able to pass a voiding trial if he were to do one right away next week. He'd like to wait until the following week and attempt one at that time.       Objective:     Exam:   Patient is a 60 year old male   General Appearance: Well groomed, hygenic  Respiratory: No cough, no respiratory distress or labored breathing  Musculoskeletal: Grossly normal with no gross deficits  Skin: No discoloration or apparent rashes  Neurologic: No tremors  Psychiatric: Alert and oriented  Further examination is deferred due to the nature of our visit.

## 2021-03-19 NOTE — LETTER
Date:October 22, 2021      Provider requested that no letter be sent. Do not send.       [facility]

## 2021-03-19 NOTE — PROGRESS NOTES
"Junito is a 60 year old who is being evaluated via a billable video visit.      How would you like to obtain your AVS? MyChart     If the video visit is dropped, the invitation should be resent by: Send to e-mail at: [email or web address]     Will anyone else be joining your video visit? No     Vitals - Patient Reported  Weight (Patient Reported): 79.8 kg (176 lb)  Height (Patient Reported): 177.8 cm (5' 10\")  BMI (Based on Pt Reported Ht/Wt): 25.25  Pain Score: No Pain (0)    Luiz Fermin Benson LPN        Video Start Time: 3:00  Video-Visit Details    Type of service:  Video Visit    Video End Time:3:30 PM    Originating Location (pt. Location): Home    Distant Location (provider location):  [facility]     Platform used for Video Visit: [facility]     60 M with stage IV esophageal CA, with a 1.9 x 1.4 x 1.2 cm CE+ lesion in the left choroid plexus of the temporal lobe (PET+) The patient presents for discussion of SRS.    On review of systems, the patient complains of swallowing difficulties but denied symptoms referable to the CNS, including headache, seizures, motor/sensory deficits.    Past Medical History:   Diagnosis Date     Arthritis      Hypertension      Malignant neoplasm of lower third of esophagus (H) 01/26/2021     Obesity (BMI 35.0-39.9) with comorbidity (H) 1/31/2019       Current Outpatient Medications:      famotidine (PEPCID) 40 MG/5ML suspension, Take 2.5 mLs (20 mg) by mouth 2 times daily, Disp: 50 mL, Rfl: 1     gabapentin (NEURONTIN) 250 MG/5ML solution, Take 5 mLs (250 mg) by mouth At Bedtime, Disp: 470 mL, Rfl: 1     thiamine (B-1) 100 MG tablet, 1 tablet (100 mg) by Per Feeding Tube route daily, Disp: 30 tablet, Rfl: 1    No Known Allergies    Video examination grossly non-focal.    I have reviewed the MRI from 2/25/2021. The interpretation of the lesion is as above described. I had also reviewed this case with Dr. Davies, Radiation oncology    AP: 60 M with MRI " finding suggestive of BM to the left temporal choroid plexus. These lesions tend to be vascular and SRS would be preferable relative to MAGDALENO. In this context, I believe that SRS is the optimal treatment option. Risks and benefits of the procedure were reviewed with the patient. Verbal informed consent was obtained. I will work with Dr. Davies to coordinate the SRS.    I have spent 30 minutes today, with the majority of the visit counseling the patient on the diagnosis. All questions were answered. Over 50% of my time on the unit was spent counseling the patient and coordinating care regarding the patient's SRS.

## 2021-03-19 NOTE — LETTER
"    3/19/2021         RE: Junito Wang  [address] [facility] [address]        Dear Colleague,    Thank you for referring your patient, Junito Wang, to the [facility]. Please see a copy of my visit note below.    Junito is a 60 year old who is being evaluated via a billable video visit.      How would you like to obtain your AVS? MyChart     If the video visit is dropped, the invitation should be resent by: Send to e-mail at: [email or web address]     Will anyone else be joining your video visit? No     Vitals - Patient Reported  Weight (Patient Reported): 79.8 kg (176 lb)  Height (Patient Reported): 177.8 cm (5' 10\")  BMI (Based on Pt Reported Ht/Wt): 25.25  Pain Score: No Pain (0)    Luiz Kwan LPN        Video Start Time: 3:00  Video-Visit Details    Type of service:  Video Visit    Video End Time:3:30 PM    Originating Location (pt. Location): Home    Distant Location (provider location):  [facility]     Platform used for Video Visit: [employer]     60 M with stage IV esophageal CA, with a 1.9 x 1.4 x 1.2 cm CE+ lesion in the left choroid plexus of the temporal lobe (PET+) The patient presents for discussion of SRS.    On review of systems, the patient complains of swallowing difficulties but denied symptoms referable to the CNS, including headache, seizures, motor/sensory deficits.    Past Medical History:   Diagnosis Date     Arthritis      Hypertension      Malignant neoplasm of lower third of esophagus (H) 01/26/2021     Obesity (BMI 35.0-39.9) with comorbidity (H) 1/31/2019       Current Outpatient Medications:      famotidine (PEPCID) 40 MG/5ML suspension, Take 2.5 mLs (20 mg) by mouth 2 times daily, Disp: 50 mL, Rfl: 1     gabapentin (NEURONTIN) 250 MG/5ML solution, Take 5 mLs (250 mg) by mouth At Bedtime, Disp: 470 mL, Rfl: 1     thiamine (B-1) 100 MG tablet, 1 tablet (100 mg) by Per Feeding Tube route daily, Disp: 30 tablet, " Rfl: 1    No Known Allergies    Video examination grossly non-focal.    I have reviewed the MRI from 2/25/2021. The interpretation of the lesion is as above described. I had also reviewed this case with Dr. Davies, Radiation oncology    AP: 60 M with MRI finding suggestive of BM to the left temporal choroid plexus. These lesions tend to be vascular and SRS would be preferable relative to MAGDALENO. In this context, I believe that SRS is the optimal treatment option. Risks and benefits of the procedure were reviewed with the patient. Verbal informed consent was obtained. I will work with Dr. Davies to coordinate the SRS.    I have spent 30 minutes today, with the majority of the visit counseling the patient on the diagnosis. All questions were answered. Over 50% of my time on the unit was spent counseling the patient and coordinating care regarding the patient's SRS.          Again, thank you for allowing me to participate in the care of your patient.        Sincerely,        Chas Baptiste MD

## 2021-03-21 DIAGNOSIS — Z11.59 ENCOUNTER FOR SCREENING FOR OTHER VIRAL DISEASES: ICD-10-CM

## 2021-03-21 LAB
SARS-COV-2 RNA RESP QL NAA+PROBE: NORMAL
SPECIMEN SOURCE: NORMAL

## 2021-03-21 PROCEDURE — U0003 INFECTIOUS AGENT DETECTION BY NUCLEIC ACID (DNA OR RNA); SEVERE ACUTE RESPIRATORY SYNDROME CORONAVIRUS 2 (SARS-COV-2) (CORONAVIRUS DISEASE [COVID-19]), AMPLIFIED PROBE TECHNIQUE, MAKING USE OF HIGH THROUGHPUT TECHNOLOGIES AS DESCRIBED BY CMS-2020-01-R: HCPCS | Performed by: RADIOLOGY

## 2021-03-21 PROCEDURE — U0005 INFEC AGEN DETEC AMPLI PROBE: HCPCS | Performed by: RADIOLOGY

## 2021-03-22 ENCOUNTER — OFFICE VISIT (OUTPATIENT)
Dept: RADIATION ONCOLOGY | Facility: CLINIC | Age: 61
End: 2021-03-22
Attending: RADIOLOGY
Payer: COMMERCIAL

## 2021-03-22 ENCOUNTER — HOSPITAL ENCOUNTER (OUTPATIENT)
Dept: MRI IMAGING | Facility: CLINIC | Age: 61
End: 2021-03-22
Attending: RADIOLOGY
Payer: COMMERCIAL

## 2021-03-22 ENCOUNTER — APPOINTMENT (OUTPATIENT)
Dept: RADIATION ONCOLOGY | Facility: CLINIC | Age: 61
End: 2021-03-22
Payer: COMMERCIAL

## 2021-03-22 ENCOUNTER — TELEPHONE (OUTPATIENT)
Dept: UROLOGY | Facility: CLINIC | Age: 61
End: 2021-03-22

## 2021-03-22 DIAGNOSIS — C79.31 METASTASIS TO BRAIN (H): ICD-10-CM

## 2021-03-22 DIAGNOSIS — C15.5 MALIGNANT NEOPLASM OF LOWER THIRD OF ESOPHAGUS (H): ICD-10-CM

## 2021-03-22 DIAGNOSIS — C15.9 MALIGNANT NEOPLASM OF ESOPHAGUS, UNSPECIFIED LOCATION (H): Primary | ICD-10-CM

## 2021-03-22 PROCEDURE — 77470 SPECIAL RADIATION TREATMENT: CPT | Mod: 26 | Performed by: RADIOLOGY

## 2021-03-22 PROCEDURE — A9585 GADOBUTROL INJECTION: HCPCS | Performed by: RADIOLOGY

## 2021-03-22 PROCEDURE — 77334 RADIATION TREATMENT AID(S): CPT | Mod: 26 | Performed by: RADIOLOGY

## 2021-03-22 PROCEDURE — 77470 SPECIAL RADIATION TREATMENT: CPT | Performed by: RADIOLOGY

## 2021-03-22 PROCEDURE — 255N000002 HC RX 255 OP 636: Performed by: RADIOLOGY

## 2021-03-22 PROCEDURE — 70552 MRI BRAIN STEM W/DYE: CPT | Mod: 26 | Performed by: RADIOLOGY

## 2021-03-22 PROCEDURE — 70552 MRI BRAIN STEM W/DYE: CPT

## 2021-03-22 PROCEDURE — 77290 THER RAD SIMULAJ FIELD CPLX: CPT | Performed by: RADIOLOGY

## 2021-03-22 PROCEDURE — 77334 RADIATION TREATMENT AID(S): CPT | Performed by: RADIOLOGY

## 2021-03-22 PROCEDURE — 77290 THER RAD SIMULAJ FIELD CPLX: CPT | Mod: 26 | Performed by: RADIOLOGY

## 2021-03-22 RX ORDER — GADOBUTROL 604.72 MG/ML
8 INJECTION INTRAVENOUS ONCE
Status: COMPLETED | OUTPATIENT
Start: 2021-03-22 | End: 2021-03-22

## 2021-03-22 RX ADMIN — GADOBUTROL 8 ML: 604.72 INJECTION INTRAVENOUS at 07:57

## 2021-03-22 NOTE — TELEPHONE ENCOUNTER
3/19 check out note:   UROLOGY CLINIC VISIT PATIENT INSTRUCTIONS     -Follow up for a nurse visit in our clinic to complete a voiding trial.   -If you successfully pass the trial, the catheter can remain out, and you should follow up with me again in 2 months to check in.   -If you fail the trial, we should pursue urodynamics testing to evaluate bladder muscle function.

## 2021-03-22 NOTE — LETTER
3/22/2021         RE: Junito Wang  [address] [address] [facility] [address]        Dear Colleague,    Thank you for referring your patient, Junito Wang, to the [facility] RADIATION ONCOLOGY GAMMA KNIFE. Please see a copy of my visit note below.    Simulation for Gammaknife done under my direction      Again, thank you for allowing me to participate in the care of your patient.        Sincerely,        Emilia Qureshi MD

## 2021-03-23 ENCOUNTER — APPOINTMENT (OUTPATIENT)
Dept: RADIATION ONCOLOGY | Facility: CLINIC | Age: 61
End: 2021-03-23
Payer: COMMERCIAL

## 2021-03-23 PROCEDURE — 77412 RADIATION TX DELIVERY LVL 3: CPT | Performed by: RADIOLOGY

## 2021-03-23 PROCEDURE — 77336 RADIATION PHYSICS CONSULT: CPT | Performed by: RADIOLOGY

## 2021-03-23 PROCEDURE — 77014 PR CT GUIDE FOR PLACEMENT RADIATION THERAPY FIELDS: CPT | Mod: TC | Performed by: RADIOLOGY

## 2021-03-23 PROCEDURE — 77427 RADIATION TX MANAGEMENT X5: CPT | Performed by: RADIOLOGY

## 2021-03-24 ENCOUNTER — OFFICE VISIT (OUTPATIENT)
Dept: RADIATION ONCOLOGY | Facility: CLINIC | Age: 61
End: 2021-03-24
Attending: NEUROLOGICAL SURGERY
Payer: COMMERCIAL

## 2021-03-24 ENCOUNTER — OFFICE VISIT (OUTPATIENT)
Dept: RADIATION ONCOLOGY | Facility: CLINIC | Age: 61
End: 2021-03-24
Attending: RADIOLOGY
Payer: COMMERCIAL

## 2021-03-24 VITALS — HEART RATE: 97 BPM | DIASTOLIC BLOOD PRESSURE: 74 MMHG | SYSTOLIC BLOOD PRESSURE: 125 MMHG | OXYGEN SATURATION: 94 %

## 2021-03-24 DIAGNOSIS — C79.31 METASTASIS TO BRAIN (H): Primary | ICD-10-CM

## 2021-03-24 PROCEDURE — 77300 RADIATION THERAPY DOSE PLAN: CPT | Performed by: RADIOLOGY

## 2021-03-24 PROCEDURE — 77295 3-D RADIOTHERAPY PLAN: CPT | Performed by: RADIOLOGY

## 2021-03-24 PROCEDURE — 77371 SRS MULTISOURCE: CPT | Performed by: RADIOLOGY

## 2021-03-24 PROCEDURE — 77334 RADIATION TREATMENT AID(S): CPT | Performed by: RADIOLOGY

## 2021-03-24 PROCEDURE — 77334 RADIATION TREATMENT AID(S): CPT | Mod: 26 | Performed by: RADIOLOGY

## 2021-03-24 PROCEDURE — 77295 3-D RADIOTHERAPY PLAN: CPT | Mod: 26 | Performed by: RADIOLOGY

## 2021-03-24 PROCEDURE — 77370 RADIATION PHYSICS CONSULT: CPT | Performed by: RADIOLOGY

## 2021-03-24 PROCEDURE — 77300 RADIATION THERAPY DOSE PLAN: CPT | Mod: 26 | Performed by: RADIOLOGY

## 2021-03-24 RX ORDER — CALCIUM CARBONATE 500 MG/1
1 TABLET, CHEWABLE ORAL 2 TIMES DAILY
COMMUNITY
End: 2021-06-23

## 2021-03-24 NOTE — PROGRESS NOTES
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     The [facility] Gamma Knife IconTM Plan software was used to create a highly conformal dose distribution using the number and size collimators detailed above.     TREATMENT:    The patient was brought to the Gamma Knife suite. A timeout was performed to confirm the correct patient and correct procedure.    Patient was identified by 2 methods.  Site was verified.    The mask was placed and a cone beam CT was done and fused to the previously approved plan.        The physicist and I evaluated the fusion and confirmed the target coverage after auto-registration.      The treatment was delivered using the [facility] Gamma Knife IconTM without complication.      The mask was removed and the patient was discharged home in stable condition.    The patient tolerated the treatment well and had no complications.    FOLLOW-UP PLANS:  The Gamma Knife Nurse Coordinator will call the patient tomorrow for short-term follow up.  Written discharge instructions were given to the patient. The patient will have a follow up brain MRI.     Approved by:  Emilia Qureshi MD

## 2021-03-24 NOTE — LETTER
3/24/2021         RE: Junito Wang  [address] [facility] [address]        Dear Colleague,    Thank you for referring your patient, Junito Wang, to the [facility] RADIATION ONCOLOGY GAMMA KNIFE. Please see a copy of my visit note below.    Gamma Knife Operative Note    MRN: [id number]  Name: Junito Wang  : 1960    Date of procedure: 2021    Surgeon:  Chas Baptiste MD PhD    Pre-operative Diagnosis:   Left temporal choroid plexus brain metastasis  Post-operative Diagnosis:   Same    Procedure: Gamma Knife Radiosurgery    Indication: This is a  60 y.o. 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 After case review in the brain tumor conference, the joint recommendation was to treat the patient with radiosurgery. Standard measurements were obtained for coordinate calculation to facilitate SRS delivery.    On the day of the procedure, informed consent was obtained. The previous MRI was reviewed. Immobilization was achieved through application of a face mask.    The treatment is planned on the Gamma plan system based on the MRI taken prior to the day of the procedure.  Treatment parameters were verified by myself, the treating radiation oncologist, and the physicist.     The lesion was treated with 20 Gy delivered in a single fraction.  The dose was delivered in a highly conformal fashion. The treatment was performed at the [facility] gamma knife center.     The maximal diameter of the treated lesion was 1.9 cm. The lesion was in proximity to the brainstem.    I was present and performed the key portions of this procedure.    Chas Baptiste MD PhD

## 2021-03-24 NOTE — LETTER
3/24/2021         RE: Junito Wang  [address] [address] [facility] [address]        Dear Colleague,    Thank you for referring your patient, Junito Wang, to the [facility] RADIATION ONCOLOGY GAMMA KNIFE. Please see a copy of my visit note below.      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     The [facility] Gamma Knife IconTM Plan software was used to create a highly conformal dose distribution using the number and size collimators detailed above.     TREATMENT:    The patient was brought to the Gamma Knife suite. A timeout was performed to confirm the correct patient and correct procedure.    Patient was identified by 2 methods.  Site was verified.    The mask was placed and a cone beam CT was done and fused to the previously approved plan.        The physicist and I evaluated the fusion and confirmed the target coverage after auto-registration.      The treatment was delivered using the [facility] Gamma Knife IconTM without complication.      The mask was removed and the patient was discharged home in stable condition.    The patient tolerated the treatment well and had no complications.    FOLLOW-UP PLANS:  The Gamma Knife Nurse Coordinator will call the patient tomorrow for short-term follow up.  Written discharge instructions were given to the patient. The patient will have a follow up brain MRI.     Approved by:  Emilia Qureshi  MD        Again, thank you for allowing me to participate in the care of your patient.        Sincerely,        Emilia Qureshi MD

## 2021-03-25 ENCOUNTER — TELEPHONE (OUTPATIENT)
Dept: RADIATION ONCOLOGY | Facility: CLINIC | Age: 61
End: 2021-03-25

## 2021-03-25 NOTE — PROGRESS NOTES
March 31, 2021     Reason for Visit: follow up metastatic esophogeal cancer    Oncology HPI:   August 2020- stomach discomfort, belching   October 2020- progressive dysphagia with solids   1/26/21- distal esophageal biopsy shows Moderately differentiated adenocarcinoma; MMR normal expression, PDL1 expression is low with TPS 2%, CPS 5-10, Her2 is pending  1/27/21- CT CAP- Enlarged  2 cm subcarinal lymph node and 1.4 cm short axis right subcarinal lymph node, focal thickening of the superior wall along the right side of the mid esophagus. Numerous small pulmonary nodules, 3 mm nodule in the superior segment of the left lower lobe, 3 mm nodule in the anterior aspect of the right upper lobe , 4 mm nodule in the medial aspect of the right upper lobe and 4 mm right lower lobe nodule. Hypoattenuating foci in the liver, 1.6 cm hypoattenuating/hypoenhancing lesion in hepatic segment 8 and 1.4 cm lesion in hepatic segment 5 , indeterminate, likely metastatic.  The prostate gland is mildly enlarged measuring 5.3 cm in transverse diameter. Multiple prominent but not significantly enlarged retroperitoneal lymph nodes, indeterminate, could be reactive. 4.4 x 4.3 cm lytic lesion centered in the posterior aspect of the left sacral ala (series 3 image 243), 4.7 x 4.0 x 5.6 cm (axial and CC dimensions, respectively) hypoattenuating lesion in the subcutaneous tissue of the anterior chest wall just anterior to the mediastinum, indeterminate, could represent sebaceous cyst.  2/4/21- Saw Dr. Bourgeois- who ordered PET/CT  3/2/21-3/10/21 [facility] with <24h of inability to ambulate with acute onset left leg weakness/urinary retention with perianal sensory deficits. s/p L2-L5 laminectomy and decompression    Treatment:  3/17/21-3/23/21: radiation to LS spine  3/24/21: gamma knife  3/31/21: C1D1 cisplatin keytruda 5fu    Interval history:   -GK went well  -Back pain is still minimal. 2/10 and not needing pain medication. Worse with sitting.   -Andrews  patents with no concerns  -TF 6 can per day, NPO otherwise   -no cough/fever chills  -energy level okay, does things around house when not busy with appointments  -L shoulder blade tenderness is intermittent, heat helps  -left ear tinnitus baseline, intermittent, no decreased hearing  -some left foot radiculopathy 2/2 tumor but otherwise no baseline neuropathy  -no chest pain or racing heart.      10 point review of systems otherwise negative     Current Outpatient Medications   Medication Sig Dispense Refill     calcium carbonate (TUMS) 500 MG chewable tablet Take 1 chew tab by mouth 2 times daily As needed       famotidine (PEPCID) 40 MG/5ML suspension Take 2.5 mLs (20 mg) by mouth 2 times daily 50 mL 1     gabapentin (NEURONTIN) 250 MG/5ML solution Take 5 mLs (250 mg) by mouth At Bedtime 470 mL 1     thiamine (B-1) 100 MG tablet 1 tablet (100 mg) by Per Feeding Tube route daily 30 tablet 1        No Known Allergies    Exam:  Blood pressure 110/77, pulse 114, temperature 98.8  F (37.1  C), temperature source Oral, weight 78.9 kg (174 lb), SpO2 97 %.  Wt Readings from Last 4 Encounters:   03/18/21 80.2 kg (176 lb 11.2 oz)   03/17/21 77.7 kg (171 lb 5 oz)   03/11/21 78.9 kg (174 lb)   03/07/21 79.1 kg (174 lb 6.4 oz)     General: No acute distress; chronically ill. fatigued  HEENT: Sclera anicteric. Oral mucosa pink and moist.  No mucositis or thrush  Lymph: No lymphadenopathy in neck  Heart: Regular, rate, and rhythm  Lungs: Clear to ascultation bilaterally  Abdomen: Positive bowel sounds. Soft, non-distended, non-tender. PEG clamped  Extremities: no lower extremity edema  Neuro: Cranial nerves grossly intact  Skin: tennis ball size cyst? Medial to R left breast.     Labs:    Ref. Range 3/31/2021 06:56   Sodium Latest Ref Range: 133 - 144 mmol/L 137   Potassium Latest Ref Range: 3.4 - 5.3 mmol/L 4.2   Chloride Latest Ref Range: 94 - 109 mmol/L 104   Carbon Dioxide Latest Ref Range: 20 - 32 mmol/L 28   Urea Nitrogen  Latest Ref Range: 7 - 30 mg/dL 15   Creatinine Latest Ref Range: 0.66 - 1.25 mg/dL 0.60 (L)   GFR Estimate Latest Ref Range: >60 mL/min/1.73_m2 >90   GFR Estimate If Black Latest Ref Range: >60 mL/min/1.73_m2 >90   Calcium Latest Ref Range: 8.5 - 10.1 mg/dL 9.7   Anion Gap Latest Ref Range: 3 - 14 mmol/L 6   Magnesium Latest Ref Range: 1.6 - 2.3 mg/dL 2.3   Albumin Latest Ref Range: 3.4 - 5.0 g/dL 3.2 (L)   Protein Total Latest Ref Range: 6.8 - 8.8 g/dL 6.9   Bilirubin Total Latest Ref Range: 0.2 - 1.3 mg/dL 0.4   Alkaline Phosphatase Latest Ref Range: 40 - 150 U/L 160 (H)   ALT Latest Ref Range: 0 - 70 U/L 23   AST Latest Ref Range: 0 - 45 U/L 12   Glucose Latest Ref Range: 70 - 99 mg/dL 104 (H)   WBC Latest Ref Range: 4.0 - 11.0 10e9/L 6.2   Hemoglobin Latest Ref Range: 13.3 - 17.7 g/dL 10.2 (L)   Hematocrit Latest Ref Range: 40.0 - 53.0 % 33.8 (L)   Platelet Count Latest Ref Range: 150 - 450 10e9/L 453 (H)   RBC Count Latest Ref Range: 4.4 - 5.9 10e12/L 4.30 (L)   MCV Latest Ref Range: 78 - 100 fl 79   MCH Latest Ref Range: 26.5 - 33.0 pg 23.7 (L)   MCHC Latest Ref Range: 31.5 - 36.5 g/dL 30.2 (L)   RDW Latest Ref Range: 10.0 - 15.0 % 18.8 (H)   Diff Method Unknown Automated Method   % Neutrophils Latest Units: % 66.9   % Lymphocytes Latest Units: % 16.5   % Monocytes Latest Units: % 10.1   % Eosinophils Latest Units: % 4.7   % Basophils Latest Units: % 1.0   % Immature Granulocytes Latest Units: % 0.8   Nucleated RBCs Latest Ref Range: 0 /100 0   Absolute Neutrophil Latest Ref Range: 1.6 - 8.3 10e9/L 4.2   Absolute Lymphocytes Latest Ref Range: 0.8 - 5.3 10e9/L 1.0   Absolute Monocytes Latest Ref Range: 0.0 - 1.3 10e9/L 0.6   Absolute Eosinophils Latest Ref Range: 0.0 - 0.7 10e9/L 0.3   Absolute Basophils Latest Ref Range: 0.0 - 0.2 10e9/L 0.1   Abs Immature Granulocytes Latest Ref Range: 0 - 0.4 10e9/L 0.1   Absolute Nucleated RBC Unknown 0.0       Imaging: recent imaging reviewed    Impression/plan:   # Stage  IV esophageal adenocarcinoma   Given progression of disease, goal of treatment would be to prolong life and reduce symptoms rather than curative.  Plan is for palliative chemoimmunotherapy with radiation therapy for symptom control.    Today we discussed initiation of Ffu + cisplatin + keytruda. Specifically we talked about the mucositis and GI upset that 5fu can cause, and the ototoxicity, nausea, and neuropathy with cisplatin. All chemo therapy can cause myelosuppression and fatigue. We also reviewed immune mediated toxicities. He had a port placed a couple days ago for the 5FU. Labs and assessment are okay to proceed today. I will have a video visit with him prior to cycle 2. Obtaining baseline CT CAP today or tomorrow to have baseline given last scan was >6 weeks ago.       #Brain mets.   Follows with Dr. Velez. S/p GK 3/24    #Bone mets  Bone biopsy of left pelvic mass 2/26/21 confirmed mets. Finished radiation 3/23 to lumbosacral spine   -Consider adding zometa next cycle; did not discuss in detail due to length of visit    #Dypshagia, malnutrition  PEG tube placed in OR  (3/5) for nutritional support. Tolerating tube feeds. Not doing anything orally though my review of SLP note indicates he could do clear liquid? Junito declined follow up with them for clarification at this time though this might be beneficial as we see benefit from systemic treatment.   --Of note, consideration given to esophageal stent vs XRT for esophageal debulking to address dysphagia inpatient; however given likely improvement with chemo-immunotherapy, as well as risks of worsening cytopenias, elected not to.     #Cauda Equina due to tumor compression. He is s/p L2-L5 laminectomy and decompression.   Followed by neurosurgery with improved exam after surgery. He is now ambulating with a walker. Follow up with their team per recs. He is aware of his activity and lifting restrictions    #Constipation  Resolved    #Urinary  retention  Andrews in place. He has follow up with urology later this week for TOV    #tachycardia  Likely 2/2 discomfort (while sitting) and deconditioning. Monitor. No chest pain    #GERD  Finds famotidine minimally effective. Will try liquid omeprazole     70 minutes spent on the date of the encounter doing chart review, review of test results, interpretation of tests, patient visit, documentation and discussion with other provider(s)       Jaky Pugh CNP on 3/31/2021 at 10:26 AM

## 2021-03-25 NOTE — TELEPHONE ENCOUNTER
A call was placed to Junito in follow up to his Gamma Knife treatment on 3/24/21.  Junito said he did have a headache last evening, did not take any medication for it and now this morning the headache is gone.  Junito said he deals with nausea off and on but that is not new.  Junito thought he handled the Gamma Knife fine.

## 2021-03-25 NOTE — PROGRESS NOTES
Gamma Knife Operative Note    MRN: [id number]  Name: Junito Wang  : 1960    Date of procedure: 2021    Surgeon:  Chas Baptiste MD PhD    Pre-operative Diagnosis:   Left temporal choroid plexus brain metastasis  Post-operative Diagnosis:   Same    Procedure: Gamma Knife Radiosurgery    Indication: This is a  60 y.o. 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 After case review in the brain tumor conference, the joint recommendation was to treat the patient with radiosurgery. Standard measurements were obtained for coordinate calculation to facilitate SRS delivery.    On the day of the procedure, informed consent was obtained. The previous MRI was reviewed. Immobilization was achieved through application of a face mask.    The treatment is planned on the Gamma plan system based on the MRI taken prior to the day of the procedure.  Treatment parameters were verified by myself, the treating radiation oncologist, and the physicist.     The lesion was treated with 20 Gy delivered in a single fraction.  The dose was delivered in a highly conformal fashion. The treatment was performed at the [facility] gamma knife center.     The maximal diameter of the treated lesion was 1.9 cm. The lesion was in proximity to the brainstem.    I was present and performed the key portions of this procedure.    Chas Baptiste MD PhD

## 2021-03-25 NOTE — PROCEDURES
Radiotherapy Treatment Summary          Date of Report: 2021     PATIENT: SMITA YOUNG  MEDICAL RECORD NO: [id number]  : 1960     DIAGNOSIS: C79.51 Secondary malignant neoplasm of bone     Newly diagnosed esophageal cancer with large mass in Lumbosacral area decomprssive surgery emergently   followed by radiotherapy     INTENT OF RADIOTHERAPY: pain palliation  PATHOLOGY:        adeno ca of esophagus with bone mets                           STAGE:           iv                Details of the treatments summarized below are found in records kept in the Department of Radiation Oncology at [place].     Treatment Summary:  Radiation Oncology - Course: 1 Protocol:   Treatment Site  Current Dose  Modality  From  To  Elapsed Days  Fx.  1 lumbosacral spine   2,000 cGy  6X/15X   3/17/2021   3/23/2021    6   5                             Dose per Fraction:     400   Total Dose:    200          COMMENTS:            ED visits/hspitalizations:0     Missed treatments:0     Acute Toxicity Profile by CTC v5.0:         none            PAIN MANAGEMENT:       pain service                       FOLLOW UP PLAN:    Having GK radiosurgery the following week then starting chemotherapy the week   after                        Staff Physician: Emilia Qureshi M.D.  Physicist: MS Jose Laguna MD  , MD  , MD              Radiation Oncology [address] [address] [place] [address] Phone: [phone]

## 2021-03-26 ENCOUNTER — ANESTHESIA EVENT (OUTPATIENT)
Dept: SURGERY | Facility: AMBULATORY SURGERY CENTER | Age: 61
End: 2021-03-26

## 2021-03-26 DIAGNOSIS — Z11.59 ENCOUNTER FOR SCREENING FOR OTHER VIRAL DISEASES: ICD-10-CM

## 2021-03-26 LAB
SARS-COV-2 RNA RESP QL NAA+PROBE: NORMAL
SPECIMEN SOURCE: NORMAL

## 2021-03-26 PROCEDURE — U0005 INFEC AGEN DETEC AMPLI PROBE: HCPCS | Performed by: THORACIC SURGERY (CARDIOTHORACIC VASCULAR SURGERY)

## 2021-03-26 PROCEDURE — U0003 INFECTIOUS AGENT DETECTION BY NUCLEIC ACID (DNA OR RNA); SEVERE ACUTE RESPIRATORY SYNDROME CORONAVIRUS 2 (SARS-COV-2) (CORONAVIRUS DISEASE [COVID-19]), AMPLIFIED PROBE TECHNIQUE, MAKING USE OF HIGH THROUGHPUT TECHNOLOGIES AS DESCRIBED BY CMS-2020-01-R: HCPCS | Performed by: THORACIC SURGERY (CARDIOTHORACIC VASCULAR SURGERY)

## 2021-03-26 RX ORDER — LIDOCAINE 40 MG/G
CREAM TOPICAL
Status: CANCELLED | OUTPATIENT
Start: 2021-03-26

## 2021-03-26 NOTE — PROGRESS NOTES
This is a recent snapshot of the patient's [facility] medical record.  For current drug dose and complete information and questions, call [phone] or In Basket pool, fv home infusion ([id number])  CSN Number:  [id number]

## 2021-03-29 ENCOUNTER — HOSPITAL ENCOUNTER (OUTPATIENT)
Facility: AMBULATORY SURGERY CENTER | Age: 61
Discharge: HOME | End: 2021-03-29
Attending: RADIOLOGY | Admitting: RADIOLOGY
Payer: COMMERCIAL

## 2021-03-29 ENCOUNTER — ANESTHESIA (OUTPATIENT)
Dept: SURGERY | Facility: AMBULATORY SURGERY CENTER | Age: 61
End: 2021-03-29

## 2021-03-29 ENCOUNTER — ANCILLARY PROCEDURE (OUTPATIENT)
Dept: RADIOLOGY | Facility: AMBULATORY SURGERY CENTER | Age: 61
End: 2021-03-29
Attending: NURSE PRACTITIONER
Payer: COMMERCIAL

## 2021-03-29 VITALS
RESPIRATION RATE: 16 BRPM | HEART RATE: 92 BPM | WEIGHT: 171 LBS | SYSTOLIC BLOOD PRESSURE: 128 MMHG | TEMPERATURE: 97.5 F | HEIGHT: 70 IN | DIASTOLIC BLOOD PRESSURE: 78 MMHG | BODY MASS INDEX: 24.48 KG/M2 | OXYGEN SATURATION: 98 %

## 2021-03-29 DIAGNOSIS — C15.9 ADENOCARCINOMA OF ESOPHAGUS (H): ICD-10-CM

## 2021-03-29 PROCEDURE — 36561 INSERT TUNNELED CV CATH: CPT | Mod: RT | Performed by: RADIOLOGY

## 2021-03-29 PROCEDURE — 76937 US GUIDE VASCULAR ACCESS: CPT | Mod: 26 | Performed by: RADIOLOGY

## 2021-03-29 PROCEDURE — 77001 FLUOROGUIDE FOR VEIN DEVICE: CPT | Mod: 26 | Performed by: RADIOLOGY

## 2021-03-29 PROCEDURE — 36561 INSERT TUNNELED CV CATH: CPT | Mod: RT

## 2021-03-29 PROCEDURE — 20552 NJX 1/MLT TRIGGER POINT 1/2: CPT

## 2021-03-29 DEVICE — CATH PORT POWERPORT CLEARVUE SLIM 6FR 5616000: Type: IMPLANTABLE DEVICE | Site: CHEST | Status: FUNCTIONAL

## 2021-03-29 RX ORDER — FENTANYL CITRATE 50 UG/ML
25-50 INJECTION, SOLUTION INTRAMUSCULAR; INTRAVENOUS
Status: DISCONTINUED | OUTPATIENT
Start: 2021-03-29 | End: 2021-03-30 | Stop reason: HOSPADM

## 2021-03-29 RX ORDER — ACETAMINOPHEN 325 MG/1
975 TABLET ORAL ONCE
Status: DISCONTINUED | OUTPATIENT
Start: 2021-03-29 | End: 2021-03-30 | Stop reason: HOSPADM

## 2021-03-29 RX ORDER — HEPARIN SODIUM,PORCINE 10 UNIT/ML
5 VIAL (ML) INTRAVENOUS EVERY 24 HOURS
Status: CANCELLED | OUTPATIENT
Start: 2021-03-29

## 2021-03-29 RX ORDER — SODIUM CHLORIDE, SODIUM LACTATE, POTASSIUM CHLORIDE, CALCIUM CHLORIDE 600; 310; 30; 20 MG/100ML; MG/100ML; MG/100ML; MG/100ML
INJECTION, SOLUTION INTRAVENOUS CONTINUOUS
Status: DISCONTINUED | OUTPATIENT
Start: 2021-03-29 | End: 2021-03-30 | Stop reason: HOSPADM

## 2021-03-29 RX ORDER — NALOXONE HYDROCHLORIDE 0.4 MG/ML
0.4 INJECTION, SOLUTION INTRAMUSCULAR; INTRAVENOUS; SUBCUTANEOUS
Status: DISCONTINUED | OUTPATIENT
Start: 2021-03-29 | End: 2021-03-30 | Stop reason: HOSPADM

## 2021-03-29 RX ORDER — NALOXONE HYDROCHLORIDE 0.4 MG/ML
0.2 INJECTION, SOLUTION INTRAMUSCULAR; INTRAVENOUS; SUBCUTANEOUS
Status: DISCONTINUED | OUTPATIENT
Start: 2021-03-29 | End: 2021-03-30 | Stop reason: HOSPADM

## 2021-03-29 RX ORDER — CEFAZOLIN SODIUM 2 G/50ML
2 SOLUTION INTRAVENOUS
Status: COMPLETED | OUTPATIENT
Start: 2021-03-29 | End: 2021-03-29

## 2021-03-29 RX ORDER — LIDOCAINE 40 MG/G
CREAM TOPICAL
Status: DISCONTINUED | OUTPATIENT
Start: 2021-03-29 | End: 2021-03-30 | Stop reason: HOSPADM

## 2021-03-29 RX ORDER — ONDANSETRON 4 MG/1
4 TABLET, ORALLY DISINTEGRATING ORAL EVERY 30 MIN PRN
Status: DISCONTINUED | OUTPATIENT
Start: 2021-03-29 | End: 2021-03-30 | Stop reason: HOSPADM

## 2021-03-29 RX ORDER — ONDANSETRON 2 MG/ML
4 INJECTION INTRAMUSCULAR; INTRAVENOUS EVERY 30 MIN PRN
Status: DISCONTINUED | OUTPATIENT
Start: 2021-03-29 | End: 2021-03-30 | Stop reason: HOSPADM

## 2021-03-29 RX ORDER — HEPARIN SODIUM (PORCINE) LOCK FLUSH IV SOLN 100 UNIT/ML 100 UNIT/ML
5 SOLUTION INTRAVENOUS
Status: CANCELLED | OUTPATIENT
Start: 2021-03-29

## 2021-03-29 RX ADMIN — SODIUM CHLORIDE, SODIUM LACTATE, POTASSIUM CHLORIDE, CALCIUM CHLORIDE: 600; 310; 30; 20 INJECTION, SOLUTION INTRAVENOUS at 08:44

## 2021-03-29 RX ADMIN — CEFAZOLIN SODIUM 2 G: 2 SOLUTION INTRAVENOUS at 08:44

## 2021-03-29 ASSESSMENT — MIFFLIN-ST. JEOR: SCORE: 1596.9

## 2021-03-29 NOTE — ADDENDUM NOTE
Addendum  created 03/29/21 1233 by Tish Morrison APRN CRNA    Flowsheet accepted, Intraprocedure Flowsheets edited

## 2021-03-29 NOTE — BRIEF OP NOTE
[facility]    Brief Operative Note    Pre-operative diagnosis: Adenocarcinoma of esophagus (H) [C15.9]  Post-operative diagnosis Same as pre-operative diagnosis    Procedure: Procedure(s):  CHEST INSERTION, VASCULAR ACCESS PORT @0900  Surgeon: Surgeon(s) and Role:     * Roderick Prakash MD - Primary  Anesthesia: Monitor Anesthesia Care   Estimated blood loss: Minimal  Drains: None  Specimens: * No specimens in log *  Findings:   6F 24cm SL Bard Slim Port R IJV to upper RA under U/S and fluoro.  Functions well.  Hep-locked, ready to access and use. .  Complications: None.  Implants:   Implant Name Type Inv. Item Serial No.  Lot No. LRB No. Used Action   CATH PORT POWERPORT CLEARVUE SLIM 6FR [id number] Port CATH PORT POWERPORT CLEARVUE SLIM 6FR [id number]  CR [employer] [id number] Right 1 Implanted

## 2021-03-29 NOTE — DISCHARGE INSTRUCTIONS
A collaboration between [facility] and [facility]  Experts in minimally invasive, targeted treatments performed using imaging guidance    Venous Access Device,  Port Catheter or Tunneled or Non-Tunneled Central Line Placement    Today you had a procedure today to install a venous access device; either a tunneled central vein catheter or a subcutaneous port catheter.    After you go home:  - Drink plenty of fluids.  Generally 6-8 (8 ounce) glasses a day is recommended.  - Resume your regular diet unless otherwise ordered by a medical provider.  - Keep any applied tape/gauze dressings clean and dry.  Change tape/gauze dressings if they get wet or soiled.  - You may shower the following day after procedure, however cover and protect from moisture any tape/gauze dressings.  You may let water hit and run over dried skin glue, but do not scrub.  Pat the area dry after showering.  - Port placement incisions are closed with absorbable suture, meaning they do not need to be removed at a later date, and a topical skin adhesive (skin glue).  This glue will wear off in 7-14 days.  Do not remove before this time.  If 14 days have passed and residual glue is present, you may gently remove it.  - Do not apply gels, lotions, or ointments to the glue site for the first 10 days as this may cause the glue to prematurely soften and fail.  - Do not perform strenuous activities or lift greater than 10 pounds for the next three days.  - If there is bleeding or oozing from the procedure site, apply firm pressure to the area for 5-10 minutes.  If the bleeding continues seek medical advice at the numbers below.  - Mild procedure site discomfort can be treated with an ice pack and over-the-counter pain relievers.        For 24 hours after any sedation used:  - Relax and take it easy.  No strenuous activities.  - Do not drive or operate machines at home or at work.  - No alcohol  consumption.  - Do not make any important or legal decisions.    Call our Interventional Radiology (IR) service if:  - If you start bleeding from the procedure site.  If you do start to bleed from the site, lie down and hold some pressure on the site.  Our radiology provider can help you decide if you need to return to the hospital.  - If you have new or worsening pain related to the procedure.  - If you have concerning swelling at the procedure site.  - If you develop persistent nausea or vomiting.  - If you develop hives or a rash or any unexplained itching.  - If you have a fever of greater than 100.5  F and chills in the first 5 days after procedure.  - Any other concerns related to your procedure.      [facility]  Interventional Radiology (IR)  [address] [address]  [address] [facility] [place] [address]    Contact Number:  [phone]  (IR control desk)  - Monday - Friday 8:00 am - 4:30 pm    After hours for urgent concerns:  [phone]  - After 4:30 pm Monday - Friday, Weekends and Holidays.   - Ask for Interventional Radiology on-call.  Someone is available 24 hours a day.  - [facility] toll free number:  [phone]

## 2021-03-29 NOTE — ANESTHESIA PREPROCEDURE EVALUATION
Anesthesia Pre-Procedure Evaluation    Patient: Junito Wang   MRN: [id number] : 1960        Preoperative Diagnosis: Adenocarcinoma of esophagus (H) [C15.9]   Procedure : Procedure(s):  CHEST INSERTION, VASCULAR ACCESS PORT @0900     Past Medical History:   Diagnosis Date     Arthritis      Hypertension      Malignant neoplasm of lower third of esophagus (H) 2021     Obesity (BMI 35.0-39.9) with comorbidity (H) 2019      Past Surgical History:   Procedure Laterality Date     ABDOMEN SURGERY      fatty mass removed     APPENDECTOMY  1987     BIOPSY Left 2021    Left pelvic mass bone biopsy     COLONOSCOPY WITH CO2 INSUFFLATION N/A 2021    Procedure: COLONOSCOPY, WITH CO2 INSUFFLATION;  Surgeon: Nain Dominguez MD;  Location: [facility] OR     COMBINED ESOPHAGOSCOPY, GASTROSCOPY, DUODENOSCOPY (EGD) WITH CO2 INSUFFLATION N/A 2021    Procedure: ESOPHAGOGASTRODUODENOSCOPY, WITH CO2 INSUFFLATION;  Surgeon: Nain Dominguez MD;  Location: [facility] OR     ENDOSCOPIC INSERTION TUBE GASTROSTOMY N/A 2021    Procedure: Percutaneous endoscopic gastrostomy tube placement;  Surgeon: Jose Curiel MD;  Location: [facility] OR     ESOPHAGOSCOPY, GASTROSCOPY, DUODENOSCOPY (EGD), COMBINED N/A 2021    Procedure: Esophagogastroduodenoscopy, With Biopsy;  Surgeon: Nain Dominguez MD;  Location: [facility] OR     GI SURGERY      Upper Endoscopy     LAMINECTOMY LUMBAR POSTERIOR MICROSCOPIC THREE+ LEVELS N/A 2021    Procedure: Lumbar 2 to Lumbar 5 Laminectomy;  Surgeon: Soy Gusman MD;  Location: [facility] OR     TONSILLECTOMY        No Known Allergies   Social History     Tobacco Use     Smoking status: Current Every Day Smoker     Packs/day: 1.00     Years: 44.00     Pack years: 44.00     Types: Cigarettes     Start date:      Smokeless tobacco: Never Used     Tobacco comment: Patient reports currently smoking 1/2 PPD since hospital discharge and wearing a Nicotene Patch    Substance Use Topics     Alcohol use: Not Currently      Wt Readings from Last 1 Encounters:   03/29/21 77.6 kg (171 lb)        Anesthesia Evaluation            ROS/MED HX  ENT/Pulmonary:       Neurologic:       Cardiovascular:     (+) hypertension-----    METS/Exercise Tolerance:     Hematologic:     (+) anemia,     Musculoskeletal:       GI/Hepatic:     (+) esophageal disease,     Renal/Genitourinary:       Endo:       Psychiatric/Substance Use:       Infectious Disease:       Malignancy:   (+) Malignancy, History of GI.    Other:            Physical Exam    Airway        Mallampati: II       Respiratory Devices and Support         Dental           Cardiovascular          Rhythm and rate: regular and normal     Pulmonary           breath sounds clear to auscultation           OUTSIDE LABS:  CBC:   Lab Results   Component Value Date    WBC 7.5 03/10/2021    WBC 8.1 03/09/2021    HGB 8.9 (L) 03/10/2021    HGB 8.8 (L) 03/09/2021    HCT 28.7 (L) 03/10/2021    HCT 28.8 (L) 03/09/2021     03/10/2021     03/09/2021     BMP:   Lab Results   Component Value Date     03/10/2021     03/09/2021    POTASSIUM 4.1 03/10/2021    POTASSIUM 3.9 03/09/2021    CHLORIDE 107 03/10/2021    CHLORIDE 108 03/09/2021    CO2 27 03/10/2021    CO2 23 03/09/2021    BUN 13 03/10/2021    BUN 17 03/09/2021    CR 0.73 03/10/2021    CR 0.64 (L) 03/09/2021    GLC 87 03/10/2021     (H) 03/09/2021     COAGS:   Lab Results   Component Value Date    PTT 23 03/02/2021    INR 1.16 (H) 03/08/2021    FIBR 663 (H) 03/02/2021     POC:   Lab Results   Component Value Date     (H) 03/09/2021     HEPATIC:   Lab Results   Component Value Date    ALBUMIN 2.4 (L) 03/08/2021    PROTTOTAL 5.4 (L) 03/08/2021    ALT 53 03/08/2021    AST 25 03/08/2021    ALKPHOS 175 (H) 03/08/2021    BILITOTAL 0.7 03/08/2021     OTHER:   Lab Results   Component Value Date    A1C 5.2 06/10/2020    ARDEN 8.6 03/10/2021    PHOS 3.0 03/10/2021    MAG  2.8 (H) 03/10/2021    TSH 1.40 03/02/2021       Anesthesia Plan    ASA Status:  3      Anesthesia Type: MAC.              Consents    Anesthesia Plan(s) and associated risks, benefits, and realistic alternatives discussed. Questions answered and patient/representative(s) expressed understanding.     - Discussed with:  Patient         Postoperative Care       PONV prophylaxis: Ondansetron (or other 5HT-3)     Comments:         H&P reviewed: Unable to attach H&P to encounter due to EHR limitations. H&P Update: appropriate H&P reviewed, patient examined. No interval changes since H&P (within 30 days).         Moo Arrieta MD

## 2021-03-29 NOTE — ANESTHESIA POSTPROCEDURE EVALUATION
Patient: Junito Wang    Procedure(s):  CHEST INSERTION, VASCULAR ACCESS PORT @0900    Diagnosis:Adenocarcinoma of esophagus (H) [C15.9]  Diagnosis Additional Information: No value filed.    Anesthesia Type:  MAC    Note:  Disposition: Outpatient   Postop Pain Control: Uneventful            Sign Out: Well controlled pain   PONV: No   Neuro/Psych: Uneventful            Sign Out: Acceptable/Baseline neuro status   Airway/Respiratory: Uneventful            Sign Out: Acceptable/Baseline resp. status   CV/Hemodynamics: Uneventful            Sign Out: Acceptable CV status   Other NRE: NONE   DID A NON-ROUTINE EVENT OCCUR? No         Last vitals:  Vitals:    03/29/21 0747 03/29/21 0930 03/29/21 0945   BP: 115/82 125/84 128/78   Pulse: 108 91 92   Resp: 18 16 16   Temp: 36.1  C (96.9  F) 36.4  C (97.5  F)    SpO2: 100% 98% 98%       Last vitals prior to Anesthesia Care Transfer:  CRNA VITALS  3/29/2021 0856 - 3/29/2021 0956      3/29/2021             Pulse:  92    SpO2:  100 %    Resp Rate (set):  10          Electronically Signed By: Moo Arrieta MD  March 29, 2021  11:19 AM

## 2021-03-29 NOTE — ANESTHESIA CARE TRANSFER NOTE
Patient: Junito Wang    Procedure(s):  CHEST INSERTION, VASCULAR ACCESS PORT @0900    Diagnosis: Adenocarcinoma of esophagus (H) [C15.9]  Diagnosis Additional Information: No value filed.    Anesthesia Type:   MAC     Note:    Oropharynx: oropharynx clear of all foreign objects and spontaneously breathing  Level of Consciousness: awake  Oxygen Supplementation: room air    Independent Airway: airway patency satisfactory and stable  Dentition: dentition unchanged  Vital Signs Stable: post-procedure vital signs reviewed and stable  Report to RN Given: handoff report given  Patient transferred to: Phase II  Comments: Uneventful transport   Report to RN  Exchanging well; color natl  Pt responds appropriately to command  IV patent  Lips/teeth/dentition as preop status  Questions answered  /84  HR 90  TAT 97.5  RR 16  Sat 97%    Handoff Report: Identifed the Patient, Identified the Reponsible Provider, Reviewed the pertinent medical history, Discussed the surgical course, Reviewed Intra-OP anesthesia mangement and issues during anesthesia, Set expectations for post-procedure period and Allowed opportunity for questions and acknowledgement of understanding      Vitals: (Last set prior to Anesthesia Care Transfer)  CRNA VITALS  3/29/2021 0856 - 3/29/2021 0932      3/29/2021             Pulse:  92    SpO2:  100 %    Resp Rate (set):  10        Electronically Signed By: ALEXEI REEVES CRNA  March 29, 2021  9:32 AM

## 2021-03-30 DIAGNOSIS — C15.5 MALIGNANT NEOPLASM OF LOWER THIRD OF ESOPHAGUS (H): Primary | ICD-10-CM

## 2021-03-30 RX ORDER — EPINEPHRINE 1 MG/ML
0.3 INJECTION, SOLUTION, CONCENTRATE INTRAVENOUS EVERY 5 MIN PRN
Status: DISCONTINUED | OUTPATIENT
Start: 2021-03-30 | End: 2021-03-30 | Stop reason: HOSPADM

## 2021-03-30 RX ORDER — LORAZEPAM 0.5 MG/1
0.5 TABLET ORAL EVERY 4 HOURS PRN
Qty: 30 TABLET | Refills: 2 | Status: SHIPPED | OUTPATIENT
Start: 2021-03-30 | End: 2021-01-01

## 2021-03-30 RX ORDER — NALOXONE HYDROCHLORIDE 0.4 MG/ML
.1-.4 INJECTION, SOLUTION INTRAMUSCULAR; INTRAVENOUS; SUBCUTANEOUS
Status: DISCONTINUED | OUTPATIENT
Start: 2021-03-30 | End: 2021-03-30 | Stop reason: HOSPADM

## 2021-03-30 RX ORDER — ALBUTEROL SULFATE 90 UG/1
1-2 AEROSOL, METERED RESPIRATORY (INHALATION)
Status: DISCONTINUED | OUTPATIENT
Start: 2021-03-30 | End: 2021-03-30 | Stop reason: HOSPADM

## 2021-03-30 RX ORDER — DIPHENHYDRAMINE HYDROCHLORIDE 50 MG/ML
50 INJECTION INTRAMUSCULAR; INTRAVENOUS
Status: DISCONTINUED | OUTPATIENT
Start: 2021-03-30 | End: 2021-03-30 | Stop reason: HOSPADM

## 2021-03-30 RX ORDER — HEPARIN SODIUM,PORCINE 10 UNIT/ML
5 VIAL (ML) INTRAVENOUS
Status: DISCONTINUED | OUTPATIENT
Start: 2021-03-30 | End: 2021-03-30 | Stop reason: HOSPADM

## 2021-03-30 RX ORDER — HEPARIN SODIUM (PORCINE) LOCK FLUSH IV SOLN 100 UNIT/ML 100 UNIT/ML
5 SOLUTION INTRAVENOUS
Status: DISCONTINUED | OUTPATIENT
Start: 2021-03-30 | End: 2021-03-30 | Stop reason: HOSPADM

## 2021-03-30 RX ORDER — MEPERIDINE HYDROCHLORIDE 25 MG/ML
25 INJECTION INTRAMUSCULAR; INTRAVENOUS; SUBCUTANEOUS EVERY 30 MIN PRN
Status: DISCONTINUED | OUTPATIENT
Start: 2021-03-30 | End: 2021-03-30 | Stop reason: HOSPADM

## 2021-03-30 RX ORDER — PALONOSETRON 0.05 MG/ML
0.25 INJECTION, SOLUTION INTRAVENOUS ONCE
Status: DISCONTINUED | OUTPATIENT
Start: 2021-03-30 | End: 2021-03-30 | Stop reason: HOSPADM

## 2021-03-30 RX ORDER — HEPARIN SODIUM (PORCINE) LOCK FLUSH IV SOLN 100 UNIT/ML 100 UNIT/ML
5 SOLUTION INTRAVENOUS
Status: CANCELLED | OUTPATIENT
Start: 2021-04-04

## 2021-03-30 RX ORDER — PROCHLORPERAZINE MALEATE 10 MG
10 TABLET ORAL EVERY 6 HOURS PRN
Qty: 30 TABLET | Refills: 2 | Status: SHIPPED | OUTPATIENT
Start: 2021-03-30 | End: 2021-01-01

## 2021-03-30 RX ORDER — LORAZEPAM 2 MG/ML
0.5 INJECTION INTRAMUSCULAR EVERY 4 HOURS PRN
Status: DISCONTINUED | OUTPATIENT
Start: 2021-03-30 | End: 2021-03-30 | Stop reason: HOSPADM

## 2021-03-30 RX ORDER — HEPARIN SODIUM,PORCINE 10 UNIT/ML
5 VIAL (ML) INTRAVENOUS
Status: CANCELLED | OUTPATIENT
Start: 2021-04-04

## 2021-03-30 RX ORDER — DEXAMETHASONE 4 MG/1
8 TABLET ORAL
Qty: 6 TABLET | Refills: 2 | Status: SHIPPED | OUTPATIENT
Start: 2021-03-30 | End: 2021-06-23

## 2021-03-30 RX ORDER — ALBUTEROL SULFATE 0.83 MG/ML
2.5 SOLUTION RESPIRATORY (INHALATION)
Status: DISCONTINUED | OUTPATIENT
Start: 2021-03-30 | End: 2021-03-30 | Stop reason: HOSPADM

## 2021-03-30 RX ORDER — SODIUM CHLORIDE 9 MG/ML
1000 INJECTION, SOLUTION INTRAVENOUS CONTINUOUS PRN
Status: DISCONTINUED | OUTPATIENT
Start: 2021-03-30 | End: 2021-03-30 | Stop reason: HOSPADM

## 2021-03-31 ENCOUNTER — HOME INFUSION (PRE-WILLOW HOME INFUSION) (OUTPATIENT)
Dept: PHARMACY | Facility: CLINIC | Age: 61
End: 2021-03-31

## 2021-03-31 ENCOUNTER — TELEPHONE (OUTPATIENT)
Dept: ONCOLOGY | Facility: CLINIC | Age: 61
End: 2021-03-31

## 2021-03-31 ENCOUNTER — APPOINTMENT (OUTPATIENT)
Dept: LAB | Facility: CLINIC | Age: 61
End: 2021-03-31
Attending: STUDENT IN AN ORGANIZED HEALTH CARE EDUCATION/TRAINING PROGRAM
Payer: COMMERCIAL

## 2021-03-31 ENCOUNTER — PATIENT OUTREACH (OUTPATIENT)
Dept: ONCOLOGY | Facility: CLINIC | Age: 61
End: 2021-03-31

## 2021-03-31 ENCOUNTER — ONCOLOGY VISIT (OUTPATIENT)
Dept: ONCOLOGY | Facility: CLINIC | Age: 61
End: 2021-03-31
Attending: STUDENT IN AN ORGANIZED HEALTH CARE EDUCATION/TRAINING PROGRAM
Payer: COMMERCIAL

## 2021-03-31 VITALS
HEART RATE: 114 BPM | WEIGHT: 174 LBS | SYSTOLIC BLOOD PRESSURE: 110 MMHG | BODY MASS INDEX: 24.97 KG/M2 | DIASTOLIC BLOOD PRESSURE: 77 MMHG | TEMPERATURE: 98.8 F | OXYGEN SATURATION: 97 %

## 2021-03-31 VITALS
SYSTOLIC BLOOD PRESSURE: 110 MMHG | OXYGEN SATURATION: 97 % | BODY MASS INDEX: 24.98 KG/M2 | RESPIRATION RATE: 18 BRPM | HEART RATE: 114 BPM | WEIGHT: 174.1 LBS | DIASTOLIC BLOOD PRESSURE: 77 MMHG | TEMPERATURE: 98.8 F

## 2021-03-31 DIAGNOSIS — C15.5 MALIGNANT NEOPLASM OF LOWER THIRD OF ESOPHAGUS (H): Primary | ICD-10-CM

## 2021-03-31 DIAGNOSIS — R64 CANCER CACHEXIA (H): ICD-10-CM

## 2021-03-31 DIAGNOSIS — G56.32 RADIAL NEUROPATHY, LEFT: ICD-10-CM

## 2021-03-31 LAB
ALBUMIN SERPL-MCNC: 3.2 G/DL (ref 3.4–5)
ALP SERPL-CCNC: 160 U/L (ref 40–150)
ALT SERPL W P-5'-P-CCNC: 23 U/L (ref 0–70)
ANION GAP SERPL CALCULATED.3IONS-SCNC: 6 MMOL/L (ref 3–14)
AST SERPL W P-5'-P-CCNC: 12 U/L (ref 0–45)
BASOPHILS # BLD AUTO: 0.1 10E9/L (ref 0–0.2)
BASOPHILS NFR BLD AUTO: 1 %
BILIRUB SERPL-MCNC: 0.4 MG/DL (ref 0.2–1.3)
BUN SERPL-MCNC: 15 MG/DL (ref 7–30)
CALCIUM SERPL-MCNC: 9.7 MG/DL (ref 8.5–10.1)
CHLORIDE SERPL-SCNC: 104 MMOL/L (ref 94–109)
CO2 SERPL-SCNC: 28 MMOL/L (ref 20–32)
CREAT SERPL-MCNC: 0.6 MG/DL (ref 0.66–1.25)
DIFFERENTIAL METHOD BLD: ABNORMAL
EOSINOPHIL # BLD AUTO: 0.3 10E9/L (ref 0–0.7)
EOSINOPHIL NFR BLD AUTO: 4.7 %
ERYTHROCYTE [DISTWIDTH] IN BLOOD BY AUTOMATED COUNT: 18.8 % (ref 10–15)
GFR SERPL CREATININE-BSD FRML MDRD: >90 ML/MIN/{1.73_M2}
GLUCOSE SERPL-MCNC: 104 MG/DL (ref 70–99)
HCT VFR BLD AUTO: 33.8 % (ref 40–53)
HGB BLD-MCNC: 10.2 G/DL (ref 13.3–17.7)
IMM GRANULOCYTES # BLD: 0.1 10E9/L (ref 0–0.4)
IMM GRANULOCYTES NFR BLD: 0.8 %
LYMPHOCYTES # BLD AUTO: 1 10E9/L (ref 0.8–5.3)
LYMPHOCYTES NFR BLD AUTO: 16.5 %
MAGNESIUM SERPL-MCNC: 2.3 MG/DL (ref 1.6–2.3)
MCH RBC QN AUTO: 23.7 PG (ref 26.5–33)
MCHC RBC AUTO-ENTMCNC: 30.2 G/DL (ref 31.5–36.5)
MCV RBC AUTO: 79 FL (ref 78–100)
MONOCYTES # BLD AUTO: 0.6 10E9/L (ref 0–1.3)
MONOCYTES NFR BLD AUTO: 10.1 %
NEUTROPHILS # BLD AUTO: 4.2 10E9/L (ref 1.6–8.3)
NEUTROPHILS NFR BLD AUTO: 66.9 %
NRBC # BLD AUTO: 0 10*3/UL
NRBC BLD AUTO-RTO: 0 /100
PLATELET # BLD AUTO: 453 10E9/L (ref 150–450)
POTASSIUM SERPL-SCNC: 4.2 MMOL/L (ref 3.4–5.3)
PROT SERPL-MCNC: 6.9 G/DL (ref 6.8–8.8)
RBC # BLD AUTO: 4.3 10E12/L (ref 4.4–5.9)
SODIUM SERPL-SCNC: 137 MMOL/L (ref 133–144)
WBC # BLD AUTO: 6.2 10E9/L (ref 4–11)

## 2021-03-31 PROCEDURE — G0463 HOSPITAL OUTPT CLINIC VISIT: HCPCS

## 2021-03-31 PROCEDURE — 99417 PROLNG OP E/M EACH 15 MIN: CPT | Performed by: NURSE PRACTITIONER

## 2021-03-31 PROCEDURE — 96375 TX/PRO/DX INJ NEW DRUG ADDON: CPT

## 2021-03-31 PROCEDURE — 99215 OFFICE O/P EST HI 40 MIN: CPT | Performed by: NURSE PRACTITIONER

## 2021-03-31 PROCEDURE — 80053 COMPREHEN METABOLIC PANEL: CPT | Performed by: STUDENT IN AN ORGANIZED HEALTH CARE EDUCATION/TRAINING PROGRAM

## 2021-03-31 PROCEDURE — 250N000011 HC RX IP 250 OP 636: Performed by: STUDENT IN AN ORGANIZED HEALTH CARE EDUCATION/TRAINING PROGRAM

## 2021-03-31 PROCEDURE — 85025 COMPLETE CBC W/AUTO DIFF WBC: CPT | Performed by: STUDENT IN AN ORGANIZED HEALTH CARE EDUCATION/TRAINING PROGRAM

## 2021-03-31 PROCEDURE — 258N000003 HC RX IP 258 OP 636: Performed by: NURSE PRACTITIONER

## 2021-03-31 PROCEDURE — 96361 HYDRATE IV INFUSION ADD-ON: CPT

## 2021-03-31 PROCEDURE — 96417 CHEMO IV INFUS EACH ADDL SEQ: CPT

## 2021-03-31 PROCEDURE — G0498 CHEMO EXTEND IV INFUS W/PUMP: HCPCS

## 2021-03-31 PROCEDURE — 96415 CHEMO IV INFUSION ADDL HR: CPT

## 2021-03-31 PROCEDURE — 96367 TX/PROPH/DG ADDL SEQ IV INF: CPT

## 2021-03-31 PROCEDURE — 96413 CHEMO IV INFUSION 1 HR: CPT

## 2021-03-31 PROCEDURE — 83735 ASSAY OF MAGNESIUM: CPT | Performed by: STUDENT IN AN ORGANIZED HEALTH CARE EDUCATION/TRAINING PROGRAM

## 2021-03-31 PROCEDURE — 250N000011 HC RX IP 250 OP 636: Performed by: NURSE PRACTITIONER

## 2021-03-31 RX ORDER — HEPARIN SODIUM,PORCINE 10 UNIT/ML
5 VIAL (ML) INTRAVENOUS
Status: CANCELLED | OUTPATIENT
Start: 2021-03-31

## 2021-03-31 RX ORDER — DEXAMETHASONE 4 MG/1
8 TABLET ORAL
Qty: 6 TABLET | Refills: 2 | Status: SHIPPED | OUTPATIENT
Start: 2021-03-31 | End: 2021-04-21

## 2021-03-31 RX ORDER — EPINEPHRINE 1 MG/ML
0.3 INJECTION, SOLUTION, CONCENTRATE INTRAVENOUS EVERY 5 MIN PRN
Status: CANCELLED | OUTPATIENT
Start: 2021-03-31

## 2021-03-31 RX ORDER — PALONOSETRON 0.05 MG/ML
0.25 INJECTION, SOLUTION INTRAVENOUS ONCE
Status: COMPLETED | OUTPATIENT
Start: 2021-03-31 | End: 2021-03-31

## 2021-03-31 RX ORDER — LORAZEPAM 2 MG/ML
0.5 INJECTION INTRAMUSCULAR EVERY 4 HOURS PRN
Status: CANCELLED | OUTPATIENT
Start: 2021-03-31

## 2021-03-31 RX ORDER — PROCHLORPERAZINE MALEATE 10 MG
10 TABLET ORAL EVERY 6 HOURS PRN
Qty: 30 TABLET | Refills: 2 | Status: SHIPPED | OUTPATIENT
Start: 2021-03-31 | End: 2021-06-02

## 2021-03-31 RX ORDER — PROCHLORPERAZINE MALEATE 10 MG
10 TABLET ORAL EVERY 6 HOURS PRN
Qty: 30 TABLET | Refills: 2 | Status: CANCELLED | OUTPATIENT
Start: 2021-03-31

## 2021-03-31 RX ORDER — HEPARIN SODIUM (PORCINE) LOCK FLUSH IV SOLN 100 UNIT/ML 100 UNIT/ML
5 SOLUTION INTRAVENOUS
Status: CANCELLED | OUTPATIENT
Start: 2021-03-31

## 2021-03-31 RX ORDER — PALONOSETRON 0.05 MG/ML
0.25 INJECTION, SOLUTION INTRAVENOUS ONCE
Status: CANCELLED | OUTPATIENT
Start: 2021-03-31

## 2021-03-31 RX ORDER — SODIUM CHLORIDE 9 MG/ML
1000 INJECTION, SOLUTION INTRAVENOUS CONTINUOUS PRN
Status: CANCELLED | OUTPATIENT
Start: 2021-03-31

## 2021-03-31 RX ORDER — OMEPRAZOLE
20 KIT 2 TIMES DAILY
Qty: 300 ML | Refills: 1 | Status: SHIPPED | OUTPATIENT
Start: 2021-03-31 | End: 2022-01-01

## 2021-03-31 RX ORDER — LORAZEPAM 0.5 MG/1
0.5 TABLET ORAL EVERY 4 HOURS PRN
Qty: 30 TABLET | Refills: 2 | Status: SHIPPED | OUTPATIENT
Start: 2021-03-31 | End: 2021-06-02

## 2021-03-31 RX ORDER — METHOCARBAMOL 750 MG/1
750 TABLET, FILM COATED ORAL 4 TIMES DAILY
Qty: 30 TABLET | Refills: 0 | COMMUNITY
Start: 2021-03-31 | End: 2021-01-01

## 2021-03-31 RX ORDER — DEXAMETHASONE 4 MG/1
8 TABLET ORAL
Qty: 6 TABLET | Refills: 2 | Status: CANCELLED | OUTPATIENT
Start: 2021-03-31

## 2021-03-31 RX ORDER — LORAZEPAM 0.5 MG/1
0.5 TABLET ORAL EVERY 4 HOURS PRN
Qty: 30 TABLET | Refills: 2 | Status: CANCELLED | OUTPATIENT
Start: 2021-03-31

## 2021-03-31 RX ORDER — DIPHENHYDRAMINE HYDROCHLORIDE 50 MG/ML
50 INJECTION INTRAMUSCULAR; INTRAVENOUS
Status: CANCELLED | OUTPATIENT
Start: 2021-03-31

## 2021-03-31 RX ORDER — MEPERIDINE HYDROCHLORIDE 25 MG/ML
25 INJECTION INTRAMUSCULAR; INTRAVENOUS; SUBCUTANEOUS EVERY 30 MIN PRN
Status: CANCELLED | OUTPATIENT
Start: 2021-03-31

## 2021-03-31 RX ORDER — ALBUTEROL SULFATE 0.83 MG/ML
2.5 SOLUTION RESPIRATORY (INHALATION)
Status: CANCELLED | OUTPATIENT
Start: 2021-03-31

## 2021-03-31 RX ORDER — NALOXONE HYDROCHLORIDE 0.4 MG/ML
.1-.4 INJECTION, SOLUTION INTRAMUSCULAR; INTRAVENOUS; SUBCUTANEOUS
Status: CANCELLED | OUTPATIENT
Start: 2021-03-31

## 2021-03-31 RX ORDER — HEPARIN SODIUM (PORCINE) LOCK FLUSH IV SOLN 100 UNIT/ML 100 UNIT/ML
500 SOLUTION INTRAVENOUS ONCE
Status: COMPLETED | OUTPATIENT
Start: 2021-03-31 | End: 2021-03-31

## 2021-03-31 RX ORDER — ALBUTEROL SULFATE 90 UG/1
1-2 AEROSOL, METERED RESPIRATORY (INHALATION)
Status: CANCELLED | OUTPATIENT
Start: 2021-03-31

## 2021-03-31 RX ADMIN — SODIUM CHLORIDE 200 MG: 9 INJECTION, SOLUTION INTRAVENOUS at 09:48

## 2021-03-31 RX ADMIN — MAGNESIUM SULFATE HEPTAHYDRATE: 500 INJECTION, SOLUTION INTRAMUSCULAR; INTRAVENOUS at 10:20

## 2021-03-31 RX ADMIN — CISPLATIN 160 MG: 1 INJECTION, SOLUTION INTRAVENOUS at 10:22

## 2021-03-31 RX ADMIN — DEXAMETHASONE SODIUM PHOSPHATE: 10 INJECTION, SOLUTION INTRAMUSCULAR; INTRAVENOUS at 09:17

## 2021-03-31 RX ADMIN — SODIUM CHLORIDE 1000 ML: 9 INJECTION, SOLUTION INTRAVENOUS at 08:15

## 2021-03-31 RX ADMIN — PALONOSETRON 0.25 MG: 0.05 INJECTION, SOLUTION INTRAVENOUS at 09:16

## 2021-03-31 RX ADMIN — SODIUM CHLORIDE, PRESERVATIVE FREE 500 UNITS: 5 INJECTION INTRAVENOUS at 07:00

## 2021-03-31 ASSESSMENT — PAIN SCALES - GENERAL
PAINLEVEL: NO PAIN (1)
PAINLEVEL: NO PAIN (1)

## 2021-03-31 NOTE — NURSING NOTE
Chief Complaint   Patient presents with     Port Draw     labs drawn via port by RN in lab      /77   Pulse 114   Temp 98.8  F (37.1  C) (Oral)   Resp 18   Wt 79 kg (174 lb 1.6 oz)   SpO2 97%   BMI 24.98 kg/m      Port accessed by RN. Labs drawn and sent. Line flushed with NS and Heparin. Pt tolerated well. Checked in to next appointment.    rIene Paiz RN on 3/31/2021 at 6:59 AM

## 2021-03-31 NOTE — TELEPHONE ENCOUNTER
Prior Authorization Retail Medication Request    Medication/Dose: OMEPRAZOLE 2MG/ML SHREYA SHARPE  ICD code (if different than what is on RX):    Previously Tried and Failed:    Rationale:      Insurance Name:  [facility]  Insurance ID:  [id number]  Insurance #: [phone]      Pharmacy Information (if different than what is on RX)  Name:  [facility]  Phone:  [phone]

## 2021-03-31 NOTE — PATIENT INSTRUCTIONS
Contact Numbers    [facility], [place] Main Line (for Scheduling/Triage/After Hours Nurse Line): [phone]    Please call the [facility] nurse triage or the after hours nurse line if you experience a temperature greater than or equal to 100.5, shaking chills, have uncontrolled nausea, vomiting and/or diarrhea, dizziness, lightheadedness, shortness of breath, chest pain, bleeding, unexplained bruising, or if you have any other new/concerning symptoms, questions or concerns.     If you are having any concerning symptoms or wish to speak to a provider before your next infusion visit, please call your care coordinator or triage to notify them so we can adequately serve you.     If you need any refills on medications (narcotics or other medications), please call before your infusion appointment.       March 2021 Sunday Monday Tuesday Wednesday Thursday Friday Saturday        1    TELEPHONE VISIT RETURN   2:00 PM   (30 min.)   Kari Herrera RD   [facility] 2    Admission   2:21 PM   Edgardo Arana DO   [facility] [address] [place]   (Discharge: 3/10/2021)    US LWR EXT VENOUS DUPLEX LEFT   3:30 PM   (30 min.)   [id number]   [facility]    MR LUMBAR SPINE WWO   5:05 PM   (45 min.)   [id number]   [facility]    LAMINECTOMY, SPINE, LUMBAR, 3 OR MORE LEVELS, POSTERIOR APPROACH, USING MICROSCOPE   9:30 PM   Soy Gusman MD   UU OR    XR SURG DONTRELL <5 MIN FL W STILL   9:45 PM   (30 min.)   [id number]   [facility] 3    XR ABDOMEN PORT 1 VIEW   8:25 AM   (20 min.)   [id number]   [facility]    XR CHEST PORT 1 VIEW   6:00 PM   (20 min.)   [id number]   [facility] 4    XR ABDOMEN PORT 1 VIEW  10:00 AM   (20 min.)   [id number]   [facility] 5    IP EVALUATION   6:00 AM   (60 min.)   Francesca Joshua OT   [facility]    INSERTION, PEG TUBE   2:10 PM   Veena  MD Jose   UU OR 6    IP EVALUATION   6:00 AM   (60 min.)   Viola Samuels, PT   [facility]    IP EVALUATION   6:00 AM   (60 min.)   Eunice Louise, SLP   [facility]    IP TREATMENT   6:00 AM   (30 min.)   Brit Coronado, OT   [facility]   7    IP TREATMENT   6:00 AM   (30 min.)   Viola Samuels, PT   [facility] 8    IP TREATMENT   6:00 AM   (30 min.)   Varun Knight, PT   [facility] 9    IP TREATMENT   6:00 AM   (30 min.)   Brit Coronado, OT   [facility]    TELEPHONE VISIT RETURN   2:00 PM   (30 min.)   Kari Herrera RD   [facility] 10    [place] PLANNING BILLING ONLY   8:30 AM   (30 min.)   Dora Laboy MD   [facility] [place] 11    NEW   1:00 PM   (90 min.)   Emilia Qureshi MD   [facility]    CT SIM   2:15 PM   (60 min.)   Emilia Qureshi MD   [facility] [place] 12     13       14     15     16    VIDEO VISIT RETURN   8:55 AM   (50 min.)   Jaky Pugh CNP   [facility] 17    TELEPHONE VISIT RETURN   8:40 AM   (40 min.)   Domonique Pedraza APRN CNS   [facility]    NURSE ONLY  11:30 AM   (30 min.)   Nurse, Mg Rad   [facility] [place]    VERIFICATION SIM  12:10 PM   (30 min.)   Emilia Qureshi MD   [facility] [place]    LONG   4:00 PM   (40 min.)   Negra Kim CNP   [facility] 18    TREATMENT  10:50 AM   (10 min.)   RADIATION THERAPIST   [facility] [place]    OTV  11:00 AM   (15 min.)   Emilia Qureshi MD   [facility] [place] 19    TREATMENT  10:50 AM   (10  min.)   RADIATION THERAPIST   [facility] [place]    VIDEO VISIT NEW   2:45 PM   (30 min.)   Chas Baptiste MD   [facility]    VIDEO VISIT NEW   3:45 PM   (30 min.)   Whit Hatfield CNP   [facility] [place] 20       21    PRE-PROCEDURE COVID PCR   1:30 PM   (15 min.)   AN COVID LAB   [facility] 22    MR BRAIN W   8:00 AM   (30 min.)   UUMR2   [facility]    VERIFICATION SIM   8:30 AM   (30 min.)   Emilia Qureshi MD   [facility] [place]    TREATMENT  10:50 AM   (10 min.)   RADIATION THERAPIST   [facility] [place] 23    TREATMENT  10:50 AM   (10 min.)   RADIATION THERAPIST   [facility] [place] 24    GAMMA TREATMENT  10:30 AM   (60 min.)   Chas Baptiste MD   [facility] [place]    GAMMA TREATMENT  10:30 AM   (60 min.)   Emilia Qureshi MD   [facility] [place] 25     26    PRE-PROCEDURE COVID PCR   1:30 PM   (15 min.)   AN COVID LAB   [facility] 27       28     29    IR CHEST PORT PLACEMENT >5 YRS   7:30 AM   (75 min.)   [id number]   [facility] [place]    Outpatient Visit   7:35 AM   Roderick Prakash MD   [facility]    INSERTION, VASCULAR ACCESS PORT   9:00 AM   Roderick Prakash MD   [facility] 30     31    LAB CENTRAL   6:45 AM   (15 min.)   [facility] DRAW   [facility]    RETURN   7:05 AM   (50 min.)   Jkay Pugh CNP   [facility] ONC INFUSION 360   7:30 AM   (360 min.)   UC ONCOLOGY INFUSION   [facility]    CT CHEST/ABDOMEN/PELVIS W   4:00 PM   (20 min.)   [email or web address]   [facility]  [place]                            April 2021 Sunday Monday Tuesday Wednesday Thursday Friday Saturday                       1     2    RETURN  12:45 PM   (40 min.)   Nurse, [facility]   [facility] [place]    TELEPHONE VISIT RETURN   3:00 PM   (30 min.)   Kari Herrera RD   [facility] 3       4     5     6     7     8     9     10       11     12     13     14     15     16     17       18     19     20    VIDEO VISIT RETURN   3:35 PM   (50 min.)   Jaky Pugh CNP   [facility] 21    LAB CENTRAL   7:15 AM   (15 min.)   NURSE ONLY CANCER CENTER   [facility]    LEVEL 5   8:00 AM   (300 min.)   [facility] [address] [place] [address] 22     23     24       25     26     27     28     29     30                          Lab Results:  Recent Results (from the past 12 hour(s))   CBC with platelets differential    Collection Time: 03/31/21  6:56 AM   Result Value Ref Range    WBC 6.2 4.0 - 11.0 10e9/L    RBC Count 4.30 (L) 4.4 - 5.9 10e12/L    Hemoglobin 10.2 (L) 13.3 - 17.7 g/dL    Hematocrit 33.8 (L) 40.0 - 53.0 %    MCV 79 78 - 100 fl    MCH 23.7 (L) 26.5 - 33.0 pg    MCHC 30.2 (L) 31.5 - 36.5 g/dL    RDW 18.8 (H) 10.0 - 15.0 %    Platelet Count 453 (H) 150 - 450 10e9/L    Diff Method Automated Method     % Neutrophils 66.9 %    % Lymphocytes 16.5 %    % Monocytes 10.1 %    % Eosinophils 4.7 %    % Basophils 1.0 %    % Immature Granulocytes 0.8 %    Nucleated RBCs 0 0 /100    Absolute Neutrophil 4.2 1.6 - 8.3 10e9/L    Absolute Lymphocytes 1.0 0.8 - 5.3 10e9/L    Absolute Monocytes 0.6 0.0 - 1.3 10e9/L    Absolute Eosinophils 0.3 0.0 - 0.7 10e9/L    Absolute Basophils 0.1 0.0 - 0.2 10e9/L    Abs Immature Granulocytes 0.1 0 - 0.4 10e9/L    Absolute Nucleated RBC 0.0    Comprehensive metabolic panel    Collection Time: 03/31/21  6:56 AM   Result  Value Ref Range    Sodium 137 133 - 144 mmol/L    Potassium 4.2 3.4 - 5.3 mmol/L    Chloride 104 94 - 109 mmol/L    Carbon Dioxide 28 20 - 32 mmol/L    Anion Gap 6 3 - 14 mmol/L    Glucose 104 (H) 70 - 99 mg/dL    Urea Nitrogen 15 7 - 30 mg/dL    Creatinine 0.60 (L) 0.66 - 1.25 mg/dL    GFR Estimate >90 >60 mL/min/[1.73_m2]    GFR Estimate If Black >90 >60 mL/min/[1.73_m2]    Calcium 9.7 8.5 - 10.1 mg/dL    Bilirubin Total 0.4 0.2 - 1.3 mg/dL    Albumin 3.2 (L) 3.4 - 5.0 g/dL    Protein Total 6.9 6.8 - 8.8 g/dL    Alkaline Phosphatase 160 (H) 40 - 150 U/L    ALT 23 0 - 70 U/L    AST 12 0 - 45 U/L   Magnesium    Collection Time: 03/31/21  6:56 AM   Result Value Ref Range    Magnesium 2.3 1.6 - 2.3 mg/dL

## 2021-03-31 NOTE — TELEPHONE ENCOUNTER
Prior Authorization Approval    Authorization Effective Date:    Authorization Expiration Date:    Medication: Lorazepam PA Approved  Approved Dose/Quantity: 30/5  Reference #: Key: [id number]   Insurance Company: [employer] [place] - Phone [phone] Fax [phone]  Expected CoPay:       CoPay Card Available:      Foundation Assistance Needed:    Which Pharmacy is filling the prescription (Not needed for infusion/clinic administered): [facility] [address]  Pharmacy Notified:    Patient Notified:      Danielle Lujan CPhT  [facility]  Oncology Pharmacy Liaison   [email or web address]  [phone] (phone  [phone] (fax

## 2021-03-31 NOTE — PROGRESS NOTES
Infusion Nursing Note:  Junito Wang presents today for Cycle 1 Day 1 Cisplatin, Keytruda, Fluorouracil pump connect.    Patient seen by provider today: Yes: Jaky Pugh NP    Note: Patient new to oncology infusion room and is receiving Keytruda/Cisplatin/Fluorouracil pump for the first time. Pt oriented to infusion room and call light. Chemotherapy teaching done in infusion today by Roderick Sánchez, MANISHCC.  Reinforced chemotherapy teaching/side effects and schedule during infusion visit.     Copy of AVS reviewed with patient. Pt instructed to call care coordinator, triage (or MD on call if after hours/weekends) with chills/temp >=100.4, questions/concerns. Pt stated understanding of plan. .    Intravenous Access:  Implanted Port.    Treatment Conditions:  Lab Results   Component Value Date    HGB 10.2 03/31/2021     Lab Results   Component Value Date    WBC 6.2 03/31/2021      Lab Results   Component Value Date    ANEU 4.2 03/31/2021     Lab Results   Component Value Date     03/31/2021      Lab Results   Component Value Date     03/31/2021                   Lab Results   Component Value Date    POTASSIUM 4.2 03/31/2021           Lab Results   Component Value Date    MAG 2.3 03/31/2021            Lab Results   Component Value Date    CR 0.60 03/31/2021                   Lab Results   Component Value Date    ARDEN 9.7 03/31/2021                Lab Results   Component Value Date    BILITOTAL 0.4 03/31/2021           Lab Results   Component Value Date    ALBUMIN 3.2 03/31/2021                    Lab Results   Component Value Date    ALT 23 03/31/2021           Lab Results   Component Value Date    AST 12 03/31/2021     Results reviewed, labs MET treatment parameters, ok to proceed with treatment.    Post Infusion Assessment:  Patient tolerated infusion without incident.  Blood return noted pre and post infusion.  Site patent and intact, free from redness, edema or discomfort.  No evidence of extravasations.      Prior to discharge: Port is secured in place with tegaderm and flushed with 10cc NS with positive blood return noted.  Continuous home infusion Dosi-Fuser pump connected.    All connectors secured in place and clamps taped open.    Pump Connection double checked with Denia Ignacio RN.  Patient instructed to call our clinic or [facility] with any questions or concerns at home.  Patient verbalized understanding.    Patient set up for pump disconnect at our clinic on Monday, 4/5/21 at 1300.      Discharge Plan:   Prescription refills given for Ativan, Compazine, Decadron and Prilosec. Pt counseled on these medications by pharmacy and reiterated by RN.  Patient and/or family verbalized understanding of discharge instructions and all questions answered.  Copy of AVS reviewed with patient and/or family.  Patient will return 4/21 to [facility] for next appointment.  Patient discharged in stable condition accompanied by: self.  Departure Mode: Ambulatory.    Mary Kate Red RN

## 2021-03-31 NOTE — PROGRESS NOTES
"Met with Junito to discuss chemotherapy and resources available at the [facility]. Provided patient with \"My Cancer Guidebook\", and [employer] [facility] printouts on 5FU. Reviewed administration, side effects (including myelosuppression, nausea/vomiting, diarrhea/constipation, hair loss, mouth sores) and ongoing symptom management by APPs in clinic. Provided phone numbers for triage and after hours care. Highlighted steps to expect when getting chemotherapy (check in, labs, pre- meds, infusion), Discussed that chemo treatment may be delayed a day or two due to blood counts, infusion schedule or patient's need to modify. Included a one page resource of symptoms and when to contact the Cancer Clinic with questions or concerns.      Junito previously signed Authorization to Discuss paperwork. Discussed that Junito is signed up for [employer] to assist in managing appointments and asking future questions of health care team.    Discussed risks of infection and bloods clots & port management.       Answered all Junito's questions to his stated satisfaction.                                                                                                                                                  "

## 2021-03-31 NOTE — TELEPHONE ENCOUNTER
PA Initiation    Medication: Lorazepam PA Pending  Insurance Company: [employer] [place] - Phone [phone] Fax [phone]  Pharmacy Filling the Rx: [facility] [place], [place] - [address] [place] [address] [phone]  Filling Pharmacy Phone:    Filling Pharmacy Fax:    Start Date: 3/31/2021      Danielle Lujan CPhT  [facility]  Oncology Pharmacy [email or web address]  [phone] (phone  [phone] (fax

## 2021-03-31 NOTE — NURSING NOTE
"Oncology Rooming Note    March 31, 2021 7:12 AM   Junito Wang is a 60 year old male who presents for:    Chief Complaint   Patient presents with     Oncology Clinic Visit     adenocarcinoma of esophagus     Initial Vitals: /77   Pulse 114   Temp 98.8  F (37.1  C) (Oral)   Wt 78.9 kg (174 lb)   SpO2 97%   BMI 24.97 kg/m   Estimated body mass index is 24.97 kg/m  as calculated from the following:    Height as of 3/29/21: 1.778 m (5' 10\").    Weight as of this encounter: 78.9 kg (174 lb). Body surface area is 1.97 meters squared.  No Pain (1) Comment: Data Unavailable   No LMP for male patient.  Allergies reviewed: Yes  Medications reviewed: Yes    Medications: Medication refills not needed today.  Pharmacy name entered into [employer]:    [id number] - [place], [place] - [address] [facility] [place], [place] - [phone]    Clinical concerns: none       Whit Herzog CMA            "

## 2021-04-01 ENCOUNTER — ANCILLARY PROCEDURE (OUTPATIENT)
Dept: CT IMAGING | Facility: CLINIC | Age: 61
End: 2021-04-01
Attending: NURSE PRACTITIONER
Payer: COMMERCIAL

## 2021-04-01 DIAGNOSIS — C15.5 MALIGNANT NEOPLASM OF LOWER THIRD OF ESOPHAGUS (H): ICD-10-CM

## 2021-04-01 PROCEDURE — 74177 CT ABD & PELVIS W/CONTRAST: CPT | Mod: TC | Performed by: RADIOLOGY

## 2021-04-01 PROCEDURE — 71260 CT THORAX DX C+: CPT | Mod: TC | Performed by: RADIOLOGY

## 2021-04-01 RX ORDER — IOPAMIDOL 755 MG/ML
85 INJECTION, SOLUTION INTRAVASCULAR ONCE
Status: COMPLETED | OUTPATIENT
Start: 2021-04-01 | End: 2021-04-01

## 2021-04-01 RX ADMIN — IOPAMIDOL 85 ML: 755 INJECTION, SOLUTION INTRAVASCULAR at 12:16

## 2021-04-01 NOTE — TELEPHONE ENCOUNTER
Central Prior Authorization Team   Phone: [phone]      PA Initiation    Medication: OMEPRAZOLE 2MG/ML SHREYA SHARPE-PA initiated  Insurance Company: [employer] [facility] [facility] - Phone [phone] Fax [phone]  Pharmacy Filling the Rx: [facility][id number] - [place], [place] - [address]   Filling Pharmacy Phone: [phone]  Filling Pharmacy Fax:    Start Date: 4/1/2021

## 2021-04-02 ENCOUNTER — OFFICE VISIT (OUTPATIENT)
Dept: UROLOGY | Facility: CLINIC | Age: 61
End: 2021-04-02
Payer: COMMERCIAL

## 2021-04-02 ENCOUNTER — VIRTUAL VISIT (OUTPATIENT)
Dept: ONCOLOGY | Facility: CLINIC | Age: 61
End: 2021-04-02
Attending: DIETITIAN, REGISTERED
Payer: COMMERCIAL

## 2021-04-02 DIAGNOSIS — R33.9 URINARY RETENTION: Primary | ICD-10-CM

## 2021-04-02 DIAGNOSIS — C15.5 MALIGNANT NEOPLASM OF LOWER THIRD OF ESOPHAGUS (H): Primary | ICD-10-CM

## 2021-04-02 PROCEDURE — 97802 MEDICAL NUTRITION INDIV IN: CPT | Mod: TEL | Performed by: DIETITIAN, REGISTERED

## 2021-04-02 PROCEDURE — 99211 OFF/OP EST MAY X REQ PHY/QHP: CPT

## 2021-04-02 PROCEDURE — 97803 MED NUTRITION INDIV SUBSEQ: CPT | Performed by: DIETITIAN, REGISTERED

## 2021-04-02 RX ORDER — CIPROFLOXACIN 500 MG/1
500 TABLET, FILM COATED ORAL ONCE
Status: COMPLETED | OUTPATIENT
Start: 2021-04-02 | End: 2021-04-02

## 2021-04-02 RX ADMIN — CIPROFLOXACIN 500 MG: 500 TABLET, FILM COATED ORAL at 13:20

## 2021-04-02 NOTE — PATIENT INSTRUCTIONS
Follow up with Whit Hatfield CNP for Urodynamics testing.    It was a pleasure meeting with you today.  Thank you for allowing me and my team the privilege of caring for you today.  YOU are the reason we are here, and I truly hope we provided you with the excellent service you deserve.  Please let us know if there is anything else we can do for you so that we can be sure you are leaving completely satisfied with your care experience.        Aniya Ballesteros, CMA

## 2021-04-02 NOTE — NURSING NOTE
Chief Complaint   Patient presents with     Allied Health Visit     Trial of void       Patient Active Problem List   Diagnosis     Hypertension goal BP (blood pressure) < 140/90     Advanced directives, counseling/discussion     Malignant neoplasm of lower third of esophagus (H)     Esophageal dysphagia     Urine retention     Left leg weakness     Anemia, unspecified type     Leukocytosis, unspecified type     Malignant neoplasm of esophagus, unspecified location (H)     Acute low back pain, unspecified back pain laterality, unspecified whether sciatica present       No Known Allergies    Current Outpatient Medications   Medication Sig Dispense Refill     calcium carbonate (TUMS) 500 MG chewable tablet Take 1 chew tab by mouth 2 times daily As needed       dexamethasone (DECADRON) 4 MG tablet Take 2 tablets (8 mg) by mouth daily (with breakfast) for 3 days, starting day after Cisplatin (Day 2). 6 tablet 2     dexamethasone (DECADRON) 4 MG tablet Take 2 tablets (8 mg) by mouth daily (with breakfast) for 3 days, starting day after Cisplatin (Day 2). 6 tablet 2     gabapentin (NEURONTIN) 250 MG/5ML solution Take 5 mLs (250 mg) by mouth At Bedtime 470 mL 1     LORazepam (ATIVAN) 0.5 MG tablet Take 1 tablet (0.5 mg) by mouth every 4 hours as needed (Anxiety, Nausea/Vomiting or Sleep) 30 tablet 2     LORazepam (ATIVAN) 0.5 MG tablet Take 1 tablet (0.5 mg) by mouth every 4 hours as needed (Anxiety, Nausea/Vomiting or Sleep) 30 tablet 2     methocarbamol (ROBAXIN) 750 MG tablet Take 1 tablet (750 mg) by mouth 4 times daily 30 tablet 0     omeprazole (FIRST-OMEPRAZOLE) 2 MG/ML SUSP Take 10 mLs (20 mg) by mouth 2 times daily 300 mL 1     prochlorperazine (COMPAZINE) 10 MG tablet Take 1 tablet (10 mg) by mouth every 6 hours as needed (Nausea/Vomiting) 30 tablet 2     prochlorperazine (COMPAZINE) 10 MG tablet Take 1 tablet (10 mg) by mouth every 6 hours as needed (Nausea/Vomiting) 30 tablet 2     thiamine (B-1) 100 MG tablet  1 tablet (100 mg) by Per Feeding Tube route daily 30 tablet 1       Social History     Tobacco Use     Smoking status: Current Every Day Smoker     Packs/day: 1.00     Years: 44.00     Pack years: 44.00     Types: Cigarettes     Start date: 1977     Smokeless tobacco: Never Used     Tobacco comment: Patient reports currently smoking 1/2 PPD since hospital discharge and wearing a Nicotene Patch   Substance Use Topics     Alcohol use: Not Currently     Drug use: No       Junito Wang comes into clinic today at the request of Whit Hatfield CNP for TOV / catheter removal.    Patient diagnosis: Urinary retention    This service provided today was under the direct supervision of Dr. Bowen Meredith, who was available if needed.    Junito Wang presented today for a trial of void.  Approximately 300 mL of normal saline instilled into bladder via catheter.  Patient stated he had urge to urinate and catheter was removed without difficulty.  Patient was given a urinal to measure urine output.  Patient voided approximately 250 mL of clear urine.    I instructed patient on how to perform CIC if he goes back into retention.  Patient was reluctant, but I still instructed him and sent him home with a couple of catheters just in case.  Patient is scheduled for Urodynamics with Whit Hatfield CNP on 4/12/21.      Cipro 500 given per protocol: Yes  The following medication was given:     MEDICATION:  Ciprofloxacin  ROUTE: PO  SITE: Medication was given orally   DOSE: 500 mg  LOT #: [id number]  : [employer]  EXPIRATION DATE: 09/2022  NDC#: [id number]   Was there drug waste? No    Prior to medication administration, verified patient identity using patient's name and date of birth.  Due to medication administration, patient instructed to remain in clinic for 15 minutes  afterwards, and to report any adverse reaction to me immediately.    Drug Amount Wasted:  None.  Vial/Syringe: Single dose  vial    Patient did tolerate procedure well.    Teaching done with patient verbally as where to call or go if pain, fever, or unable to urinate post catheter removal.    Aniya Ballesteros CMA  4/2/2021  1:05 PM

## 2021-04-02 NOTE — PROGRESS NOTES
This is a recent snapshot of the patient's [facility] medical record.  For current drug dose and complete information and questions, call [phone] or In [facility], [facility] home infusion ([id number])  CSN Number:  [id number]

## 2021-04-02 NOTE — PROGRESS NOTES
Chief Complaint   Patient presents with     Allied Health Visit     Trial of void     Aniya Ballesteros, CMA

## 2021-04-02 NOTE — LETTER
4/2/2021         RE: Junito Wang  [address] [address]        Dear Colleague,    Thank you for referring your patient, Junito Wang, to the [facility]. Please see a copy of my visit note below.     CLINICAL NUTRITION SERVICES - REASSESSMENT NOTE   EVALUATION OF PREVIOUS PLAN OF CARE:   Referring Physician: Maurizio 2/8/21  Time spent with patient: 30 minutes.     Current diet: NPO  PEG Tube: scheduled on 3/9/21 through thoracic; EN dependent since 3/9/21     Monitoring from previous assessment:   -EN intake - Junito has been taking EN via PEG tube for the past 3 weeks with great tolerance.   Current regimen:  Formula: Isosource 1.5 rob  Volume: 6 cartons/day (2 cartons TID)  MOA: Gravity bag/bolus  Provisions:  2250kcal (29kcal/kg), 102g protein (1.3g/kg), 264g CHO, 22g fiber  Flushes: 160ml water before/after each feeding plus 180ml total water flush with meds = 1120mL water; 1140ml free water from formula = total of 2260mL water  -Weight trends - stable  Wt Readings from Last 6 Encounters:   03/31/21 79 kg (174 lb 1.6 oz)   03/31/21 78.9 kg (174 lb)   03/29/21 77.6 kg (171 lb)   03/18/21 80.2 kg (176 lb 11.2 oz)   03/17/21 77.7 kg (171 lb 5 oz)   03/11/21 78.9 kg (174 lb)     CURRENT NUTRITION DIAGNOSIS   Inadequate oral intake related to dysphaiga as evidenced by pt dependent upon EN to meet 100% of estimated nutrition needs.    INTERVENTIONS   Recommendations / Nutrition Prescription   1. Increase water/hydration by 2 cups/day (aiming for >10-12 cup free water)  2. Include 2-3 cups of hydration as pedialyte and/or gatorade via feeding tube. Flush with 30-60mL water after electrolyte flush.    Implementation  -EN Composition, EN Schedule and Feeding Tube Flush - reviewed EN needs and hydration needs.   -Encouraged to increase hydration by at least 2 cups/day with chemotherapy.  Reviewed ways to increase hydration/electrolytes via feeding tube.     -Discussed that if weight trends decline, will need to increase feeding volume to 6 1/2-7 cartons of formula/day.   Goals   1. EN to meet 100% of estimated nutrition needs (6-7 cartons/day)  2. Aim for 6-8 cups hydration/day (free water in formula = 4 cups)    Follow up/Monitoring: follow-up scheduled for 4/21/21 at 10am during chemotherapy infusion  -Enteral Nutrition intake  -Weight trends    Kari Brown RD, [facility]  [phone]        Again, thank you for allowing me to participate in the care of your patient.      Sincerely,    Kari Brown RD

## 2021-04-02 NOTE — TELEPHONE ENCOUNTER
PRIOR AUTHORIZATION DENIED    Medication: OMEPRAZOLE 2MG/ML SHREYA ESPINOZA denied    Denial Date: 4/2/2021    Denial Rational:         Appeal Information:

## 2021-04-02 NOTE — PROGRESS NOTES
"The patient has been notified of the following:      \"We have found that certain health care needs can be provided without the need for a face to face visit.  This service lets us provide the care you need with a phone conversation.       I will have full access to your [facility] medical record during this entire phone call.   I will be taking notes for your medical record.      Since this is like an office visit, we will bill your insurance company for this service.       There are potential benefits and risks of telephone visits (e.g. limits to patient confidentiality) that differ from in-person visits.?  Confidentiality still applies for telephone services, and nobody will record the visit.  It is important to be in a quiet, private space that is free of distractions (including cell phone or other devices) during the visit.??      If during the course of the call I believe a telephone visit is not appropriate, you will not be charged for this service\"     Consent has been obtained for this service by care team member: Yes    CLINICAL NUTRITION SERVICES - REASSESSMENT NOTE   EVALUATION OF PREVIOUS PLAN OF CARE:   Referring Physician: Maurizio 2/8/21  Time spent with patient: 30 minutes.     Current diet: NPO  PEG Tube: scheduled on 3/9/21 through thoracic; EN dependent since 3/9/21     Monitoring from previous assessment:   -EN intake - Junito has been taking EN via PEG tube for the past 3 weeks with great tolerance.   Current regimen:  Formula: Isosource 1.5 rob  Volume: 6 cartons/day (2 cartons TID)  MOA: Gravity bag/bolus  Provisions:  2250kcal (29kcal/kg), 102g protein (1.3g/kg), 264g CHO, 22g fiber  Flushes: 160ml water before/after each feeding plus 180ml total water flush with meds = 1120mL water; 1140ml free water from formula = total of 2260mL water  -Weight trends - stable  Wt Readings from Last 6 Encounters:   03/31/21 79 kg (174 lb 1.6 oz)   03/31/21 78.9 kg (174 lb)   03/29/21 77.6 kg (171 lb) "   03/18/21 80.2 kg (176 lb 11.2 oz)   03/17/21 77.7 kg (171 lb 5 oz)   03/11/21 78.9 kg (174 lb)     CURRENT NUTRITION DIAGNOSIS   Inadequate oral intake related to dysphaiga as evidenced by pt dependent upon EN to meet 100% of estimated nutrition needs.    INTERVENTIONS   Recommendations / Nutrition Prescription   1. Increase water/hydration by 2 cups/day (aiming for >10-12 cup free water)  2. Include 2-3 cups of hydration as pedialyte and/or gatorade via feeding tube. Flush with 30-60mL water after electrolyte flush.    Implementation  -EN Composition, EN Schedule and Feeding Tube Flush - reviewed EN needs and hydration needs.   -Encouraged to increase hydration by at least 2 cups/day with chemotherapy.  Reviewed ways to increase hydration/electrolytes via feeding tube.    -Discussed that if weight trends decline, will need to increase feeding volume to 6 1/2-7 cartons of formula/day.   Goals   1. EN to meet 100% of estimated nutrition needs (6-7 cartons/day)  2. Aim for 6-8 cups hydration/day (free water in formula = 4 cups)    Follow up/Monitoring: follow-up scheduled for 4/21/21 at 10am during chemotherapy infusion  -Enteral Nutrition intake  -Weight trends    Kari Brown RD, LD  [facility]  [phone]

## 2021-04-05 ENCOUNTER — MYC MEDICAL ADVICE (OUTPATIENT)
Dept: ONCOLOGY | Facility: CLINIC | Age: 61
End: 2021-04-05

## 2021-04-06 ENCOUNTER — PATIENT OUTREACH (OUTPATIENT)
Dept: ONCOLOGY | Facility: CLINIC | Age: 61
End: 2021-04-06

## 2021-04-06 ENCOUNTER — DOCUMENTATION ONLY (OUTPATIENT)
Dept: PHARMACY | Facility: CLINIC | Age: 61
End: 2021-04-06

## 2021-04-06 ENCOUNTER — HOME INFUSION (PRE-WILLOW HOME INFUSION) (OUTPATIENT)
Dept: PHARMACY | Facility: CLINIC | Age: 61
End: 2021-04-06

## 2021-04-06 NOTE — PROGRESS NOTES
Called to inform Pt [facility] would be calling, he said he spoke to them, and during our call their  called. Was also able to inform Pt that PA issues with medication had been resolved and he could pick it up. Called and relayed information to Pt, who verbalized understanding.

## 2021-04-07 ENCOUNTER — PRE VISIT (OUTPATIENT)
Dept: UROLOGY | Facility: CLINIC | Age: 61
End: 2021-04-07

## 2021-04-07 NOTE — PROGRESS NOTES
Skilled nurse visit in the home, for discontinuation of Fluorouracil 7950 mg in 243 ml NS infused over 120 hours.    Monique Waller RN, BSN  [facility]  [phone]  [email or web address]

## 2021-04-07 NOTE — PROGRESS NOTES
This is a recent snapshot of the patient's [facility] medical record.  For current drug dose and complete information and questions, call [phone] or In Basket pool, fv home infusion ([id number])  CSN Number:  [id number]

## 2021-04-12 ENCOUNTER — TELEPHONE (OUTPATIENT)
Dept: UROLOGY | Facility: CLINIC | Age: 61
End: 2021-04-12

## 2021-04-12 ENCOUNTER — ANCILLARY PROCEDURE (OUTPATIENT)
Dept: RADIOLOGY | Facility: AMBULATORY SURGERY CENTER | Age: 61
End: 2021-04-12
Attending: NURSE PRACTITIONER
Payer: COMMERCIAL

## 2021-04-12 ENCOUNTER — ALLIED HEALTH/NURSE VISIT (OUTPATIENT)
Dept: UROLOGY | Facility: CLINIC | Age: 61
End: 2021-04-12
Payer: COMMERCIAL

## 2021-04-12 VITALS — HEART RATE: 123 BPM | SYSTOLIC BLOOD PRESSURE: 124 MMHG | DIASTOLIC BLOOD PRESSURE: 86 MMHG

## 2021-04-12 DIAGNOSIS — N48.1 BALANITIS: ICD-10-CM

## 2021-04-12 DIAGNOSIS — R33.9 URINARY RETENTION: Primary | ICD-10-CM

## 2021-04-12 LAB
ALBUMIN UR-MCNC: NEGATIVE MG/DL
APPEARANCE UR: CLEAR
BILIRUB UR QL STRIP: NEGATIVE
COLOR UR AUTO: YELLOW
GLUCOSE UR STRIP-MCNC: NEGATIVE MG/DL
HGB UR QL STRIP: NEGATIVE
KETONES UR STRIP-MCNC: NEGATIVE MG/DL
LEUKOCYTE ESTERASE UR QL STRIP: NEGATIVE
NITRATE UR QL: NEGATIVE
PH UR STRIP: 7 PH (ref 5–7)
SP GR UR STRIP: 1.01 (ref 1–1.03)
UROBILINOGEN UR STRIP-ACNC: 4 EU/DL (ref 0.2–1)

## 2021-04-12 PROCEDURE — 51600 INJECTION FOR BLADDER X-RAY: CPT | Performed by: NURSE PRACTITIONER

## 2021-04-12 PROCEDURE — 99207 PR NO BILLABLE SERVICE THIS VISIT: CPT | Performed by: NURSE PRACTITIONER

## 2021-04-12 PROCEDURE — 81003 URINALYSIS AUTO W/O SCOPE: CPT | Performed by: PATHOLOGY

## 2021-04-12 PROCEDURE — 81003 URINALYSIS AUTO W/O SCOPE: CPT | Performed by: NURSE PRACTITIONER

## 2021-04-12 PROCEDURE — 51784 ANAL/URINARY MUSCLE STUDY: CPT | Performed by: NURSE PRACTITIONER

## 2021-04-12 PROCEDURE — 51728 CYSTOMETROGRAM W/VP: CPT | Performed by: NURSE PRACTITIONER

## 2021-04-12 PROCEDURE — 51797 INTRAABDOMINAL PRESSURE TEST: CPT | Performed by: NURSE PRACTITIONER

## 2021-04-12 PROCEDURE — 74455 X-RAY URETHRA/BLADDER: CPT | Performed by: NURSE PRACTITIONER

## 2021-04-12 RX ORDER — CLOTRIMAZOLE AND BETAMETHASONE DIPROPIONATE 10; .64 MG/G; MG/G
CREAM TOPICAL 2 TIMES DAILY
Qty: 15 G | Refills: 1 | Status: SHIPPED | OUTPATIENT
Start: 2021-04-12 | End: 2021-06-23

## 2021-04-12 RX ORDER — FAMOTIDINE 40 MG/5ML
POWDER, FOR SUSPENSION ORAL
COMMUNITY
Start: 2021-03-23 | End: 2021-04-20

## 2021-04-12 RX ORDER — SULFAMETHOXAZOLE/TRIMETHOPRIM 800-160 MG
1 TABLET ORAL ONCE
Status: COMPLETED | OUTPATIENT
Start: 2021-04-12 | End: 2021-04-12

## 2021-04-12 RX ADMIN — SULFAMETHOXAZOLE AND TRIMETHOPRIM 1 TABLET: 800; 160 TABLET ORAL at 09:23

## 2021-04-12 ASSESSMENT — PAIN SCALES - GENERAL: PAINLEVEL: NO PAIN (0)

## 2021-04-12 NOTE — PROGRESS NOTES
lotriPREPROCEDURE DIAGNOSES:    1. Urinary retention in the setting of esophageal cancer and extensive epidural changes, suggestive of tumor vs hemorrhage, for which he underwent emergency surgery and radiation to the site.     POSTPROCEDURE DIAGNOSES:  -Large bladder capacity (741 mL) with delayed filling sensations.  -Good bladder compliance with DO/DOI.  -No JOSE.  -Patient makes initial attempt to void from a seated position at fill volume of 566 mL, but unable to void any urine, and no appreciable detrusor contraction. He reported a reduced urge to void, and filling was therefore resumed. At fill volume of 741 mL, he was then assisted to a standing position for a second attempt to void, which yielded a brief rise in detrusor pressure to 45 cm of H2O pressure, during which time he passes some urine. However, this pressure quickly drops, and he attempts to void primarily through Valsalva effort. He was then allowed to finish voiding in private bathroom, and voided an additional 120 mL.   -Reduced flow rate (Qmean 4.2 mL/s) with intermittent flow curve and incomplete bladder emptying (final  mL).   -Increased EMG activity during voiding, which likely correlates with Valsalva effort  -BOOI is -33.4 which is negative for bladder outlet obstruction.  -Fluoroscopy reveals a smooth bladder wall without diverticulae or cellules.  No vesicoureteral reflux was observed.  The bladder neck was closed during filling and during voiding.    PROCEDURE:    1. Sterile urethral catheterization for measurement of postvoid residual urine volume.  2. Complex filling cystometrogram with measurement of bladder and rectal pressures.  3. Complex voiding cystometrogram with measurement of bladder and rectal pressures.  4. Electromyography of the pelvic floor during urodynamics.  5. Fluoroscopic imaging of the bladder during urodynamics, at least 3 views.    6. Interpretation of urodynamics and flouroscopic imaging.      INDICATIONS  FOR PROCEDURE:  Mr. Junito Wang is a pleasant 60 year old male with urinary retention in the setting of esophageal cancer and extensive epidural changes, suggestive of tumor vs hemorrhage, for which he underwent emergency surgery and radiation to the site. Baseline video urodynamic assessment is requested today to better characterize Mr. Junito Wang's voiding dysfunction.      VOIDING DIARY:  Voids every 2-8 hours during the day, nocturia x 3.  Episodes of incontinence: None.  Total Volume Intake: 2800 mL; water, Isosource.  Total Volume Output: 1500 mL; average voided volume 300 mL, largest voided volume 620 mL.    DESCRIPTION OF PROCEDURE:  Risks, benefits, and alternatives to urodynamics were discussed with the patient and he wished to proceed.  Urodynamics are planned to better assess the primary etiology for Mr. Wang's urologic dysfunction.  After informed consent was obtained, the patient was taken to the procedure room where the study was initiated. Findings below.     PRE-STUDY UROFLOWMETRY:  Postvoid residual by catheter: 60 mL.  Pretest urine dipstick was negative for leukocytes and nitrites.    Next a 7F double-lumen urodynamics catheter was inserted into the bladder under sterile technique via the urethra.  A 7F abdominal manometry catheter was placed in the rectum.  EMG pads were placed on both sides of the anal verge.  The bladder was filled with 200 mL of Omnipaque at 30 mL/minute and serial pressures were recorded.  With coughing there was an appropriate rise in vesical and abdominal pressures with no change in detrusor pressure, confirming good study catheter placement.    DURING THE FILLING PHASE:  First sensation: 400 mL.  First Desire: 460 mL.  Strong Desire: 520 mL.  Maximum Capacity: 741 mL.    Uninhibited detrusor contractions: None.  Compliance: Good. PDet=9.8 cmH20 at capacity. Compliance ratio of 75.  Continence: No DOI or JOSE.  EMG: Mostly concordant during filling.    DURING THE  VOIDING PHASE:  Patient makes initial attempt to void from a seated position at fill volume of 566 mL, but unable to void any urine, and no appreciable detrusor contraction. He reported a reduced urge to void, and filling was therefore resumed. At fill volume of 741 mL, he was then assisted to a standing position for a second attempt to void, which yielded the following:   Maximum detrusor contraction with void: Brief rise in detrusor pressure to 45 cm of H2O pressure, during which time he passes some urine. However, this pressure quickly drops, and he attempts to void primarily through Valsalva effort.   Voided volume: 312 mL.  Maximum flow rate: 27 mL/sec.  Average flow rate: 4.2 mL/sec.  Postvoid Residual: 433 mL.  EMG activity: Increased, which likely correlates with Valsalva effort/pushing.  Character of voiding curve: Intermittent.  BOOI: -33.4 (suggesting no obstruction - see key below)  [obstructed (NICHOLE index [BOOI] ? 40); equivocal (no definite   obstruction; BOOI 20-40); and no obstruction (BOOI ? 20)]    He was then allowed to finish voiding in private bathroom, and voided an additional 120 mL.     FLUOROSCOPIC IMAGING OF THE BLADDER DURING URODYNAMICS:  Please note, image numbers on UDS tracings correlate with iSite series numbers on PACS images. Fluoroscopy during today's procedure demonstrated a smooth bladder wall without diverticulae or cellules.  No vesicoureteral reflux was observed.  The bladder neck was closed during filling and during voiding.  At the completion of the study, all catheters were removed and the patient was brought back into the consultation room to further discuss today's study results.      ASSESSMENT/PLAN:  Mr. Junito Wang is a pleasant 60 year old male with urinary retention in the setting of esophageal cancer and extensive epidural changes, suggestive of tumor vs hemorrhage, for which he underwent emergency surgery and radiation to the site.  He demonstrated the following  findings today on urodynamic evaluation:    -Large bladder capacity (741 mL) with delayed filling sensations.  -Good bladder compliance with DO/DOI.  -No JOSE.  -Patient makes initial attempt to void from a seated position at fill volume of 566 mL, but unable to void any urine, and no appreciable detrusor contraction. He reported a reduced urge to void, and filling was therefore resumed. At fill volume of 741 mL, he was then assisted to a standing position for a second attempt to void, which yielded a brief rise in detrusor pressure to 45 cm of H2O pressure, during which time he passes some urine. However, this pressure quickly drops, and he attempts to void primarily through Valsalva effort. He was then allowed to finish voiding in private bathroom, and voided an additional 120 mL.   -Reduced flow rate (Qmean 4.2 mL/s) with intermittent flow curve and incomplete bladder emptying (final  mL).   -Increased EMG activity during voiding, which likely correlates with Valsalva effort  -BOOI is -33.4 which is negative for bladder outlet obstruction.  -Fluoroscopy reveals a smooth bladder wall without diverticulae or cellules.  No vesicoureteral reflux was observed.  The bladder neck was closed during filling and during voiding.    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 Final PVR today is 313 mL, which is elevated, but reasonable. He continues to void an average of 300 mL with each void throughout the day, per his bladder diary. He is very opposed to CIC and does not think this is a reasonable option for him. We therefore made the shared decision to defer any form of catheterization today (CIC vs [facility]) and have patient continue to monitor void volumes. Will need to monitor residual volumes closely, and would like to see him back in 2-3 months for an in-person follow up for uroflow/PVR. However, the  patient would like to follow at the [facility] for future visits due to ease of travel and close proximity to his home. Will therefore have him follow up with my colleague, Angelica Lloyd.     Some penile irritation reported today, and some evidence of balanitis on exam today. Will prescribe him Lotrisone to apply to the area BID.         - A single Bactrim DS was provided for UTI prophylaxis following completion of today's study per department protocol.  The risk of UTI with VUDS is low at ~2.5-3%.      Thank you for allowing me to participate in the care of Mr. Junito Wang and please don't hesitate to contact me with any questions or concerns.      This procedure was performed under a collaborative agreement with Dr. Adan Mcculloguh, Professor and  of Urology, [facility].    ALEXEI Syed, CNP  Department of Urology

## 2021-04-12 NOTE — NURSING NOTE
Chief Complaint   Patient presents with     Urodynamics Study           Patient Active Problem List   Diagnosis     Hypertension goal BP (blood pressure) < 140/90     Advanced directives, counseling/discussion     Malignant neoplasm of lower third of esophagus (H)     Esophageal dysphagia     Urine retention     Left leg weakness     Anemia, unspecified type     Leukocytosis, unspecified type     Malignant neoplasm of esophagus, unspecified location (H)     Acute low back pain, unspecified back pain laterality, unspecified whether sciatica present       No Known Allergies    Current Outpatient Medications   Medication Sig Dispense Refill     calcium carbonate (TUMS) 500 MG chewable tablet Take 1 chew tab by mouth 2 times daily As needed       dexamethasone (DECADRON) 4 MG tablet Take 2 tablets (8 mg) by mouth daily (with breakfast) for 3 days, starting day after Cisplatin (Day 2). 6 tablet 2     dexamethasone (DECADRON) 4 MG tablet Take 2 tablets (8 mg) by mouth daily (with breakfast) for 3 days, starting day after Cisplatin (Day 2). 6 tablet 2     famotidine (PEPCID) 40 MG/5ML suspension        gabapentin (NEURONTIN) 250 MG/5ML solution Take 5 mLs (250 mg) by mouth At Bedtime 470 mL 1     LORazepam (ATIVAN) 0.5 MG tablet Take 1 tablet (0.5 mg) by mouth every 4 hours as needed (Anxiety, Nausea/Vomiting or Sleep) 30 tablet 2     LORazepam (ATIVAN) 0.5 MG tablet Take 1 tablet (0.5 mg) by mouth every 4 hours as needed (Anxiety, Nausea/Vomiting or Sleep) 30 tablet 2     methocarbamol (ROBAXIN) 750 MG tablet Take 1 tablet (750 mg) by mouth 4 times daily 30 tablet 0     omeprazole (FIRST-OMEPRAZOLE) 2 MG/ML SUSP Take 10 mLs (20 mg) by mouth 2 times daily 300 mL 1     omeprazole POWD powder        prochlorperazine (COMPAZINE) 10 MG tablet Take 1 tablet (10 mg) by mouth every 6 hours as needed (Nausea/Vomiting) 30 tablet 2     prochlorperazine (COMPAZINE) 10 MG tablet Take 1 tablet (10 mg) by mouth every 6 hours as needed  (Nausea/Vomiting) 30 tablet 2     thiamine (B-1) 100 MG tablet 1 tablet (100 mg) by Per Feeding Tube route daily 30 tablet 1       Social History     Tobacco Use     Smoking status: Current Every Day Smoker     Packs/day: 1.00     Years: 44.00     Pack years: 44.00     Types: Cigarettes     Start date:      Smokeless tobacco: Never Used     Tobacco comment: Patient reports currently smoking 1/2 PPD since hospital discharge and wearing a Nicotene Patch   Substance Use Topics     Alcohol use: Not Currently     Drug use: No       Invasive Procedure Safety Checklist:    Procedure: Urodynamics    Action: Complete sections and checkboxes as appropriate.  Pre-procedure:  1. Patient ID Verified with 2 identifiers (Deja and  or MRN) : YES    2. Procedure and site verified with patient/designee (when able) : YES    3. Accurate consent documentation in medical record : YES    4. H&P (or appropriate assessment) documented in medical record : N/A  H&P must be up to 30 days prior to procedure an updated within 24 hours of Procedure as applicable.     5. Relevant diagnostic and radiology test results appropriately labeled and displayed as applicable : YES    6. Blood products, implants, devices, and/or special equipment available for the procedure as applicable : YES    7. Procedure site(s) marked with provider initials [Exclusions: none] : NO    8. Marking not required. Reason : Yes  Procedure does not require site marking    Time Out:     Time-Out performed immediately prior to starting procedure, including verbal and active participation of all team members addressing: YES    1. Correct patient identity.  2. Confirmed that the correct side and site are marked.  3. An accurate procedure to be done.  4. Agreement on the procedure to be done.  5. Correct patient position.  6. Relevant images and results are properly labeled and appropriately displayed.  7. The need to administer antibiotics or fluids for irrigation purposes  during the procedure as applicable.  8. Safety precautions based on patient history or medication use.    During Procedure: Verification of correct person, site, and procedure occurs any time the responsibility for care of the patient is transferred to another member of the care team.    The following medication was given: Sulfamethoxazole / Trimethoprim    MEDICATION:  Bactrim  ROUTE: PO  SITE: Orally  DOSE: 800/160mg  LOT #: [id number]  : [employer]  EXPIRATION DATE: 04/2022  NDC#: 8858-0713-22   Was there drug waste? No        The following medication was given:     MEDICATION:  Omnipaque (Iohexol Injection) (240mgI/mL)  ROUTE: Provider Administered  SITE: Provider Administered via catheter  DOSE: 200mL  LOT #: [id number]  : [employer]  EXPIRATION DATE: 8/8/2023  NDC#: [address]-31   Was there drug waste? No        Aniya Ballesteros CMA  4/12/2021  8:49 AM

## 2021-04-12 NOTE — TELEPHONE ENCOUNTER
Hi Madai- cream needs to be applied to the glans of his penis twice daily until redness/irritation resolves.

## 2021-04-12 NOTE — PATIENT INSTRUCTIONS
Prescription for clotrimazole-betamethasone has been sent to your pharmacy.    Follow up with Angelica Lloyd PA-C in 2 months ([facility]).    It was a pleasure meeting with you today.  Thank you for allowing me and my team the privilege of caring for you today.  YOU are the reason we are here, and I truly hope we provided you with the excellent service you deserve.  Please let us know if there is anything else we can do for you so that we can be sure you are leaving completely satisfied with your care experience.        Aniya Ballesteros, CMA

## 2021-04-12 NOTE — TELEPHONE ENCOUNTER
[facility] Call Center    Phone Message    May a detailed message be left on voicemail: yes     Reason for Call: Medication Question or concern regarding medication   Prescription Clarification  Name of Medication: clotrimazole-betamethasone (LOTRISONE) 1-0.05 % external cream  Prescribing Provider: Whit Hatfield   Pharmacy: [id number] - [facility] [address] [phone]   What on the order needs clarification? [facility] needs to know here the cream is being applied, please call them at [phone], thank you          Action Taken: Message routed to:  [facility] ([facility] uro    Travel Screening: Not Applicable

## 2021-04-14 ENCOUNTER — TELEPHONE (OUTPATIENT)
Dept: RESPIRATORY THERAPY | Facility: CLINIC | Age: 61
End: 2021-04-14

## 2021-04-14 NOTE — TELEPHONE ENCOUNTER
Smoking Cessation Counseling Follow-Up Phone Call    Patient provided Smoking Cessation Consult during last hospitalization. Called patient today for smoking cessation counseling follow-up support. Patient states he is still smoking however, has cut back from 1 ppd to 1/2 ppd. He remains motivated to quit. He is not using any NRT at this time but is using distracting methods such as suckers.     Congratulated patient on his commitment to quit and efforts to cut back.   Encouraged patient to continue to:  -to use distracting methods to work through cravings and urges.  -consider continuing schedule of cutting back weekly until quit.     Patient agreed to future follow-up phone calls.     Kierra Jones RRT, CTTS  Chronic Pulmonary Disease Specialist  Office: [phone]  Pager: [phone]  Hours: M-F 8-4:30

## 2021-04-17 ENCOUNTER — HEALTH MAINTENANCE LETTER (OUTPATIENT)
Age: 61
End: 2021-04-17

## 2021-04-19 NOTE — PROGRESS NOTES
"Junito is a 60 year old who is being evaluated via a billable video visit.      How would you like to obtain your AVS? MyChart  If the video visit is dropped, the invitation should be resent by: Send to e-mail at: [email or web address]  Will anyone else be joining your video visit? No     Vitals - Patient Reported  Weight (Patient Reported): 77.3 kg (170 lb 6.4 oz)  Height (Patient Reported): 177.8 cm (5' 10\")  BMI (Based on Pt Reported Ht/Wt): 24.45  Pain Score: Mild Pain (3)  Pain Loc: Low Back    Negra Bonilla, [facility] April 20, 2021  3:24 PM           Video-Visit Details    Type of service:  Video Visit    Video Start Time: 3:50 PM    Video End Time:4:10 PM    Originating Location (pt. Location): Home    Distant Location (provider location):  [facility] CANCER [facility]     Platform used for Video Visit: too.me        April 20, 2021     Reason for Visit: follow up metastatic esophogeal cancer    Oncology HPI:   August 2020- stomach discomfort, belching   October 2020- progressive dysphagia with solids   1/26/21- distal esophageal biopsy shows Moderately differentiated adenocarcinoma; MMR normal expression, PDL1 expression is low with TPS 2%, CPS 5-10, Her2 is pending  1/27/21- CT CAP- Enlarged  2 cm subcarinal lymph node and 1.4 cm short axis right subcarinal lymph node, focal thickening of the superior wall along the right side of the mid esophagus. Numerous small pulmonary nodules, 3 mm nodule in the superior segment of the left lower lobe, 3 mm nodule in the anterior aspect of the right upper lobe , 4 mm nodule in the medial aspect of the right upper lobe and 4 mm right lower lobe nodule. Hypoattenuating foci in the liver, 1.6 cm hypoattenuating/hypoenhancing lesion in hepatic segment 8 and 1.4 cm lesion in hepatic segment 5 , indeterminate, likely metastatic.  The prostate gland is mildly enlarged measuring 5.3 cm in transverse diameter. Multiple prominent but not significantly enlarged " "retroperitoneal lymph nodes, indeterminate, could be reactive. 4.4 x 4.3 cm lytic lesion centered in the posterior aspect of the left sacral ala (series 3 image 243), 4.7 x 4.0 x 5.6 cm (axial and CC dimensions, respectively) hypoattenuating lesion in the subcutaneous tissue of the anterior chest wall just anterior to the mediastinum, indeterminate, could represent sebaceous cyst.  2/4/21- Saw Dr. Bourgeois- who ordered PET/CT  3/2/21-3/10/21 [facility] with <24h of inability to ambulate with acute onset left leg weakness/urinary retention with perianal sensory deficits. s/p L2-L5 laminectomy and decompression    Treatment:  3/17/21-3/23/21: radiation to LS spine  3/24/21: gamma knife  3/31/21: C1D1 cisplatin keytruda 5fu    Interval history:   Junito feels like chemo went alright. The \"gaggy\" feeling he has previously had resolved about 2-3 days after chemo and has only happened a few times since. He feels encouraged by this. No nausea or upset stomach either. He did note some sores on his lips that are now resolved. He put aquaphor on them to control irritation. He remains PEG dependent so did not affect intake. He is working with urology, now has garza out. Back pain is still 2-3/10 and will occasionally have radiculopathy down his left leg. He plans to continue working with PT. He had some constipation that dulcolax PO and suppository x2 helped with. Now bowels are normal. Denies breathing concerns, chest pain, cough, fever or chills.     10 point review of systems otherwise negative     Current Outpatient Medications   Medication Sig Dispense Refill     calcium carbonate (TUMS) 500 MG chewable tablet Take 1 chew tab by mouth 2 times daily As needed       clotrimazole-betamethasone (LOTRISONE) 1-0.05 % external cream Apply topically 2 times daily 15 g 1     dexamethasone (DECADRON) 4 MG tablet Take 2 tablets (8 mg) by mouth daily (with breakfast) for 3 days, starting day after Cisplatin (Day 2). 6 tablet 2     dexamethasone " (DECADRON) 4 MG tablet Take 2 tablets (8 mg) by mouth daily (with breakfast) for 3 days, starting day after Cisplatin (Day 2). 6 tablet 2     famotidine (PEPCID) 40 MG/5ML suspension        gabapentin (NEURONTIN) 250 MG/5ML solution Take 5 mLs (250 mg) by mouth At Bedtime 470 mL 1     LORazepam (ATIVAN) 0.5 MG tablet Take 1 tablet (0.5 mg) by mouth every 4 hours as needed (Anxiety, Nausea/Vomiting or Sleep) 30 tablet 2     LORazepam (ATIVAN) 0.5 MG tablet Take 1 tablet (0.5 mg) by mouth every 4 hours as needed (Anxiety, Nausea/Vomiting or Sleep) 30 tablet 2     methocarbamol (ROBAXIN) 750 MG tablet Take 1 tablet (750 mg) by mouth 4 times daily 30 tablet 0     omeprazole (FIRST-OMEPRAZOLE) 2 MG/ML SUSP Take 10 mLs (20 mg) by mouth 2 times daily 300 mL 1     omeprazole POWD powder        prochlorperazine (COMPAZINE) 10 MG tablet Take 1 tablet (10 mg) by mouth every 6 hours as needed (Nausea/Vomiting) 30 tablet 2     prochlorperazine (COMPAZINE) 10 MG tablet Take 1 tablet (10 mg) by mouth every 6 hours as needed (Nausea/Vomiting) 30 tablet 2     thiamine (B-1) 100 MG tablet 1 tablet (100 mg) by Per Feeding Tube route daily 30 tablet 1        No Known Allergies    Exam:  There were no vitals taken for this visit.  Wt Readings from Last 4 Encounters:   03/31/21 79 kg (174 lb 1.6 oz)   03/31/21 78.9 kg (174 lb)   03/29/21 77.6 kg (171 lb)   03/18/21 80.2 kg (176 lb 11.2 oz)     Video physical exam  General: Patient appears well in no acute distress.   Skin: No visualized rash or lesions on visualized skin  Eyes: EOMI, no erythema, sclera icterus or discharge noted  Resp: Appears to be breathing comfortably without accessory muscle usage, speaking in full sentences, no cough  MSK: Appears to have normal range of motion based on visualized movements  Neurologic: No apparent tremors, facial movements symmetric  Psych: affect good, alert and oriented    The rest of a comprehensive physical examination is deferred due to PHE  (public health emergency) video restrictions    Labs:  New labs tomorrow    Imaging: recent imaging reviewed    Impression/plan:   # Stage IV esophageal adenocarcinoma   Given progression of disease, goal of treatment would be to prolong life and reduce symptoms rather than curative.  Plan is for palliative chemoimmunotherapy with radiation therapy for symptom control.    Tolerated cycle 1 Ffu + cisplatin + keytruda ex mucositis. Mouth sores have since healed and no signs of immuno toxicity thus will proceed tomorrow assuming labs are acceptable.  Need to clarify CT CAP after 2 or 3 cycles. Has follow up with Dr. Steven next month.       #Brain mets.   Follows with Dr. Velez. S/p GK 3/24    #mucositis  2/2 F5U. Okay to sooth with Aquaphor. Recommended salt/soda rinses too. He did not find them too painful so not needing analgesic.     #Bone mets  Bone biopsy of left pelvic mass 2/26/21 confirmed mets. Finished radiation 3/23 to lumbosacral spine   -Discussed adding zometa but he has not been to a dentist in 4-5 years thus would prioritize eval if possible. He will try and get in for evaluation prior to next visit    #Dypshagia, malnutrition  PEG tube placed in OR  (3/5) for nutritional support. Tolerating tube feeds. Not doing anything orally though my review of SLP note indicates he could do clear liquid? Junito declined follow up with them for clarification at this time though this might be beneficial as we see benefit from systemic treatment.   --Of note, consideration given to esophageal stent vs XRT for esophageal debulking to address dysphagia inpatient; however given likely improvement with chemo-immunotherapy, as well as risks of worsening cytopenias, elected not to.     #Cauda Equina due to tumor compression. He is s/p L2-L5 laminectomy and decompression.   Followed by neurosurgery with improved exam after surgery. He is now ambulating with a walker. Follow up with their team per recs. He is aware of  his activity and lifting restrictions. Has some L radiculopathy that he uses robaxan prn.     #Constipation  Dulcolax, miralax, or MOM prn, asked he not use suppository while on chemo    #Urinary retention  Andrews out. Follows with urology    #GERD  Improved. liquid omeprazole as needed     60 minutes spent on the date of the encounter doing chart review, review of test results, interpretation of tests, patient visit and documentation     Jaky Pugh, CNP on 4/20/2021 at 6:29 PM          Addendum: . D/w Dr. Steven, will add neulasta to D5 disconnect 5FU and reduce cisplatin by 50%.

## 2021-04-20 ENCOUNTER — VIRTUAL VISIT (OUTPATIENT)
Dept: ONCOLOGY | Facility: CLINIC | Age: 61
End: 2021-04-20
Attending: STUDENT IN AN ORGANIZED HEALTH CARE EDUCATION/TRAINING PROGRAM
Payer: COMMERCIAL

## 2021-04-20 DIAGNOSIS — C15.5 MALIGNANT NEOPLASM OF LOWER THIRD OF ESOPHAGUS (H): Primary | ICD-10-CM

## 2021-04-20 PROCEDURE — 99215 OFFICE O/P EST HI 40 MIN: CPT | Mod: 95 | Performed by: NURSE PRACTITIONER

## 2021-04-20 PROCEDURE — 999N001193 HC VIDEO/TELEPHONE VISIT; NO CHARGE

## 2021-04-20 RX ORDER — LORAZEPAM 2 MG/ML
0.5 INJECTION INTRAMUSCULAR EVERY 4 HOURS PRN
Status: CANCELLED | OUTPATIENT
Start: 2021-04-21

## 2021-04-20 RX ORDER — ALBUTEROL SULFATE 0.83 MG/ML
2.5 SOLUTION RESPIRATORY (INHALATION)
Status: CANCELLED | OUTPATIENT
Start: 2021-04-21

## 2021-04-20 RX ORDER — NALOXONE HYDROCHLORIDE 0.4 MG/ML
.1-.4 INJECTION, SOLUTION INTRAMUSCULAR; INTRAVENOUS; SUBCUTANEOUS
Status: CANCELLED | OUTPATIENT
Start: 2021-04-21

## 2021-04-20 RX ORDER — HEPARIN SODIUM (PORCINE) LOCK FLUSH IV SOLN 100 UNIT/ML 100 UNIT/ML
5 SOLUTION INTRAVENOUS
Status: CANCELLED | OUTPATIENT
Start: 2021-04-21

## 2021-04-20 RX ORDER — HEPARIN SODIUM,PORCINE 10 UNIT/ML
5 VIAL (ML) INTRAVENOUS
Status: CANCELLED | OUTPATIENT
Start: 2021-04-26

## 2021-04-20 RX ORDER — ALBUTEROL SULFATE 90 UG/1
1-2 AEROSOL, METERED RESPIRATORY (INHALATION)
Status: CANCELLED | OUTPATIENT
Start: 2021-04-21

## 2021-04-20 RX ORDER — METHYLPREDNISOLONE SODIUM SUCCINATE 125 MG/2ML
125 INJECTION, POWDER, LYOPHILIZED, FOR SOLUTION INTRAMUSCULAR; INTRAVENOUS
Status: CANCELLED | OUTPATIENT
Start: 2021-04-21

## 2021-04-20 RX ORDER — SODIUM CHLORIDE 9 MG/ML
1000 INJECTION, SOLUTION INTRAVENOUS CONTINUOUS PRN
Status: CANCELLED | OUTPATIENT
Start: 2021-04-21

## 2021-04-20 RX ORDER — HEPARIN SODIUM (PORCINE) LOCK FLUSH IV SOLN 100 UNIT/ML 100 UNIT/ML
5 SOLUTION INTRAVENOUS
Status: CANCELLED | OUTPATIENT
Start: 2021-04-26

## 2021-04-20 RX ORDER — PALONOSETRON 0.05 MG/ML
0.25 INJECTION, SOLUTION INTRAVENOUS ONCE
Status: CANCELLED | OUTPATIENT
Start: 2021-04-21

## 2021-04-20 RX ORDER — HEPARIN SODIUM,PORCINE 10 UNIT/ML
5 VIAL (ML) INTRAVENOUS
Status: CANCELLED | OUTPATIENT
Start: 2021-04-21

## 2021-04-20 RX ORDER — DIPHENHYDRAMINE HYDROCHLORIDE 50 MG/ML
50 INJECTION INTRAMUSCULAR; INTRAVENOUS
Status: CANCELLED | OUTPATIENT
Start: 2021-04-21

## 2021-04-20 RX ORDER — EPINEPHRINE 1 MG/ML
0.3 INJECTION, SOLUTION INTRAMUSCULAR; SUBCUTANEOUS EVERY 5 MIN PRN
Status: CANCELLED | OUTPATIENT
Start: 2021-04-21

## 2021-04-20 RX ORDER — MEPERIDINE HYDROCHLORIDE 25 MG/ML
25 INJECTION INTRAMUSCULAR; INTRAVENOUS; SUBCUTANEOUS EVERY 30 MIN PRN
Status: CANCELLED | OUTPATIENT
Start: 2021-04-21

## 2021-04-20 NOTE — LETTER
April 20, 2021       TO: Junito Wang  [id number] [address] [address]       DearMr.Felipe,    We are writing to inform you of your test results.    {[facility] results letter list:[id number]}    No results found from the In Basket message.    ***

## 2021-04-21 ENCOUNTER — INFUSION THERAPY VISIT (OUTPATIENT)
Dept: INFUSION THERAPY | Facility: CLINIC | Age: 61
End: 2021-04-21
Attending: NURSE PRACTITIONER
Payer: COMMERCIAL

## 2021-04-21 ENCOUNTER — HOME INFUSION (PRE-WILLOW HOME INFUSION) (OUTPATIENT)
Dept: PHARMACY | Facility: CLINIC | Age: 61
End: 2021-04-21

## 2021-04-21 ENCOUNTER — VIRTUAL VISIT (OUTPATIENT)
Dept: ONCOLOGY | Facility: CLINIC | Age: 61
End: 2021-04-21
Attending: DIETITIAN, REGISTERED
Payer: COMMERCIAL

## 2021-04-21 VITALS
HEART RATE: 102 BPM | OXYGEN SATURATION: 98 % | RESPIRATION RATE: 16 BRPM | WEIGHT: 172.8 LBS | TEMPERATURE: 98.9 F | BODY MASS INDEX: 24.79 KG/M2 | SYSTOLIC BLOOD PRESSURE: 103 MMHG | DIASTOLIC BLOOD PRESSURE: 72 MMHG

## 2021-04-21 DIAGNOSIS — C15.5 MALIGNANT NEOPLASM OF LOWER THIRD OF ESOPHAGUS (H): Primary | ICD-10-CM

## 2021-04-21 LAB
ALBUMIN SERPL-MCNC: 3.6 G/DL (ref 3.4–5)
ALP SERPL-CCNC: 141 U/L (ref 40–150)
ALT SERPL W P-5'-P-CCNC: 29 U/L (ref 0–70)
ANION GAP SERPL CALCULATED.3IONS-SCNC: 5 MMOL/L (ref 3–14)
ANISOCYTOSIS BLD QL SMEAR: ABNORMAL
AST SERPL W P-5'-P-CCNC: 25 U/L (ref 0–45)
BASOPHILS # BLD AUTO: 0.1 10E9/L (ref 0–0.2)
BASOPHILS NFR BLD AUTO: 3 %
BILIRUB SERPL-MCNC: 0.3 MG/DL (ref 0.2–1.3)
BUN SERPL-MCNC: 15 MG/DL (ref 7–30)
CALCIUM SERPL-MCNC: 9.2 MG/DL (ref 8.5–10.1)
CHLORIDE SERPL-SCNC: 105 MMOL/L (ref 94–109)
CO2 SERPL-SCNC: 27 MMOL/L (ref 20–32)
CREAT SERPL-MCNC: 0.7 MG/DL (ref 0.66–1.25)
DIFFERENTIAL METHOD BLD: ABNORMAL
ELLIPTOCYTES BLD QL SMEAR: SLIGHT
EOSINOPHIL # BLD AUTO: 0 10E9/L (ref 0–0.7)
EOSINOPHIL NFR BLD AUTO: 1 %
ERYTHROCYTE [DISTWIDTH] IN BLOOD BY AUTOMATED COUNT: 21.4 % (ref 10–15)
GFR SERPL CREATININE-BSD FRML MDRD: >90 ML/MIN/{1.73_M2}
GLUCOSE SERPL-MCNC: 106 MG/DL (ref 70–99)
HCT VFR BLD AUTO: 35.5 % (ref 40–53)
HGB BLD-MCNC: 10.6 G/DL (ref 13.3–17.7)
LYMPHOCYTES # BLD AUTO: 1 10E9/L (ref 0.8–5.3)
LYMPHOCYTES NFR BLD AUTO: 53 %
MAGNESIUM SERPL-MCNC: 2.5 MG/DL (ref 1.6–2.3)
MCH RBC QN AUTO: 24 PG (ref 26.5–33)
MCHC RBC AUTO-ENTMCNC: 29.9 G/DL (ref 31.5–36.5)
MCV RBC AUTO: 81 FL (ref 78–100)
MONOCYTES # BLD AUTO: 0.2 10E9/L (ref 0–1.3)
MONOCYTES NFR BLD AUTO: 10 %
NEUTROPHILS # BLD AUTO: 0.7 10E9/L (ref 1.6–8.3)
NEUTROPHILS NFR BLD AUTO: 33 %
PLATELET # BLD AUTO: 262 10E9/L (ref 150–450)
PLATELET # BLD EST: ABNORMAL 10*3/UL
POTASSIUM SERPL-SCNC: 4 MMOL/L (ref 3.4–5.3)
PROT SERPL-MCNC: 7 G/DL (ref 6.8–8.8)
RBC # BLD AUTO: 4.41 10E12/L (ref 4.4–5.9)
SODIUM SERPL-SCNC: 137 MMOL/L (ref 133–144)
WBC # BLD AUTO: 2 10E9/L (ref 4–11)

## 2021-04-21 PROCEDURE — 85025 COMPLETE CBC W/AUTO DIFF WBC: CPT | Performed by: NURSE PRACTITIONER

## 2021-04-21 PROCEDURE — 96375 TX/PRO/DX INJ NEW DRUG ADDON: CPT | Performed by: NURSE PRACTITIONER

## 2021-04-21 PROCEDURE — 96417 CHEMO IV INFUS EACH ADDL SEQ: CPT | Performed by: NURSE PRACTITIONER

## 2021-04-21 PROCEDURE — 83735 ASSAY OF MAGNESIUM: CPT | Performed by: NURSE PRACTITIONER

## 2021-04-21 PROCEDURE — 99207 PR NO CHARGE LOS: CPT

## 2021-04-21 PROCEDURE — 96368 THER/DIAG CONCURRENT INF: CPT | Performed by: NURSE PRACTITIONER

## 2021-04-21 PROCEDURE — 96523 IRRIG DRUG DELIVERY DEVICE: CPT | Mod: TEL

## 2021-04-21 PROCEDURE — 80053 COMPREHEN METABOLIC PANEL: CPT | Performed by: NURSE PRACTITIONER

## 2021-04-21 PROCEDURE — 999N000107 HC STATISTIC NUTRITIONAL THERAPY NO CHARGE

## 2021-04-21 PROCEDURE — 96416 CHEMO PROLONG INFUSE W/PUMP: CPT | Performed by: NURSE PRACTITIONER

## 2021-04-21 PROCEDURE — 96415 CHEMO IV INFUSION ADDL HR: CPT | Performed by: NURSE PRACTITIONER

## 2021-04-21 PROCEDURE — 96413 CHEMO IV INFUSION 1 HR: CPT | Performed by: NURSE PRACTITIONER

## 2021-04-21 PROCEDURE — 96367 TX/PROPH/DG ADDL SEQ IV INF: CPT | Performed by: NURSE PRACTITIONER

## 2021-04-21 RX ORDER — DEXAMETHASONE 4 MG/1
8 TABLET ORAL
Qty: 6 TABLET | Refills: 2 | Status: SHIPPED | OUTPATIENT
Start: 2021-04-21 | End: 2021-04-21

## 2021-04-21 RX ORDER — HEPARIN SODIUM (PORCINE) LOCK FLUSH IV SOLN 100 UNIT/ML 100 UNIT/ML
5 SOLUTION INTRAVENOUS ONCE
Status: COMPLETED | OUTPATIENT
Start: 2021-04-21 | End: 2021-04-21

## 2021-04-21 RX ORDER — PALONOSETRON 0.05 MG/ML
0.25 INJECTION, SOLUTION INTRAVENOUS ONCE
Status: COMPLETED | OUTPATIENT
Start: 2021-04-21 | End: 2021-04-21

## 2021-04-21 RX ADMIN — HEPARIN SODIUM (PORCINE) LOCK FLUSH IV SOLN 100 UNIT/ML 5 ML: 100 SOLUTION at 07:30

## 2021-04-21 RX ADMIN — PALONOSETRON 0.25 MG: 0.05 INJECTION, SOLUTION INTRAVENOUS at 09:29

## 2021-04-21 RX ADMIN — Medication 1000 ML: at 08:42

## 2021-04-21 ASSESSMENT — PAIN SCALES - GENERAL: PAINLEVEL: MILD PAIN (2)

## 2021-04-21 NOTE — PROGRESS NOTES
"Patient's name and  were verified.  See Doc Flowsheet - IV assess for details.  IVAD accessed with 20G 3/4\" rizvi gripper plus needle  blood return positive: YES  Site without redness, tenderness or swelling: YES  flushed with 10cc NS and 5cc 100u/ml heparin  Needle: left in place for treatment.   Comments: Labs drawn.  Patient tolerated procedure without incident.      "

## 2021-04-21 NOTE — PROGRESS NOTES
Infusion Nursing Note:  Junito Wang presents today for C2 Keytruda Cisplatin and 5FU pump connect.    Patient seen by provider today: No   present during visit today: Not Applicable.    Note: Patient ANC today was 0.7. Call placed to Jaky Pugh NP for guidance on treatment  instructions.  Per Jaky- Cisplatin dose decreased by 50% and will add neulasta to day 5 pump disconnect.  Patient verbalized understanding. Reviewed neutropenic precautions.    Patient  Did meet criteria for an asymptomatic covid-19 PCR test in infusion today. Patient  declined the covid-19 test.    Intravenous Access:  Implanted Port.    Treatment Conditions:  Lab Results   Component Value Date    HGB 10.6 04/21/2021     Lab Results   Component Value Date    WBC 2.0 04/21/2021      Lab Results   Component Value Date    ANEU 0.7 04/21/2021     Lab Results   Component Value Date     04/21/2021      Lab Results   Component Value Date     04/21/2021                   Lab Results   Component Value Date    POTASSIUM 4.0 04/21/2021           Lab Results   Component Value Date    MAG 2.5 04/21/2021            Lab Results   Component Value Date    CR 0.70 04/21/2021                   Lab Results   Component Value Date    ARDEN 9.2 04/21/2021                Lab Results   Component Value Date    BILITOTAL 0.3 04/21/2021           Lab Results   Component Value Date    ALBUMIN 3.6 04/21/2021                    Lab Results   Component Value Date    ALT 29 04/21/2021           Lab Results   Component Value Date    AST 25 04/21/2021       Results reviewed, labs did NOT meet treatment parameters: ANC 0.7 today.      Post Infusion Assessment:  Patient tolerated infusion without incident.  Blood return noted pre and post infusion.  Site patent and intact, free from redness, edema or discomfort.  No evidence of extravasations.  Access discontinued per protocol.     Prior to discharge: Port is secured in place with tegaderm and flushed  "with 10cc NS with positive blood return noted.  Continuous home infusion Dosi-Fuser pump connected.    All connectors secured in place and clamps taped open.    Pump started, \"running\" noted on display (CADD): Not Applicable.  Pump Connection double checked with Gala Cummins RN and Flor Garcia RN.  Patient instructed to call our clinic or [facility] with any questions or concerns at home.  Patient verbalized understanding.    Patient set up for pump disconnect at home with [facility] on Monday 4/265 @ 11:30 AM  Spoke with Zara at [facility] to confirm disconnect and neulasta orders.        Discharge Plan:   Patient and/or family verbalized understanding of discharge instructions and all questions answered.  Copy of AVS reviewed with patient and/or family.  Patient will return 5/12 for next appointment.  Patient discharged in stable condition accompanied by: self.  Departure Mode: Ambulatory.    Jennifer Sarkar RN                        "

## 2021-04-21 NOTE — LETTER
4/21/2021         RE: Junito Wang  [address] [place] [address]        Dear Colleague,    Thank you for referring your patient, Junito Wang, to the [facility]. Please see a copy of my visit note below.    Nutrition Services Brief note:    MNT follow-up for PEG tube feedings by RD during chemotherapy.     Current EN regimen:  Formula: Isosource 1.5 rob  Volume: 6 cartons/day (2 cartons TID)  MOA: Gravity bag/bolus  Provisions:  2250kcal (29kcal/kg), 102g protein (1.3g/kg), 264g CHO, 22g fiber  Flushes: 160ml water before/after each feeding plus 180ml total water flush with meds = 1120mL water; 1140ml free water from formula = total of 2260mL water    Intake/tolerance: he has been tolerating 5-6 cartons/day very well.  He had some diarrhea, ~3 days post chemo three weeks ago, however, BM have been wnl since.     He increased his water volume by 2 cups/day per previous suggestions. Now getting at least 10-12 cups water/electrolytes/day.     Weight trends:stable   Wt Readings from Last 6 Encounters:   04/21/21 78.4 kg (172 lb 12.8 oz)   03/31/21 79 kg (174 lb 1.6 oz)   03/31/21 78.9 kg (174 lb)   03/29/21 77.6 kg (171 lb)   03/18/21 80.2 kg (176 lb 11.2 oz)   03/17/21 77.7 kg (171 lb 5 oz)       He denies any nutrition or tube feeding related questions or concerns.     RD will follow-up in one month.     Kari Brown RD,   [facility]  [phone]            Again, thank you for allowing me to participate in the care of your patient.        Sincerely,        Kari Brown RD

## 2021-04-21 NOTE — PROGRESS NOTES
Nutrition Services Brief note:    MNT follow-up for PEG tube feedings by RD during chemotherapy.     Current EN regimen:  Formula: Isosource 1.5 rob  Volume: 6 cartons/day (2 cartons TID)  MOA: Gravity bag/bolus  Provisions:  2250kcal (29kcal/kg), 102g protein (1.3g/kg), 264g CHO, 22g fiber  Flushes: 160ml water before/after each feeding plus 180ml total water flush with meds = 1120mL water; 1140ml free water from formula = total of 2260mL water    Intake/tolerance: he has been tolerating 5-6 cartons/day very well.  He had some diarrhea, ~3 days post chemo three weeks ago, however, BM have been wnl since.     He increased his water volume by 2 cups/day per previous suggestions. Now getting at least 10-12 cups water/electrolytes/day.     Weight trends:stable   Wt Readings from Last 6 Encounters:   04/21/21 78.4 kg (172 lb 12.8 oz)   03/31/21 79 kg (174 lb 1.6 oz)   03/31/21 78.9 kg (174 lb)   03/29/21 77.6 kg (171 lb)   03/18/21 80.2 kg (176 lb 11.2 oz)   03/17/21 77.7 kg (171 lb 5 oz)       He denies any nutrition or tube feeding related questions or concerns.     RD will follow-up in one month.     Kari Brown RD, [facility]  [phone]

## 2021-04-22 NOTE — PROGRESS NOTES
This is a recent snapshot of the patient's [facility] medical record.  For current drug dose and complete information and questions, call [phone] or In Basket pool, fv home infusion ([id number])  CSN Number:  [id number]

## 2021-04-26 ENCOUNTER — DOCUMENTATION ONLY (OUTPATIENT)
Dept: PHARMACY | Facility: CLINIC | Age: 61
End: 2021-04-26

## 2021-04-26 ENCOUNTER — HOME INFUSION (PRE-WILLOW HOME INFUSION) (OUTPATIENT)
Dept: PHARMACY | Facility: CLINIC | Age: 61
End: 2021-04-26

## 2021-04-26 NOTE — PROGRESS NOTES
Skilled nurse visit in the home, for discontinuation of fluorouracil. 7950 mg of fluorouracil infused over 120 hours.    Adri Villa RN  [facility]  [email or web address]  [phone]

## 2021-04-27 NOTE — PROGRESS NOTES
This is a recent snapshot of the patient's [facility] medical record.  For current drug dose and complete information and questions, call [phone] or In Basket pool, fv home infusion ([id number])  CSN Number:  [id number]

## 2021-05-07 ENCOUNTER — TELEPHONE (OUTPATIENT)
Dept: UROLOGY | Facility: CLINIC | Age: 61
End: 2021-05-07

## 2021-05-07 NOTE — TELEPHONE ENCOUNTER
[facility] Call Center    Phone Message    May a detailed message be left on voicemail: yes     Reason for Call: Pt is having Chemotherapy on 6/2.  He has an appointment with Angelica on 6/4.  He said that he will still have his chemo bag on at that time and he wants to know if we are ok with him coming to our clinic with his chemo bag on.  Thanks.    Action Taken: Message routed to:  Adult Clinics: Urology p [id number]    Travel Screening: Not Applicable

## 2021-05-07 NOTE — TELEPHONE ENCOUNTER
Received note below per Angleica Lloyd PA-C.    Angelica Lloyd PA-C Berkenes, Melissa RN; [facility] Urology Adult [place] 1 hour ago (2:46 PM)     Per Whit Hatfield, this appointment is to follow up on incomplete bladder emptying so will need flow/PVR. Let's push the appointment out a few weeks to a month so there is more distance between chemo and clinic appointment.       Thanks,   Angelica Lloyd PA-C           Relayed to patient and assisted in scheduling for 8/6/21 which is the next in person day for Angelica Lloyd PA-C. Sent message to Angelica Lloyd PA-C if this wait is ok or how to proceed.    Angelica Kraft LPN on 5/7/2021 at 4:07 PM

## 2021-05-10 NOTE — TELEPHONE ENCOUNTER
Patient is now scheduled on July 9th with Angelica Lloyd in [place].      Trish Menjivar  Surgical Specialties Procedure   [facility]  5/10/2021 10:10 AM

## 2021-05-10 NOTE — TELEPHONE ENCOUNTER
Swapnil, Angelica, LPN  [facility] Urology Adult [place]; [facility] Procedure Coordinator 5 minutes ago (9:58 AM)     Hey guys!     Can you please help patient bump his august appointment up with Angelica Lloyd PA-C to 7/9?     Thanks!!    Routing Angelica Tracy PA-C Babcock, Rachel, LPN; [facility] 3 days ago     Can we make July 9 my in person day instead? I can see him then, otherwise the nurse visit/virtual as you suggested would be fine too.   Thanks!   Angelica Lloyd PA-C    Message text       Angelica Kraft LPN Lahti, Rachel Leigh, PA-C; [facility] 3 days ago     Scout Dominguez,     You don't have a day in clinic in July. Did you want to add one on, or is Aug 6th ok? Or should we do a nurse only appt in July for the flow/PVR with a virtual with you?     Thanks

## 2021-05-11 ENCOUNTER — ANCILLARY PROCEDURE (OUTPATIENT)
Dept: CT IMAGING | Facility: CLINIC | Age: 61
End: 2021-05-11
Attending: NURSE PRACTITIONER
Payer: COMMERCIAL

## 2021-05-11 DIAGNOSIS — C15.5 MALIGNANT NEOPLASM OF LOWER THIRD OF ESOPHAGUS (H): ICD-10-CM

## 2021-05-11 PROCEDURE — 71260 CT THORAX DX C+: CPT | Mod: TC | Performed by: RADIOLOGY

## 2021-05-11 PROCEDURE — 74177 CT ABD & PELVIS W/CONTRAST: CPT | Mod: TC | Performed by: RADIOLOGY

## 2021-05-11 RX ORDER — IOPAMIDOL 755 MG/ML
84 INJECTION, SOLUTION INTRAVASCULAR ONCE
Status: COMPLETED | OUTPATIENT
Start: 2021-05-11 | End: 2021-05-11

## 2021-05-11 RX ADMIN — IOPAMIDOL 84 ML: 755 INJECTION, SOLUTION INTRAVASCULAR at 12:30

## 2021-05-11 NOTE — PROGRESS NOTES
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  (AJCC 8th edition)  -MMR proficient, HER2 by IHC is 2+, FISH neg  -PD-L1 CPS- 5-10%       Treatment:  3/2/21- L2-L5 laminectomy and decompression  3/17/21-3/23/21: Radiation to LS spine  3/24/21: gamma knife (2000cGy in 5 fractions)  3/31/21 to now: Cisplatin+keytruda+5fu every 3 weeks       Oncology HPI:   August 2020- stomach discomfort, belching   October 2020- progressive dysphagia with solids   1/26/21- distal esophageal biopsy shows Moderately differentiated adenocarcinoma; MMR normal expression, PDL1 expression is low with TPS 2%, CPS 5-10, Her2 is pending  1/27/21- CT CAP- Enlarged  2 cm subcarinal lymph node and 1.4 cm short axis right subcarinal lymph node, focal thickening of the superior wall along the right side of the mid esophagus. Numerous small pulmonary nodules, 3 mm nodule in the superior segment of the left lower lobe, 3 mm nodule in the anterior aspect of the right upper lobe , 4 mm nodule in the medial aspect of the right upper lobe and 4 mm right lower lobe nodule. Hypoattenuating foci in the liver, 1.6 cm hypoattenuating/hypoenhancing lesion in hepatic segment 8 and 1.4 cm lesion in hepatic segment 5 , indeterminate, likely metastatic.  The prostate gland is mildly enlarged measuring 5.3 cm in transverse diameter. Multiple prominent but not significantly enlarged retroperitoneal lymph nodes, indeterminate, could be reactive. 4.4 x 4.3 cm lytic lesion centered in the posterior aspect of the left sacral ala (series 3 image 243), 4.7 x 4.0 x 5.6 cm (axial and CC dimensions, respectively) hypoattenuating lesion in the subcutaneous tissue of the anterior chest wall just anterior to the mediastinum, indeterminate, could represent sebaceous cyst.  2/12/21-  PET/CT-Hypermetabolic distal esophageal mass extending into  the gastroesophageal junction and gastric cardia. Hypermetabolic metastatic adenopathy in the neck, mediastinum, and  upper abdomen, Hypermetabolic intrahepatic metastases, Hypermetabolic intracranial nodule in the left temporal lobe, likely metastatic. Hypermetabolic intraosseous lesions in the pelvis, including large  sacral mass obliterating the left S1-3 neural foramina.  2/25/21- Brain MRI- Enhancing lesion in the left choroid plexus adjacent to the medial left temporal lobe, measuring 1.9 x 1.4 x 1.2 cm, concerning for metastasis.   3/2/21-3/10/21- Admission to [facility]- <24h of inability to ambulate with acute onset left leg weakness/urinary retention with perianal sensory deficits, MRI showed extensive epidural signal change suggestive of tumor vs. Hemorrhage.Pt underwent emergent surgery- L2-L5 laminectomy and decompression . PEG tube placement 3/5/2021.   3/17/21-3/23/21: Radiation to LS spine  3/24/21: gamma knife (2000cGy in 5 fractions)  3/29/21- port placement  3/31/21: Cycle 1 Cisplatin+keytruda+5fu   4/21/21: Cycle 2 Cisplatin+keytruda+5fu  5/11/21- Ct CAP- Overall stable disease- mixed response- MA in 2 liver lesions while, stable disease to borderline progression in 1 liver lesions        Interval history:     Junito feels well today. He has had trying to eat by mouth. He has tried cheese, cheese puffs, and icecream, seem to go ok. He thinks chemo is going good. He has minimal sideffects. He typically has fatigue for 3 days since chemo. His lips become dry and scaly, sometimes ooze little blood. He has some mouth sores.He is using aquaphor for lips and magic salt water gargles which are helping. The lips are getting better now.  No nausea or vomiting. He has occasional constipation for which he uses miralax.  He had one episode of loose BM. He is using PEG tube for feeding. He is using 5-6 cartons/day, going well.   He tries to stay active. He went to [employer] and shopping yesterday, [employer]  plants outside.  He seems to be able to walk with out a walker sometimes and he is able to walk around the house. If he can get out, he thinks he can walk longer.  He is about 70% back to baseline in terms of strength. He is on his feet about 20% of day time, but attributes it to small home and not able to walk at home. Once it gets warmer, he thinks he can walk longer.   Back pain is still 2-3/10 and will occasionally have radiculopathy down his left leg. He plans to continue working with PT. Denies breathing concerns, chest pain, cough, fever or chills.  Darryl is out last month. No urinary issues.        10 point review of systems otherwise negative     Current Outpatient Medications   Medication Sig Dispense Refill     calcium carbonate (TUMS) 500 MG chewable tablet Take 1 chew tab by mouth 2 times daily As needed       clotrimazole-betamethasone (LOTRISONE) 1-0.05 % external cream Apply topically 2 times daily 15 g 1     dexamethasone (DECADRON) 4 MG tablet Take 2 tablets (8 mg) by mouth daily (with breakfast) for 3 days, starting day after Cisplatin (Day 2). 6 tablet 2     gabapentin (NEURONTIN) 250 MG/5ML solution Take 5 mLs (250 mg) by mouth At Bedtime 470 mL 1     LORazepam (ATIVAN) 0.5 MG tablet Take 1 tablet (0.5 mg) by mouth every 4 hours as needed (Anxiety, Nausea/Vomiting or Sleep) (Patient not taking: Reported on 4/20/2021) 30 tablet 2     LORazepam (ATIVAN) 0.5 MG tablet Take 1 tablet (0.5 mg) by mouth every 4 hours as needed (Anxiety, Nausea/Vomiting or Sleep) (Patient not taking: Reported on 4/20/2021) 30 tablet 2     methocarbamol (ROBAXIN) 750 MG tablet Take 1 tablet (750 mg) by mouth 4 times daily 30 tablet 0     omeprazole (FIRST-OMEPRAZOLE) 2 MG/ML SUSP Take 10 mLs (20 mg) by mouth 2 times daily 300 mL 1     omeprazole POWD powder        prochlorperazine (COMPAZINE) 10 MG tablet Take 1 tablet (10 mg) by mouth every 6 hours as needed (Nausea/Vomiting) (Patient not taking: Reported on 4/20/2021)  30 tablet 2     prochlorperazine (COMPAZINE) 10 MG tablet Take 1 tablet (10 mg) by mouth every 6 hours as needed (Nausea/Vomiting) (Patient not taking: Reported on 4/20/2021) 30 tablet 2     thiamine (B-1) 100 MG tablet 1 tablet (100 mg) by Per Feeding Tube route daily 30 tablet 1        No Known Allergies    Exam:  There were no vitals taken for this visit.  Wt Readings from Last 4 Encounters:   04/21/21 78.4 kg (172 lb 12.8 oz)   03/31/21 79 kg (174 lb 1.6 oz)   03/31/21 78.9 kg (174 lb)   03/29/21 77.6 kg (171 lb)     Video physical exam  General: Patient appears well in no acute distress.   Skin: No visualized rash or lesions on visualized skin  Eyes: EOMI, no erythema, sclera icterus or discharge noted  Resp: Appears to be breathing comfortably without accessory muscle usage, speaking in full sentences, no cough  MSK: Appears to have normal range of motion based on visualized movements  Neurologic: No apparent tremors, facial movements symmetric  Psych: affect good, alert and oriented    The rest of a comprehensive physical examination is deferred due to PHE (public health emergency) video restrictions    Labs:  New labs today.    Imaging:   CT CHEST/ABDOMEN/PELVIS WITH CONTRAST  5/11/2021 12:37 PM     CLINICAL HISTORY:  Malignant neoplasm of lower third of esophagus (H).  Follow up after two cycles of chemotherapy and immunotherapy.     TECHNIQUE: CT scan of the chest, abdomen, and pelvis was performed  following injection of IV contrast. Multiplanar reformats were  obtained. Dose reduction techniques were used.   CONTRAST: 84 mL Isovue-370     COMPARISON: CT of the chest, abdomen, and pelvis performed 5/11/2021.     FINDINGS:   LUNGS AND PLEURA: No pleural effusions. Scattered tiny lung nodules  bilaterally measure 0.4 cm or smaller, and are unchanged. No new or  enlarging pulmonary nodules are identified.     MEDIASTINUM/AXILLAE: Previously described circumferential thickening  of the distal esophagus at the  gastroesophageal junction is no longer  seen on today's exam. Mild esophageal dilatation on the previous exam  has almost entirely resolved. No enlarged lymph nodes are identified  in the chest. Previously noted enlarged right supraclavicular lymph  node is no longer seen on today's exam. No pericardial effusion. Right  Port-A-Cath, with tip in the low SVC.      CORONARY ARTERY CALCIFICATION: Moderate.     HEPATOBILIARY: Several slightly hypoenhancing hepatic metastases have  undergone mixed change. For example, a hypoenhancing lesion in the  posterior segment of the right hepatic lobe superiorly (series 3 image  132) measures 1.6 x 1.2 cm, and has decreased in size (previously 3.9  x 3.3 cm). For another example, a hypoenhancing lesion in the anterior  segment of the right hepatic lobe inferiorly (series 3 image 175) has  increased in size, measuring 3.2 x 2 cm (previously 2.8 x 1.7 cm).     PANCREAS: Normal.     SPLEEN: Normal.     ADRENAL GLANDS: Normal.     KIDNEYS/BLADDER: Unremarkable. No hydronephrosis.     BOWEL: A gastrostomy tube is in place. No bowel obstruction. No  convincing evidence for colitis or diverticulitis. The appendix is  surgically absent.     PELVIC ORGANS: Unremarkable.     LYMPH NODES: No enlarged lymph nodes are identified in the abdomen or  pelvis. Previously noted gastrohepatic ligament adenopathy has  decreased significantly in size.     VASCULATURE: Mild atherosclerotic aortoiliac calcification.     ADDITIONAL FINDINGS: A low-density subcutaneous lesion overlying the  sternum at midline (series 3 image 68) is unchanged, measuring 5.1 x  3.5 cm, and again is of slightly higher density than simple fluid.     MUSCULOSKELETAL: A lytic metastatic lesion within the sacrum on the  left (series 3 image 245) is not significantly changed, measuring 5 x  4.5 cm.                                                                      IMPRESSION:   1.  Previously noted circumferential thickening in  the distal  esophagus at the gastroesophageal junction is no longer present on  today's exam.  2.  Multiple hypoenhancing hepatic metastases have undergone mixed  change, with some lesions decreased in size, and others increased in  size.  3.  Previously noted right supraclavicular and gastrohepatic ligament  lymph nodes are no longer enlarged on today's exam.  4.  Lytic metastatic lesion within the sacrum on the left measures 5  cm, and is not significantly changed.      Impression/plan:     # Stage IV esophageal adenocarcinoma   # ECOG PS 2    Started chemoimmunotherpay with Cisplatin+5Fu+pembrolizumab based on [id number] trial. The trial demonstrated a statistically significant improvement in OS and PFS for patients randomized to pembrolizumab with chemotherapy. Median OS was 12.4 months (95% CI: 10.5, 14.0) for the pembrolizumab arm versus 9.8 months (95% CI: 8.8, 10.8) for the chemotherapy arm (HR 0.73; 95% CI: 0.62, 0.86; p<0.0001). Median PFS was 6.3  (95% CI: 6.2, 6.9) and 5.8 months (95% CI: 5.0, 6.0), respectively (HR 0.65; 95% CI: 0.55, 0.76; p<0.0001).    Tolerated 2 cycles well with minimal and manageable side-effects. Restaging scan after 2 cycles shows overall stable disease with - mixed response- MA in esophgaus primary, and 2 liver lesions while, stable disease in spine, and stable to borderline progression in 1 liver lesions. Will therefore continue treatment and reassess in 6 weeks.    Plan  Ok for infusion today provided labs are acceptable range  Please schedule cycle 4, cycle 5   Added zometa to cycle 5, will need every 6 weeks with alternate cycles of chemo   RTC with NP/PA prior to cycle 4   Ct CAP in 6 weeks   RTC with me prior to cycle 5 with CT        #Brain mets.   Follows with Dr. Velez. S/p GK 3/24. Next MRI in June 23    #mucositis  2/2 F5U. Continue Aquaphor and salt/soda rinses too. He did not find them too painful so not needing analgesic.     #Bone mets  Bone biopsy of left pelvic  mass 2/26/21 confirmed mets. Finished radiation 3/23 to lumbosacral spine   Discussed adding zometa, he has not seen dentist in 4 yrs, discusseed small risk of osteonecrosis, 3-5% risk, he does not have any ongoing dental issues and has no tooth pain or caries.  -He is ok with proceedign with zometa, will scheduel with next chemo and can give it every 6 weeks  -Will need to start on Calcium andVitmain D when he starts (forgot to discuss)    #Dypshagia, malnutrition  PEG tube placed in OR  (3/5) for nutritional support. Tolerating tube feeds. Not doing anything orally though my review of SLP note indicates he could do clear liquid? Junito declined follow up with them for clarification at this time though this might be beneficial as we see benefit from systemic treatment.   --Of note, consideration given to esophageal stent vs XRT for esophageal debulking to address dysphagia inpatient; however given likely improvement with chemo-immunotherapy, as well as risks of worsening cytopenias, elected not to.     #Cauda Equina due to tumor compression. He is s/p L2-L5 laminectomy and decompression.   Followed by neurosurgery with improved exam after surgery. He is now ambulating with a walker, strength nearly abck to 70%. He is aware of his activity and lifting restrictions. Has some L radiculopathy that he uses robaxan prn.     #Constipation  Dulcolax, miralax, or MOM prn, asked he not use suppository while on chemo    #Urinary retention  Andrews out. Follows with urology    #GERD  Improved. liquid omeprazole as needed     On the day of service  Chart review: 5 minutes  Visit duration: 30 minutes  Care coordination: 5 minutes    Jose Steven MD    Hematology, Oncology and Transplantation        Junito is a 60 year old who is being evaluated via a billable video visit.      How would you like to obtain your AVS? MyChart  If the video visit is dropped, the invitation should be resent by: Send to e-mail at:  [email or web address]  Will anyone else be joining your video visit? No       NICKO Woods      Video Start Time: 7:10 AM  Video-Visit Details    Type of service:  Video Visit    Video End Time:7:40 AM    Originating Location (pt. Location): Home    Distant Location (provider location):  [facility] CANCER [facility]     Platform used for Video Visit: Dar

## 2021-05-12 ENCOUNTER — VIRTUAL VISIT (OUTPATIENT)
Dept: ONCOLOGY | Facility: CLINIC | Age: 61
End: 2021-05-12
Attending: STUDENT IN AN ORGANIZED HEALTH CARE EDUCATION/TRAINING PROGRAM
Payer: COMMERCIAL

## 2021-05-12 ENCOUNTER — HOME INFUSION (PRE-WILLOW HOME INFUSION) (OUTPATIENT)
Dept: PHARMACY | Facility: CLINIC | Age: 61
End: 2021-05-12

## 2021-05-12 ENCOUNTER — INFUSION THERAPY VISIT (OUTPATIENT)
Dept: INFUSION THERAPY | Facility: CLINIC | Age: 61
End: 2021-05-12
Payer: COMMERCIAL

## 2021-05-12 VITALS
RESPIRATION RATE: 14 BRPM | SYSTOLIC BLOOD PRESSURE: 100 MMHG | OXYGEN SATURATION: 98 % | HEART RATE: 116 BPM | DIASTOLIC BLOOD PRESSURE: 66 MMHG | WEIGHT: 173.9 LBS | BODY MASS INDEX: 24.95 KG/M2 | TEMPERATURE: 98.9 F

## 2021-05-12 DIAGNOSIS — C79.51 BONE METASTASIS: ICD-10-CM

## 2021-05-12 DIAGNOSIS — C15.5 MALIGNANT NEOPLASM OF LOWER THIRD OF ESOPHAGUS (H): Primary | ICD-10-CM

## 2021-05-12 LAB
ALBUMIN SERPL-MCNC: 3.6 G/DL (ref 3.4–5)
ALP SERPL-CCNC: 115 U/L (ref 40–150)
ALT SERPL W P-5'-P-CCNC: 19 U/L (ref 0–70)
ANION GAP SERPL CALCULATED.3IONS-SCNC: 7 MMOL/L (ref 3–14)
AST SERPL W P-5'-P-CCNC: 13 U/L (ref 0–45)
BASOPHILS # BLD AUTO: 0.1 10E9/L (ref 0–0.2)
BASOPHILS NFR BLD AUTO: 0.7 %
BILIRUB SERPL-MCNC: 0.5 MG/DL (ref 0.2–1.3)
BUN SERPL-MCNC: 22 MG/DL (ref 7–30)
CALCIUM SERPL-MCNC: 8.8 MG/DL (ref 8.5–10.1)
CHLORIDE SERPL-SCNC: 104 MMOL/L (ref 94–109)
CO2 SERPL-SCNC: 24 MMOL/L (ref 20–32)
CREAT SERPL-MCNC: 0.84 MG/DL (ref 0.66–1.25)
DIFFERENTIAL METHOD BLD: ABNORMAL
EOSINOPHIL # BLD AUTO: 0.2 10E9/L (ref 0–0.7)
EOSINOPHIL NFR BLD AUTO: 1.2 %
ERYTHROCYTE [DISTWIDTH] IN BLOOD BY AUTOMATED COUNT: 22.8 % (ref 10–15)
GFR SERPL CREATININE-BSD FRML MDRD: >90 ML/MIN/{1.73_M2}
GLUCOSE SERPL-MCNC: 153 MG/DL (ref 70–99)
HCT VFR BLD AUTO: 36.8 % (ref 40–53)
HGB BLD-MCNC: 11.3 G/DL (ref 13.3–17.7)
IMM GRANULOCYTES # BLD: 0.3 10E9/L (ref 0–0.4)
IMM GRANULOCYTES NFR BLD: 2.2 %
LYMPHOCYTES # BLD AUTO: 0.3 10E9/L (ref 0.8–5.3)
LYMPHOCYTES NFR BLD AUTO: 2.6 %
MAGNESIUM SERPL-MCNC: 2.4 MG/DL (ref 1.6–2.3)
MCH RBC QN AUTO: 25.3 PG (ref 26.5–33)
MCHC RBC AUTO-ENTMCNC: 30.7 G/DL (ref 31.5–36.5)
MCV RBC AUTO: 82 FL (ref 78–100)
MONOCYTES # BLD AUTO: 0.6 10E9/L (ref 0–1.3)
MONOCYTES NFR BLD AUTO: 4.6 %
NEUTROPHILS # BLD AUTO: 11.1 10E9/L (ref 1.6–8.3)
NEUTROPHILS NFR BLD AUTO: 88.7 %
PLATELET # BLD AUTO: 281 10E9/L (ref 150–450)
POTASSIUM SERPL-SCNC: 4.1 MMOL/L (ref 3.4–5.3)
PROT SERPL-MCNC: 6.8 G/DL (ref 6.8–8.8)
RBC # BLD AUTO: 4.47 10E12/L (ref 4.4–5.9)
SODIUM SERPL-SCNC: 135 MMOL/L (ref 133–144)
WBC # BLD AUTO: 12.5 10E9/L (ref 4–11)

## 2021-05-12 PROCEDURE — 83735 ASSAY OF MAGNESIUM: CPT | Performed by: STUDENT IN AN ORGANIZED HEALTH CARE EDUCATION/TRAINING PROGRAM

## 2021-05-12 PROCEDURE — 99215 OFFICE O/P EST HI 40 MIN: CPT | Mod: 95 | Performed by: STUDENT IN AN ORGANIZED HEALTH CARE EDUCATION/TRAINING PROGRAM

## 2021-05-12 PROCEDURE — 96415 CHEMO IV INFUSION ADDL HR: CPT | Performed by: NURSE PRACTITIONER

## 2021-05-12 PROCEDURE — 80053 COMPREHEN METABOLIC PANEL: CPT | Performed by: STUDENT IN AN ORGANIZED HEALTH CARE EDUCATION/TRAINING PROGRAM

## 2021-05-12 PROCEDURE — 96375 TX/PRO/DX INJ NEW DRUG ADDON: CPT | Performed by: NURSE PRACTITIONER

## 2021-05-12 PROCEDURE — 85025 COMPLETE CBC W/AUTO DIFF WBC: CPT | Performed by: STUDENT IN AN ORGANIZED HEALTH CARE EDUCATION/TRAINING PROGRAM

## 2021-05-12 PROCEDURE — 96416 CHEMO PROLONG INFUSE W/PUMP: CPT | Performed by: NURSE PRACTITIONER

## 2021-05-12 PROCEDURE — 96417 CHEMO IV INFUS EACH ADDL SEQ: CPT | Performed by: NURSE PRACTITIONER

## 2021-05-12 PROCEDURE — 96368 THER/DIAG CONCURRENT INF: CPT | Performed by: NURSE PRACTITIONER

## 2021-05-12 PROCEDURE — 999N001193 HC VIDEO/TELEPHONE VISIT; NO CHARGE

## 2021-05-12 PROCEDURE — 99207 PR NO CHARGE LOS: CPT

## 2021-05-12 PROCEDURE — 99207 PR NO CHARGE NURSE ONLY: CPT

## 2021-05-12 PROCEDURE — 96367 TX/PROPH/DG ADDL SEQ IV INF: CPT | Performed by: NURSE PRACTITIONER

## 2021-05-12 PROCEDURE — 96413 CHEMO IV INFUSION 1 HR: CPT | Performed by: NURSE PRACTITIONER

## 2021-05-12 RX ORDER — HEPARIN SODIUM (PORCINE) LOCK FLUSH IV SOLN 100 UNIT/ML 100 UNIT/ML
5 SOLUTION INTRAVENOUS
Status: CANCELLED | OUTPATIENT
Start: 2021-05-12

## 2021-05-12 RX ORDER — SODIUM CHLORIDE 9 MG/ML
1000 INJECTION, SOLUTION INTRAVENOUS CONTINUOUS PRN
Status: CANCELLED | OUTPATIENT
Start: 2021-05-12

## 2021-05-12 RX ORDER — ALBUTEROL SULFATE 0.83 MG/ML
2.5 SOLUTION RESPIRATORY (INHALATION)
Status: CANCELLED | OUTPATIENT
Start: 2021-05-12

## 2021-05-12 RX ORDER — HEPARIN SODIUM,PORCINE 10 UNIT/ML
5 VIAL (ML) INTRAVENOUS
Status: CANCELLED | OUTPATIENT
Start: 2021-05-12

## 2021-05-12 RX ORDER — HEPARIN SODIUM (PORCINE) LOCK FLUSH IV SOLN 100 UNIT/ML 100 UNIT/ML
5 SOLUTION INTRAVENOUS
Status: CANCELLED | OUTPATIENT
Start: 2021-05-16

## 2021-05-12 RX ORDER — LORAZEPAM 2 MG/ML
0.5 INJECTION INTRAMUSCULAR EVERY 4 HOURS PRN
Status: CANCELLED | OUTPATIENT
Start: 2021-05-12

## 2021-05-12 RX ORDER — NALOXONE HYDROCHLORIDE 0.4 MG/ML
.1-.4 INJECTION, SOLUTION INTRAMUSCULAR; INTRAVENOUS; SUBCUTANEOUS
Status: CANCELLED | OUTPATIENT
Start: 2021-05-12

## 2021-05-12 RX ORDER — HEPARIN SODIUM,PORCINE 10 UNIT/ML
5 VIAL (ML) INTRAVENOUS
Status: CANCELLED | OUTPATIENT
Start: 2021-05-16

## 2021-05-12 RX ORDER — PALONOSETRON 0.05 MG/ML
0.25 INJECTION, SOLUTION INTRAVENOUS ONCE
Status: COMPLETED | OUTPATIENT
Start: 2021-05-12 | End: 2021-05-12

## 2021-05-12 RX ORDER — MEPERIDINE HYDROCHLORIDE 25 MG/ML
25 INJECTION INTRAMUSCULAR; INTRAVENOUS; SUBCUTANEOUS EVERY 30 MIN PRN
Status: CANCELLED | OUTPATIENT
Start: 2021-05-12

## 2021-05-12 RX ORDER — HEPARIN SODIUM (PORCINE) LOCK FLUSH IV SOLN 100 UNIT/ML 100 UNIT/ML
5 SOLUTION INTRAVENOUS
Status: CANCELLED | OUTPATIENT
Start: 2021-06-02

## 2021-05-12 RX ORDER — ZOLEDRONIC ACID 0.04 MG/ML
4 INJECTION, SOLUTION INTRAVENOUS ONCE
Status: CANCELLED | OUTPATIENT
Start: 2021-06-02 | End: 2021-06-02

## 2021-05-12 RX ORDER — HEPARIN SODIUM,PORCINE 10 UNIT/ML
5 VIAL (ML) INTRAVENOUS
Status: CANCELLED | OUTPATIENT
Start: 2021-06-02

## 2021-05-12 RX ORDER — DIPHENHYDRAMINE HYDROCHLORIDE 50 MG/ML
50 INJECTION INTRAMUSCULAR; INTRAVENOUS
Status: CANCELLED | OUTPATIENT
Start: 2021-05-12

## 2021-05-12 RX ORDER — HEPARIN SODIUM (PORCINE) LOCK FLUSH IV SOLN 100 UNIT/ML 100 UNIT/ML
500 SOLUTION INTRAVENOUS ONCE
Status: COMPLETED | OUTPATIENT
Start: 2021-05-12 | End: 2021-05-12

## 2021-05-12 RX ORDER — EPINEPHRINE 1 MG/ML
0.3 INJECTION, SOLUTION INTRAMUSCULAR; SUBCUTANEOUS EVERY 5 MIN PRN
Status: CANCELLED | OUTPATIENT
Start: 2021-05-12

## 2021-05-12 RX ORDER — METHYLPREDNISOLONE SODIUM SUCCINATE 125 MG/2ML
125 INJECTION, POWDER, LYOPHILIZED, FOR SOLUTION INTRAMUSCULAR; INTRAVENOUS
Status: CANCELLED | OUTPATIENT
Start: 2021-05-12

## 2021-05-12 RX ORDER — ALBUTEROL SULFATE 90 UG/1
1-2 AEROSOL, METERED RESPIRATORY (INHALATION)
Status: CANCELLED | OUTPATIENT
Start: 2021-05-12

## 2021-05-12 RX ORDER — PALONOSETRON 0.05 MG/ML
0.25 INJECTION, SOLUTION INTRAVENOUS ONCE
Status: CANCELLED | OUTPATIENT
Start: 2021-05-12

## 2021-05-12 RX ADMIN — PALONOSETRON 0.25 MG: 0.05 INJECTION, SOLUTION INTRAVENOUS at 10:45

## 2021-05-12 RX ADMIN — Medication 1000 ML: at 10:39

## 2021-05-12 RX ADMIN — HEPARIN SODIUM (PORCINE) LOCK FLUSH IV SOLN 100 UNIT/ML 500 UNITS: 100 SOLUTION at 09:54

## 2021-05-12 NOTE — PROGRESS NOTES
"Patient's name and  were verified.  See Doc Flowsheet - IV assess for details.  IVAD accessed with 20G  3/4\" rizvi gripper plus needle  blood return positive: YES  Site without redness, tenderness or swelling: YES  flushed with 30cc NS and 5cc 100u/ml heparin  Needle: Left accessed for infusion    Comments: Labs drawn.  Patient tolerated procedure without incident.    Belén Patel  RN, BSN, OCN        "

## 2021-05-12 NOTE — PROGRESS NOTES
Infusion Nursing Note:  Junito Wang presents today for C3D1 Keytruda, Cisplatin and 5FU Home Pump Connect.    Patient seen by provider today: Yes: Maurizio   present during visit today: Not Applicable.    Note:   Patient has Dexamethasone to take at home.    Starting to eat some but still relies on tube feeding. Able to walk some without a walker. States he is grateful for these improvements.    Patient voided before starting Cisplatin.    Intravenous Access:  Implanted Port.    Treatment Conditions:  Lab Results   Component Value Date    HGB 11.3 05/12/2021     Lab Results   Component Value Date    WBC 12.5 05/12/2021      Lab Results   Component Value Date    ANEU 11.1 05/12/2021     Lab Results   Component Value Date     05/12/2021      Lab Results   Component Value Date     05/12/2021                   Lab Results   Component Value Date    POTASSIUM 4.1 05/12/2021           Lab Results   Component Value Date    MAG 2.4 05/12/2021            Lab Results   Component Value Date    CR 0.84 05/12/2021                   Lab Results   Component Value Date    ARDEN 8.8 05/12/2021                Lab Results   Component Value Date    BILITOTAL 0.5 05/12/2021           Lab Results   Component Value Date    ALBUMIN 3.6 05/12/2021                    Lab Results   Component Value Date    ALT 19 05/12/2021           Lab Results   Component Value Date    AST 13 05/12/2021       Results reviewed, labs MET treatment parameters, ok to proceed with treatment.      Post Infusion Assessment:  Patient tolerated infusion without incident.  Blood return noted pre and post infusion.  Site patent and intact, free from redness, edema or discomfort.  No evidence of extravasations.  Prior to discharge: Port is secured in place with tegaderm and flushed with 10cc NS with positive blood return noted.  Continuous home infusion Dosi-Fuser pump connected.   All connectors secured in place and clamps taped open.    Pump  "started, \"running\" noted on display (CADD): Not Applicable.  Pump Connection double checked with Heike Nielson RN.  Patient instructed to call our clinic or [facility] with any questions or concerns at home.  Patient verbalized understanding.    Patient set up for pump disconnect at home with [facility] on Monday 5/17/21at 1530.       Discharge Plan:   AVS to patient via MYCHART.  Patient will return 6/2/21 for next appointment.   Patient discharged in stable condition accompanied by: self.  Departure Mode: Ambulatory with rolling walker.    Gala Cummins RN                      "

## 2021-05-12 NOTE — LETTER
5/12/2021         RE: Junito Wang  [id number] [address]        Dear Colleague,    Thank you for referring your patient, Junito Wang, to the [facility]. Please see a copy of my visit note below.    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  (AJCC 8th edition)  -MMR proficient, HER2 by IHC is 2+, FISH neg  -PD-L1 CPS- 5-10%       Treatment:  3/2/21- L2-L5 laminectomy and decompression  3/17/21-3/23/21: Radiation to LS spine  3/24/21: gamma knife (2000cGy in 5 fractions)  3/31/21 to now: Cisplatin+keytruda+5fu every 3 weeks       Oncology HPI:   August 2020- stomach discomfort, belching   October 2020- progressive dysphagia with solids   1/26/21- distal esophageal biopsy shows Moderately differentiated adenocarcinoma; MMR normal expression, PDL1 expression is low with TPS 2%, CPS 5-10, Her2 is pending  1/27/21- CT CAP- Enlarged  2 cm subcarinal lymph node and 1.4 cm short axis right subcarinal lymph node, focal thickening of the superior wall along the right side of the mid esophagus. Numerous small pulmonary nodules, 3 mm nodule in the superior segment of the left lower lobe, 3 mm nodule in the anterior aspect of the right upper lobe , 4 mm nodule in the medial aspect of the right upper lobe and 4 mm right lower lobe nodule. Hypoattenuating foci in the liver, 1.6 cm hypoattenuating/hypoenhancing lesion in hepatic segment 8 and 1.4 cm lesion in hepatic segment 5 , indeterminate, likely metastatic.  The prostate gland is mildly enlarged measuring 5.3 cm in transverse diameter. Multiple prominent but not significantly enlarged retroperitoneal lymph nodes, indeterminate, could be reactive. 4.4 x 4.3 cm lytic lesion centered in the posterior aspect of the left sacral ala (series 3 image 243), 4.7 x 4.0 x 5.6 cm (axial  and CC dimensions, respectively) hypoattenuating lesion in the subcutaneous tissue of the anterior chest wall just anterior to the mediastinum, indeterminate, could represent sebaceous cyst.  2/12/21-  PET/CT-Hypermetabolic distal esophageal mass extending into the gastroesophageal junction and gastric cardia. Hypermetabolic metastatic adenopathy in the neck, mediastinum, and  upper abdomen, Hypermetabolic intrahepatic metastases, Hypermetabolic intracranial nodule in the left temporal lobe, likely metastatic. Hypermetabolic intraosseous lesions in the pelvis, including large  sacral mass obliterating the left S1-3 neural foramina.  2/25/21- Brain MRI- Enhancing lesion in the left choroid plexus adjacent to the medial left temporal lobe, measuring 1.9 x 1.4 x 1.2 cm, concerning for metastasis.   3/2/21-3/10/21- Admission to [facility]- <24h of inability to ambulate with acute onset left leg weakness/urinary retention with perianal sensory deficits, MRI showed extensive epidural signal change suggestive of tumor vs. Hemorrhage.Pt underwent emergent surgery- L2-L5 laminectomy and decompression . PEG tube placement 3/5/2021.   3/17/21-3/23/21: Radiation to LS spine  3/24/21: gamma knife (2000cGy in 5 fractions)  3/29/21- port placement  3/31/21: Cycle 1 Cisplatin+keytruda+5fu   4/21/21: Cycle 2 Cisplatin+keytruda+5fu  5/11/21- Ct CAP- Overall stable disease- mixed response- DC in 2 liver lesions while, stable disease to borderline progression in 1 liver lesions        Interval history:     Junito feels well today. He has had trying to eat by mouth. He has tried cheese, cheese puffs, and icecream, seem to go ok. He thinks chemo is going good. He has minimal sideffects. He typically has fatigue for 3 days since chemo. His lips become dry and scaly, sometimes ooze little blood. He has some mouth sores.He is using aquaphor for lips and magic salt water gargles which are helping. The lips are getting better now.  No nausea or  vomiting. He has occasional constipation for which he uses miralax.  He had one episode of loose BM. He is using PEG tube for feeding. He is using 5-6 cartons/day, going well.   He tries to stay active. He went to [employer] and shopping yesterday, planted dome plants outside.  He seems to be able to walk with out a walker sometimes and he is able to walk around the house. If he can get out, he thinks he can walk longer.  He is about 70% back to baseline in terms of strength. He is on his feet about 20% of day time, but attributes it to small home and not able to walk at home. Once it gets warmer, he thinks he can walk longer.   Back pain is still 2-3/10 and will occasionally have radiculopathy down his left leg. He plans to continue working with PT. Denies breathing concerns, chest pain, cough, fever or chills.  Andrews is out last month. No urinary issues.        10 point review of systems otherwise negative     Current Outpatient Medications   Medication Sig Dispense Refill     calcium carbonate (TUMS) 500 MG chewable tablet Take 1 chew tab by mouth 2 times daily As needed       clotrimazole-betamethasone (LOTRISONE) 1-0.05 % external cream Apply topically 2 times daily 15 g 1     dexamethasone (DECADRON) 4 MG tablet Take 2 tablets (8 mg) by mouth daily (with breakfast) for 3 days, starting day after Cisplatin (Day 2). 6 tablet 2     gabapentin (NEURONTIN) 250 MG/5ML solution Take 5 mLs (250 mg) by mouth At Bedtime 470 mL 1     LORazepam (ATIVAN) 0.5 MG tablet Take 1 tablet (0.5 mg) by mouth every 4 hours as needed (Anxiety, Nausea/Vomiting or Sleep) (Patient not taking: Reported on 4/20/2021) 30 tablet 2     LORazepam (ATIVAN) 0.5 MG tablet Take 1 tablet (0.5 mg) by mouth every 4 hours as needed (Anxiety, Nausea/Vomiting or Sleep) (Patient not taking: Reported on 4/20/2021) 30 tablet 2     methocarbamol (ROBAXIN) 750 MG tablet Take 1 tablet (750 mg) by mouth 4 times daily 30 tablet 0     omeprazole  (FIRST-OMEPRAZOLE) 2 MG/ML SUSP Take 10 mLs (20 mg) by mouth 2 times daily 300 mL 1     omeprazole POWD powder        prochlorperazine (COMPAZINE) 10 MG tablet Take 1 tablet (10 mg) by mouth every 6 hours as needed (Nausea/Vomiting) (Patient not taking: Reported on 4/20/2021) 30 tablet 2     prochlorperazine (COMPAZINE) 10 MG tablet Take 1 tablet (10 mg) by mouth every 6 hours as needed (Nausea/Vomiting) (Patient not taking: Reported on 4/20/2021) 30 tablet 2     thiamine (B-1) 100 MG tablet 1 tablet (100 mg) by Per Feeding Tube route daily 30 tablet 1        No Known Allergies    Exam:  There were no vitals taken for this visit.  Wt Readings from Last 4 Encounters:   04/21/21 78.4 kg (172 lb 12.8 oz)   03/31/21 79 kg (174 lb 1.6 oz)   03/31/21 78.9 kg (174 lb)   03/29/21 77.6 kg (171 lb)     Video physical exam  General: Patient appears well in no acute distress.   Skin: No visualized rash or lesions on visualized skin  Eyes: EOMI, no erythema, sclera icterus or discharge noted  Resp: Appears to be breathing comfortably without accessory muscle usage, speaking in full sentences, no cough  MSK: Appears to have normal range of motion based on visualized movements  Neurologic: No apparent tremors, facial movements symmetric  Psych: affect good, alert and oriented    The rest of a comprehensive physical examination is deferred due to PHE (public health emergency) video restrictions    Labs:  New labs today.    Imaging:   CT CHEST/ABDOMEN/PELVIS WITH CONTRAST  5/11/2021 12:37 PM     CLINICAL HISTORY:  Malignant neoplasm of lower third of esophagus (H).  Follow up after two cycles of chemotherapy and immunotherapy.     TECHNIQUE: CT scan of the chest, abdomen, and pelvis was performed  following injection of IV contrast. Multiplanar reformats were  obtained. Dose reduction techniques were used.   CONTRAST: 84 mL Isovue-370     COMPARISON: CT of the chest, abdomen, and pelvis performed 5/11/2021.     FINDINGS:   LUNGS AND  PLEURA: No pleural effusions. Scattered tiny lung nodules  bilaterally measure 0.4 cm or smaller, and are unchanged. No new or  enlarging pulmonary nodules are identified.     MEDIASTINUM/AXILLAE: Previously described circumferential thickening  of the distal esophagus at the gastroesophageal junction is no longer  seen on today's exam. Mild esophageal dilatation on the previous exam  has almost entirely resolved. No enlarged lymph nodes are identified  in the chest. Previously noted enlarged right supraclavicular lymph  node is no longer seen on today's exam. No pericardial effusion. Right  Port-A-Cath, with tip in the low SVC.      CORONARY ARTERY CALCIFICATION: Moderate.     HEPATOBILIARY: Several slightly hypoenhancing hepatic metastases have  undergone mixed change. For example, a hypoenhancing lesion in the  posterior segment of the right hepatic lobe superiorly (series 3 image  132) measures 1.6 x 1.2 cm, and has decreased in size (previously 3.9  x 3.3 cm). For another example, a hypoenhancing lesion in the anterior  segment of the right hepatic lobe inferiorly (series 3 image 175) has  increased in size, measuring 3.2 x 2 cm (previously 2.8 x 1.7 cm).     PANCREAS: Normal.     SPLEEN: Normal.     ADRENAL GLANDS: Normal.     KIDNEYS/BLADDER: Unremarkable. No hydronephrosis.     BOWEL: A gastrostomy tube is in place. No bowel obstruction. No  convincing evidence for colitis or diverticulitis. The appendix is  surgically absent.     PELVIC ORGANS: Unremarkable.     LYMPH NODES: No enlarged lymph nodes are identified in the abdomen or  pelvis. Previously noted gastrohepatic ligament adenopathy has  decreased significantly in size.     VASCULATURE: Mild atherosclerotic aortoiliac calcification.     ADDITIONAL FINDINGS: A low-density subcutaneous lesion overlying the  sternum at midline (series 3 image 68) is unchanged, measuring 5.1 x  3.5 cm, and again is of slightly higher density than simple  fluid.     MUSCULOSKELETAL: A lytic metastatic lesion within the sacrum on the  left (series 3 image 245) is not significantly changed, measuring 5 x  4.5 cm.                                                                      IMPRESSION:   1.  Previously noted circumferential thickening in the distal  esophagus at the gastroesophageal junction is no longer present on  today's exam.  2.  Multiple hypoenhancing hepatic metastases have undergone mixed  change, with some lesions decreased in size, and others increased in  size.  3.  Previously noted right supraclavicular and gastrohepatic ligament  lymph nodes are no longer enlarged on today's exam.  4.  Lytic metastatic lesion within the sacrum on the left measures 5  cm, and is not significantly changed.      Impression/plan:     # Stage IV esophageal adenocarcinoma   # ECOG PS 2    Started chemoimmunotherpay with Cisplatin+5Fu+pembrolizumab based on [id number] trial. The trial demonstrated a statistically significant improvement in OS and PFS for patients randomized to pembrolizumab with chemotherapy. Median OS was 12.4 months (95% CI: 10.5, 14.0) for the pembrolizumab arm versus 9.8 months (95% CI: 8.8, 10.8) for the chemotherapy arm (HR 0.73; 95% CI: 0.62, 0.86; p<0.0001). Median PFS was 6.3  (95% CI: 6.2, 6.9) and 5.8 months (95% CI: 5.0, 6.0), respectively (HR 0.65; 95% CI: 0.55, 0.76; p<0.0001).    Tolerated 2 cycles well with minimal and manageable side-effects. Restaging scan after 2 cycles shows overall stable disease with - mixed response- DE in esophgaus primary, and 2 liver lesions while, stable disease in spine, and stable to borderline progression in 1 liver lesions. Will therefore continue treatment and reassess in 6 weeks.    Plan  Ok for infusion today provided labs are acceptable range  Please schedule cycle 4, cycle 5   Added zometa to cycle 5, will need every 6 weeks with alternate cycles of chemo   RTC with NP/PA prior to cycle 4   Ct CAP in 6 weeks    RTC with me prior to cycle 5 with CT        #Brain mets.   Follows with Dr. Velez. S/p GK 3/24. Next MRI in June 23    #mucositis  2/2 F5U. Continue Aquaphor and salt/soda rinses too. He did not find them too painful so not needing analgesic.     #Bone mets  Bone biopsy of left pelvic mass 2/26/21 confirmed mets. Finished radiation 3/23 to lumbosacral spine   Discussed adding zometa, he has not seen dentist in 4 yrs, discusseed small risk of osteonecrosis, 3-5% risk, he does not have any ongoing dental issues and has no tooth pain or caries.  -He is ok with proceedign with zometa, will scheduel with next chemo and can give it every 6 weeks  -Will need to start on Calcium andVitmain D when he starts (forgot to discuss)    #Dypshagia, malnutrition  PEG tube placed in OR  (3/5) for nutritional support. Tolerating tube feeds. Not doing anything orally though my review of SLP note indicates he could do clear liquid? Junito declined follow up with them for clarification at this time though this might be beneficial as we see benefit from systemic treatment.   --Of note, consideration given to esophageal stent vs XRT for esophageal debulking to address dysphagia inpatient; however given likely improvement with chemo-immunotherapy, as well as risks of worsening cytopenias, elected not to.     #Cauda Equina due to tumor compression. He is s/p L2-L5 laminectomy and decompression.   Followed by neurosurgery with improved exam after surgery. He is now ambulating with a walker, strength nearly abck to 70%. He is aware of his activity and lifting restrictions. Has some L radiculopathy that he uses robaxan prn.     #Constipation  Dulcolax, miralax, or MOM prn, asked he not use suppository while on chemo    #Urinary retention  Andrews out. Follows with urology    #GERD  Improved. liquid omeprazole as needed     On the day of service  Chart review: 5 minutes  Visit duration: 30 minutes  Care coordination: 5 minutes    Jose  MD Maurizio    Hematology, Oncology and Transplantation        Junito is a 60 year old who is being evaluated via a billable video visit.      How would you like to obtain your AVS? MyChart  If the video visit is dropped, the invitation should be resent by: Send to e-mail at: [email or web address]  Will anyone else be joining your video visit? NICKO Robles      Video Start Time: 7:10 AM  Video-Visit Details    Type of service:  Video Visit    Video End Time:7:40 AM    Originating Location (pt. Location): Home    Distant Location (provider location):  [facility]CER [facility]     Platform used for Video Visit: [employer]         Again, thank you for allowing me to participate in the care of your patient.        Sincerely,        Jose Steven MD

## 2021-05-17 ENCOUNTER — HOME INFUSION (PRE-WILLOW HOME INFUSION) (OUTPATIENT)
Dept: PHARMACY | Facility: CLINIC | Age: 61
End: 2021-05-17

## 2021-05-19 ENCOUNTER — VIRTUAL VISIT (OUTPATIENT)
Dept: ONCOLOGY | Facility: CLINIC | Age: 61
End: 2021-05-19
Attending: STUDENT IN AN ORGANIZED HEALTH CARE EDUCATION/TRAINING PROGRAM
Payer: COMMERCIAL

## 2021-05-19 DIAGNOSIS — C15.5 MALIGNANT NEOPLASM OF LOWER THIRD OF ESOPHAGUS (H): Primary | ICD-10-CM

## 2021-05-19 PROCEDURE — 97803 MED NUTRITION INDIV SUBSEQ: CPT | Mod: TEL | Performed by: DIETITIAN, REGISTERED

## 2021-05-19 NOTE — LETTER
"    5/19/2021         RE: Junito Wang  [address] [address] [address]        Dear Colleague,    Thank you for referring your patient, Junito Wang, to the [facility]. Please see a copy of my visit note below.    The patient has been notified of the following:      \"We have found that certain health care needs can be provided without the need for a face to face visit.  This service lets us provide the care you need with a phone conversation.       I will have full access to your [facility] medical record during this entire phone call.   I will be taking notes for your medical record.      Since this is like an office visit, we will bill your insurance company for this service.       There are potential benefits and risks of telephone visits (e.g. limits to patient confidentiality) that differ from in-person visits.?  Confidentiality still applies for telephone services, and nobody will record the visit.  It is important to be in a quiet, private space that is free of distractions (including cell phone or other devices) during the visit.??      If during the course of the call I believe a telephone visit is not appropriate, you will not be charged for this service\"     Consent has been obtained for this service by care team member: Yes     CLINICAL NUTRITION SERVICES - REASSESSMENT NOTE   EVALUATION OF PREVIOUS PLAN OF CARE:   Referring Physician: Maurizio 2/8/21  Time spent with patient: 15 minutes.  Current diet: ice cream and cheese  Current appetite/intake: poor  PEG Tube: EN dependent since 3/9/21       Monitoring from previous assessment:     -EN intake - Junito continues to take and tolerate 5-6 cartons of formula daily.  He takes only 5 on his chemo days, otherwise has been consistently getting 6/day.   Current EN regimen:  Formula: Isosource 1.5 rob  Volume: 5-6 cartons/day (2 cartons TID)  MOA: Gravity bag/bolus  Provisions: 2250kcal (29kcal/kg), 102g protein " (1.3g/kg), 264g CHO, 22g fiber  Flushes: 160ml water before/after each feeding plus 180ml total water flush with meds = 1120mL water; 1140ml free water from formula = total of 2260mL water  -Weight trends -   Wt Readings from Last 6 Encounters:   05/12/21 78.9 kg (173 lb 14.4 oz)   04/21/21 78.4 kg (172 lb 12.8 oz)   03/31/21 79 kg (174 lb 1.6 oz)   03/31/21 78.9 kg (174 lb)   03/29/21 77.6 kg (171 lb)   03/18/21 80.2 kg (176 lb 11.2 oz)     Previous Goals:   1. EN to meet 100% of estimated nutrition needs (6-7 cartons/day)  2. Aim for 6-8 cups hydration/day (free water in formula = 4 cups)  Evaluation: Met   Previous Nutrition Diagnosis:   Inadequate oral intake related to dysphaiga as evidenced by pt dependent upon EN to meet 100% of estimated nutrition needs.    Evaluation: No change   NEW FINDINGS:   Starting to take/tolerate PO with shrinking tumor   CURRENT NUTRITION DIAGNOSIS   Inadequate oral intake related to dysphaiga as evidenced by pt dependent upon EN to meet 100% of estimated nutrition needs.    INTERVENTIONS   -EN Composition, EN Schedule and Feeding Tube Flush - reviewed EN needs and hydration needs.   -Reviewed nutrition needs and balance of EN and PO to ensure adequacy.  Discussed that for every ~400 rob, 20g protein consumed PO, he can reduce EN by one carton/day.  If weight gain desired, encouraged to continue same EN regimen.   Goals   1. EN to meet >75% of estimated nutrition needs (5-6 cartons/day)  2. PO as tolerated     Follow up/Monitoring: one month follow-up scheduled for June 16th at 11am    -Enteral Nutrition intake  -PO intake  -Weight trends     Kari Brown RD, [facility] [place]  [phone]      Again, thank you for allowing me to participate in the care of your patient.        Sincerely,        Kari Brown RD

## 2021-05-19 NOTE — PROGRESS NOTES
"The patient has been notified of the following:      \"We have found that certain health care needs can be provided without the need for a face to face visit.  This service lets us provide the care you need with a phone conversation.       I will have full access to your [facility] medical record during this entire phone call.   I will be taking notes for your medical record.      Since this is like an office visit, we will bill your insurance company for this service.       There are potential benefits and risks of telephone visits (e.g. limits to patient confidentiality) that differ from in-person visits.?  Confidentiality still applies for telephone services, and nobody will record the visit.  It is important to be in a quiet, private space that is free of distractions (including cell phone or other devices) during the visit.??      If during the course of the call I believe a telephone visit is not appropriate, you will not be charged for this service\"     Consent has been obtained for this service by care team member: Yes     CLINICAL NUTRITION SERVICES - REASSESSMENT NOTE   EVALUATION OF PREVIOUS PLAN OF CARE:   Referring Physician: Maurizio 2/8/21  Time spent with patient: 15 minutes.  Current diet: ice cream and cheese  Current appetite/intake: poor  PEG Tube: EN dependent since 3/9/21       Monitoring from previous assessment:     -EN intake - Junito continues to take and tolerate 5-6 cartons of formula daily.  He takes only 5 on his chemo days, otherwise has been consistently getting 6/day.   Current EN regimen:  Formula: Isosource 1.5 rob  Volume: 5-6 cartons/day (2 cartons TID)  MOA: Gravity bag/bolus  Provisions: 2250kcal (29kcal/kg), 102g protein (1.3g/kg), 264g CHO, 22g fiber  Flushes: 160ml water before/after each feeding plus 180ml total water flush with meds = 1120mL water; 1140ml free water from formula = total of 2260mL water  -Weight trends -   Wt Readings from Last 6 Encounters:   05/12/21 78.9 kg " (173 lb 14.4 oz)   04/21/21 78.4 kg (172 lb 12.8 oz)   03/31/21 79 kg (174 lb 1.6 oz)   03/31/21 78.9 kg (174 lb)   03/29/21 77.6 kg (171 lb)   03/18/21 80.2 kg (176 lb 11.2 oz)     Previous Goals:   1. EN to meet 100% of estimated nutrition needs (6-7 cartons/day)  2. Aim for 6-8 cups hydration/day (free water in formula = 4 cups)  Evaluation: Met   Previous Nutrition Diagnosis:   Inadequate oral intake related to dysphaiga as evidenced by pt dependent upon EN to meet 100% of estimated nutrition needs.    Evaluation: No change   NEW FINDINGS:   Starting to take/tolerate PO with shrinking tumor   CURRENT NUTRITION DIAGNOSIS   Inadequate oral intake related to dysphaiga as evidenced by pt dependent upon EN to meet 100% of estimated nutrition needs.    INTERVENTIONS   -EN Composition, EN Schedule and Feeding Tube Flush - reviewed EN needs and hydration needs.   -Reviewed nutrition needs and balance of EN and PO to ensure adequacy.  Discussed that for every ~400 rob, 20g protein consumed PO, he can reduce EN by one carton/day.  If weight gain desired, encouraged to continue same EN regimen.   Goals   1. EN to meet >75% of estimated nutrition needs (5-6 cartons/day)  2. PO as tolerated     Follow up/Monitoring: one month follow-up scheduled for June 16th at 11am    -Enteral Nutrition intake  -PO intake  -Weight trends     Kari Brown RD, LD  [facility]  [phone]

## 2021-05-28 NOTE — PROGRESS NOTES
"Junito is a 60 year old who is being evaluated via a billable video visit.      How would you like to obtain your AVS? MyChart  If the video visit is dropped, the invitation should be resent by: Send to e-mail at: [email or web address]  Will anyone else be joining your video visit? No       I have reviewed and updated the patient's allergies and medication list.    Concerns: none  Refills: Thiamine needs refill if continued    Vitals - Patient Reported  Weight (Patient Reported): 78 kg (172 lb)  Height (Patient Reported): 177.8 cm (5' 10\")  BMI (Based on Pt Reported Ht/Wt): 24.68  Pain Score: Mild Pain (2)  Pain Loc: Other - see comment(left leg)    Whit Herzog CMA      Video-Visit Details    Type of service:  Video Visit    Video Start Time: 7:15 AM    Video End Time:7:35 AM    Originating Location (pt. Location): Home    Distant Location (provider location):  [facility]     Platform used for Video Visit: Reading Room      June 2, 2021     Reason for Visit: follow up metastatic esophogeal cancer    Oncology HPI:   August 2020- stomach discomfort, belching   October 2020- progressive dysphagia with solids   1/26/21- distal esophageal biopsy shows Moderately differentiated adenocarcinoma; MMR normal expression, PDL1 expression is low with TPS 2%, CPS 5-10, Her2 is pending  1/27/21- CT CAP- Enlarged  2 cm subcarinal lymph node and 1.4 cm short axis right subcarinal lymph node, focal thickening of the superior wall along the right side of the mid esophagus. Numerous small pulmonary nodules, 3 mm nodule in the superior segment of the left lower lobe, 3 mm nodule in the anterior aspect of the right upper lobe , 4 mm nodule in the medial aspect of the right upper lobe and 4 mm right lower lobe nodule. Hypoattenuating foci in the liver, 1.6 cm hypoattenuating/hypoenhancing lesion in hepatic segment 8 and 1.4 cm lesion in hepatic segment 5 , indeterminate, likely metastatic.  The prostate gland is " "mildly enlarged measuring 5.3 cm in transverse diameter. Multiple prominent but not significantly enlarged retroperitoneal lymph nodes, indeterminate, could be reactive. 4.4 x 4.3 cm lytic lesion centered in the posterior aspect of the left sacral ala (series 3 image 243), 4.7 x 4.0 x 5.6 cm (axial and CC dimensions, respectively) hypoattenuating lesion in the subcutaneous tissue of the anterior chest wall just anterior to the mediastinum, indeterminate, could represent sebaceous cyst.  2/4/21- Saw Dr. Bourgeois- who ordered PET/CT  3/2/21-3/10/21- Admission to [facility]- <24h of inability to ambulate with acute onset left leg weakness/urinary retention with perianal sensory deficits, MRI showed extensive epidural signal change suggestive of tumor vs. Hemorrhage.Pt underwent emergent surgery- L2-L5 laminectomy and decompression . PEG tube placement 3/5/2021.   3/17/21-3/23/21: Radiation to LS spine  3/24/21: gamma knife (2000cGy in 5 fractions)  3/29/21- port placement  3/31/21: Cycle 1 Cisplatin+keytruda+5fu   4/21/21: Cycle 2 Cisplatin+keytruda+5fu  5/11/21- Ct CAP- Overall stable disease- mixed response- MD in 2 liver lesions while, stable disease to borderline progression in 1 liver lesions    Treatment:  3/17/21-3/23/21: radiation to LS spine  3/24/21: gamma knife  3/31/21-current: cisplatin keytruda 5fu    Interval history:   Junito continues to tolerate chemo well. He has fatigue for a few days but then that improves. He has mouth sores that start about the 4th day after treatment and last about a week. He did more salt and soda rinses this last cycle which he feels helped. He puts aquaphor on cracked/dry lips. He is trying to eat more things orally, he adjusts this during the days he has the mouth sores and uses his PEG otherwise. He still does about 5 cartons per day. He feels swallowing goes fine and the \"burping\" feeling he used to have is less so now since starting treatment.     Denies nausea and diarrhea after " chemo. He uses miralax as needed if he is constipated. He has radiculopathy of his L leg but otherwise no neuropathy. He was outside doing yardwork yesterday and tolerated this well. He walks around without his walker sometimes.     10 point review of systems otherwise negative     Current Outpatient Medications   Medication Sig Dispense Refill     calcium carbonate (TUMS) 500 MG chewable tablet Take 1 chew tab by mouth 2 times daily As needed       clotrimazole-betamethasone (LOTRISONE) 1-0.05 % external cream Apply topically 2 times daily 15 g 1     dexamethasone (DECADRON) 4 MG tablet Take 2 tablets (8 mg) by mouth daily (with breakfast) for 3 days, starting day after Cisplatin (Day 2). 6 tablet 2     gabapentin (NEURONTIN) 250 MG/5ML solution Take 5 mLs (250 mg) by mouth At Bedtime 470 mL 1     LORazepam (ATIVAN) 0.5 MG tablet Take 1 tablet (0.5 mg) by mouth every 4 hours as needed (Anxiety, Nausea/Vomiting or Sleep) (Patient not taking: Reported on 4/20/2021) 30 tablet 2     LORazepam (ATIVAN) 0.5 MG tablet Take 1 tablet (0.5 mg) by mouth every 4 hours as needed (Anxiety, Nausea/Vomiting or Sleep) (Patient not taking: Reported on 4/20/2021) 30 tablet 2     methocarbamol (ROBAXIN) 750 MG tablet Take 1 tablet (750 mg) by mouth 4 times daily 30 tablet 0     multivitamin CF FORMULA (CHOICEFUL) chewable tablet Take 1 tablet by mouth daily       omeprazole (FIRST-OMEPRAZOLE) 2 MG/ML SUSP Take 10 mLs (20 mg) by mouth 2 times daily 300 mL 1     omeprazole POWD powder        prochlorperazine (COMPAZINE) 10 MG tablet Take 1 tablet (10 mg) by mouth every 6 hours as needed (Nausea/Vomiting) (Patient not taking: Reported on 4/20/2021) 30 tablet 2     prochlorperazine (COMPAZINE) 10 MG tablet Take 1 tablet (10 mg) by mouth every 6 hours as needed (Nausea/Vomiting) (Patient not taking: Reported on 4/20/2021) 30 tablet 2     thiamine (B-1) 100 MG tablet 1 tablet (100 mg) by Per Feeding Tube route daily 30 tablet 1        No  Known Allergies    Exam:  There were no vitals taken for this visit.  Wt Readings from Last 4 Encounters:   05/12/21 78.9 kg (173 lb 14.4 oz)   04/21/21 78.4 kg (172 lb 12.8 oz)   03/31/21 79 kg (174 lb 1.6 oz)   03/31/21 78.9 kg (174 lb)     Video physical exam  General: Patient appears well in no acute distress.   Skin: No visualized rash or lesions on visualized skin  Eyes: EOMI, no erythema, sclera icterus or discharge noted  Resp: Appears to be breathing comfortably without accessory muscle usage, speaking in full sentences, no cough  MSK: Appears to have normal range of motion based on visualized movements  Neurologic: No apparent tremors, facial movements symmetric  Psych: affect good, alert and oriented    The rest of a comprehensive physical examination is deferred due to PHE (public health emergency) video restrictions    Labs:  New labs today at infusion     Imaging: recent imaging report reviewed by me    Impression/plan:      # Stage IV esophageal adenocarcinoma   # ECOG PS 2     Started chemoimmunotherpay with Cisplatin+5Fu+pembrolizumab based on [id number] trial. The trial demonstrated a statistically significant improvement in OS and PFS for patients randomized to pembrolizumab with chemotherapy. Median OS was 12.4 months (95% CI: 10.5, 14.0) for the pembrolizumab arm versus 9.8 months (95% CI: 8.8, 10.8) for the chemotherapy arm (HR 0.73; 95% CI: 0.62, 0.86; p<0.0001). Median PFS was 6.3  (95% CI: 6.2, 6.9) and 5.8 months (95% CI: 5.0, 6.0), respectively (HR 0.65; 95% CI: 0.55, 0.76; p<0.0001).     Now s/p 3 cycles well with minimal and manageable side-effects. Restaging scan after 2 cycles showed overall stable disease with - mixed response- AZ in esophgaus primary, and 2 liver lesions while, stable disease in spine, and stable to borderline progression in 1 liver lesions. Treatment was continued and plan is to reassess prior to next cycle      Plan  Proceed with cycle 4 today pending labs  CT CAP in 3  kimberly, RTC with Dr. Steven  prior to cycle 5         #Brain mets.   Follows with Dr. Velez. S/p GK 3/24. Next MRI in June 23     #mucositis  2/2 F5U. Continue Aquaphor and salt/soda rinses too. He did not find them too painful so not needing analgesic.      #Bone mets  Bone biopsy of left pelvic mass 2/26/21 confirmed mets. Finished radiation 3/23 to lumbosacral spine   Discussed adding zometa, he has not seen dentist in 4 yrs, discusseed small risk of osteonecrosis, 3-5% risk, he does not have any ongoing dental issues and has no tooth pain or caries.  -Zometa will start today, plan to give every 6 weeks  -Start Calcium and Vitmain D     #Dypshagia, malnutrition  PEG tube placed in OR  (3/5) for nutritional support. Tolerating tube feeds and now with some PO intake too. Has recently met with Kari DOUGLAS. Continue PO intake as tolerated. Previously declined SLP follow up, can revisit as needed though dysphagia has improved with treatment.   --Of note, consideration given to esophageal stent vs XRT for esophageal debulking to address dysphagia inpatient in Feb/March 2021; however given likely improvement with chemo-immunotherapy, as well as risks of worsening cytopenias, elected not to.      #Cauda Equina due to tumor compression. He is s/p L2-L5 laminectomy and decompression.   Followed by neurosurgery with improved exam after surgery. He is now ambulating with or without a walker, strength nearly back to 70%. He is aware of his activity and lifting restrictions. Has some L radiculopathy that he uses robaxan prn.      #Constipation  Dulcolax, miralax, or MOM prn, asked he not use suppository while on chemo     #Urinary retention  Andrews out. Follows with urology     #GERD  Improved. liquid omeprazole as needed     50 minutes spent on the date of the encounter doing chart review, review of test results, interpretation of tests, patient visit, documentation and discussion with other provider(s)       Jaky SAWYER  Keaton, CNP on 6/2/2021 at 7:51 AM

## 2021-05-28 NOTE — PROGRESS NOTES
This is a recent snapshot of the patient's [facility] medical record.  For current drug dose and complete information and questions, call [phone] or In Basket pool, fv home infusion ([id number])  CSN Number:  [id number]

## 2021-06-02 ENCOUNTER — HOME INFUSION (PRE-WILLOW HOME INFUSION) (OUTPATIENT)
Dept: PHARMACY | Facility: CLINIC | Age: 61
End: 2021-06-02

## 2021-06-02 ENCOUNTER — VIRTUAL VISIT (OUTPATIENT)
Dept: ONCOLOGY | Facility: CLINIC | Age: 61
End: 2021-06-02
Attending: NURSE PRACTITIONER
Payer: COMMERCIAL

## 2021-06-02 ENCOUNTER — INFUSION THERAPY VISIT (OUTPATIENT)
Dept: INFUSION THERAPY | Facility: CLINIC | Age: 61
End: 2021-06-02
Payer: COMMERCIAL

## 2021-06-02 VITALS
WEIGHT: 173 LBS | HEART RATE: 100 BPM | TEMPERATURE: 98.9 F | OXYGEN SATURATION: 97 % | DIASTOLIC BLOOD PRESSURE: 70 MMHG | RESPIRATION RATE: 18 BRPM | BODY MASS INDEX: 24.82 KG/M2 | SYSTOLIC BLOOD PRESSURE: 101 MMHG

## 2021-06-02 DIAGNOSIS — C15.5 MALIGNANT NEOPLASM OF LOWER THIRD OF ESOPHAGUS (H): ICD-10-CM

## 2021-06-02 DIAGNOSIS — C79.51 BONE METASTASIS: Primary | ICD-10-CM

## 2021-06-02 DIAGNOSIS — C15.5 MALIGNANT NEOPLASM OF LOWER THIRD OF ESOPHAGUS (H): Primary | ICD-10-CM

## 2021-06-02 LAB
ALBUMIN SERPL-MCNC: 3.7 G/DL (ref 3.4–5)
ALP SERPL-CCNC: 123 U/L (ref 40–150)
ALT SERPL W P-5'-P-CCNC: 21 U/L (ref 0–70)
ANION GAP SERPL CALCULATED.3IONS-SCNC: 3 MMOL/L (ref 3–14)
AST SERPL W P-5'-P-CCNC: 26 U/L (ref 0–45)
BASOPHILS # BLD AUTO: 0.1 10E9/L (ref 0–0.2)
BASOPHILS NFR BLD AUTO: 0.8 %
BILIRUB SERPL-MCNC: 0.3 MG/DL (ref 0.2–1.3)
BUN SERPL-MCNC: 17 MG/DL (ref 7–30)
CALCIUM SERPL-MCNC: 9.1 MG/DL (ref 8.5–10.1)
CHLORIDE SERPL-SCNC: 102 MMOL/L (ref 94–109)
CO2 SERPL-SCNC: 30 MMOL/L (ref 20–32)
CREAT SERPL-MCNC: 0.76 MG/DL (ref 0.66–1.25)
DIFFERENTIAL METHOD BLD: ABNORMAL
EOSINOPHIL # BLD AUTO: 0.1 10E9/L (ref 0–0.7)
EOSINOPHIL NFR BLD AUTO: 1 %
ERYTHROCYTE [DISTWIDTH] IN BLOOD BY AUTOMATED COUNT: 22.6 % (ref 10–15)
GFR SERPL CREATININE-BSD FRML MDRD: >90 ML/MIN/{1.73_M2}
GLUCOSE SERPL-MCNC: 131 MG/DL (ref 70–99)
HCT VFR BLD AUTO: 35.9 % (ref 40–53)
HGB BLD-MCNC: 11.1 G/DL (ref 13.3–17.7)
IMM GRANULOCYTES # BLD: 0.4 10E9/L (ref 0–0.4)
IMM GRANULOCYTES NFR BLD: 4.4 %
LYMPHOCYTES # BLD AUTO: 1.3 10E9/L (ref 0.8–5.3)
LYMPHOCYTES NFR BLD AUTO: 15.3 %
MAGNESIUM SERPL-MCNC: 2.4 MG/DL (ref 1.6–2.3)
MCH RBC QN AUTO: 26.1 PG (ref 26.5–33)
MCHC RBC AUTO-ENTMCNC: 30.9 G/DL (ref 31.5–36.5)
MCV RBC AUTO: 84 FL (ref 78–100)
MONOCYTES # BLD AUTO: 0.6 10E9/L (ref 0–1.3)
MONOCYTES NFR BLD AUTO: 7.2 %
NEUTROPHILS # BLD AUTO: 6.2 10E9/L (ref 1.6–8.3)
NEUTROPHILS NFR BLD AUTO: 71.3 %
PLATELET # BLD AUTO: 306 10E9/L (ref 150–450)
POTASSIUM SERPL-SCNC: 4 MMOL/L (ref 3.4–5.3)
PROT SERPL-MCNC: 6.8 G/DL (ref 6.8–8.8)
RBC # BLD AUTO: 4.26 10E12/L (ref 4.4–5.9)
SODIUM SERPL-SCNC: 135 MMOL/L (ref 133–144)
WBC # BLD AUTO: 8.7 10E9/L (ref 4–11)

## 2021-06-02 PROCEDURE — 96416 CHEMO PROLONG INFUSE W/PUMP: CPT | Performed by: NURSE PRACTITIONER

## 2021-06-02 PROCEDURE — 96375 TX/PRO/DX INJ NEW DRUG ADDON: CPT | Performed by: NURSE PRACTITIONER

## 2021-06-02 PROCEDURE — 83735 ASSAY OF MAGNESIUM: CPT | Performed by: NURSE PRACTITIONER

## 2021-06-02 PROCEDURE — 96417 CHEMO IV INFUS EACH ADDL SEQ: CPT | Performed by: NURSE PRACTITIONER

## 2021-06-02 PROCEDURE — 96413 CHEMO IV INFUSION 1 HR: CPT | Performed by: NURSE PRACTITIONER

## 2021-06-02 PROCEDURE — 99207 PR NO CHARGE LOS: CPT

## 2021-06-02 PROCEDURE — 80053 COMPREHEN METABOLIC PANEL: CPT | Performed by: NURSE PRACTITIONER

## 2021-06-02 PROCEDURE — 99215 OFFICE O/P EST HI 40 MIN: CPT | Mod: 95 | Performed by: NURSE PRACTITIONER

## 2021-06-02 PROCEDURE — 96415 CHEMO IV INFUSION ADDL HR: CPT | Performed by: NURSE PRACTITIONER

## 2021-06-02 PROCEDURE — 96367 TX/PROPH/DG ADDL SEQ IV INF: CPT | Performed by: NURSE PRACTITIONER

## 2021-06-02 PROCEDURE — 85025 COMPLETE CBC W/AUTO DIFF WBC: CPT | Performed by: NURSE PRACTITIONER

## 2021-06-02 PROCEDURE — 999N001193 HC VIDEO/TELEPHONE VISIT; NO CHARGE

## 2021-06-02 RX ORDER — ALBUTEROL SULFATE 90 UG/1
1-2 AEROSOL, METERED RESPIRATORY (INHALATION)
Status: CANCELLED
Start: 2021-06-02

## 2021-06-02 RX ORDER — PALONOSETRON 0.05 MG/ML
0.25 INJECTION, SOLUTION INTRAVENOUS ONCE
Status: COMPLETED | OUTPATIENT
Start: 2021-06-02 | End: 2021-06-02

## 2021-06-02 RX ORDER — METHYLPREDNISOLONE SODIUM SUCCINATE 125 MG/2ML
125 INJECTION, POWDER, LYOPHILIZED, FOR SOLUTION INTRAMUSCULAR; INTRAVENOUS
Status: CANCELLED
Start: 2021-06-02

## 2021-06-02 RX ORDER — HEPARIN SODIUM (PORCINE) LOCK FLUSH IV SOLN 100 UNIT/ML 100 UNIT/ML
500 SOLUTION INTRAVENOUS ONCE
Status: CANCELLED | OUTPATIENT
Start: 2021-06-02 | End: 2021-06-02

## 2021-06-02 RX ORDER — MEPERIDINE HYDROCHLORIDE 25 MG/ML
25 INJECTION INTRAMUSCULAR; INTRAVENOUS; SUBCUTANEOUS EVERY 30 MIN PRN
Status: CANCELLED | OUTPATIENT
Start: 2021-06-02

## 2021-06-02 RX ORDER — PALONOSETRON 0.05 MG/ML
0.25 INJECTION, SOLUTION INTRAVENOUS ONCE
Status: CANCELLED
Start: 2021-06-02

## 2021-06-02 RX ORDER — HEPARIN SODIUM (PORCINE) LOCK FLUSH IV SOLN 100 UNIT/ML 100 UNIT/ML
5 SOLUTION INTRAVENOUS
Status: CANCELLED | OUTPATIENT
Start: 2021-06-02

## 2021-06-02 RX ORDER — HEPARIN SODIUM,PORCINE 10 UNIT/ML
5 VIAL (ML) INTRAVENOUS
Status: CANCELLED | OUTPATIENT
Start: 2021-06-02

## 2021-06-02 RX ORDER — LORAZEPAM 2 MG/ML
0.5 INJECTION INTRAMUSCULAR EVERY 4 HOURS PRN
Status: CANCELLED | OUTPATIENT
Start: 2021-06-02

## 2021-06-02 RX ORDER — DIPHENHYDRAMINE HYDROCHLORIDE 50 MG/ML
50 INJECTION INTRAMUSCULAR; INTRAVENOUS
Status: CANCELLED
Start: 2021-06-02

## 2021-06-02 RX ORDER — ALBUTEROL SULFATE 0.83 MG/ML
2.5 SOLUTION RESPIRATORY (INHALATION)
Status: CANCELLED | OUTPATIENT
Start: 2021-06-02

## 2021-06-02 RX ORDER — NALOXONE HYDROCHLORIDE 0.4 MG/ML
.1-.4 INJECTION, SOLUTION INTRAMUSCULAR; INTRAVENOUS; SUBCUTANEOUS
Status: CANCELLED | OUTPATIENT
Start: 2021-06-02

## 2021-06-02 RX ORDER — HEPARIN SODIUM,PORCINE 10 UNIT/ML
5 VIAL (ML) INTRAVENOUS
Status: CANCELLED | OUTPATIENT
Start: 2021-06-07

## 2021-06-02 RX ORDER — EPINEPHRINE 1 MG/ML
0.3 INJECTION, SOLUTION INTRAMUSCULAR; SUBCUTANEOUS EVERY 5 MIN PRN
Status: CANCELLED | OUTPATIENT
Start: 2021-06-02

## 2021-06-02 RX ORDER — HEPARIN SODIUM (PORCINE) LOCK FLUSH IV SOLN 100 UNIT/ML 100 UNIT/ML
5 SOLUTION INTRAVENOUS
Status: CANCELLED | OUTPATIENT
Start: 2021-06-07

## 2021-06-02 RX ORDER — SODIUM CHLORIDE 9 MG/ML
1000 INJECTION, SOLUTION INTRAVENOUS CONTINUOUS PRN
Status: CANCELLED
Start: 2021-06-02

## 2021-06-02 RX ADMIN — PALONOSETRON 0.25 MG: 0.05 INJECTION, SOLUTION INTRAVENOUS at 11:05

## 2021-06-02 RX ADMIN — Medication 1000 ML: at 10:56

## 2021-06-02 NOTE — LETTER
June 2, 2021       TO: Junito Wang  [address] [address] [address]       DearMr.Felipe,    We are writing to inform you of your test results.    {[facility] results letter list:[id number]}    No results found from the In Basket message.    ***

## 2021-06-02 NOTE — PROGRESS NOTES
"Infusion Nursing Note:  Junito Wang presents today for C4D1 PORT LABS/ Keytruda/Cisplatin/Fluorouracil pump connect.    Patient seen by provider today: Jaky Pugh Video visit this morning   present during visit today: Not Applicable.    Note: Junito is managing well.  He is ready for chemo today.  Patient declined the covid-19 test.    Intravenous Access:  Implanted Port.    Treatment Conditions:  Lab Results   Component Value Date    HGB 11.1 06/02/2021     Lab Results   Component Value Date    WBC 8.7 06/02/2021      Lab Results   Component Value Date    ANEU 6.2 06/02/2021     Lab Results   Component Value Date     06/02/2021      Lab Results   Component Value Date     06/02/2021                   Lab Results   Component Value Date    POTASSIUM 4.0 06/02/2021           Lab Results   Component Value Date    MAG 2.4 06/02/2021            Lab Results   Component Value Date    CR 0.76 06/02/2021                   Lab Results   Component Value Date    ARDEN 9.1 06/02/2021                Lab Results   Component Value Date    BILITOTAL 0.3 06/02/2021           Lab Results   Component Value Date    ALBUMIN 3.7 06/02/2021                    Lab Results   Component Value Date    ALT 21 06/02/2021           Lab Results   Component Value Date    AST 26 06/02/2021       Results reviewed, labs MET treatment parameters, ok to proceed with treatment.      Post Infusion Assessment:  Prior to discharge: Port is secured in place with tegaderm and flushed with 10cc NS with positive blood return noted.  Continuous home infusion Dosi-Fuser pump connected.    All connectors secured in place and clamps taped open.    Pump started, \"running\" noted on display (CADD): Not Applicable.  Pump Connection double checked with Vasile Cabello and Flor Garcia RN  Patient instructed to call our clinic or [facility] with any questions or concerns at home.  Patient verbalized understanding.    Patient set up " for pump disconnect at home with [facility] on 6/7/2021.            Discharge Plan:   Patient discharged in stable condition accompanied by: self.      Kelly Vazquez RN

## 2021-06-03 NOTE — PROGRESS NOTES
This is a recent snapshot of the patient's [facility] medical record.  For current drug dose and complete information and questions, call [phone] or In Basket pool, fv home infusion ([id number])  CSN Number:  [id number]

## 2021-06-07 ENCOUNTER — HOME INFUSION (PRE-WILLOW HOME INFUSION) (OUTPATIENT)
Dept: PHARMACY | Facility: CLINIC | Age: 61
End: 2021-06-07

## 2021-06-08 NOTE — PROGRESS NOTES
This is a recent snapshot of the patient's [facility] medical record.  For current drug dose and complete information and questions, call [phone] or In Basket pool, fv home infusion ([id number])  CSN Number:  [id number]

## 2021-06-16 ENCOUNTER — VIRTUAL VISIT (OUTPATIENT)
Dept: ONCOLOGY | Facility: CLINIC | Age: 61
End: 2021-06-16
Attending: DIETITIAN, REGISTERED
Payer: COMMERCIAL

## 2021-06-16 DIAGNOSIS — C15.5 MALIGNANT NEOPLASM OF LOWER THIRD OF ESOPHAGUS (H): Primary | ICD-10-CM

## 2021-06-16 PROCEDURE — 97802 MEDICAL NUTRITION INDIV IN: CPT | Mod: TEL | Performed by: DIETITIAN, REGISTERED

## 2021-06-16 NOTE — LETTER
"    6/16/2021         RE: Junito Wang  [address] [address] [address]        Dear Colleague,    Thank you for referring your patient, Junito Wang, to the [facility]. Please see a copy of my visit note below.    The patient has been notified of the following:      \"We have found that certain health care needs can be provided without the need for a face to face visit.  This service lets us provide the care you need with a phone conversation.       I will have full access to your [facility] medical record during this entire phone call.   I will be taking notes for your medical record.      Since this is like an office visit, we will bill your insurance company for this service.       There are potential benefits and risks of telephone visits (e.g. limits to patient confidentiality) that differ from in-person visits.?  Confidentiality still applies for telephone services, and nobody will record the visit.  It is important to be in a quiet, private space that is free of distractions (including cell phone or other devices) during the visit.??      If during the course of the call I believe a telephone visit is not appropriate, you will not be charged for this service\"     Consent has been obtained for this service by care team member: Yes     CLINICAL NUTRITION SERVICES - REASSESSMENT NOTE   EVALUATION OF PREVIOUS PLAN OF CARE:   Referring Physician: Maurizio 2/8/21  Time spent with patient: 15 minutes.  Current diet: ice cream and cheese  Current appetite/intake: poor  PEG Tube: EN dependent since 3/9/21     Monitoring from previous assessment:   -Food intake - pancakes, deli meat, cheese, yogurt, squash, ice cream, jello, popsicle  - EN intake- 4-5 cartons of formula/day  Current EN regimen:  Formula: Isosource 1.5 rob  Volume: 5 cartons/day   MOA: Gravity bag/bolus  Provisions: 1875kcal (24kcal/kg), 85g protein (1.1g/kg)  Flushes: 160ml water before/after each feeding " plus 180ml total water flush with meds = 1120mL water; 1140ml free water from formula = total of 2260mL water  -Weight trends - stable   Wt Readings from Last 6 Encounters:   06/02/21 78.5 kg (173 lb)   05/12/21 78.9 kg (173 lb 14.4 oz)   04/21/21 78.4 kg (172 lb 12.8 oz)   03/31/21 79 kg (174 lb 1.6 oz)   03/31/21 78.9 kg (174 lb)   03/29/21 77.6 kg (171 lb)     Previous Goals:   1. EN to meet >75% of estimated nutrition needs (5-6 cartons/day)  2. PO as tolerated  Evaluation: Met/ongoing  Previous Nutrition Diagnosis:   Inadequate oral intake related to dysphaiga as evidenced by pt dependent upon EN to meet 100% of estimated nutrition needs.    Evaluation: Improving   NEW FINDINGS:   Increased PO intake   CURRENT NUTRITION DIAGNOSIS   Inadequate oral intake related to dysphaiga as evidenced by pt dependent upon EN to meet 75% of estimated nutrition needs.    INTERVENTIONS   -EN Composition, EN Schedule and Feeding Tube Flush - reviewed EN needs and hydration needs. Reviewed ability to wean by 1-2 cartons as PO intake increases.   -Reviewed nutrition needs and balance of EN and PO to ensure adequacy.  Discussed that for every ~400 rob, 20g protein consumed PO, he can reduce EN by one carton/week as tolerated.     Goals   1. EN to meet >75% of estimated nutrition needs (5-6 cartons/day)  2. PO intake to increase by 25% each week as tolerated.      Follow up/Monitoring: one month follow-up scheduled for July 19th at 11:30 am     -Enteral Nutrition wean  -PO intake  -Weight trends     Kari Brown RD, [facility]  [phone]      Again, thank you for allowing me to participate in the care of your patient.        Sincerely,        Kari Brown RD

## 2021-06-16 NOTE — PROGRESS NOTES
"The patient has been notified of the following:      \"We have found that certain health care needs can be provided without the need for a face to face visit.  This service lets us provide the care you need with a phone conversation.       I will have full access to your [facility] medical record during this entire phone call.   I will be taking notes for your medical record.      Since this is like an office visit, we will bill your insurance company for this service.       There are potential benefits and risks of telephone visits (e.g. limits to patient confidentiality) that differ from in-person visits.?  Confidentiality still applies for telephone services, and nobody will record the visit.  It is important to be in a quiet, private space that is free of distractions (including cell phone or other devices) during the visit.??      If during the course of the call I believe a telephone visit is not appropriate, you will not be charged for this service\"     Consent has been obtained for this service by care team member: Yes     CLINICAL NUTRITION SERVICES - REASSESSMENT NOTE   EVALUATION OF PREVIOUS PLAN OF CARE:   Referring Physician: Maurizio 2/8/21  Time spent with patient: 15 minutes.  Current diet: ice cream and cheese  Current appetite/intake: poor  PEG Tube: EN dependent since 3/9/21     Monitoring from previous assessment:   -Food intake - pancakes, deli meat, cheese, yogurt, squash, ice cream, jello, popsicle  - EN intake- 4-5 cartons of formula/day  Current EN regimen:  Formula: Isosource 1.5 rob  Volume: 5 cartons/day   MOA: Gravity bag/bolus  Provisions: 1875kcal (24kcal/kg), 85g protein (1.1g/kg)  Flushes: 160ml water before/after each feeding plus 180ml total water flush with meds = 1120mL water; 1140ml free water from formula = total of 2260mL water  -Weight trends - stable   Wt Readings from Last 6 Encounters:   06/02/21 78.5 kg (173 lb)   05/12/21 78.9 kg (173 lb 14.4 oz)   04/21/21 78.4 kg (172 lb " 12.8 oz)   03/31/21 79 kg (174 lb 1.6 oz)   03/31/21 78.9 kg (174 lb)   03/29/21 77.6 kg (171 lb)     Previous Goals:   1. EN to meet >75% of estimated nutrition needs (5-6 cartons/day)  2. PO as tolerated  Evaluation: Met/ongoing  Previous Nutrition Diagnosis:   Inadequate oral intake related to dysphaiga as evidenced by pt dependent upon EN to meet 100% of estimated nutrition needs.    Evaluation: Improving   NEW FINDINGS:   Increased PO intake   CURRENT NUTRITION DIAGNOSIS   Inadequate oral intake related to dysphaiga as evidenced by pt dependent upon EN to meet 75% of estimated nutrition needs.    INTERVENTIONS   -EN Composition, EN Schedule and Feeding Tube Flush - reviewed EN needs and hydration needs. Reviewed ability to wean by 1-2 cartons as PO intake increases.   -Reviewed nutrition needs and balance of EN and PO to ensure adequacy.  Discussed that for every ~400 rob, 20g protein consumed PO, he can reduce EN by one carton/week as tolerated.     Goals   1. EN to meet >75% of estimated nutrition needs (5-6 cartons/day)  2. PO intake to increase by 25% each week as tolerated.      Follow up/Monitoring: one month follow-up scheduled for July 19th at 11:30 am     -Enteral Nutrition wean  -PO intake  -Weight trends     Kari Brown RD, LD  [facility]  [phone]

## 2021-06-21 ENCOUNTER — ANCILLARY PROCEDURE (OUTPATIENT)
Dept: CT IMAGING | Facility: CLINIC | Age: 61
End: 2021-06-21
Attending: STUDENT IN AN ORGANIZED HEALTH CARE EDUCATION/TRAINING PROGRAM
Payer: COMMERCIAL

## 2021-06-21 ENCOUNTER — APPOINTMENT (OUTPATIENT)
Dept: GENERAL RADIOLOGY | Facility: CLINIC | Age: 61
End: 2021-06-21
Payer: COMMERCIAL

## 2021-06-21 PROCEDURE — 71260 CT THORAX DX C+: CPT | Mod: TC | Performed by: RADIOLOGY

## 2021-06-21 PROCEDURE — 74177 CT ABD & PELVIS W/CONTRAST: CPT | Mod: TC | Performed by: RADIOLOGY

## 2021-06-21 RX ORDER — IOPAMIDOL 755 MG/ML
200 INJECTION, SOLUTION INTRAVASCULAR ONCE
Status: COMPLETED | OUTPATIENT
Start: 2021-06-21 | End: 2021-06-21

## 2021-06-21 RX ADMIN — Medication 61 ML: at 14:58

## 2021-06-21 RX ADMIN — IOPAMIDOL 84 ML: 755 INJECTION, SOLUTION INTRAVASCULAR at 14:58

## 2021-06-22 NOTE — PROGRESS NOTES
"  FOLLOW-UP VISIT    Patient Name: Junito Wang      : 1960     Age: 60 year old        ______________________________________________________________________________   Chief complaint: Follow up for Radiation Treatment, esophageal cancer with bone mets    /81 (BP Location: Right arm, Patient Position: Sitting, Cuff Size: Adult Regular)   Pulse 112   Resp 16   Ht 1.778 m (5' 10\")   Wt 79.4 kg (175 lb)   SpO2 100%   BMI 25.11 kg/m      Date Radiation Completed: (20Gy 5fx to lumbosacral) (20Gy Gamma Knife brain) 3/24/2021    Pain  Current history of pain associated with this visit:   Intensity: 1/10  Current: aching  Location: lumbosacral/left hip  Treatment: rest    Labs  Other Labs: No    Imaging  MRI: Brain 2021      Other Appointments:     MD Name: Maurizio Appointment Date: 2021  MD Name: Appointment Date:  MD Name: Appointment Date:  Other Appointment Notes:     Residual Radiation side effect: denies     Additional Instructions:     Nurse face-to-face time: Level 3:  10 min face to face time                Review of Systems   Constitutional: Negative.    HENT: Positive for tinnitus (left, chronic).    Eyes: Negative.    Respiratory: Positive for cough.    Cardiovascular: Negative.    Gastrointestinal: Positive for constipation (post chemo), heartburn (post chemo) and nausea (post chemo).   Genitourinary: Positive for frequency.   Musculoskeletal: Positive for joint pain.   Skin: Negative.    Neurological: Positive for tingling (tips of fingers).   Endo/Heme/Allergies: Negative.    Psychiatric/Behavioral: Negative.                "

## 2021-06-23 ENCOUNTER — VIRTUAL VISIT (OUTPATIENT)
Dept: ONCOLOGY | Facility: CLINIC | Age: 61
End: 2021-06-23
Attending: STUDENT IN AN ORGANIZED HEALTH CARE EDUCATION/TRAINING PROGRAM
Payer: COMMERCIAL

## 2021-06-23 ENCOUNTER — HOSPITAL ENCOUNTER (OUTPATIENT)
Dept: MRI IMAGING | Facility: CLINIC | Age: 61
End: 2021-06-23
Attending: RADIOLOGY
Payer: COMMERCIAL

## 2021-06-23 ENCOUNTER — OFFICE VISIT (OUTPATIENT)
Dept: RADIATION ONCOLOGY | Facility: CLINIC | Age: 61
End: 2021-06-23
Attending: RADIOLOGY
Payer: COMMERCIAL

## 2021-06-23 VITALS
DIASTOLIC BLOOD PRESSURE: 81 MMHG | HEART RATE: 112 BPM | OXYGEN SATURATION: 100 % | HEIGHT: 70 IN | WEIGHT: 175 LBS | RESPIRATION RATE: 16 BRPM | SYSTOLIC BLOOD PRESSURE: 109 MMHG | BODY MASS INDEX: 25.05 KG/M2

## 2021-06-23 DIAGNOSIS — C79.51 BONE METASTASIS: ICD-10-CM

## 2021-06-23 DIAGNOSIS — C15.5 MALIGNANT NEOPLASM OF LOWER THIRD OF ESOPHAGUS (H): ICD-10-CM

## 2021-06-23 DIAGNOSIS — R11.0 NAUSEA: Primary | ICD-10-CM

## 2021-06-23 DIAGNOSIS — C79.31 METASTASIS TO BRAIN (H): ICD-10-CM

## 2021-06-23 DIAGNOSIS — C79.31 METASTASIS TO BRAIN (H): Primary | ICD-10-CM

## 2021-06-23 PROCEDURE — A9585 GADOBUTROL INJECTION: HCPCS | Performed by: RADIOLOGY

## 2021-06-23 PROCEDURE — 70553 MRI BRAIN STEM W/O & W/DYE: CPT | Mod: 26 | Performed by: RADIOLOGY

## 2021-06-23 PROCEDURE — 70553 MRI BRAIN STEM W/O & W/DYE: CPT

## 2021-06-23 PROCEDURE — 255N000002 HC RX 255 OP 636: Performed by: RADIOLOGY

## 2021-06-23 PROCEDURE — 99215 OFFICE O/P EST HI 40 MIN: CPT | Mod: 95 | Performed by: STUDENT IN AN ORGANIZED HEALTH CARE EDUCATION/TRAINING PROGRAM

## 2021-06-23 PROCEDURE — 999N001193 HC VIDEO/TELEPHONE VISIT; NO CHARGE

## 2021-06-23 RX ORDER — METHYLPREDNISOLONE SODIUM SUCCINATE 125 MG/2ML
125 INJECTION, POWDER, LYOPHILIZED, FOR SOLUTION INTRAMUSCULAR; INTRAVENOUS
Status: CANCELLED
Start: 2021-06-24

## 2021-06-23 RX ORDER — ALBUTEROL SULFATE 90 UG/1
1-2 AEROSOL, METERED RESPIRATORY (INHALATION)
Status: CANCELLED
Start: 2021-06-24

## 2021-06-23 RX ORDER — HEPARIN SODIUM (PORCINE) LOCK FLUSH IV SOLN 100 UNIT/ML 100 UNIT/ML
5 SOLUTION INTRAVENOUS
Status: CANCELLED | OUTPATIENT
Start: 2021-06-24

## 2021-06-23 RX ORDER — DEXAMETHASONE 4 MG/1
8 TABLET ORAL
Qty: 6 TABLET | Refills: 2 | Status: SHIPPED | OUTPATIENT
Start: 2021-06-23 | End: 2021-01-01

## 2021-06-23 RX ORDER — EPINEPHRINE 1 MG/ML
0.3 INJECTION, SOLUTION INTRAMUSCULAR; SUBCUTANEOUS EVERY 5 MIN PRN
Status: CANCELLED | OUTPATIENT
Start: 2021-06-24

## 2021-06-23 RX ORDER — LORAZEPAM 2 MG/ML
0.5 INJECTION INTRAMUSCULAR EVERY 4 HOURS PRN
Status: CANCELLED | OUTPATIENT
Start: 2021-06-24

## 2021-06-23 RX ORDER — DIPHENHYDRAMINE HYDROCHLORIDE 50 MG/ML
50 INJECTION INTRAMUSCULAR; INTRAVENOUS
Status: CANCELLED
Start: 2021-06-24

## 2021-06-23 RX ORDER — GADOBUTROL 604.72 MG/ML
7.5 INJECTION INTRAVENOUS ONCE
Status: COMPLETED | OUTPATIENT
Start: 2021-06-23 | End: 2021-06-23

## 2021-06-23 RX ORDER — PALONOSETRON 0.05 MG/ML
0.25 INJECTION, SOLUTION INTRAVENOUS ONCE
Status: CANCELLED
Start: 2021-06-24

## 2021-06-23 RX ORDER — HEPARIN SODIUM,PORCINE 10 UNIT/ML
5 VIAL (ML) INTRAVENOUS
Status: CANCELLED | OUTPATIENT
Start: 2021-06-24

## 2021-06-23 RX ORDER — ALBUTEROL SULFATE 0.83 MG/ML
2.5 SOLUTION RESPIRATORY (INHALATION)
Status: CANCELLED | OUTPATIENT
Start: 2021-06-24

## 2021-06-23 RX ORDER — NALOXONE HYDROCHLORIDE 0.4 MG/ML
.1-.4 INJECTION, SOLUTION INTRAMUSCULAR; INTRAVENOUS; SUBCUTANEOUS
Status: CANCELLED | OUTPATIENT
Start: 2021-06-24

## 2021-06-23 RX ORDER — MEPERIDINE HYDROCHLORIDE 25 MG/ML
25 INJECTION INTRAMUSCULAR; INTRAVENOUS; SUBCUTANEOUS EVERY 30 MIN PRN
Status: CANCELLED | OUTPATIENT
Start: 2021-06-24

## 2021-06-23 RX ORDER — SODIUM CHLORIDE 9 MG/ML
1000 INJECTION, SOLUTION INTRAVENOUS CONTINUOUS PRN
Status: CANCELLED
Start: 2021-06-24

## 2021-06-23 RX ADMIN — GADOBUTROL 7.5 ML: 604.72 INJECTION INTRAVENOUS at 12:02

## 2021-06-23 ASSESSMENT — ENCOUNTER SYMPTOMS
TINGLING: 1
EYES NEGATIVE: 1
CONSTITUTIONAL NEGATIVE: 1
CARDIOVASCULAR NEGATIVE: 1
FREQUENCY: 1
COUGH: 1
PSYCHIATRIC NEGATIVE: 1
HEARTBURN: 1
NAUSEA: 1
CONSTIPATION: 1

## 2021-06-23 ASSESSMENT — MIFFLIN-ST. JEOR: SCORE: 1615.04

## 2021-06-23 ASSESSMENT — PAIN SCALES - GENERAL: PAINLEVEL: NO PAIN (1)

## 2021-06-23 NOTE — PROGRESS NOTES
This is a recent snapshot of the patient's [facility] medical record.  For current drug dose and complete information and questions, call [phone] or In Basket pool, fv home infusion ([id number])  CSN Number:  [id number]

## 2021-06-23 NOTE — LETTER
6/23/2021         RE: Junito Wang  [id number] [facility] [id number]        Dear Colleague,    Thank you for referring your patient, Junito Wang, to the [facility]. Please see a copy of my visit note below.    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),metastasis of liver, brain and spine  (AJCC 8th edition)  -MMR proficient, HER2 by IHC is 2+, FISH neg  -PD-L1 CPS- 5-10%        Treatment:  3/2/21- L2-L5 laminectomy and decompression  3/17/21-3/23/21: Radiation to LS spine  3/24/21: gamma knife (2000cGy in 5 fractions)  3/31/21 to now: Cisplatin+keytruda+5fu every 3 weeks    Treatment intent- Palliative    Oncology HPI:   August 2020- stomach discomfort, belching   October 2020- progressive dysphagia with solids   1/26/21- distal esophageal biopsy shows Moderately differentiated adenocarcinoma; MMR normal expression, PDL1 expression is low with TPS 2%, CPS 5-10, Her2 is pending  1/27/21- CT CAP- Enlarged  2 cm subcarinal lymph node and 1.4 cm short axis right subcarinal lymph node, focal thickening of the superior wall along the right side of the mid esophagus. Numerous small pulmonary nodules, 3 mm nodule in the superior segment of the left lower lobe, 3 mm nodule in the anterior aspect of the right upper lobe , 4 mm nodule in the medial aspect of the right upper lobe and 4 mm right lower lobe nodule. Hypoattenuating foci in the liver, 1.6 cm hypoattenuating/hypoenhancing lesion in hepatic segment 8 and 1.4 cm lesion in hepatic segment 5 , indeterminate, likely metastatic.  The prostate gland is mildly enlarged measuring 5.3 cm in transverse diameter. Multiple prominent but not significantly enlarged retroperitoneal lymph nodes, indeterminate, could be reactive. 4.4 x 4.3 cm lytic lesion centered in the posterior aspect of the left sacral ala (series 3 image  243), 4.7 x 4.0 x 5.6 cm (axial and CC dimensions, respectively) hypoattenuating lesion in the subcutaneous tissue of the anterior chest wall just anterior to the mediastinum, indeterminate, could represent sebaceous cyst.  2/4/21- Saw Dr. Bourgeois ordered PET/CT  3/2/21-3/10/21- Admission to [facility]- <24h of inability to ambulate with acute onset left leg weakness/urinary retention with perianal sensory deficits, MRI showed extensive epidural signal change suggestive of tumor vs. Hemorrhage.Pt underwent emergent surgery- L2-L5 laminectomy and decompression . PEG tube placement 3/5/2021.   3/17/21-3/23/21: Radiation to LS spine  3/24/21: gamma knife (2000cGy in 5 fractions)  3/29/21- port placement  3/31/21: Cycle 1 Cisplatin+keytruda+5fu   4/21/21: Cycle 2 Cisplatin+keytruda+5fu  5/11/21- Ct CAP- Overall stable disease- mixed response- TN in 2 liver lesions while, stable disease to borderline progression in 1 liver lesions  5/12/21- Cycle 3 Cisplatin+keytruda+5fu  6/2/21- Cycle 4 Cisplatin+keytruda+5fu  6/21/21- CT CAP- Stable mild circumferential wall thickening of the distal esophagus (series 3, image 99) with small amount of fluid in the upper and mid esophagus. No lymphadenopathy. Stable to slight increased size of hepatic metastases. For example a 3.1 x 2.9 cm lesion in the left lobe (series 3, image 123) previously measured 2.9 x 2.6 cm, and a 2.7 x 2.9 cm peripheral right hepatic lobe metastasis previously measured 3.0 x 2.2 cm. A 1.0 cm lesion in the caudate lobe (series 3, image 138) is more prominent today/new. Stable small gastrohepatic ligament lymph nodes. No new or enlarging lymph nodes in the abdomen and pelvis. No ascites.Stable 5.1 x 4.6 cm left sacral mass. Stable cystic 4.9 x 3.5 cm subcutaneous lesion overlying the midsternum.      Interval history:   Junito continues to tolerate chemo well. Has mild nausea that is manageable, and diarrhea after chemo. He uses miralax as needed if he is constipated. .He  has some pain near the tail bone that is constant, the back of leg pain is intermittennt, and isshooting to the toes. He has stable numbness in foot. He has mild neuropathy in tips of finger but is stable, and intermittent.  He uses walker only for long distances.  He was outside doing yardwork yesterday and tolerated this well. He walks around without his walker sometimes.  Energy levels are lower in the first few days, but imrpoves after the first week.  He has fatigue for a few days but then that improves later. He has mouth sores and tongue sores that start about the 4th day after treatment and last about a week. He did more salt and soda rinses this last cycle which he feels helped. He puts aquaphor on cracked/dry lips.   He is trying to eat more things orally, he adjusts this during the days he has the mouth sores and uses his PEG otherwise. He still does about 4-5 cartons per day.  He had turkey sandwich ice cream, able to eat by mouth a little bit now. Slowly the amount of PO food intake is increasing.   He is currently eating about 60-70% of PO intake.    He remains independent of ADL and IADL. He has been doing gardening and plating tomatoes, weeding, flower beds.          10 point review of systems otherwise negative     Current Outpatient Medications   Medication Sig Dispense Refill     Calcium Carb-Cholecalciferol (CALCIUM-VITAMIN D) 600-400 MG-UNIT TABS Take 2 tablets by mouth daily (Patient not taking: Reported on 6/2/2021) 60 tablet 3     calcium carbonate (TUMS) 500 MG chewable tablet Take 1 chew tab by mouth 2 times daily As needed       clotrimazole-betamethasone (LOTRISONE) 1-0.05 % external cream Apply topically 2 times daily (Patient not taking: Reported on 6/2/2021) 15 g 1     dexamethasone (DECADRON) 4 MG tablet Take 2 tablets (8 mg) by mouth daily (with breakfast) for 3 days, starting day after Cisplatin (Day 2). 6 tablet 2     gabapentin (NEURONTIN) 250 MG/5ML solution Take 5 mLs (250 mg)  by mouth At Bedtime 470 mL 1     LORazepam (ATIVAN) 0.5 MG tablet Take 1 tablet (0.5 mg) by mouth every 4 hours as needed (Anxiety, Nausea/Vomiting or Sleep) (Patient not taking: Reported on 4/20/2021) 30 tablet 2     methocarbamol (ROBAXIN) 750 MG tablet Take 1 tablet (750 mg) by mouth 4 times daily 30 tablet 0     multivitamin CF FORMULA (CHOICEFUL) chewable tablet Take 1 tablet by mouth daily       omeprazole (FIRST-OMEPRAZOLE) 2 MG/ML SUSP Take 10 mLs (20 mg) by mouth 2 times daily (Patient not taking: Reported on 6/2/2021) 300 mL 1     omeprazole POWD powder        prochlorperazine (COMPAZINE) 10 MG tablet Take 1 tablet (10 mg) by mouth every 6 hours as needed (Nausea/Vomiting) (Patient not taking: Reported on 4/20/2021) 30 tablet 2        No Known Allergies    Exam:  There were no vitals taken for this visit.  Wt Readings from Last 4 Encounters:   06/02/21 78.5 kg (173 lb)   05/12/21 78.9 kg (173 lb 14.4 oz)   04/21/21 78.4 kg (172 lb 12.8 oz)   03/31/21 79 kg (174 lb 1.6 oz)     Video physical exam  General: Patient appears well in no acute distress.   Skin: No visualized rash or lesions on visualized skin  Eyes: EOMI, no erythema, sclera icterus or discharge noted  Resp: Appears to be breathing comfortably without accessory muscle usage, speaking in full sentences, no cough  MSK: Appears to have normal range of motion based on visualized movements  Neurologic: No apparent tremors, facial movements symmetric  Psych: affect good, alert and oriented    The rest of a comprehensive physical examination is deferred due to PHE (public health emergency) video restrictions    Labs:  New labs today at infusion     Imaging:     CT CHEST/ABDOMEN/PELVIS W CONTRAST 6/21/2021 3:07 PM     CLINICAL HISTORY: Malignant neoplasm of lower third of esophagus (H);  Bone metastasis (H)     TECHNIQUE: CT scan of the chest, abdomen, and pelvis was performed  following injection of IV contrast. Multiplanar reformats were  obtained.  Dose reduction techniques were used.   CONTRAST: 84mL Isovue-370     COMPARISON: 5/11/2021     FINDINGS:   LUNGS AND PLEURA: Few tiny sub-4 mm pulmonary nodules are stable. No  new or enlarging nodules. No infiltrate or effusion.     MEDIASTINUM/AXILLAE: Stable mild circumferential wall thickening of  the distal esophagus (series 3, image 99) with small amount of fluid  in the upper and mid esophagus. No lymphadenopathy. Right IJ port  catheter tip at the low SVC. No thoracic aortic aneurysm. Moderate  coronary artery calcifications.     HEPATOBILIARY: Stable to slight increased size of hepatic metastases.  For example a 3.1 x 2.9 cm lesion in the left lobe (series 3, image  123) previously measured 2.9 x 2.6 cm, and a 2.7 x 2.9 cm peripheral  right hepatic lobe metastasis previously measured 3.0 x 2.2 cm. A 1.0  cm lesion in the caudate lobe (series 3, image 138) is more prominent  today/new.     PANCREAS: Normal.     SPLEEN: Normal.     ADRENAL GLANDS: Normal.     KIDNEYS/BLADDER: No significant mass, stones, or hydronephrosis. There  are simple or benign cysts. No follow up is needed.     BOWEL: Percutaneous gastrostomy tube in place. No small bowel or  colonic obstruction or inflammatory changes. Large amount of formed  stool in the colon.     PELVIC ORGANS: No pelvic masses.     ADDITIONAL FINDINGS: Stable small gastrohepatic ligament lymph nodes.  No new or enlarging lymph nodes in the abdomen and pelvis. No ascites.     MUSCULOSKELETAL: Stable 5.1 x 4.6 cm left sacral mass. Stable cystic  4.9 x 3.5 cm subcutaneous lesion overlying the midsternum. No new  lesions in the bones.                                                                      IMPRESSION:  1.  Stable to slight increase in size of hepatic metastases and  new/increased prominence of a metastatic lesion in the caudate lobe of  the liver.  2.  Stable circumferential wall thickening of the distal esophagus.  3.  Stable osseous metastasis in the  sacrum.     AZALEA ARGUELLO MD    Impression/plan:      # Stage IV esophageal adenocarcinoma   # ECOG PS 2     Started chemoimmunotherpay with Cisplatin+5Fu+pembrolizumab based on KN-590 trial. The trial demonstrated a statistically significant improvement in OS and PFS for patients randomized to pembrolizumab with chemotherapy. Median OS was 12.4 months (95% CI: 10.5, 14.0) for the pembrolizumab arm versus 9.8 months (95% CI: 8.8, 10.8) for the chemotherapy arm (HR 0.73; 95% CI: 0.62, 0.86; p<0.0001). Median PFS was 6.3  (95% CI: 6.2, 6.9) and 5.8 months (95% CI: 5.0, 6.0), respectively (HR 0.65; 95% CI: 0.55, 0.76; p<0.0001).     Now s/p 4 cycles well with minimal and manageable side-effects. Restaging scan after 2 cycles showed overall stable disease with - mixed response- KS in esophgaus primary, and 2 liver lesions while, stable disease in spine, and stable to borderline progression in 1 liver lesions. Scan after 4 cycles showing stable disease in esophagus and LN and a some liver lesion. 2 lesions in liver are slightly bigger, one is more prominent than before, however, the degree of increase is <20%, and still in the realm of stable disease. Clinically he is stable to improved. Will continue chemoimmunotherpay for 2 more cycles and do restaging scan after 6 cycles. If stable with next scan, will drop cisplatin after 6 cycles, continue 5 -FLU and pembro maintainence for total of 2 yrs or until progression/intolerable toxicities.     Plan  Proceed with cycle 5 tomorrow pending labs  Appt with Jaky prior to July 14 infusion   Infusion appts for 5-FFLU and keytruda afetr July 14   Ct CAP Aug 3 (prefers [facility] location)   RTC with me Aug 4       #Brain mets.   Follows with Dr. Velez. S/p GK 3/24. Next MRI in June 23.     #mucositis  2/2 F5U. Continue Aquaphor and salt/soda rinses too. He did not find them too painful so not needing analgesic.      #Bone mets  Bone biopsy of left pelvic mass 2/26/21 confirmed  mets. Finished radiation 3/23 to lumbosacral spine   Discussed adding zometa, he has not seen dentist in 4 yrs, discusseed small risk of osteonecrosis, 3-5% risk, he does not have any ongoing dental issues and has no tooth pain or caries.  -Continue Zometa, plan to give every 6 weeks  -Continue Calcium and Vitmain D     #Dypshagia, malnutrition  PEG tube placed in OR  (3/5) for nutritional support. Tolerating tube feeds and now with  PO intake too. Has follws with Kari DOUGLAS. Continue PO intake as tolerated. Previously declined SLP follow up, can revisit as needed though dysphagia has improved with treatment.   --Of note, consideration given to esophageal stent vs XRT for esophageal debulking to address dysphagia inpatient in Feb/March 2021; however given likely improvement with chemo-immunotherapy, as well as risks of worsening cytopenias, elected not to.      #Cauda Equina due to tumor compression. He is s/p L2-L5 laminectomy and decompression.   Followed by neurosurgery with improved exam after surgery. He is now ambulating with or without a walker, strength nearly back to 70%. He is aware of his activity and lifting restrictions. Has some L radiculopathy that he uses robaxan prn.      #Constipation  Dulcolax, miralax, or MOM prn, asked he not use suppository while on chemo     #Urinary retention  Andrews out. Follows with urology     #GERD  Improved. liquid omeprazole as needed     On the day of service  Chart review: 5 minutes  Visit duration: 30 minutes  Care coordination: 5 minutes    Jose Steven MD    Hematology, Oncology and Transplantation        Junito is a 60 year old who is being evaluated via a billable video visit.      How would you like to obtain your AVS? MyChart  If the video visit is dropped, the invitation should be resent by: Send to e-mail at: [email or web address]  Will anyone else be joining your video visit? No    Vitals - Patient Reported  Weight (Patient Reported): 78.5 kg  "(173 lb)  Height (Patient Reported): 177.8 cm (5' 10\")  BMI (Based on Pt Reported Ht/Wt): 24.82  Pain Score: Mild Pain (2)  Pain Loc: Other - see comment(LOWER BACK AND LEFT LEG)    Video Start Time: 8:10 AM  Video-Visit Details    Type of service:  Video Visit    Video End Time:8:39 AM    Originating Location (pt. Location): Home    Distant Location (provider location):  [facility]     Platform used for Video Visit: Trovali     Viola Youssef MA          Again, thank you for allowing me to participate in the care of your patient.        Sincerely,        Jose Steven MD    "

## 2021-06-23 NOTE — PROGRESS NOTES
"  Department of Therapeutic Radiology--Radiation Oncology                   [place] [address]  [address] [place]  [address]  Office:  [phone]  Fax:  [phone]   Radiation Oncology Clinic  [address] [place] [address]  Phone:  [phone]  Fax:  [phone]     RE: Junito Wang : 1960   MRN: [id number] BRANDON: 2021     OUTPATIENT VISIT NOTE       DIAGNOSIS: lung cancaer with brain met  ( and liver and bone mets)  AREAS TREATED: GK to choroid on left temporal area  DOSE:  20 Gy in single fraction  to  brain  3/24/21                 20 Gy in 5 fractions to lumbar spine ending 3/23/21    TYPES OF RADIATION GIVEN: EBT and GK    INTERVAL SINCE COMPLETION OF RADIATION THERAPY: 3 mo      SUBJECTIVE: He is improving as far as walking goes.   He is still using a walker, but getting stronger.  No headaches, or other cns symptoms.   Chemotherapy going well.  He has 5 cycles of chemo .  Tolerating well      OBJECTIVE:   /81 (BP Location: Right arm, Patient Position: Sitting, Cuff Size: Adult Regular)   Pulse 112   Resp 16   Ht 1.778 m (5' 10\")   Wt 79.4 kg (175 lb)   SpO2 100%   BMI 25.11 kg/m      GENERAL: Healthy, alert and no distress  EYES: Eyes grossly normal to inspection.  No discharge or erythema, or obvious scleral/conjunctival abnormalities.  RESP: No audible wheeze, cough, or visible cyanosis.  No visible retractions or increased work of breathing.    SKIN: Visible skin clear. No significant rash, abnormal pigmentation or lesions.  NEURO: Cranial nerves grossly intact.  Mentation and speech appropriate for age.  PSYCH: Mentation appears normal, affect normal/bright, judgement and insight intact, normal speech and appearance well-groomed.      BRAIN MRI today:   BY my eye no new mets and the previously treated area is almost completely resolved    ASSESSMENT :No new brain mets    RECOMMENDATION : RTC 3 months with follow up MRI brain      Thank you " for allowing me to participate in this patients  care.   Please do not heistate to call if you have questions   Emilia Oliveira   Department of Therapeutic Radiology -Radiation Oncology  [facility]    Phone:[facility] [phone]/ CARLOS -[phone]/[facility] [place]  [phone]  PAGER   [phone] up

## 2021-06-23 NOTE — PROGRESS NOTES
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),metastasis of liver, brain and spine  (AJCC 8th edition)  -MMR proficient, HER2 by IHC is 2+, FISH neg  -PD-L1 CPS- 5-10%        Treatment:  3/2/21- L2-L5 laminectomy and decompression  3/17/21-3/23/21: Radiation to LS spine  3/24/21: gamma knife (2000cGy in 5 fractions)  3/31/21 to now: Cisplatin+keytruda+5fu every 3 weeks    Treatment intent- Palliative    Oncology HPI:   August 2020- stomach discomfort, belching   October 2020- progressive dysphagia with solids   1/26/21- distal esophageal biopsy shows Moderately differentiated adenocarcinoma; MMR normal expression, PDL1 expression is low with TPS 2%, CPS 5-10, Her2 is pending  1/27/21- CT CAP- Enlarged  2 cm subcarinal lymph node and 1.4 cm short axis right subcarinal lymph node, focal thickening of the superior wall along the right side of the mid esophagus. Numerous small pulmonary nodules, 3 mm nodule in the superior segment of the left lower lobe, 3 mm nodule in the anterior aspect of the right upper lobe , 4 mm nodule in the medial aspect of the right upper lobe and 4 mm right lower lobe nodule. Hypoattenuating foci in the liver, 1.6 cm hypoattenuating/hypoenhancing lesion in hepatic segment 8 and 1.4 cm lesion in hepatic segment 5 , indeterminate, likely metastatic.  The prostate gland is mildly enlarged measuring 5.3 cm in transverse diameter. Multiple prominent but not significantly enlarged retroperitoneal lymph nodes, indeterminate, could be reactive. 4.4 x 4.3 cm lytic lesion centered in the posterior aspect of the left sacral ala (series 3 image 243), 4.7 x 4.0 x 5.6 cm (axial and CC dimensions, respectively) hypoattenuating lesion in the subcutaneous tissue of the anterior chest wall just anterior to the mediastinum, indeterminate, could represent sebaceous cyst.  2/4/21- Saw Dr. Bourgeois ordered  PET/CT  3/2/21-3/10/21- Admission to [facility]- <24h of inability to ambulate with acute onset left leg weakness/urinary retention with perianal sensory deficits, MRI showed extensive epidural signal change suggestive of tumor vs. Hemorrhage.Pt underwent emergent surgery- L2-L5 laminectomy and decompression . PEG tube placement 3/5/2021.   3/17/21-3/23/21: Radiation to LS spine  3/24/21: gamma knife (2000cGy in 5 fractions)  3/29/21- port placement  3/31/21: Cycle 1 Cisplatin+keytruda+5fu   4/21/21: Cycle 2 Cisplatin+keytruda+5fu  5/11/21- Ct CAP- Overall stable disease- mixed response- NC in 2 liver lesions while, stable disease to borderline progression in 1 liver lesions  5/12/21- Cycle 3 Cisplatin+keytruda+5fu  6/2/21- Cycle 4 Cisplatin+keytruda+5fu  6/21/21- CT CAP- Stable mild circumferential wall thickening of the distal esophagus (series 3, image 99) with small amount of fluid in the upper and mid esophagus. No lymphadenopathy. Stable to slight increased size of hepatic metastases. For example a 3.1 x 2.9 cm lesion in the left lobe (series 3, image 123) previously measured 2.9 x 2.6 cm, and a 2.7 x 2.9 cm peripheral right hepatic lobe metastasis previously measured 3.0 x 2.2 cm. A 1.0 cm lesion in the caudate lobe (series 3, image 138) is more prominent today/new. Stable small gastrohepatic ligament lymph nodes. No new or enlarging lymph nodes in the abdomen and pelvis. No ascites.Stable 5.1 x 4.6 cm left sacral mass. Stable cystic 4.9 x 3.5 cm subcutaneous lesion overlying the midsternum.      Interval history:   Junito continues to tolerate chemo well. Has mild nausea that is manageable, and diarrhea after chemo. He uses miralax as needed if he is constipated. .He has some pain near the tail bone that is constant, the back of leg pain is intermittennt, and isshooting to the toes. He has stable numbness in foot. He has mild neuropathy in tips of finger but is stable, and intermittent.  He uses walker only for  long distances.  He was outside doing yardwork yesterday and tolerated this well. He walks around without his walker sometimes.  Energy levels are lower in the first few days, but imrpoves after the first week.  He has fatigue for a few days but then that improves later. He has mouth sores and tongue sores that start about the 4th day after treatment and last about a week. He did more salt and soda rinses this last cycle which he feels helped. He puts aquaphor on cracked/dry lips.   He is trying to eat more things orally, he adjusts this during the days he has the mouth sores and uses his PEG otherwise. He still does about 4-5 cartons per day.  He had turkey sandwich ice cream, able to eat by mouth a little bit now. Slowly the amount of PO food intake is increasing.   He is currently eating about 60-70% of PO intake.    He remains independent of ADL and IADL. He has been doing gardening and plating tomatoes, weeding, flower beds.          10 point review of systems otherwise negative     Current Outpatient Medications   Medication Sig Dispense Refill     Calcium Carb-Cholecalciferol (CALCIUM-VITAMIN D) 600-400 MG-UNIT TABS Take 2 tablets by mouth daily (Patient not taking: Reported on 6/2/2021) 60 tablet 3     calcium carbonate (TUMS) 500 MG chewable tablet Take 1 chew tab by mouth 2 times daily As needed       clotrimazole-betamethasone (LOTRISONE) 1-0.05 % external cream Apply topically 2 times daily (Patient not taking: Reported on 6/2/2021) 15 g 1     dexamethasone (DECADRON) 4 MG tablet Take 2 tablets (8 mg) by mouth daily (with breakfast) for 3 days, starting day after Cisplatin (Day 2). 6 tablet 2     gabapentin (NEURONTIN) 250 MG/5ML solution Take 5 mLs (250 mg) by mouth At Bedtime 470 mL 1     LORazepam (ATIVAN) 0.5 MG tablet Take 1 tablet (0.5 mg) by mouth every 4 hours as needed (Anxiety, Nausea/Vomiting or Sleep) (Patient not taking: Reported on 4/20/2021) 30 tablet 2     methocarbamol (ROBAXIN) 750 MG  tablet Take 1 tablet (750 mg) by mouth 4 times daily 30 tablet 0     multivitamin CF FORMULA (CHOICEFUL) chewable tablet Take 1 tablet by mouth daily       omeprazole (FIRST-OMEPRAZOLE) 2 MG/ML SUSP Take 10 mLs (20 mg) by mouth 2 times daily (Patient not taking: Reported on 6/2/2021) 300 mL 1     omeprazole POWD powder        prochlorperazine (COMPAZINE) 10 MG tablet Take 1 tablet (10 mg) by mouth every 6 hours as needed (Nausea/Vomiting) (Patient not taking: Reported on 4/20/2021) 30 tablet 2        No Known Allergies    Exam:  There were no vitals taken for this visit.  Wt Readings from Last 4 Encounters:   06/02/21 78.5 kg (173 lb)   05/12/21 78.9 kg (173 lb 14.4 oz)   04/21/21 78.4 kg (172 lb 12.8 oz)   03/31/21 79 kg (174 lb 1.6 oz)     Video physical exam  General: Patient appears well in no acute distress.   Skin: No visualized rash or lesions on visualized skin  Eyes: EOMI, no erythema, sclera icterus or discharge noted  Resp: Appears to be breathing comfortably without accessory muscle usage, speaking in full sentences, no cough  MSK: Appears to have normal range of motion based on visualized movements  Neurologic: No apparent tremors, facial movements symmetric  Psych: affect good, alert and oriented    The rest of a comprehensive physical examination is deferred due to PHE (public health emergency) video restrictions    Labs:  New labs today at infusion     Imaging:     CT CHEST/ABDOMEN/PELVIS W CONTRAST 6/21/2021 3:07 PM     CLINICAL HISTORY: Malignant neoplasm of lower third of esophagus (H);  Bone metastasis (H)     TECHNIQUE: CT scan of the chest, abdomen, and pelvis was performed  following injection of IV contrast. Multiplanar reformats were  obtained. Dose reduction techniques were used.   CONTRAST: 84mL Isovue-370     COMPARISON: 5/11/2021     FINDINGS:   LUNGS AND PLEURA: Few tiny sub-4 mm pulmonary nodules are stable. No  new or enlarging nodules. No infiltrate or  effusion.     MEDIASTINUM/AXILLAE: Stable mild circumferential wall thickening of  the distal esophagus (series 3, image 99) with small amount of fluid  in the upper and mid esophagus. No lymphadenopathy. Right IJ port  catheter tip at the low SVC. No thoracic aortic aneurysm. Moderate  coronary artery calcifications.     HEPATOBILIARY: Stable to slight increased size of hepatic metastases.  For example a 3.1 x 2.9 cm lesion in the left lobe (series 3, image  123) previously measured 2.9 x 2.6 cm, and a 2.7 x 2.9 cm peripheral  right hepatic lobe metastasis previously measured 3.0 x 2.2 cm. A 1.0  cm lesion in the caudate lobe (series 3, image 138) is more prominent  today/new.     PANCREAS: Normal.     SPLEEN: Normal.     ADRENAL GLANDS: Normal.     KIDNEYS/BLADDER: No significant mass, stones, or hydronephrosis. There  are simple or benign cysts. No follow up is needed.     BOWEL: Percutaneous gastrostomy tube in place. No small bowel or  colonic obstruction or inflammatory changes. Large amount of formed  stool in the colon.     PELVIC ORGANS: No pelvic masses.     ADDITIONAL FINDINGS: Stable small gastrohepatic ligament lymph nodes.  No new or enlarging lymph nodes in the abdomen and pelvis. No ascites.     MUSCULOSKELETAL: Stable 5.1 x 4.6 cm left sacral mass. Stable cystic  4.9 x 3.5 cm subcutaneous lesion overlying the midsternum. No new  lesions in the bones.                                                                      IMPRESSION:  1.  Stable to slight increase in size of hepatic metastases and  new/increased prominence of a metastatic lesion in the caudate lobe of  the liver.  2.  Stable circumferential wall thickening of the distal esophagus.  3.  Stable osseous metastasis in the sacrum.     AZALEA ARGUELLO MD    Impression/plan:      # Stage IV esophageal adenocarcinoma   # ECOG PS 2     Started chemoimmunotherpay with Cisplatin+5Fu+pembrolizumab based on KN-590 trial. The trial demonstrated a  statistically significant improvement in OS and PFS for patients randomized to pembrolizumab with chemotherapy. Median OS was 12.4 months (95% CI: 10.5, 14.0) for the pembrolizumab arm versus 9.8 months (95% CI: 8.8, 10.8) for the chemotherapy arm (HR 0.73; 95% CI: 0.62, 0.86; p<0.0001). Median PFS was 6.3  (95% CI: 6.2, 6.9) and 5.8 months (95% CI: 5.0, 6.0), respectively (HR 0.65; 95% CI: 0.55, 0.76; p<0.0001).     Now s/p 4 cycles well with minimal and manageable side-effects. Restaging scan after 2 cycles showed overall stable disease with - mixed response- FL in esophgaus primary, and 2 liver lesions while, stable disease in spine, and stable to borderline progression in 1 liver lesions. Scan after 4 cycles showing stable disease in esophagus and LN and a some liver lesion. 2 lesions in liver are slightly bigger, one is more prominent than before, however, the degree of increase is <20%, and still in the realm of stable disease. Clinically he is stable to improved. Will continue chemoimmunotherpay for 2 more cycles and do restaging scan after 6 cycles. If stable with next scan, will drop cisplatin after 6 cycles, continue 5 -FLU and pembro maintainence for total of 2 yrs or until progression/intolerable toxicities.     Plan  Proceed with cycle 5 tomorrow pending labs  Appt with Jaky prior to July 14 infusion   Infusion appts for 5-FFLU and keytruda afetr July 14   Ct CAP Aug 3 (prefers [facility] location)   RTC with me Aug 4       #Brain mets.   Follows with Dr. Velez. S/p GK 3/24. Next MRI in June 23.     #mucositis  2/2 F5U. Continue Aquaphor and salt/soda rinses too. He did not find them too painful so not needing analgesic.      #Bone mets  Bone biopsy of left pelvic mass 2/26/21 confirmed mets. Finished radiation 3/23 to lumbosacral spine   Discussed adding zometa, he has not seen dentist in 4 yrs, discusseed small risk of osteonecrosis, 3-5% risk, he does not have any ongoing dental issues and has no  "tooth pain or caries.  -Continue Zometa, plan to give every 6 weeks  -Continue Calcium and Vitmain D     #Dypshagia, malnutrition  PEG tube placed in OR  (3/5) for nutritional support. Tolerating tube feeds and now with  PO intake too. Has follws with Kari DOUGLAS. Continue PO intake as tolerated. Previously declined SLP follow up, can revisit as needed though dysphagia has improved with treatment.   --Of note, consideration given to esophageal stent vs XRT for esophageal debulking to address dysphagia inpatient in Feb/March 2021; however given likely improvement with chemo-immunotherapy, as well as risks of worsening cytopenias, elected not to.      #Cauda Equina due to tumor compression. He is s/p L2-L5 laminectomy and decompression.   Followed by neurosurgery with improved exam after surgery. He is now ambulating with or without a walker, strength nearly back to 70%. He is aware of his activity and lifting restrictions. Has some L radiculopathy that he uses robaxan prn.      #Constipation  Dulcolax, miralax, or MOM prn, asked he not use suppository while on chemo     #Urinary retention  Andrews out. Follows with urology     #GERD  Improved. liquid omeprazole as needed     On the day of service  Chart review: 5 minutes  Visit duration: 30 minutes  Care coordination: 5 minutes    Jose Steven MD    Hematology, Oncology and Transplantation        Junito is a 60 year old who is being evaluated via a billable video visit.      How would you like to obtain your AVS? MyChart  If the video visit is dropped, the invitation should be resent by: Send to e-mail at: [email or web address]  Will anyone else be joining your video visit? No    Vitals - Patient Reported  Weight (Patient Reported): 78.5 kg (173 lb)  Height (Patient Reported): 177.8 cm (5' 10\")  BMI (Based on Pt Reported Ht/Wt): 24.82  Pain Score: Mild Pain (2)  Pain Loc: Other - see comment(LOWER BACK AND LEFT LEG)    Video Start Time: 8:10 " VALENTINA  Video-Visit Details    Type of service:  Video Visit    Video End Time:8:39 AM    Originating Location (pt. Location): Home    Distant Location (provider location):  [facility] CANCER [facility]     Platform used for Video Visit: Dar Youssef MA

## 2021-06-23 NOTE — LETTER
"    2021         RE: Junito Wang  [address] [address] [facility] [address]        Dear Colleague,    Thank you for referring your patient, Junito Wang, to the [facility] RADIATION ONCOLOGY. Please see a copy of my visit note below.      FOLLOW-UP VISIT    Patient Name: Junito Wang      : 1960     Age: 60 year old        ______________________________________________________________________________   Chief complaint: Follow up for Radiation Treatment, esophageal cancer with bone mets    /81 (BP Location: Right arm, Patient Position: Sitting, Cuff Size: Adult Regular)   Pulse 112   Resp 16   Ht 1.778 m (5' 10\")   Wt 79.4 kg (175 lb)   SpO2 100%   BMI 25.11 kg/m      Date Radiation Completed: (20Gy 5fx to lumbosacral) (20Gy Gamma Knife brain) 3/24/2021    Pain  Current history of pain associated with this visit:   Intensity: 10  Current: aching  Location: lumbosacral/left hip  Treatment: rest    Labs  Other Labs: No    Imaging  MRI: Brain 2021      Other Appointments:     MD Name: Maurizio Appointment Date: 2021  MD Name: Appointment Date:  MD Name: Appointment Date:  Other Appointment Notes:     Residual Radiation side effect: denies     Additional Instructions:     Nurse face-to-face time: Level 3:  10 min face to face time                Review of Systems   Constitutional: Negative.    HENT: Positive for tinnitus (left, chronic).    Eyes: Negative.    Respiratory: Positive for cough.    Cardiovascular: Negative.    Gastrointestinal: Positive for constipation (post chemo), heartburn (post chemo) and nausea (post chemo).   Genitourinary: Positive for frequency.   Musculoskeletal: Positive for joint pain.   Skin: Negative.    Neurological: Positive for tingling (tips of fingers).   Endo/Heme/Allergies: Negative.    Psychiatric/Behavioral: Negative.                    Department of Therapeutic Radiology--Radiation Oncology                   [place] [address] " "[address] [place]  [address]  Office:  [phone]  Fax:  [phone]   Radiation Oncology Clinic  [address] [place] [address]  Phone:  [phone]  Fax:  [phone]     RE: Junito Wang : 1960   MRN: [id number] BRANDON: 2021     OUTPATIENT VISIT NOTE       DIAGNOSIS: lung cancaer with brain met  ( and liver and bone mets)  AREAS TREATED: GK to choroid on left temporal area  DOSE:  20 Gy in single fraction  to  brain  3/24/21                 20 Gy in 5 fractions to lumbar spine ending 3/23/21    TYPES OF RADIATION GIVEN: EBT and GK    INTERVAL SINCE COMPLETION OF RADIATION THERAPY: 3 mo      SUBJECTIVE: He is improving as far as walking goes.   He is still using a walker, but getting stronger.  No headaches, or other cns symptoms.   Chemotherapy going well.  He has 5 cycles of chemo .  Tolerating well      OBJECTIVE:   /81 (BP Location: Right arm, Patient Position: Sitting, Cuff Size: Adult Regular)   Pulse 112   Resp 16   Ht 1.778 m (5' 10\")   Wt 79.4 kg (175 lb)   SpO2 100%   BMI 25.11 kg/m      GENERAL: Healthy, alert and no distress  EYES: Eyes grossly normal to inspection.  No discharge or erythema, or obvious scleral/conjunctival abnormalities.  RESP: No audible wheeze, cough, or visible cyanosis.  No visible retractions or increased work of breathing.    SKIN: Visible skin clear. No significant rash, abnormal pigmentation or lesions.  NEURO: Cranial nerves grossly intact.  Mentation and speech appropriate for age.  PSYCH: Mentation appears normal, affect normal/bright, judgement and insight intact, normal speech and appearance well-groomed.      BRAIN MRI today:   BY my eye no new mets and the previously treated area is almost completely resolved    ASSESSMENT :No new brain mets    RECOMMENDATION : RTC 3 months with follow up MRI brain      Thank you for allowing me to participate in this patients  care.   Please do not heistate to call if " you have questions   Emilia Oliveira   Department of Therapeutic Radiology -Radiation Oncology  [facility] [place]    Phone:[facility] [phone]/ CARLOS [phone]/[facility]  [phone]  PAGER   [phone]         up        Again, thank you for allowing me to participate in the care of your patient.        Sincerely,        Emilia Qureshi MD

## 2021-06-24 ENCOUNTER — INFUSION THERAPY VISIT (OUTPATIENT)
Dept: INFUSION THERAPY | Facility: CLINIC | Age: 61
End: 2021-06-24
Payer: COMMERCIAL

## 2021-06-24 VITALS
HEART RATE: 104 BPM | BODY MASS INDEX: 25.11 KG/M2 | RESPIRATION RATE: 16 BRPM | TEMPERATURE: 99.1 F | SYSTOLIC BLOOD PRESSURE: 114 MMHG | WEIGHT: 175 LBS | OXYGEN SATURATION: 98 % | DIASTOLIC BLOOD PRESSURE: 71 MMHG

## 2021-06-24 DIAGNOSIS — C79.51 BONE METASTASIS: ICD-10-CM

## 2021-06-24 DIAGNOSIS — C15.5 MALIGNANT NEOPLASM OF LOWER THIRD OF ESOPHAGUS (H): Primary | ICD-10-CM

## 2021-06-24 LAB
ALBUMIN SERPL-MCNC: 3.6 G/DL (ref 3.4–5)
ALP SERPL-CCNC: 110 U/L (ref 40–150)
ALT SERPL W P-5'-P-CCNC: 20 U/L (ref 0–70)
ANION GAP SERPL CALCULATED.3IONS-SCNC: 6 MMOL/L (ref 3–14)
AST SERPL W P-5'-P-CCNC: 16 U/L (ref 0–45)
BASOPHILS # BLD AUTO: 0.1 10E9/L (ref 0–0.2)
BASOPHILS NFR BLD AUTO: 0.9 %
BILIRUB SERPL-MCNC: 0.3 MG/DL (ref 0.2–1.3)
BUN SERPL-MCNC: 15 MG/DL (ref 7–30)
CALCIUM SERPL-MCNC: 9.2 MG/DL (ref 8.5–10.1)
CHLORIDE SERPL-SCNC: 106 MMOL/L (ref 94–109)
CO2 SERPL-SCNC: 28 MMOL/L (ref 20–32)
CREAT SERPL-MCNC: 0.65 MG/DL (ref 0.66–1.25)
DIFFERENTIAL METHOD BLD: ABNORMAL
EOSINOPHIL # BLD AUTO: 0.5 10E9/L (ref 0–0.7)
EOSINOPHIL NFR BLD AUTO: 7.4 %
ERYTHROCYTE [DISTWIDTH] IN BLOOD BY AUTOMATED COUNT: 21.6 % (ref 10–15)
GFR SERPL CREATININE-BSD FRML MDRD: >90 ML/MIN/{1.73_M2}
GLUCOSE SERPL-MCNC: 92 MG/DL (ref 70–99)
HCT VFR BLD AUTO: 35.9 % (ref 40–53)
HGB BLD-MCNC: 11.4 G/DL (ref 13.3–17.7)
IMM GRANULOCYTES # BLD: 0.1 10E9/L (ref 0–0.4)
IMM GRANULOCYTES NFR BLD: 1.5 %
LYMPHOCYTES # BLD AUTO: 0.7 10E9/L (ref 0.8–5.3)
LYMPHOCYTES NFR BLD AUTO: 10 %
MAGNESIUM SERPL-MCNC: 2.2 MG/DL (ref 1.6–2.3)
MCH RBC QN AUTO: 27.5 PG (ref 26.5–33)
MCHC RBC AUTO-ENTMCNC: 31.8 G/DL (ref 31.5–36.5)
MCV RBC AUTO: 87 FL (ref 78–100)
MONOCYTES # BLD AUTO: 0.7 10E9/L (ref 0–1.3)
MONOCYTES NFR BLD AUTO: 11.1 %
NEUTROPHILS # BLD AUTO: 4.6 10E9/L (ref 1.6–8.3)
NEUTROPHILS NFR BLD AUTO: 69.1 %
PLATELET # BLD AUTO: 285 10E9/L (ref 150–450)
POTASSIUM SERPL-SCNC: 4.1 MMOL/L (ref 3.4–5.3)
PROT SERPL-MCNC: 6.8 G/DL (ref 6.8–8.8)
RBC # BLD AUTO: 4.15 10E12/L (ref 4.4–5.9)
SODIUM SERPL-SCNC: 140 MMOL/L (ref 133–144)
WBC # BLD AUTO: 6.6 10E9/L (ref 4–11)

## 2021-06-24 PROCEDURE — 96375 TX/PRO/DX INJ NEW DRUG ADDON: CPT | Performed by: NURSE PRACTITIONER

## 2021-06-24 PROCEDURE — 96415 CHEMO IV INFUSION ADDL HR: CPT | Performed by: NURSE PRACTITIONER

## 2021-06-24 PROCEDURE — 96416 CHEMO PROLONG INFUSE W/PUMP: CPT | Performed by: NURSE PRACTITIONER

## 2021-06-24 PROCEDURE — 96368 THER/DIAG CONCURRENT INF: CPT | Performed by: NURSE PRACTITIONER

## 2021-06-24 PROCEDURE — 96417 CHEMO IV INFUS EACH ADDL SEQ: CPT | Performed by: NURSE PRACTITIONER

## 2021-06-24 PROCEDURE — 96367 TX/PROPH/DG ADDL SEQ IV INF: CPT | Performed by: NURSE PRACTITIONER

## 2021-06-24 PROCEDURE — 80053 COMPREHEN METABOLIC PANEL: CPT | Performed by: NURSE PRACTITIONER

## 2021-06-24 PROCEDURE — 85025 COMPLETE CBC W/AUTO DIFF WBC: CPT | Performed by: NURSE PRACTITIONER

## 2021-06-24 PROCEDURE — 83735 ASSAY OF MAGNESIUM: CPT | Performed by: NURSE PRACTITIONER

## 2021-06-24 PROCEDURE — 99207 PR NO CHARGE LOS: CPT

## 2021-06-24 PROCEDURE — 99207 PR NO CHARGE NURSE ONLY: CPT

## 2021-06-24 PROCEDURE — 96413 CHEMO IV INFUSION 1 HR: CPT | Performed by: NURSE PRACTITIONER

## 2021-06-24 RX ORDER — HEPARIN SODIUM (PORCINE) LOCK FLUSH IV SOLN 100 UNIT/ML 100 UNIT/ML
500 SOLUTION INTRAVENOUS ONCE
Status: COMPLETED | OUTPATIENT
Start: 2021-06-24 | End: 2021-06-24

## 2021-06-24 RX ORDER — ZOLEDRONIC ACID 0.04 MG/ML
4 INJECTION, SOLUTION INTRAVENOUS ONCE
Status: CANCELLED | OUTPATIENT
Start: 2021-06-25 | End: 2021-06-25

## 2021-06-24 RX ORDER — HEPARIN SODIUM,PORCINE 10 UNIT/ML
5 VIAL (ML) INTRAVENOUS
Status: CANCELLED | OUTPATIENT
Start: 2021-06-28

## 2021-06-24 RX ORDER — ZOLEDRONIC ACID 0.04 MG/ML
4 INJECTION, SOLUTION INTRAVENOUS ONCE
Status: COMPLETED | OUTPATIENT
Start: 2021-06-24 | End: 2021-06-24

## 2021-06-24 RX ORDER — HEPARIN SODIUM (PORCINE) LOCK FLUSH IV SOLN 100 UNIT/ML 100 UNIT/ML
5 SOLUTION INTRAVENOUS
Status: DISCONTINUED | OUTPATIENT
Start: 2021-06-24 | End: 2021-06-24 | Stop reason: HOSPADM

## 2021-06-24 RX ORDER — HEPARIN SODIUM,PORCINE 10 UNIT/ML
5 VIAL (ML) INTRAVENOUS
Status: CANCELLED | OUTPATIENT
Start: 2021-06-25

## 2021-06-24 RX ORDER — PALONOSETRON 0.05 MG/ML
0.25 INJECTION, SOLUTION INTRAVENOUS ONCE
Status: COMPLETED | OUTPATIENT
Start: 2021-06-24 | End: 2021-06-24

## 2021-06-24 RX ORDER — HEPARIN SODIUM (PORCINE) LOCK FLUSH IV SOLN 100 UNIT/ML 100 UNIT/ML
5 SOLUTION INTRAVENOUS
Status: CANCELLED | OUTPATIENT
Start: 2021-06-28

## 2021-06-24 RX ORDER — HEPARIN SODIUM (PORCINE) LOCK FLUSH IV SOLN 100 UNIT/ML 100 UNIT/ML
5 SOLUTION INTRAVENOUS
Status: CANCELLED | OUTPATIENT
Start: 2021-06-25

## 2021-06-24 RX ADMIN — Medication 1000 ML: at 08:25

## 2021-06-24 RX ADMIN — HEPARIN SODIUM (PORCINE) LOCK FLUSH IV SOLN 100 UNIT/ML 500 UNITS: 100 SOLUTION at 07:36

## 2021-06-24 RX ADMIN — PALONOSETRON 0.25 MG: 0.05 INJECTION, SOLUTION INTRAVENOUS at 11:08

## 2021-06-24 RX ADMIN — ZOLEDRONIC ACID 4 MG: 0.04 INJECTION, SOLUTION INTRAVENOUS at 08:27

## 2021-06-24 ASSESSMENT — PAIN SCALES - GENERAL: PAINLEVEL: MILD PAIN (2)

## 2021-06-24 NOTE — PROGRESS NOTES
"Infusion Nursing Note:  Junito Wang presents today for C5D1 Keytruda/Cisplatin/5FU pump connect and Zometa.    Patient seen by provider today: No- visit with Dr. Steven yesterday   present during visit today: Not Applicable.    Note: N/A.  Patient did meet criteria for an asymptomatic covid-19 PCR test in infusion today. Patient declined the covid-19 test.    Intravenous Access:  Implanted Port.    Treatment Conditions:  Lab Results   Component Value Date    HGB 11.4 06/24/2021     Lab Results   Component Value Date    WBC 6.6 06/24/2021      Lab Results   Component Value Date    ANEU 4.6 06/24/2021     Lab Results   Component Value Date     06/24/2021      Lab Results   Component Value Date     06/24/2021                   Lab Results   Component Value Date    POTASSIUM 4.1 06/24/2021           Lab Results   Component Value Date    MAG 2.2 06/24/2021            Lab Results   Component Value Date    CR 0.65 06/24/2021                   Lab Results   Component Value Date    ARDEN 9.2 06/24/2021                Lab Results   Component Value Date    BILITOTAL 0.3 06/24/2021           Lab Results   Component Value Date    ALBUMIN 3.6 06/24/2021                    Lab Results   Component Value Date    ALT 20 06/24/2021           Lab Results   Component Value Date    AST 16 06/24/2021       Results reviewed, labs MET treatment parameters, ok to proceed with treatment.    Post Infusion Assessment:  Patient tolerated infusion without incident.  Blood return noted pre and post infusion.  Site patent and intact, free from redness, edema or discomfort.  No evidence of extravasations.     Prior to discharge: Port is secured in place with tegaderm and flushed with 10cc NS with positive blood return noted.  Continuous home infusion Dosi-Fuser pump connected.    All connectors secured in place and clamps taped open.    Pump started, \"running\" noted on display (CADD): Not Applicable.  Pump Connection " double checked with Jennifer Sarkar RN.  Patient instructed to call our clinic or [facility] with any questions or concerns at home.  Patient verbalized understanding.    Patient set up for pump disconnect at home with [facility] on 6/29/2021 at 2:00 pm.      Discharge Plan:   Patient will return 7/14/2021 for next appointment.   Patient discharged in stable condition accompanied by: self.  Departure Mode: Ambulatory.    Chelsey Hogan RN-BSN, PHN, OCN  [facility]

## 2021-06-24 NOTE — PROGRESS NOTES
Patient's port accessed using sterile procedure. Blood return noted. Gold, green and purple tube drawn, labeled and sent to lab. Port flushed with saline and heparin. Patient tolerated lab draw well.       Eliane Wheeler RN

## 2021-07-07 NOTE — PROGRESS NOTES
This is a recent snapshot of the patient's [facility] medical record.  For current drug dose and complete information and questions, call [phone] or In [facility] pool, fv home infusion ([id number])  CSN Number:  [id number]

## 2021-07-07 NOTE — PROGRESS NOTES
Urology Office Visit - Follow up    Reason for visit: flow/PVR    Assessment/Plan:  61 year old male with incomplete bladder emptying. 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 However, he is very averse to catheterization. Random bladder scan today is 315 mL roughly 45 minutes after his last void. Subjectively, he feels to be voiding well without any bothersome LUTS. Objectively, his kidney function is normal and he is not having any UTIs or urinary incontinence as sequelae from his mild urinary retention. We briefly discussed adding an alpha blocker, though he runs slightly hypotensive at baseline so will avoid this for now so as not to increase his risk for falls.  -He will continue to void on his own. Double void as able.  -Cautioned on signs/symptoms to watch for that may herald a more serious issue. He does have tools available for self-catheterization if needed.  -Otherwise, follow up in 6 months to recheck.    Angelica Lloyd PA-C  Department of Urology      HPI: Junito Wang is a 61 year old male with a history of esophageal cancer and urinary retention in the setting of extensive epidural changes suggestive of tumor vs. hemorrhage causing inability to ambulate and leg weakness, as well as severe constipation s/p emergency surgery. Previously followed by Whit Hatfield CNP. He underwent urodynamics testing on 4/12/21. Results/recommendations from Whit reviewed as follows:    -Large bladder capacity (741 mL) with delayed filling sensations.  -Good bladder compliance with DO/DOI.  -No JOSE.  -Patient makes initial attempt to void from a seated position at fill volume of 566 mL, but unable to void any urine, and no appreciable detrusor contraction. He reported a reduced urge to void, and filling was therefore resumed. At fill volume of 741 mL, he was then assisted to a standing position for a second attempt to void, which yielded a brief rise in detrusor pressure to  45 cm of H2O pressure, during which time he passes some urine. However, this pressure quickly drops, and he attempts to void primarily through Valsalva effort. He was then allowed to finish voiding in private bathroom, and voided an additional 120 mL.   -Reduced flow rate (Qmean 4.2 mL/s) with intermittent flow curve and incomplete bladder emptying (final  mL).   -Increased EMG activity during voiding, which likely correlates with Valsalva effort  -BOOI is -33.4 which is negative for bladder outlet obstruction.  -Fluoroscopy reveals a smooth bladder wall without diverticulae or cellules.  No vesicoureteral reflux was observed.  The bladder neck was closed during filling and during voiding.     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 Final PVR today is 313 mL, which is elevated, but reasonable. He continues to void an average of 300 mL with each void throughout the day, per his bladder diary. He is very opposed to CIC and does not think this is a reasonable option for him. We therefore made the shared decision to defer any form of catheterization today (CIC vs Nadrews) and have patient continue to monitor void volumes. Will need to monitor residual volumes closely, and would like to see him back in 2-3 months for an in-person follow up for uroflow/PVR. However, the patient would like to follow at the [facility] for future visits due to ease of travel and close proximity to his home. Will therefore have him follow up with my colleague, Angelica Lloyd.      Some penile irritation reported today, and some evidence of balanitis on exam today. Will prescribe him Lotrisone to apply to the area BID.     He follows up today for flow/PVR. Feels to be voiding well. He is not sure that he empties his bladder fully, but no negative consequences from this such as UTIs, urinary incontinence, etc. He voids  somewhat infrequently during the day. Wakes every 1.5 hours at night due to reasons other than his bladder. He will void a few times at night when he wakes. Feels to void in large volumes. He denies dysuria or hematuria.     PEx  There were no vitals taken for this visit.   GENERAL: Healthy, alert and no distress  EYES: Eyes grossly normal to inspection.  No discharge or erythema, or obvious scleral/conjunctival abnormalities.  RESP: No audible wheeze, cough, or visible cyanosis.  No visible retractions or increased work of breathing.    SKIN: Visible skin clear. No significant rash, abnormal pigmentation or lesions.  NEURO: Cranial nerves grossly intact.  Mentation and speech appropriate for age.  PSYCH: Mentation appears normal, affect normal/bright, judgement and insight intact, normal speech and appearance well-groomed.    LABS:  Creatinine   Date Value Ref Range Status   06/24/2021 0.65 (L) 0.66 - 1.25 mg/dL Final       Random bladder scan: 315 mL (45 minutes after last void)

## 2021-07-09 NOTE — NURSING NOTE
Junito Wang's goals for this visit include:   Chief Complaint   Patient presents with     Follow Up     3 month follow up       He requests these members of his care team be copied on today's visit information:     PCP: Negra Kim    Referring Provider:  Whit Hatfield, [facility] [place] [address]    There were no vitals taken for this visit.    Do you need any medication refills at today's visit?     Angelica Kraft LPN on 7/9/2021 at 11:31 AM

## 2021-07-09 NOTE — PATIENT INSTRUCTIONS
UROLOGY CLINIC VISIT PATIENT INSTRUCTIONS    Continue to urinate on your own as you have been doing.     Watch for the following:  -New incontinence (leakage) of urine.  -Urinary tract infections (symptoms would include pain/burning with urination, blood in the urine, fevers, chills, etc.).  -Harder time emptying your bladder or urinating very little amounts.     Otherwise, follow up in 6 months to recheck.     If you have any issues, questions or concerns in the meantime, do not hesitate to contact us at [phone] or via [employer].     It was a pleasure meeting with you today.  Thank you for allowing me and my team the privilege of caring for you today.  YOU are the reason we are here, and I truly hope we provided you with the excellent service you deserve.  Please let us know if there is anything else we can do for you so that we can be sure you are leaving completely satisfied with your care experience.

## 2021-07-12 NOTE — PROGRESS NOTES
"Junito is a 61 year old who is being evaluated via a billable video visit.      How would you like to obtain your AVS? MyChart     If the video visit is dropped, the invitation should be resent by: Send to e-mail at: [email or web address]     Will anyone else be joining your video visit? No          Vitals - Patient Reported  Weight (Patient Reported): 79.4 kg (175 lb)  Height (Patient Reported): 177.8 cm (5' 10\")  BMI (Based on Pt Reported Ht/Wt): 25.11  Pain Score: Mild Pain (2)  Pain Loc: Low Back    Luiz Kwan LPN      Video-Visit Details    Type of service:  Video Visit    Video Start Time: 7:10 AM    Video End Time:7:30 AM    Originating Location (pt. 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),metastasis of liver, brain and spine  (AJCC 8th edition)  -MMR proficient, HER2 by IHC is 2+, FISH neg  -PD-L1 CPS- 5-10%     Treatment:  3/2/21- L2-L5 laminectomy and decompression  3/17/21-3/23/21: Radiation to LS spine  3/24/21: gamma knife (2000cGy in 5 fractions)  3/31/21 to now: Cisplatin+keytruda+5fu every 3 weeks    Treatment intent- Palliative    Oncology HPI:   August 2020- stomach discomfort, belching   October 2020- progressive dysphagia with solids   1/26/21- distal esophageal biopsy shows Moderately differentiated adenocarcinoma; MMR normal expression, PDL1 expression is low with TPS 2%, CPS 5-10, Her2 is pending  1/27/21- CT CAP- Enlarged  2 cm subcarinal lymph node and 1.4 cm short axis right subcarinal lymph node, focal thickening of the superior wall along the right side of the mid esophagus. Numerous small pulmonary nodules, 3 mm nodule in the superior segment of the left lower lobe, 3 mm nodule in the anterior aspect of the right upper lobe , 4 mm nodule " in the medial aspect of the right upper lobe and 4 mm right lower lobe nodule. Hypoattenuating foci in the liver, 1.6 cm hypoattenuating/hypoenhancing lesion in hepatic segment 8 and 1.4 cm lesion in hepatic segment 5 , indeterminate, likely metastatic.  The prostate gland is mildly enlarged measuring 5.3 cm in transverse diameter. Multiple prominent but not significantly enlarged retroperitoneal lymph nodes, indeterminate, could be reactive. 4.4 x 4.3 cm lytic lesion centered in the posterior aspect of the left sacral ala (series 3 image 243), 4.7 x 4.0 x 5.6 cm (axial and CC dimensions, respectively) hypoattenuating lesion in the subcutaneous tissue of the anterior chest wall just anterior to the mediastinum, indeterminate, could represent sebaceous cyst.  2/4/21- Saw Dr. Bourgeois ordered PET/CT  3/2/21-3/10/21- Admission to [facility]- <24h of inability to ambulate with acute onset left leg weakness/urinary retention with perianal sensory deficits, MRI showed extensive epidural signal change suggestive of tumor vs. Hemorrhage.Pt underwent emergent surgery- L2-L5 laminectomy and decompression . PEG tube placement 3/5/2021.   3/17/21-3/23/21: Radiation to LS spine  3/24/21: gamma knife (2000cGy in 5 fractions)  3/29/21- port placement  3/31/21: Cycle 1 Cisplatin+keytruda+5fu   4/21/21: Cycle 2 Cisplatin+keytruda+5fu  5/11/21- Ct CAP- Overall stable disease- mixed response- MT in 2 liver lesions while, stable disease to borderline progression in 1 liver lesions  5/12/21- Cycle 3 Cisplatin+keytruda+5fu  6/2/21- Cycle 4 Cisplatin+keytruda+5fu  6/21/21- CT CAP- Stable mild circumferential wall thickening of the distal esophagus (series 3, image 99) with small amount of fluid in the upper and mid esophagus. No lymphadenopathy. Stable to slight increased size of hepatic metastases. For example a 3.1 x 2.9 cm lesion in the left lobe (series 3, image 123) previously measured 2.9 x 2.6 cm, and a 2.7 x 2.9 cm peripheral right hepatic  lobe metastasis previously measured 3.0 x 2.2 cm. A 1.0 cm lesion in the caudate lobe (series 3, image 138) is more prominent today/new. Stable small gastrohepatic ligament lymph nodes. No new or enlarging lymph nodes in the abdomen and pelvis. No ascites.Stable 5.1 x 4.6 cm left sacral mass. Stable cystic 4.9 x 3.5 cm subcutaneous lesion overlying the midsternum.    Interval history:   Junito continues to tolerate chemo well. He has some mucosal irritation that was more prominent on his tongue vs lips this time. He describes it as tongue sensitivity, aggravated by more acidic food. Other foods that are not spicy/acidic he tolerates fine. He is compliant with salt/soda rinses. Low back pain and leg numbness unchanged. Uses a walker only if walking long distances. Does not get diarrhea, if anything gets constipated after treatment and takes miralax prn, and then empties out. Swallowing without issues, occasionally will belch more often but no indigestion or GERD really. No fever/chills. Urinating without issue. No cough/sob. Denies bleeding concerns. He continues to enjoy gardening      10 point review of systems otherwise negative     Current Outpatient Medications   Medication Sig Dispense Refill     Calcium Carb-Cholecalciferol (CALCIUM-VITAMIN D) 600-400 MG-UNIT TABS Take 2 tablets by mouth daily 60 tablet 3     dexamethasone (DECADRON) 4 MG tablet Take 2 tablets (8 mg) by mouth daily (with breakfast) for 3 days, starting day after Cisplatin (Day 2). 6 tablet 2     gabapentin (NEURONTIN) 250 MG/5ML solution Take 5 mLs (250 mg) by mouth At Bedtime 470 mL 1     LORazepam (ATIVAN) 0.5 MG tablet Take 1 tablet (0.5 mg) by mouth every 4 hours as needed (Anxiety, Nausea/Vomiting or Sleep) (Patient not taking: Reported on 4/20/2021) 30 tablet 2     methocarbamol (ROBAXIN) 750 MG tablet Take 1 tablet (750 mg) by mouth 4 times daily 30 tablet 0     multivitamin CF FORMULA (CHOICEFUL) chewable tablet Take 1 tablet by mouth  daily       omeprazole (FIRST-OMEPRAZOLE) 2 MG/ML SUSP Take 10 mLs (20 mg) by mouth 2 times daily (Patient not taking: Reported on 6/2/2021) 300 mL 1     omeprazole POWD powder        prochlorperazine (COMPAZINE) 10 MG tablet Take 1 tablet (10 mg) by mouth every 6 hours as needed (Nausea/Vomiting) (Patient not taking: Reported on 4/20/2021) 30 tablet 2        No Known Allergies    Exam:  There were no vitals taken for this visit.  Wt Readings from Last 4 Encounters:   06/24/21 79.4 kg (175 lb)   06/23/21 79.4 kg (175 lb)   06/02/21 78.5 kg (173 lb)   05/12/21 78.9 kg (173 lb 14.4 oz)     Video physical exam  General: Patient appears well in no acute distress.   Skin: No visualized rash or lesions on visualized skin  Eyes: EOMI, no erythema, sclera icterus or discharge noted  Resp: Appears to be breathing comfortably without accessory muscle usage, speaking in full sentences, no cough  MSK: Appears to have normal range of motion based on visualized movements  Neurologic: No apparent tremors, facial movements symmetric  Psych: affect good, alert and oriented    The rest of a comprehensive physical examination is deferred due to PHE (public health emergency) video restrictions    Labs:  Recent labs from 6/24 reviewed, new labs will be drawn tomorrow    Imaging: N/A today    Impression/plan:   # Stage IV esophageal adenocarcinoma   # ECOG PS 2  Started chemoimmunotherpay with Cisplatin+5Fu+pembrolizumab based on [id number] trial. The trial demonstrated a statistically significant improvement in OS and PFS for patients randomized to pembrolizumab with chemotherapy. Median OS was 12.4 months (95% CI: 10.5, 14.0) for the pembrolizumab arm versus 9.8 months (95% CI: 8.8, 10.8) for the chemotherapy arm (HR 0.73; 95% CI: 0.62, 0.86; p<0.0001). Median PFS was 6.3  (95% CI: 6.2, 6.9) and 5.8 months (95% CI: 5.0, 6.0), respectively (HR 0.65; 95% CI: 0.55, 0.76; p<0.0001).     Now s/p 5 cycles well with minimal and manageable  side-effects. Restaging scan after 2 cycles showed overall stable disease with - mixed response- WA in esophgaus primary, and 2 liver lesions while, stable disease in spine, and stable to borderline progression in 1 liver lesions. Scan after 4 cycles showing stable disease in esophagus and LN and a some liver lesion. 2 lesions in liver are slightly bigger, one is more prominent than before, however, the degree of increase is <20%, and still in the realm of stable disease. Clinically he is stable to improved. Plan to continue chemoimmunotherpay for 1 more cycle (tomorrow) and do restaging scan early august after 6 cycles. If stable with next scan, will drop cisplatin and continue 5 -FLU and pembro maintainence for total of 2 yrs or until progression/intolerable toxicities.     Plan:  Proceed with cisplatin, 5FU, and keytruda tomorrow  Ct CAP Aug 3, RTC with Dr. Steven after  Pending SD, q3week 5FU and keytruda to follow, will tentatively set up visit with me end of Aug    #Brain mets.   Follows with Dr. Velez. S/p GK 3/24. MRI June 23 with no new mets. Next in 09/2021     #mucositis  2/2 F5U. Continue Aquaphor and salt/soda rinses too. Offered MMW but he does not feel they are that bad at all. Discussed avoiding irritating foods like spicy/acidic items when mucositis is worse.      #Bone mets  Bone biopsy of left pelvic mass 2/26/21 confirmed mets. Finished radiation 3/23 to lumbosacral spine   Added zometa last infusion. while he has not seen dentist in 4 yrs, discusseed small risk of osteonecrosis, 3-5% risk, he does not have any ongoing dental issues and has no tooth pain or caries.  -Zometa every 6 weeks, due with 8/5 infusion   -Continue Calcium and Vitmain D     #Dypshagia, malnutrition  PEG tube placed in OR  (3/5) for nutritional support. Tolerating tube feeds and now with  PO intake too. Has follws with Kari DOUGLAS. Continue PO intake as tolerated. Previously declined SLP follow up, can revisit as needed  though dysphagia has improved with treatment.   --Of note, consideration given to esophageal stent vs XRT for esophageal debulking to address dysphagia inpatient in Feb/March 2021; however given likely improvement with chemo-immunotherapy, as well as risks of worsening cytopenias, elected not to.      #Cauda Equina due to tumor compression. He is s/p L2-L5 laminectomy and decompression.   Followed by neurosurgery (now prn) with improved exam after surgery. He is now ambulating with or without a walker, strength nearly back to 70%. He is aware of his activity and lifting restrictions. Has some L radiculopathy that he uses robaxan prn.      #Constipation  Dulcolax, miralax, or MOM prn, asked he not use suppository while on chemo     #Urinary retention  Andrews out with no new issues, recently saw urology with q6 mo follow up     #GERD  Improved. liquid omeprazole as needed but not really needing    40 minutes spent on the date of the encounter doing chart review, review of test results, interpretation of tests, patient visit, documentation and discussion with other provider(s)     Jaky Pugh CNP on 7/13/2021 at 7:40 AM

## 2021-07-13 NOTE — LETTER
"    7/13/2021         RE: Junito Wang  [id number] [facility] [address]        Dear Colleague,    Thank you for referring your patient, Junito Wang, to the [facility]. Please see a copy of my visit note below.    Junito is a 61 year old who is being evaluated via a billable video visit.      How would you like to obtain your AVS? MyChart     If the video visit is dropped, the invitation should be resent by: Send to e-mail at: [email or web address]     Will anyone else be joining your video visit? No          Vitals - Patient Reported  Weight (Patient Reported): 79.4 kg (175 lb)  Height (Patient Reported): 177.8 cm (5' 10\")  BMI (Based on Pt Reported Ht/Wt): 25.11  Pain Score: Mild Pain (2)  Pain Loc: Low Back    Luiz Kwan LPN      Video-Visit Details    Type of service:  Video Visit    Video Start Time: 7:10 AM    Video End Time:7:30 AM    Originating Location (pt. 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),metastasis of liver, brain and spine  (AJCC 8th edition)  -MMR proficient, HER2 by IHC is 2+, FISH neg  -PD-L1 CPS- 5-10%     Treatment:  3/2/21- L2-L5 laminectomy and decompression  3/17/21-3/23/21: Radiation to LS spine  3/24/21: gamma knife (2000cGy in 5 fractions)  3/31/21 to now: Cisplatin+keytruda+5fu every 3 weeks    Treatment intent- Palliative    Oncology HPI:   August 2020- stomach discomfort, belching   October 2020- progressive dysphagia with solids   1/26/21- distal esophageal biopsy shows Moderately differentiated adenocarcinoma; MMR normal expression, PDL1 expression is low with TPS 2%, CPS 5-10, Her2 is pending  1/27/21- CT CAP- Enlarged  2 cm subcarinal lymph node and 1.4 cm short axis right subcarinal " lymph node, focal thickening of the superior wall along the right side of the mid esophagus. Numerous small pulmonary nodules, 3 mm nodule in the superior segment of the left lower lobe, 3 mm nodule in the anterior aspect of the right upper lobe , 4 mm nodule in the medial aspect of the right upper lobe and 4 mm right lower lobe nodule. Hypoattenuating foci in the liver, 1.6 cm hypoattenuating/hypoenhancing lesion in hepatic segment 8 and 1.4 cm lesion in hepatic segment 5 , indeterminate, likely metastatic.  The prostate gland is mildly enlarged measuring 5.3 cm in transverse diameter. Multiple prominent but not significantly enlarged retroperitoneal lymph nodes, indeterminate, could be reactive. 4.4 x 4.3 cm lytic lesion centered in the posterior aspect of the left sacral ala (series 3 image 243), 4.7 x 4.0 x 5.6 cm (axial and CC dimensions, respectively) hypoattenuating lesion in the subcutaneous tissue of the anterior chest wall just anterior to the mediastinum, indeterminate, could represent sebaceous cyst.  2/4/21- Saw Dr. Bourgeois ordered PET/CT  3/2/21-3/10/21- Admission to [facility]- <24h of inability to ambulate with acute onset left leg weakness/urinary retention with perianal sensory deficits, MRI showed extensive epidural signal change suggestive of tumor vs. Hemorrhage.Pt underwent emergent surgery- L2-L5 laminectomy and decompression . PEG tube placement 3/5/2021.   3/17/21-3/23/21: Radiation to LS spine  3/24/21: gamma knife (2000cGy in 5 fractions)  3/29/21- port placement  3/31/21: Cycle 1 Cisplatin+keytruda+5fu   4/21/21: Cycle 2 Cisplatin+keytruda+5fu  5/11/21- Ct CAP- Overall stable disease- mixed response- HI in 2 liver lesions while, stable disease to borderline progression in 1 liver lesions  5/12/21- Cycle 3 Cisplatin+keytruda+5fu  6/2/21- Cycle 4 Cisplatin+keytruda+5fu  6/21/21- CT CAP- Stable mild circumferential wall thickening of the distal esophagus (series 3, image 99) with small amount of fluid  in the upper and mid esophagus. No lymphadenopathy. Stable to slight increased size of hepatic metastases. For example a 3.1 x 2.9 cm lesion in the left lobe (series 3, image 123) previously measured 2.9 x 2.6 cm, and a 2.7 x 2.9 cm peripheral right hepatic lobe metastasis previously measured 3.0 x 2.2 cm. A 1.0 cm lesion in the caudate lobe (series 3, image 138) is more prominent today/new. Stable small gastrohepatic ligament lymph nodes. No new or enlarging lymph nodes in the abdomen and pelvis. No ascites.Stable 5.1 x 4.6 cm left sacral mass. Stable cystic 4.9 x 3.5 cm subcutaneous lesion overlying the midsternum.    Interval history:   Junito continues to tolerate chemo well. He has some mucosal irritation that was more prominent on his tongue vs lips this time. He describes it as tongue sensitivity, aggravated by more acidic food. Other foods that are not spicy/acidic he tolerates fine. He is compliant with salt/soda rinses. Low back pain and leg numbness unchanged. Uses a walker only if walking long distances. Does not get diarrhea, if anything gets constipated after treatment and takes miralax prn, and then empties out. Swallowing without issues, occasionally will belch more often but no indigestion or GERD really. No fever/chills. Urinating without issue. No cough/sob. Denies bleeding concerns. He continues to enjoy gardening      10 point review of systems otherwise negative     Current Outpatient Medications   Medication Sig Dispense Refill     Calcium Carb-Cholecalciferol (CALCIUM-VITAMIN D) 600-400 MG-UNIT TABS Take 2 tablets by mouth daily 60 tablet 3     dexamethasone (DECADRON) 4 MG tablet Take 2 tablets (8 mg) by mouth daily (with breakfast) for 3 days, starting day after Cisplatin (Day 2). 6 tablet 2     gabapentin (NEURONTIN) 250 MG/5ML solution Take 5 mLs (250 mg) by mouth At Bedtime 470 mL 1     LORazepam (ATIVAN) 0.5 MG tablet Take 1 tablet (0.5 mg) by mouth every 4 hours as needed (Anxiety,  Nausea/Vomiting or Sleep) (Patient not taking: Reported on 4/20/2021) 30 tablet 2     methocarbamol (ROBAXIN) 750 MG tablet Take 1 tablet (750 mg) by mouth 4 times daily 30 tablet 0     multivitamin CF FORMULA (CHOICEFUL) chewable tablet Take 1 tablet by mouth daily       omeprazole (FIRST-OMEPRAZOLE) 2 MG/ML SUSP Take 10 mLs (20 mg) by mouth 2 times daily (Patient not taking: Reported on 6/2/2021) 300 mL 1     omeprazole POWD powder        prochlorperazine (COMPAZINE) 10 MG tablet Take 1 tablet (10 mg) by mouth every 6 hours as needed (Nausea/Vomiting) (Patient not taking: Reported on 4/20/2021) 30 tablet 2        No Known Allergies    Exam:  There were no vitals taken for this visit.  Wt Readings from Last 4 Encounters:   06/24/21 79.4 kg (175 lb)   06/23/21 79.4 kg (175 lb)   06/02/21 78.5 kg (173 lb)   05/12/21 78.9 kg (173 lb 14.4 oz)     Video physical exam  General: Patient appears well in no acute distress.   Skin: No visualized rash or lesions on visualized skin  Eyes: EOMI, no erythema, sclera icterus or discharge noted  Resp: Appears to be breathing comfortably without accessory muscle usage, speaking in full sentences, no cough  MSK: Appears to have normal range of motion based on visualized movements  Neurologic: No apparent tremors, facial movements symmetric  Psych: affect good, alert and oriented    The rest of a comprehensive physical examination is deferred due to PHE (public health emergency) video restrictions    Labs:  Recent labs from 6/24 reviewed, new labs will be drawn tomorrow    Imaging: N/A today    Impression/plan:   # Stage IV esophageal adenocarcinoma   # ECOG PS 2  Started chemoimmunotherpay with Cisplatin+5Fu+pembrolizumab based on KN-590 trial. The trial demonstrated a statistically significant improvement in OS and PFS for patients randomized to pembrolizumab with chemotherapy. Median OS was 12.4 months (95% CI: 10.5, 14.0) for the pembrolizumab arm versus 9.8 months (95% CI: 8.8,  10.8) for the chemotherapy arm (HR 0.73; 95% CI: 0.62, 0.86; p<0.0001). Median PFS was 6.3  (95% CI: 6.2, 6.9) and 5.8 months (95% CI: 5.0, 6.0), respectively (HR 0.65; 95% CI: 0.55, 0.76; p<0.0001).     Now s/p 5 cycles well with minimal and manageable side-effects. Restaging scan after 2 cycles showed overall stable disease with - mixed response- AL in esophgaus primary, and 2 liver lesions while, stable disease in spine, and stable to borderline progression in 1 liver lesions. Scan after 4 cycles showing stable disease in esophagus and LN and a some liver lesion. 2 lesions in liver are slightly bigger, one is more prominent than before, however, the degree of increase is <20%, and still in the realm of stable disease. Clinically he is stable to improved. Plan to continue chemoimmunotherpay for 1 more cycle (tomorrow) and do restaging scan early august after 6 cycles. If stable with next scan, will drop cisplatin and continue 5 -FLU and pembro maintainence for total of 2 yrs or until progression/intolerable toxicities.     Plan:  Proceed with cisplatin, 5FU, and keytruda tomorrow  Ct CAP Aug 3, RTC with Dr. Steven after  Pending SD, q3week 5FU and keytruda to follow, will tentatively set up visit with me end of Aug    #Brain mets.   Follows with Dr. Velez. S/p GK 3/24. MRI June 23 with no new mets. Next in 09/2021     #mucositis  2/2 F5U. Continue Aquaphor and salt/soda rinses too. Offered MMW but he does not feel they are that bad at all. Discussed avoiding irritating foods like spicy/acidic items when mucositis is worse.      #Bone mets  Bone biopsy of left pelvic mass 2/26/21 confirmed mets. Finished radiation 3/23 to lumbosacral spine   Added zometa last infusion. while he has not seen dentist in 4 yrs, discusseed small risk of osteonecrosis, 3-5% risk, he does not have any ongoing dental issues and has no tooth pain or caries.  -Zometa every 6 weeks, due with 8/5 infusion   -Continue Calcium and  Vitmain D     #Dypshagia, malnutrition  PEG tube placed in OR  (3/5) for nutritional support. Tolerating tube feeds and now with  PO intake too. Has follws with Kari DOUGLAS. Continue PO intake as tolerated. Previously declined SLP follow up, can revisit as needed though dysphagia has improved with treatment.   --Of note, consideration given to esophageal stent vs XRT for esophageal debulking to address dysphagia inpatient in Feb/March 2021; however given likely improvement with chemo-immunotherapy, as well as risks of worsening cytopenias, elected not to.      #Cauda Equina due to tumor compression. He is s/p L2-L5 laminectomy and decompression.   Followed by neurosurgery (now prn) with improved exam after surgery. He is now ambulating with or without a walker, strength nearly back to 70%. He is aware of his activity and lifting restrictions. Has some L radiculopathy that he uses robaxan prn.      #Constipation  Dulcolax, miralax, or MOM prn, asked he not use suppository while on chemo     #Urinary retention  Andrews out with no new issues, recently saw urology with q6 mo follow up     #GERD  Improved. liquid omeprazole as needed but not really needing    40 minutes spent on the date of the encounter doing chart review, review of test results, interpretation of tests, patient visit, documentation and discussion with other provider(s)     Jaky Pugh CNP on 7/13/2021 at 7:40 AM          Again, thank you for allowing me to participate in the care of your patient.        Sincerely,        Jaky Pugh CNP

## 2021-07-14 NOTE — PROGRESS NOTES
Infusion Nursing Note:  Junito Wang presents today for C6  Keytruda/Cisplatin/5 FU pump.    Patient seen by provider today: No   present during visit today: Not Applicable.    Note: Patient reports being able to eat some food orally now in addition to his tube feeds.   .  Intravenous Access:  Implanted Port.    Treatment Conditions:  Lab Results   Component Value Date    HGB 11.4 07/14/2021    HGB 11.4 06/24/2021     Lab Results   Component Value Date    WBC 7.5 07/14/2021    WBC 6.6 06/24/2021      Lab Results   Component Value Date    ANEU 5.1 07/14/2021    ANEU 4.6 06/24/2021     Lab Results   Component Value Date     07/14/2021     06/24/2021      Lab Results   Component Value Date     07/14/2021     06/24/2021                   Lab Results   Component Value Date    POTASSIUM 4.3 07/14/2021    POTASSIUM 4.1 06/24/2021           Lab Results   Component Value Date    MAG 2.3 07/14/2021    MAG 2.2 06/24/2021            Lab Results   Component Value Date    CR 0.62 07/14/2021    CR 0.65 06/24/2021                   Lab Results   Component Value Date    ARDEN 8.8 07/14/2021    ARDEN 9.2 06/24/2021                Lab Results   Component Value Date    BILITOTAL 0.2 07/14/2021    BILITOTAL 0.3 06/24/2021           Lab Results   Component Value Date    ALBUMIN 3.6 07/14/2021    ALBUMIN 3.6 06/24/2021                    Lab Results   Component Value Date    ALT 17 07/14/2021    ALT 20 06/24/2021           Lab Results   Component Value Date    AST 16 07/14/2021    AST 16 06/24/2021       Results reviewed, labs MET treatment parameters, ok to proceed with treatment.      Post Infusion Assessment:  Patient tolerated infusion without incident.  Blood return noted pre and post infusion.  Site patent and intact, free from redness, edema or discomfort.  No evidence of extravasations.     Prior to discharge: Port is secured in place with tegaderm and flushed with 10cc NS with positive blood  "return noted.  Continuous home infusion Dosi-Fuser pump connected.    All connectors secured in place and clamps taped open.    Pump started, \"running\" noted on display (CADD): Not Applicable.  Pump Connection double checked with Kelly Vazquez RN.  Patient instructed to call our clinic or [facility] with any questions or concerns at home.  Patient verbalized understanding.    Patient set up for pump disconnect at home with [facility] on  Monday 7/19/21 at 4:30 PM. Patient aware and message left for [facility] to confirm.       Discharge Plan:   Discharge instructions reviewed with: Patient.  Patient and/or family verbalized understanding of discharge instructions and all questions answered.  Patient discharged in stable condition accompanied by: self.  Departure Mode: Ambulatory.      Jennifer Sarkar RN                      "

## 2021-07-28 NOTE — PROGRESS NOTES
This is a recent snapshot of the patient's [facility] medical record.  For current drug dose and complete information and questions, call [phone] or In Basket pool, [facility] home infusion ([id number])  CSN Number:  [id number]

## 2021-07-30 NOTE — PROGRESS NOTES
This is a recent snapshot of the patient's [facility] medical record.  For current drug dose and complete information and questions, call [phone] or In Basket pool, [facility]v home infusion ([id number])  CSN Number:  [id number]

## 2021-08-04 NOTE — PROGRESS NOTES
Junito is a 61 year old who is being evaluated via a billable video visit.      How would you like to obtain your AVS? MyChart  If the video visit is dropped, the invitation should be resent by: Send to e-mail at: [email or web address]  Will anyone else be joining your video visit? No       NICKO Woods      Video Start Time: 8:15 AM  Video-Visit Details    Type of service:  Video Visit    Video End Time: 8:54 AM    Originating Location (pt. Location): Home    Distant Location (provider location):  [facility]     Platform used for Video Visit: [employer]

## 2021-08-04 NOTE — LETTER
8/4/2021         RE: Junito Wang  [address] [facility] [id number]        Dear Colleague,    Thank you for referring your patient, Junito Wang, to the [facility]. Please see a copy of my visit note below.    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),metastasis of liver, brain and spine  (AJCC 8th edition)  -MMR proficient, HER2 by IHC is 2+, FISH neg  -PD-L1 CPS- 5-10%  -NGS by [facility]- No actionable mutations     Treatment:  3/2/21- L2-L5 laminectomy and decompression  3/17/21-3/23/21: Radiation to LS spine  3/24/21: gamma knife (2000cGy in 5 fractions)  3/31/21 to now: Cisplatin+keytruda+5fu every 3 weeks    Treatment intent- Palliative    Oncology HPI:   August 2020- stomach discomfort, belching   October 2020- progressive dysphagia with solids   1/26/21- distal esophageal biopsy shows Moderately differentiated adenocarcinoma; MMR normal expression, PDL1 expression is low with TPS 2%, CPS 5-10, Her2 is pending  1/27/21- CT CAP- Enlarged  2 cm subcarinal lymph node and 1.4 cm short axis right subcarinal lymph node, focal thickening of the superior wall along the right side of the mid esophagus. Numerous small pulmonary nodules, 3 mm nodule in the superior segment of the left lower lobe, 3 mm nodule in the anterior aspect of the right upper lobe , 4 mm nodule in the medial aspect of the right upper lobe and 4 mm right lower lobe nodule. Hypoattenuating foci in the liver, 1.6 cm hypoattenuating/hypoenhancing lesion in hepatic segment 8 and 1.4 cm lesion in hepatic segment 5 , indeterminate, likely metastatic.  The prostate gland is mildly enlarged measuring 5.3 cm in transverse diameter. Multiple prominent but not significantly enlarged retroperitoneal lymph nodes, indeterminate, could be reactive. 4.4 x 4.3 cm lytic lesion centered in the posterior aspect of the  left sacral ala (series 3 image 243), 4.7 x 4.0 x 5.6 cm (axial and CC dimensions, respectively) hypoattenuating lesion in the subcutaneous tissue of the anterior chest wall just anterior to the mediastinum, indeterminate, could represent sebaceous cyst.  2/4/21- Saw Dr. Bourgeois ordered PET/CT  3/2/21-3/10/21- Admission to [facility]- <24h of inability to ambulate with acute onset left leg weakness/urinary retention with perianal sensory deficits, MRI showed extensive epidural signal change suggestive of tumor vs. Hemorrhage.Pt underwent emergent surgery- L2-L5 laminectomy and decompression . PEG tube placement 3/5/2021.   3/17/21-3/23/21: Radiation to LS spine  3/24/21: gamma knife (2000cGy in 5 fractions)  3/29/21- port placement  3/31/21: Cycle 1 Cisplatin+keytruda+5fu   4/21/21: Cycle 2 Cisplatin+keytruda+5fu  5/11/21- Ct CAP- Overall stable disease- mixed response- FL in 2 liver lesions while, stable disease to borderline progression in 1 liver lesions  5/12/21- Cycle 3 Cisplatin+keytruda+5fu  6/2/21- Cycle 4 Cisplatin+keytruda+5fu  6/21/21- CT CAP- Stable mild circumferential wall thickening of the distal esophagus (series 3, image 99) with small amount of fluid in the upper and mid esophagus. No lymphadenopathy. Stable to slight increased size of hepatic metastases. For example a 3.1 x 2.9 cm lesion in the left lobe (series 3, image 123) previously measured 2.9 x 2.6 cm, and a 2.7 x 2.9 cm peripheral right hepatic lobe metastasis previously measured 3.0 x 2.2 cm. A 1.0 cm lesion in the caudate lobe (series 3, image 138) is more prominent today/new. Stable small gastrohepatic ligament lymph nodes. No new or enlarging lymph nodes in the abdomen and pelvis. No ascites.Stable 5.1 x 4.6 cm left sacral mass. Stable cystic 4.9 x 3.5 cm subcutaneous lesion overlying the midsternum.  6/24/21- Cycle 5 Cisplatin+keytruda+5fu  7/14/21- Cycle 6 Cisplatin+keytruda+5fu  8/3/21- Ct CAP- Few small pulmonary nodules are stable. Stable  mild circumferential wall thickening of the distal esophagus (series 3, image 97).  Increased size of hepatic metastases. For example a 3.5  x 3.5 cm left lobe metastasis (series 3, image 117) previously measured 2.9 x 3.1 cm, a 3.5 x 2.7 cm subcapsular right hepatic lobe metastasis (series 3, image 158) previously measured 2.7 x 2.9 cm and  a 1.7 x 1.5 cm lesion in the left lobe adjacent to the caudate (series 3, image 124) previously measured 1.0 cm.  Stable 5.2 x 4.9 cm lytic lesion in the left sacrum previously measuring 5.2 x 4.6 cm (3, image 238). Stable sclerotic lesion in the anterior aspect of the T4 vertebral body measuring 1.3 cm (series 3, image 39) and sclerosis of the posterior left third rib.. No new lesions. Stable 5.2 cm cystic subcutaneous lesion overlying the sternum      Interval history:   Junito is overall doing well. He continues to tolerate his treatments well.     He has begun to eat solid foods. He is tolerating them well with no pain in the mouth/chest or aspiration. He thinks he has gained one lb between last two cycles of treatments. His appetite is good.     His mucositis/mouth sores are overall improved. These symptoms are the heaviest through the first 4 days after each treatment and then gradually recede away. Given the overall improvement in his oral health, he is not doing salt and soda rinses regularly any longer.     As a new problem, he describes increased ringing sensation in his left ear. He has had this problem for ~ 20 years but it just feels more increased in frequency now compared to earlier. No hearing loss, drainage or nystagmus. Right ear is asymptomatic.     He is staying active. He is up and about most of the day. He works in his yard and continues to drive. He says that cancer and treatments have not impacted in things that he always done but may have rather slowed him down.     Comprehensive ROS was unremarkable except as detailed above.        Current Outpatient  Medications   Medication Sig Dispense Refill     Calcium Carb-Cholecalciferol (CALCIUM-VITAMIN D) 600-400 MG-UNIT TABS Take 2 tablets by mouth daily 60 tablet 3     dexamethasone (DECADRON) 4 MG tablet Take 2 tablets (8 mg) by mouth daily (with breakfast) for 3 days, starting day after Cisplatin (Day 2). 6 tablet 2     gabapentin (NEURONTIN) 250 MG/5ML solution Take 5 mLs (250 mg) by mouth At Bedtime 470 mL 1     LORazepam (ATIVAN) 0.5 MG tablet Take 1 tablet (0.5 mg) by mouth every 4 hours as needed (Anxiety, Nausea/Vomiting or Sleep) (Patient not taking: Reported on 4/20/2021) 30 tablet 2     methocarbamol (ROBAXIN) 750 MG tablet Take 1 tablet (750 mg) by mouth 4 times daily 30 tablet 0     multivitamin CF FORMULA (CHOICEFUL) chewable tablet Take 1 tablet by mouth daily       omeprazole (FIRST-OMEPRAZOLE) 2 MG/ML SUSP Take 10 mLs (20 mg) by mouth 2 times daily (Patient not taking: Reported on 6/2/2021) 300 mL 1     omeprazole POWD powder        prochlorperazine (COMPAZINE) 10 MG tablet Take 1 tablet (10 mg) by mouth every 6 hours as needed (Nausea/Vomiting) (Patient not taking: Reported on 4/20/2021) 30 tablet 2        No Known Allergies    Exam:  There were no vitals taken for this visit.  Wt Readings from Last 4 Encounters:   07/14/21 80 kg (176 lb 6.4 oz)   06/24/21 79.4 kg (175 lb)   06/23/21 79.4 kg (175 lb)   06/02/21 78.5 kg (173 lb)     Video physical exam  General: Patient appears well in no acute distress.   Skin: No visualized rash or lesions on visualized skin  Eyes: EOMI, no erythema, sclera icterus or discharge noted  Resp: Appears to be breathing comfortably without accessory muscle usage, speaking in full sentences, no cough  MSK: Appears to have normal range of motion based on visualized movements  Neurologic: No apparent tremors, facial movements symmetric  Psych: affect good, alert and oriented    The rest of a comprehensive physical examination is deferred due to PHE (public health emergency)  video restrictions    Labs:   Recent Labs   Lab Test 07/14/21  1033 06/24/21  0740 06/02/21  1023   WBC 7.5 6.6 8.7   RBC 4.06* 4.15* 4.26*   HGB 11.4* 11.4* 11.1*   HCT 36.5* 35.9* 35.9*   MCV 90 87 84   MCH 28.1 27.5 26.1*   MCHC 31.2* 31.8 30.9*   RDW 20.2* 21.6* 22.6*    285 306   NEUTROPHIL 70  68 69.1 71.3     Recent Labs   Lab Test 07/14/21  1033 06/24/21  0740 06/02/21  1023    140 135   POTASSIUM 4.3 4.1 4.0   CHLORIDE 108 106 102   CO2 27 28 30   ANIONGAP 4 6 3   * 92 131*   BUN 13 15 17   CR 0.62 0.65* 0.76   ARDEN 8.8 9.2 9.1     Recent Labs   Lab Test 07/14/21  1033 06/24/21  0740 06/02/21  1023   PROTTOTAL 6.7* 6.8 6.8   ALBUMIN 3.6 3.6 3.7   BILITOTAL 0.2 0.3 0.3   ALKPHOS 128 110 123   AST 16 16 26   ALT 17 20 21         Imaging:   CT CAP 8/3/21  COMPARISON: 6/21/2021  FINDINGS:   LUNGS AND PLEURA: Few small pulmonary nodules are stable. For example,  a 4 mm right lower lobe nodule (series 5, image 115) and 4 mm left  upper lobe nodule (series 5, image 104) are unchanged. No new or  enlarging nodules. No infiltrate or pleural effusion.     MEDIASTINUM/AXILLAE: Stable mild circumferential wall thickening of the distal esophagus (series 3, image 97). No lymphadenopathy. Right IJ port catheter tip at the SVC/right atrial junction. No thoracic aortic aneurysm. Moderate coronary artery calcifications. No pericardial effusion.     HEPATOBILIARY: Increased size of hepatic metastases. For example a 3.5 x 3.5 cm left lobe metastasis (series 3, image 117) previously measured 2.9 x 3.1 cm, a 3.5 x 2.7 cm subcapsular right hepatic lobe metastasis (series 3, image 158) previously measured 2.7 x 2.9 cm and a 1.7 x 1.5 cm lesion in the left lobe adjacent to the caudate (series 3, image 124) previously measured 1.0 cm. No new lesions.     PANCREAS: Normal.     SPLEEN: Normal.     ADRENAL GLANDS: Normal.     KIDNEYS/BLADDER: Left renal simple cyst requiring no specific  follow-up and otherwise  normal kidneys.     BOWEL: Percutaneous endoscopic gastrostomy tube. No small bowel or colonic obstruction or inflammatory changes.     PELVIC ORGANS: No pelvic masses.     ADDITIONAL FINDINGS: Stable soft tissue thickening surrounding the left gastric artery. No lymphadenopathy. No ascites.     MUSCULOSKELETAL: Stable 5.2 x 4.9 cm lytic lesion in the left sacrum previously measuring 5.2 x 4.6 cm (3, image 238). Stable sclerotic lesion in the anterior aspect of the T4 vertebral body measuring 1.3 cm (series 3, image 39) and sclerosis of the posterior left third rib. No new lesions. Stable 5.2 cm cystic subcutaneous lesion overlying the sternum.                                                                      IMPRESSION:  1.  Slight increased size of hepatic metastases.  2.  Stable osseous metastases with the largest lesion in the sacrum.  3.  Stable wall thickening of the distal esophagus.    Impression/plan:   # Stage IV esophageal adenocarcinoma   # Progressive liver disease  # ECOG PS 0-1  Started chemoimmunotherpay with Cisplatin+5Fu+pembrolizumab based on [id number] trial. The trial demonstrated a statistically significant improvement in OS and PFS for patients randomized to pembrolizumab with chemotherapy. Median OS was 12.4 months (95% CI: 10.5, 14.0) for the pembrolizumab arm versus 9.8 months (95% CI: 8.8, 10.8) for the chemotherapy arm (HR 0.73; 95% CI: 0.62, 0.86; p<0.0001). Median PFS was 6.3  (95% CI: 6.2, 6.9) and 5.8 months (95% CI: 5.0, 6.0), respectively (HR 0.65; 95% CI: 0.55, 0.76; p<0.0001).     Now s/p 6 cycles well with minimal and manageable side-effects. Restaging scan after 2 cycles showed overall stable disease with - mixed response- KS in esophgaus primary, and 2 liver lesions while, stable disease in spine, and stable to borderline progression in 1 liver lesions. Scan after 4 cycles showing stable disease in esophagus and LN and a some liver lesion. 2 lesions in liver are slightly bigger,  one is more prominent than before. CT CAP after 6 cycles done on 8/3/21 demonstrated continued interval increase in the size of 2 liver lesions, stable osseous lesions and continued inconspicuity of the primary esophageal mass.     - Given the liver-selective progressive disease, we recommend locoregional liver-targeted therapies as compared to introducing a change in the systemic treatment.  We discussed the risks and benefits of each approach.  He was agreeable for liver directed local therapy and referral to interventional radiology was placed.  - He is status post 6 cycles of induction cisplatin, 5-FU and pembrolizumab. At this time, we will switch to 5-FLU and pembrolizumab maintenance (i.e. drop cisplatin) for a total of 2 years or until progression/intolerable toxicities. Okay for infusion tomorrow.    - In the event of eventual extrahepatic disease progression, options for next lines of treatment would include ramucirumab plus paclitaxel based on the [place] trial. Other options include single-agent docetaxel, pacltixael or irinotecan. Lonsurf could be pursued in the third line.      #Brain mets   Follows with Dr. Velez. S/p GK 3/24. MRI June 23 with no new mets. Next in 09/2021.     #Mucositis, improved  2/2 F5U. Continue Aquaphor and salt/soda rinses as needed. He has been offered MMW but does not feel his symptoms are bad enough to warrant more treatment. Avoid irritating foods like spicy/acidic items when mucositis is worse.      #Bone mets  Bone biopsy of left pelvic mass 2/26/21 confirmed metastasis. Finished radiation 3/23 to lumbosacral spine.   While he has not seen dentist in 4 yrs, discusseed small risk of osteonecrosis, 3-5% risk, he does not have any ongoing dental issues and has no tooth pain or caries.  -Zometa every 6 weeks, due with 8/5 infusion (start date 6/2/21).   -Continue Calcium and Vitmain D.     #Dypshagia, malnutrition  PEG tube placed in OR (3/5) for nutritional support. Now  with good PO intake. Follows with RD. Continue PO intake as tolerated. Previously declined SLP follow up, can revisit as needed though dysphagia has improved with treatment.   --Of note, consideration given to esophageal stent vs XRT for esophageal debulking to address dysphagia inpatient in Feb/March 2021; however given likely improvement with chemo-immunotherapy, as well as risks of worsening cytopenias, elected not to.      #Cauda Equina due to tumor compression s/p L2-L5 laminectomy and decompression   Followed by neurosurgery (now prn) with improved exam after surgery. He is now ambulating with or without a walker, strength nearly back to baseline. He is aware of his activity and lifting restrictions. Has some L radiculopathy that he uses robaxan prn.      #Constipation  Dulcolax, miralax, or MOM prn. Continue to avoid suppository while on chemo.     #Urinary retention  Andrews out with no new issues, recently saw urology with q6 mo follow up.     #GERD  Improved. Liquid omeprazole as needed but not really needing      Overall Plan:  - Proceed with maintainence 5-FLU and pembrolizumab tomorrow. These infusions are currently already scheduled through 9/16/21.   - IR consult, Dr. Rosales Meléndez for local therpay for liver   - Video visit with Jaky Pugh CNP already on schedule for 8/25/21.   - Today requested restaging CT CAP and a visit with Dr. Steven in 8 weeks.     Patient was seen and plan of care developed with Dr. Steven.     Thomas Roberts  Hematology/Oncology Fellow  [phone]     The pt was evaluated with resident/fellow and the note was edited to relflect the combined assessment and plan      On the day of service  Chart review: 5 minutes  Visit duration: 30 minutes  Care coordination: 5 minutes    Jose Steven MD    Hematology, Oncology and Transplantation      Junito is a 61 year old who is being evaluated via a billable video visit.      How would you like to obtain your AVS?  Deana  If the video visit is dropped, the invitation should be resent by: Send to e-mail at: [email or web address]  Will anyone else be joining your video visit? No       NICKO Woods      Video Start Time: 8:15 AM  Video-Visit Details    Type of service:  Video Visit    Video End Time: 8:54 AM    Originating Location (pt. Location): Home    Distant Location (provider location):  [facility]     Platform used for Video Visit: [employer]      Again, thank you for allowing me to participate in the care of your patient.        Sincerely,        Jose Steven MD

## 2021-08-04 NOTE — PROGRESS NOTES
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),metastasis of liver, brain and spine  (AJCC 8th edition)  -MMR proficient, HER2 by IHC is 2+, FISH neg  -PD-L1 CPS- 5-10%  -NGS by [facility]- No actionable mutations     Treatment:  3/2/21- L2-L5 laminectomy and decompression  3/17/21-3/23/21: Radiation to LS spine  3/24/21: gamma knife (2000cGy in 5 fractions)  3/31/21 to now: Cisplatin+keytruda+5fu every 3 weeks    Treatment intent- Palliative    Oncology HPI:   August 2020- stomach discomfort, belching   October 2020- progressive dysphagia with solids   1/26/21- distal esophageal biopsy shows Moderately differentiated adenocarcinoma; MMR normal expression, PDL1 expression is low with TPS 2%, CPS 5-10, Her2 is pending  1/27/21- CT CAP- Enlarged  2 cm subcarinal lymph node and 1.4 cm short axis right subcarinal lymph node, focal thickening of the superior wall along the right side of the mid esophagus. Numerous small pulmonary nodules, 3 mm nodule in the superior segment of the left lower lobe, 3 mm nodule in the anterior aspect of the right upper lobe , 4 mm nodule in the medial aspect of the right upper lobe and 4 mm right lower lobe nodule. Hypoattenuating foci in the liver, 1.6 cm hypoattenuating/hypoenhancing lesion in hepatic segment 8 and 1.4 cm lesion in hepatic segment 5 , indeterminate, likely metastatic.  The prostate gland is mildly enlarged measuring 5.3 cm in transverse diameter. Multiple prominent but not significantly enlarged retroperitoneal lymph nodes, indeterminate, could be reactive. 4.4 x 4.3 cm lytic lesion centered in the posterior aspect of the left sacral ala (series 3 image 243), 4.7 x 4.0 x 5.6 cm (axial and CC dimensions, respectively) hypoattenuating lesion in the subcutaneous tissue of the anterior chest wall just anterior to the mediastinum, indeterminate, could represent sebaceous  cyst.  2/4/21- Saw Dr. Bourgeois ordered PET/CT  3/2/21-3/10/21- Admission to [facility]- <24h of inability to ambulate with acute onset left leg weakness/urinary retention with perianal sensory deficits, MRI showed extensive epidural signal change suggestive of tumor vs. Hemorrhage.Pt underwent emergent surgery- L2-L5 laminectomy and decompression . PEG tube placement 3/5/2021.   3/17/21-3/23/21: Radiation to LS spine  3/24/21: gamma knife (2000cGy in 5 fractions)  3/29/21- port placement  3/31/21: Cycle 1 Cisplatin+keytruda+5fu   4/21/21: Cycle 2 Cisplatin+keytruda+5fu  5/11/21- Ct CAP- Overall stable disease- mixed response- [place] in 2 liver lesions while, stable disease to borderline progression in 1 liver lesions  5/12/21- Cycle 3 Cisplatin+keytruda+5fu  6/2/21- Cycle 4 Cisplatin+keytruda+5fu  6/21/21- CT CAP- Stable mild circumferential wall thickening of the distal esophagus (series 3, image 99) with small amount of fluid in the upper and mid esophagus. No lymphadenopathy. Stable to slight increased size of hepatic metastases. For example a 3.1 x 2.9 cm lesion in the left lobe (series 3, image 123) previously measured 2.9 x 2.6 cm, and a 2.7 x 2.9 cm peripheral right hepatic lobe metastasis previously measured 3.0 x 2.2 cm. A 1.0 cm lesion in the caudate lobe (series 3, image 138) is more prominent today/new. Stable small gastrohepatic ligament lymph nodes. No new or enlarging lymph nodes in the abdomen and pelvis. No ascites.Stable 5.1 x 4.6 cm left sacral mass. Stable cystic 4.9 x 3.5 cm subcutaneous lesion overlying the midsternum.  6/24/21- Cycle 5 Cisplatin+keytruda+5fu  7/14/21- Cycle 6 Cisplatin+keytruda+5fu  8/3/21- Ct CAP- Few small pulmonary nodules are stable. Stable mild circumferential wall thickening of the distal esophagus (series 3, image 97).  Increased size of hepatic metastases. For example a 3.5  x 3.5 cm left lobe metastasis (series 3, image 117) previously measured 2.9 x 3.1 cm, a 3.5 x 2.7 cm  subcapsular right hepatic lobe metastasis (series 3, image 158) previously measured 2.7 x 2.9 cm and  a 1.7 x 1.5 cm lesion in the left lobe adjacent to the caudate (series 3, image 124) previously measured 1.0 cm.  Stable 5.2 x 4.9 cm lytic lesion in the left sacrum previously measuring 5.2 x 4.6 cm (3, image 238). Stable sclerotic lesion in the anterior aspect of the T4 vertebral body measuring 1.3 cm (series 3, image 39) and sclerosis of the posterior left third rib.. No new lesions. Stable 5.2 cm cystic subcutaneous lesion overlying the sternum      Interval history:   Junito is overall doing well. He continues to tolerate his treatments well.     He has begun to eat solid foods. He is tolerating them well with no pain in the mouth/chest or aspiration. He thinks he has gained one lb between last two cycles of treatments. His appetite is good.     His mucositis/mouth sores are overall improved. These symptoms are the heaviest through the first 4 days after each treatment and then gradually recede away. Given the overall improvement in his oral health, he is not doing salt and soda rinses regularly any longer.     As a new problem, he describes increased ringing sensation in his left ear. He has had this problem for ~ 20 years but it just feels more increased in frequency now compared to earlier. No hearing loss, drainage or nystagmus. Right ear is asymptomatic.     He is staying active. He is up and about most of the day. He works in his yard and continues to drive. He says that cancer and treatments have not impacted in things that he always done but may have rather slowed him down.     Comprehensive ROS was unremarkable except as detailed above.        Current Outpatient Medications   Medication Sig Dispense Refill     Calcium Carb-Cholecalciferol (CALCIUM-VITAMIN D) 600-400 MG-UNIT TABS Take 2 tablets by mouth daily 60 tablet 3     dexamethasone (DECADRON) 4 MG tablet Take 2 tablets (8 mg) by mouth daily (with  breakfast) for 3 days, starting day after Cisplatin (Day 2). 6 tablet 2     gabapentin (NEURONTIN) 250 MG/5ML solution Take 5 mLs (250 mg) by mouth At Bedtime 470 mL 1     LORazepam (ATIVAN) 0.5 MG tablet Take 1 tablet (0.5 mg) by mouth every 4 hours as needed (Anxiety, Nausea/Vomiting or Sleep) (Patient not taking: Reported on 4/20/2021) 30 tablet 2     methocarbamol (ROBAXIN) 750 MG tablet Take 1 tablet (750 mg) by mouth 4 times daily 30 tablet 0     multivitamin CF FORMULA (CHOICEFUL) chewable tablet Take 1 tablet by mouth daily       omeprazole (FIRST-OMEPRAZOLE) 2 MG/ML SUSP Take 10 mLs (20 mg) by mouth 2 times daily (Patient not taking: Reported on 6/2/2021) 300 mL 1     omeprazole POWD powder        prochlorperazine (COMPAZINE) 10 MG tablet Take 1 tablet (10 mg) by mouth every 6 hours as needed (Nausea/Vomiting) (Patient not taking: Reported on 4/20/2021) 30 tablet 2        No Known Allergies    Exam:  There were no vitals taken for this visit.  Wt Readings from Last 4 Encounters:   07/14/21 80 kg (176 lb 6.4 oz)   06/24/21 79.4 kg (175 lb)   06/23/21 79.4 kg (175 lb)   06/02/21 78.5 kg (173 lb)     Video physical exam  General: Patient appears well in no acute distress.   Skin: No visualized rash or lesions on visualized skin  Eyes: EOMI, no erythema, sclera icterus or discharge noted  Resp: Appears to be breathing comfortably without accessory muscle usage, speaking in full sentences, no cough  MSK: Appears to have normal range of motion based on visualized movements  Neurologic: No apparent tremors, facial movements symmetric  Psych: affect good, alert and oriented    The rest of a comprehensive physical examination is deferred due to PHE (public health emergency) video restrictions    Labs:   Recent Labs   Lab Test 07/14/21  1033 06/24/21  0740 06/02/21  1023   WBC 7.5 6.6 8.7   RBC 4.06* 4.15* 4.26*   HGB 11.4* 11.4* 11.1*   HCT 36.5* 35.9* 35.9*   MCV 90 87 84   MCH 28.1 27.5 26.1*   MCHC 31.2* 31.8  30.9*   RDW 20.2* 21.6* 22.6*    285 306   NEUTROPHIL 70  68 69.1 71.3     Recent Labs   Lab Test 07/14/21  1033 06/24/21  0740 06/02/21  1023    140 135   POTASSIUM 4.3 4.1 4.0   CHLORIDE 108 106 102   CO2 27 28 30   ANIONGAP 4 6 3   * 92 131*   BUN 13 15 17   CR 0.62 0.65* 0.76   ARDEN 8.8 9.2 9.1     Recent Labs   Lab Test 07/14/21  1033 06/24/21  0740 06/02/21  1023   PROTTOTAL 6.7* 6.8 6.8   ALBUMIN 3.6 3.6 3.7   BILITOTAL 0.2 0.3 0.3   ALKPHOS 128 110 123   AST 16 16 26   ALT 17 20 21         Imaging:   CT CAP 8/3/21  COMPARISON: 6/21/2021  FINDINGS:   LUNGS AND PLEURA: Few small pulmonary nodules are stable. For example,  a 4 mm right lower lobe nodule (series 5, image 115) and 4 mm left  upper lobe nodule (series 5, image 104) are unchanged. No new or  enlarging nodules. No infiltrate or pleural effusion.     MEDIASTINUM/AXILLAE: Stable mild circumferential wall thickening of the distal esophagus (series 3, image 97). No lymphadenopathy. Right IJ port catheter tip at the SVC/right atrial junction. No thoracic aortic aneurysm. Moderate coronary artery calcifications. No pericardial effusion.     HEPATOBILIARY: Increased size of hepatic metastases. For example a 3.5 x 3.5 cm left lobe metastasis (series 3, image 117) previously measured 2.9 x 3.1 cm, a 3.5 x 2.7 cm subcapsular right hepatic lobe metastasis (series 3, image 158) previously measured 2.7 x 2.9 cm and a 1.7 x 1.5 cm lesion in the left lobe adjacent to the caudate (series 3, image 124) previously measured 1.0 cm. No new lesions.     PANCREAS: Normal.     SPLEEN: Normal.     ADRENAL GLANDS: Normal.     KIDNEYS/BLADDER: Left renal simple cyst requiring no specific  follow-up and otherwise normal kidneys.     BOWEL: Percutaneous endoscopic gastrostomy tube. No small bowel or colonic obstruction or inflammatory changes.     PELVIC ORGANS: No pelvic masses.     ADDITIONAL FINDINGS: Stable soft tissue thickening surrounding the left  gastric artery. No lymphadenopathy. No ascites.     MUSCULOSKELETAL: Stable 5.2 x 4.9 cm lytic lesion in the left sacrum previously measuring 5.2 x 4.6 cm (3, image 238). Stable sclerotic lesion in the anterior aspect of the T4 vertebral body measuring 1.3 cm (series 3, image 39) and sclerosis of the posterior left third rib. No new lesions. Stable 5.2 cm cystic subcutaneous lesion overlying the sternum.                                                                      IMPRESSION:  1.  Slight increased size of hepatic metastases.  2.  Stable osseous metastases with the largest lesion in the sacrum.  3.  Stable wall thickening of the distal esophagus.    Impression/plan:   # Stage IV esophageal adenocarcinoma   # Progressive liver disease  # ECOG PS 0-1  Started chemoimmunotherpay with Cisplatin+5Fu+pembrolizumab based on KN-590 trial. The trial demonstrated a statistically significant improvement in OS and PFS for patients randomized to pembrolizumab with chemotherapy. Median OS was 12.4 months (95% CI: 10.5, 14.0) for the pembrolizumab arm versus 9.8 months (95% CI: 8.8, 10.8) for the chemotherapy arm (HR 0.73; 95% CI: 0.62, 0.86; p<0.0001). Median PFS was 6.3  (95% CI: 6.2, 6.9) and 5.8 months (95% CI: 5.0, 6.0), respectively (HR 0.65; 95% CI: 0.55, 0.76; p<0.0001).     Now s/p 6 cycles well with minimal and manageable side-effects. Restaging scan after 2 cycles showed overall stable disease with - mixed response- PA in esophgaus primary, and 2 liver lesions while, stable disease in spine, and stable to borderline progression in 1 liver lesions. Scan after 4 cycles showing stable disease in esophagus and LN and a some liver lesion. 2 lesions in liver are slightly bigger, one is more prominent than before. CT CAP after 6 cycles done on 8/3/21 demonstrated continued interval increase in the size of 2 liver lesions, stable osseous lesions and continued inconspicuity of the primary esophageal mass.     - Given the  liver-selective progressive disease, we recommend locoregional liver-targeted therapies as compared to introducing a change in the systemic treatment.  We discussed the risks and benefits of each approach.  He was agreeable for liver directed local therapy and referral to interventional radiology was placed.  - He is status post 6 cycles of induction cisplatin, 5-FU and pembrolizumab. At this time, we will switch to 5-FLU and pembrolizumab maintenance (i.e. drop cisplatin) for a total of 2 years or until progression/intolerable toxicities. Okay for infusion tomorrow.    - In the event of eventual extrahepatic disease progression, options for next lines of treatment would include ramucirumab plus paclitaxel based on the [place] trial. Other options include single-agent docetaxel, pacltixael or irinotecan. Lonsurf could be pursued in the third line.      #Brain mets   Follows with Dr. Velez. S/p GK 3/24. MRI June 23 with no new mets. Next in 09/2021.     #Mucositis, improved  2/2 F5U. Continue Aquaphor and salt/soda rinses as needed. He has been offered MMW but does not feel his symptoms are bad enough to warrant more treatment. Avoid irritating foods like spicy/acidic items when mucositis is worse.      #Bone mets  Bone biopsy of left pelvic mass 2/26/21 confirmed metastasis. Finished radiation 3/23 to lumbosacral spine.   While he has not seen dentist in 4 yrs, discusseed small risk of osteonecrosis, 3-5% risk, he does not have any ongoing dental issues and has no tooth pain or caries.  -Zometa every 6 weeks, due with 8/5 infusion (start date 6/2/21).   -Continue Calcium and Vitmain D.     #Dypshagia, malnutrition  PEG tube placed in OR (3/5) for nutritional support. Now with good PO intake. Follows with RD. Continue PO intake as tolerated. Previously declined SLP follow up, can revisit as needed though dysphagia has improved with treatment.   --Of note, consideration given to esophageal stent vs XRT for  esophageal debulking to address dysphagia inpatient in Feb/March 2021; however given likely improvement with chemo-immunotherapy, as well as risks of worsening cytopenias, elected not to.      #Cauda Equina due to tumor compression s/p L2-L5 laminectomy and decompression   Followed by neurosurgery (now prn) with improved exam after surgery. He is now ambulating with or without a walker, strength nearly back to baseline. He is aware of his activity and lifting restrictions. Has some L radiculopathy that he uses robaxan prn.      #Constipation  Dulcolax, miralax, or MOM prn. Continue to avoid suppository while on chemo.     #Urinary retention  Andrews out with no new issues, recently saw urology with q6 mo follow up.     #GERD  Improved. Liquid omeprazole as needed but not really needing      Overall Plan:  - Proceed with maintainence 5-FLU and pembrolizumab tomorrow. These infusions are currently already scheduled through 9/16/21.   - IR consult, Dr. Rosales Meléndez for local therpay for liver   - Video visit with Jaky Pugh CNP already on schedule for 8/25/21.   - Today requested restaging CT CAP and a visit with Dr. Steven in 8 weeks.     Patient was seen and plan of care developed with Dr. Steven.     Thomas Roberts  Hematology/Oncology Fellow  [phone]     The pt was evaluated with resident/fellow and the note was edited to relflect the combined assessment and plan      On the day of service  Chart review: 5 minutes  Visit duration: 30 minutes  Care coordination: 5 minutes    Jose Steven MD    Hematology, Oncology and Transplantation

## 2021-08-05 NOTE — PROGRESS NOTES
See IV flow sheet for details of port access. Labs sent: cbc, cmp, mag. Port needle left in place for chemotherapy to follow.    Crystal Conde RN

## 2021-08-05 NOTE — PROGRESS NOTES
Infusion Nursing Note:  Junito Wang presents today for Zometa, Keytruda, 5FU pump connect.    Patient seen by provider today: No   present during visit today: Not Applicable.    Note: Patient saw Dr. Steven yesterday and the MD removed the Cisplatin from his plan.  Patient did meet criteria for an asymptomatic covid-19 PCR test in infusion today. Patient declined the covid-19 test.    Intravenous Access:  Implanted Port.    Treatment Conditions:  Lab Results   Component Value Date    HGB 11.5 08/05/2021    HGB 11.4 06/24/2021     Lab Results   Component Value Date    WBC 6.0 08/05/2021    WBC 6.6 06/24/2021      Lab Results   Component Value Date    ANEU 5.1 07/14/2021    ANEU 4.6 06/24/2021     Lab Results   Component Value Date     08/05/2021     06/24/2021      Lab Results   Component Value Date     08/05/2021     06/24/2021                   Lab Results   Component Value Date    POTASSIUM 4.2 08/05/2021    POTASSIUM 4.1 06/24/2021           Lab Results   Component Value Date    MAG 2.4 08/05/2021    MAG 2.2 06/24/2021            Lab Results   Component Value Date    CR 0.73 08/05/2021    CR 0.65 06/24/2021                   Lab Results   Component Value Date    ARDEN 9.0 08/05/2021    ARDEN 9.2 06/24/2021                Lab Results   Component Value Date    BILITOTAL 0.1 08/05/2021    BILITOTAL 0.3 06/24/2021           Lab Results   Component Value Date    ALBUMIN 3.5 08/05/2021    ALBUMIN 3.6 06/24/2021                    Lab Results   Component Value Date    ALT 17 08/05/2021    ALT 20 06/24/2021           Lab Results   Component Value Date    AST 13 08/05/2021    AST 16 06/24/2021       Results reviewed, labs MET treatment parameters, ok to proceed with treatment.    Post Infusion Assessment:  Patient tolerated infusion without incident.  Blood return noted pre and post infusion.  Site patent and intact, free from redness, edema or discomfort.  No evidence of  "extravasations.     Prior to discharge: Port is secured in place with tegaderm and flushed with 10cc NS with positive blood return noted.  Continuous home infusion Dosi-Fuser pump connected.    All connectors secured in place and clamps taped open.    Pump started, \"running\" noted on display (CADD): Not Applicable.  Pump Connection double checked with Gala Cummins RN.  Patient instructed to call our clinic or [facility] with any questions or concerns at home.  Patient verbalized understanding.    Patient set up for pump disconnect at home with [facility] on 8/10/2021 at 1015.        Discharge Plan:   Patient will return 8/26/2021 for next appointment.   Patient discharged in stable condition accompanied by: self.  Departure Mode: Ambulatory.    Chelsey Hogan RN-BSN, PHN, OCN  [facility]  "

## 2021-08-05 NOTE — PROGRESS NOTES
SPIRITUAL HEALTH SERVICES Progress Note  [facility]    Visited Junito Wang in the infusion center to introduce Spiritual Health support here at [facility].   Offered reflective conversation, support and affirmation as he openly shared he journey.       Illness Narrative - Patient diagnosed with Stage IV esophagus cancer in January 2021.  He has had radiation and chemotherapy.  In general he states that he has been tolerating the treatments.       Concerns - Patient shared that when he first heard his diagnoses it was difficult but so far so good.  May need to have more radiation in his future due to mets in liver area and has been told this may be more difficult to tolerate.        Coping - Patient has support of three siblings. One of his brothers lives with him.  He states that he is trying to stay optimistic.       Meaning-Making - Not discussed today.       Plan -  I gave the patient my contact information and he knows that he can request a Spiritual Health encounter as needed.     Celia White  Staff

## 2021-08-06 PROBLEM — C15.9 METASTASIS FROM ESOPHAGEAL CANCER (H): Status: ACTIVE | Noted: 2021-01-01

## 2021-08-06 PROBLEM — C79.9 METASTASIS FROM ESOPHAGEAL CANCER (H): Status: ACTIVE | Noted: 2021-01-01

## 2021-08-06 NOTE — TELEPHONE ENCOUNTER
DIAGNOSIS: NEW liver lesions, per Estrella   DATE RECEIVED: 8.9.21   NOTES STATUS DETAILS   OFFICE NOTE from referring provider Internal 8.4.21  Dr. Jose Steven  [facility]   OFFICE NOTE from other specialist na    DISCHARGE SUMMARY from hospital na    DISCHARGE REPORT from the ER na    OPERATIVE REPORT na    MEDICATION LIST Internal    XRAYS (IMAGES & REPORTS) Internal 8.3.21, 6.21.21, 5.11.21  CT Chest/Abd/Pelvis   PATHOLOGY  Slides & report na

## 2021-08-09 NOTE — LETTER
"    8/9/2021         RE: Junito Wang  [address] [facility] [id number]        Dear Colleague,    Thank you for referring your patient, Junito Wang, to the [facility]. Please see a copy of my visit note below.    Junito is a 61 year old who is being evaluated via a billable video visit.      How would you like to obtain your AVS? MyChart  If the video visit is dropped, the invitation should be resent by: Send to e-mail at: [email or web address]  Will anyone else be joining your video visit? No     Vitals - Patient Reported  Weight (Patient Reported): 80.9 kg (178 lb 5.6 oz)  Height (Patient Reported): 177.8 cm (5' 10\")  BMI (Based on Pt Reported Ht/Wt): 25.59  Pain Score: No Pain (0)  Video Start Time: 1:30 PM  Video-Visit Details    Type of service:  Video Visit    Video End Time:200    Originating Location (pt. Location): Home    Distant Location (provider location):  [facility]     Platform used for Video Visit: Dar Youssef MA          INTERVENTIONAL RADIOLOGY CLINIC VISIT - NEW PATIENT      Date of this visit: 8/9/2021    Junito Wang  is referred by Dr. Steven for treatment recommendations. The patient requires evaluation for diagnosis of metastatic adenocarcinoma of the GE junction with liver metastasis.    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    As you are aware, Junito Wang is a 61 year old with history of stage IV metastatic gastroesophageal cancer undergoing palliative treatment. He developed stomach discomfort (8/2020) and solid food dysphagia (10/2020). Esophageal biopsy (1/2021) demonstrated moderately differentiated adenocarcinoma, and CT the same month showed disease involving the " mediastinal lymph nodes, liver, and sacrum with additional lung nodules. PET and MRI brain imaging (2/2021) demonstrated additional lesions in the left choroid plexus and lymph nodes of the neck and abdomen. He was admitted for acute leg weakness and sensory deficits (3/2021) with MRI showing possible epidural tumor for which he underwent L2-5 laminectomy, lumbar spine radiation and PEG placement. The patient subsequently underwent gamma knife (3/2021) and started chemotherapy (3/2021) with Cisplatin, keytruda and 5 FU. The patient's CT (5/2021) showed overall stable disease with some likely progression in the liver . His most recent CT (620/21) again showed overall stable disease except for increased size of multiple liver metastasis.    Today, patient is overall doing well without acute complaint. He detailed his clinical course and inquired into the utility of Y90 radioembolization.      PAST MEDICAL HISTORY:   Past Medical History:   Diagnosis Date     Arthritis      Hypertension      Malignant neoplasm of lower third of esophagus (H) 01/26/2021     Obesity (BMI 35.0-39.9) with comorbidity (H) 1/31/2019       PAST SURGICAL HISTORY:   Past Surgical History:   Procedure Laterality Date     ABDOMEN SURGERY  1992    fatty mass removed     APPENDECTOMY  1987     BIOPSY Left 02/26/2021    Left pelvic mass bone biopsy     COLONOSCOPY WITH CO2 INSUFFLATION N/A 01/26/2021    Procedure: COLONOSCOPY, WITH CO2 INSUFFLATION;  Surgeon: Nain Dominguez MD;  Location: [facility] OR     COMBINED ESOPHAGOSCOPY, GASTROSCOPY, DUODENOSCOPY (EGD) WITH CO2 INSUFFLATION N/A 01/26/2021    Procedure: ESOPHAGOGASTRODUODENOSCOPY, WITH CO2 INSUFFLATION;  Surgeon: Nain Dominguez MD;  Location: [facility] OR     ENDOSCOPIC INSERTION TUBE GASTROSTOMY N/A 03/05/2021    Procedure: Percutaneous endoscopic gastrostomy tube placement;  Surgeon: Jose Curiel MD;  Location: [facility] OR     ESOPHAGOSCOPY, GASTROSCOPY, DUODENOSCOPY (EGD), COMBINED N/A  2021    Procedure: Esophagogastroduodenoscopy, With Biopsy;  Surgeon: Nain Dominguez MD;  Location: [facility] OR     GI SURGERY      Upper Endoscopy     INSERT PORT VASCULAR ACCESS Right 3/29/2021    Procedure: CHEST INSERTION, VASCULAR ACCESS PORT @0900;  Surgeon: Roderick Prakash MD;  Location: [facility] OR     IR CHEST PORT PLACEMENT > 5 YRS OF AGE  3/29/2021     LAMINECTOMY LUMBAR POSTERIOR MICROSCOPIC THREE+ LEVELS N/A 2021    Procedure: Lumbar 2 to Lumbar 5 Laminectomy;  Surgeon: Soy Gusman MD;  Location: [facility] OR     TONSILLECTOMY         FAMILY HISTORY:   Family History   Problem Relation Age of Onset     Hypertension Mother      Neurologic Disorder Mother         stroke     Hyperlipidemia Mother      Cerebrovascular Disease Mother          of stroke     Thyroid Disease Mother      Heart Disease Father      Heart Failure Father      Coronary Artery Disease Father          of heart attack     Hypertension Brother      Coronary Artery Disease Brother         Quadruple Bi-pass/Quadruple Bi-pass     Hypertension Sister      Coronary Artery Disease Sister         Stent/Stent     Hypertension Brother      Hyperlipidemia Brother      Hypertension Sister      Hyperlipidemia Sister      Cancer No family hx of        SOCIAL HISTORY:   Social History     Tobacco Use     Smoking status: Current Every Day Smoker     Packs/day: 0.50     Years: 44.00     Pack years: 22.00     Types: Cigarettes     Start date:      Smokeless tobacco: Never Used     Tobacco comment: Patient reports currently smoking 1/2 PPD since hospital discharge and wearing a Nicotene Patch   Substance Use Topics     Alcohol use: Not Currently       PROBLEM LIST:   Patient Active Problem List    Diagnosis Date Noted     Metastasis from esophageal cancer (H) 2021     Priority: Medium     Liver metastases (H) 2021     Priority: Medium     Bone metastasis (H) 2021     Priority: Medium     Urine retention  03/03/2021     Priority: Medium     Left leg weakness 03/03/2021     Priority: Medium     Anemia, unspecified type 03/03/2021     Priority: Medium     Leukocytosis, unspecified type 03/03/2021     Priority: Medium     Malignant neoplasm of esophagus, unspecified location (H) 03/03/2021     Priority: Medium     Acute low back pain, unspecified back pain laterality, unspecified whether sciatica present 03/03/2021     Priority: Medium     Malignant neoplasm of lower third of esophagus (H) 03/01/2021     Priority: Medium     Added automatically from request for surgery [id number]       Esophageal dysphagia 03/01/2021     Priority: Medium     Added automatically from request for surgery [id number]       Advanced directives, counseling/discussion 10/30/2017     Priority: Medium     Hypertension goal BP (blood pressure) < 140/90 06/14/2015     Priority: Medium       MEDICATIONS:   Prescription Medications as of 8/9/2021       Rx Number Disp Refills Start End Last Dispensed Date Next Fill Date Owning Pharmacy    Calcium Carb-Cholecalciferol (CALCIUM-VITAMIN D) 600-400 MG-UNIT TABS  60 tablet 3 6/2/2021    [facility] #[facility] [place] [facility] [address] [place] [address] [place]    Sig: Take 2 tablets by mouth daily    Class: E-Prescribe    Route: Oral    dexamethasone (DECADRON) 4 MG tablet  6 tablet 2 6/23/2021    [facility] #[facility] [place], [address]    Sig: Take 2 tablets (8 mg) by mouth daily (with breakfast) for 3 days, starting day after Cisplatin (Day 2).    Class: E-Prescribe    Route: Oral    gabapentin (NEURONTIN) 250 MG/5ML solution  470 mL 1 2/8/2021    [facility] #[facility] [place] [address]    Sig: Take 5 mLs (250 mg) by mouth At Bedtime    Class: E-Prescribe    Route: Oral    LORazepam (ATIVAN) 0.5 MG tablet  30 tablet 2 3/30/2021        Sig: Take 1 tablet (0.5 mg) by mouth every 4 hours as needed (Anxiety, Nausea/Vomiting or Sleep)    Class: Local Print    Route:  Oral    methocarbamol (ROBAXIN) 750 MG tablet  30 tablet 0 3/31/2021    [facility] #[facility] [place], [place] [address]     Sig: Take 1 tablet (750 mg) by mouth 4 times daily    Class: Historical    Route: Oral    multivitamin CF FORMULA (CHOICEFUL) chewable tablet        [facility] #[facility] [place], [place] - [address] [place] [address] [place]     Sig: Take 1 tablet by mouth daily    Class: Historical    Route: Oral    omeprazole (FIRST-OMEPRAZOLE) 2 MG/ML SUSP  300 mL 1 3/31/2021    [facility] [place] [place] [facility] [address] [facility] [address]     Sig: Take 10 mLs (20 mg) by mouth 2 times daily    Class: E-Prescribe    Route: Oral    omeprazole POWD powder    4/1/2021        Class: Historical    prochlorperazine (COMPAZINE) 10 MG tablet  30 tablet 2 3/30/2021    [facility] #[facility] [place], [place] - [address] [place], [place] [address] [address]     Sig: Take 1 tablet (10 mg) by mouth every 6 hours as needed (Nausea/Vomiting)    Class: E-Prescribe    Route: Oral          ALLERGIES:   Patient has no known allergies.    General: Negative for recent fever.  Skin: Negative for jaundice.  Eyes: Negative for jaundice.  Respiratory: Negative for shortness of breath.  Cardiovascular: Negative for chest pain.  Gastrointestinal: Negative for abdominal pain or swelling, nausea, vomiting, or diarrhea.  Musculoskeletal: Negative for ankle swelling.      Physical Examination:   VITALS:   There were no vitals taken for this visit.  A limited virtual physical exam was performed.  General: Awake, alert, NAD  Resp: Breathing comfortably on room air    Labs:    Lab Results   Component Value Date    HGB 11.5 08/05/2021    HGB 11.4 06/24/2021     Lab Results   Component Value Date     08/05/2021     06/24/2021     Lab Results   Component Value Date    WBC 6.0 08/05/2021    WBC 6.6 06/24/2021       Lab Results   Component Value Date    INR 1.16 03/08/2021       Lab Results   Component Value Date    PROTTOTAL 6.7 08/05/2021     PROTTOTAL 6.8 06/24/2021      Lab Results   Component Value Date    ALBUMIN 3.5 08/05/2021    ALBUMIN 3.6 06/24/2021     Lab Results   Component Value Date    BILITOTAL 0.1 08/05/2021    BILITOTAL 0.3 06/24/2021     No results found for: BILICONJ   Lab Results   Component Value Date    ALKPHOS 124 08/05/2021    ALKPHOS 110 06/24/2021     Lab Results   Component Value Date    AST 13 08/05/2021    AST 16 06/24/2021     Lab Results   Component Value Date    ALT 17 08/05/2021    ALT 20 06/24/2021       Lab Results   Component Value Date    CR 0.73 08/05/2021    CR 0.65 06/24/2021     No results found for: GFR    No results found for: FETO    Diagnostic studies:   I reviewed the CT CAP from 8/3/21  1.  Slight increased size of hepatic metastases.  2.  Stable osseous metastases with the largest lesion in the sacrum.  3.  Stable wall thickening of the distal esophagus.    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 The esophageal, osseous, brain and georgina components of his disease are stable and continue to respond to systemic treatment. His progression is limited to his liver and this clearly represents the greatest threat to his immediate survival. Therefore, the patient may benefit from locoregional treatment of his liver with Y90 radioembolization.    ECOG performance status: 0    I believe the patient is a suitable candidate for a radioembolization procedure.    I showed Mr. Wang the imaging and discussed the findings. I discussed with Mr. Wang that the goal of the procedure is disease control with a survival benefit rather than a cure given the nature of the disease, but that sometimes lesions will be cured in this manner. Alternatives were reviewed, including surgery, but the patient is not a surgical candidate.  I explained the  radioembolization procedure  - that it is a two step procedure on two different days that involves an angiogram and nuclear medicine study on each day, and that it requires insurance pre-approval. I explained that the first procedure involves mapping the arteries to the liver and embolizing any sidebranches that place the patient at risk of non-target radiation injury, then checking for shunting to the lungs. The second procedure, assuming relevant sidebranches were embolizable and the lung shunting is not too high, involves delivering the radiation beads intra-arterially and imaging the liver afterwards to assess the pattern of radiation distribution.   I explained that both procedures are performed under conscious sedation. We discussed what will happen before, during and after the procedure; what to expect in the post procedure recovery period; and what the follow-up will be. We discussed the risks of the procedure which include, but are not limited to, vascular injury causing bleeding or occlusion of blood vessels, groin hematoma, infection, biloma, liver failure, non-target radiation injury to structures such as gallbladder/bowel/pancreas/lung (with risks such as bowel ulceration, pancreatitis or pneumonitis), and incomplete treatment. Risks are increased the greater the deviation from normal lab values, especially albumin and total bilirubin, and also the larger the area we must treat. We also discussed the post-radioembolization syndrome which consists of varying degrees of pain, nausea, and lethargy. I also explained that new tumors may develop elsewhere given the nature of the disease. Most patients go home the same day, but occasionally circumstances are such that a longer admission may be required. I also informed the patient that I will be assisted during the procedure by a fellow and/or resident, and that sometimes a medical student may be observing. All of Mr Wang's questions were answered and   Felipe agreed to proceed.       Plan   Pending insurance pre-approval, we will schedule Mr. Wang for the Y90 mapping procedure accordingly.    It was a pleasure seeing the patient.     Signed,    Rosales Meléndez M.D.  Department of Interventional Radiology  [facility]  Patient Care Team:  Negra Kim CNP as PCP - General  Roderick Sánchez, RN as Specialty Care Coordinator (Oncology)  Jose Steven MD as MD (Hematology & Oncology)  Emilia Qureshi MD as MD (Radiation Oncology)  Negra Kim CNP as Assigned PCP  Whit Hatfield CNP as Nurse Practitioner (Urology)  Trisha Garcia, MANISH as Registered Nurse  Emilia Qureshi MD as Assigned Cancer Care Provider  Chas Baptiste MD as Assigned Neuroscience Provider  Angelica Lloyd PA-C as Assigned Surgical Provider  JOSE STEVEN                      Again, thank you for allowing me to participate in the care of your patient.        Sincerely,        Rosales Meléndez MD

## 2021-08-10 NOTE — PATIENT INSTRUCTIONS
Junito you have had your consult today with Dr Meléndez regarding liver directed therapy for your liver lesions.  Your CT completed on 8/3/21 has been reviewed with you during this visit.    Plan:    We will submit for prior authorization for [employer]'s Y90 and contact you to schedule your procedure.  During phone call on 8/10/21 I have obtained telephone consent from you to submit 6 months worth of records to obtain insurance prior authorization for treatment.    Please don't hesitate to call with questions or concerns,    JO Stephen, RN, BSN  Interventional Radiology Nurse Coordinator   Phone:  [phone]

## 2021-08-17 NOTE — TELEPHONE ENCOUNTER
Called pt and informed him that we did hear back from his insurance    Informed him that our request for Y90 was denied.     We will be going through the 1st level of appeal in which we will write a letter and submit supporting documents.     Will update him on status     He verbalized understanding     Estrella WARE RN, BSN  Interventional Radiology/Vascular  Nurse Coordinator   Phone: [phone]  Fax: [phone]

## 2021-08-20 NOTE — TELEPHONE ENCOUNTER
[facility]    Contact Information:  Rosales Meléndez MD  [address] [place] [address]   [email or web address]   [phone]        Insurance Plan: [facility]  Plan Type: Medicaid MCO  Employer: N/A  Secondary Insurance:  Secondary Plan Type:   CPT Code:   ICD-10: C15.9; C79.9; C78.7  Place of Service: -Outpatient Hosp  Facility: ?[facility]?    Condition:       Case Status:  Approved    Primary Insurance Status:    Secondary Insurance Status:    Case Level:  1st Appeal    Initial Call Date:  8/12/2021    Next Step:  Case closed - Approved    Follow-Up:  8/20/2021 - Belia at [facility] contacted [employer] regarding approval for patient BN. The expedited appeal was received and reviewed by the medical director who overturned the denial. The appeal number is #[id number]. The authorization number is #[id number]. This is valid from 8/16/21-11/16/21 for bilobar treatment. Belia will be faxing the approval letter to [employer] once generated.    Case Notes:  8/19/2021 - Bo received the appeal letter and supporting articles to submit for patient BN. The appeal was faxed to [facility] as expedited for review.    8/18/2021 - The office advised the physician would like to proceed with the 1st level appeal for patient BN. They are currently creating an appeal letter. Once complete, Bo will fax the appeal for review.    8/16/2021 - Destiny at [facility] contacted Bo regarding denial for patient BN. CPT codes 21827, 14143 and 09255 were denied as not medically necessary. There is a peer to peer option, however, this will not overturn the denial. The next step would be to submit a 1st level appeal. This can be faxed to [phone] attention case #[id number]. Bo obtained the determination letter which was forwarded to the office contacts.    8/12/2021 - Bo contacted [facility]  [place] regarding patient BN. The patient has a [employer] [place] Medicaid policy effective 1/1/2019. If considered medically necessary, the procedure will be covered at 100% of the allowed amount after the $100 copayment has been satisfied. Roby setup the authorization request on [employer]. The case was marked as urgent and sent to review under #[id number]. Reference #[employer] Transaction ID [id number]    8/11/2021 - Prior Auth: Roby has received a Pre-Cert request for patient BN. As soon as our investigation is complete, All parties will be notified of our findings.      Cherelle Palacios

## 2021-08-20 NOTE — TELEPHONE ENCOUNTER
Called pt to inform him that we did get an approval for his Y90.    Mapping: Wed 8/25  Check in at 9am     Delivery Fri 9/10   Check in 8am     All appt information and location given to pt  He is to get his covid test this Sat and provided  Him with the appt detail  To call and schedule this.      Informed him that I will send this msg to him via [employer] as well.    I did send a msg of his appt details to  so he's aware.    Pt will contact me with any other questions.     Estrella WARE RN, BSN  Interventional Radiology/Vascular  Nurse Coordinator   Phone: [phone]  Fax: [phone]

## 2021-08-24 NOTE — PROGRESS NOTES
"Junito is a 61 year old who is being evaluated via a billable video visit.      How would you like to obtain your AVS? MyChart  If the video visit is dropped, the invitation should be resent by: Send to e-mail at: [email or web address]  Will anyone else be joining your video visit? No     Vitals - Patient Reported  Weight (Patient Reported): 80.7 kg (178 lb)  Height (Patient Reported): 177.8 cm (5' 10\")  BMI (Based on Pt Reported Ht/Wt): 25.54  Pain Score: Mild Pain (2)  Pain Loc: Low Back (HIP)    Stephanie MAHARAJ          Video-Visit Details    Type of service:  Video Visit    Video Start Time: 7:10 AM    Video End Time:7:21 AM    Originating Location (pt. 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),metastasis of liver, brain and spine  (AJCC 8th edition)  -MMR proficient, HER2 by IHC is 2+, FISH neg  -PD-L1 CPS- 5-10%  -NGS by Cargiacomo- No actionable mutations     Treatment:  3/2/21- L2-L5 laminectomy and decompression  3/17/21-3/23/21: Radiation to LS spine  3/24/21: gamma knife (2000cGy in 5 fractions)  3/31/21 to now: Cisplatin+keytruda+5fu every 3 weeks    9/10/21: tentative Y-90    Treatment intent- Palliative    Oncology HPI:   August 2020- stomach discomfort, belching   October 2020- progressive dysphagia with solids   1/26/21- distal esophageal biopsy shows Moderately differentiated adenocarcinoma; MMR normal expression, PDL1 expression is low with TPS 2%, CPS 5-10, Her2 is pending  1/27/21- CT CAP- Enlarged  2 cm subcarinal lymph node and 1.4 cm short axis right subcarinal lymph node, focal thickening of the superior wall along the right side of the mid esophagus. Numerous small pulmonary nodules, 3 mm nodule in the superior segment of the left lower lobe, 3 mm nodule " in the anterior aspect of the right upper lobe , 4 mm nodule in the medial aspect of the right upper lobe and 4 mm right lower lobe nodule. Hypoattenuating foci in the liver, 1.6 cm hypoattenuating/hypoenhancing lesion in hepatic segment 8 and 1.4 cm lesion in hepatic segment 5 , indeterminate, likely metastatic.  The prostate gland is mildly enlarged measuring 5.3 cm in transverse diameter. Multiple prominent but not significantly enlarged retroperitoneal lymph nodes, indeterminate, could be reactive. 4.4 x 4.3 cm lytic lesion centered in the posterior aspect of the left sacral ala (series 3 image 243), 4.7 x 4.0 x 5.6 cm (axial and CC dimensions, respectively) hypoattenuating lesion in the subcutaneous tissue of the anterior chest wall just anterior to the mediastinum, indeterminate, could represent sebaceous cyst.  2/4/21- Saw Dr. Bourgeois ordered PET/CT  3/2/21-3/10/21- Admission to [facility]- <24h of inability to ambulate with acute onset left leg weakness/urinary retention with perianal sensory deficits, MRI showed extensive epidural signal change suggestive of tumor vs. Hemorrhage.Pt underwent emergent surgery- L2-L5 laminectomy and decompression . PEG tube placement 3/5/2021.   3/17/21-3/23/21: Radiation to LS spine  3/24/21: gamma knife (2000cGy in 5 fractions)  3/29/21- port placement  3/31/21: Cycle 1 Cisplatin+keytruda+5fu   4/21/21: Cycle 2 Cisplatin+keytruda+5fu  5/11/21- Ct CAP- Overall stable disease- mixed response- MD in 2 liver lesions while, stable disease to borderline progression in 1 liver lesions  5/12/21- Cycle 3 Cisplatin+keytruda+5fu  6/2/21- Cycle 4 Cisplatin+keytruda+5fu  6/21/21- CT CAP- Stable mild circumferential wall thickening of the distal esophagus (series 3, image 99) with small amount of fluid in the upper and mid esophagus. No lymphadenopathy. Stable to slight increased size of hepatic metastases. For example a 3.1 x 2.9 cm lesion in the left lobe (series 3, image 123) previously  measured 2.9 x 2.6 cm, and a 2.7 x 2.9 cm peripheral right hepatic lobe metastasis previously measured 3.0 x 2.2 cm. A 1.0 cm lesion in the caudate lobe (series 3, image 138) is more prominent today/new. Stable small gastrohepatic ligament lymph nodes. No new or enlarging lymph nodes in the abdomen and pelvis. No ascites.Stable 5.1 x 4.6 cm left sacral mass. Stable cystic 4.9 x 3.5 cm subcutaneous lesion overlying the midsternum.  6/24/21- Cycle 5 Cisplatin+keytruda+5fu  7/14/21- Cycle 6 Cisplatin+keytruda+5fu  8/3/21- Ct CAP- Few small pulmonary nodules are stable. Stable mild circumferential wall thickening of the distal esophagus (series 3, image 97).  Increased size of hepatic metastases. For example a 3.5  x 3.5 cm left lobe metastasis (series 3, image 117) previously measured 2.9 x 3.1 cm, a 3.5 x 2.7 cm subcapsular right hepatic lobe metastasis (series 3, image 158) previously measured 2.7 x 2.9 cm and  a 1.7 x 1.5 cm lesion in the left lobe adjacent to the caudate (series 3, image 124) previously measured 1.0 cm.  Stable 5.2 x 4.9 cm lytic lesion in the left sacrum previously measuring 5.2 x 4.6 cm (3, image 238). Stable sclerotic lesion in the anterior aspect of the T4 vertebral body measuring 1.3 cm (series 3, image 39) and sclerosis of the posterior left third rib.. No new lesions. Stable 5.2 cm cystic subcutaneous lesion overlying the sternum    Interval history:   Junito is overall doing well. He continues to tolerate his treatments well. With the drop of the cisplatin he notes his mouth sores did not feel as bad. He continues to deny nausea, not needing anti-emetics. He is eating well with overall good appetite. Notes some runny nose since chemo has started but no congestion, sinus pressure, or fever. This is self limiting. Denies any new concerns or SE from chemo. He remains active outside and around the house. Hoping to get Y-90 mapping today    Comprehensive ROS was unremarkable except as detailed  above.       Current Outpatient Medications   Medication Sig Dispense Refill     Calcium Carb-Cholecalciferol (CALCIUM-VITAMIN D) 600-400 MG-UNIT TABS Take 2 tablets by mouth daily 60 tablet 3     dexamethasone (DECADRON) 4 MG tablet Take 2 tablets (8 mg) by mouth daily (with breakfast) for 3 days, starting day after Cisplatin (Day 2). 6 tablet 2     gabapentin (NEURONTIN) 250 MG/5ML solution Take 5 mLs (250 mg) by mouth At Bedtime 470 mL 1     LORazepam (ATIVAN) 0.5 MG tablet Take 1 tablet (0.5 mg) by mouth every 4 hours as needed (Anxiety, Nausea/Vomiting or Sleep) (Patient not taking: Reported on 4/20/2021) 30 tablet 2     methocarbamol (ROBAXIN) 750 MG tablet Take 1 tablet (750 mg) by mouth 4 times daily 30 tablet 0     multivitamin CF FORMULA (CHOICEFUL) chewable tablet Take 1 tablet by mouth daily       omeprazole (FIRST-OMEPRAZOLE) 2 MG/ML SUSP Take 10 mLs (20 mg) by mouth 2 times daily (Patient not taking: Reported on 6/2/2021) 300 mL 1     omeprazole POWD powder        prochlorperazine (COMPAZINE) 10 MG tablet Take 1 tablet (10 mg) by mouth every 6 hours as needed (Nausea/Vomiting) (Patient not taking: Reported on 4/20/2021) 30 tablet 2        No Known Allergies    Exam:  There were no vitals taken for this visit.  Wt Readings from Last 4 Encounters:   08/05/21 80.9 kg (178 lb 6.4 oz)   07/14/21 80 kg (176 lb 6.4 oz)   06/24/21 79.4 kg (175 lb)   06/23/21 79.4 kg (175 lb)     Video physical exam  General: Patient appears well in no acute distress.   Skin: No visualized rash or lesions on visualized skin  Eyes: EOMI, no erythema, sclera icterus or discharge noted  Resp: Appears to be breathing comfortably without accessory muscle usage, speaking in full sentences, no cough  MSK: Appears to have normal range of motion based on visualized movements  Neurologic: No apparent tremors, facial movements symmetric  Psych: affect good, alert and oriented    The rest of a comprehensive physical examination is deferred  due to PHE (public health emergency) video restrictions    Labs: 8/5 labs--new labs 8/25    Imaging:   CT CAP 8/3/21  COMPARISON: 6/21/2021  FINDINGS:   LUNGS AND PLEURA: Few small pulmonary nodules are stable. For example,  a 4 mm right lower lobe nodule (series 5, image 115) and 4 mm left  upper lobe nodule (series 5, image 104) are unchanged. No new or  enlarging nodules. No infiltrate or pleural effusion.     MEDIASTINUM/AXILLAE: Stable mild circumferential wall thickening of the distal esophagus (series 3, image 97). No lymphadenopathy. Right IJ port catheter tip at the SVC/right atrial junction. No thoracic aortic aneurysm. Moderate coronary artery calcifications. No pericardial effusion.     HEPATOBILIARY: Increased size of hepatic metastases. For example a 3.5 x 3.5 cm left lobe metastasis (series 3, image 117) previously measured 2.9 x 3.1 cm, a 3.5 x 2.7 cm subcapsular right hepatic lobe metastasis (series 3, image 158) previously measured 2.7 x 2.9 cm and a 1.7 x 1.5 cm lesion in the left lobe adjacent to the caudate (series 3, image 124) previously measured 1.0 cm. No new lesions.     PANCREAS: Normal.     SPLEEN: Normal.     ADRENAL GLANDS: Normal.     KIDNEYS/BLADDER: Left renal simple cyst requiring no specific  follow-up and otherwise normal kidneys.     BOWEL: Percutaneous endoscopic gastrostomy tube. No small bowel or colonic obstruction or inflammatory changes.     PELVIC ORGANS: No pelvic masses.     ADDITIONAL FINDINGS: Stable soft tissue thickening surrounding the left gastric artery. No lymphadenopathy. No ascites.     MUSCULOSKELETAL: Stable 5.2 x 4.9 cm lytic lesion in the left sacrum previously measuring 5.2 x 4.6 cm (3, image 238). Stable sclerotic lesion in the anterior aspect of the T4 vertebral body measuring 1.3 cm (series 3, image 39) and sclerosis of the posterior left third rib. No new lesions. Stable 5.2 cm cystic subcutaneous lesion overlying the sternum.                                                                       IMPRESSION:  1.  Slight increased size of hepatic metastases.  2.  Stable osseous metastases with the largest lesion in the sacrum.  3.  Stable wall thickening of the distal esophagus.    Impression/plan:   # Stage IV esophageal adenocarcinoma   # Progressive liver disease  # ECOG PS 0-1  Started chemoimmunotherpay with Cisplatin+5Fu+pembrolizumab based on [id number] trial. The trial demonstrated a statistically significant improvement in OS and PFS for patients randomized to pembrolizumab with chemotherapy. Median OS was 12.4 months (95% CI: 10.5, 14.0) for the pembrolizumab arm versus 9.8 months (95% CI: 8.8, 10.8) for the chemotherapy arm (HR 0.73; 95% CI: 0.62, 0.86; p<0.0001). Median PFS was 6.3  (95% CI: 6.2, 6.9) and 5.8 months (95% CI: 5.0, 6.0), respectively (HR 0.65; 95% CI: 0.55, 0.76; p<0.0001).     Restaging scan after 2 cycles showed overall stable disease with - mixed response- MA in esophgaus primary, and 2 liver lesions while, stable disease in spine, and stable to borderline progression in 1 liver lesions. Scan after 4 cycles showing stable disease in esophagus and LN and a some liver lesion. 2 lesions in liver are slightly bigger, one is more prominent than before. CT CAP after 6 cycles done on 8/3/21 demonstrated continued interval increase in the size of 2 liver lesions, stable osseous lesions and continued inconspicuity of the primary esophageal mass.     - Given the liver-selective progressive disease, we recommended liver directed local therapy and referral to interventional radiology was placed. Plan is for Y-90 pending insurance coverage as he just got a call prior to our visit that it now may not be covered.   - Chemotherapy wise, he is s/p 6 cycles of induction cisplatin, 5-FU and pembrolizumab thus was swithced to 5-FLU and pembrolizumab maintenance (i.e. drop cisplatin) with last cycle for a total of 2 years or until progression/intolerable  toxicities. Okay for infusion tomorrow pendinng labs  - In the event of eventual extrahepatic disease progression, options for next lines of treatment would include ramucirumab plus paclitaxel based on the [place] trial. Other options include single-agent docetaxel, pacltixael or irinotecan. Lonsurf could be pursued in the third line.    #Brain mets   Follows with Dr. Velez. S/p GK 3/24. MRI June 23 with no new mets. Next in 09/2021.     #Mucositis, improved  2/2 F5U. Continue Aquaphor and salt/soda rinses as needed. He has been offered MMW but does not feel his symptoms are bad enough to warrant more treatment. Avoid irritating foods like spicy/acidic items when mucositis is worse.      #Bone mets  Bone biopsy of left pelvic mass 2/26/21 confirmed metastasis. Finished radiation 3/23 to lumbosacral spine.   While he has not seen dentist in 4 yrs, discusseed small risk of osteonecrosis, 3-5% risk, he does not have any ongoing dental issues and has no tooth pain or caries.  -Zometa every 6 weeks, last given 8/5  -Continue Calcium and Vitmain D.     #Dypshagia, malnutrition  PEG tube placed in OR (3/5) for nutritional support. Now with good PO intake. Follows with RD. Continue PO intake as tolerated. Previously declined SLP follow up, can revisit as needed though dysphagia has improved with treatment.   --Of note, consideration given to esophageal stent vs XRT for esophageal debulking to address dysphagia inpatient in Feb/March 2021; however given likely improvement with chemo-immunotherapy, as well as risks of worsening cytopenias, elected not to.      #Cauda Equina due to tumor compression s/p L2-L5 laminectomy and decompression   Followed by neurosurgery (now prn) with improved exam after surgery. He is now ambulating with or without a walker, strength nearly back to baseline. He is aware of his activity and lifting restrictions. Has some L radiculopathy that he uses robaxan prn.      #Constipation  Dulcolax,  miralax, or MOM prn. Continue to avoid suppository while on chemo.     #Urinary retention  Andrews out with no new issues, saw urology with q6 mo follow up rec.     #GERD  Improved. Liquid omeprazole as needed but not really needing      Overall Plan:  - Proceed with maintainence 5-FLU and pembrolizumab tomorrow. These infusions are currently already scheduled through 9/16/21.   -Mapping for local therpay for liver therapy with Y-90 today and procedure 9/10  - Restaging CT CAP and a visit with Dr. Steven 9/30      40  minutes spent on the date of the encounter doing chart review, review of test results, interpretation of tests, patient visit and documentation       Jaky Pugh CNP on 8/25/2021 at 7:31 AM

## 2021-08-24 NOTE — PROGRESS NOTES
This is a recent snapshot of the patient's [facility] medical record.  For current drug dose and complete information and questions, call [phone] or In Basket pool, [facility] home infusion ([id number])  CSN Number:  [id number]

## 2021-08-25 NOTE — LETTER
"    8/25/2021         RE: Junito Wang  [id number] [facility] [id number]        Dear Colleague,    Thank you for referring your patient, Junito Wang, to the [facility]. Please see a copy of my visit note below.    Junito is a 61 year old who is being evaluated via a billable video visit.      How would you like to obtain your AVS? MyChart  If the video visit is dropped, the invitation should be resent by: Send to e-mail at: [email or web address]  Will anyone else be joining your video visit? No     Vitals - Patient Reported  Weight (Patient Reported): 80.7 kg (178 lb)  Height (Patient Reported): 177.8 cm (5' 10\")  BMI (Based on Pt Reported Ht/Wt): 25.54  Pain Score: Mild Pain (2)  Pain Loc: Low Back (HIP)    Stephanie MAHARAJ          Video-Visit Details    Type of service:  Video Visit    Video Start Time: 7:10 AM    Video End Time:7:21 AM    Originating Location (pt. Location): Home    Distant Location (provider location):  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),metastasis of liver, brain and spine  (AJCC 8th edition)  -MMR proficient, HER2 by IHC is 2+, FISH neg  -PD-L1 CPS- 5-10%  -NGS by [facility]- No actionable mutations     Treatment:  3/2/21- L2-L5 laminectomy and decompression  3/17/21-3/23/21: Radiation to LS spine  3/24/21: gamma knife (2000cGy in 5 fractions)  3/31/21 to now: Cisplatin+keytruda+5fu every 3 weeks    9/10/21: tentative Y-90    Treatment intent- Palliative    Oncology HPI:   August 2020- stomach discomfort, belching   October 2020- progressive dysphagia with solids   1/26/21- distal esophageal biopsy shows Moderately differentiated adenocarcinoma; MMR normal expression, PDL1 expression is low with TPS 2%, CPS 5-10, Her2 is pending  1/27/21- CT CAP- Enlarged  2 cm " subcarinal lymph node and 1.4 cm short axis right subcarinal lymph node, focal thickening of the superior wall along the right side of the mid esophagus. Numerous small pulmonary nodules, 3 mm nodule in the superior segment of the left lower lobe, 3 mm nodule in the anterior aspect of the right upper lobe , 4 mm nodule in the medial aspect of the right upper lobe and 4 mm right lower lobe nodule. Hypoattenuating foci in the liver, 1.6 cm hypoattenuating/hypoenhancing lesion in hepatic segment 8 and 1.4 cm lesion in hepatic segment 5 , indeterminate, likely metastatic.  The prostate gland is mildly enlarged measuring 5.3 cm in transverse diameter. Multiple prominent but not significantly enlarged retroperitoneal lymph nodes, indeterminate, could be reactive. 4.4 x 4.3 cm lytic lesion centered in the posterior aspect of the left sacral ala (series 3 image 243), 4.7 x 4.0 x 5.6 cm (axial and CC dimensions, respectively) hypoattenuating lesion in the subcutaneous tissue of the anterior chest wall just anterior to the mediastinum, indeterminate, could represent sebaceous cyst.  2/4/21- Saw Dr. Bourgeois ordered PET/CT  3/2/21-3/10/21- Admission to [facility]- <24h of inability to ambulate with acute onset left leg weakness/urinary retention with perianal sensory deficits, MRI showed extensive epidural signal change suggestive of tumor vs. Hemorrhage.Pt underwent emergent surgery- L2-L5 laminectomy and decompression . PEG tube placement 3/5/2021.   3/17/21-3/23/21: Radiation to LS spine  3/24/21: gamma knife (2000cGy in 5 fractions)  3/29/21- port placement  3/31/21: Cycle 1 Cisplatin+keytruda+5fu   4/21/21: Cycle 2 Cisplatin+keytruda+5fu  5/11/21- Ct CAP- Overall stable disease- mixed response- KS in 2 liver lesions while, stable disease to borderline progression in 1 liver lesions  5/12/21- Cycle 3 Cisplatin+keytruda+5fu  6/2/21- Cycle 4 Cisplatin+keytruda+5fu  6/21/21- CT CAP- Stable mild circumferential wall thickening of the  distal esophagus (series 3, image 99) with small amount of fluid in the upper and mid esophagus. No lymphadenopathy. Stable to slight increased size of hepatic metastases. For example a 3.1 x 2.9 cm lesion in the left lobe (series 3, image 123) previously measured 2.9 x 2.6 cm, and a 2.7 x 2.9 cm peripheral right hepatic lobe metastasis previously measured 3.0 x 2.2 cm. A 1.0 cm lesion in the caudate lobe (series 3, image 138) is more prominent today/new. Stable small gastrohepatic ligament lymph nodes. No new or enlarging lymph nodes in the abdomen and pelvis. No ascites.Stable 5.1 x 4.6 cm left sacral mass. Stable cystic 4.9 x 3.5 cm subcutaneous lesion overlying the midsternum.  6/24/21- Cycle 5 Cisplatin+keytruda+5fu  7/14/21- Cycle 6 Cisplatin+keytruda+5fu  8/3/21- Ct CAP- Few small pulmonary nodules are stable. Stable mild circumferential wall thickening of the distal esophagus (series 3, image 97).  Increased size of hepatic metastases. For example a 3.5  x 3.5 cm left lobe metastasis (series 3, image 117) previously measured 2.9 x 3.1 cm, a 3.5 x 2.7 cm subcapsular right hepatic lobe metastasis (series 3, image 158) previously measured 2.7 x 2.9 cm and  a 1.7 x 1.5 cm lesion in the left lobe adjacent to the caudate (series 3, image 124) previously measured 1.0 cm.  Stable 5.2 x 4.9 cm lytic lesion in the left sacrum previously measuring 5.2 x 4.6 cm (3, image 238). Stable sclerotic lesion in the anterior aspect of the T4 vertebral body measuring 1.3 cm (series 3, image 39) and sclerosis of the posterior left third rib.. No new lesions. Stable 5.2 cm cystic subcutaneous lesion overlying the sternum    Interval history:   Junito is overall doing well. He continues to tolerate his treatments well. With the drop of the cisplatin he notes his mouth sores did not feel as bad. He continues to deny nausea, not needing anti-emetics. He is eating well with overall good appetite. Notes some runny nose since chemo has  started but no congestion, sinus pressure, or fever. This is self limiting. Denies any new concerns or SE from chemo. He remains active outside and around the house. Hoping to get Y-90 mapping today    Comprehensive ROS was unremarkable except as detailed above.       Current Outpatient Medications   Medication Sig Dispense Refill     Calcium Carb-Cholecalciferol (CALCIUM-VITAMIN D) 600-400 MG-UNIT TABS Take 2 tablets by mouth daily 60 tablet 3     dexamethasone (DECADRON) 4 MG tablet Take 2 tablets (8 mg) by mouth daily (with breakfast) for 3 days, starting day after Cisplatin (Day 2). 6 tablet 2     gabapentin (NEURONTIN) 250 MG/5ML solution Take 5 mLs (250 mg) by mouth At Bedtime 470 mL 1     LORazepam (ATIVAN) 0.5 MG tablet Take 1 tablet (0.5 mg) by mouth every 4 hours as needed (Anxiety, Nausea/Vomiting or Sleep) (Patient not taking: Reported on 4/20/2021) 30 tablet 2     methocarbamol (ROBAXIN) 750 MG tablet Take 1 tablet (750 mg) by mouth 4 times daily 30 tablet 0     multivitamin CF FORMULA (CHOICEFUL) chewable tablet Take 1 tablet by mouth daily       omeprazole (FIRST-OMEPRAZOLE) 2 MG/ML SUSP Take 10 mLs (20 mg) by mouth 2 times daily (Patient not taking: Reported on 6/2/2021) 300 mL 1     omeprazole POWD powder        prochlorperazine (COMPAZINE) 10 MG tablet Take 1 tablet (10 mg) by mouth every 6 hours as needed (Nausea/Vomiting) (Patient not taking: Reported on 4/20/2021) 30 tablet 2        No Known Allergies    Exam:  There were no vitals taken for this visit.  Wt Readings from Last 4 Encounters:   08/05/21 80.9 kg (178 lb 6.4 oz)   07/14/21 80 kg (176 lb 6.4 oz)   06/24/21 79.4 kg (175 lb)   06/23/21 79.4 kg (175 lb)     Video physical exam  General: Patient appears well in no acute distress.   Skin: No visualized rash or lesions on visualized skin  Eyes: EOMI, no erythema, sclera icterus or discharge noted  Resp: Appears to be breathing comfortably without accessory muscle usage, speaking in full  sentences, no cough  MSK: Appears to have normal range of motion based on visualized movements  Neurologic: No apparent tremors, facial movements symmetric  Psych: affect good, alert and oriented    The rest of a comprehensive physical examination is deferred due to PHE (public health emergency) video restrictions    Labs: 8/5 labs--new labs 8/25    Imaging:   CT CAP 8/3/21  COMPARISON: 6/21/2021  FINDINGS:   LUNGS AND PLEURA: Few small pulmonary nodules are stable. For example,  a 4 mm right lower lobe nodule (series 5, image 115) and 4 mm left  upper lobe nodule (series 5, image 104) are unchanged. No new or  enlarging nodules. No infiltrate or pleural effusion.     MEDIASTINUM/AXILLAE: Stable mild circumferential wall thickening of the distal esophagus (series 3, image 97). No lymphadenopathy. Right IJ port catheter tip at the SVC/right atrial junction. No thoracic aortic aneurysm. Moderate coronary artery calcifications. No pericardial effusion.     HEPATOBILIARY: Increased size of hepatic metastases. For example a 3.5 x 3.5 cm left lobe metastasis (series 3, image 117) previously measured 2.9 x 3.1 cm, a 3.5 x 2.7 cm subcapsular right hepatic lobe metastasis (series 3, image 158) previously measured 2.7 x 2.9 cm and a 1.7 x 1.5 cm lesion in the left lobe adjacent to the caudate (series 3, image 124) previously measured 1.0 cm. No new lesions.     PANCREAS: Normal.     SPLEEN: Normal.     ADRENAL GLANDS: Normal.     KIDNEYS/BLADDER: Left renal simple cyst requiring no specific  follow-up and otherwise normal kidneys.     BOWEL: Percutaneous endoscopic gastrostomy tube. No small bowel or colonic obstruction or inflammatory changes.     PELVIC ORGANS: No pelvic masses.     ADDITIONAL FINDINGS: Stable soft tissue thickening surrounding the left gastric artery. No lymphadenopathy. No ascites.     MUSCULOSKELETAL: Stable 5.2 x 4.9 cm lytic lesion in the left sacrum previously measuring 5.2 x 4.6 cm (3, image 238).  Stable sclerotic lesion in the anterior aspect of the T4 vertebral body measuring 1.3 cm (series 3, image 39) and sclerosis of the posterior left third rib. No new lesions. Stable 5.2 cm cystic subcutaneous lesion overlying the sternum.                                                                      IMPRESSION:  1.  Slight increased size of hepatic metastases.  2.  Stable osseous metastases with the largest lesion in the sacrum.  3.  Stable wall thickening of the distal esophagus.    Impression/plan:   # Stage IV esophageal adenocarcinoma   # Progressive liver disease  # ECOG PS 0-1  Started chemoimmunotherpay with Cisplatin+5Fu+pembrolizumab based on KN-590 trial. The trial demonstrated a statistically significant improvement in OS and PFS for patients randomized to pembrolizumab with chemotherapy. Median OS was 12.4 months (95% CI: 10.5, 14.0) for the pembrolizumab arm versus 9.8 months (95% CI: 8.8, 10.8) for the chemotherapy arm (HR 0.73; 95% CI: 0.62, 0.86; p<0.0001). Median PFS was 6.3  (95% CI: 6.2, 6.9) and 5.8 months (95% CI: 5.0, 6.0), respectively (HR 0.65; 95% CI: 0.55, 0.76; p<0.0001).     Restaging scan after 2 cycles showed overall stable disease with - mixed response- NH in esophgaus primary, and 2 liver lesions while, stable disease in spine, and stable to borderline progression in 1 liver lesions. Scan after 4 cycles showing stable disease in esophagus and LN and a some liver lesion. 2 lesions in liver are slightly bigger, one is more prominent than before. CT CAP after 6 cycles done on 8/3/21 demonstrated continued interval increase in the size of 2 liver lesions, stable osseous lesions and continued inconspicuity of the primary esophageal mass.     - Given the liver-selective progressive disease, we recommended liver directed local therapy and referral to interventional radiology was placed. Plan is for Y-90 pending insurance coverage as he just got a call prior to our visit that it now may  not be covered.   - Chemotherapy wise, he is s/p 6 cycles of induction cisplatin, 5-FU and pembrolizumab thus was swithced to 5-FLU and pembrolizumab maintenance (i.e. drop cisplatin) with last cycle for a total of 2 years or until progression/intolerable toxicities. Okay for infusion tomorrow pendinng labs  - In the event of eventual extrahepatic disease progression, options for next lines of treatment would include ramucirumab plus paclitaxel based on the [place] trial. Other options include single-agent docetaxel, pacltixael or irinotecan. Lonsurf could be pursued in the third line.    #Brain mets   Follows with Dr. Velez. S/p GK 3/24. MRI June 23 with no new mets. Next in 09/2021.     #Mucositis, improved  2/2 F5U. Continue Aquaphor and salt/soda rinses as needed. He has been offered MMW but does not feel his symptoms are bad enough to warrant more treatment. Avoid irritating foods like spicy/acidic items when mucositis is worse.      #Bone mets  Bone biopsy of left pelvic mass 2/26/21 confirmed metastasis. Finished radiation 3/23 to lumbosacral spine.   While he has not seen dentist in 4 yrs, discusseed small risk of osteonecrosis, 3-5% risk, he does not have any ongoing dental issues and has no tooth pain or caries.  -Zometa every 6 weeks, last given 8/5  -Continue Calcium and Vitmain D.     #Dypshagia, malnutrition  PEG tube placed in OR (3/5) for nutritional support. Now with good PO intake. Follows with RD. Continue PO intake as tolerated. Previously declined SLP follow up, can revisit as needed though dysphagia has improved with treatment.   --Of note, consideration given to esophageal stent vs XRT for esophageal debulking to address dysphagia inpatient in Feb/March 2021; however given likely improvement with chemo-immunotherapy, as well as risks of worsening cytopenias, elected not to.      #Cauda Equina due to tumor compression s/p L2-L5 laminectomy and decompression   Followed by neurosurgery (now  prn) with improved exam after surgery. He is now ambulating with or without a walker, strength nearly back to baseline. He is aware of his activity and lifting restrictions. Has some L radiculopathy that he uses robaxan prn.      #Constipation  Dulcolax, miralax, or MOM prn. Continue to avoid suppository while on chemo.     #Urinary retention  Andrews out with no new issues, saw urology with q6 mo follow up rec.     #GERD  Improved. Liquid omeprazole as needed but not really needing      Overall Plan:  - Proceed with maintainence 5-FLU and pembrolizumab tomorrow. These infusions are currently already scheduled through 9/16/21.   -Mapping for local therpay for liver therapy with Y-90 today and procedure 9/10  - Restaging CT CAP and a visit with Dr. Steven 9/30      40  minutes spent on the date of the encounter doing chart review, review of test results, interpretation of tests, patient visit and documentation       Jaky Pugh CNP on 8/25/2021 at 7:31 AM          Again, thank you for allowing me to participate in the care of your patient.        Sincerely,        Jaky Pugh CNP

## 2021-08-25 NOTE — PROGRESS NOTES
Pre procedure complete. Awaiting consent and lab results. Pt appropriately NPO. VSS. Tolerable left lower back pain. Right power port infusing NS @ 100 mL/hr. Jay groins prepped. Pulses marked. Miguelangel (brother) to transport home ([phone]).

## 2021-08-25 NOTE — PRE-PROCEDURE
GENERAL PRE-PROCEDURE:   Procedure:  Therasphere mapping  Date/Time:  8/25/2021 10:55 AM    Verbal consent obtained?: Yes    Written consent obtained?: Yes    Risks and benefits: Risks, benefits and alternatives were discussed    Consent given by:  Patient  Patient states understanding of procedure being performed: Yes    Patient's understanding of procedure matches consent: Yes    Procedure consent matches procedure scheduled: Yes    Expected level of sedation:  Moderate  Appropriately NPO:  Yes  ASA Class:  1  Mallampati  :  Grade 2- soft palate, base of uvula, tonsillar pillars, and portion of posterior pharyngeal wall visible  Lungs:  Lungs clear with good breath sounds bilaterally and wheezes (Mild wheezing bilaterally)  Heart:  Normal heart sounds and rate  History & Physical reviewed:  History and physical reviewed and no updates needed  Statement of review:  I have reviewed the lab findings, diagnostic data, medications, and the plan for sedation

## 2021-08-25 NOTE — PROGRESS NOTES
Pt arrived to 2A s/p visceral angiogram. RFA mynx closure; tegaderm C/D/I. CMS intact. VSS. Denies pain. Bedrest until 1615. Will continue to monitor.

## 2021-08-25 NOTE — PROCEDURES
[facility]    Procedure: IR Procedure Note    Date/Time: 8/25/2021 1:36 PM  Performed by: Luis Hendrix DO  Authorized by: Margareth Lee MD   IR Fellow Physician: Simeon    UNIVERSAL PROTOCOL   Site Marked: NA  Prior Images Obtained and Reviewed:  Yes  Required items: Required blood products, implants, devices and special equipment available    Patient identity confirmed:  Verbally with patient, arm band, provided demographic data and hospital-assigned identification number  Patient was reevaluated immediately before administering moderate or deep sedation or anesthesia  Confirmation Checklist:  Patient's identity using two indicators, relevant allergies, procedure was appropriate and matched the consent or emergent situation and correct equipment/implants were available  Time out: Immediately prior to the procedure a time out was called    Universal Protocol: the Joint Commission Universal Protocol was followed    Preparation: Patient was prepped and draped in usual sterile fashion           ANESTHESIA    Anesthesia: Local infiltration  Local Anesthetic:  Lidocaine 1% without epinephrine      SEDATION    Patient Sedated: Yes    Sedation:  See MAR for details  Vital signs: Vital signs monitored during sedation    See dictated procedure note for full details.  Findings: Visceral angiogram demonstrated a right hepatic artery segmental branch feeding nearly the entirety of the segment 5 mass. Replace left hepatic artery off of the left gastric artery feeding the segment 2 and 3 masses. Two MAA doses given in the segment 5 and left lateral hepatic artery feeding segments 2 and 3.    Right femoral Mynx  closure device.    Specimens: none    Complications: None    Condition: Stable    Plan: Transfer to nuclear medicine for shunt calculation.  3 hours of postprocedural care.      PROCEDURE   Patient Tolerance:  Patient tolerated the procedure well with no  immediate complications    Length of time physician/provider present for 1:1 monitoring during sedation: 60

## 2021-08-25 NOTE — TELEPHONE ENCOUNTER
"Returning pt's phone call   He states that he had a received a letter in the Mail from [facility], re: denial of imaging services.     Letter is from a [employer]   Call back number provided was the Imaging call center    Pt states that he would prefer not to come in today due to he will not be able to afford the imaging.     I did call the call center who then provided me with financial securing.     Ph: [phone]- Olga  *All paperwork for approval from [facility] emailed to Olga    Discussed letter and inquired on clarification of unapproved services.   Olga will call and inquire with [facility]  Findings:  *delay of notification from [employer], currently Y90 procedure is \"pending\" however nothing has been denied on the acct.    Recommendation for pt to attend Y90 Mapping today. If the procedure is denied, we will appeal.     Pt communicated of information above. He will attend Y90 Mapping today.    Estrella WARE RN, BSN  Interventional Radiology/Vascular  Nurse Coordinator   Phone: [phone]  Fax: [phone]        "

## 2021-08-25 NOTE — PROGRESS NOTES
Patient Name: Junito Wang  Medical Record Number: [id number]  Today's Date: 8/25/2021    Procedure: Image guided visceral angiogram with therasphere mapping  Proceduralist: Dr. Dre Serrano    Procedure Start: 1205  Procedure end: 1318  Sedation medications administered: 175 fentanyl, 3.5 versed     Report given to: Tricia HAMMOND 2A  : RUDOLPH    Other Notes: Pt arrived to IR room 1 from 2A. Consent reviewed. Pt denies any questions or concerns regarding procedure. Pt positioned supine and monitored per protocol. Pt tolerated procedure without any noted complications. Pt transferred back to 1.    Mynx used for closure in R groin. Deployed at 1315. 3 hr hr bed rest.

## 2021-08-25 NOTE — DISCHARGE INSTRUCTIONS
[facility]   Interventional Radiology  Discharge Instructions Post Angiography for Visceral Angiogram    AFTER YOU GO HOME          Relax and take it easy for 24 hours.       Drink plenty of fluids.       Resume your regular diet, unless otherwise instructed by your Primary Physician.       DO NOT smoke for at least 24 hours, if you were given any sedation.       DO NOT drink alcoholic beverages the day of your procedure.       Do not drive or operate machinery at home or at work for 24 hours.          DO NOT do any strenuous exercise or lifting for at least 2 days following your procedure.       DO NOT take a bath or shower for at least 12 hours following your procedure.       DO NOT make any important or legal decisions for 24 hours following your procedure.    CALL THE PHYSICIAN IF:       - You start bleeding from the procedure site. lie down flat and hold pressure on the site. A small lump or bruise is common at the puncture site. Your physician will tell you if you need to return to the hospital.      - You develop numbness, coolness or a change in color of the leg that was punctured.      - You experience increased pain or redness at the puncture site.      - You develop hives or a rash or unexplained itching.      - You develop a temperature of 101 degrees F or greater.    Additional Information:           Support the puncture site for coughing, sneezing, or moving your bowels for the first 48 hours  No tub bath, hot tubs, or swimming for 5 days  No lotion or powder to the puncture site for 3 day    Closure Device: Mynx             [facility] INTERVENTIONAL RADIOLOGY DEPARTMENT         Procedure Physician:  Dr. Lee                              Date of Procedure:August 25, 2021       Telephone Numbers:   [phone]     Monday-Friday 8:00 to 4:30 pm                                        [phone]     After 4:30 pm Monday-Friday, Weekend and Holidays. Ask for the Interventional  Radiologist on call. Someone is on call 24 hrs/day.

## 2021-08-25 NOTE — PROGRESS NOTES
Patient tolerated recovery stage well. VSS, RFA site clean/dry/intact, no hematoma, and denies pain. Patient tolerated PO food and fluids. Teaching was done and discharge instructions were given. Patient ambulated, voided, and right port de-accessed. Patient discharged from the hospital via wheel chair to home with Miguelangel (brother).

## 2021-08-25 NOTE — PROGRESS NOTES
This is a recent snapshot of the patient's [facility] medical record.  For current drug dose and complete information and questions, call [phone] or In Basket pool, fv home infusion ([id number])  CSN Number:  [id number]

## 2021-08-26 NOTE — PROGRESS NOTES
Power Port needle left for access for treatment, Sterile technique performed and maintained. Patient tolerated procedure well. Tubes drawn in rainbow order: red, green, purple. Transparent dressing placed with use of tegaderm.    Vanessa Oliveira RN  [facility] Oncology/[facility] [place]

## 2021-08-26 NOTE — PROGRESS NOTES
Infusion Nursing Note:  Junito Wang presents today for  C8 Keytruda + 5FU pump  Patient seen by provider today: No   present during visit today: Not Applicable.    Note: Patient stating that he is going to start Y-90 therapy. Anxious that this will help the 2 liver spots that are not shrinking.       Intravenous Access:  Implanted Port.    Treatment Conditions:  Lab Results   Component Value Date    HGB 11.7 08/26/2021    HGB 11.4 06/24/2021     Lab Results   Component Value Date    WBC 5.1 08/26/2021    WBC 6.6 06/24/2021      Lab Results   Component Value Date    ANEU 5.1 07/14/2021    ANEU 4.6 06/24/2021     Lab Results   Component Value Date     08/26/2021     06/24/2021      Lab Results   Component Value Date     08/26/2021     06/24/2021                   Lab Results   Component Value Date    POTASSIUM 4.3 08/26/2021    POTASSIUM 4.1 06/24/2021           Lab Results   Component Value Date    MAG 2.5 08/26/2021    MAG 2.2 06/24/2021            Lab Results   Component Value Date    CR 0.70 08/25/2021    CR 0.65 06/24/2021                   Lab Results   Component Value Date    ARDEN 8.8 08/25/2021    ARDEN 9.2 06/24/2021                Lab Results   Component Value Date    BILITOTAL 0.4 08/25/2021    BILITOTAL 0.3 06/24/2021           Lab Results   Component Value Date    ALBUMIN 3.6 08/25/2021    ALBUMIN 3.6 06/24/2021                    Lab Results   Component Value Date    ALT 16 08/25/2021    ALT 20 06/24/2021           Lab Results   Component Value Date    AST 16 08/25/2021    AST 16 06/24/2021       Results reviewed, labs MET treatment parameters, ok to proceed with treatment.      Post Infusion Assessment:  Patient tolerated infusion without incident.  Blood return noted pre and post infusion.  Site patent and intact, free from redness, edema or discomfort.  No evidence of extravasations.     Prior to discharge: Port is secured in place with tegaderm and flushed with  "10cc NS with positive blood return noted.  Continuous home infusion Dosi-Fuser pump connected.    All connectors secured in place and clamps taped open.    Pump started, \"running\" noted on display (CADD): Not Applicable.  Pump Connection double checked with Heike Nielson RN.  Patient instructed to call our clinic or [facility] with any questions or concerns at home.  Patient verbalized understanding.    Patient set up for pump disconnect at home with [facility] on Tuesday 8/31 @ 9:45 AM. Spoke with Denia zurita Teams to confirm disconnect time.        Discharge Plan:   Discharge instructions reviewed with: Patient.  Patient and/or family verbalized understanding of discharge instructions and all questions answered.  Patient discharged in stable condition accompanied by: self.  Departure Mode: Ambulatory.      Jennifer Sarkar RN                      "

## 2021-08-30 NOTE — PROGRESS NOTES
This is a recent snapshot of the patient's [facility] medical record.  For current drug dose and complete information and questions, call [phone] or In [facility] pool, fv home infusion ([id number])  CSN Number:  [id number]

## 2021-08-31 NOTE — PROGRESS NOTES
Skilled nurse visit in the home, for discontinuation of Chemotherapy. 7950 mg of 5FU infused over 5 days.    Kari Stuart RN, OCN    [facility]  [email or web address]  [phone]

## 2021-09-02 NOTE — TELEPHONE ENCOUNTER
Calls in today with complaint of sores on left side of groin and on testicles. The sore is grayish tinged and is open area is a little smaller than a pea. There is a little grayness developing on the left side of his groin there is redness on the testicles. It is a nagging itchy pain when he walks, when sitting or standing there is very little pain.   Urinating without difficulty.   States it hurts when he holds his penis to urinate.   Using antibiotic cream to area.   Do not put anything on the area.   Use unscented soap to wash area and then try to keep area clean, cool and dry.   If develop more pain,more open areas, If start having more trouble urinating then he should give us a call right away.    Soonest he could get in with PCP is September 10th so he did not schedule appointment.      Paged Jaky Pugh at 13:58   Spoke with Jaky Pugh and patient should schedule visit with primary care.

## 2021-09-03 NOTE — Clinical Note
I saw Junito today for an interesting skin disorder.  I have not seen anything like this before but ruled out diabetes and yeast balanitis.  Wondering if this is a possible side effect of his chemotherapy.  He has an appointment with you on the 15th.  I did put him on bacitracin just to prevent secondary skin infection in the desquamated areas.

## 2021-09-03 NOTE — NURSING NOTE
"Chief Complaint   Patient presents with     Groin Pain     sores in the groin area, noticed Tuesday night,       Initial BP (!) 155/84   Pulse 120   Temp 97.1  F (36.2  C) (Tympanic)   Wt 78.9 kg (174 lb)   SpO2 98%   BMI 24.97 kg/m   Estimated body mass index is 24.97 kg/m  as calculated from the following:    Height as of 8/25/21: 1.778 m (5' 10\").    Weight as of this encounter: 78.9 kg (174 lb).  Medication Reconciliation: complete  Crystal Bardales CMA  "

## 2021-09-03 NOTE — PROGRESS NOTES
"SUBJECTIVE: Junito is a 61 year old male who complains of \"sores\"   on his groin and scrotum.  These have been present for about 2 days.   He first noticed something on the left upper thigh near the inguinal fold. He reports it was gray and then the center seem to peel and was red.  He washed the area and noticed some red spot on the scrotum     There has been pain but no itching  It hurts when he walks but not when sitting still.        The patient can not remember any recent use of any new personal products on his skin.  The patient can not remember any other recent exposures to his skin.  The patient denies any skin problems similar to this in the past  He had a test recently in which a catheter was used in the right inguinal artery     He started chemo in April and has had 8 so far every 3 weeks             Past Medical History:   Diagnosis Date     Arthritis      Hypertension      Malignant neoplasm of lower third of esophagus (H) 01/26/2021     Obesity (BMI 35.0-39.9) with comorbidity (H) 1/31/2019       OBJECTIVE: SKIN:   BP (!) 155/84   Pulse 120   Temp 97.1  F (36.2  C) (Tympanic)   Wt 78.9 kg (174 lb)   SpO2 98%   BMI 24.97 kg/m     BP Readings from Last 6 Encounters:   09/03/21 (!) 155/84   08/26/21 109/71   08/25/21 120/81   08/05/21 130/77   07/14/21 135/82   06/24/21 114/71         SKIN:  xamination of the involved area reveals an oval area of gray skin approximately 8 x 3 cm.  The central area appeared desquamated and was erythematous.    There were multiple smaller areas like this on the scrotum.    Results for orders placed or performed in visit on 09/03/21   Glucose, whole blood     Status: Normal   Result Value Ref Range    Glucose Whole Blood 109 79 - 116 mg/dL   KOH prep (skin, hair or nails only)     Status: None    Specimen: Groin, Left; Skin   Result Value Ref Range    KOH Preparation No fungal elements seen     KOH Preparation Reference Range: No fungal elements seen.          ASSESSMENT " and PLAN:   (R23.4) Acute desquamative eruption of skin  Comment:   Plan: bacitracin 500 UNIT/GM external ointment        Applied to prevent secondary skin infection in this patient who is likely immune suppressed because of his current chemotherapy.     I will forward the chart to his oncologist.  He does have an appointment with her on the 15th.  I certainly think it is possible that this is related to his current treatment

## 2021-09-08 NOTE — PROGRESS NOTES
This is a recent snapshot of the patient's [facility] medical record.  For current drug dose and complete information and questions, call [phone] or In [facility] pool, fv home infusion ([id number])  CSN Number:  [id number]

## 2021-09-10 NOTE — PROCEDURES
[facility]    Procedure: IR Procedure Note    Date/Time: 9/10/2021 12:56 PM  Performed by: Danielle Baptiste MD  Authorized by: Danielle Baptiste MD   IR Fellow Physician: LAURO Baptiste MD.   Other(s) attending procedure: KELI Meléndez MD.     UNIVERSAL PROTOCOL   Site Marked: Yes  Prior Images Obtained and Reviewed:  Yes  Required items: Required blood products, implants, devices and special equipment available    Patient identity confirmed:  Verbally with patient, arm band, provided demographic data and hospital-assigned identification number  Patient was reevaluated immediately before administering moderate or deep sedation or anesthesia  Confirmation Checklist:  Patient's identity using two indicators, relevant allergies, procedure was appropriate and matched the consent or emergent situation and correct equipment/implants were available  Time out: Immediately prior to the procedure a time out was called    Universal Protocol: the Joint Commission Universal Protocol was followed    Preparation: Patient was prepped and draped in usual sterile fashion           ANESTHESIA    Anesthesia: Local infiltration  Local Anesthetic:  Lidocaine 1% without epinephrine      SEDATION    Patient Sedated: Yes    Sedation Type:  Moderate (conscious) sedation  Vital signs: Vital signs monitored during sedation    See dictated procedure note for full details.  Findings: -Lesions in the left hepatic lobe and segment 5 lesion.     Specimens: none    Complications: None    Condition: Stable    Plan: -IR clinic visits in 4 weeks with repeat MRI liver.     PROCEDURE   Patient Tolerance:  Patient tolerated the procedure well with no immediate complications  Describe Procedure: -Y90 delivery in the left hepatic lobe and segment 5 lesions.   Length of time physician/provider present for 1:1 monitoring during sedation: 90

## 2021-09-10 NOTE — PROGRESS NOTES
Patient Name: Junito Wang  Medical Record Number: [id number]  Today's Date: 9/10/2021    Procedure: Ytrium 90 delivery  Proceduralist: Dr. Oliva Olvera    Procedure Start: 1048  Procedure end: 1246  Sedation medications administered: 200 mcg fentanyl, 4 mg versed     Report given to: Belén HAMMOND 2A  : RUDOLPH    Other Notes: Pt arrived to IR room 4 from . Consent reviewed. Pt denies any questions or concerns regarding procedure. Pt positioned supine and monitored per protocol. Pt tolerated procedure without any noted complications. Pt transferred back to .    Mynx closure device deployed to right groin at 1240. Bedrest x 3 hours until 1540.

## 2021-09-10 NOTE — PROGRESS NOTES
Pt here for y-90 delivery. Groins clipped, port accessed, pulses marked. Labs sent, pending results. Consent in chart, ready to be signed.

## 2021-09-10 NOTE — PROGRESS NOTES
Junito is a 61 year old who is being evaluated via a billable video visit.    Patient reviewed medications and allergies via [employer] e-check in.     How would you like to obtain your AVS? [employer]  If the video visit is dropped, the invitation should be resent by: Send to e-mail at: [email or web address]  Will anyone else be joining your video visit? No      Video-Visit Details    Type of service:  Video Visit    Video Start Time: 7:15 AM    Video End Time:7:37 AM    Originating Location (pt. 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),metastasis of liver, brain and spine  (AJCC 8th edition)  -MMR proficient, HER2 by IHC is 2+, FISH neg  -PD-L1 CPS- 5-10%  -NGS by [facility]- No actionable mutations     Treatment:  3/2/21- L2-L5 laminectomy and decompression  3/17/21-3/23/21: Radiation to LS spine  3/24/21: gamma knife (2000cGy in 5 fractions)  3/31/21 to now: Cisplatin+keytruda+5fu every 3 weeks  9/10/21: Y-90    Treatment intent- Palliative    Oncology HPI:   August 2020- stomach discomfort, belching   October 2020- progressive dysphagia with solids   1/26/21- distal esophageal biopsy shows Moderately differentiated adenocarcinoma; MMR normal expression, PDL1 expression is low with TPS 2%, CPS 5-10, Her2 is pending  1/27/21- CT CAP- Enlarged  2 cm subcarinal lymph node and 1.4 cm short axis right subcarinal lymph node, focal thickening of the superior wall along the right side of the mid esophagus. Numerous small pulmonary nodules, 3 mm nodule in the superior segment of the left lower lobe, 3 mm nodule in the anterior aspect of the right upper lobe , 4 mm nodule in the medial aspect of the right upper lobe and 4 mm right lower lobe nodule. Hypoattenuating foci in the liver, 1.6 cm  hypoattenuating/hypoenhancing lesion in hepatic segment 8 and 1.4 cm lesion in hepatic segment 5 , indeterminate, likely metastatic.  The prostate gland is mildly enlarged measuring 5.3 cm in transverse diameter. Multiple prominent but not significantly enlarged retroperitoneal lymph nodes, indeterminate, could be reactive. 4.4 x 4.3 cm lytic lesion centered in the posterior aspect of the left sacral ala (series 3 image 243), 4.7 x 4.0 x 5.6 cm (axial and CC dimensions, respectively) hypoattenuating lesion in the subcutaneous tissue of the anterior chest wall just anterior to the mediastinum, indeterminate, could represent sebaceous cyst.  2/4/21- Saw Dr. Bourgeois ordered PET/CT  3/2/21-3/10/21- Admission to [facility]- <24h of inability to ambulate with acute onset left leg weakness/urinary retention with perianal sensory deficits, MRI showed extensive epidural signal change suggestive of tumor vs. Hemorrhage.Pt underwent emergent surgery- L2-L5 laminectomy and decompression . PEG tube placement 3/5/2021.   3/17/21-3/23/21: Radiation to LS spine  3/24/21: gamma knife (2000cGy in 5 fractions)  3/29/21- port placement  3/31/21: Cycle 1 Cisplatin+keytruda+5fu   4/21/21: Cycle 2 Cisplatin+keytruda+5fu  5/11/21- Ct CAP- Overall stable disease- mixed response- NH in 2 liver lesions while, stable disease to borderline progression in 1 liver lesions  5/12/21- Cycle 3 Cisplatin+keytruda+5fu  6/2/21- Cycle 4 Cisplatin+keytruda+5fu  6/21/21- CT CAP- Stable mild circumferential wall thickening of the distal esophagus (series 3, image 99) with small amount of fluid in the upper and mid esophagus. No lymphadenopathy. Stable to slight increased size of hepatic metastases. For example a 3.1 x 2.9 cm lesion in the left lobe (series 3, image 123) previously measured 2.9 x 2.6 cm, and a 2.7 x 2.9 cm peripheral right hepatic lobe metastasis previously measured 3.0 x 2.2 cm. A 1.0 cm lesion in the caudate lobe (series 3, image 138) is more  prominent today/new. Stable small gastrohepatic ligament lymph nodes. No new or enlarging lymph nodes in the abdomen and pelvis. No ascites.Stable 5.1 x 4.6 cm left sacral mass. Stable cystic 4.9 x 3.5 cm subcutaneous lesion overlying the midsternum.  6/24/21- Cycle 5 Cisplatin+keytruda+5fu  7/14/21- Cycle 6 Cisplatin+keytruda+5fu  8/3/21- Ct CAP- Few small pulmonary nodules are stable. Stable mild circumferential wall thickening of the distal esophagus (series 3, image 97).  Increased size of hepatic metastases. For example a 3.5  x 3.5 cm left lobe metastasis (series 3, image 117) previously measured 2.9 x 3.1 cm, a 3.5 x 2.7 cm subcapsular right hepatic lobe metastasis (series 3, image 158) previously measured 2.7 x 2.9 cm and  a 1.7 x 1.5 cm lesion in the left lobe adjacent to the caudate (series 3, image 124) previously measured 1.0 cm.  Stable 5.2 x 4.9 cm lytic lesion in the left sacrum previously measuring 5.2 x 4.6 cm (3, image 238). Stable sclerotic lesion in the anterior aspect of the T4 vertebral body measuring 1.3 cm (series 3, image 39) and sclerosis of the posterior left third rib.. No new lesions. Stable 5.2 cm cystic subcutaneous lesion overlying the sternum  9/10/21- Y90 to liver mets  9/13/21- Brain MRI: no progression or distal metatasis    Interval history:   -Feels like he has fluid in his R ear, ear issues for which he has followed with ENT previously. No ear pain or fever.  -Had nausea after Y90 briefly, did not need anti emetic, went away quickly.   -Skin in groin is better since using new soap and applying bacitracin. His pcp ruled out fungus and diabetes  -A bit constipated, has miralax. No diarrhea  -No issues eating, avoids triggers if mild mucositis  -Feeling pretty good other wise, chemo continues to go well.   -No cough/sob  -no f/c    Comprehensive ROS was unremarkable except as detailed above.     No current outpatient medications on file.      No Known Allergies    Exam:  There  were no vitals taken for this visit.  Wt Readings from Last 4 Encounters:   09/10/21 78.9 kg (174 lb)   09/03/21 78.9 kg (174 lb)   08/26/21 80.8 kg (178 lb 1.6 oz)   08/25/21 80.7 kg (178 lb)     Video physical exam  General: Patient appears well in no acute distress.   Skin: No visualized rash or lesions on visualized skin  Eyes: EOMI, no erythema, sclera icterus or discharge noted  Resp: Appears to be breathing comfortably without accessory muscle usage, speaking in full sentences, no cough  MSK: Appears to have normal range of motion based on visualized movements  Neurologic: No apparent tremors, facial movements symmetric  Psych: affect good, alert and oriented    The rest of a comprehensive physical examination is deferred due to PHE (public health emergency) video restrictions    Labs: 9/10 labs--new labs tomorrow    Imaging: n/a    Impression/plan:   # Stage IV esophageal adenocarcinoma   # Progressive liver disease  # ECOG PS 0-1  Started chemoimmunotherpay with Cisplatin+5Fu+pembrolizumab based on KN-590 trial. The trial demonstrated a statistically significant improvement in OS and PFS for patients randomized to pembrolizumab with chemotherapy. Median OS was 12.4 months (95% CI: 10.5, 14.0) for the pembrolizumab arm versus 9.8 months (95% CI: 8.8, 10.8) for the chemotherapy arm (HR 0.73; 95% CI: 0.62, 0.86; p<0.0001). Median PFS was 6.3  (95% CI: 6.2, 6.9) and 5.8 months (95% CI: 5.0, 6.0), respectively (HR 0.65; 95% CI: 0.55, 0.76; p<0.0001).     Restaging scan after 2 cycles showed overall stable disease with - mixed response- NC in esophgaus primary, and 2 liver lesions while, stable disease in spine, and stable to borderline progression in 1 liver lesions. Scan after 4 cycles showing stable disease in esophagus and LN and a some liver lesion. 2 lesions in liver are slightly bigger, one is more prominent than before. CT CAP after 6 cycles done on 8/3/21 demonstrated continued interval increase in the  size of 2 liver lesions, stable osseous lesions and continued inconspicuity of the primary esophageal mass.     - Given the liver-selective progressive disease, we recommended liver directed local therapy and Y-90 was complete 9/10    - Chemotherapy wise, he completed 6 cycles of induction cisplatin, 5-FU and pembrolizumab thus was swithced to 5-FLU and pembrolizumab maintenance (i.e. drop cisplatin) with cycle 7 for a total of 2 years or until progression/intolerable toxicities. Okay for infusion tomorrow pendinng labs    - In the event of eventual extrahepatic disease progression, options for next lines of treatment would include ramucirumab plus paclitaxel based on the [place] trial. Other options include single-agent docetaxel, pacltixael or irinotecan. Lonsurf could be pursued in the third line.    #groin skin breakdown: seen by PCP 9/3/21. Improving, no concerns of infx. Monitor for worsening at would consider derm referral at that time. I do not feel this is directly related to his cancer therapy    #Brain mets   Follows with Dr. Velez. S/p GK 3/24. MRI Sept 13 with no new mets. Next in 12/2021.     #Mucositis, improved  2/2 F5U. Continue Aquaphor and salt/soda rinses as needed. He has been offered MMW but does not feel his symptoms are bad enough to warrant more treatment. Avoid irritating foods like spicy/acidic items when mucositis is worse.      #Bone mets  Bone biopsy of left pelvic mass 2/26/21 confirmed metastasis. Finished radiation 3/23 to lumbosacral spine.   While he has not seen dentist in 4 yrs, discusseed small risk of osteonecrosis, 3-5% risk, he does not have any ongoing dental issues and has no tooth pain or caries.  -Zometa every 6 weeks, last given 8/5--give tomorrow  -Continue Calcium and Vitamin D.     #Dypshagia, malnutrition  PEG tube placed in OR (3/5) for nutritional support. Now with good PO intake. Follows with RD. Continue PO intake as tolerated. Previously declined SLP follow  up, can revisit as needed though dysphagia has improved with treatment.   --Of note, consideration given to esophageal stent vs XRT for esophageal debulking to address dysphagia inpatient in Feb/March 2021; however given likely improvement with chemo-immunotherapy, as well as risks of worsening cytopenias, elected not to.      #Cauda Equina due to tumor compression s/p L2-L5 laminectomy and decompression   Followed by neurosurgery (now prn) with improved exam after surgery. He is now ambulating with or without a walker, strength nearly back to baseline. He is aware of his activity and lifting restrictions. Has some L radiculopathy that he uses robaxan prn.      #Constipation  Dulcolax, miralax, or MOM prn. Continue to avoid suppository while on chemo.     #Urinary retention  Andrews out with no new issues, saw urology with q6 mo follow up rec.     #GERD  Improved. Liquid omeprazole as needed but not really needing      Overall Plan:  -Proceed with maintainence 5-FLU and pembrolizumab tomorrow.   -Follow up with IR ~1 mo from Y90   -Restaging CT CAP and a visit with Dr. Steven 9/29

## 2021-09-10 NOTE — PRE-PROCEDURE
GENERAL PRE-PROCEDURE:   Procedure:  Y90 delivery    Written consent obtained?: Yes    Risks and benefits: Risks, benefits and alternatives were discussed    Consent given by:  Patient  Patient states understanding of procedure being performed: Yes    Patient's understanding of procedure matches consent: Yes    Procedure consent matches procedure scheduled: Yes    Expected level of sedation:  Moderate  Appropriately NPO:  Yes  ASA Class:  2  Mallampati  :  Grade 2- soft palate, base of uvula, tonsillar pillars, and portion of posterior pharyngeal wall visible  Lungs:  Lungs clear with good breath sounds bilaterally  Heart:  Normal heart sounds and rate  History & Physical reviewed:  History and physical reviewed and no updates needed  Statement of review:  I have reviewed the lab findings, diagnostic data, medications, and the plan for sedation

## 2021-09-10 NOTE — DISCHARGE INSTRUCTIONS
[facility]   Interventional Radiology  Discharge Instructions Post Angiography for Insertion of   Radioactive Microspheres to the Liver    AFTER YOU GO HOME          Relax and take it easy for 24 hours.       Drink plenty of fluids.       Resume your regular diet, unless otherwise instructed by your Primary Physician.       DO NOT smoke for at least 24 hours, if you were given any sedation.       DO NOT drink alcoholic beverages the day of your procedure.       Do not drive or operate machinery at home or at work for 24 hours.          DO NOT do any strenuous exercise or lifting for at least 2 days following your procedure.       DO NOT take a bath or shower for at least 12 hours following your procedure.       DO NOT make any important or legal decisions for 24 hours following your procedure.    CALL THE PHYSICIAN IF:       - You start bleeding from the procedure site. lie down flat and hold pressure on the site. A small lump or bruise is common at the puncture site. Your physician will tell you if you need to return to the hospital.      - You develop numbness, coolness or a change in color of the leg that was punctured.      - You experience increased pain or redness at the puncture site.      - You develop hives or a rash or unexplained itching.      - You develop a temperature of 101 degrees F or greater.    Additional Information:           Support the puncture site for coughing, sneezing, or moving your bowels for the first 48 hours  No tub bath, hot tubs, or swimming for 5 days  No lotion or powder to the puncture site for 3 day    Follow the Discharge Instructions for the Liver Brachytherapy; Contrast Instructions and Instructions for the [employer]iopharmaceuticals.     Closure Device: Mynx            [facility] INTERVENTIONAL RADIOLOGY DEPARTMENT         Procedure Physician: Dr. Santos and Dr Meléndez                              Date of Procedure:September 10, 2021       Telephone Numbers:    [phone]     Monday-Friday 8:00 to 4:30 pm                                        [phone]     After 4:30 pm Monday-Friday, Weekend and Holidays. Ask for the Interventional Radiologist on call. Someone is on call 24 hrs/day.

## 2021-09-10 NOTE — PROGRESS NOTES
Pt back from procedure. RFA site CDI, no hematoma. VSS. Pain at baseline. 3 hours bedrest, pt aware. Scripts delivered to pharmacy.

## 2021-09-10 NOTE — PROGRESS NOTES
Patient tolerated recovery stage well. VSS, right groin site clean/dry/intact, no hematoma, and denies pain. Patient tolerated PO food and fluids. Teaching was done and discharge instructions were given. Patient ambulated, voided, and port was heparin-locked and de-accessed.  Discharge prescriptions sent home with patient.  Patient discharged from the hospital via wheel chair to home with brother @ 1600.

## 2021-09-12 NOTE — PROGRESS NOTES
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    Initial PET/CT on 21 demonstrated hypermetabolic mass in the distal esophagus extending into the gastric cardia with an SUV max of 16.7.  There were multiple hypermetabolic mediastinal lymph nodes in both the paraesophageal and subcarinal spaces.  There was also a hypoenhancing hypermetabolic mass in the liver in segment 7 measuring 1.8 x 1.7 cm with SUV max 8.3.  There are 2 additional nodules with FDG avidity 1 in segment 5 and the other in the margins of segments 5 and 6.  The patient also had a hypermetabolic lytic mass in the left sacrum extending into the left presacral space that measured 5.7 x 3.8 x 7.5 cm with an SUV max of 10.8. There was also a hypermetabolic mass in the proximal right humerus measuring 2.1 x 1.6 cm with SUV max 7.2. MRI of the brain on 21 showed a 1.9 x 1.4 x 1.2 cm enhancing lesion in the left choroid plexus adjacent to the left mesial temporal lobe.   He underwent a biopsy of the left pelvic bone mass on 2021 that was consistent with metastatic adenocarcinoma, consistent with known esophageal primary.    Shortly thereafter, he presented to emergency department for evaluation of severe low back pain and difficulty  urinating for which he had an MRI of the lumbar spine on 3/2/2021 that demonstrated a new posteriorly infiltrating T2 flair hyperintense appearance with internal septations within the lumbar spinal cord causing severe compression of the thecal sac and spinal canal extending from T12 down to the sacrum.  He underwent emergent laminectomy and decompression of the epidural hematoma.    He was referred to us and received a course of GK SRS to the solitary cranial metastasis as well as a course of palliative radiation therapy to the lumbar spine as outlined above.  After completion of radiation treatment, he started to have systemic therapy with cisplatin/5-FU/pembrolizumab under care of Dr. Steven.  He then underwent radio embolization of metastatic hepatic lesions (Y90) on 9/10/2021 by Dr. Meléndez and Dr. Baptiste.    He had a follow-up MRI on 6/23/2021 that demonstrated positive response to treatment with decrease size of the treated lesion without development of new cranial metastasis.  He presents today for a routine surveillance follow-up. He is now 6 months out of his radiation treatment. He had an MRI of the brain shortly before the visit. Our preliminary reading demonstrated stable disease with no evidence of new intracranial metastasis.    On interview today, he stated that he recovered well from his procedure. He is tolerating his systemic treatment with Dr. Steven with no significant side effects. He otherwise denied any recent fever, chills, headaches, nausea, vomiting, weakness, numbness, urinary or bowel incontinence, weight or appetite changes.    PHYSICAL EXAM:  /82 (BP Location: Left arm, Patient Position: Sitting, Cuff Size: Adult Regular)   Pulse 98   Resp 16   Wt 77.6 kg (171 lb)   SpO2 100%   BMI 24.54 kg/m       Gen: Alert, in NAD  Eyes: EOMI, sclera anicteric, PERRL  HENT      Head: NC/AT     Ears: No external auricular lesions     Nose/sinus: No rhinorrhea or epistaxis     Oral  Cavity/Oropharynx: MMM, no thrush noted  Pulm: Breathing comfortably on room air, no audible wheezes or ronchi  CV: Well-perfused, no cyanosis  Abdominal: Soft, nondistended  Skin: Normal color and turgor  Neurologic/MSK: Alert and oriented x3, CN II-XII intact, motor 5/5 throughout, sensation intact to light touch throughout. +2 DTRs. Finger-nose-finger and heel knee-shin intact.    Psych: Appropriate mood and affect    IMAGIN/13/2021                                  2021                                3/22/2021                         (6 M)                                          (3 M)                              (Pretreatment)            IMPRESSION:   Mr. Wang is a 61 year old male with widely metastatic adenocarcinoma of the esophagus with brain, liver and osseous metastasis. He received GK SRS for his solitary intracranial metastasis and palliative radiotherapy to lumbar spine. He is currently on systemic treatment under the care of Dr. Steven.  He is now 6 months out of his radiation treatment. No clinical or radiographic evidence of disease recurrence or progression.    PLAN:   1- Return to radiation oncology clinic in 3 months with a new brain MRI prior  2- Continue care with medical oncology as scheduled      Domingo Win MD  PGY-5 Resident, Radiation Oncology  [facility]  Phone:[phone]  Pager:[phone]  I saw the patient with the resident.  I agree with the resident note and plan of care.      Emilia Qureshi MD

## 2021-09-13 NOTE — PROGRESS NOTES
FOLLOW-UP VISIT    Patient Name: Junito Wang      : 1960     Age: 60 year old        ______________________________________________________________________________   Chief complaint: Follow up for Radiation Treatment, esophageal cancer with bone mets         Date Radiation Completed: (20Gy 5fx to lumbosacral) (20Gy Gamma Knife brain) 3/24/2021  Received Y90 on  9/10/2021    Pain: denies     Labs  Other Labs: No    Imaging  MRI: Brain 2021      Other Appointments:     MD Name: Keaton Appointment Date: 9/15/2021  MD Name: Appointment Date:  MD Name: Appointment Date:  Other Appointment Notes:     Residual Radiation side effect: denies     Additional Instructions:     Nurse face-to-face time: Level 3:  10 min face to face time                Review of Systems   Constitutional: Negative.    HENT: Positive for tinnitus (left, chronic).    Eyes: Negative.    Respiratory: Positive for cough.    Cardiovascular: Negative.    Gastrointestinal: Positive for constipation (post chemo). Heartburn: post chemo. Nausea: post chemo.   Musculoskeletal: Positive for back pain (lower) and joint pain.        Left leg   Skin: Negative.    Neurological: Positive for tingling (tips of fingers) and focal weakness (left leg).   Endo/Heme/Allergies: Negative.    Psychiatric/Behavioral: Negative.

## 2021-09-13 NOTE — LETTER
2021         RE: Junito Wang  [address] [facility] [id number]        Dear Colleague,    Thank you for referring your patient, Junito Wang, to the [facility] RADIATION ONCOLOGY. Please see a copy of my visit note below.       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    Initial PET/CT on 21 demonstrated hypermetabolic mass in the distal esophagus extending into the gastric cardia with an SUV max of 16.7.  There were multiple hypermetabolic mediastinal lymph nodes in both the paraesophageal and subcarinal spaces.  There was also a hypoenhancing hypermetabolic mass in the liver in segment 7 measuring 1.8 x 1.7 cm with SUV max 8.3.  There are 2 additional nodules with FDG avidity 1 in segment 5 and the other in the margins of segments 5 and 6.  The patient also had a hypermetabolic lytic mass in the left sacrum extending into the left presacral space that measured 5.7 x 3.8 x 7.5 cm with an SUV max of 10.8. There was also a hypermetabolic mass in the proximal right humerus measuring 2.1 x 1.6 cm with SUV max 7.2. MRI of the brain on 21 showed a 1.9 x 1.4 x 1.2 cm enhancing lesion in the left choroid plexus adjacent to the left mesial temporal lobe.   He underwent a  biopsy of the left pelvic bone mass on 2/26/2021 that was consistent with metastatic adenocarcinoma, consistent with known esophageal primary.    Shortly thereafter, he presented to emergency department for evaluation of severe low back pain and difficulty urinating for which he had an MRI of the lumbar spine on 3/2/2021 that demonstrated a new posteriorly infiltrating T2 flair hyperintense appearance with internal septations within the lumbar spinal cord causing severe compression of the thecal sac and spinal canal extending from T12 down to the sacrum.  He underwent emergent laminectomy and decompression of the epidural hematoma.    He was referred to us and received a course of GK SRS to the solitary cranial metastasis as well as a course of palliative radiation therapy to the lumbar spine as outlined above.  After completion of radiation treatment, he started to have systemic therapy with cisplatin/5-FU/pembrolizumab under care of Dr. Steven.  He then underwent radio embolization of metastatic hepatic lesions (Y90) on 9/10/2021 by Dr. Meléndez and Dr. Baptiste.    He had a follow-up MRI on 6/23/2021 that demonstrated positive response to treatment with decrease size of the treated lesion without development of new cranial metastasis.  He presents today for a routine surveillance follow-up. He is now 6 months out of his radiation treatment. He had an MRI of the brain shortly before the visit. Our preliminary reading demonstrated stable disease with no evidence of new intracranial metastasis.    On interview today, he stated that he recovered well from his procedure. He is tolerating his systemic treatment with Dr. Steven with no significant side effects. He otherwise denied any recent fever, chills, headaches, nausea, vomiting, weakness, numbness, urinary or bowel incontinence, weight or appetite changes.    PHYSICAL EXAM:  /82 (BP Location: Left arm, Patient Position: Sitting, Cuff Size: Adult Regular)    Pulse 98   Resp 16   Wt 77.6 kg (171 lb)   SpO2 100%   BMI 24.54 kg/m       Gen: Alert, in NAD  Eyes: EOMI, sclera anicteric, PERRL  HENT      Head: NC/AT     Ears: No external auricular lesions     Nose/sinus: No rhinorrhea or epistaxis     Oral Cavity/Oropharynx: MMM, no thrush noted  Pulm: Breathing comfortably on room air, no audible wheezes or ronchi  CV: Well-perfused, no cyanosis  Abdominal: Soft, nondistended  Skin: Normal color and turgor  Neurologic/MSK: Alert and oriented x3, CN II-XII intact, motor 5/5 throughout, sensation intact to light touch throughout. +2 DTRs. Finger-nose-finger and heel knee-shin intact.    Psych: Appropriate mood and affect    IMAGIN/13/2021                                  2021                                3/22/2021                         (6 M)                                          (3 M)                              (Pretreatment)            IMPRESSION:   Mr. Wang is a 61 year old male with widely metastatic adenocarcinoma of the esophagus with brain, liver and osseous metastasis. He received GK SRS for his solitary intracranial metastasis and palliative radiotherapy to lumbar spine. He is currently on systemic treatment under the care of Dr. Steven.  He is now 6 months out of his radiation treatment. No clinical or radiographic evidence of disease recurrence or progression.    PLAN:   1- Return to radiation oncology clinic in 3 months with a new brain MRI prior  2- Continue care with medical oncology as scheduled      Domingo Win MD  PGY-5 Resident, Radiation Oncology  [facility]  Phone:[phone]  Pager:[phone]  I saw the patient with the resident.  I agree with the resident note and plan of care.      Emilia Qureshi MD      FOLLOW-UP VISIT    Patient Name: Junito Wang      : 1960     Age: 60 year old         ______________________________________________________________________________   Chief complaint: Follow up for Radiation Treatment, esophageal cancer with bone mets         Date Radiation Completed: (20Gy 5fx to lumbosacral) (20Gy Gamma Knife brain) 3/24/2021  Received Y90 on  9/10/2021    Pain: denies     Labs  Other Labs: No    Imaging  MRI: Brain 9/13/2021      Other Appointments:     MD Name: Keaton Appointment Date: 9/15/2021  MD Name: Appointment Date:  MD Name: Appointment Date:  Other Appointment Notes:     Residual Radiation side effect: denies     Additional Instructions:     Nurse face-to-face time: Level 3:  10 min face to face time                Review of Systems   Constitutional: Negative.    HENT: Positive for tinnitus (left, chronic).    Eyes: Negative.    Respiratory: Positive for cough.    Cardiovascular: Negative.    Gastrointestinal: Positive for constipation (post chemo). Heartburn: post chemo. Nausea: post chemo.   Musculoskeletal: Positive for back pain (lower) and joint pain.        Left leg   Skin: Negative.    Neurological: Positive for tingling (tips of fingers) and focal weakness (left leg).   Endo/Heme/Allergies: Negative.    Psychiatric/Behavioral: Negative.

## 2021-09-14 NOTE — TELEPHONE ENCOUNTER
Called pt to fup on him s/p Y90    He states that he had some nausea post Y90.  He states that this did go away and didn't take any meds    He does have some pain in his abd but he had this prior to  Denies swelling in legs    Puncture site looks good.    Denies any other issues.        I informed him that we will plan for follow up in 1 mos with Dr. Meléndez  And that our  will contact him for these appts    He agrees to plan and will call with any other questions.     Estrella WARE RN, BSN  Interventional Radiology/Vascular  Nurse Coordinator   Phone: [phone]  Fax: [phone]

## 2021-09-15 NOTE — LETTER
9/15/2021         RE: Junito Wang  [id number] [facility] [id number]        Dear Colleague,    Thank you for referring your patient, Junito Wang, to the [facility]. Please see a copy of my visit note below.    Junito is a 61 year old who is being evaluated via a billable video visit.    Patient reviewed medications and allergies via [employer] e-check in.     How would you like to obtain your AVS? [employer]  If the video visit is dropped, the invitation should be resent by: Send to e-mail at: [email or web address]  Will anyone else be joining your video visit? No      Video-Visit Details    Type of service:  Video Visit    Video Start Time: 7:15 AM    Video End Time:7:37 AM    Originating Location (pt. Location): Home    Distant Location (provider location):  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),metastasis of liver, brain and spine  (AJCC 8th edition)  -MMR proficient, HER2 by IHC is 2+, FISH neg  -PD-L1 CPS- 5-10%  -NGS by [facility]- No actionable mutations     Treatment:  3/2/21- L2-L5 laminectomy and decompression  3/17/21-3/23/21: Radiation to LS spine  3/24/21: gamma knife (2000cGy in 5 fractions)  3/31/21 to now: Cisplatin+keytruda+5fu every 3 weeks  9/10/21: Y-90    Treatment intent- Palliative    Oncology HPI:   August 2020- stomach discomfort, belching   October 2020- progressive dysphagia with solids   1/26/21- distal esophageal biopsy shows Moderately differentiated adenocarcinoma; MMR normal expression, PDL1 expression is low with TPS 2%, CPS 5-10, Her2 is pending  1/27/21- CT CAP- Enlarged  2 cm subcarinal lymph node and 1.4 cm short axis right subcarinal lymph node, focal thickening of the superior wall along the right side of the mid esophagus. Numerous small pulmonary  nodules, 3 mm nodule in the superior segment of the left lower lobe, 3 mm nodule in the anterior aspect of the right upper lobe , 4 mm nodule in the medial aspect of the right upper lobe and 4 mm right lower lobe nodule. Hypoattenuating foci in the liver, 1.6 cm hypoattenuating/hypoenhancing lesion in hepatic segment 8 and 1.4 cm lesion in hepatic segment 5 , indeterminate, likely metastatic.  The prostate gland is mildly enlarged measuring 5.3 cm in transverse diameter. Multiple prominent but not significantly enlarged retroperitoneal lymph nodes, indeterminate, could be reactive. 4.4 x 4.3 cm lytic lesion centered in the posterior aspect of the left sacral ala (series 3 image 243), 4.7 x 4.0 x 5.6 cm (axial and CC dimensions, respectively) hypoattenuating lesion in the subcutaneous tissue of the anterior chest wall just anterior to the mediastinum, indeterminate, could represent sebaceous cyst.  2/4/21- Saw Dr. Bourgeois ordered PET/CT  3/2/21-3/10/21- Admission to [facility]- <24h of inability to ambulate with acute onset left leg weakness/urinary retention with perianal sensory deficits, MRI showed extensive epidural signal change suggestive of tumor vs. Hemorrhage.Pt underwent emergent surgery- L2-L5 laminectomy and decompression . PEG tube placement 3/5/2021.   3/17/21-3/23/21: Radiation to LS spine  3/24/21: gamma knife (2000cGy in 5 fractions)  3/29/21- port placement  3/31/21: Cycle 1 Cisplatin+keytruda+5fu   4/21/21: Cycle 2 Cisplatin+keytruda+5fu  5/11/21- Ct CAP- Overall stable disease- mixed response- SD in 2 liver lesions while, stable disease to borderline progression in 1 liver lesions  5/12/21- Cycle 3 Cisplatin+keytruda+5fu  6/2/21- Cycle 4 Cisplatin+keytruda+5fu  6/21/21- CT CAP- Stable mild circumferential wall thickening of the distal esophagus (series 3, image 99) with small amount of fluid in the upper and mid esophagus. No lymphadenopathy. Stable to slight increased size of hepatic metastases. For  example a 3.1 x 2.9 cm lesion in the left lobe (series 3, image 123) previously measured 2.9 x 2.6 cm, and a 2.7 x 2.9 cm peripheral right hepatic lobe metastasis previously measured 3.0 x 2.2 cm. A 1.0 cm lesion in the caudate lobe (series 3, image 138) is more prominent today/new. Stable small gastrohepatic ligament lymph nodes. No new or enlarging lymph nodes in the abdomen and pelvis. No ascites.Stable 5.1 x 4.6 cm left sacral mass. Stable cystic 4.9 x 3.5 cm subcutaneous lesion overlying the midsternum.  6/24/21- Cycle 5 Cisplatin+keytruda+5fu  7/14/21- Cycle 6 Cisplatin+keytruda+5fu  8/3/21- Ct CAP- Few small pulmonary nodules are stable. Stable mild circumferential wall thickening of the distal esophagus (series 3, image 97).  Increased size of hepatic metastases. For example a 3.5  x 3.5 cm left lobe metastasis (series 3, image 117) previously measured 2.9 x 3.1 cm, a 3.5 x 2.7 cm subcapsular right hepatic lobe metastasis (series 3, image 158) previously measured 2.7 x 2.9 cm and  a 1.7 x 1.5 cm lesion in the left lobe adjacent to the caudate (series 3, image 124) previously measured 1.0 cm.  Stable 5.2 x 4.9 cm lytic lesion in the left sacrum previously measuring 5.2 x 4.6 cm (3, image 238). Stable sclerotic lesion in the anterior aspect of the T4 vertebral body measuring 1.3 cm (series 3, image 39) and sclerosis of the posterior left third rib.. No new lesions. Stable 5.2 cm cystic subcutaneous lesion overlying the sternum  9/10/21- Y90 to liver mets  9/13/21- Brain MRI: no progression or distal metatasis    Interval history:   -Feels like he has fluid in his R ear, ear issues for which he has followed with ENT previously. No ear pain or fever.  -Had nausea after Y90 briefly, did not need anti emetic, went away quickly.   -Skin in groin is better since using new soap and applying bacitracin. His pcp ruled out fungus and diabetes  -A bit constipated, has miralax. No diarrhea  -No issues eating, avoids  triggers if mild mucositis  -Feeling pretty good other wise, chemo continues to go well.   -No cough/sob  -no f/c    Comprehensive ROS was unremarkable except as detailed above.     No current outpatient medications on file.      No Known Allergies    Exam:  There were no vitals taken for this visit.  Wt Readings from Last 4 Encounters:   09/10/21 78.9 kg (174 lb)   09/03/21 78.9 kg (174 lb)   08/26/21 80.8 kg (178 lb 1.6 oz)   08/25/21 80.7 kg (178 lb)     Video physical exam  General: Patient appears well in no acute distress.   Skin: No visualized rash or lesions on visualized skin  Eyes: EOMI, no erythema, sclera icterus or discharge noted  Resp: Appears to be breathing comfortably without accessory muscle usage, speaking in full sentences, no cough  MSK: Appears to have normal range of motion based on visualized movements  Neurologic: No apparent tremors, facial movements symmetric  Psych: affect good, alert and oriented    The rest of a comprehensive physical examination is deferred due to PHE (public health emergency) video restrictions    Labs: 9/10 labs--new labs tomorrow    Imaging: n/a    Impression/plan:   # Stage IV esophageal adenocarcinoma   # Progressive liver disease  # ECOG PS 0-1  Started chemoimmunotherpay with Cisplatin+5Fu+pembrolizumab based on KN-590 trial. The trial demonstrated a statistically significant improvement in OS and PFS for patients randomized to pembrolizumab with chemotherapy. Median OS was 12.4 months (95% CI: 10.5, 14.0) for the pembrolizumab arm versus 9.8 months (95% CI: 8.8, 10.8) for the chemotherapy arm (HR 0.73; 95% CI: 0.62, 0.86; p<0.0001). Median PFS was 6.3  (95% CI: 6.2, 6.9) and 5.8 months (95% CI: 5.0, 6.0), respectively (HR 0.65; 95% CI: 0.55, 0.76; p<0.0001).     Restaging scan after 2 cycles showed overall stable disease with - mixed response- NY in esophgaus primary, and 2 liver lesions while, stable disease in spine, and stable to borderline progression in 1  liver lesions. Scan after 4 cycles showing stable disease in esophagus and LN and a some liver lesion. 2 lesions in liver are slightly bigger, one is more prominent than before. CT CAP after 6 cycles done on 8/3/21 demonstrated continued interval increase in the size of 2 liver lesions, stable osseous lesions and continued inconspicuity of the primary esophageal mass.     - Given the liver-selective progressive disease, we recommended liver directed local therapy and Y-90 was complete 9/10    - Chemotherapy wise, he completed 6 cycles of induction cisplatin, 5-FU and pembrolizumab thus was swithced to 5-FLU and pembrolizumab maintenance (i.e. drop cisplatin) with cycle 7 for a total of 2 years or until progression/intolerable toxicities. Okay for infusion tomorrow pendinng labs    - In the event of eventual extrahepatic disease progression, options for next lines of treatment would include ramucirumab plus paclitaxel based on the [facility] trial. Other options include single-agent docetaxel, pacltixael or irinotecan. Lonsurf could be pursued in the third line.    #groin skin breakdown: seen by PCP 9/3/21. Improving, no concerns of infx. Monitor for worsening at would consider derm referral at that time. I do not feel this is directly related to his cancer therapy    #Brain mets   Follows with Dr. Velez. S/p GK 3/24. MRI Sept 13 with no new mets. Next in 12/2021.     #Mucositis, improved  2/2 F5U. Continue Aquaphor and salt/soda rinses as needed. He has been offered MMW but does not feel his symptoms are bad enough to warrant more treatment. Avoid irritating foods like spicy/acidic items when mucositis is worse.      #Bone mets  Bone biopsy of left pelvic mass 2/26/21 confirmed metastasis. Finished radiation 3/23 to lumbosacral spine.   While he has not seen dentist in 4 yrs, discusseed small risk of osteonecrosis, 3-5% risk, he does not have any ongoing dental issues and has no tooth pain or caries.  -Zometa  every 6 weeks, last given 8/5--give tomorrow  -Continue Calcium and Vitamin D.     #Dypshagia, malnutrition  PEG tube placed in OR (3/5) for nutritional support. Now with good PO intake. Follows with RD. Continue PO intake as tolerated. Previously declined SLP follow up, can revisit as needed though dysphagia has improved with treatment.   --Of note, consideration given to esophageal stent vs XRT for esophageal debulking to address dysphagia inpatient in Feb/March 2021; however given likely improvement with chemo-immunotherapy, as well as risks of worsening cytopenias, elected not to.      #Cauda Equina due to tumor compression s/p L2-L5 laminectomy and decompression   Followed by neurosurgery (now prn) with improved exam after surgery. He is now ambulating with or without a walker, strength nearly back to baseline. He is aware of his activity and lifting restrictions. Has some L radiculopathy that he uses robaxan prn.      #Constipation  Dulcolax, miralax, or MOM prn. Continue to avoid suppository while on chemo.     #Urinary retention  Andrews out with no new issues, saw urology with q6 mo follow up rec.     #GERD  Improved. Liquid omeprazole as needed but not really needing      Overall Plan:  -Proceed with maintainence 5-FLU and pembrolizumab tomorrow.   -Follow up with IR ~1 mo from Y90   -Restaging CT CAP and a visit with Dr. Steven 9/29            Again, thank you for allowing me to participate in the care of your patient.        Sincerely,        Jaky Pugh, CNP

## 2021-09-16 NOTE — PROGRESS NOTES
Power Port needle left for access for treatment, Sterile technique performed and maintained. Patient tolerated procedure well. Tubes drawn in rainbow order: red, green, purple. Transparent dressing placed with use of tegaderm.    Vanessa Oliveira RN  [facility] [place]

## 2021-09-16 NOTE — PROGRESS NOTES
"Infusion Nursing Note:  Junito Wang presents today for C9 Zometa, Keytruda + 5fu pump  connect.    Patient seen by provider today: No   present during visit today: Not Applicable.    Note: Patient stated that he can't figure out why he is losing weight. Reports eating quality protein several times a day.  Still has his feeding tube in place, however he also stated that he has not used in the past week. Encouraged him to flush it on a regular basis regardless if he is using it or not.  Also encouraged him to reach out to the dietician for further recommendations on food choices going forward.     Intravenous Access:  Implanted Port.    Treatment Conditions:  Lab Results   Component Value Date    HGB 12.2 (L) 09/16/2021    WBC 4.8 09/16/2021    ANEU 5.1 07/14/2021    ANEUTAUTO 3.5 09/16/2021     09/16/2021      Lab Results   Component Value Date     09/16/2021    POTASSIUM 4.1 09/16/2021    MAG 2.5 (H) 09/16/2021    CR 0.72 09/16/2021    ARDEN 8.6 09/16/2021    BILITOTAL 0.5 09/16/2021    ALBUMIN 3.8 09/16/2021    ALT 60 09/16/2021    AST 52 (H) 09/16/2021     Results reviewed, labs MET treatment parameters, ok to proceed with treatment.      Post Infusion Assessment:  Patient tolerated infusion without incident.  Blood return noted pre and post infusion.  Site patent and intact, free from redness, edema or discomfort.  No evidence of extravasations.      Prior to discharge: Port is secured in place with tegaderm and flushed with 10cc NS with positive blood return noted.  Continuous home infusion Dosi-Fuser pump connected.    All connectors secured in place and clamps taped open.    Pump started, \"running\" noted on display (CADD): Not Applicable.  Pump Connection double checked with Gala Cummins RN.  Patient instructed to call our clinic or [facility] with any questions or concerns at home.  Patient verbalized understanding.    Patient set up for pump disconnect at home with " [facility] on Tuesday 9/21/21 @ 10AM. Connected with Denia MACK to verify disconnect.       Discharge Plan:   Discharge instructions reviewed with: Patient.  Patient and/or family verbalized understanding of discharge instructions and all questions answered.  Patient discharged in stable condition accompanied by: self.  Departure Mode: Ambulatory.      Jennifer Sarkar RN

## 2021-09-17 NOTE — PROGRESS NOTES
This is a recent snapshot of the patient's [facility] medical record.  For current drug dose and complete information and questions, call [phone] or In Basket pool, [facility] home infusion ([id number])  CSN Number:  [id number]

## 2021-09-22 NOTE — PROGRESS NOTES
This is a recent snapshot of the patient's [facility] medical record.  For current drug dose and complete information and questions, call [phone] or In Basket pool, [facility] home infusion ([id number])  CSN Number:  [id number]

## 2021-09-28 NOTE — PROGRESS NOTES
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    Treatment intent- Palliative    Oncology HPI:   August 2020- stomach discomfort, belching   October 2020- progressive dysphagia with solids   1/26/21- distal esophageal biopsy shows Moderately differentiated adenocarcinoma; MMR normal expression, PDL1 expression is low with TPS 2%, CPS 5-10, Her2 is pending  1/27/21- CT CAP- Enlarged  2 cm subcarinal lymph node and 1.4 cm short axis right subcarinal lymph node, focal thickening of the superior wall along the right side of the mid esophagus. Numerous small pulmonary nodules, 3 mm nodule in the superior segment of the left lower lobe, 3 mm nodule in the anterior aspect of the right upper lobe , 4 mm nodule in the medial aspect of the right upper lobe and 4 mm right lower lobe nodule. Hypoattenuating foci in the liver, 1.6 cm hypoattenuating/hypoenhancing lesion in hepatic segment 8 and 1.4 cm lesion in hepatic segment 5 , indeterminate, likely metastatic.  The prostate gland is mildly enlarged measuring 5.3 cm in transverse diameter. Multiple prominent but not significantly enlarged retroperitoneal lymph nodes, indeterminate, could be reactive. 4.4 x 4.3 cm lytic lesion centered in the posterior aspect of the left sacral ala (series 3 image 243), 4.7 x 4.0 x 5.6 cm (axial and CC dimensions, respectively) hypoattenuating lesion in the subcutaneous  tissue of the anterior chest wall just anterior to the mediastinum, indeterminate, could represent sebaceous cyst.  2/4/21- Saw Dr. Bourgeois ordered PET/CT  3/2/21-3/10/21- Admission to [facility]- <24h of inability to ambulate with acute onset left leg weakness/urinary retention with perianal sensory deficits, MRI showed extensive epidural signal change suggestive of tumor vs. Hemorrhage.Pt underwent emergent surgery- L2-L5 laminectomy and decompression . PEG tube placement 3/5/2021.   3/17/21-3/23/21: Radiation to LS spine  3/24/21: gamma knife (2000cGy in 5 fractions)  3/29/21- port placement  3/31/21: Cycle 1 Cisplatin+keytruda+5fu   4/21/21: Cycle 2 Cisplatin+keytruda+5fu  5/11/21- Ct CAP- Overall stable disease- mixed response- MA in 2 liver lesions while, stable disease to borderline progression in 1 liver lesions  5/12/21- Cycle 3 Cisplatin+keytruda+5fu  6/2/21- Cycle 4 Cisplatin+keytruda+5fu  6/21/21- CT CAP- Stable mild circumferential wall thickening of the distal esophagus (series 3, image 99) with small amount of fluid in the upper and mid esophagus. No lymphadenopathy. Stable to slight increased size of hepatic metastases. For example a 3.1 x 2.9 cm lesion in the left lobe (series 3, image 123) previously measured 2.9 x 2.6 cm, and a 2.7 x 2.9 cm peripheral right hepatic lobe metastasis previously measured 3.0 x 2.2 cm. A 1.0 cm lesion in the caudate lobe (series 3, image 138) is more prominent today/new. Stable small gastrohepatic ligament lymph nodes. No new or enlarging lymph nodes in the abdomen and pelvis. No ascites.Stable 5.1 x 4.6 cm left sacral mass. Stable cystic 4.9 x 3.5 cm subcutaneous lesion overlying the midsternum.  6/24/21- Cycle 5 Cisplatin+keytruda+5fu  7/14/21- Cycle 6 Cisplatin+keytruda+5fu  8/3/21- Ct CAP- Few small pulmonary nodules are stable. Stable mild circumferential wall thickening of the distal esophagus (series 3, image 97).  Increased size of hepatic metastases. For example a  3.5  x 3.5 cm left lobe metastasis (series 3, image 117) previously measured 2.9 x 3.1 cm, a 3.5 x 2.7 cm subcapsular right hepatic lobe metastasis (series 3, image 158) previously measured 2.7 x 2.9 cm and  a 1.7 x 1.5 cm lesion in the left lobe adjacent to the caudate (series 3, image 124) previously measured 1.0 cm.  Stable 5.2 x 4.9 cm lytic lesion in the left sacrum previously measuring 5.2 x 4.6 cm (3, image 238). Stable sclerotic lesion in the anterior aspect of the T4 vertebral body measuring 1.3 cm (series 3, image 39) and sclerosis of the posterior left third rib.. No new lesions. Stable 5.2 cm cystic subcutaneous lesion overlying the sternum  8/5, 8/26, 9/16- Pembrolizumab and 5-FU  9/10/21- Y90 delivery in the left hepatic lobe and segment 5 lesions.   9/13/21- Brain MRI: no progression or distal metatasis  9/28/21- Ct CAP- Multiple left and right hepatic nodular masses identified consistent with metastatic disease. Several appear to be new or are larger worrisome for progression. One lesion is slightly smaller along  the anterior aspect of hepatic segment 2. Stable destructive sacral bone lesion and sclerosis at T4 and left third rib. Stable distal esophageal wall thickening. Stable but indeterminant soft tissue surrounding portions of the left gastric artery at the upper midabdomen.    Interval history:   -Tolerating the pembrolizumab and 5 FU with minimal toxicities.   Feels like he has fluid in his R ear, ear issues for which he has followed with ENT previously. This is stable. No ear pain or fever.  -Had nausea after Y90 briefly, did not need anti emetic, went away quickly.   -Skin in groin is better since using new soap and applying bacitracin. His pcp ruled out fungus and diabetes  -A bit constipated, has miralax. No diarrhea  -No issues eating, avoids triggers if mild mucositis  -Feeling pretty good other wise, chemo continues to go well.   -No cough/sob  He currently takles everythign through  PO. He is able to move around without any walker, he is idnependent of ADLa nd IADL and is able to do grocery shopping.     Comprehensive ROS was unremarkable except as detailed above.     Current Outpatient Medications   Medication Sig Dispense Refill     bacitracin 500 UNIT/GM external ointment Apply topically 2 times daily Until the sore are well healed 113.4 g 2     Calcium Carb-Cholecalciferol (CALCIUM-VITAMIN D) 600-400 MG-UNIT TABS Take 2 tablets by mouth daily 60 tablet 3     gabapentin (NEURONTIN) 250 MG/5ML solution Take 5 mLs (250 mg) by mouth At Bedtime 470 mL 1     LORazepam (ATIVAN) 0.5 MG tablet Take 1 tablet (0.5 mg) by mouth every 4 hours as needed (Anxiety, Nausea/Vomiting or Sleep) 30 tablet 2     methocarbamol (ROBAXIN) 750 MG tablet Take 750 mg by mouth 4 times daily  30 tablet 0     methylPREDNISolone (MEDROL DOSEPAK) 4 MG tablet therapy pack Take 1 tablet (4 mg) by mouth 2 times daily Take as directed on package. 21 tablet 0     multivitamin CF FORMULA (CHOICEFUL) chewable tablet Take 1 tablet by mouth daily       omeprazole (FIRST-OMEPRAZOLE) 2 MG/ML SUSP Take 10 mLs (20 mg) by mouth 2 times daily 300 mL 1     ondansetron (ZOFRAN) 4 MG tablet Take 1-2 tablets (4-8 mg) by mouth every 6 hours as needed for nausea (vomiting) 40 tablet 0     oxyCODONE (ROXICODONE) 5 MG tablet Take 1-2 tablets (5-10 mg) by mouth every 6 hours as needed for moderate to severe pain 10 tablet 0     pantoprazole (PROTONIX) 40 MG EC tablet Take 1 tablet (40 mg) by mouth daily 30 tablet 0     prochlorperazine (COMPAZINE) 10 MG tablet Take 1 tablet (10 mg) by mouth every 6 hours as needed (Nausea/Vomiting) 30 tablet 2      No Known Allergies    Exam:  There were no vitals taken for this visit.  Wt Readings from Last 4 Encounters:   09/29/21 77.1 kg (170 lb)   09/16/21 76.7 kg (169 lb)   09/13/21 77.6 kg (171 lb)   09/10/21 78.9 kg (174 lb)         Gen: Well built and nourished, not in any acute distress  HEENT: MMM, no  oral lesions, no pallor, icterus  Neck: supple,   Lymph: no cervical, sc, axillary LAD   CV: RRR, no murmurs  Resp: CTA  Abd: Soft, NTND, no HSM   Ext: no edema   Neuro: AAOx4. Cranial nerves grossly intact. Strength 5/5 throughout.  Normal muscle tone. Sensations grossly intact. 3+ symmetric reflexes in Knee jt   Labs:     Imaging: n/a    Impression/plan:   # Stage IV esophageal adenocarcinoma   # Progressive liver disease  # ECOG PS 0-1  Started chemoimmunotherpay with Cisplatin+5Fu+pembrolizumab based on [id number] trial. The trial demonstrated a statistically significant improvement in OS and PFS for patients randomized to pembrolizumab with chemotherapy. Median OS was 12.4 months (95% CI: 10.5, 14.0) for the pembrolizumab arm versus 9.8 months (95% CI: 8.8, 10.8) for the chemotherapy arm (HR 0.73; 95% CI: 0.62, 0.86; p<0.0001). Median PFS was 6.3  (95% CI: 6.2, 6.9) and 5.8 months (95% CI: 5.0, 6.0), respectively (HR 0.65; 95% CI: 0.55, 0.76; p<0.0001).     Restaging scan after 2 cycles showed overall stable disease with - mixed response- WV in esophgaus primary, and 2 liver lesions while, stable disease in spine, and stable to borderline progression in 1 liver lesions. Scan after 4 cycles showing stable disease in esophagus and LN and a some liver lesion. 2 lesions in liver are slightly bigger, one is more prominent than before. CT CAP after 6 cycles done on 8/3/21 demonstrated continued interval increase in the size of 2 liver lesions, stable osseous lesions and continued inconspicuity of the primary esophageal mass. Given the liver-isolated progressive disease, we recommended liver directed local therapy and Y-90 was complete 9/10  Chemotherapy wise, he completed 6 cycles of induction cisplatin, 5-FU and pembrolizumab thus was swithced to 5-FLU and pembrolizumab maintenance (i.e. drop cisplatin) with cycle 7 for a total of 2 years or until progression/intolerable toxicities.   Recent CT CAP showing ongoing  stable disease outside of liver, however, in the liver the radiologist report suggests new lesion and enlargement. Of some liver lesion. I discussed the CT findings with Rosales Meléndez who suggested that it might be too early for assessing response, as these findings may still reflect treatment effect. He is scheduled to get liver MRI in mid October and appt with Dr. Meléndez.   We will reassess liver disease after the MRI. Will continue pemrbo+5 FLU for now.  IF mid October MRIs can shows confirmed progression we can consider switching therapy earlier.    - In the event of eventual extrahepatic disease progression, options for next lines of treatment would include ramucirumab plus paclitaxel based on the [place] trial. Other options include single-agent docetaxel, pacltixael or irinotecan. Lonsurf could be pursued in the third line.    Overall plan  Ok to get infusion on 10/7 provided no new issues and labs are ok   RTC as scheduled with Jaky Austinange for furture infusion appts   Ct CAP in 8 weeks   RTC with me after 8 weeks        #groin skin breakdown: seen by PCP 9/3/21. Improving, no concerns of infx. Monitor for worsening at would consider derm referral at that time. I do not feel this is directly related to his cancer therapy    #Brain mets   Follows with Dr. Velez. S/p GK 3/24. MRI Sept 13 with no new mets. Next in 12/2021.     #Mucositis, improved  2/2 F5U. Continue Aquaphor and salt/soda rinses as needed. He has been offered MMW but does not feel his symptoms are bad enough to warrant more treatment. Avoid irritating foods like spicy/acidic items when mucositis is worse.      #Bone mets  Bone biopsy of left pelvic mass 2/26/21 confirmed metastasis. Finished radiation 3/23 to lumbosacral spine.   While he has not seen dentist in 4 yrs, discusseed small risk of osteonecrosis, 3-5% risk, he does not have any ongoing dental issues and has no tooth pain or caries.  -Zometa every 6 weeks, last given 8/5--give  tomorrow  -Continue Calcium and Vitamin D.     #Dypshagia, malnutrition  PEG tube placed in OR (3/5) for nutritional support. Now with good PO intake. Follows with RD. Continue PO intake as tolerated. Previously declined SLP follow up, can revisit as needed though dysphagia has improved with treatment.   --Of note, consideration given to esophageal stent vs XRT for esophageal debulking to address dysphagia inpatient in Feb/March 2021; however given likely improvement with chemo-immunotherapy, as well as risks of worsening cytopenias, elected not to.      #Cauda Equina due to tumor compression s/p L2-L5 laminectomy and decompression   Followed by neurosurgery (now prn) with improved exam after surgery. He is now ambulating with or without a walker, strength nearly back to baseline. He is aware of his activity and lifting restrictions. Has some L radiculopathy that he uses robaxan prn.      #Constipation  Dulcolax, miralax, or MOM prn. Continue to avoid suppository while on chemo.     #Urinary retention  Andrews out with no new issues, saw urology with q6 mo follow up rec.     #GERD  Improved. Liquid omeprazole as needed but not really needing    On the day of service  Chart review: 5 minutes  Visit duration: 30 minutes  Care coordination: 5 minutes    Jose Steven MD    Hematology, Oncology and Transplantation

## 2021-09-29 NOTE — NURSING NOTE
"Oncology Rooming Note    September 29, 2021 11:54 AM   Junito Wang is a 61 year old male who presents for:    Chief Complaint   Patient presents with     Oncology Clinic Visit     [facility] RETURN - ADENOCARCINOMA OF ESOPHAGUS     Initial Vitals: BP (!) 143/91 (BP Location: Right arm, Patient Position: Chair, Cuff Size: Adult Regular)   Pulse 120   Temp 97.9  F (36.6  C)   Resp 16   Ht 1.778 m (5' 10\")   Wt 77.1 kg (170 lb)   SpO2 97%   BMI 24.39 kg/m   Estimated body mass index is 24.39 kg/m  as calculated from the following:    Height as of this encounter: 1.778 m (5' 10\").    Weight as of this encounter: 77.1 kg (170 lb). Body surface area is 1.95 meters squared.  No Pain (0) Comment: Data Unavailable   No LMP for male patient.  Allergies reviewed: Yes  Medications reviewed: Yes    Medications: Medication refills not needed today.  Pharmacy name entered into [employer]:    [id number] - [place], [place] - [address] [facility] [place] [place] - [id number]  [facility] - [place], [place] - [address] [address] [place] [address], [id number]    Clinical concerns: No new concerns. Maurizio was notified.      Luiz Kwan, ESTRADA            "

## 2021-09-29 NOTE — LETTER
9/29/2021         RE: Junito Wang  [id number] [facility] [id number]        Dear Colleague,    Thank you for referring your patient, Junito Wang, to the [facility]. Please see a copy of my visit note below.    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    Treatment intent- Palliative    Oncology HPI:   August 2020- stomach discomfort, belching   October 2020- progressive dysphagia with solids   1/26/21- distal esophageal biopsy shows Moderately differentiated adenocarcinoma; MMR normal expression, PDL1 expression is low with TPS 2%, CPS 5-10, Her2 is pending  1/27/21- CT CAP- Enlarged  2 cm subcarinal lymph node and 1.4 cm short axis right subcarinal lymph node, focal thickening of the superior wall along the right side of the mid esophagus. Numerous small pulmonary nodules, 3 mm nodule in the superior segment of the left lower lobe, 3 mm nodule in the anterior aspect of the right upper lobe , 4 mm nodule in the medial aspect of the right upper lobe and 4 mm right lower lobe nodule. Hypoattenuating foci in the liver, 1.6 cm hypoattenuating/hypoenhancing lesion in hepatic segment 8 and 1.4 cm lesion in hepatic segment 5 , indeterminate, likely metastatic.  The prostate gland is mildly enlarged measuring 5.3 cm in transverse diameter. Multiple prominent but not significantly enlarged  retroperitoneal lymph nodes, indeterminate, could be reactive. 4.4 x 4.3 cm lytic lesion centered in the posterior aspect of the left sacral ala (series 3 image 243), 4.7 x 4.0 x 5.6 cm (axial and CC dimensions, respectively) hypoattenuating lesion in the subcutaneous tissue of the anterior chest wall just anterior to the mediastinum, indeterminate, could represent sebaceous cyst.  2/4/21- Saw Dr. Bourgeois ordered PET/CT  3/2/21-3/10/21- Admission to [facility]- <24h of inability to ambulate with acute onset left leg weakness/urinary retention with perianal sensory deficits, MRI showed extensive epidural signal change suggestive of tumor vs. Hemorrhage.Pt underwent emergent surgery- L2-L5 laminectomy and decompression . PEG tube placement 3/5/2021.   3/17/21-3/23/21: Radiation to LS spine  3/24/21: gamma knife (2000cGy in 5 fractions)  3/29/21- port placement  3/31/21: Cycle 1 Cisplatin+keytruda+5fu   4/21/21: Cycle 2 Cisplatin+keytruda+5fu  5/11/21- Ct CAP- Overall stable disease- mixed response- LA in 2 liver lesions while, stable disease to borderline progression in 1 liver lesions  5/12/21- Cycle 3 Cisplatin+keytruda+5fu  6/2/21- Cycle 4 Cisplatin+keytruda+5fu  6/21/21- CT CAP- Stable mild circumferential wall thickening of the distal esophagus (series 3, image 99) with small amount of fluid in the upper and mid esophagus. No lymphadenopathy. Stable to slight increased size of hepatic metastases. For example a 3.1 x 2.9 cm lesion in the left lobe (series 3, image 123) previously measured 2.9 x 2.6 cm, and a 2.7 x 2.9 cm peripheral right hepatic lobe metastasis previously measured 3.0 x 2.2 cm. A 1.0 cm lesion in the caudate lobe (series 3, image 138) is more prominent today/new. Stable small gastrohepatic ligament lymph nodes. No new or enlarging lymph nodes in the abdomen and pelvis. No ascites.Stable 5.1 x 4.6 cm left sacral mass. Stable cystic 4.9 x 3.5 cm subcutaneous lesion overlying the midsternum.  6/24/21- Cycle 5  Cisplatin+keytruda+5fu  7/14/21- Cycle 6 Cisplatin+keytruda+5fu  8/3/21- Ct CAP- Few small pulmonary nodules are stable. Stable mild circumferential wall thickening of the distal esophagus (series 3, image 97).  Increased size of hepatic metastases. For example a 3.5  x 3.5 cm left lobe metastasis (series 3, image 117) previously measured 2.9 x 3.1 cm, a 3.5 x 2.7 cm subcapsular right hepatic lobe metastasis (series 3, image 158) previously measured 2.7 x 2.9 cm and  a 1.7 x 1.5 cm lesion in the left lobe adjacent to the caudate (series 3, image 124) previously measured 1.0 cm.  Stable 5.2 x 4.9 cm lytic lesion in the left sacrum previously measuring 5.2 x 4.6 cm (3, image 238). Stable sclerotic lesion in the anterior aspect of the T4 vertebral body measuring 1.3 cm (series 3, image 39) and sclerosis of the posterior left third rib.. No new lesions. Stable 5.2 cm cystic subcutaneous lesion overlying the sternum  8/5, 8/26, 9/16- Pembrolizumab and 5-FU  9/10/21- Y90 delivery in the left hepatic lobe and segment 5 lesions.   9/13/21- Brain MRI: no progression or distal metatasis  9/28/21- Ct CAP- Multiple left and right hepatic nodular masses identified consistent with metastatic disease. Several appear to be new or are larger worrisome for progression. One lesion is slightly smaller along  the anterior aspect of hepatic segment 2. Stable destructive sacral bone lesion and sclerosis at T4 and left third rib. Stable distal esophageal wall thickening. Stable but indeterminant soft tissue surrounding portions of the left gastric artery at the upper midabdomen.    Interval history:   -Tolerating the pembrolizumab and 5 FU with minimal toxicities.   Feels like he has fluid in his R ear, ear issues for which he has followed with ENT previously. This is stable. No ear pain or fever.  -Had nausea after Y90 briefly, did not need anti emetic, went away quickly.   -Skin in groin is better since using new soap and applying  bacitracin. His pcp ruled out fungus and diabetes  -A bit constipated, has miralax. No diarrhea  -No issues eating, avoids triggers if mild mucositis  -Feeling pretty good other wise, chemo continues to go well.   -No cough/sob  He currently takles everythign through PO. He is able to move around without any walker, he is idnependent of ADLa nd IADL and is able to do grocery shopping.     Comprehensive ROS was unremarkable except as detailed above.     Current Outpatient Medications   Medication Sig Dispense Refill     bacitracin 500 UNIT/GM external ointment Apply topically 2 times daily Until the sore are well healed 113.4 g 2     Calcium Carb-Cholecalciferol (CALCIUM-VITAMIN D) 600-400 MG-UNIT TABS Take 2 tablets by mouth daily 60 tablet 3     gabapentin (NEURONTIN) 250 MG/5ML solution Take 5 mLs (250 mg) by mouth At Bedtime 470 mL 1     LORazepam (ATIVAN) 0.5 MG tablet Take 1 tablet (0.5 mg) by mouth every 4 hours as needed (Anxiety, Nausea/Vomiting or Sleep) 30 tablet 2     methocarbamol (ROBAXIN) 750 MG tablet Take 750 mg by mouth 4 times daily  30 tablet 0     methylPREDNISolone (MEDROL DOSEPAK) 4 MG tablet therapy pack Take 1 tablet (4 mg) by mouth 2 times daily Take as directed on package. 21 tablet 0     multivitamin CF FORMULA (CHOICEFUL) chewable tablet Take 1 tablet by mouth daily       omeprazole (FIRST-OMEPRAZOLE) 2 MG/ML SUSP Take 10 mLs (20 mg) by mouth 2 times daily 300 mL 1     ondansetron (ZOFRAN) 4 MG tablet Take 1-2 tablets (4-8 mg) by mouth every 6 hours as needed for nausea (vomiting) 40 tablet 0     oxyCODONE (ROXICODONE) 5 MG tablet Take 1-2 tablets (5-10 mg) by mouth every 6 hours as needed for moderate to severe pain 10 tablet 0     pantoprazole (PROTONIX) 40 MG EC tablet Take 1 tablet (40 mg) by mouth daily 30 tablet 0     prochlorperazine (COMPAZINE) 10 MG tablet Take 1 tablet (10 mg) by mouth every 6 hours as needed (Nausea/Vomiting) 30 tablet 2      No Known Allergies    Exam:  There  were no vitals taken for this visit.  Wt Readings from Last 4 Encounters:   09/29/21 77.1 kg (170 lb)   09/16/21 76.7 kg (169 lb)   09/13/21 77.6 kg (171 lb)   09/10/21 78.9 kg (174 lb)         Gen: Well built and nourished, not in any acute distress  HEENT: MMM, no oral lesions, no pallor, icterus  Neck: supple,   Lymph: no cervical, sc, axillary LAD   CV: RRR, no murmurs  Resp: CTA  Abd: Soft, NTND, no HSM   Ext: no edema   Neuro: AAOx4. Cranial nerves grossly intact. Strength 5/5 throughout.  Normal muscle tone. Sensations grossly intact. 3+ symmetric reflexes in Knee jt   Labs:     Imaging: n/a    Impression/plan:   # Stage IV esophageal adenocarcinoma   # Progressive liver disease  # ECOG PS 0-1  Started chemoimmunotherpay with Cisplatin+5Fu+pembrolizumab based on [id number] trial. The trial demonstrated a statistically significant improvement in OS and PFS for patients randomized to pembrolizumab with chemotherapy. Median OS was 12.4 months (95% CI: 10.5, 14.0) for the pembrolizumab arm versus 9.8 months (95% CI: 8.8, 10.8) for the chemotherapy arm (HR 0.73; 95% CI: 0.62, 0.86; p<0.0001). Median PFS was 6.3  (95% CI: 6.2, 6.9) and 5.8 months (95% CI: 5.0, 6.0), respectively (HR 0.65; 95% CI: 0.55, 0.76; p<0.0001).     Restaging scan after 2 cycles showed overall stable disease with - mixed response- SC in esophgaus primary, and 2 liver lesions while, stable disease in spine, and stable to borderline progression in 1 liver lesions. Scan after 4 cycles showing stable disease in esophagus and LN and a some liver lesion. 2 lesions in liver are slightly bigger, one is more prominent than before. CT CAP after 6 cycles done on 8/3/21 demonstrated continued interval increase in the size of 2 liver lesions, stable osseous lesions and continued inconspicuity of the primary esophageal mass. Given the liver-isolated progressive disease, we recommended liver directed local therapy and Y-90 was complete 9/10  Chemotherapy wise,  he completed 6 cycles of induction cisplatin, 5-FU and pembrolizumab thus was swithced to 5-FLU and pembrolizumab maintenance (i.e. drop cisplatin) with cycle 7 for a total of 2 years or until progression/intolerable toxicities.   Recent CT CAP showing ongoing stable disease outside of liver, however, in the liver the radiologist report suggests new lesion and enlargement. Of some liver lesion. I discussed the CT findings with Rosales Meléndez who suggested that it might be too early for assessing response, as these findings may still reflect treatment effect. He is scheduled to get liver MRI in mid October and appt with Dr. Meléndez.   We will reassess liver disease after the MRI. Will continue pemrbo+5 FLU for now.  IF mid October MRIs can shows confirmed progression we can consider switching therapy earlier.    - In the event of eventual extrahepatic disease progression, options for next lines of treatment would include ramucirumab plus paclitaxel based on the Spiro trial. Other options include single-agent docetaxel, pacltixael or irinotecan. Lonsurf could be pursued in the third line.    Overall plan  Ok to get infusion on 10/7 provided no new issues and labs are ok   RTC as scheduled with Jaky Moser for furture infusion appts   Ct CAP in 8 weeks   RTC with me after 8 weeks        #groin skin breakdown: seen by PCP 9/3/21. Improving, no concerns of infx. Monitor for worsening at would consider derm referral at that time. I do not feel this is directly related to his cancer therapy    #Brain mets   Follows with Dr. Velez. S/p GK 3/24. MRI Sept 13 with no new mets. Next in 12/2021.     #Mucositis, improved  2/2 F5U. Continue Aquaphor and salt/soda rinses as needed. He has been offered MMW but does not feel his symptoms are bad enough to warrant more treatment. Avoid irritating foods like spicy/acidic items when mucositis is worse.      #Bone mets  Bone biopsy of left pelvic mass 2/26/21 confirmed metastasis.  Finished radiation 3/23 to lumbosacral spine.   While he has not seen dentist in 4 yrs, discusseed small risk of osteonecrosis, 3-5% risk, he does not have any ongoing dental issues and has no tooth pain or caries.  -Zometa every 6 weeks, last given 8/5--give tomorrow  -Continue Calcium and Vitamin D.     #Dypshagia, malnutrition  PEG tube placed in OR (3/5) for nutritional support. Now with good PO intake. Follows with RD. Continue PO intake as tolerated. Previously declined SLP follow up, can revisit as needed though dysphagia has improved with treatment.   --Of note, consideration given to esophageal stent vs XRT for esophageal debulking to address dysphagia inpatient in Feb/March 2021; however given likely improvement with chemo-immunotherapy, as well as risks of worsening cytopenias, elected not to.      #Cauda Equina due to tumor compression s/p L2-L5 laminectomy and decompression   Followed by neurosurgery (now prn) with improved exam after surgery. He is now ambulating with or without a walker, strength nearly back to baseline. He is aware of his activity and lifting restrictions. Has some L radiculopathy that he uses robaxan prn.      #Constipation  Dulcolax, miralax, or MOM prn. Continue to avoid suppository while on chemo.     #Urinary retention  Andrews out with no new issues, saw urology with q6 mo follow up rec.     #GERD  Improved. Liquid omeprazole as needed but not really needing    On the day of service  Chart review: 5 minutes  Visit duration: 30 minutes  Care coordination: 5 minutes    Jose Steven MD    Hematology, Oncology and Transplantation

## 2021-10-07 NOTE — PROGRESS NOTES
"Infusion Nursing Note:  Junito Wang presents today for C10D1 Pembrolizumab/5FU pump connect  Patient seen by provider today: No   present during visit today: Not Applicable.    Note: Pt c/o neuropathy in L foot, some nausea that resolves on it's own. Pt c/o  mouth sores a few days after chemo that resolve with salt/soda rinses and states he gets a rash in his groin area a few days after chemo that he has seen his provider for and bacitracin as been prescribed.  Pt will use this prn with relief.      Intravenous Access:  Implanted Port.    Treatment Conditions:  Lab Results   Component Value Date    HGB 11.8 (L) 10/07/2021    WBC 3.8 (L) 10/07/2021    ANEU 5.1 07/14/2021    ANEUTAUTO 2.4 10/07/2021     10/07/2021      Lab Results   Component Value Date     10/07/2021    POTASSIUM 3.9 10/07/2021    MAG 2.1 10/07/2021    CR 0.79 10/07/2021    ARDEN 8.8 10/07/2021    BILITOTAL 0.6 10/07/2021    ALBUMIN 3.6 10/07/2021    ALT 22 10/07/2021    AST 20 10/07/2021     Results reviewed, labs MET treatment parameters, ok to proceed with treatment.      Post Infusion Assessment:  Patient tolerated infusion without incident.  Blood return noted pre and post infusion.  Site patent and intact, free from redness, edema or discomfort.  No evidence of extravasations.  Prior to discharge: Port is secured in place with tegaderm and flushed with 10cc NS with positive blood return noted.  Continuous home infusion Dosi-Fuser pump connected.    All connectors secured in place and clamps taped open.    Pump started, \"running\" noted on display (CADD): Not Applicable.  Pump Connection double checked with Flor Garcia RN.  Patient instructed to call our clinic or [facility] with any questions or concerns at home.  Patient verbalized understanding.    Patient set up for pump disconnect at home with [facility] on 10/12/21 at 11am.  Appt arranged with MANISH Solorzano             Discharge Plan: "   Patient discharged in stable condition accompanied by: self.  Departure Mode: Ambulatory.  Pt will RTC 10/28/21 C11D1 Pembrolizumab/5FU pump connect.      Vasile Cabello RN

## 2021-10-12 NOTE — PROGRESS NOTES
Skilled nurse visit in the home, for discontinuation of Adrucil, 7958 mg infused over 5 days. Mild mouth sores noted during end of chemo cycle. No other adverse effects observed or reported.

## 2021-10-15 NOTE — PROGRESS NOTES
This is a recent snapshot of the patient's [facility] medical record.  For current drug dose and complete information and questions, call [phone] or In Basket pool, [facility]v home infusion ([id number])  CSN Number:  [id number]

## 2021-10-18 NOTE — PROGRESS NOTES
INTERVENTIONAL RADIOLOGY CLINIC VISIT - ESTABLISHED PATIENT      Date of this visit: 10/18/2021    Junito Wang  is referred by Dr. Steven for treatment recommendations. The patient requires evaluation for diagnosis of metastatic adenocarcinoma of the GE junction with liver metastasis.    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  He reports doing fairly well after his treatment without significant complaint.      As you are aware, Junito Wang is a 61 year old with history of stage IV metastatic gastroesophageal cancer undergoing palliative treatment. He developed stomach discomfort (8/2020) and solid food dysphagia (10/2020). Esophageal biopsy (1/2021) demonstrated moderately differentiated adenocarcinoma, and CT the same month showed disease involving the mediastinal lymph nodes, liver, and sacrum with additional lung nodules. PET and MRI brain imaging (2/2021) demonstrated additional lesions in the left choroid plexus and lymph nodes of the neck and abdomen. He was admitted for acute leg weakness and sensory deficits (3/2021) with MRI showing possible epidural tumor for which he underwent L2-5 laminectomy, lumbar spine radiation and PEG placement. The patient subsequently underwent gamma knife (3/2021) and started chemotherapy (3/2021) with Cisplatin, keytruda and 5 FU. The patient's CT (5/2021) showed overall stable disease with some likely progression in the liver . He had his cisplatin discontinued in September of 2021 and is continuing on keytruda and 5 FU.        PAST MEDICAL HISTORY:   Past Medical History:   Diagnosis Date     Arthritis      Hypertension      Malignant neoplasm of lower third of esophagus (H) 01/26/2021     Obesity (BMI 35.0-39.9) with  comorbidity (H) 2019       PAST SURGICAL HISTORY:   Past Surgical History:   Procedure Laterality Date     ABDOMEN SURGERY  1992    fatty mass removed     APPENDECTOMY  1987     BIOPSY Left 2021    Left pelvic mass bone biopsy     COLONOSCOPY WITH CO2 INSUFFLATION N/A 2021    Procedure: COLONOSCOPY, WITH CO2 INSUFFLATION;  Surgeon: Nain Dominguez MD;  Location: [facility]     COMBINED ESOPHAGOSCOPY, GASTROSCOPY, DUODENOSCOPY (EGD) WITH CO2 INSUFFLATION N/A 2021    Procedure: ESOPHAGOGASTRODUODENOSCOPY, WITH CO2 INSUFFLATION;  Surgeon: Nain Dominguez MD;  Location[facility]     ENDOSCOPIC INSERTION TUBE GASTROSTOMY N/A 2021    Procedure: Percutaneous endoscopic gastrostomy tube placement;  Surgeon: Jose Curiel MD;  Location: [facility]     ESOPHAGOSCOPY, GASTROSCOPY, DUODENOSCOPY (EGD), COMBINED N/A 2021    Procedure: Esophagogastroduodenoscopy, With Biopsy;  Surgeon: Nain Dominguez MD;  Location: [facility]     GI SURGERY      Upper Endoscopy     INSERT PORT VASCULAR ACCESS Right 3/29/2021    Procedure: CHEST INSERTION, VASCULAR ACCESS PORT @0900;  Surgeon: Roderick Prakash MD;  Location: [facility] OR     IR CHEST PORT PLACEMENT > 5 YRS OF AGE  3/29/2021     IR SIRT (SELECTIVE INTERNAL RADIO THERAPY)  2021     IR VISCERAL ANGIOGRAM  2021     IR VISCERAL EMBOLIZATION  9/10/2021     LAMINECTOMY LUMBAR POSTERIOR MICROSCOPIC THREE+ LEVELS N/A 2021    Procedure: Lumbar 2 to Lumbar 5 Laminectomy;  Surgeon: Soy Gusman MD;  Location: [facility]     TONSILLECTOMY         FAMILY HISTORY:   Family History   Problem Relation Age of Onset     Hypertension Mother      Neurologic Disorder Mother         stroke     Hyperlipidemia Mother      Cerebrovascular Disease Mother          of stroke     Thyroid Disease Mother      Heart Disease Father      Heart Failure Father      Coronary Artery Disease Father          of heart attack     Hypertension Brother       Coronary Artery Disease Brother         Quadruple Bi-pass/Quadruple Bi-pass     Hypertension Sister      Coronary Artery Disease Sister         Stent/Stent     Hypertension Brother      Hyperlipidemia Brother      Hypertension Sister      Hyperlipidemia Sister      Cancer No family hx of        SOCIAL HISTORY:   Social History     Tobacco Use     Smoking status: Current Every Day Smoker     Packs/day: 0.50     Years: 44.00     Pack years: 22.00     Types: Cigarettes     Start date: 1977     Smokeless tobacco: Never Used     Tobacco comment: Patient reports currently smoking 1/2 PPD since hospital discharge and wearing a Nicotene Patch   Substance Use Topics     Alcohol use: Not Currently       PROBLEM LIST:   Patient Active Problem List    Diagnosis Date Noted     Metastasis from esophageal cancer (H) 08/06/2021     Priority: Medium     Liver metastases (H) 08/06/2021     Priority: Medium     Bone metastasis (H) 05/12/2021     Priority: Medium     Urine retention 03/03/2021     Priority: Medium     Left leg weakness 03/03/2021     Priority: Medium     Anemia, unspecified type 03/03/2021     Priority: Medium     Leukocytosis, unspecified type 03/03/2021     Priority: Medium     Malignant neoplasm of esophagus, unspecified location (H) 03/03/2021     Priority: Medium     Acute low back pain, unspecified back pain laterality, unspecified whether sciatica present 03/03/2021     Priority: Medium     Malignant neoplasm of lower third of esophagus (H) 03/01/2021     Priority: Medium     Added automatically from request for surgery [id number]       Esophageal dysphagia 03/01/2021     Priority: Medium     Added automatically from request for surgery [id number]       Advanced directives, counseling/discussion 10/30/2017     Priority: Medium     Hypertension goal BP (blood pressure) < 140/90 06/14/2015     Priority: Medium       MEDICATIONS:   Prescription Medications as of 10/18/2021       Rx Number Disp Refills Start End Last  Dispensed Date Next Fill Date Owning Pharmacy    bacitracin 500 UNIT/GM external ointment  113.4 g 2 9/3/2021    [facility] [address] [place]     Sig: Apply topically 2 times daily Until the sore are well healed    Class: E-Prescribe    Route: Topical    Calcium Carb-Cholecalciferol (CALCIUM-VITAMIN D) 600-400 MG-UNIT TABS  60 tablet 3 6/2/2021    [facility] - [address] [place] [address] [place]     Sig: Take 2 tablets by mouth daily    Class: E-Prescribe    Route: Oral    gabapentin (NEURONTIN) 250 MG/5ML solution  470 mL 1 2/8/2021    [facility] - [address] [place], [place] [address]     Sig: Take 5 mLs (250 mg) by mouth At Bedtime    Class: E-Prescribe    Route: Oral    LORazepam (ATIVAN) 0.5 MG tablet  30 tablet 2 3/30/2021        Sig: Take 1 tablet (0.5 mg) by mouth every 4 hours as needed (Anxiety, Nausea/Vomiting or Sleep)    Class: Local Print    Route: Oral    methocarbamol (ROBAXIN) 750 MG tablet  30 tablet 0 3/31/2021    [facility] - [address] [place] [address] [place]     Sig: Take 750 mg by mouth 4 times daily     Class: Historical    Route: Oral    multivitamin CF FORMULA (CHOICEFUL) chewable tablet        [facility] - [address] [place] [address] [place]     Sig: Take 1 tablet by mouth daily    Class: Historical    Route: Oral    omeprazole (FIRST-OMEPRAZOLE) 2 MG/ML SUSP  300 mL 1 3/31/2021    [facility] - [address] [place], [place] [address]     Sig: Take 10 mLs (20 mg) by mouth 2 times daily    Class: E-Prescribe    Route: Oral    ondansetron (ZOFRAN) 4 MG tablet  40 tablet 0 9/10/2021        Sig: Take 1-2 tablets (4-8 mg) by mouth every 6 hours as needed for nausea (vomiting)    Class: Local Print    Route: Oral    oxyCODONE (ROXICODONE) 5 MG tablet  10 tablet 0 9/10/2021        Sig: Take 1-2 tablets (5-10 mg) by mouth every 6 hours as needed for moderate to severe pain    Class:  Local Print    Earliest Fill Date: 9/10/2021    Route: Oral    pantoprazole (PROTONIX) 40 MG EC tablet  30 tablet 0 9/10/2021        Sig: Take 1 tablet (40 mg) by mouth daily    Class: Local Print    Route: Oral    prochlorperazine (COMPAZINE) 10 MG tablet  30 tablet 2 3/30/2021    [id number] [place] - [address] [facility]    Sig: Take 1 tablet (10 mg) by mouth every 6 hours as needed (Nausea/Vomiting)    Class: E-Prescribe    Route: Oral      Clinic-Administered Medications as of 10/18/2021       Dose Frequency Start End    sodium chloride (PF) 0.9% PF flush 74 mL 74 mL ONCE 9/28/2021     Route: Intravenous          ALLERGIES:   Patient has no known allergies.    General: Negative for recent fever.  Skin: Negative for jaundice.  Eyes: Negative for jaundice.  Respiratory: Negative for shortness of breath.  Cardiovascular: Negative for chest pain.  Gastrointestinal: Negative for abdominal pain or swelling, nausea, vomiting, or diarrhea.  Musculoskeletal: Negative for ankle swelling.      Physical Examination:   VITALS:   There were no vitals taken for this visit.  A limited virtual physical exam was performed.  General: Awake, alert, NAD  Resp: Breathing comfortably on room air    Labs:    Lab Results   Component Value Date    HGB 11.5 08/05/2021    HGB 11.4 06/24/2021     Lab Results   Component Value Date     08/05/2021     06/24/2021     Lab Results   Component Value Date    WBC 6.0 08/05/2021    WBC 6.6 06/24/2021       Lab Results   Component Value Date    INR 1.16 03/08/2021       Lab Results   Component Value Date    PROTTOTAL 6.7 08/05/2021    PROTTOTAL 6.8 06/24/2021      Lab Results   Component Value Date    ALBUMIN 3.5 08/05/2021    ALBUMIN 3.6 06/24/2021     Lab Results   Component Value Date    BILITOTAL 0.1 08/05/2021    BILITOTAL 0.3 06/24/2021     No results found for: BILICONJ   Lab Results   Component Value Date    ALKPHOS 124 08/05/2021    ALKPHOS 110  06/24/2021     Lab Results   Component Value Date    AST 13 08/05/2021    AST 16 06/24/2021     Lab Results   Component Value Date    ALT 17 08/05/2021    ALT 20 06/24/2021       Lab Results   Component Value Date    CR 0.73 08/05/2021    CR 0.65 06/24/2021       Diagnostic studies:   I reviewed the MRI from 10/13/21: which demonstrates positive treatment effect with some areas of disease outside of the treatment range.  How much residual disease is present in the treatment zone is unclear.      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  He had a positive treatment response but the depth of response is unclear at this time.      ECOG performance status: 1    Plan   We will plan to see him back in 2 months with another MRI.    It was a pleasure seeing the patient.     I, Dr Rosales Meléndez, was present with the fellow during the history and exam. I discussed the case with the fellow and agree with the findings as documented in the assessment and plan.    A total of 15 minutes was spent in care for the patient, of which >50% was spent in counseling and co-ordination of care.      CC  Patient Care Team:  Negra Kim CNP as PCP - General  Roderick Sánchez RN as Specialty Care Coordinator (Oncology)  Jose Steven MD as MD (Hematology & Oncology)  Emilia Qureshi MD as MD (Radiation Oncology)  Negra Kim CNP as Assigned PCP  Whit Hatfield CNP as Nurse Practitioner (Urology)  Trisha Garcia RN as Registered Nurse  Emilia Qureshi MD as Assigned Cancer Care Provider  Chas Baptiste MD as Assigned Neuroscience Provider  Angelica Lloyd PA-C as Assigned Surgical Provider  JOSE STEVEN

## 2021-10-18 NOTE — LETTER
10/18/2021         RE: Junito Wang  [id number] [facility] [id number]        Dear Colleague,    Thank you for referring your patient, Junito Wang, to the [facility]. Please see a copy of my visit note below.          INTERVENTIONAL RADIOLOGY CLINIC VISIT - ESTABLISHED PATIENT      Date of this visit: 10/18/2021    Junito Wang  is referred by Dr. Steven for treatment recommendations. The patient requires evaluation for diagnosis of metastatic adenocarcinoma of the GE junction with liver metastasis.    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  He reports doing fairly well after his treatment without significant complaint.      As you are aware, Junito Wang is a 61 year old with history of stage IV metastatic gastroesophageal cancer undergoing palliative treatment. He developed stomach discomfort (8/2020) and solid food dysphagia (10/2020). Esophageal biopsy (1/2021) demonstrated moderately differentiated adenocarcinoma, and CT the same month showed disease involving the mediastinal lymph nodes, liver, and sacrum with additional lung nodules. PET and MRI brain imaging (2/2021) demonstrated additional lesions in the left choroid plexus and lymph nodes of the neck and abdomen. He was admitted for acute leg weakness and sensory deficits (3/2021) with MRI showing possible epidural tumor for which he underwent L2-5 laminectomy, lumbar spine radiation and PEG placement. The patient subsequently underwent gamma knife (3/2021) and started chemotherapy (3/2021) with Cisplatin, keytruda and 5 FU. The patient's CT (5/2021) showed overall stable disease with some likely progression in the liver . He had his cisplatin discontinued in  September of 2021 and is continuing on keytruda and 5 FU.        PAST MEDICAL HISTORY:   Past Medical History:   Diagnosis Date     Arthritis      Hypertension      Malignant neoplasm of lower third of esophagus (H) 01/26/2021     Obesity (BMI 35.0-39.9) with comorbidity (H) 1/31/2019       PAST SURGICAL HISTORY:   Past Surgical History:   Procedure Laterality Date     ABDOMEN SURGERY  1992    fatty mass removed     APPENDECTOMY  1987     BIOPSY Left 02/26/2021    Left pelvic mass bone biopsy     COLONOSCOPY WITH CO2 INSUFFLATION N/A 01/26/2021    Procedure: COLONOSCOPY, WITH CO2 INSUFFLATION;  Surgeon: Nain Dominguez MD;  Location: [facility]     COMBINED ESOPHAGOSCOPY, GASTROSCOPY, DUODENOSCOPY (EGD) WITH CO2 INSUFFLATION N/A 01/26/2021    Procedure: ESOPHAGOGASTRODUODENOSCOPY, WITH CO2 INSUFFLATION;  Surgeon: Nain Dominguez MD;  Location: [facility]     ENDOSCOPIC INSERTION TUBE GASTROSTOMY N/A 03/05/2021    Procedure: Percutaneous endoscopic gastrostomy tube placement;  Surgeon: Jose Curiel MD;  Location: [facility]     ESOPHAGOSCOPY, GASTROSCOPY, DUODENOSCOPY (EGD), COMBINED N/A 01/26/2021    Procedure: Esophagogastroduodenoscopy, With Biopsy;  Surgeon: Nain Dominguez MD;  Location: [facility]     GI SURGERY      Upper Endoscopy     INSERT PORT VASCULAR ACCESS Right 3/29/2021    Procedure: CHEST INSERTION, VASCULAR ACCESS PORT @0900;  Surgeon: Roderick Prakash MD;  Location: [facility] OR     IR CHEST PORT PLACEMENT > 5 YRS OF AGE  3/29/2021     IR SIRT (SELECTIVE INTERNAL RADIO THERAPY)  8/25/2021     IR VISCERAL ANGIOGRAM  8/25/2021     IR VISCERAL EMBOLIZATION  9/10/2021     LAMINECTOMY LUMBAR POSTERIOR MICROSCOPIC THREE+ LEVELS N/A 03/02/2021    Procedure: Lumbar 2 to Lumbar 5 Laminectomy;  Surgeon: Soy Gusman MD;  Location: [facility]     TONSILLECTOMY         FAMILY HISTORY:   Family History   Problem Relation Age of Onset     Hypertension Mother      Neurologic Disorder Mother          stroke     Hyperlipidemia Mother      Cerebrovascular Disease Mother          of stroke     Thyroid Disease Mother      Heart Disease Father      Heart Failure Father      Coronary Artery Disease Father          of heart attack     Hypertension Brother      Coronary Artery Disease Brother         Quadruple Bi-pass/Quadruple Bi-pass     Hypertension Sister      Coronary Artery Disease Sister         Stent/Stent     Hypertension Brother      Hyperlipidemia Brother      Hypertension Sister      Hyperlipidemia Sister      Cancer No family hx of        SOCIAL HISTORY:   Social History     Tobacco Use     Smoking status: Current Every Day Smoker     Packs/day: 0.50     Years: 44.00     Pack years: 22.00     Types: Cigarettes     Start date:      Smokeless tobacco: Never Used     Tobacco comment: Patient reports currently smoking 1/2 PPD since hospital discharge and wearing a Nicotene Patch   Substance Use Topics     Alcohol use: Not Currently       PROBLEM LIST:   Patient Active Problem List    Diagnosis Date Noted     Metastasis from esophageal cancer (H) 2021     Priority: Medium     Liver metastases (H) 2021     Priority: Medium     Bone metastasis (H) 2021     Priority: Medium     Urine retention 2021     Priority: Medium     Left leg weakness 2021     Priority: Medium     Anemia, unspecified type 2021     Priority: Medium     Leukocytosis, unspecified type 2021     Priority: Medium     Malignant neoplasm of esophagus, unspecified location (H) 2021     Priority: Medium     Acute low back pain, unspecified back pain laterality, unspecified whether sciatica present 2021     Priority: Medium     Malignant neoplasm of lower third of esophagus (H) 2021     Priority: Medium     Added automatically from request for surgery [id number]       Esophageal dysphagia 2021     Priority: Medium     Added automatically from request for surgery [id number]        Advanced directives, counseling/discussion 10/30/2017     Priority: Medium     Hypertension goal BP (blood pressure) < 140/90 06/14/2015     Priority: Medium       MEDICATIONS:   Prescription Medications as of 10/18/2021       Rx Number Disp Refills Start End Last Dispensed Date Next Fill Date Owning Pharmacy    bacitracin 500 UNIT/GM external ointment  113.4 g 2 9/3/2021    [facility] #[place] - [address]     Sig: Apply topically 2 times daily Until the sore are well healed    Class: E-Prescribe    Route: Topical    Calcium Carb-Cholecalciferol (CALCIUM-VITAMIN D) 600-400 MG-UNIT TABS  60 tablet 3 6/2/2021    [facility] #[place] - [address]     Sig: Take 2 tablets by mouth daily    Class: E-Prescribe    Route: Oral    gabapentin (NEURONTIN) 250 MG/5ML solution  470 mL 1 2/8/2021    [facility] [place] - [address]     Sig: Take 5 mLs (250 mg) by mouth At Bedtime    Class: E-Prescribe    Route: Oral    LORazepam (ATIVAN) 0.5 MG tablet  30 tablet 2 3/30/2021        Sig: Take 1 tablet (0.5 mg) by mouth every 4 hours as needed (Anxiety, Nausea/Vomiting or Sleep)    Class: Local Print    Route: Oral    methocarbamol (ROBAXIN) 750 MG tablet  30 tablet 0 3/31/2021    [facility] #[place] - [address] [place], [place] [address]     Sig: Take 750 mg by mouth 4 times daily     Class: Historical    Route: Oral    multivitamin CF FORMULA (CHOICEFUL) chewable tablet        [facility] - [address]     Sig: Take 1 tablet by mouth daily    Class: Historical    Route: Oral    omeprazole (FIRST-OMEPRAZOLE) 2 MG/ML SUSP  300 mL 1 3/31/2021    [facility] [place] - [address]     Sig: Take 10 mLs (20 mg) by mouth 2 times daily    Class: E-Prescribe    Route: Oral    ondansetron (ZOFRAN) 4 MG tablet  40 tablet 0 9/10/2021        Sig: Take 1-2 tablets (4-8  mg) by mouth every 6 hours as needed for nausea (vomiting)    Class: Local Print    Route: Oral    oxyCODONE (ROXICODONE) 5 MG tablet  10 tablet 0 9/10/2021        Sig: Take 1-2 tablets (5-10 mg) by mouth every 6 hours as needed for moderate to severe pain    Class: Local Print    Earliest Fill Date: 9/10/2021    Route: Oral    pantoprazole (PROTONIX) 40 MG EC tablet  30 tablet 0 9/10/2021        Sig: Take 1 tablet (40 mg) by mouth daily    Class: Local Print    Route: Oral    prochlorperazine (COMPAZINE) 10 MG tablet  30 tablet 2 3/30/2021    [id number] - [place] [facility] [place] - [address] [facility]    Sig: Take 1 tablet (10 mg) by mouth every 6 hours as needed (Nausea/Vomiting)    Class: E-Prescribe    Route: Oral      Clinic-Administered Medications as of 10/18/2021       Dose Frequency Start End    sodium chloride (PF) 0.9% PF flush 74 mL 74 mL ONCE 9/28/2021     Route: Intravenous          ALLERGIES:   Patient has no known allergies.    General: Negative for recent fever.  Skin: Negative for jaundice.  Eyes: Negative for jaundice.  Respiratory: Negative for shortness of breath.  Cardiovascular: Negative for chest pain.  Gastrointestinal: Negative for abdominal pain or swelling, nausea, vomiting, or diarrhea.  Musculoskeletal: Negative for ankle swelling.      Physical Examination:   VITALS:   There were no vitals taken for this visit.  A limited virtual physical exam was performed.  General: Awake, alert, NAD  Resp: Breathing comfortably on room air    Labs:    Lab Results   Component Value Date    HGB 11.5 08/05/2021    HGB 11.4 06/24/2021     Lab Results   Component Value Date     08/05/2021     06/24/2021     Lab Results   Component Value Date    WBC 6.0 08/05/2021    WBC 6.6 06/24/2021       Lab Results   Component Value Date    INR 1.16 03/08/2021       Lab Results   Component Value Date    PROTTOTAL 6.7 08/05/2021    PROTTOTAL 6.8 06/24/2021      Lab Results   Component Value Date     ALBUMIN 3.5 08/05/2021    ALBUMIN 3.6 06/24/2021     Lab Results   Component Value Date    BILITOTAL 0.1 08/05/2021    BILITOTAL 0.3 06/24/2021     No results found for: BILICONJ   Lab Results   Component Value Date    ALKPHOS 124 08/05/2021    ALKPHOS 110 06/24/2021     Lab Results   Component Value Date    AST 13 08/05/2021    AST 16 06/24/2021     Lab Results   Component Value Date    ALT 17 08/05/2021    ALT 20 06/24/2021       Lab Results   Component Value Date    CR 0.73 08/05/2021    CR 0.65 06/24/2021       Diagnostic studies:   I reviewed the MRI from 10/13/21: which demonstrates positive treatment effect with some areas of disease outside of the treatment range.  How much residual disease is present in the treatment zone is unclear.      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  He had a positive treatment response but the depth of response is unclear at this time.      ECOG performance status: 1    Plan   We will plan to see him back in 2 months with another MRI.    It was a pleasure seeing the patient.     I, Dr Rosales Meléndez, was present with the fellow during the history and exam. I discussed the case with the fellow and agree with the findings as documented in the assessment and plan.    A total of 15 minutes was spent in care for the patient, of which >50% was spent in counseling and co-ordination of care.      CC  Patient Care Team:  Negra Kim CNP as PCP - General  Roderick Sánchez RN as Specialty Care Coordinator (Oncology)  Jose Steven MD as MD (Hematology & Oncology)  Emilia Qureshi MD as MD (Radiation Oncology)  Negra Kim CNP as Assigned PCP  Whit Hatfield CNP as Nurse Practitioner (Urology)  Trisha Garcia RN as Registered Nurse  Emilia Qureshi MD as Assigned Cancer Care  Provider  Chas Baptiste MD as Assigned Neuroscience Provider  Angelica Lloyd PA-C as Assigned Surgical Provider  MICHELLE MCCALL                    Again, thank you for allowing me to participate in the care of your patient.        Sincerely,        Rosales Meléndez MD

## 2021-10-26 NOTE — PROGRESS NOTES
Junito is a 61 year old who is being evaluated via a billable video visit.      How would you like to obtain your AVS? [employer]  If the video visit is dropped, the invitation should be resent by: Send to e-mail at: [email or web address]  Will anyone else be joining your video visit? No        Video-Visit Details    Type of service:  Video Visit    Video Start Time: 7:08 AM    Video End Time:7:22 AM    Originating Location (pt. 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    Treatment intent- Palliative    Oncology HPI:   August 2020- stomach discomfort, belching   October 2020- progressive dysphagia with solids   1/26/21- distal esophageal biopsy shows Moderately differentiated adenocarcinoma; MMR normal expression, PDL1 expression is low with TPS 2%, CPS 5-10, Her2 is pending  1/27/21- CT CAP- Enlarged  2 cm subcarinal lymph node and 1.4 cm short axis right subcarinal lymph node, focal thickening of the superior wall along the right side of the mid esophagus. Numerous small pulmonary nodules, 3 mm nodule in the superior segment of the left lower lobe, 3 mm nodule in the anterior aspect of the right upper lobe , 4 mm nodule in the medial aspect of the right upper lobe and 4 mm right lower lobe nodule.  Hypoattenuating foci in the liver, 1.6 cm hypoattenuating/hypoenhancing lesion in hepatic segment 8 and 1.4 cm lesion in hepatic segment 5 , indeterminate, likely metastatic.  The prostate gland is mildly enlarged measuring 5.3 cm in transverse diameter. Multiple prominent but not significantly enlarged retroperitoneal lymph nodes, indeterminate, could be reactive. 4.4 x 4.3 cm lytic lesion centered in the posterior aspect of the left sacral ala (series 3 image 243), 4.7 x 4.0 x 5.6 cm (axial and CC dimensions, respectively) hypoattenuating lesion in the subcutaneous tissue of the anterior chest wall just anterior to the mediastinum, indeterminate, could represent sebaceous cyst.  2/4/21- Saw Dr. Bourgeois ordered PET/CT  3/2/21-3/10/21- Admission to [facility]- <24h of inability to ambulate with acute onset left leg weakness/urinary retention with perianal sensory deficits, MRI showed extensive epidural signal change suggestive of tumor vs. Hemorrhage.Pt underwent emergent surgery- L2-L5 laminectomy and decompression . PEG tube placement 3/5/2021.   3/17/21-3/23/21: Radiation to LS spine  3/24/21: gamma knife (2000cGy in 5 fractions)  3/29/21- port placement  3/31/21: Cycle 1 Cisplatin+keytruda+5fu   4/21/21: Cycle 2 Cisplatin+keytruda+5fu  5/11/21- Ct CAP- Overall stable disease- mixed response- CO in 2 liver lesions while, stable disease to borderline progression in 1 liver lesions  5/12/21- Cycle 3 Cisplatin+keytruda+5fu  6/2/21- Cycle 4 Cisplatin+keytruda+5fu  6/21/21- CT CAP- Stable mild circumferential wall thickening of the distal esophagus (series 3, image 99) with small amount of fluid in the upper and mid esophagus. No lymphadenopathy. Stable to slight increased size of hepatic metastases. For example a 3.1 x 2.9 cm lesion in the left lobe (series 3, image 123) previously measured 2.9 x 2.6 cm, and a 2.7 x 2.9 cm peripheral right hepatic lobe metastasis previously measured 3.0 x 2.2 cm. A 1.0 cm lesion in the caudate  lobe (series 3, image 138) is more prominent today/new. Stable small gastrohepatic ligament lymph nodes. No new or enlarging lymph nodes in the abdomen and pelvis. No ascites.Stable 5.1 x 4.6 cm left sacral mass. Stable cystic 4.9 x 3.5 cm subcutaneous lesion overlying the midsternum.  6/24/21- Cycle 5 Cisplatin+keytruda+5fu  7/14/21- Cycle 6 Cisplatin+keytruda+5fu  8/3/21- Ct CAP- Few small pulmonary nodules are stable. Stable mild circumferential wall thickening of the distal esophagus (series 3, image 97).  Increased size of hepatic metastases. For example a 3.5  x 3.5 cm left lobe metastasis (series 3, image 117) previously measured 2.9 x 3.1 cm, a 3.5 x 2.7 cm subcapsular right hepatic lobe metastasis (series 3, image 158) previously measured 2.7 x 2.9 cm and  a 1.7 x 1.5 cm lesion in the left lobe adjacent to the caudate (series 3, image 124) previously measured 1.0 cm.  Stable 5.2 x 4.9 cm lytic lesion in the left sacrum previously measuring 5.2 x 4.6 cm (3, image 238). Stable sclerotic lesion in the anterior aspect of the T4 vertebral body measuring 1.3 cm (series 3, image 39) and sclerosis of the posterior left third rib.. No new lesions. Stable 5.2 cm cystic subcutaneous lesion overlying the sternum  8/5, 8/26, 9/16- Pembrolizumab and 5-FU  9/10/21- Y90 delivery in the left hepatic lobe and segment 5 lesions.   9/13/21- Brain MRI: no progression or distal metatasis  9/28/21- Ct CAP- Multiple left and right hepatic nodular masses identified consistent with metastatic disease. Several appear to be new or are larger worrisome for progression. One lesion is slightly smaller along  the anterior aspect of hepatic segment 2. Stable destructive sacral bone lesion and sclerosis at T4 and left third rib. Stable distal esophageal wall thickening. Stable but indeterminant soft tissue surrounding portions of the left gastric artery at the upper midabdomen.    Interval history:   -Since Y-90 he notes he is more gassy,  with lighter brown colored stool  -Stools are harder and very formed but he is still able to pass  -Some R abdominal discomfort, ~1x daily, not severe or very noticeable  -Feels he is not drinking enough water  -No new side effects of keytruda/5FU therapy  -Independent in ADLs  -No cough or SOB   -No LE edema  -No fever    Comprehensive ROS was unremarkable except as detailed above.     Current Outpatient Medications   Medication Sig Dispense Refill     bacitracin 500 UNIT/GM external ointment Apply topically 2 times daily Until the sore are well healed 113.4 g 2     Calcium Carb-Cholecalciferol (CALCIUM-VITAMIN D) 600-400 MG-UNIT TABS Take 2 tablets by mouth daily 60 tablet 3     gabapentin (NEURONTIN) 250 MG/5ML solution Take 5 mLs (250 mg) by mouth At Bedtime (Patient not taking: Reported on 10/7/2021) 470 mL 1     LORazepam (ATIVAN) 0.5 MG tablet Take 1 tablet (0.5 mg) by mouth every 4 hours as needed (Anxiety, Nausea/Vomiting or Sleep) (Patient not taking: Reported on 9/29/2021) 30 tablet 2     methocarbamol (ROBAXIN) 750 MG tablet Take 750 mg by mouth 4 times daily  (Patient not taking: Reported on 9/29/2021) 30 tablet 0     multivitamin CF FORMULA (CHOICEFUL) chewable tablet Take 1 tablet by mouth daily       omeprazole (FIRST-OMEPRAZOLE) 2 MG/ML SUSP Take 10 mLs (20 mg) by mouth 2 times daily (Patient not taking: Reported on 9/29/2021) 300 mL 1     ondansetron (ZOFRAN) 4 MG tablet Take 1-2 tablets (4-8 mg) by mouth every 6 hours as needed for nausea (vomiting) (Patient not taking: Reported on 10/7/2021) 40 tablet 0     oxyCODONE (ROXICODONE) 5 MG tablet Take 1-2 tablets (5-10 mg) by mouth every 6 hours as needed for moderate to severe pain (Patient not taking: Reported on 10/7/2021) 10 tablet 0     pantoprazole (PROTONIX) 40 MG EC tablet Take 1 tablet (40 mg) by mouth daily (Patient not taking: Reported on 10/18/2021) 30 tablet 0     prochlorperazine (COMPAZINE) 10 MG tablet Take 1 tablet (10 mg) by mouth  every 6 hours as needed (Nausea/Vomiting) (Patient not taking: Reported on 9/29/2021) 30 tablet 2      No Known Allergies    Exam:  There were no vitals taken for this visit.  Wt Readings from Last 4 Encounters:   10/07/21 77 kg (169 lb 11.2 oz)   09/29/21 77.1 kg (170 lb)   09/16/21 76.7 kg (169 lb)   09/13/21 77.6 kg (171 lb)       Video physical exam  General: Patient appears well in no acute distress.   Skin: No visualized rash or lesions on visualized skin  Eyes: EOMI, no erythema, sclera icterus or discharge noted  Resp: Appears to be breathing comfortably without accessory muscle usage, speaking in full sentences, no cough  MSK: Appears to have normal range of motion based on visualized movements  Neurologic: No apparent tremors, facial movements symmetric  Psych: affect good, alert and oriented    The rest of a comprehensive physical examination is deferred due to PHE (public health emergency) video restrictions    Labs: will be drawn later this morning at infusion     Imaging: Report of MRI abdomen reviewed by me today     Impression/plan:   # Stage IV esophageal adenocarcinoma   # Progressive liver disease  # ECOG PS 0-1  Started chemoimmunotherpay with Cisplatin+5Fu+pembrolizumab based on [id number] trial. The trial demonstrated a statistically significant improvement in OS and PFS for patients randomized to pembrolizumab with chemotherapy. Median OS was 12.4 months (95% CI: 10.5, 14.0) for the pembrolizumab arm versus 9.8 months (95% CI: 8.8, 10.8) for the chemotherapy arm (HR 0.73; 95% CI: 0.62, 0.86; p<0.0001). Median PFS was 6.3  (95% CI: 6.2, 6.9) and 5.8 months (95% CI: 5.0, 6.0), respectively (HR 0.65; 95% CI: 0.55, 0.76; p<0.0001).     Restaging scan after 2 cycles showed overall stable disease with - mixed response- VA in esophgaus primary, and 2 liver lesions while, stable disease in spine, and stable to borderline progression in 1 liver lesions. Scan after 4 cycles showing stable disease in  esophagus and LN and a some liver lesion. 2 lesions in liver are slightly bigger, one is more prominent than before. CT CAP after 6 cycles done on 8/3/21 demonstrated continued interval increase in the size of 2 liver lesions, stable osseous lesions and continued inconspicuity of the primary esophageal mass. Given the liver-isolated progressive disease, we recommended liver directed local therapy and Y-90 was complete 9/10    Chemotherapy wise, he completed 6 cycles of induction cisplatin, 5-FU and pembrolizumab thus was swithced to 5-FLU and pembrolizumab maintenance (i.e. drop cisplatin) with cycle 7 for a total of 2 years or until progression/intolerable toxicities.     Recent CT CAP showing ongoing stable disease outside of liver, however, in the liver the radiologist report suggests new lesion and enlargement. liver MRI in mid October with positive response per Dr. Meléndez. With plans for repeat MRI in December, scheduled for 12/15.    Confirmed with Dr. Steven, continue pemrbo+5 FLU for now. Proceed today pending labs    - In the event of eventual extrahepatic disease progression, options for next lines of treatment would include ramucirumab plus paclitaxel based on the Rumely trial. Other options include single-agent docetaxel, pacltixael or irinotecan. Lonsurf could be pursued in the third line.    Overall plan  Proceed today with infusion pending labs  RTC with me prior to next infusion  CT CAP end of November and visit with Dr. Steven  MRI abdomen and brain 12/15    #lighter colored stool  #very mild abdominal discomfort  Labs today to assess biliary obstruction     #Brain mets   Follows with Dr. Velez. S/p GK 3/24. MRI Sept 13 with no new mets. Next in 12/2021.     #Mucositis, improved  2/2 F5U. Continue Aquaphor and salt/soda rinses as needed. Avoid irritating foods like spicy/acidic items when mucositis is worse.      #Bone mets  Bone biopsy of left pelvic mass 2/26/21 confirmed metastasis.  Finished radiation 3/23 to lumbosacral spine.   While he has not seen dentist in 4 yrs, discusseed small risk of osteonecrosis, 3-5% risk, he does not have any ongoing dental issues and has no tooth pain or caries.  -Zometa every 6 weeks, last given 9/16, give today  -Continue Calcium and Vitamin D.     #Dypshagia, malnutrition  PEG tube placed in OR (3/5) for nutritional support. Now with good PO intake. Follows with RD. Continue PO intake as tolerated. Previously declined SLP follow up, can revisit as needed though dysphagia has improved with treatment.   --Of note, consideration given to esophageal stent vs XRT for esophageal debulking to address dysphagia inpatient in Feb/March 2021; however given likely improvement with chemo-immunotherapy, as well as risks of worsening cytopenias, elected not to.      #Cauda Equina due to tumor compression s/p L2-L5 laminectomy and decompression   Followed by neurosurgery (now prn) with improved exam after surgery. He is now ambulating with or without a walker, strength nearly back to baseline. He is aware of his activity and lifting restrictions. Has some L radiculopathy that he uses robaxan prn.      #Constipation  Dulcolax, miralax, or MOM prn. Continue to avoid suppository while on chemo.     #Urinary retention  Andrews out with no new issues, saw urology with q6 mo follow up rec.    50 minutes spent on the date of the encounter doing chart review, review of outside records, review of test results, interpretation of tests, patient visit, documentation and discussion with other provider(s)      Jaky Pugh CNP on 10/28/2021 at 7:58 AM

## 2021-10-28 NOTE — PROGRESS NOTES
Power Port needle left for access for treatment, Sterile technique performed and maintained. Patient tolerated procedure well. Tubes drawn in rainbow order: red, green, purple. Transparent dressing placed with use of tehaderm.    Vanessa Oliveira RN  [facility]

## 2021-10-28 NOTE — PROGRESS NOTES
"Infusion Nursing Note:  Junito Wang presents today for C11,D1 of Keytruda and 5FU pump disconnect  - DUE for Zometa today.  Patient seen by provider today: No   present during visit today: Not Applicable.    Note: Pt states he is doing well, has had some more gassy episodes and would like to have Pepcid today - denies any problems with is last infusion.      Intravenous Access:  Implanted Port.    Treatment Conditions:  Lab Results   Component Value Date    HGB 12.4 (L) 10/28/2021    WBC 4.5 10/28/2021    ANEU 5.1 07/14/2021    ANEUTAUTO 2.6 10/28/2021     10/28/2021      Lab Results   Component Value Date     10/28/2021    POTASSIUM 4.3 10/28/2021    MAG 2.3 10/28/2021    CR 0.70 10/28/2021    ARDEN 9.1 10/28/2021    BILITOTAL 0.4 10/28/2021    ALBUMIN 3.7 10/28/2021    ALT 20 10/28/2021    AST 20 10/28/2021     Results reviewed, labs MET treatment parameters, ok to proceed with treatment.      Post Infusion Assessment:  Patient tolerated infusion without incident.  Blood return noted pre and post infusion.  Site patent and intact, free from redness, edema or discomfort.  No evidence of extravasations.    Prior to discharge: Port is secured in place with tegaderm and flushed with 10cc NS with positive blood return noted.  Continuous home infusion Dosi-Fuser pump connected.    All connectors secured in place and clamps taped open.    Pump started, \"running\" noted on display (CADD): Not Applicable.  Pump Connection double checked with Jennifer MAE RN.  Patient instructed to call our clinic or [facility] with any questions or concerns at home.  Patient verbalized understanding.    Patient set up for pump disconnect at home with [facility] on Tuesday 11/01/2021 at 11:15am..      Discharge Plan:   Return on 11/18/2021 with Jaky Pugh and infusion.  Discharge instructions reviewed with: Patient.  Patient and/or family verbalized understanding of discharge instructions and " all questions answered.  Patient discharged in stable condition accompanied by: self.  Departure Mode: Ambulatory.      Heike Nielson RN

## 2021-10-28 NOTE — LETTER
10/28/2021         RE: Junito Wang  [id number] [facility] [id number]        Dear Colleague,    Thank you for referring your patient, Junito Wang, to the [facility]. Please see a copy of my visit note below.    Junito is a 61 year old who is being evaluated via a billable video visit.      How would you like to obtain your AVS? MyChart  If the video visit is dropped, the invitation should be resent by: Send to e-mail at: [email or web address]  Will anyone else be joining your video visit? No        Video-Visit Details    Type of service:  Video Visit    Video Start Time: 7:08 AM    Video End Time:7:22 AM    Originating Location (pt. 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    Treatment intent- Palliative    Oncology HPI:   August 2020- stomach discomfort, belching   October 2020- progressive dysphagia with solids   1/26/21- distal esophageal biopsy shows Moderately differentiated adenocarcinoma; MMR normal expression, PDL1 expression is low with TPS 2%, CPS 5-10, Her2 is pending  1/27/21- CT CAP- Enlarged  2 cm subcarinal lymph node and 1.4 cm short axis right subcarinal lymph node, focal thickening of the superior wall along the right side of the mid  esophagus. Numerous small pulmonary nodules, 3 mm nodule in the superior segment of the left lower lobe, 3 mm nodule in the anterior aspect of the right upper lobe , 4 mm nodule in the medial aspect of the right upper lobe and 4 mm right lower lobe nodule. Hypoattenuating foci in the liver, 1.6 cm hypoattenuating/hypoenhancing lesion in hepatic segment 8 and 1.4 cm lesion in hepatic segment 5 , indeterminate, likely metastatic.  The prostate gland is mildly enlarged measuring 5.3 cm in transverse diameter. Multiple prominent but not significantly enlarged retroperitoneal lymph nodes, indeterminate, could be reactive. 4.4 x 4.3 cm lytic lesion centered in the posterior aspect of the left sacral ala (series 3 image 243), 4.7 x 4.0 x 5.6 cm (axial and CC dimensions, respectively) hypoattenuating lesion in the subcutaneous tissue of the anterior chest wall just anterior to the mediastinum, indeterminate, could represent sebaceous cyst.  2/4/21- Saw Dr. Bourgeois ordered PET/CT  3/2/21-3/10/21- Admission to [facility]- <24h of inability to ambulate with acute onset left leg weakness/urinary retention with perianal sensory deficits, MRI showed extensive epidural signal change suggestive of tumor vs. Hemorrhage.Pt underwent emergent surgery- L2-L5 laminectomy and decompression . PEG tube placement 3/5/2021.   3/17/21-3/23/21: Radiation to LS spine  3/24/21: gamma knife (2000cGy in 5 fractions)  3/29/21- port placement  3/31/21: Cycle 1 Cisplatin+keytruda+5fu   4/21/21: Cycle 2 Cisplatin+keytruda+5fu  5/11/21- Ct CAP- Overall stable disease- mixed response- PA in 2 liver lesions while, stable disease to borderline progression in 1 liver lesions  5/12/21- Cycle 3 Cisplatin+keytruda+5fu  6/2/21- Cycle 4 Cisplatin+keytruda+5fu  6/21/21- CT CAP- Stable mild circumferential wall thickening of the distal esophagus (series 3, image 99) with small amount of fluid in the upper and mid esophagus. No lymphadenopathy. Stable to slight increased  size of hepatic metastases. For example a 3.1 x 2.9 cm lesion in the left lobe (series 3, image 123) previously measured 2.9 x 2.6 cm, and a 2.7 x 2.9 cm peripheral right hepatic lobe metastasis previously measured 3.0 x 2.2 cm. A 1.0 cm lesion in the caudate lobe (series 3, image 138) is more prominent today/new. Stable small gastrohepatic ligament lymph nodes. No new or enlarging lymph nodes in the abdomen and pelvis. No ascites.Stable 5.1 x 4.6 cm left sacral mass. Stable cystic 4.9 x 3.5 cm subcutaneous lesion overlying the midsternum.  6/24/21- Cycle 5 Cisplatin+keytruda+5fu  7/14/21- Cycle 6 Cisplatin+keytruda+5fu  8/3/21- Ct CAP- Few small pulmonary nodules are stable. Stable mild circumferential wall thickening of the distal esophagus (series 3, image 97).  Increased size of hepatic metastases. For example a 3.5  x 3.5 cm left lobe metastasis (series 3, image 117) previously measured 2.9 x 3.1 cm, a 3.5 x 2.7 cm subcapsular right hepatic lobe metastasis (series 3, image 158) previously measured 2.7 x 2.9 cm and  a 1.7 x 1.5 cm lesion in the left lobe adjacent to the caudate (series 3, image 124) previously measured 1.0 cm.  Stable 5.2 x 4.9 cm lytic lesion in the left sacrum previously measuring 5.2 x 4.6 cm (3, image 238). Stable sclerotic lesion in the anterior aspect of the T4 vertebral body measuring 1.3 cm (series 3, image 39) and sclerosis of the posterior left third rib.. No new lesions. Stable 5.2 cm cystic subcutaneous lesion overlying the sternum  8/5, 8/26, 9/16- Pembrolizumab and 5-FU  9/10/21- Y90 delivery in the left hepatic lobe and segment 5 lesions.   9/13/21- Brain MRI: no progression or distal metatasis  9/28/21- Ct CAP- Multiple left and right hepatic nodular masses identified consistent with metastatic disease. Several appear to be new or are larger worrisome for progression. One lesion is slightly smaller along  the anterior aspect of hepatic segment 2. Stable destructive sacral bone  lesion and sclerosis at T4 and left third rib. Stable distal esophageal wall thickening. Stable but indeterminant soft tissue surrounding portions of the left gastric artery at the upper midabdomen.    Interval history:   -Since Y-90 he notes he is more gassy, with lighter brown colored stool  -Stools are harder and very formed but he is still able to pass  -Some R abdominal discomfort, ~1x daily, not severe or very noticeable  -Feels he is not drinking enough water  -No new side effects of keytruda/5FU therapy  -Independent in ADLs  -No cough or SOB   -No LE edema  -No fever    Comprehensive ROS was unremarkable except as detailed above.     Current Outpatient Medications   Medication Sig Dispense Refill     bacitracin 500 UNIT/GM external ointment Apply topically 2 times daily Until the sore are well healed 113.4 g 2     Calcium Carb-Cholecalciferol (CALCIUM-VITAMIN D) 600-400 MG-UNIT TABS Take 2 tablets by mouth daily 60 tablet 3     gabapentin (NEURONTIN) 250 MG/5ML solution Take 5 mLs (250 mg) by mouth At Bedtime (Patient not taking: Reported on 10/7/2021) 470 mL 1     LORazepam (ATIVAN) 0.5 MG tablet Take 1 tablet (0.5 mg) by mouth every 4 hours as needed (Anxiety, Nausea/Vomiting or Sleep) (Patient not taking: Reported on 9/29/2021) 30 tablet 2     methocarbamol (ROBAXIN) 750 MG tablet Take 750 mg by mouth 4 times daily  (Patient not taking: Reported on 9/29/2021) 30 tablet 0     multivitamin CF FORMULA (CHOICEFUL) chewable tablet Take 1 tablet by mouth daily       omeprazole (FIRST-OMEPRAZOLE) 2 MG/ML SUSP Take 10 mLs (20 mg) by mouth 2 times daily (Patient not taking: Reported on 9/29/2021) 300 mL 1     ondansetron (ZOFRAN) 4 MG tablet Take 1-2 tablets (4-8 mg) by mouth every 6 hours as needed for nausea (vomiting) (Patient not taking: Reported on 10/7/2021) 40 tablet 0     oxyCODONE (ROXICODONE) 5 MG tablet Take 1-2 tablets (5-10 mg) by mouth every 6 hours as needed for moderate to severe pain (Patient not  taking: Reported on 10/7/2021) 10 tablet 0     pantoprazole (PROTONIX) 40 MG EC tablet Take 1 tablet (40 mg) by mouth daily (Patient not taking: Reported on 10/18/2021) 30 tablet 0     prochlorperazine (COMPAZINE) 10 MG tablet Take 1 tablet (10 mg) by mouth every 6 hours as needed (Nausea/Vomiting) (Patient not taking: Reported on 9/29/2021) 30 tablet 2      No Known Allergies    Exam:  There were no vitals taken for this visit.  Wt Readings from Last 4 Encounters:   10/07/21 77 kg (169 lb 11.2 oz)   09/29/21 77.1 kg (170 lb)   09/16/21 76.7 kg (169 lb)   09/13/21 77.6 kg (171 lb)       Video physical exam  General: Patient appears well in no acute distress.   Skin: No visualized rash or lesions on visualized skin  Eyes: EOMI, no erythema, sclera icterus or discharge noted  Resp: Appears to be breathing comfortably without accessory muscle usage, speaking in full sentences, no cough  MSK: Appears to have normal range of motion based on visualized movements  Neurologic: No apparent tremors, facial movements symmetric  Psych: affect good, alert and oriented    The rest of a comprehensive physical examination is deferred due to PHE (public health emergency) video restrictions    Labs: will be drawn later this morning at infusion     Imaging: Report of MRI abdomen reviewed by me today     Impression/plan:   # Stage IV esophageal adenocarcinoma   # Progressive liver disease  # ECOG PS 0-1  Started chemoimmunotherpay with Cisplatin+5Fu+pembrolizumab based on [id number] trial. The trial demonstrated a statistically significant improvement in OS and PFS for patients randomized to pembrolizumab with chemotherapy. Median OS was 12.4 months (95% CI: 10.5, 14.0) for the pembrolizumab arm versus 9.8 months (95% CI: 8.8, 10.8) for the chemotherapy arm (HR 0.73; 95% CI: 0.62, 0.86; p<0.0001). Median PFS was 6.3  (95% CI: 6.2, 6.9) and 5.8 months (95% CI: 5.0, 6.0), respectively (HR 0.65; 95% CI: 0.55, 0.76; p<0.0001).     Restaging  scan after 2 cycles showed overall stable disease with - mixed response- PA in esophgaus primary, and 2 liver lesions while, stable disease in spine, and stable to borderline progression in 1 liver lesions. Scan after 4 cycles showing stable disease in esophagus and LN and a some liver lesion. 2 lesions in liver are slightly bigger, one is more prominent than before. CT CAP after 6 cycles done on 8/3/21 demonstrated continued interval increase in the size of 2 liver lesions, stable osseous lesions and continued inconspicuity of the primary esophageal mass. Given the liver-isolated progressive disease, we recommended liver directed local therapy and Y-90 was complete 9/10    Chemotherapy wise, he completed 6 cycles of induction cisplatin, 5-FU and pembrolizumab thus was swithced to 5-FLU and pembrolizumab maintenance (i.e. drop cisplatin) with cycle 7 for a total of 2 years or until progression/intolerable toxicities.     Recent CT CAP showing ongoing stable disease outside of liver, however, in the liver the radiologist report suggests new lesion and enlargement. liver MRI in mid October with positive response per Dr. Meléndez. With plans for repeat MRI in December, scheduled for 12/15.    Confirmed with Dr. Steven, continue pemrbo+5 FLU for now. Proceed today pending labs    - In the event of eventual extrahepatic disease progression, options for next lines of treatment would include ramucirumab plus paclitaxel based on the [place] trial. Other options include single-agent docetaxel, pacltixael or irinotecan. Lonsurf could be pursued in the third line.    Overall plan  Proceed today with infusion pending labs  RTC with me prior to next infusion  CT CAP end of November and visit with Dr. Steven  MRI abdomen and brain 12/15    #lighter colored stool  #very mild abdominal discomfort  Labs today to assess biliary obstruction     #Brain mets   Follows with Dr. Velez. S/p GK 3/24. MRI Sept 13 with no new mets. Next  in 12/2021.     #Mucositis, improved  2/2 F5U. Continue Aquaphor and salt/soda rinses as needed. Avoid irritating foods like spicy/acidic items when mucositis is worse.      #Bone mets  Bone biopsy of left pelvic mass 2/26/21 confirmed metastasis. Finished radiation 3/23 to lumbosacral spine.   While he has not seen dentist in 4 yrs, discusseed small risk of osteonecrosis, 3-5% risk, he does not have any ongoing dental issues and has no tooth pain or caries.  -Zometa every 6 weeks, last given 9/16, give today  -Continue Calcium and Vitamin D.     #Dypshagia, malnutrition  PEG tube placed in OR (3/5) for nutritional support. Now with good PO intake. Follows with RD. Continue PO intake as tolerated. Previously declined SLP follow up, can revisit as needed though dysphagia has improved with treatment.   --Of note, consideration given to esophageal stent vs XRT for esophageal debulking to address dysphagia inpatient in Feb/March 2021; however given likely improvement with chemo-immunotherapy, as well as risks of worsening cytopenias, elected not to.      #Cauda Equina due to tumor compression s/p L2-L5 laminectomy and decompression   Followed by neurosurgery (now prn) with improved exam after surgery. He is now ambulating with or without a walker, strength nearly back to baseline. He is aware of his activity and lifting restrictions. Has some L radiculopathy that he uses robaxan prn.      #Constipation  Dulcolax, miralax, or MOM prn. Continue to avoid suppository while on chemo.     #Urinary retention  Andrews out with no new issues, saw urology with q6 mo follow up rec.    50 minutes spent on the date of the encounter doing chart review, review of outside records, review of test results, interpretation of tests, patient visit, documentation and discussion with other provider(s)      Jaky Pugh CNP on 10/28/2021 at 7:58 AM        Again, thank you for allowing me to participate in the care of your patient.       Sincerely,    Jaky Pugh, CNP

## 2021-10-30 NOTE — PROGRESS NOTES
This is a recent snapshot of the patient's [facility] medical record.  For current drug dose and complete information and questions, call [phone] or In Basket pool, [facility] home infusion ([id number])  CSN Number:  [id number]

## 2021-11-04 NOTE — PROGRESS NOTES
This is a recent snapshot of the patient's [facility] medical record.  For current drug dose and complete information and questions, call [phone] or In Basket pool, [facility]v home infusion ([id number])  CSN Number:  [id number]

## 2021-11-15 NOTE — PROGRESS NOTES
Junito is a 61 year old who is being evaluated via a billable video visit.      How would you like to obtain your AVS? [employer]  If the video visit is dropped, the invitation should be resent by: Text to cell phone: [phone]  Will anyone else be joining your video visit? No      Video-Visit Details    Type of service:  Video Visit    Video Start Time: 9:06 AM    Video End Time:9:19 AM    Originating Location (pt. 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    Treatment intent- Palliative    Oncology HPI:   August 2020- stomach discomfort, belching   October 2020- progressive dysphagia with solids   1/26/21- distal esophageal biopsy shows Moderately differentiated adenocarcinoma; MMR normal expression, PDL1 expression is low with TPS 2%, CPS 5-10, Her2 is pending  1/27/21- CT CAP- Enlarged  2 cm subcarinal lymph node and 1.4 cm short axis right subcarinal lymph node, focal thickening of the superior wall along the right side of the mid esophagus. Numerous small pulmonary nodules, 3 mm nodule in the superior segment of the left lower lobe, 3 mm nodule in the anterior aspect of the right upper lobe , 4 mm nodule in the medial aspect of the right upper lobe and 4 mm right lower lobe nodule.  Hypoattenuating foci in the liver, 1.6 cm hypoattenuating/hypoenhancing lesion in hepatic segment 8 and 1.4 cm lesion in hepatic segment 5 , indeterminate, likely metastatic.  The prostate gland is mildly enlarged measuring 5.3 cm in transverse diameter. Multiple prominent but not significantly enlarged retroperitoneal lymph nodes, indeterminate, could be reactive. 4.4 x 4.3 cm lytic lesion centered in the posterior aspect of the left sacral ala (series 3 image 243), 4.7 x 4.0 x 5.6 cm (axial and CC dimensions, respectively) hypoattenuating lesion in the subcutaneous tissue of the anterior chest wall just anterior to the mediastinum, indeterminate, could represent sebaceous cyst.  2/4/21- Saw Dr. Bourgeois ordered PET/CT  3/2/21-3/10/21- Admission to [facility]- <24h of inability to ambulate with acute onset left leg weakness/urinary retention with perianal sensory deficits, MRI showed extensive epidural signal change suggestive of tumor vs. Hemorrhage.Pt underwent emergent surgery- L2-L5 laminectomy and decompression . PEG tube placement 3/5/2021.   3/17/21-3/23/21: Radiation to LS spine  3/24/21: gamma knife (2000cGy in 5 fractions)  3/29/21- port placement  3/31/21: Cycle 1 Cisplatin+keytruda+5fu   4/21/21: Cycle 2 Cisplatin+keytruda+5fu  5/11/21- Ct CAP- Overall stable disease- mixed response- NC in 2 liver lesions while, stable disease to borderline progression in 1 liver lesions  5/12/21- Cycle 3 Cisplatin+keytruda+5fu  6/2/21- Cycle 4 Cisplatin+keytruda+5fu  6/21/21- CT CAP- Stable mild circumferential wall thickening of the distal esophagus (series 3, image 99) with small amount of fluid in the upper and mid esophagus. No lymphadenopathy. Stable to slight increased size of hepatic metastases. For example a 3.1 x 2.9 cm lesion in the left lobe (series 3, image 123) previously measured 2.9 x 2.6 cm, and a 2.7 x 2.9 cm peripheral right hepatic lobe metastasis previously measured 3.0 x 2.2 cm. A 1.0 cm lesion in the caudate  lobe (series 3, image 138) is more prominent today/new. Stable small gastrohepatic ligament lymph nodes. No new or enlarging lymph nodes in the abdomen and pelvis. No ascites.Stable 5.1 x 4.6 cm left sacral mass. Stable cystic 4.9 x 3.5 cm subcutaneous lesion overlying the midsternum.  6/24/21- Cycle 5 Cisplatin+keytruda+5fu  7/14/21- Cycle 6 Cisplatin+keytruda+5fu  8/3/21- Ct CAP- Few small pulmonary nodules are stable. Stable mild circumferential wall thickening of the distal esophagus (series 3, image 97).  Increased size of hepatic metastases. For example a 3.5  x 3.5 cm left lobe metastasis (series 3, image 117) previously measured 2.9 x 3.1 cm, a 3.5 x 2.7 cm subcapsular right hepatic lobe metastasis (series 3, image 158) previously measured 2.7 x 2.9 cm and  a 1.7 x 1.5 cm lesion in the left lobe adjacent to the caudate (series 3, image 124) previously measured 1.0 cm.  Stable 5.2 x 4.9 cm lytic lesion in the left sacrum previously measuring 5.2 x 4.6 cm (3, image 238). Stable sclerotic lesion in the anterior aspect of the T4 vertebral body measuring 1.3 cm (series 3, image 39) and sclerosis of the posterior left third rib.. No new lesions. Stable 5.2 cm cystic subcutaneous lesion overlying the sternum  8/5, 8/26, 9/16- Pembrolizumab and 5-FU  9/10/21- Y90 delivery in the left hepatic lobe and segment 5 lesions.   9/13/21- Brain MRI: no progression or distal metatasis  9/28/21- Ct CAP- Multiple left and right hepatic nodular masses identified consistent with metastatic disease. Several appear to be new or are larger worrisome for progression. One lesion is slightly smaller along  the anterior aspect of hepatic segment 2. Stable destructive sacral bone lesion and sclerosis at T4 and left third rib. Stable distal esophageal wall thickening. Stable but indeterminant soft tissue surrounding portions of the left gastric artery at the upper midabdomen.    Interval history:   -Had some diarrhea earlier this week  after he took dulcolax for constipation. This has now normalize  -mild 2/10 low back pain that is better when he has good posture  -Has some taste aversion to sweetness, no coating on tongue  -Mouth sores manageable   -No new side effects of keytruda/5FU therapy  -Independent in ADLs  -No cough or SOB   -No LE edema  -No fever     Comprehensive ROS was unremarkable except as detailed above.     Current Outpatient Medications   Medication Sig Dispense Refill     bacitracin 500 UNIT/GM external ointment Apply topically 2 times daily Until the sore are well healed 113.4 g 2     Calcium Carb-Cholecalciferol (CALCIUM-VITAMIN D) 600-400 MG-UNIT TABS Take 2 tablets by mouth daily 60 tablet 3     gabapentin (NEURONTIN) 250 MG/5ML solution Take 5 mLs (250 mg) by mouth At Bedtime (Patient not taking: Reported on 10/7/2021) 470 mL 1     LORazepam (ATIVAN) 0.5 MG tablet Take 1 tablet (0.5 mg) by mouth every 4 hours as needed (Anxiety, Nausea/Vomiting or Sleep) (Patient not taking: Reported on 9/29/2021) 30 tablet 2     multivitamin CF FORMULA (CHOICEFUL) chewable tablet Take 1 tablet by mouth daily       omeprazole (FIRST-OMEPRAZOLE) 2 MG/ML SUSP Take 10 mLs (20 mg) by mouth 2 times daily (Patient not taking: Reported on 9/29/2021) 300 mL 1     ondansetron (ZOFRAN) 4 MG tablet Take 1-2 tablets (4-8 mg) by mouth every 6 hours as needed for nausea (vomiting) (Patient not taking: Reported on 10/7/2021) 40 tablet 0     prochlorperazine (COMPAZINE) 10 MG tablet Take 1 tablet (10 mg) by mouth every 6 hours as needed (Nausea/Vomiting) (Patient not taking: Reported on 9/29/2021) 30 tablet 2      No Known Allergies    Exam:  There were no vitals taken for this visit.  Wt Readings from Last 4 Encounters:   10/28/21 78.2 kg (172 lb 4.8 oz)   10/07/21 77 kg (169 lb 11.2 oz)   09/29/21 77.1 kg (170 lb)   09/16/21 76.7 kg (169 lb)       Video physical exam  General: Patient appears well in no acute distress.   Skin: No visualized rash or  lesions on visualized skin  Eyes: EOMI, no erythema, sclera icterus or discharge noted  Resp: Appears to be breathing comfortably without accessory muscle usage, speaking in full sentences, no cough  MSK: Appears to have normal range of motion based on visualized movements  Neurologic: No apparent tremors, facial movements symmetric  Psych: affect good, alert and oriented    The rest of a comprehensive physical examination is deferred due to PHE (public health emergency) video restrictions    Labs: will be drawn later this morning at infusion     Imaging: Report of MRI abdomen reviewed by me today     Impression/plan:   # Stage IV esophageal adenocarcinoma   # Progressive liver disease  # ECOG PS 0-1  Started chemoimmunotherpay with Cisplatin+5Fu+pembrolizumab based on [id number] trial. The trial demonstrated a statistically significant improvement in OS and PFS for patients randomized to pembrolizumab with chemotherapy. Median OS was 12.4 months (95% CI: 10.5, 14.0) for the pembrolizumab arm versus 9.8 months (95% CI: 8.8, 10.8) for the chemotherapy arm (HR 0.73; 95% CI: 0.62, 0.86; p<0.0001). Median PFS was 6.3  (95% CI: 6.2, 6.9) and 5.8 months (95% CI: 5.0, 6.0), respectively (HR 0.65; 95% CI: 0.55, 0.76; p<0.0001).     Restaging scan after 2 cycles showed overall stable disease with - mixed response- TN in esophgaus primary, and 2 liver lesions while, stable disease in spine, and stable to borderline progression in 1 liver lesions. Scan after 4 cycles showing stable disease in esophagus and LN and a some liver lesion. 2 lesions in liver are slightly bigger, one is more prominent than before. CT CAP after 6 cycles done on 8/3/21 demonstrated continued interval increase in the size of 2 liver lesions, stable osseous lesions and continued inconspicuity of the primary esophageal mass. Given the liver-isolated progressive disease, we recommended liver directed local therapy and Y-90 was complete 9/10    Chemotherapy  wise, he completed 6 cycles of induction cisplatin, 5-FU and pembrolizumab thus was swithced to 5-FLU and pembrolizumab maintenance (i.e. drop cisplatin) with cycle 7 for a total of 2 years or until progression/intolerable toxicities.     Recent CT CAP showing ongoing stable disease outside of liver, however, in the liver the radiologist report suggests new lesion and enlargement. liver MRI in mid October with positive response per Dr. Meléndez. With plans for repeat MRI in December, scheduled for 12/15.  - In the event of eventual extrahepatic disease progression, options for next lines of treatment would include ramucirumab plus paclitaxel based on the [place] trial. Other options include single-agent docetaxel, pacltixael or irinotecan. Lonsurf could be pursued in the third line.    Overall plan  Proceed today with infusion; labs look good  CT CAP end of November and visit with Dr. Steven  MRI abdomen and brain 12/15    #Brain mets   Follows with Dr. Velez. S/p GK 3/24. MRI Sept 13 with no new mets. Next in 12/2021.     #Mucositis, improved  2/2 F5U. Continue Aquaphor and salt/soda rinses as needed. Avoid irritating foods like spicy/acidic items when mucositis is worse.      #Bone mets  Bone biopsy of left pelvic mass 2/26/21 confirmed metastasis. Finished radiation 3/23 to lumbosacral spine.   While he has not seen dentist in 4 yrs, discusseed small risk of osteonecrosis, 3-5% risk, he does not have any ongoing dental issues and has no tooth pain or caries.  -Zometa every 6 weeks, last given 10/28  -Continue Calcium and Vitamin D.     #Dypshagia, malnutrition  PEG tube placed in OR (3/5) for nutritional support. Now with good PO intake. Follows with RD. Continue PO intake as tolerated. Previously declined SLP follow up, can revisit as needed though dysphagia has improved with treatment.   --Of note, consideration given to esophageal stent vs XRT for esophageal debulking to address dysphagia inpatient in  Feb/March 2021; however given likely improvement with chemo-immunotherapy, as well as risks of worsening cytopenias, elected not to.      #Cauda Equina due to tumor compression s/p L2-L5 laminectomy and decompression   Followed by neurosurgery (now prn) with improved exam after surgery. He is now ambulating with or without a walker, strength nearly back to baseline. He is aware of his activity and lifting restrictions. Has some L radiculopathy that he uses robaxan prn.      #Constipation  Dulcolax, miralax, or MOM prn. Continue to avoid suppository while on chemo.     #Urinary retention  Andrews out with no new issues, saw urology with q6 mo follow up rec.    30 minutes spent on the date of the encounter doing chart review, interpretation of tests, patient visit and documentation     Jaky Pugh CNP on 11/18/2021 at 9:20 AM

## 2021-11-18 NOTE — LETTER
November 18, 2021       TO: Junito aWng  [id number] [id number]       DearMr.Felipe,    We are writing to inform you of your test results.    {[facility] results letter list:[id number]}    No results found from the In Basket message.    ***

## 2021-11-18 NOTE — LETTER
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    Treatment intent- Palliative    Oncology HPI:   August 2020- stomach discomfort, belching   October 2020- progressive dysphagia with solids   1/26/21- distal esophageal biopsy shows Moderately differentiated adenocarcinoma; MMR normal expression, PDL1 expression is low with TPS 2%, CPS 5-10, Her2 is pending  1/27/21- CT CAP- Enlarged  2 cm subcarinal lymph node and 1.4 cm short axis right subcarinal lymph node, focal thickening of the superior wall along the right side of the mid esophagus. Numerous small pulmonary nodules, 3 mm nodule in the superior segment of the left lower lobe, 3 mm nodule in the anterior aspect of the right upper lobe , 4 mm nodule in the medial aspect of the right upper lobe and 4 mm right lower lobe nodule. Hypoattenuating foci in the liver, 1.6 cm hypoattenuating/hypoenhancing lesion in hepatic segment 8 and 1.4 cm lesion in hepatic segment 5 , indeterminate, likely metastatic.  The prostate gland is mildly enlarged measuring 5.3 cm in transverse diameter. Multiple prominent but not significantly enlarged retroperitoneal lymph nodes, indeterminate, could be reactive. 4.4 x 4.3 cm lytic lesion centered in the posterior aspect of the left sacral ala (series 3 image 243), 4.7 x 4.0 x 5.6  cm (axial and CC dimensions, respectively) hypoattenuating lesion in the subcutaneous tissue of the anterior chest wall just anterior to the mediastinum, indeterminate, could represent sebaceous cyst.  2/4/21- Saw Dr. Bourgeois ordered PET/CT  3/2/21-3/10/21- Admission to [facility]- <24h of inability to ambulate with acute onset left leg weakness/urinary retention with perianal sensory deficits, MRI showed extensive epidural signal change suggestive of tumor vs. Hemorrhage.Pt underwent emergent surgery- L2-L5 laminectomy and decompression . PEG tube placement 3/5/2021.   3/17/21-3/23/21: Radiation to LS spine  3/24/21: gamma knife (2000cGy in 5 fractions)  3/29/21- port placement  3/31/21: Cycle 1 Cisplatin+keytruda+5fu   4/21/21: Cycle 2 Cisplatin+keytruda+5fu  5/11/21- Ct CAP- Overall stable disease- mixed response- DC in 2 liver lesions while, stable disease to borderline progression in 1 liver lesions  5/12/21- Cycle 3 Cisplatin+keytruda+5fu  6/2/21- Cycle 4 Cisplatin+keytruda+5fu  6/21/21- CT CAP- Stable mild circumferential wall thickening of the distal esophagus (series 3, image 99) with small amount of fluid in the upper and mid esophagus. No lymphadenopathy. Stable to slight increased size of hepatic metastases. For example a 3.1 x 2.9 cm lesion in the left lobe (series 3, image 123) previously measured 2.9 x 2.6 cm, and a 2.7 x 2.9 cm peripheral right hepatic lobe metastasis previously measured 3.0 x 2.2 cm. A 1.0 cm lesion in the caudate lobe (series 3, image 138) is more prominent today/new. Stable small gastrohepatic ligament lymph nodes. No new or enlarging lymph nodes in the abdomen and pelvis. No ascites.Stable 5.1 x 4.6 cm left sacral mass. Stable cystic 4.9 x 3.5 cm subcutaneous lesion overlying the midsternum.  6/24/21- Cycle 5 Cisplatin+keytruda+5fu  7/14/21- Cycle 6 Cisplatin+keytruda+5fu  8/3/21- Ct CAP- Few small pulmonary nodules are stable. Stable mild circumferential wall thickening of the distal  esophagus (series 3, image 97).  Increased size of hepatic metastases. For example a 3.5  x 3.5 cm left lobe metastasis (series 3, image 117) previously measured 2.9 x 3.1 cm, a 3.5 x 2.7 cm subcapsular right hepatic lobe metastasis (series 3, image 158) previously measured 2.7 x 2.9 cm and  a 1.7 x 1.5 cm lesion in the left lobe adjacent to the caudate (series 3, image 124) previously measured 1.0 cm.  Stable 5.2 x 4.9 cm lytic lesion in the left sacrum previously measuring 5.2 x 4.6 cm (3, image 238). Stable sclerotic lesion in the anterior aspect of the T4 vertebral body measuring 1.3 cm (series 3, image 39) and sclerosis of the posterior left third rib.. No new lesions. Stable 5.2 cm cystic subcutaneous lesion overlying the sternum  8/5, 8/26, 9/16- Pembrolizumab and 5-FU  9/10/21- Y90 delivery in the left hepatic lobe and segment 5 lesions.   9/13/21- Brain MRI: no progression or distal metatasis  9/28/21- Ct CAP- Multiple left and right hepatic nodular masses identified consistent with metastatic disease. Several appear to be new or are larger worrisome for progression. One lesion is slightly smaller along  the anterior aspect of hepatic segment 2. Stable destructive sacral bone lesion and sclerosis at T4 and left third rib. Stable distal esophageal wall thickening. Stable but indeterminant soft tissue surrounding portions of the left gastric artery at the upper midabdomen.    Interval history:   -Had some diarrhea earlier this week after he took dulcolax for constipation. This has now normalize  -mild 2/10 low back pain that is better when he has good posture  -Has some taste aversion to sweetness, no coating on tongue  -Mouth sores manageable   -No new side effects of keytruda/5FU therapy  -Independent in ADLs  -No cough or SOB   -No LE edema  -No fever     Comprehensive ROS was unremarkable except as detailed above.     Current Outpatient Medications   Medication Sig Dispense Refill     bacitracin 500  UNIT/GM external ointment Apply topically 2 times daily Until the sore are well healed 113.4 g 2     Calcium Carb-Cholecalciferol (CALCIUM-VITAMIN D) 600-400 MG-UNIT TABS Take 2 tablets by mouth daily 60 tablet 3     gabapentin (NEURONTIN) 250 MG/5ML solution Take 5 mLs (250 mg) by mouth At Bedtime (Patient not taking: Reported on 10/7/2021) 470 mL 1     LORazepam (ATIVAN) 0.5 MG tablet Take 1 tablet (0.5 mg) by mouth every 4 hours as needed (Anxiety, Nausea/Vomiting or Sleep) (Patient not taking: Reported on 9/29/2021) 30 tablet 2     multivitamin CF FORMULA (CHOICEFUL) chewable tablet Take 1 tablet by mouth daily       omeprazole (FIRST-OMEPRAZOLE) 2 MG/ML SUSP Take 10 mLs (20 mg) by mouth 2 times daily (Patient not taking: Reported on 9/29/2021) 300 mL 1     ondansetron (ZOFRAN) 4 MG tablet Take 1-2 tablets (4-8 mg) by mouth every 6 hours as needed for nausea (vomiting) (Patient not taking: Reported on 10/7/2021) 40 tablet 0     prochlorperazine (COMPAZINE) 10 MG tablet Take 1 tablet (10 mg) by mouth every 6 hours as needed (Nausea/Vomiting) (Patient not taking: Reported on 9/29/2021) 30 tablet 2      No Known Allergies    Exam:  There were no vitals taken for this visit.  Wt Readings from Last 4 Encounters:   10/28/21 78.2 kg (172 lb 4.8 oz)   10/07/21 77 kg (169 lb 11.2 oz)   09/29/21 77.1 kg (170 lb)   09/16/21 76.7 kg (169 lb)       Video physical exam  General: Patient appears well in no acute distress.   Skin: No visualized rash or lesions on visualized skin  Eyes: EOMI, no erythema, sclera icterus or discharge noted  Resp: Appears to be breathing comfortably without accessory muscle usage, speaking in full sentences, no cough  MSK: Appears to have normal range of motion based on visualized movements  Neurologic: No apparent tremors, facial movements symmetric  Psych: affect good, alert and oriented    The rest of a comprehensive physical examination is deferred due to PHE (public health emergency) video  restrictions    Labs: will be drawn later this morning at infusion     Imaging: Report of MRI abdomen reviewed by me today     Impression/plan:   # Stage IV esophageal adenocarcinoma   # Progressive liver disease  # ECOG PS 0-1  Started chemoimmunotherpay with Cisplatin+5Fu+pembrolizumab based on [id number] trial. The trial demonstrated a statistically significant improvement in OS and PFS for patients randomized to pembrolizumab with chemotherapy. Median OS was 12.4 months (95% CI: 10.5, 14.0) for the pembrolizumab arm versus 9.8 months (95% CI: 8.8, 10.8) for the chemotherapy arm (HR 0.73; 95% CI: 0.62, 0.86; p<0.0001). Median PFS was 6.3  (95% CI: 6.2, 6.9) and 5.8 months (95% CI: 5.0, 6.0), respectively (HR 0.65; 95% CI: 0.55, 0.76; p<0.0001).     Restaging scan after 2 cycles showed overall stable disease with - mixed response- WA in esophgaus primary, and 2 liver lesions while, stable disease in spine, and stable to borderline progression in 1 liver lesions. Scan after 4 cycles showing stable disease in esophagus and LN and a some liver lesion. 2 lesions in liver are slightly bigger, one is more prominent than before. CT CAP after 6 cycles done on 8/3/21 demonstrated continued interval increase in the size of 2 liver lesions, stable osseous lesions and continued inconspicuity of the primary esophageal mass. Given the liver-isolated progressive disease, we recommended liver directed local therapy and Y-90 was complete 9/10    Chemotherapy wise, he completed 6 cycles of induction cisplatin, 5-FU and pembrolizumab thus was swithced to 5-FLU and pembrolizumab maintenance (i.e. drop cisplatin) with cycle 7 for a total of 2 years or until progression/intolerable toxicities.     Recent CT CAP showing ongoing stable disease outside of liver, however, in the liver the radiologist report suggests new lesion and enlargement. liver MRI in mid October with positive response per Dr. Meléndez. With plans for repeat MRI in December,  scheduled for 12/15.  - In the event of eventual extrahepatic disease progression, options for next lines of treatment would include ramucirumab plus paclitaxel based on the [place] trial. Other options include single-agent docetaxel, pacltixael or irinotecan. Lonsurf could be pursued in the third line.    Overall plan  Proceed today with infusion; labs look good  CT CAP end of November and visit with Dr. Steven  MRI abdomen and brain 12/15    #Brain mets   Follows with Dr. Velez. S/p GK 3/24. MRI Sept 13 with no new mets. Next in 12/2021.     #Mucositis, improved  2/2 F5U. Continue Aquaphor and salt/soda rinses as needed. Avoid irritating foods like spicy/acidic items when mucositis is worse.      #Bone mets  Bone biopsy of left pelvic mass 2/26/21 confirmed metastasis. Finished radiation 3/23 to lumbosacral spine.   While he has not seen dentist in 4 yrs, discusseed small risk of osteonecrosis, 3-5% risk, he does not have any ongoing dental issues and has no tooth pain or caries.  -Zometa every 6 weeks, last given 10/28  -Continue Calcium and Vitamin D.     #Dypshagia, malnutrition  PEG tube placed in OR (3/5) for nutritional support. Now with good PO intake. Follows with RD. Continue PO intake as tolerated. Previously declined SLP follow up, can revisit as needed though dysphagia has improved with treatment.   --Of note, consideration given to esophageal stent vs XRT for esophageal debulking to address dysphagia inpatient in Feb/March 2021; however given likely improvement with chemo-immunotherapy, as well as risks of worsening cytopenias, elected not to.      #Cauda Equina due to tumor compression s/p L2-L5 laminectomy and decompression   Followed by neurosurgery (now prn) with improved exam after surgery. He is now ambulating with or without a walker, strength nearly back to baseline. He is aware of his activity and lifting restrictions. Has some L radiculopathy that he uses robaxan prn.       #Constipation  Dulcolax, miralax, or MOM prn. Continue to avoid suppository while on chemo.     #Urinary retention  Andrews out with no new issues, saw urology with q6 mo follow up rec.    30 minutes spent on the date of the encounter doing chart review, interpretation of tests, patient visit and documentation     Jaky Pugh CNP on 11/18/2021 at 9:20 AM

## 2021-11-18 NOTE — PROGRESS NOTES
"Infusion Nursing Note:  Junito Wang presents today for C12D1 Keytruda/Fluorouracil pump connect.    Patient seen by provider today: Yes: Jaky Pugh, CLAUDE   present during visit today: Not Applicable.    Note: N/A.      Intravenous Access:  Implanted Port.    Treatment Conditions:  Lab Results   Component Value Date    HGB 12.4 (L) 11/18/2021    WBC 4.4 11/18/2021    ANEU 5.1 07/14/2021    ANEUTAUTO 2.7 11/18/2021     11/18/2021      Lab Results   Component Value Date     11/18/2021    POTASSIUM 4.0 11/18/2021    MAG 2.2 11/18/2021    CR 0.76 11/18/2021    ARDEN 9.1 11/18/2021    BILITOTAL 0.7 11/18/2021    ALBUMIN 3.7 11/18/2021    ALT 21 11/18/2021    AST 24 11/18/2021     Results reviewed, labs MET treatment parameters, ok to proceed with treatment.      Post Infusion Assessment:  Patient tolerated infusion without incident.   Prior to discharge: Port is secured in place with tegaderm and flushed with 10cc NS with positive blood return noted.  Continuous home infusion Dosi-Fuser pump connected.    All connectors secured in place and clamps taped open.    Pump started, \"running\" noted on display (CADD): Not Applicable.  Pump Connection double checked with Zayra Sarkar RN.  Patient instructed to call our clinic or [facility] with any questions or concerns at home.  Patient verbalized understanding.    Patient set up for pump disconnect at home with [facility] on 11/23/2021.      [facility] has been notified        Discharge Plan:   AVS to patient via [employer].  Patient will return 12/9/2021 for next appointment.   Patient discharged in stable condition accompanied by: self.      Kelly Vazquez RN    "

## 2021-11-18 NOTE — PROGRESS NOTES
Power Port needle left for access for treatment, Sterile technique performed and maintained. Patient tolerated procedure well. Tubes drawn in rainbow order: red, green, purple. Transparent dressing placed with use of tegaderm.    Vanessa Oliveira RN  [facility]/Infusion [place]

## 2021-11-23 NOTE — PROGRESS NOTES
Skilled nurse visit in the home, for discontinuation of   chemotherapy. 7950 mg of Fluorouracil infused over 120 hours.    MANISH Jennings  [facility]  [email or web address]  [phone]

## 2021-11-29 NOTE — LETTER
11/29/2021         RE: Junito Wang  [address] [facility] [id number]        Dear Colleague,    Thank you for referring your patient, Junito Wang, to the [facility]. Please see a copy of my visit note below.      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    Treatment intent- Palliative    Oncology HPI:   August 2020- stomach discomfort, belching   October 2020- progressive dysphagia with solids   1/26/21- distal esophageal biopsy shows Moderately differentiated adenocarcinoma; MMR normal expression, PDL1 expression is low with TPS 2%, CPS 5-10, Her2 is pending  1/27/21- CT CAP- Enlarged  2 cm subcarinal lymph node and 1.4 cm short axis right subcarinal lymph node, focal thickening of the superior wall along the right side of the mid esophagus. Numerous small pulmonary nodules, 3 mm nodule in the superior segment of the left lower lobe, 3 mm nodule in the anterior aspect of the right upper lobe , 4 mm nodule in the medial aspect of the right upper lobe and 4 mm right lower lobe nodule. Hypoattenuating foci in the liver, 1.6 cm hypoattenuating/hypoenhancing lesion in hepatic segment 8 and 1.4 cm lesion in hepatic segment 5 , indeterminate, likely metastatic.  The prostate gland is mildly enlarged measuring 5.3 cm in transverse diameter. Multiple prominent but not  significantly enlarged retroperitoneal lymph nodes, indeterminate, could be reactive. 4.4 x 4.3 cm lytic lesion centered in the posterior aspect of the left sacral ala (series 3 image 243), 4.7 x 4.0 x 5.6 cm (axial and CC dimensions, respectively) hypoattenuating lesion in the subcutaneous tissue of the anterior chest wall just anterior to the mediastinum, indeterminate, could represent sebaceous cyst.  2/4/21- Saw Dr. Bourgeois ordered PET/CT  3/2/21-3/10/21- Admission to [facility]- <24h of inability to ambulate with acute onset left leg weakness/urinary retention with perianal sensory deficits, MRI showed extensive epidural signal change suggestive of tumor vs. Hemorrhage.Pt underwent emergent surgery- L2-L5 laminectomy and decompression . PEG tube placement 3/5/2021.   3/17/21-3/23/21: Radiation to LS spine  3/24/21: gamma knife (2000cGy in 5 fractions)  3/29/21- port placement  3/31/21: Cycle 1 Cisplatin+keytruda+5fu   4/21/21: Cycle 2 Cisplatin+keytruda+5fu  5/11/21- Ct CAP- Overall stable disease- mixed response- SC in 2 liver lesions while, stable disease to borderline progression in 1 liver lesions  5/12/21- Cycle 3 Cisplatin+keytruda+5fu  6/2/21- Cycle 4 Cisplatin+keytruda+5fu  6/21/21- CT CAP- Stable mild circumferential wall thickening of the distal esophagus (series 3, image 99) with small amount of fluid in the upper and mid esophagus. No lymphadenopathy. Stable to slight increased size of hepatic metastases. For example a 3.1 x 2.9 cm lesion in the left lobe (series 3, image 123) previously measured 2.9 x 2.6 cm, and a 2.7 x 2.9 cm peripheral right hepatic lobe metastasis previously measured 3.0 x 2.2 cm. A 1.0 cm lesion in the caudate lobe (series 3, image 138) is more prominent today/new. Stable small gastrohepatic ligament lymph nodes. No new or enlarging lymph nodes in the abdomen and pelvis. No ascites.Stable 5.1 x 4.6 cm left sacral mass. Stable cystic 4.9 x 3.5 cm subcutaneous lesion overlying the  midsternum.  6/24/21- Cycle 5 Cisplatin+keytruda+5fu  7/14/21- Cycle 6 Cisplatin+keytruda+5fu  8/3/21- Ct CAP- Few small pulmonary nodules are stable. Stable mild circumferential wall thickening of the distal esophagus (series 3, image 97).  Increased size of hepatic metastases. For example a 3.5  x 3.5 cm left lobe metastasis (series 3, image 117) previously measured 2.9 x 3.1 cm, a 3.5 x 2.7 cm subcapsular right hepatic lobe metastasis (series 3, image 158) previously measured 2.7 x 2.9 cm and  a 1.7 x 1.5 cm lesion in the left lobe adjacent to the caudate (series 3, image 124) previously measured 1.0 cm.  Stable 5.2 x 4.9 cm lytic lesion in the left sacrum previously measuring 5.2 x 4.6 cm (3, image 238). Stable sclerotic lesion in the anterior aspect of the T4 vertebral body measuring 1.3 cm (series 3, image 39) and sclerosis of the posterior left third rib.. No new lesions. Stable 5.2 cm cystic subcutaneous lesion overlying the sternum  8/5, 8/26, 9/16- Pembrolizumab and 5-FU  9/10/21- Y90 delivery in the left hepatic lobe and segment 5 lesions.   9/13/21- Brain MRI: no progression or distal metatasis  9/28/21- Ct CAP- Multiple left and right hepatic nodular masses identified consistent with metastatic disease. Several appear to be new or are larger worrisome for progression. One lesion is slightly smaller along  the anterior aspect of hepatic segment 2. Stable destructive sacral bone lesion and sclerosis at T4 and left third rib. Stable distal esophageal wall thickening. Stable but indeterminant soft tissue surrounding portions of the left gastric artery at the upper midabdomen.  11/26/21- CT CAP-  Interval progression of disease: Enlarging hepatic metastatic foci, Wall thickening involving the distal thoracic esophagus appears to involve a more proximal distal extent compared to prior,  Stable to mildly enlarged nodular thickening about the left gastric artery. New abnormal left periaortic retroperitoneal  lymphadenopathy. Enlarging left lytic sacral mass.      Interval history:   -He is doing ok overall. He denies any new symptoms, has ongoing mouth and lip sores, managed well, usually gets for 1 week following 5-FLu.   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     Current Outpatient Medications   Medication Sig Dispense Refill     Calcium Carb-Cholecalciferol (CALCIUM-VITAMIN D) 600-400 MG-UNIT TABS Take 2 tablets by mouth daily 60 tablet 3     multivitamin CF FORMULA (CHOICEFUL) chewable tablet Take 1 tablet by mouth daily       bacitracin 500 UNIT/GM external ointment Apply topically 2 times daily Until the sore are well healed (Patient not taking: Reported on 11/29/2021) 113.4 g 2     gabapentin (NEURONTIN) 250 MG/5ML solution Take 5 mLs (250 mg) by mouth At Bedtime (Patient not taking: Reported on 10/7/2021) 470 mL 1     LORazepam (ATIVAN) 0.5 MG tablet Take 1 tablet (0.5 mg) by mouth every 4 hours as needed (Anxiety, Nausea/Vomiting or Sleep) (Patient not taking: Reported on 9/29/2021) 30 tablet 2     omeprazole (FIRST-OMEPRAZOLE) 2 MG/ML SUSP Take 10 mLs (20 mg) by mouth 2 times daily (Patient not taking: Reported on 9/29/2021) 300 mL 1     ondansetron (ZOFRAN) 4 MG tablet Take 1-2 tablets (4-8 mg) by mouth every 6 hours as needed for nausea (vomiting) (Patient not taking: Reported on 10/7/2021) 40 tablet 0     prochlorperazine (COMPAZINE) 10 MG  tablet Take 1 tablet (10 mg) by mouth every 6 hours as needed (Nausea/Vomiting) (Patient not taking: Reported on 9/29/2021) 30 tablet 2      No Known Allergies    Exam:  There were no vitals taken for this visit.  Wt Readings from Last 4 Encounters:   11/18/21 77.2 kg (170 lb 3.2 oz)   10/28/21 78.2 kg (172 lb 4.8 oz)   10/07/21 77 kg (169 lb 11.2 oz)   09/29/21 77.1 kg (170 lb)       Video physical exam  General: Patient appears well in no acute distress.   Skin: No visualized rash or lesions on visualized skin  Eyes: EOMI, no erythema, sclera icterus or discharge noted  Resp: Appears to be breathing comfortably without accessory muscle usage, speaking in full sentences, no cough  MSK: Appears to have normal range of motion based on visualized movements  Neurologic: No apparent tremors, facial movements symmetric  Psych: affect good, alert and oriented    The rest of a comprehensive physical examination is deferred due to PHE (public health emergency) video restrictions    Labs:   Recent Labs   Lab Test 11/18/21  0831 10/28/21  0846 10/07/21  0853   WBC 4.4 4.5 3.8*   RBC 4.31* 4.33* 4.09*   HGB 12.4* 12.4* 11.8*   HCT 38.5* 39.1* 36.9*   MCV 89 90 90   MCH 28.8 28.6 28.9   MCHC 32.2 31.7 32.0   RDW 16.5* 16.4* 16.4*    293 217   NEUTROPHIL 62 58 65     Recent Labs   Lab Test 11/18/21  0831 10/28/21  0846 10/07/21  0853    139 138   POTASSIUM 4.0 4.3 3.9   CHLORIDE 110* 108 107   CO2 26 27 27   ANIONGAP 3 4 4   * 94 103*   BUN 16 14 14   CR 0.76 0.70 0.79   ARDEN 9.1 9.1 8.8     Recent Labs   Lab Test 11/18/21  0831 10/28/21  0846 10/07/21  0853   PROTTOTAL 7.2 7.0 6.8   ALBUMIN 3.7 3.7 3.6   BILITOTAL 0.7 0.4 0.6   ALKPHOS 129 144 147   AST 24 20 20   ALT 21 20 22         Imaging:     EXAMINATION: CT CHEST/ABDOMEN/PELVIS W CONTRAST, 11/26/2021 11:38 AM     TECHNIQUE:  Helical CT images from the thoracic inlet through the  symphysis pubis were obtained  with contrast. Contrast dose: Isovue  370  104mL     COMPARISON: Abdominal MRI 10/13/2021, CT chest abdomen and pelvis  9/20/2021     HISTORY: Malignant neoplasm of lower third of esophagus (H)     FINDINGS:     Chest: Right IJ Port-A-Cath terminates in the low SVC. Normal thyroid.  No abnormal thoracic lymphadenopathy. Heart size is normal without  pericardial effusion. No central pulmonary embolism. Thickening of the  distal thoracic esophagus appears to involve more of the craniocaudal  dimension and measuring approximately 5 cm proximal distal, previously  4 cm. Cystlike focus in the anterior subcutaneous fat overlying the  sternal body today measures 5.1 x 3.4 x 5.9 cm, not substantially  changed.     The central tracheobronchial tree is patent. No pleural effusion. No  concerning pulmonary nodule.     Abdomen and pelvis: Redemonstration of metastatic hepatic lesions and  treatment related change. Multinodular enhancing focus in the caudal  aspect of segment 5 today measures approximately 4.6 x 5.5 cm in the  coronal plane on coronal image 35, previously 1.4 x 2.4 cm when  measured in a similar manner on prior CT 9/28/2021. Rounded focus  within hepatic segment 7 today measures up to 2.1 cm on series 4 image  61, previously 1.0 cm. Enlarging nonenhancing hypodensity in segment 5  measuring up to 6.3 cm on coronal image 38, previously 4.6 cm on prior  CT, possibly treatment related change.     Lobular nodularity about the gastric artery today measures up to 2.6 x  1.7 cm, similar to mildly enlarged compared to prior CT (series 3  image 267).     Enlarging left retroperitoneal periaortic retroperitoneal  lymphadenopathy (coronal series 4 image 52) measuring up to 10 mm  short axis on series 3 image 363. No abnormal pelvic lymphadenopathy.     The gallbladder, pancreas, and spleen are unremarkable. Normal right  adrenal gland. Mild thickening of the left adrenal gland, similar to  prior. Normal right kidney. Simple left renal cysts. No abnormally  dilated  loops of large or small bowel. No free fluid in the pelvis.  The bladder is partially distended. Mild wall thickening of the  sigmoid colon and rectum with mild surrounding inflammatory changes.  Normal diameter of the abdominal aorta.     Bones and soft tissues: Lytic lesion of the left sacrum today measures  4.5 x 5.5 cm in the coronal plane, previously 3.9 x 4.6 cm. Partially  visualized lesion in the proximal right humeral shaft, previously  noted to be hypermetabolic on PET CT 2/12/2021. Grossly stable lytic  lesion in the medial right ilium.                                                                      IMPRESSION:      1. Interval progression of disease:  *  Enlarging hepatic metastatic foci.  *  Wall thickening involving the distal thoracic esophagus appears to  involve a more proximal distal extent compared to prior.  *  Stable to mildly enlarged nodular thickening about the left gastric  artery.  *  New abnormal left periaortic retroperitoneal lymphadenopathy.  *  Enlarging left lytic sacral mass.     2. Mild proctitis-sigmoid colitis.    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 Restaging scan after 2 cycles showed overall stable disease with - mixed response- ND in esophgaus primary, and 2 liver lesions while, stable disease in spine, and stable to borderline progression in 1 liver lesions. Scan after 4 cycles showing stable disease in esophagus and LN and a some liver lesion. 2 lesions in liver are slightly bigger, one is more prominent than before. CT CAP after 6 cycles done on 8/3/21 demonstrated continued interval increase in the size of 2 liver lesions, stable osseous lesions and  continued inconspicuity of the primary esophageal mass. Given the liver-isolated progressive disease, we recommended liver directed local therapy and Y-90 was complete 9/10    Chemotherapy wise, he completed 6 cycles of induction cisplatin, 5-FU and pembrolizumab thus was swithced to 5-FLU and pembrolizumab maintenance (i.e. drop cisplatin) with cycle 7. He has complted 6 cycels of maintainece pembro+5flu. Most recent Ct CAP now concerning for progressive disease. Given the concern for multifocal progression, we discussed about switching treatment to paclitaxel+ramicirumab based on the [place] trial. Other options include single-agent docetaxel, pacltixael or irinotecan. Lonsurf could be pursued in the third line.    After discussing risks and benefits, pan to switch to ramucirumab 8 mg/kg  intravenously on days 1 and 15, plus paclitaxel 80 mg/m2 intravenously on days 1, 8, and 15 of a 28-day cycle. We discussed the data behind rainbow trial, overall survival was significantly longer in the ramucirumab plus paclitaxel group than in the placebo plus paclitaxel group (median 9 6 months [95% CI 8 5-10 8] vs 7 4 months [95% CI 6 3-8 4], hazard ratio 0 807 [95% CI 0 678-0 962]; p=0 017). We also discussed common toxicities including hemorrhage, stroke, HTN, proteinuria, neuropathy etc.     Overall plan  Cancel the 5 FLU and keytruda appts   Switch treatment to paclitaxel and ramicirumab, starting 12/9   Continue zometa infusions   RTC with Np/PA prior to Day 1 and day 15 infusion   Ct CAP in 10 weeks   RTC with me in 10 weeks after Ct scan    #Brain mets   Follows with Dr. Velez. S/p GK 3/24. MRI Sept 13 with no new mets. Next in 12/2021.     #Mucositis, improved  2/2 F5U. Continue Aquaphor and salt/soda rinses as needed. Avoid irritating foods like spicy/acidic items when mucositis is worse.      #Bone mets  Bone biopsy of left pelvic mass 2/26/21 confirmed metastasis. Finished radiation 3/23 to lumbosacral spine.    While he has not seen dentist in 4 yrs, discusseed small risk of osteonecrosis, 3-5% risk, he does not have any ongoing dental issues and has no tooth pain or caries.  -Zometa every 6 weeks, last given 10/28  -Continue Calcium and Vitamin D.     #Dypshagia, malnutrition  PEG tube placed in OR (3/5) for nutritional support. Now with good PO intake. Follows with RD. Continue PO intake as tolerated. Previously declined SLP follow up, can revisit as needed though dysphagia has improved with treatment.   --Of note, consideration given to esophageal stent vs XRT for esophageal debulking to address dysphagia inpatient in Feb/March 2021; however given likely improvement with chemo-immunotherapy, as well as risks of worsening cytopenias, elected not to.      #Cauda Equina due to tumor compression s/p L2-L5 laminectomy and decompression   Followed by neurosurgery (now prn) with improved exam after surgery. He is now ambulating with or without a walker, strength nearly back to baseline. He is aware of his activity and lifting restrictions. Has some L radiculopathy that he uses robaxan prn.   Most recent CT scan showing mild increase in the size of rt sacral lesion, however has no new symptoms, asked hum to be vigilant for new symptoms.     #Constipation  Dulcolax, miralax, or MOM prn. Continue to avoid suppository while on chemo.     #Urinary retention-resolved  Andrews out with no new issues, saw urology with q6 mo follow up rec.    On the day of service  Chart review: 5 minutes  Visit duration: 30 minutes  Care coordination: 5 minutes    Jose Steven MD    Hematology, Oncology and Transplantation

## 2021-11-29 NOTE — PROGRESS NOTES
Junito is a 61 year old who is being evaluated via a billable video visit.      How would you like to obtain your AVS? [employer]  If the video visit is dropped, the invitation should be resent by: Text to cell phone: [phone]  Will anyone else be joining your video visit? No      Video Start Time: 1:02 PM  Video-Visit Details    Type of service:  Video Visit    Video End Time:1:32 PM    Originating Location (pt. Location): Home    Distant Location (provider location):  [facility]     Platform used for Video Visit: [employer]

## 2021-11-29 NOTE — PROGRESS NOTES
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    Treatment intent- Palliative    Oncology HPI:   August 2020- stomach discomfort, belching   October 2020- progressive dysphagia with solids   1/26/21- distal esophageal biopsy shows Moderately differentiated adenocarcinoma; MMR normal expression, PDL1 expression is low with TPS 2%, CPS 5-10, Her2 is pending  1/27/21- CT CAP- Enlarged  2 cm subcarinal lymph node and 1.4 cm short axis right subcarinal lymph node, focal thickening of the superior wall along the right side of the mid esophagus. Numerous small pulmonary nodules, 3 mm nodule in the superior segment of the left lower lobe, 3 mm nodule in the anterior aspect of the right upper lobe , 4 mm nodule in the medial aspect of the right upper lobe and 4 mm right lower lobe nodule. Hypoattenuating foci in the liver, 1.6 cm hypoattenuating/hypoenhancing lesion in hepatic segment 8 and 1.4 cm lesion in hepatic segment 5 , indeterminate, likely metastatic.  The prostate gland is mildly enlarged measuring 5.3 cm in transverse diameter. Multiple prominent but not significantly enlarged retroperitoneal lymph nodes, indeterminate, could be reactive. 4.4 x 4.3 cm lytic lesion centered in the posterior aspect of the left sacral ala (series 3 image 243), 4.7 x 4.0 x 5.6 cm (axial and CC dimensions, respectively) hypoattenuating lesion  in the subcutaneous tissue of the anterior chest wall just anterior to the mediastinum, indeterminate, could represent sebaceous cyst.  2/4/21- Saw Dr. Bourgeois ordered PET/CT  3/2/21-3/10/21- Admission to [facility]- <24h of inability to ambulate with acute onset left leg weakness/urinary retention with perianal sensory deficits, MRI showed extensive epidural signal change suggestive of tumor vs. Hemorrhage.Pt underwent emergent surgery- L2-L5 laminectomy and decompression . PEG tube placement 3/5/2021.   3/17/21-3/23/21: Radiation to LS spine  3/24/21: gamma knife (2000cGy in 5 fractions)  3/29/21- port placement  3/31/21: Cycle 1 Cisplatin+keytruda+5fu   4/21/21: Cycle 2 Cisplatin+keytruda+5fu  5/11/21- Ct CAP- Overall stable disease- mixed response- TN in 2 liver lesions while, stable disease to borderline progression in 1 liver lesions  5/12/21- Cycle 3 Cisplatin+keytruda+5fu  6/2/21- Cycle 4 Cisplatin+keytruda+5fu  6/21/21- CT CAP- Stable mild circumferential wall thickening of the distal esophagus (series 3, image 99) with small amount of fluid in the upper and mid esophagus. No lymphadenopathy. Stable to slight increased size of hepatic metastases. For example a 3.1 x 2.9 cm lesion in the left lobe (series 3, image 123) previously measured 2.9 x 2.6 cm, and a 2.7 x 2.9 cm peripheral right hepatic lobe metastasis previously measured 3.0 x 2.2 cm. A 1.0 cm lesion in the caudate lobe (series 3, image 138) is more prominent today/new. Stable small gastrohepatic ligament lymph nodes. No new or enlarging lymph nodes in the abdomen and pelvis. No ascites.Stable 5.1 x 4.6 cm left sacral mass. Stable cystic 4.9 x 3.5 cm subcutaneous lesion overlying the midsternum.  6/24/21- Cycle 5 Cisplatin+keytruda+5fu  7/14/21- Cycle 6 Cisplatin+keytruda+5fu  8/3/21- Ct CAP- Few small pulmonary nodules are stable. Stable mild circumferential wall thickening of the distal esophagus (series 3, image 97).  Increased size of hepatic metastases.  For example a 3.5  x 3.5 cm left lobe metastasis (series 3, image 117) previously measured 2.9 x 3.1 cm, a 3.5 x 2.7 cm subcapsular right hepatic lobe metastasis (series 3, image 158) previously measured 2.7 x 2.9 cm and  a 1.7 x 1.5 cm lesion in the left lobe adjacent to the caudate (series 3, image 124) previously measured 1.0 cm.  Stable 5.2 x 4.9 cm lytic lesion in the left sacrum previously measuring 5.2 x 4.6 cm (3, image 238). Stable sclerotic lesion in the anterior aspect of the T4 vertebral body measuring 1.3 cm (series 3, image 39) and sclerosis of the posterior left third rib.. No new lesions. Stable 5.2 cm cystic subcutaneous lesion overlying the sternum  8/5, 8/26, 9/16- Pembrolizumab and 5-FU  9/10/21- Y90 delivery in the left hepatic lobe and segment 5 lesions.   9/13/21- Brain MRI: no progression or distal metatasis  9/28/21- Ct CAP- Multiple left and right hepatic nodular masses identified consistent with metastatic disease. Several appear to be new or are larger worrisome for progression. One lesion is slightly smaller along  the anterior aspect of hepatic segment 2. Stable destructive sacral bone lesion and sclerosis at T4 and left third rib. Stable distal esophageal wall thickening. Stable but indeterminant soft tissue surrounding portions of the left gastric artery at the upper midabdomen.  11/26/21- CT CAP-  Interval progression of disease: Enlarging hepatic metastatic foci, Wall thickening involving the distal thoracic esophagus appears to involve a more proximal distal extent compared to prior,  Stable to mildly enlarged nodular thickening about the left gastric artery. New abnormal left periaortic retroperitoneal lymphadenopathy. Enlarging left lytic sacral mass.      Interval history:   -He is doing ok overall. He denies any new symptoms, has ongoing mouth and lip sores, managed well, usually gets for 1 week following 5-FLu.   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     Current Outpatient Medications   Medication Sig Dispense Refill     Calcium Carb-Cholecalciferol (CALCIUM-VITAMIN D) 600-400 MG-UNIT TABS Take 2 tablets by mouth daily 60 tablet 3     multivitamin CF FORMULA (CHOICEFUL) chewable tablet Take 1 tablet by mouth daily       bacitracin 500 UNIT/GM external ointment Apply topically 2 times daily Until the sore are well healed (Patient not taking: Reported on 11/29/2021) 113.4 g 2     gabapentin (NEURONTIN) 250 MG/5ML solution Take 5 mLs (250 mg) by mouth At Bedtime (Patient not taking: Reported on 10/7/2021) 470 mL 1     LORazepam (ATIVAN) 0.5 MG tablet Take 1 tablet (0.5 mg) by mouth every 4 hours as needed (Anxiety, Nausea/Vomiting or Sleep) (Patient not taking: Reported on 9/29/2021) 30 tablet 2     omeprazole (FIRST-OMEPRAZOLE) 2 MG/ML SUSP Take 10 mLs (20 mg) by mouth 2 times daily (Patient not taking: Reported on 9/29/2021) 300 mL 1     ondansetron (ZOFRAN) 4 MG tablet Take 1-2 tablets (4-8 mg) by mouth every 6 hours as needed for nausea (vomiting) (Patient not taking: Reported on 10/7/2021) 40 tablet 0     prochlorperazine (COMPAZINE) 10 MG tablet Take 1 tablet (10 mg) by mouth every 6 hours as needed (Nausea/Vomiting) (Patient not taking: Reported on 9/29/2021) 30 tablet 2      No Known Allergies    Exam:  There were no vitals taken for this visit.  Wt Readings from Last 4 Encounters:   11/18/21 77.2 kg (170 lb  3.2 oz)   10/28/21 78.2 kg (172 lb 4.8 oz)   10/07/21 77 kg (169 lb 11.2 oz)   09/29/21 77.1 kg (170 lb)       Video physical exam  General: Patient appears well in no acute distress.   Skin: No visualized rash or lesions on visualized skin  Eyes: EOMI, no erythema, sclera icterus or discharge noted  Resp: Appears to be breathing comfortably without accessory muscle usage, speaking in full sentences, no cough  MSK: Appears to have normal range of motion based on visualized movements  Neurologic: No apparent tremors, facial movements symmetric  Psych: affect good, alert and oriented    The rest of a comprehensive physical examination is deferred due to PHE (public health emergency) video restrictions    Labs:   Recent Labs   Lab Test 11/18/21  0831 10/28/21  0846 10/07/21  0853   WBC 4.4 4.5 3.8*   RBC 4.31* 4.33* 4.09*   HGB 12.4* 12.4* 11.8*   HCT 38.5* 39.1* 36.9*   MCV 89 90 90   MCH 28.8 28.6 28.9   MCHC 32.2 31.7 32.0   RDW 16.5* 16.4* 16.4*    293 217   NEUTROPHIL 62 58 65     Recent Labs   Lab Test 11/18/21  0831 10/28/21  0846 10/07/21  0853    139 138   POTASSIUM 4.0 4.3 3.9   CHLORIDE 110* 108 107   CO2 26 27 27   ANIONGAP 3 4 4   * 94 103*   BUN 16 14 14   CR 0.76 0.70 0.79   ARDEN 9.1 9.1 8.8     Recent Labs   Lab Test 11/18/21  0831 10/28/21  0846 10/07/21  0853   PROTTOTAL 7.2 7.0 6.8   ALBUMIN 3.7 3.7 3.6   BILITOTAL 0.7 0.4 0.6   ALKPHOS 129 144 147   AST 24 20 20   ALT 21 20 22         Imaging:     EXAMINATION: CT CHEST/ABDOMEN/PELVIS W CONTRAST, 11/26/2021 11:38 AM     TECHNIQUE:  Helical CT images from the thoracic inlet through the  symphysis pubis were obtained  with contrast. Contrast dose: Isovue  370 104mL     COMPARISON: Abdominal MRI 10/13/2021, CT chest abdomen and pelvis  9/20/2021     HISTORY: Malignant neoplasm of lower third of esophagus (H)     FINDINGS:     Chest: Right IJ Port-A-Cath terminates in the low SVC. Normal thyroid.  No abnormal thoracic lymphadenopathy.  Heart size is normal without  pericardial effusion. No central pulmonary embolism. Thickening of the  distal thoracic esophagus appears to involve more of the craniocaudal  dimension and measuring approximately 5 cm proximal distal, previously  4 cm. Cystlike focus in the anterior subcutaneous fat overlying the  sternal body today measures 5.1 x 3.4 x 5.9 cm, not substantially  changed.     The central tracheobronchial tree is patent. No pleural effusion. No  concerning pulmonary nodule.     Abdomen and pelvis: Redemonstration of metastatic hepatic lesions and  treatment related change. Multinodular enhancing focus in the caudal  aspect of segment 5 today measures approximately 4.6 x 5.5 cm in the  coronal plane on coronal image 35, previously 1.4 x 2.4 cm when  measured in a similar manner on prior CT 9/28/2021. Rounded focus  within hepatic segment 7 today measures up to 2.1 cm on series 4 image  61, previously 1.0 cm. Enlarging nonenhancing hypodensity in segment 5  measuring up to 6.3 cm on coronal image 38, previously 4.6 cm on prior  CT, possibly treatment related change.     Lobular nodularity about the gastric artery today measures up to 2.6 x  1.7 cm, similar to mildly enlarged compared to prior CT (series 3  image 267).     Enlarging left retroperitoneal periaortic retroperitoneal  lymphadenopathy (coronal series 4 image 52) measuring up to 10 mm  short axis on series 3 image 363. No abnormal pelvic lymphadenopathy.     The gallbladder, pancreas, and spleen are unremarkable. Normal right  adrenal gland. Mild thickening of the left adrenal gland, similar to  prior. Normal right kidney. Simple left renal cysts. No abnormally  dilated loops of large or small bowel. No free fluid in the pelvis.  The bladder is partially distended. Mild wall thickening of the  sigmoid colon and rectum with mild surrounding inflammatory changes.  Normal diameter of the abdominal aorta.     Bones and soft tissues: Lytic lesion of  the left sacrum today measures  4.5 x 5.5 cm in the coronal plane, previously 3.9 x 4.6 cm. Partially  visualized lesion in the proximal right humeral shaft, previously  noted to be hypermetabolic on PET CT 2/12/2021. Grossly stable lytic  lesion in the medial right ilium.                                                                      IMPRESSION:      1. Interval progression of disease:  *  Enlarging hepatic metastatic foci.  *  Wall thickening involving the distal thoracic esophagus appears to  involve a more proximal distal extent compared to prior.  *  Stable to mildly enlarged nodular thickening about the left gastric  artery.  *  New abnormal left periaortic retroperitoneal lymphadenopathy.  *  Enlarging left lytic sacral mass.     2. Mild proctitis-sigmoid colitis.    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 Restaging scan after 2 cycles showed overall stable disease with - mixed response- NJ in esophgaus primary, and 2 liver lesions while, stable disease in spine, and stable to borderline progression in 1 liver lesions. Scan after 4 cycles showing stable disease in esophagus and LN and a some liver lesion. 2 lesions in liver are slightly bigger, one is more prominent than before. CT CAP after 6 cycles done on 8/3/21 demonstrated continued interval increase in the size of 2 liver lesions, stable osseous lesions and continued inconspicuity of the primary esophageal mass. Given the liver-isolated progressive disease, we recommended liver directed local therapy and Y-90 was complete 9/10    Chemotherapy wise, he completed 6 cycles of induction cisplatin, 5-FU and pembrolizumab thus was swithced to  5-FLU and pembrolizumab maintenance (i.e. drop cisplatin) with cycle 7. He has complted 6 cycels of maintainece pembro+5flu. Most recent Ct CAP now concerning for progressive disease. Given the concern for multifocal progression, we discussed about switching treatment to paclitaxel+ramicirumab based on the [place] trial. Other options include single-agent docetaxel, pacltixael or irinotecan. Lonsurf could be pursued in the third line.    After discussing risks and benefits, pan to switch to ramucirumab 8 mg/kg  intravenously on days 1 and 15, plus paclitaxel 80 mg/m2 intravenously on days 1, 8, and 15 of a 28-day cycle. We discussed the data behind rainbow trial, overall survival was significantly longer in the ramucirumab plus paclitaxel group than in the placebo plus paclitaxel group (median 9 6 months [95% CI 8 5-10 8] vs 7 4 months [95% CI 6 3-8 4], hazard ratio 0 807 [95% CI 0 678-0 962]; p=0 017). We also discussed common toxicities including hemorrhage, stroke, HTN, proteinuria, neuropathy etc.     Overall plan  Cancel the 5 FLU and keytruda appts   Switch treatment to paclitaxel and ramicirumab, starting 12/9   Continue zometa infusions   RTC with Np/PA prior to Day 1 and day 15 infusion   Ct CAP in 10 weeks   RTC with me in 10 weeks after Ct scan    #Brain mets   Follows with Dr. Velez. S/p GK 3/24. MRI Sept 13 with no new mets. Next in 12/2021.     #Mucositis, improved  2/2 F5U. Continue Aquaphor and salt/soda rinses as needed. Avoid irritating foods like spicy/acidic items when mucositis is worse.      #Bone mets  Bone biopsy of left pelvic mass 2/26/21 confirmed metastasis. Finished radiation 3/23 to lumbosacral spine.   While he has not seen dentist in 4 yrs, discusseed small risk of osteonecrosis, 3-5% risk, he does not have any ongoing dental issues and has no tooth pain or caries.  -Zometa every 6 weeks, last given 10/28  -Continue Calcium and Vitamin D.     #Dypshagia, malnutrition  PEG tube  placed in OR (3/5) for nutritional support. Now with good PO intake. Follows with RD. Continue PO intake as tolerated. Previously declined SLP follow up, can revisit as needed though dysphagia has improved with treatment.   --Of note, consideration given to esophageal stent vs XRT for esophageal debulking to address dysphagia inpatient in Feb/March 2021; however given likely improvement with chemo-immunotherapy, as well as risks of worsening cytopenias, elected not to.      #Cauda Equina due to tumor compression s/p L2-L5 laminectomy and decompression   Followed by neurosurgery (now prn) with improved exam after surgery. He is now ambulating with or without a walker, strength nearly back to baseline. He is aware of his activity and lifting restrictions. Has some L radiculopathy that he uses robaxan prn.   Most recent CT scan showing mild increase in the size of rt sacral lesion, however has no new symptoms, asked hum to be vigilant for new symptoms.     #Constipation  Dulcolax, miralax, or MOM prn. Continue to avoid suppository while on chemo.     #Urinary retention-resolved  Andrews out with no new issues, saw urology with q6 mo follow up rec.    On the day of service  Chart review: 5 minutes  Visit duration: 30 minutes  Care coordination: 5 minutes    Jose Steven MD    Hematology, Oncology and Transplantation

## 2021-12-06 NOTE — PROGRESS NOTES
Junito is a 61 year old who is being evaluated via a billable video visit.      How would you like to obtain your AVS? Valon LasersharMieple  If the video visit is dropped, the invitation should be resent by: Text to cell phone: [phone]  Will anyone else be joining your video visit? No     Video Start Time: 2:20pm  Video-Visit Details    Type of service:  Video Visit    Video End Time:3:14pm    Originating Location (pt. 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    Treatment intent- Palliative    Oncology HPI:   August 2020- stomach discomfort, belching   October 2020- progressive dysphagia with solids   1/26/21- distal esophageal biopsy shows Moderately differentiated adenocarcinoma; MMR normal expression, PDL1 expression is low with TPS 2%, CPS 5-10, Her2 is pending  1/27/21- CT CAP- Enlarged  2 cm subcarinal lymph node and 1.4 cm short axis right subcarinal lymph node, focal thickening of the superior wall along the right side of the mid esophagus. Numerous small pulmonary nodules, 3 mm nodule in the superior segment of the left lower lobe, 3 mm nodule in the anterior aspect of the right upper lobe , 4 mm nodule in the medial aspect of the right upper lobe and 4 mm right lower lobe  nodule. Hypoattenuating foci in the liver, 1.6 cm hypoattenuating/hypoenhancing lesion in hepatic segment 8 and 1.4 cm lesion in hepatic segment 5 , indeterminate, likely metastatic.  The prostate gland is mildly enlarged measuring 5.3 cm in transverse diameter. Multiple prominent but not significantly enlarged retroperitoneal lymph nodes, indeterminate, could be reactive. 4.4 x 4.3 cm lytic lesion centered in the posterior aspect of the left sacral ala (series 3 image 243), 4.7 x 4.0 x 5.6 cm (axial and CC dimensions, respectively) hypoattenuating lesion in the subcutaneous tissue of the anterior chest wall just anterior to the mediastinum, indeterminate, could represent sebaceous cyst.  2/4/21- Saw Dr. Bourgeois ordered PET/CT  3/2/21-3/10/21- Admission to [facility]- <24h of inability to ambulate with acute onset left leg weakness/urinary retention with perianal sensory deficits, MRI showed extensive epidural signal change suggestive of tumor vs. Hemorrhage.Pt underwent emergent surgery- L2-L5 laminectomy and decompression . PEG tube placement 3/5/2021.   3/17/21-3/23/21: Radiation to LS spine  3/24/21: gamma knife (2000cGy in 5 fractions)  3/29/21- port placement  3/31/21: Cycle 1 Cisplatin+keytruda+5fu   4/21/21: Cycle 2 Cisplatin+keytruda+5fu  5/11/21- Ct CAP- Overall stable disease- mixed response- RI in 2 liver lesions while, stable disease to borderline progression in 1 liver lesions  5/12/21- Cycle 3 Cisplatin+keytruda+5fu  6/2/21- Cycle 4 Cisplatin+keytruda+5fu  6/21/21- CT CAP- Stable mild circumferential wall thickening of the distal esophagus (series 3, image 99) with small amount of fluid in the upper and mid esophagus. No lymphadenopathy. Stable to slight increased size of hepatic metastases. For example a 3.1 x 2.9 cm lesion in the left lobe (series 3, image 123) previously measured 2.9 x 2.6 cm, and a 2.7 x 2.9 cm peripheral right hepatic lobe metastasis previously measured 3.0 x 2.2 cm. A 1.0 cm lesion in the  caudate lobe (series 3, image 138) is more prominent today/new. Stable small gastrohepatic ligament lymph nodes. No new or enlarging lymph nodes in the abdomen and pelvis. No ascites.Stable 5.1 x 4.6 cm left sacral mass. Stable cystic 4.9 x 3.5 cm subcutaneous lesion overlying the midsternum.  6/24/21- Cycle 5 Cisplatin+keytruda+5fu  7/14/21- Cycle 6 Cisplatin+keytruda+5fu  8/3/21- Ct CAP- Few small pulmonary nodules are stable. Stable mild circumferential wall thickening of the distal esophagus (series 3, image 97).  Increased size of hepatic metastases. For example a 3.5  x 3.5 cm left lobe metastasis (series 3, image 117) previously measured 2.9 x 3.1 cm, a 3.5 x 2.7 cm subcapsular right hepatic lobe metastasis (series 3, image 158) previously measured 2.7 x 2.9 cm and  a 1.7 x 1.5 cm lesion in the left lobe adjacent to the caudate (series 3, image 124) previously measured 1.0 cm.  Stable 5.2 x 4.9 cm lytic lesion in the left sacrum previously measuring 5.2 x 4.6 cm (3, image 238). Stable sclerotic lesion in the anterior aspect of the T4 vertebral body measuring 1.3 cm (series 3, image 39) and sclerosis of the posterior left third rib.. No new lesions. Stable 5.2 cm cystic subcutaneous lesion overlying the sternum  8/5, 8/26, 9/16- Pembrolizumab and 5-FU  9/10/21- Y90 delivery in the left hepatic lobe and segment 5 lesions.   9/13/21- Brain MRI: no progression or distal metatasis  9/28/21- Ct CAP- Multiple left and right hepatic nodular masses identified consistent with metastatic disease. Several appear to be new or are larger worrisome for progression. One lesion is slightly smaller along  the anterior aspect of hepatic segment 2. Stable destructive sacral bone lesion and sclerosis at T4 and left third rib. Stable distal esophageal wall thickening. Stable but indeterminant soft tissue surrounding portions of the left gastric artery at the upper midabdomen.  11/26/21- CT CAP-  Interval progression of disease:  Enlarging hepatic metastatic foci, Wall thickening involving the distal thoracic esophagus appears to involve a more proximal distal extent compared to prior,  Stable to mildly enlarged nodular thickening about the left gastric artery. New abnormal left periaortic retroperitoneal lymphadenopathy. Enlarging left lytic sacral mass.      Interval history:   Junito is doing ok overall. He states that he has had some nausea that last couple of days with eating associated with gas and hiccups/burping. He states this feels different than his acid reflux in the past. He was prescribed omeprazole at one time but has not taken this or tried anything else. Aside from these symptoms he feels as though he has been eating well but states he could be drinking more water. Additionally, he endorses intermittent mid to right mid-low abdominal pain that occurs at random times. He has occasionally has constipation that he manages with Dulcolax. No vomiting or diarrhea. No mouth or lip sores.     He has stable mild low back pain with hip and top of butt pain, radiating down the left leg/foot usually round a 2 on a pain scale from 1-10. At baseline, he has constant numbness to his left foot that has been stable. Has not been taking gabapentin but states that he probably should be taking it. His mood has been stable. Good energy, sleeping well. He has been doing baking, holiday cookies with 19 different types. He has occasional cough, in the morning, self limiting and stable.    No chest pain, no COLÓN/SOB. No fevers, chills, LE edema, urinary changes. No new weakness. No headaches, vision changes.     Comprehensive ROS was unremarkable except as detailed above.      Current Outpatient Medications   Medication Sig Dispense Refill     bacitracin 500 UNIT/GM external ointment Apply topically 2 times daily Until the sore are well healed (Patient not taking: Reported on 11/29/2021) 113.4 g 2     Calcium Carb-Cholecalciferol (CALCIUM-VITAMIN D)  600-400 MG-UNIT TABS Take 2 tablets by mouth daily 60 tablet 3     gabapentin (NEURONTIN) 250 MG/5ML solution Take 5 mLs (250 mg) by mouth At Bedtime (Patient not taking: Reported on 10/7/2021) 470 mL 1     multivitamin CF FORMULA (CHOICEFUL) chewable tablet Take 1 tablet by mouth daily       omeprazole (FIRST-OMEPRAZOLE) 2 MG/ML SUSP Take 10 mLs (20 mg) by mouth 2 times daily (Patient not taking: Reported on 9/29/2021) 300 mL 1     ondansetron (ZOFRAN) 4 MG tablet Take 1-2 tablets (4-8 mg) by mouth every 6 hours as needed for nausea (vomiting) (Patient not taking: Reported on 10/7/2021) 40 tablet 0      No Known Allergies    Exam:  There were no vitals taken for this visit.  Wt Readings from Last 4 Encounters:   11/18/21 77.2 kg (170 lb 3.2 oz)   10/28/21 78.2 kg (172 lb 4.8 oz)   10/07/21 77 kg (169 lb 11.2 oz)   09/29/21 77.1 kg (170 lb)     Video physical exam  General: Patient appears well in no acute distress.   Skin: No visualized rash or lesions on visualized skin  Eyes: EOMI, no erythema, sclera icterus or discharge noted  Resp: Appears to be breathing comfortably without accessory muscle usage, speaking in full sentences, no cough  MSK: Appears to have normal range of motion based on visualized movements  Neurologic: No apparent tremors, facial movements symmetric  Psych: affect good, alert and oriented    The rest of a comprehensive physical examination is deferred due to PHE (public health emergency) video restrictions    Labs:   Most Recent 3 CBC's:  Recent Labs   Lab Test 11/18/21  0831 10/28/21  0846 10/07/21  0853   WBC 4.4 4.5 3.8*   HGB 12.4* 12.4* 11.8*   MCV 89 90 90    293 217     Most Recent 3 BMP's:  Recent Labs   Lab Test 11/18/21  0831 10/28/21  0846 10/07/21  0853    139 138   POTASSIUM 4.0 4.3 3.9   CHLORIDE 110* 108 107   CO2 26 27 27   BUN 16 14 14   CR 0.76 0.70 0.79   ANIONGAP 3 4 4   ARDEN 9.1 9.1 8.8   * 94 103*    Most Recent 2 LFT's:  Recent Labs   Lab Test  11/18/21  0831 10/28/21  0846   AST 24 20   ALT 21 20   ALKPHOS 129 144   BILITOTAL 0.7 0.4    Most Recent TSH and T4:  Recent Labs   Lab Test 03/02/21  1426   TSH 1.40     I reviewed the above labs today.    Imaging:     EXAMINATION: CT CHEST/ABDOMEN/PELVIS W CONTRAST, 11/26/2021 11:38 AM     TECHNIQUE:  Helical CT images from the thoracic inlet through the  symphysis pubis were obtained  with contrast. Contrast dose: Isovue  370 104mL     COMPARISON: Abdominal MRI 10/13/2021, CT chest abdomen and pelvis  9/20/2021     HISTORY: Malignant neoplasm of lower third of esophagus (H)     FINDINGS:     Chest: Right IJ Port-A-Cath terminates in the low SVC. Normal thyroid.  No abnormal thoracic lymphadenopathy. Heart size is normal without  pericardial effusion. No central pulmonary embolism. Thickening of the  distal thoracic esophagus appears to involve more of the craniocaudal  dimension and measuring approximately 5 cm proximal distal, previously  4 cm. Cystlike focus in the anterior subcutaneous fat overlying the  sternal body today measures 5.1 x 3.4 x 5.9 cm, not substantially  changed.     The central tracheobronchial tree is patent. No pleural effusion. No  concerning pulmonary nodule.     Abdomen and pelvis: Redemonstration of metastatic hepatic lesions and  treatment related change. Multinodular enhancing focus in the caudal  aspect of segment 5 today measures approximately 4.6 x 5.5 cm in the  coronal plane on coronal image 35, previously 1.4 x 2.4 cm when  measured in a similar manner on prior CT 9/28/2021. Rounded focus  within hepatic segment 7 today measures up to 2.1 cm on series 4 image  61, previously 1.0 cm. Enlarging nonenhancing hypodensity in segment 5  measuring up to 6.3 cm on coronal image 38, previously 4.6 cm on prior  CT, possibly treatment related change.     Lobular nodularity about the gastric artery today measures up to 2.6 x  1.7 cm, similar to mildly enlarged compared to prior CT (series  3  image 267).     Enlarging left retroperitoneal periaortic retroperitoneal  lymphadenopathy (coronal series 4 image 52) measuring up to 10 mm  short axis on series 3 image 363. No abnormal pelvic lymphadenopathy.     The gallbladder, pancreas, and spleen are unremarkable. Normal right  adrenal gland. Mild thickening of the left adrenal gland, similar to  prior. Normal right kidney. Simple left renal cysts. No abnormally  dilated loops of large or small bowel. No free fluid in the pelvis.  The bladder is partially distended. Mild wall thickening of the  sigmoid colon and rectum with mild surrounding inflammatory changes.  Normal diameter of the abdominal aorta.     Bones and soft tissues: Lytic lesion of the left sacrum today measures  4.5 x 5.5 cm in the coronal plane, previously 3.9 x 4.6 cm. Partially  visualized lesion in the proximal right humeral shaft, previously  noted to be hypermetabolic on PET CT 2/12/2021. Grossly stable lytic  lesion in the medial right ilium.                                                                      IMPRESSION:      1. Interval progression of disease:  *  Enlarging hepatic metastatic foci.  *  Wall thickening involving the distal thoracic esophagus appears to  involve a more proximal distal extent compared to prior.  *  Stable to mildly enlarged nodular thickening about the left gastric  artery.  *  New abnormal left periaortic retroperitoneal lymphadenopathy.  *  Enlarging left lytic sacral mass.     2. Mild proctitis-sigmoid colitis.    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 Restaging scan after 2 cycles showed overall stable disease with - mixed response- NC in  esophgaus primary, and 2 liver lesions while, stable disease in spine, and stable to borderline progression in 1 liver lesions. Scan after 4 cycles showing stable disease in esophagus and LN and a some liver lesion. 2 lesions in liver are slightly bigger, one is more prominent than before. CT CAP after 6 cycles done on 8/3/21 demonstrated continued interval increase in the size of 2 liver lesions, stable osseous lesions and continued inconspicuity of the primary esophageal mass. Given the liver-isolated progressive disease, we recommended liver directed local therapy and Y-90 was complete 9/10    Chemotherapy wise, he completed 6 cycles of induction cisplatin, 5-FU and pembrolizumab thus was swithced to 5-FLU and pembrolizumab maintenance (i.e. drop cisplatin) with cycle 7. He complted 6 cycels of maintainece pembro+5flu and restagnig CT CAP 11/26 concerning for multifocal progressive disease.    Plan to switch treatment to paclitaxel+ramicirumab based on the [place] trial. Ramucirumab 8 mg/kg  Intravenously is given on days 1 and 15, plus paclitaxel 80 mg/m2 intravenously on days 1, 8, and 15 of a 28-day cycle (3 weeks on, 1 week off). Dr. Steven discussed the data behind rainbow trial, overall survival was significantly longer in the ramucirumab plus paclitaxel group than in the placebo plus paclitaxel group (median 9 6 months [95% CI 8 5-10 8] vs 7 4 months [95% CI 6 3-8 4], hazard ratio 0 807 [95% CI 0 678-0 962]; p=0 017). Common toxicities including hemorrhage, stroke, HTN, proteinuria, neuropathy, etc were discussed.     Overall plan  - Start C1 paclitaxel and ramicirumab tomorrow, 12/29. Reviewed common side effects again today with Junito and clarified cycle schedule.   - Spoke with Dr. Maurizio Machado's nurse, who will work on scheduling the C1 week 2 and 3 infusions. Junito has expressed that he prefers [facility]. Will do our best to accommodate.   - Will schedule C2 infusions at [facility]. C2 week 1  infusion on 1/6/21, week two 1/13/21, and week three 1/23/21.   - Virtual visit with Jaky Pugh C1D15 an D1 and 15 of cycle 2.    - Ct CAP scheduled on 2/7/212. F/U with Dr. Steven on 2/9/22.    #Brain mets   Follows with Dr. Velez. S/p GK 3/24. MRI Sept 13 with no new mets. Next in MR Brain and visit with Dr. Qureshi schedule for 12/15/21.      #Bone mets  Bone biopsy of left pelvic mass 2/26/21 confirmed metastasis. Finished radiation 3/23 to lumbosacral spine.   While he has not seen dentist in 4 yrs, discusseed small risk of osteonecrosis, 3-5% risk, he does not have any ongoing dental issues and has no tooth pain or caries.  -Zometa tomorrow, 12/9. Will administer every 4 weeks with the start of a his new treatment cycles, next due 1/6/21.   -Continue Calcium and Vitamin D.  -Is not currently taking gabapentin for pain.      #Dypshagia, malnutrition  PEG tube placed in OR (3/5) for nutritional support. Now with good PO intake. Continue PO intake as tolerated. Previously declined SLP follow up, can revisit as needed though dysphagia has improved with treatment.   --Of note, consideration given to esophageal stent vs XRT for esophageal debulking to address dysphagia inpatient in Feb/March 2021; however given likely improvement with chemo-immunotherapy, as well as risks of worsening cytopenias, elected not to.      #Cauda Equina due to tumor compression s/p L2-L5 laminectomy and decompression   Followed by neurosurgery (now prn) with improved exam after surgery. He is now ambulating with or without a walker, strength nearly back to baseline. He is aware of his activity and lifting restrictions. Has some L radiculopathy that he uses robaxan prn.   Most recent CT scan showing mild increase in the size of rt sacral lesion, however has no new symptoms, asked him to be vigilant for new symptoms. Of note, he does have baseline L leg numbness.     #Constipation  Continue Dulcolax prn. May also use miralax, or  MOM prn. Continue to avoid suppository while on chemo.    #GERD  Suspect his GI symptoms may be related to GERD/gastritis. Asked he restart omeprazole to see. Junito will let us know if he needs a refill or new prescription if his omeprazole is .      60 minutes spent on the date of the encounter doing chart review, review of test results, interpretation of tests, patient visit, documentation and discussion with other provider(s)     REBECA Aldridge     I, Jaky Pugh, was present with the DAVIDE student who participated in the service and in the documentation of the note.  I have verified the history and personally performed the physical exam and medical decision making.  I agree with the assessment and plan of care as documented in the note.      Jaky Pugh, CNP

## 2021-12-08 NOTE — PROGRESS NOTES
"Junito is a 61 year old who is being evaluated via a billable video visit.      How would you like to obtain your AVS? MyChart  If the video visit is dropped, the invitation should be resent by: Text to cell phone: [phone]  Will anyone else be joining your video visit? Rosa Isela Zhao   {If patient encounters technical issues they should call [phone] :[id number]}    Video Start Time: {video visit start/end time for provider to select:152948}  Video-Visit Details    Type of service:  Video Visit    Video End Time:{video visit start/end time for provider to select:503581}    Originating Location (pt. Location): {video visit patient location:042580::\"Home\"}    Distant Location (provider location):  [facility]     Platform used for Video Visit: {Virtual Visit Platforms:973003::\"AdCampWell\"}  "

## 2021-12-09 NOTE — NURSING NOTE
Chief Complaint   Patient presents with     Port Draw     Labs drawn via port by RN in lab. VS taken.      Port accessed with 20 gauge 3/4 inch flat needle and labs drawn by RN.  Port flushed with saline and heparin.  Pt tolerated well.  VS taken.  Pt checked in for next appt.    Mary Kate Florez RN

## 2021-12-09 NOTE — PROGRESS NOTES
Infusion Nursing Note:  Junito Wang presents today for Day 1 Cycle 1 Taxotere, Cytoxan.    Patient seen by provider : Yes: Jaky Pugh CNP  Date of visit: 12/8/21      present during visit today: Not Applicable.    Note: Junito arrives to infusion today doing well. He offers no changes or concerns since his visit with Jaky yesterday. Reviewed potential side effects and schedule of new chemo regimen today, as well as provided education handouts on Cyramza and Taxotere.     Intravenous Access:  Implanted Port.    Treatment Conditions:  Lab Results   Component Value Date    HGB 12.3 (L) 12/09/2021    WBC 4.8 12/09/2021    ANEU 5.1 07/14/2021    ANEUTAUTO 3.5 12/09/2021     12/09/2021      Lab Results   Component Value Date     11/18/2021    POTASSIUM 4.0 11/18/2021    MAG 2.2 11/18/2021    CR 0.76 11/18/2021    ARDEN 9.1 11/18/2021    BILITOTAL 0.6 12/09/2021    ALBUMIN 3.9 12/09/2021    ALT 18 12/09/2021    AST 32 12/09/2021     Results reviewed, labs MET treatment parameters, ok to proceed with treatment.  Urine protein: NEGATIVE.  /77.    Post Infusion Assessment:  Patient tolerated infusion without incident.  Blood return noted pre and post infusion.  Access discontinued per protocol.     Discharge Plan:   Patient declined prescription refills.  Discharge instructions reviewed with: Patient.  AVS to patient via Pikhub.  Patient will return 12/16 for next infusion appointment. Patient requests future infusions to be done at [facility]. Request sent to scheduling to make changes in location if possible. Patient is aware to watch [employer] for any changes to schedule.   Patient discharged in stable condition accompanied by: self.  Departure Mode: Ambulatory.  Face to Face time: 0.    Vanessa Bae RN

## 2021-12-09 NOTE — PROGRESS NOTES
This is a recent snapshot of the patient's [facility] medical record.  For current drug dose and complete information and questions, call [phone] or In Basket pool, fv home infusion ([id number])  CSN Number:  [id number]

## 2021-12-15 NOTE — PROGRESS NOTES
FOLLOW-UP VISIT    Patient Name: Junito Wang      : 1960     Age: 61 year old        ______________________________________________________________________________     Chief Complaint   Patient presents with     Cancer     follow up for radiation oncology      /87 (BP Location: Right arm, Patient Position: Sitting, Cuff Size: Adult Regular)   Pulse 93   Resp 16   Wt 74.8 kg (165 lb)   SpO2 99%   BMI 23.68 kg/m       Date Radiation Completed:   2) 20 Gy GK SRS -- left choroid plexus 3/24/21   1) 20 Gy in 5 fractions -- lumbar spine completed 3/23/21    Y-90 in September with Dr. Meléndez     Pain  Current history of pain associated with this visit:   Intensity: 2/10  Current: dull  Location: left hip  Treatment: rest    Labs  Other Labs: Yes: CBC/CMP 21    Imaging  MRI: Brain & Abd 12/15/2021        Other Appointments:     MD Name: Chemo Appointment Date: 2021   MD Name: Appointment Date:   MD Name: Appointment Date:   Other Appointment Notes:     Residual Radiation side effect: denies     Additional Instructions:     Nurse face-to-face time: Level 2:  5 min face to face time

## 2021-12-15 NOTE — PROGRESS NOTES
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     RADIATION THERAPY DELIVERED:   2) 20 Gy GK SRS -- left choroid plexus 3/24/21   1) 20 Gy in 5 fractions -- lumbar spine completed 3/23/21    INTERVAL SINCE COMPLETION OF RADIATION THERAPY:   9 months    SUBJECTIVE:   Junito Wang is a 61 year old male with a known diagnosis of widely metastatic adenocarcinoma of the esophagus with brain, liver and bone metastasis.    Initial PET/CT on 21 demonstrated hypermetabolic mass in the distal esophagus extending into the gastric cardia with an SUV max of 16.7.  There were multiple hypermetabolic mediastinal lymph nodes in both the paraesophageal and subcarinal spaces.  There was also a hypoenhancing hypermetabolic mass in the liver in segment 7 measuring 1.8 x 1.7 cm with SUV max 8.3.  There are 2 additional nodules with FDG avidity 1 in segment 5 and the other in the margins of segments 5 and 6.  The patient also had a hypermetabolic lytic mass in the left sacrum extending into the left presacral space that measured 5.7 x 3.8 x 7.5 cm with an SUV max of 10.8. There was also a hypermetabolic mass in the proximal right humerus measuring 2.1 x 1.6 cm with SUV max 7.2. MRI of the brain on 21 showed a 1.9 x 1.4 x 1.2 cm enhancing lesion in the left choroid plexus adjacent to the left mesial temporal lobe.   He underwent a biopsy of the left pelvic bone mass on 2021 that was consistent with metastatic adenocarcinoma, consistent with known esophageal primary.    Shortly thereafter, he presented to emergency department for evaluation of severe low back pain and difficulty urinating for which he had an MRI of the lumbar  spine on 3/2/2021 that demonstrated a new posteriorly infiltrating T2 flair hyperintense appearance with internal septations within the lumbar spinal cord causing severe compression of the thecal sac and spinal canal extending from T12 down to the sacrum.  He underwent emergent laminectomy and decompression of the epidural hematoma.    He was referred to us and received a course of GK SRS to the solitary cranial metastasis as well as a course of palliative radiation therapy to the lumbar spine as outlined above. After completion of radiation treatment, he began systemic therapy with cisplatin/5-FU/pembrolizumab under care of Dr. Steven, which was switched to 5-FU/pembroluzumab 8/5/21.  He then underwent radio embolization of metastatic hepatic lesions (Y90) on 9/10/2021 by Dr. Meléndez and Dr. Baptiste.    Systemic therapy was switched to paclitaxel / ramucirumab on 12/9/21 given interval disease progression seen on CT CAP.    Brain MRI was done earlier today. On initial review, there appears to be at least 3 new lesions.     Mr. Wang reports he has been feeling fine overall without any new symptoms. Specifically, he is not experiencing any new neurological symptoms such as headaches, n/v, or focal weakness.     PHYSICAL EXAM:  /87 (BP Location: Right arm, Patient Position: Sitting, Cuff Size: Adult Regular)   Pulse 93   Resp 16   Wt 74.8 kg (165 lb)   SpO2 99%   BMI 23.68 kg/m       Gen: Alert, in NAD  HENT      Head: NC/AT     Ears: No external auricular lesions     Nose/sinus: No rhinorrhea or epistaxis  Pulm: Breathing comfortably on room air, no audible wheezes or ronchi  CV: Well-perfused, no cyanosis  Skin: Normal color and turgor  Neurologic/MSK: Alert and oriented x3, symmetric face, normal and symmetric movements of upper and lower extremities  Psych: Appropriate mood and affect    IMAGING:  MR brain today: On initial review with neuroradiology, there appears to be at least 3 new small lesions, 2  of which are in the brainstem and 1 is of the right occipital    IMPRESSION:   Mr. Wang is a 61 year old male with widely metastatic adenocarcinoma of the esophagus with brain, liver and osseous metastasis. He had previously received GK SRS for a solitary intracranial metastasis and palliative radiotherapy to lumbar spine. MRI brain from earlier today demonstrated at least 3 small new lesions.     PLAN:   A course of Gamma Knife radiosurgery was recommended for local control of the 3 newly found brain metastases. Given that  these lesions are all small, we proposed a single fraction treatment with a frame.     The logistics and potential acute / late toxicities associated with GK-SRS were explained in detail. Informed consent was obtained.      Patient was seen with Dr. Qureshi.    Aniya Nobles MD PGY3    I was personally present with the resident during the history and exam.  I   discussed the case with the resident and agree with the findings and plan   of care as documented in the resident's note.    Emilia Qureshi   [phone]

## 2021-12-15 NOTE — LETTER
12/15/2021         RE: Junito Wang  [address] [facility] [place] [id number]        Dear Colleague,    Thank you for referring your patient, Junito Wang, to the [facility] RADIATION ONCOLOGY. Please see a copy of my visit note below.       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     RADIATION THERAPY DELIVERED:   2) 20 Gy GK SRS -- left choroid plexus 3/24/21   1) 20 Gy in 5 fractions -- lumbar spine completed 3/23/21    INTERVAL SINCE COMPLETION OF RADIATION THERAPY:   9 months    SUBJECTIVE:   Junito Wang is a 61 year old male with a known diagnosis of widely metastatic adenocarcinoma of the esophagus with brain, liver and bone metastasis.    Initial PET/CT on 21 demonstrated hypermetabolic mass in the distal esophagus extending into the gastric cardia with an SUV max of 16.7.  There were multiple hypermetabolic mediastinal lymph nodes in both the paraesophageal and subcarinal spaces.  There was also a hypoenhancing hypermetabolic mass in the liver in segment 7 measuring 1.8 x 1.7 cm with SUV max 8.3.  There are 2 additional nodules with FDG avidity 1 in segment 5 and the other in the margins of segments 5 and 6.  The patient also had a hypermetabolic lytic mass in the left sacrum extending into the left presacral space that measured 5.7 x 3.8 x 7.5 cm with an SUV max of 10.8. There was also a hypermetabolic mass in the proximal right humerus measuring 2.1 x 1.6 cm with SUV max 7.2. MRI of the brain on 21 showed a 1.9 x 1.4 x 1.2 cm enhancing lesion in the left choroid plexus adjacent to the left mesial temporal lobe.   He underwent a biopsy of the left pelvic bone mass on  2/26/2021 that was consistent with metastatic adenocarcinoma, consistent with known esophageal primary.    Shortly thereafter, he presented to emergency department for evaluation of severe low back pain and difficulty urinating for which he had an MRI of the lumbar spine on 3/2/2021 that demonstrated a new posteriorly infiltrating T2 flair hyperintense appearance with internal septations within the lumbar spinal cord causing severe compression of the thecal sac and spinal canal extending from T12 down to the sacrum.  He underwent emergent laminectomy and decompression of the epidural hematoma.    He was referred to us and received a course of GK SRS to the solitary cranial metastasis as well as a course of palliative radiation therapy to the lumbar spine as outlined above. After completion of radiation treatment, he began systemic therapy with cisplatin/5-FU/pembrolizumab under care of Dr. Steven, which was switched to 5-FU/pembroluzumab 8/5/21.  He then underwent radio embolization of metastatic hepatic lesions (Y90) on 9/10/2021 by Dr. Meléndez and Dr. Baptiste.    Systemic therapy was switched to paclitaxel / ramucirumab on 12/9/21 given interval disease progression seen on CT CAP.    Brain MRI was done earlier today. On initial review, there appears to be at least 3 new lesions.     Mr. Wang reports he has been feeling fine overall without any new symptoms. Specifically, he is not experiencing any new neurological symptoms such as headaches, n/v, or focal weakness.     PHYSICAL EXAM:  /87 (BP Location: Right arm, Patient Position: Sitting, Cuff Size: Adult Regular)   Pulse 93   Resp 16   Wt 74.8 kg (165 lb)   SpO2 99%   BMI 23.68 kg/m       Gen: Alert, in NAD  HENT      Head: NC/AT     Ears: No external auricular lesions     Nose/sinus: No rhinorrhea or epistaxis  Pulm: Breathing comfortably on room air, no audible wheezes or ronchi  CV: Well-perfused, no cyanosis  Skin: Normal color and  del  Neurologic/MSK: Alert and oriented x3, symmetric face, normal and symmetric movements of upper and lower extremities  Psych: Appropriate mood and affect    IMAGING:  MR brain today: On initial review with neuroradiology, there appears to be at least 3 new small lesions, 2 of which are in the brainstem and 1 is of the right occipital    IMPRESSION:   Mr. Wang is a 61 year old male with widely metastatic adenocarcinoma of the esophagus with brain, liver and osseous metastasis. He had previously received GK SRS for a solitary intracranial metastasis and palliative radiotherapy to lumbar spine. MRI brain from earlier today demonstrated at least 3 small new lesions.     PLAN:   A course of Gamma Knife radiosurgery was recommended for local control of the 3 newly found brain metastases. Given that  these lesions are all small, we proposed a single fraction treatment with a frame.     The logistics and potential acute / late toxicities associated with GK-SRS were explained in detail. Informed consent was obtained.      Patient was seen with Dr. Qureshi.    Aniya Nobles MD PGY3    I was personally present with the resident during the history and exam.  I   discussed the case with the resident and agree with the findings and plan   of care as documented in the resident's note.    Emilia Qureshi   [phone]       FOLLOW-UP VISIT    Patient Name: Junito Wang      : 1960     Age: 61 year old        ______________________________________________________________________________     Chief Complaint   Patient presents with     Cancer     follow up for radiation oncology      /87 (BP Location: Right arm, Patient Position: Sitting, Cuff Size: Adult Regular)   Pulse 93   Resp 16   Wt 74.8 kg (165 lb)   SpO2 99%   BMI 23.68 kg/m       Date Radiation Completed:   2) 20 Gy GK SRS -- left choroid plexus 3/24/21   1) 20 Gy in 5 fractions -- lumbar spine completed 3/23/21    Y-90 in September with   Young     Pain  Current history of pain associated with this visit:   Intensity: 2/10  Current: dull  Location: left hip  Treatment: rest    Labs  Other Labs: Yes: CBC/CMP 12/9/21    Imaging  MRI: Brain & Abd 12/15/2021        Other Appointments:     MD Name: Chemo Appointment Date: 12/16/2021   MD Name: Appointment Date:   MD Name: Appointment Date:   Other Appointment Notes:     Residual Radiation side effect: denies     Additional Instructions:     Nurse face-to-face time: Level 2:  5 min face to face time        Again, thank you for allowing me to participate in the care of your patient.        Sincerely,        Emilia Qureshi MD

## 2021-12-16 NOTE — PATIENT INSTRUCTIONS
[facility] Triage and after hours / weekends / holidays:  [phone]    Please call the triage or after hours line if you experience a temperature greater than or equal to 100.5, shaking chills, have uncontrolled nausea, vomiting and/or diarrhea, dizziness, shortness of breath, chest pain, bleeding, unexplained bruising, or if you have any other new/concerning symptoms, questions or concerns.      If you are having any concerning symptoms or wish to speak to a provider before your next infusion visit, please call your care coordinator or triage to notify them so we can adequately serve you.     If you need a refill on a narcotic prescription or other medication, please call before your infusion appointment.                 December 2021 Sunday Monday Tuesday Wednesday Thursday Friday Saturday                  1     2     3     4       5     6     7     8    VIDEO VISIT RETURN   2:15 PM   (45 min.)   Jaky Pugh CNP   [facility] 9    LAB CENTRAL   9:30 AM   (15 min.)   [facility] LAB DRAW   [facility]    ONC INFUSION 2 HR (120 MIN)  10:00 AM   (120 min.)    ONC INFUSION NURSE   [facility] 10     11       12     13     14     15    MR ABDOMEN WWO   8:30 AM   (45 min.)   UR2   [facility]    MR BRAIN WWO   9:00 AM   (45 min.)   [facility] [facility]    RETURN   1:30 PM   (30 min.)   Emilia Qureshi MD   [facility] Radiation Oncology 16    LAB CENTRAL   1:00 PM   (15 min.)   [facility] DRAW   [facility]    ONC INFUSION 2 HR (120 MIN)   1:30 PM   (120 min.)    ONC INFUSION NURSE   [facility] 17     18       19     20     21     22    VIDEO VISIT RETURN  10:00 AM   (45 min.)   Jaky Pugh CNP   [facility] 23    GAMMA TREATMENT   5:30 AM   (300 min.)   Emilia Qureshi  MD   [facility] Radiation Oncology Gamma Knife    MR BRAIN W   7:00 AM   (30 min.)   UUMR1   [facility]    GAMMA TREATMENT   7:00 AM   (60 min.)   Chas Baptiste MD   [facility] Radiation Oncology Gamma Knife    PROCEDURE - 2 HR   8:00 AM   (120 min.)   U2A ROOM 4   [facility] [address] [place]    LAB CENTRAL   1:00 PM   (15 min.)    [facility] DRAW   [facility]    ONC INFUSION 2 HR (120 MIN)   1:30 PM   (120 min.)   UC ONC INFUSION NURSE   [facility] 24     25       26     27     28     29     30    VIDEO VISIT RETURN   6:45 AM   (45 min.)   Colleen Crowell APRN CNP   [facility]    LAB CENTRAL   9:00 AM   (15 min.)   NURSE ONLY CANCER CENTER   [facility] 31 January 2022 Sunday Monday Tuesday Wednesday Thursday Friday Saturday                                 1       2     3    RETURN  12:45 PM   (20 min.)   Rosales Meléndez MD   [facility] 4     5    VIDEO VISIT RETURN   4:15 PM   (45 min.)   Jaky Pugh CNP   [facility] 6    LAB CENTRAL  10:30 AM   (15 min.)   [facility] DRAW   [facility]    ONC INFUSION 2 HR (120 MIN)  11:00 AM   (120 min.)   UC ONC INFUSION NURSE   [facility] 7    RETURN  11:30 AM   (30 min.)   Angelica Lloyd PA-C   [facility] 8       9     10     11     12     13    LAB CENTRAL   7:45 AM   (15 min.)   NURSE ONLY CANCER CENTER   [facility]    INFUSION 1.5 HR (90 MIN)   8:30 AM   (90 min.)   BAY 9 INFUSION   [facility] 14     15       16     17     18     19    VIDEO VISIT RETURN   4:15 PM   (45 min.)   Jaky Pugh CNP   [facility]  20    LAB CENTRAL   7:45 AM   (15 min.)   NURSE ONLY CANCER CENTER   [facility]    INFUSION 1.5 HR (90 MIN)   8:30 AM   (90 min.)   BAY 4 INFUSION   [facility] 21     22       23     24     25     26     27     28     29       30     31                                             Recent Results (from the past 24 hour(s))   CBC with platelets and differential    Collection Time: 12/16/21  1:19 PM   Result Value Ref Range    WBC Count 3.4 (L) 4.0 - 11.0 10e3/uL    RBC Count 4.25 (L) 4.40 - 5.90 10e6/uL    Hemoglobin 12.2 (L) 13.3 - 17.7 g/dL    Hematocrit 38.3 (L) 40.0 - 53.0 %    MCV 90 78 - 100 fL    MCH 28.7 26.5 - 33.0 pg    MCHC 31.9 31.5 - 36.5 g/dL    RDW 17.2 (H) 10.0 - 15.0 %    Platelet Count 267 150 - 450 10e3/uL    % Neutrophils 68 %    % Lymphocytes 17 %    % Monocytes 9 %    % Eosinophils 5 %    % Basophils 1 %    % Immature Granulocytes 0 %    NRBCs per 100 WBC 0 <1 /100    Absolute Neutrophils 2.3 1.6 - 8.3 10e3/uL    Absolute Lymphocytes 0.6 (L) 0.8 - 5.3 10e3/uL    Absolute Monocytes 0.3 0.0 - 1.3 10e3/uL    Absolute Eosinophils 0.2 0.0 - 0.7 10e3/uL    Absolute Basophils 0.0 0.0 - 0.2 10e3/uL    Absolute Immature Granulocytes 0.0 <=0.4 10e3/uL    Absolute NRBCs 0.0 10e3/uL         [facility] Triage and after hours / weekends / holidays:  [phone]    Please call the triage or after hours line if you experience a temperature greater than or equal to 100.5, shaking chills, have uncontrolled nausea, vomiting and/or diarrhea, dizziness, shortness of breath, chest pain, bleeding, unexplained bruising, or if you have any other new/concerning symptoms, questions or concerns.      If you are having any concerning symptoms or wish to speak to a provider before your next infusion visit, please call your care coordinator or triage to notify them so we can adequately serve you.     If you need a refill on a narcotic prescription or other  medication, please call before your infusion appointment.                 December 2021 Sunday Monday Tuesday Wednesday Thursday Friday Saturday                  1     2     3     4       5     6     7     8    VIDEO VISIT RETURN   2:15 PM   (45 min.)   Jaky Pugh CNP   [facility] 9    LAB CENTRAL   9:30 AM   (15 min.)   [facility] DRAW   [facility]    ONC INFUSION 2 HR (120 MIN)  10:00 AM   (120 min.)   [facility] ONC INFUSION NURSE   [facility] 10     11       12     13     14     15    MR ABDOMEN WWO   8:30 AM   (45 min.)   UUMR2   [facility]    MR BRAIN WWO   9:00 AM   (45 min.)   UR2   [facility]    RETURN   1:30 PM   (30 min.)   Emilia Qureshi MD   [facility] Radiation Oncology 16    LAB CENTRAL   1:00 PM   (15 min.)   [facility]    ONC INFUSION 2 HR (120 MIN)   1:30 PM   (120 min.)   UC ONC INFUSION NURSE   [facility] 17     18       19     20     21     22    VIDEO VISIT RETURN  10:00 AM   (45 min.)   Jaky Pugh CNP   [facility] 23    GAMMA TREATMENT   5:30 AM   (300 min.)   Emilia Qureshi MD   [facility] Radiation Oncology Gamma Knife    MR BRAIN W   7:00 AM   (30 min.)   UUMR1   [facility]    GAMMA TREATMENT   7:00 AM   (60 min.)   Chas Baptiste MD   [facility] Radiation Oncology Gamma Knife    PROCEDURE - 2 HR   8:00 AM   (120 min.)   U2A ROOM 4   [facility] [address] [place]    LAB CENTRAL   1:00 PM   (15 min.)   [facility]   [facility]    ONC INFUSION 2 HR (120 MIN)   1:30 PM   (120 min.)   UC ONC INFUSION NURSE   [facility] 24     25       26     27     28     29     30    VIDEO VISIT RETURN   6:45 AM   (45  min.)   Colleen Crowell APRN CNP   [facility]    LAB CENTRAL   9:00 AM   (15 min.)   NURSE ONLY CANCER CENTER   [facility] 31 January 2022 Sunday Monday Tuesday Wednesday Thursday Friday Saturday                                 1       2     3    RETURN  12:45 PM   (20 min.)   Rosales Meléndez MD   [facility] 4     5    VIDEO VISIT RETURN   4:15 PM   (45 min.)   Jaky Pugh CNP   [facility] 6    LAB CENTRAL  10:30 AM   (15 min.)    [facility] DRAW   [facility]    ONC INFUSION 2 HR (120 MIN)  11:00 AM   (120 min.)    ONC INFUSION NURSE   [facility] 7    RETURN  11:30 AM   (30 min.)   Angelica Lloyd PA-C   [facility] 8       9     10     11     12     13    LAB CENTRAL   7:45 AM   (15 min.)   NURSE ONLY CANCER CENTER   [facility]    INFUSION 1.5 HR (90 MIN)   8:30 AM   (90 min.)   BAY 9 INFUSION   [facility] 14     15       16     17     18     19    VIDEO VISIT RETURN   4:15 PM   (45 min.)   Jaky Pugh CNP   [facility] 20    LAB CENTRAL   7:45 AM   (15 min.)   NURSE ONLY CANCER [facility]    INFUSION 1.5 HR (90 MIN)   8:30 AM   (90 min.)   BAY 4 INFUSION   [facility] 21     22       23     24     25     26     27     28     29       30     31                                             Recent Results (from the past 24 hour(s))   CBC with platelets and differential    Collection Time: 12/16/21  1:19 PM   Result Value Ref Range    WBC Count 3.4 (L) 4.0 - 11.0 10e3/uL    RBC Count 4.25 (L) 4.40 - 5.90 10e6/uL    Hemoglobin 12.2 (L) 13.3 - 17.7 g/dL    Hematocrit 38.3 (L) 40.0 - 53.0 %    MCV 90 78 -  100 fL    MCH 28.7 26.5 - 33.0 pg    MCHC 31.9 31.5 - 36.5 g/dL    RDW 17.2 (H) 10.0 - 15.0 %    Platelet Count 267 150 - 450 10e3/uL    % Neutrophils 68 %    % Lymphocytes 17 %    % Monocytes 9 %    % Eosinophils 5 %    % Basophils 1 %    % Immature Granulocytes 0 %    NRBCs per 100 WBC 0 <1 /100    Absolute Neutrophils 2.3 1.6 - 8.3 10e3/uL    Absolute Lymphocytes 0.6 (L) 0.8 - 5.3 10e3/uL    Absolute Monocytes 0.3 0.0 - 1.3 10e3/uL    Absolute Eosinophils 0.2 0.0 - 0.7 10e3/uL    Absolute Basophils 0.0 0.0 - 0.2 10e3/uL    Absolute Immature Granulocytes 0.0 <=0.4 10e3/uL    Absolute NRBCs 0.0 10e3/uL

## 2021-12-16 NOTE — PROGRESS NOTES
Infusion Nursing Note:  Junito Wang presents today for Cycle 1 Day 8 Paclitaxel.    Patient seen by provider today: No   present during visit today: Not Applicable.    Note: pt assessed upon arrival to infusion.  Pt states that he has been feeling this past week.  Denies fever, chills, SOB, or cough.  Pt has been eating and drinking well; no nausea/vomiting.  Been able to shovel snow and bake [employer] cookies/treats.  Pt feels well for treatment today.    Reviewed today's labs and plan of care with patient.      Pt questions regarding his schedule.  IB message sent to RNCC and scheduling with patient's requests.    Intravenous Access:  Implanted Port.    Treatment Conditions:     Ref. Range 12/16/2021 13:19   WBC Latest Ref Range: 4.0 - 11.0 10e3/uL 3.4 (L)   Hemoglobin Latest Ref Range: 13.3 - 17.7 g/dL 12.2 (L)   Hematocrit Latest Ref Range: 40.0 - 53.0 % 38.3 (L)   Platelet Count Latest Ref Range: 150 - 450 10e3/uL 267   RBC Count Latest Ref Range: 4.40 - 5.90 10e6/uL 4.25 (L)   MCV Latest Ref Range: 78 - 100 fL 90   MCH Latest Ref Range: 26.5 - 33.0 pg 28.7   MCHC Latest Ref Range: 31.5 - 36.5 g/dL 31.9   RDW Latest Ref Range: 10.0 - 15.0 % 17.2 (H)   % Neutrophils Latest Units: % 68   % Lymphocytes Latest Units: % 17   % Monocytes Latest Units: % 9   % Eosinophils Latest Units: % 5   % Basophils Latest Units: % 1   Absolute Basophils Latest Ref Range: 0.0 - 0.2 10e3/uL 0.0   Absolute Eosinophils Latest Ref Range: 0.0 - 0.7 10e3/uL 0.2   Absolute Immature Granulocytes Latest Ref Range: <=0.4 10e3/uL 0.0   Absolute Lymphocytes Latest Ref Range: 0.8 - 5.3 10e3/uL 0.6 (L)   Absolute Monocytes Latest Ref Range: 0.0 - 1.3 10e3/uL 0.3   % Immature Granulocytes Latest Units: % 0   Absolute Neutrophils Latest Ref Range: 1.6 - 8.3 10e3/uL 2.3   Absolute NRBCs Latest Units: 10e3/uL 0.0   NRBCs per 100 WBC Latest Ref Range: <1 /100 0       Results reviewed, labs MET treatment parameters, ok to proceed with  treatment.      Post Infusion Assessment:  Patient tolerated infusion without incident.  Blood return noted pre and post infusion.  Site patent and intact, free from redness, edema or discomfort.  No evidence of extravasations.  Access discontinued per protocol.       Discharge Plan:   Patient declined prescription refills.  Discharge instructions reviewed with: Patient.  Patient and/or family verbalized understanding of discharge instructions and all questions answered.  AVS to patient via [employer].  Patient needs to reschedule his 12/23 appointment because he has gamma knife procedure. IB Message sent to scheduling  Patient discharged in stable condition accompanied by: self.  Departure Mode: Ambulatory.    Belén Boucher RN

## 2021-12-22 NOTE — PROGRESS NOTES
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    Treatment intent- Palliative    Oncology HPI:   August 2020- stomach discomfort, belching   October 2020- progressive dysphagia with solids   1/26/21- distal esophageal biopsy shows Moderately differentiated adenocarcinoma; MMR normal expression, PDL1 expression is low with TPS 2%, CPS 5-10, Her2 is pending  1/27/21- CT CAP- Enlarged  2 cm subcarinal lymph node and 1.4 cm short axis right subcarinal lymph node, focal thickening of the superior wall along the right side of the mid esophagus. Numerous small pulmonary nodules, 3 mm nodule in the superior segment of the left lower lobe, 3 mm nodule in the anterior aspect of the right upper lobe , 4 mm nodule in the medial aspect of the right upper lobe and 4 mm right lower lobe nodule. Hypoattenuating foci in the liver, 1.6 cm hypoattenuating/hypoenhancing lesion in hepatic segment 8 and 1.4 cm lesion in hepatic segment 5 , indeterminate, likely metastatic.  The prostate gland is mildly enlarged measuring 5.3 cm in transverse diameter. Multiple prominent but not significantly enlarged retroperitoneal lymph nodes, indeterminate, could be reactive. 4.4 x 4.3 cm lytic lesion centered in the posterior aspect of the left sacral ala (series 3 image 243), 4.7 x 4.0 x 5.6 cm (axial and CC dimensions, respectively) hypoattenuating  lesion in the subcutaneous tissue of the anterior chest wall just anterior to the mediastinum, indeterminate, could represent sebaceous cyst.  2/4/21- Saw Dr. Bourgeois ordered PET/CT  3/2/21-3/10/21- Admission to [facility]- <24h of inability to ambulate with acute onset left leg weakness/urinary retention with perianal sensory deficits, MRI showed extensive epidural signal change suggestive of tumor vs. Hemorrhage.Pt underwent emergent surgery- L2-L5 laminectomy and decompression . PEG tube placement 3/5/2021.   3/17/21-3/23/21: Radiation to LS spine  3/24/21: gamma knife (2000cGy in 5 fractions)  3/29/21- port placement  3/31/21: Cycle 1 Cisplatin+keytruda+5fu   4/21/21: Cycle 2 Cisplatin+keytruda+5fu  5/11/21- Ct CAP- Overall stable disease- mixed response- OH in 2 liver lesions while, stable disease to borderline progression in 1 liver lesions  5/12/21- Cycle 3 Cisplatin+keytruda+5fu  6/2/21- Cycle 4 Cisplatin+keytruda+5fu  6/21/21- CT CAP- Stable mild circumferential wall thickening of the distal esophagus (series 3, image 99) with small amount of fluid in the upper and mid esophagus. No lymphadenopathy. Stable to slight increased size of hepatic metastases. For example a 3.1 x 2.9 cm lesion in the left lobe (series 3, image 123) previously measured 2.9 x 2.6 cm, and a 2.7 x 2.9 cm peripheral right hepatic lobe metastasis previously measured 3.0 x 2.2 cm. A 1.0 cm lesion in the caudate lobe (series 3, image 138) is more prominent today/new. Stable small gastrohepatic ligament lymph nodes. No new or enlarging lymph nodes in the abdomen and pelvis. No ascites.Stable 5.1 x 4.6 cm left sacral mass. Stable cystic 4.9 x 3.5 cm subcutaneous lesion overlying the midsternum.  6/24/21- Cycle 5 Cisplatin+keytruda+5fu  7/14/21- Cycle 6 Cisplatin+keytruda+5fu  8/3/21- Ct CAP- Few small pulmonary nodules are stable. Stable mild circumferential wall thickening of the distal esophagus (series 3, image 97).  Increased size of hepatic  metastases. For example a 3.5  x 3.5 cm left lobe metastasis (series 3, image 117) previously measured 2.9 x 3.1 cm, a 3.5 x 2.7 cm subcapsular right hepatic lobe metastasis (series 3, image 158) previously measured 2.7 x 2.9 cm and  a 1.7 x 1.5 cm lesion in the left lobe adjacent to the caudate (series 3, image 124) previously measured 1.0 cm.  Stable 5.2 x 4.9 cm lytic lesion in the left sacrum previously measuring 5.2 x 4.6 cm (3, image 238). Stable sclerotic lesion in the anterior aspect of the T4 vertebral body measuring 1.3 cm (series 3, image 39) and sclerosis of the posterior left third rib.. No new lesions. Stable 5.2 cm cystic subcutaneous lesion overlying the sternum  8/5, 8/26, 9/16- Pembrolizumab and 5-FU  9/10/21- Y90 delivery in the left hepatic lobe and segment 5 lesions.   9/13/21- Brain MRI: no progression or distal metatasis  9/28/21- Ct CAP- Multiple left and right hepatic nodular masses identified consistent with metastatic disease. Several appear to be new or are larger worrisome for progression. One lesion is slightly smaller along  the anterior aspect of hepatic segment 2. Stable destructive sacral bone lesion and sclerosis at T4 and left third rib. Stable distal esophageal wall thickening. Stable but indeterminant soft tissue surrounding portions of the left gastric artery at the upper midabdomen.  11/26/21- CT CAP-  Interval progression of disease: Enlarging hepatic metastatic foci, Wall thickening involving the distal thoracic esophagus appears to involve a more proximal distal extent compared to prior,  Stable to mildly enlarged nodular thickening about the left gastric artery. New abnormal left periaortic retroperitoneal lymphadenopathy. Enlarging left lytic sacral mass.      Interval history:   Junito is doing ok overall. He states that he has had some nausea that last couple of days with eating associated with gas and hiccups/burping. He states this feels different than his acid reflux  in the past. He was prescribed omeprazole at one time but has not taken this or tried anything else. Aside from these symptoms he feels as though he has been eating well but states he could be drinking more water. Additionally, he endorses intermittent mid to right mid-low abdominal pain that occurs at random times. He has occasionally has constipation that he manages with Dulcolax. No vomiting or diarrhea. No mouth or lip sores.     He has stable mild low back pain with hip and top of butt pain, radiating down the left leg/foot usually round a 2 on a pain scale from 1-10. At baseline, he has constant numbness to his left foot that has been stable. Has not been taking gabapentin but states that he probably should be taking it. His mood has been stable. Good energy, sleeping well. He has been doing baking, holiday cookies with 19 different types. He has occasional cough, in the morning, self limiting and stable.    No chest pain, no COLÓN/SOB. No fevers, chills, LE edema, urinary changes. No new weakness. No headaches, vision changes.     Comprehensive ROS was unremarkable except as detailed above.      Current Outpatient Medications   Medication Sig Dispense Refill     bacitracin 500 UNIT/GM external ointment Apply topically 2 times daily Until the sore are well healed (Patient not taking: Reported on 11/29/2021) 113.4 g 2     Calcium Carb-Cholecalciferol (CALCIUM-VITAMIN D) 600-400 MG-UNIT TABS Take 2 tablets by mouth daily 60 tablet 3     gabapentin (NEURONTIN) 250 MG/5ML solution Take 5 mLs (250 mg) by mouth At Bedtime (Patient not taking: Reported on 10/7/2021) 470 mL 1     LORazepam (ATIVAN) 0.5 MG tablet Take 1 tablet (0.5 mg) by mouth every 4 hours as needed (Anxiety, Nausea/Vomiting or Sleep) 30 tablet 2     multivitamin CF FORMULA (CHOICEFUL) chewable tablet Take 1 tablet by mouth daily       omeprazole (FIRST-OMEPRAZOLE) 2 MG/ML SUSP Take 10 mLs (20 mg) by mouth 2 times daily (Patient not taking: Reported on  9/29/2021) 300 mL 1     ondansetron (ZOFRAN) 4 MG tablet Take 1-2 tablets (4-8 mg) by mouth every 6 hours as needed for nausea (vomiting) (Patient not taking: Reported on 10/7/2021) 40 tablet 0     prochlorperazine (COMPAZINE) 10 MG tablet Take 1 tablet (10 mg) by mouth every 6 hours as needed (Nausea/Vomiting) 30 tablet 2      No Known Allergies    Exam:  There were no vitals taken for this visit.  Wt Readings from Last 4 Encounters:   12/16/21 75.2 kg (165 lb 12.8 oz)   12/15/21 74.8 kg (165 lb)   12/09/21 75.8 kg (167 lb 1.6 oz)   11/18/21 77.2 kg (170 lb 3.2 oz)     Video physical exam  General: Patient appears well in no acute distress.   Skin: No visualized rash or lesions on visualized skin  Eyes: EOMI, no erythema, sclera icterus or discharge noted  Resp: Appears to be breathing comfortably without accessory muscle usage, speaking in full sentences, no cough  MSK: Appears to have normal range of motion based on visualized movements  Neurologic: No apparent tremors, facial movements symmetric  Psych: affect good, alert and oriented    The rest of a comprehensive physical examination is deferred due to PHE (public health emergency) video restrictions    Labs:   Most Recent 3 CBC's:  Recent Labs   Lab Test 12/16/21  1319 12/09/21  1019 11/18/21  0831   WBC 3.4* 4.8 4.4   HGB 12.2* 12.3* 12.4*   MCV 90 90 89    258 281     Most Recent 3 BMP's:  Recent Labs   Lab Test 11/18/21  0831 10/28/21  0846 10/07/21  0853    139 138   POTASSIUM 4.0 4.3 3.9   CHLORIDE 110* 108 107   CO2 26 27 27   BUN 16 14 14   CR 0.76 0.70 0.79   ANIONGAP 3 4 4   ARDEN 9.1 9.1 8.8   * 94 103*    Most Recent 2 LFT's:  Recent Labs   Lab Test 12/09/21  1019 11/18/21  0831   AST 32 24   ALT 18 21   ALKPHOS 114 129   BILITOTAL 0.6 0.7    Most Recent TSH and T4:  Recent Labs   Lab Test 03/02/21  1426   TSH 1.40     I reviewed the above labs today.    Imaging:     EXAMINATION: CT CHEST/ABDOMEN/PELVIS W CONTRAST, 11/26/2021  11:38 AM     TECHNIQUE:  Helical CT images from the thoracic inlet through the  symphysis pubis were obtained  with contrast. Contrast dose: Isovue  370 104mL     COMPARISON: Abdominal MRI 10/13/2021, CT chest abdomen and pelvis  9/20/2021     HISTORY: Malignant neoplasm of lower third of esophagus (H)     FINDINGS:     Chest: Right IJ Port-A-Cath terminates in the low SVC. Normal thyroid.  No abnormal thoracic lymphadenopathy. Heart size is normal without  pericardial effusion. No central pulmonary embolism. Thickening of the  distal thoracic esophagus appears to involve more of the craniocaudal  dimension and measuring approximately 5 cm proximal distal, previously  4 cm. Cystlike focus in the anterior subcutaneous fat overlying the  sternal body today measures 5.1 x 3.4 x 5.9 cm, not substantially  changed.     The central tracheobronchial tree is patent. No pleural effusion. No  concerning pulmonary nodule.     Abdomen and pelvis: Redemonstration of metastatic hepatic lesions and  treatment related change. Multinodular enhancing focus in the caudal  aspect of segment 5 today measures approximately 4.6 x 5.5 cm in the  coronal plane on coronal image 35, previously 1.4 x 2.4 cm when  measured in a similar manner on prior CT 9/28/2021. Rounded focus  within hepatic segment 7 today measures up to 2.1 cm on series 4 image  61, previously 1.0 cm. Enlarging nonenhancing hypodensity in segment 5  measuring up to 6.3 cm on coronal image 38, previously 4.6 cm on prior  CT, possibly treatment related change.     Lobular nodularity about the gastric artery today measures up to 2.6 x  1.7 cm, similar to mildly enlarged compared to prior CT (series 3  image 267).     Enlarging left retroperitoneal periaortic retroperitoneal  lymphadenopathy (coronal series 4 image 52) measuring up to 10 mm  short axis on series 3 image 363. No abnormal pelvic lymphadenopathy.     The gallbladder, pancreas, and spleen are unremarkable. Normal  right  adrenal gland. Mild thickening of the left adrenal gland, similar to  prior. Normal right kidney. Simple left renal cysts. No abnormally  dilated loops of large or small bowel. No free fluid in the pelvis.  The bladder is partially distended. Mild wall thickening of the  sigmoid colon and rectum with mild surrounding inflammatory changes.  Normal diameter of the abdominal aorta.     Bones and soft tissues: Lytic lesion of the left sacrum today measures  4.5 x 5.5 cm in the coronal plane, previously 3.9 x 4.6 cm. Partially  visualized lesion in the proximal right humeral shaft, previously  noted to be hypermetabolic on PET CT 2/12/2021. Grossly stable lytic  lesion in the medial right ilium.                                                                      IMPRESSION:      1. Interval progression of disease:  *  Enlarging hepatic metastatic foci.  *  Wall thickening involving the distal thoracic esophagus appears to  involve a more proximal distal extent compared to prior.  *  Stable to mildly enlarged nodular thickening about the left gastric  artery.  *  New abnormal left periaortic retroperitoneal lymphadenopathy.  *  Enlarging left lytic sacral mass.     2. Mild proctitis-sigmoid colitis.    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 Restaging scan after 2 cycles showed overall stable disease with - mixed response- RI in esophgaus primary, and 2 liver lesions while, stable disease in spine, and stable to borderline progression in 1 liver lesions. Scan after 4 cycles showing stable disease in esophagus and LN and a some liver lesion. 2 lesions in liver are slightly bigger, one is more prominent than  before. CT CAP after 6 cycles done on 8/3/21 demonstrated continued interval increase in the size of 2 liver lesions, stable osseous lesions and continued inconspicuity of the primary esophageal mass. Given the liver-isolated progressive disease, we recommended liver directed local therapy and Y-90 was complete 9/10    Chemotherapy wise, he completed 6 cycles of induction cisplatin, 5-FU and pembrolizumab thus was swithced to 5-FLU and pembrolizumab maintenance (i.e. drop cisplatin) with cycle 7. He complted 6 cycels of maintainece pembro+5flu and restagnig CT CAP 11/26 concerning for multifocal progressive disease.    Plan to switch treatment to paclitaxel+ramicirumab based on the [place] trial. Ramucirumab 8 mg/kg  Intravenously is given on days 1 and 15, plus paclitaxel 80 mg/m2 intravenously on days 1, 8, and 15 of a 28-day cycle (3 weeks on, 1 week off). Dr. Steven discussed the data behind rainbow trial, overall survival was significantly longer in the ramucirumab plus paclitaxel group than in the placebo plus paclitaxel group (median 9 6 months [95% CI 8 5-10 8] vs 7 4 months [95% CI 6 3-8 4], hazard ratio 0 807 [95% CI 0 678-0 962]; p=0 017). Common toxicities including hemorrhage, stroke, HTN, proteinuria, neuropathy, etc were discussed.     Overall plan  - Start C1 paclitaxel and ramicirumab tomorrow, 12/29. Reviewed common side effects again today with Junito and clarified cycle schedule.   - Spoke with Dr. Maurizio Machado's nurse, who will work on scheduling the C1 week 2 and 3 infusions. Junito has expressed that he prefers [facility]. Will do our best to accommodate.   - Will schedule C2 infusions at [facility]. C2 week 1 infusion on 1/6/21, week two 1/13/21, and week three 1/23/21.   - Virtual visit with Jaky Pugh C1D15 an D1 and 15 of cycle 2.    - Ct CAP scheduled on 2/7/212. F/U with Dr. Steven on 2/9/22.    #Brain mets   Follows with Dr. Velez. S/p GK 3/24. MRI Sept 13 with no new mets.  Next in MR Brain and visit with Dr. Qureshi schedule for 12/15/21.      #Bone mets  Bone biopsy of left pelvic mass 21 confirmed metastasis. Finished radiation 3/23 to lumbosacral spine.   While he has not seen dentist in 4 yrs, discusseed small risk of osteonecrosis, 3-5% risk, he does not have any ongoing dental issues and has no tooth pain or caries.  -Zometa tomorrow, . Will administer every 4 weeks with the start of a his new treatment cycles, next due 21.   -Continue Calcium and Vitamin D.  -Is not currently taking gabapentin for pain.      #Dypshagia, malnutrition  PEG tube placed in OR (3/5) for nutritional support. Now with good PO intake. Continue PO intake as tolerated. Previously declined SLP follow up, can revisit as needed though dysphagia has improved with treatment.   --Of note, consideration given to esophageal stent vs XRT for esophageal debulking to address dysphagia inpatient in 2021; however given likely improvement with chemo-immunotherapy, as well as risks of worsening cytopenias, elected not to.      #Cauda Equina due to tumor compression s/p L2-L5 laminectomy and decompression   Followed by neurosurgery (now prn) with improved exam after surgery. He is now ambulating with or without a walker, strength nearly back to baseline. He is aware of his activity and lifting restrictions. Has some L radiculopathy that he uses robaxan prn.   Most recent CT scan showing mild increase in the size of rt sacral lesion, however has no new symptoms, asked him to be vigilant for new symptoms. Of note, he does have baseline L leg numbness.     #Constipation  Continue Dulcolax prn. May also use miralax, or MOM prn. Continue to avoid suppository while on chemo.    #GERD  Suspect his GI symptoms may be related to GERD/gastritis. Asked he restart omeprazole to see. Junito will let us know if he needs a refill or new prescription if his omeprazole is .      REBECA Aldridge     I,  Jaky Pugh, was present with the DAV[employer] student who participated in the service and in the documentation of the note.  I have verified the history and personally performed the physical exam and medical decision making.  I agree with the assessment and plan of care as documented in the note.

## 2021-12-22 NOTE — PROGRESS NOTES
Junito is a 61 year old who is being evaluated via a billable video visit.      How would you like to obtain your AVS? [employer]  If the video visit is dropped, the invitation should be resent by: Text to cell phone: [phone]  Will anyone else be joining your video visit? No     Video Start Time: 2:20pm  Video-Visit Details    Type of service:  Video Visit    Video End Time:3:14pm    Originating Location (pt. 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    Treatment intent- Palliative    Oncology HPI:   August 2020- stomach discomfort, belching   October 2020- progressive dysphagia with solids   1/26/21- distal esophageal biopsy shows Moderately differentiated adenocarcinoma; MMR normal expression, PDL1 expression is low with TPS 2%, CPS 5-10, Her2 is pending  1/27/21- CT CAP- Enlarged  2 cm subcarinal lymph node and 1.4 cm short axis right subcarinal lymph node, focal thickening of the superior wall along the right side of the mid esophagus. Numerous small pulmonary nodules, 3 mm nodule in the superior segment of the left lower lobe, 3 mm nodule in the anterior aspect of the right upper lobe , 4 mm nodule in the medial aspect of the right upper lobe and 4 mm right lower lobe  nodule. Hypoattenuating foci in the liver, 1.6 cm hypoattenuating/hypoenhancing lesion in hepatic segment 8 and 1.4 cm lesion in hepatic segment 5 , indeterminate, likely metastatic.  The prostate gland is mildly enlarged measuring 5.3 cm in transverse diameter. Multiple prominent but not significantly enlarged retroperitoneal lymph nodes, indeterminate, could be reactive. 4.4 x 4.3 cm lytic lesion centered in the posterior aspect of the left sacral ala (series 3 image 243), 4.7 x 4.0 x 5.6 cm (axial and CC dimensions, respectively) hypoattenuating lesion in the subcutaneous tissue of the anterior chest wall just anterior to the mediastinum, indeterminate, could represent sebaceous cyst.  2/4/21- Saw Dr. Bourgeois ordered PET/CT  3/2/21-3/10/21- Admission to [facility]- <24h of inability to ambulate with acute onset left leg weakness/urinary retention with perianal sensory deficits, MRI showed extensive epidural signal change suggestive of tumor vs. Hemorrhage.Pt underwent emergent surgery- L2-L5 laminectomy and decompression . PEG tube placement 3/5/2021.   3/17/21-3/23/21: Radiation to LS spine  3/24/21: gamma knife (2000cGy in 5 fractions)  3/29/21- port placement  3/31/21: Cycle 1 Cisplatin+keytruda+5fu   4/21/21: Cycle 2 Cisplatin+keytruda+5fu  5/11/21- Ct CAP- Overall stable disease- mixed response- NE in 2 liver lesions while, stable disease to borderline progression in 1 liver lesions  5/12/21- Cycle 3 Cisplatin+keytruda+5fu  6/2/21- Cycle 4 Cisplatin+keytruda+5fu  6/21/21- CT CAP- Stable mild circumferential wall thickening of the distal esophagus (series 3, image 99) with small amount of fluid in the upper and mid esophagus. No lymphadenopathy. Stable to slight increased size of hepatic metastases. For example a 3.1 x 2.9 cm lesion in the left lobe (series 3, image 123) previously measured 2.9 x 2.6 cm, and a 2.7 x 2.9 cm peripheral right hepatic lobe metastasis previously measured 3.0 x 2.2 cm. A 1.0 cm lesion in the  caudate lobe (series 3, image 138) is more prominent today/new. Stable small gastrohepatic ligament lymph nodes. No new or enlarging lymph nodes in the abdomen and pelvis. No ascites.Stable 5.1 x 4.6 cm left sacral mass. Stable cystic 4.9 x 3.5 cm subcutaneous lesion overlying the midsternum.  6/24/21- Cycle 5 Cisplatin+keytruda+5fu  7/14/21- Cycle 6 Cisplatin+keytruda+5fu  8/3/21- Ct CAP- Few small pulmonary nodules are stable. Stable mild circumferential wall thickening of the distal esophagus (series 3, image 97).  Increased size of hepatic metastases. For example a 3.5  x 3.5 cm left lobe metastasis (series 3, image 117) previously measured 2.9 x 3.1 cm, a 3.5 x 2.7 cm subcapsular right hepatic lobe metastasis (series 3, image 158) previously measured 2.7 x 2.9 cm and  a 1.7 x 1.5 cm lesion in the left lobe adjacent to the caudate (series 3, image 124) previously measured 1.0 cm.  Stable 5.2 x 4.9 cm lytic lesion in the left sacrum previously measuring 5.2 x 4.6 cm (3, image 238). Stable sclerotic lesion in the anterior aspect of the T4 vertebral body measuring 1.3 cm (series 3, image 39) and sclerosis of the posterior left third rib.. No new lesions. Stable 5.2 cm cystic subcutaneous lesion overlying the sternum  8/5, 8/26, 9/16- Pembrolizumab and 5-FU  9/10/21- Y90 delivery in the left hepatic lobe and segment 5 lesions.   9/13/21- Brain MRI: no progression or distal metatasis  9/28/21- Ct CAP- Multiple left and right hepatic nodular masses identified consistent with metastatic disease. Several appear to be new or are larger worrisome for progression. One lesion is slightly smaller along  the anterior aspect of hepatic segment 2. Stable destructive sacral bone lesion and sclerosis at T4 and left third rib. Stable distal esophageal wall thickening. Stable but indeterminant soft tissue surrounding portions of the left gastric artery at the upper midabdomen.  11/26/21- CT CAP-  Interval progression of disease:  Enlarging hepatic metastatic foci, Wall thickening involving the distal thoracic esophagus appears to involve a more proximal distal extent compared to prior,  Stable to mildly enlarged nodular thickening about the left gastric artery. New abnormal left periaortic retroperitoneal lymphadenopathy. Enlarging left lytic sacral mass.  12/15/21- 3 new Brain Mets identified, Rad/Onc recommends gamma knife scheduled for 12/23/21.     Interval history:   Junito is doing ok overall. He states that he has had some nausea that last couple of days with eating associated with gas and hiccups/burping. He states this feels different than his acid reflux in the past. He was prescribed omeprazole at one time but has not taken this or tried anything else. Aside from these symptoms he feels as though he has been eating well but states he could be drinking more water. Additionally, he endorses intermittent mid to right mid-low abdominal pain that occurs at random times. He has occasionally has constipation that he manages with Dulcolax. No vomiting or diarrhea. No mouth or lip sores.     He has stable mild low back pain with hip and top of butt pain, radiating down the left leg/foot usually round a 2 on a pain scale from 1-10. At baseline, he has constant numbness to his left foot that has been stable. Has not been taking gabapentin but states that he probably should be taking it. His mood has been stable. Good energy, sleeping well. He has been doing baking, holiday cookies with 19 different types. He has occasional cough, in the morning, self limiting and stable.    No chest pain, no COLÓN/SOB. No fevers, chills, LE edema, urinary changes. No new weakness. No headaches, vision changes.     Comprehensive ROS was unremarkable except as detailed above.      Current Outpatient Medications   Medication Sig Dispense Refill     bacitracin 500 UNIT/GM external ointment Apply topically 2 times daily Until the sore are well healed (Patient not  taking: Reported on 11/29/2021) 113.4 g 2     Calcium Carb-Cholecalciferol (CALCIUM-VITAMIN D) 600-400 MG-UNIT TABS Take 2 tablets by mouth daily 60 tablet 3     gabapentin (NEURONTIN) 250 MG/5ML solution Take 5 mLs (250 mg) by mouth At Bedtime (Patient not taking: Reported on 10/7/2021) 470 mL 1     LORazepam (ATIVAN) 0.5 MG tablet Take 1 tablet (0.5 mg) by mouth every 4 hours as needed (Anxiety, Nausea/Vomiting or Sleep) 30 tablet 2     multivitamin CF FORMULA (CHOICEFUL) chewable tablet Take 1 tablet by mouth daily       omeprazole (FIRST-OMEPRAZOLE) 2 MG/ML SUSP Take 10 mLs (20 mg) by mouth 2 times daily (Patient not taking: Reported on 9/29/2021) 300 mL 1     ondansetron (ZOFRAN) 4 MG tablet Take 1-2 tablets (4-8 mg) by mouth every 6 hours as needed for nausea (vomiting) (Patient not taking: Reported on 10/7/2021) 40 tablet 0     prochlorperazine (COMPAZINE) 10 MG tablet Take 1 tablet (10 mg) by mouth every 6 hours as needed (Nausea/Vomiting) 30 tablet 2      No Known Allergies    Exam:  There were no vitals taken for this visit.  Wt Readings from Last 4 Encounters:   12/16/21 75.2 kg (165 lb 12.8 oz)   12/15/21 74.8 kg (165 lb)   12/09/21 75.8 kg (167 lb 1.6 oz)   11/18/21 77.2 kg (170 lb 3.2 oz)     Video physical exam  General: Patient appears well in no acute distress.   Skin: No visualized rash or lesions on visualized skin  Eyes: EOMI, no erythema, sclera icterus or discharge noted  Resp: Appears to be breathing comfortably without accessory muscle usage, speaking in full sentences, no cough  MSK: Appears to have normal range of motion based on visualized movements  Neurologic: No apparent tremors, facial movements symmetric  Psych: affect good, alert and oriented    The rest of a comprehensive physical examination is deferred due to PHE (public health emergency) video restrictions    Labs:   Most Recent 3 CBC's:  Recent Labs   Lab Test 12/16/21  1319 12/09/21  1019 11/18/21  0831   WBC 3.4* 4.8 4.4   HGB  12.2* 12.3* 12.4*   MCV 90 90 89    258 281     Most Recent 3 BMP's:  Recent Labs   Lab Test 11/18/21  0831 10/28/21  0846 10/07/21  0853    139 138   POTASSIUM 4.0 4.3 3.9   CHLORIDE 110* 108 107   CO2 26 27 27   BUN 16 14 14   CR 0.76 0.70 0.79   ANIONGAP 3 4 4   ARDEN 9.1 9.1 8.8   * 94 103*    Most Recent 2 LFT's:  Recent Labs   Lab Test 12/09/21  1019 11/18/21  0831   AST 32 24   ALT 18 21   ALKPHOS 114 129   BILITOTAL 0.6 0.7    Most Recent TSH and T4:  Recent Labs   Lab Test 03/02/21  1426   TSH 1.40     I reviewed the above labs today.    Imaging:     EXAMINATION: CT CHEST/ABDOMEN/PELVIS W CONTRAST, 11/26/2021 11:38 AM     TECHNIQUE:  Helical CT images from the thoracic inlet through the  symphysis pubis were obtained  with contrast. Contrast dose: Isovue  370 104mL     COMPARISON: Abdominal MRI 10/13/2021, CT chest abdomen and pelvis  9/20/2021     HISTORY: Malignant neoplasm of lower third of esophagus (H)     FINDINGS:     Chest: Right IJ Port-A-Cath terminates in the low SVC. Normal thyroid.  No abnormal thoracic lymphadenopathy. Heart size is normal without  pericardial effusion. No central pulmonary embolism. Thickening of the  distal thoracic esophagus appears to involve more of the craniocaudal  dimension and measuring approximately 5 cm proximal distal, previously  4 cm. Cystlike focus in the anterior subcutaneous fat overlying the  sternal body today measures 5.1 x 3.4 x 5.9 cm, not substantially  changed.     The central tracheobronchial tree is patent. No pleural effusion. No  concerning pulmonary nodule.     Abdomen and pelvis: Redemonstration of metastatic hepatic lesions and  treatment related change. Multinodular enhancing focus in the caudal  aspect of segment 5 today measures approximately 4.6 x 5.5 cm in the  coronal plane on coronal image 35, previously 1.4 x 2.4 cm when  measured in a similar manner on prior CT 9/28/2021. Rounded focus  within hepatic segment 7 today  measures up to 2.1 cm on series 4 image  61, previously 1.0 cm. Enlarging nonenhancing hypodensity in segment 5  measuring up to 6.3 cm on coronal image 38, previously 4.6 cm on prior  CT, possibly treatment related change.     Lobular nodularity about the gastric artery today measures up to 2.6 x  1.7 cm, similar to mildly enlarged compared to prior CT (series 3  image 267).     Enlarging left retroperitoneal periaortic retroperitoneal  lymphadenopathy (coronal series 4 image 52) measuring up to 10 mm  short axis on series 3 image 363. No abnormal pelvic lymphadenopathy.     The gallbladder, pancreas, and spleen are unremarkable. Normal right  adrenal gland. Mild thickening of the left adrenal gland, similar to  prior. Normal right kidney. Simple left renal cysts. No abnormally  dilated loops of large or small bowel. No free fluid in the pelvis.  The bladder is partially distended. Mild wall thickening of the  sigmoid colon and rectum with mild surrounding inflammatory changes.  Normal diameter of the abdominal aorta.     Bones and soft tissues: Lytic lesion of the left sacrum today measures  4.5 x 5.5 cm in the coronal plane, previously 3.9 x 4.6 cm. Partially  visualized lesion in the proximal right humeral shaft, previously  noted to be hypermetabolic on PET CT 2/12/2021. Grossly stable lytic  lesion in the medial right ilium.                                                                      IMPRESSION:      1. Interval progression of disease:  *  Enlarging hepatic metastatic foci.  *  Wall thickening involving the distal thoracic esophagus appears to  involve a more proximal distal extent compared to prior.  *  Stable to mildly enlarged nodular thickening about the left gastric  artery.  *  New abnormal left periaortic retroperitoneal lymphadenopathy.  *  Enlarging left lytic sacral mass.     2. Mild proctitis-sigmoid colitis.    Impression/plan:   Stage IV adenocarcinoma of the GE junction (Siewet  II),metastasis of liver, brain and spine  (AJCC 8th edition) diagnosed Jan 2021  -MMR proficient, HER2 by IHC is 2+, FISH neg  -PD-L1 CPS- 5-10%  -NGS by [facility]- No actionable mutations  # ECOG PS 1    Started chemoimmunotherpay with Cisplatin+5Fu+pembrolizumab based on KN-590 trial. Restaging scan after 2 cycles showed overall stable disease with - mixed response- DE in esophgaus primary, and 2 liver lesions while, stable disease in spine, and stable to borderline progression in 1 liver lesions. Scan after 4 cycles showing stable disease in esophagus and LN and a some liver lesion. 2 lesions in liver are slightly bigger, one is more prominent than before. CT CAP after 6 cycles done on 8/3/21 demonstrated continued interval increase in the size of 2 liver lesions, stable osseous lesions and continued inconspicuity of the primary esophageal mass. Given the liver-isolated progressive disease, we recommended liver directed local therapy and Y-90 was complete 9/10    Chemotherapy wise, he completed 6 cycles of induction cisplatin, 5-FU and pembrolizumab thus was swithced to 5-FLU and pembrolizumab maintenance (i.e. drop cisplatin) with cycle 7. He complted 6 cycels of maintainece pembro+5flu and restagnig CT CAP 11/26 concerning for multifocal progressive disease.    Plan to switch treatment to paclitaxel+ramicirumab based on the [place] trial. Ramucirumab 8 mg/kg  Intravenously is given on days 1 and 15, plus paclitaxel 80 mg/m2 intravenously on days 1, 8, and 15 of a 28-day cycle (3 weeks on, 1 week off). Dr. Steven discussed the data behind rainbow trial, overall survival was significantly longer in the ramucirumab plus paclitaxel group than in the placebo plus paclitaxel group (median 9 6 months [95% CI 8 5-10 8] vs 7 4 months [95% CI 6 3-8 4], hazard ratio 0 807 [95% CI 0 678-0 962]; p=0 017). Common toxicities including hemorrhage, stroke, HTN, proteinuria, neuropathy, etc were discussed.     Overall plan  -  Start C1 paclitaxel and ramicirumab tomorrow, 12/29. Reviewed common side effects again today with Junito and clarified cycle schedule.   - Spoke with Dr. Maurizio Machado's nurse, who will work on scheduling the C1 week 2 and 3 infusions. Junito has expressed that he prefers [place]. Will do our best to accommodate.   - Will schedule C2 infusions at [place]. C2 week 1 infusion on 1/6/21, week two 1/13/21, and week three 1/23/21.   - Virtual visit with Jaky Pugh C1D15 an D1 and 15 of cycle 2.    - Ct CAP scheduled on 2/7/212. F/U with Dr. Steven on 2/9/22.    #Brain mets   Follows with Dr. Velez. S/p GK 3/24. MRI Sept 13 with no new mets. Next in MR Brain and visit with Dr. Qureshi schedule for 12/15/21.      #Bone mets  Bone biopsy of left pelvic mass 2/26/21 confirmed metastasis. Finished radiation 3/23 to lumbosacral spine.   While he has not seen dentist in 4 yrs, discusseed small risk of osteonecrosis, 3-5% risk, he does not have any ongoing dental issues and has no tooth pain or caries.  -Zometa tomorrow, 12/9. Will administer every 4 weeks with the start of a his new treatment cycles, next due 1/6/21.   -Continue Calcium and Vitamin D.  -Is not currently taking gabapentin for pain.      #Dypshagia, malnutrition  PEG tube placed in OR (3/5) for nutritional support. Now with good PO intake. Continue PO intake as tolerated. Previously declined SLP follow up, can revisit as needed though dysphagia has improved with treatment.   --Of note, consideration given to esophageal stent vs XRT for esophageal debulking to address dysphagia inpatient in Feb/March 2021; however given likely improvement with chemo-immunotherapy, as well as risks of worsening cytopenias, elected not to.      #Cauda Equina due to tumor compression s/p L2-L5 laminectomy and decompression   Followed by neurosurgery (now prn) with improved exam after surgery. He is now ambulating with or without a walker, strength nearly back to  baseline. He is aware of his activity and lifting restrictions. Has some L radiculopathy that he uses robaxan prn.   Most recent CT scan showing mild increase in the size of rt sacral lesion, however has no new symptoms, asked him to be vigilant for new symptoms. Of note, he does have baseline L leg numbness.     #Constipation  Continue Dulcolax prn. May also use miralax, or MOM prn. Continue to avoid suppository while on chemo.    #GERD  Suspect his GI symptoms may be related to GERD/gastritis. Asked he restart omeprazole to see. Junito will let us know if he needs a refill or new prescription if his omeprazole is .      60 minutes spent on the date of the encounter doing chart review, review of test results, interpretation of tests, patient visit, documentation and discussion with other provider(s)     REBECA Aldridge     I, Jaky Pugh, was present with the DAVIDE student who participated in the service and in the documentation of the note.  I have verified the history and personally performed the physical exam and medical decision making.  I agree with the assessment and plan of care as documented in the note.      Humaira Rizzo

## 2021-12-22 NOTE — PROGRESS NOTES
Junito is a 61 year old who is being evaluated via a billable video visit.      How would you like to obtain your AVS? [employer]  If the video visit is dropped, the invitation should be resent by: Send to e-mail at: [email or web address]  Will anyone else be joining your video visit? No  Kari Echeverria VF    Video Start Time: 10:11 AM  Video-Visit Details  Type of service:  Video Visit    Video End Time:10:45am    Originating Location (pt. 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    Treatment intent- Palliative    Oncology HPI:   August 2020- stomach discomfort, belching   October 2020- progressive dysphagia with solids   1/26/21- distal esophageal biopsy shows Moderately differentiated adenocarcinoma; MMR normal expression, PDL1 expression is low with TPS 2%, CPS 5-10, Her2 is pending  1/27/21- CT CAP- Enlarged  2 cm subcarinal lymph node and 1.4 cm short axis right subcarinal lymph node, focal thickening of the superior wall along the right side of the mid esophagus. Numerous small pulmonary nodules, 3 mm nodule in the superior segment of the left lower lobe, 3 mm nodule in the anterior aspect of the right upper lobe , 4 mm nodule in the medial aspect of the right upper lobe  and 4 mm right lower lobe nodule. Hypoattenuating foci in the liver, 1.6 cm hypoattenuating/hypoenhancing lesion in hepatic segment 8 and 1.4 cm lesion in hepatic segment 5 , indeterminate, likely metastatic.  The prostate gland is mildly enlarged measuring 5.3 cm in transverse diameter. Multiple prominent but not significantly enlarged retroperitoneal lymph nodes, indeterminate, could be reactive. 4.4 x 4.3 cm lytic lesion centered in the posterior aspect of the left sacral ala (series 3 image 243), 4.7 x 4.0 x 5.6 cm (axial and CC dimensions, respectively) hypoattenuating lesion in the subcutaneous tissue of the anterior chest wall just anterior to the mediastinum, indeterminate, could represent sebaceous cyst.  2/4/21- Saw Dr. Bourgeois ordered PET/CT  3/2/21-3/10/21- Admission to [facility]- <24h of inability to ambulate with acute onset left leg weakness/urinary retention with perianal sensory deficits, MRI showed extensive epidural signal change suggestive of tumor vs. Hemorrhage.Pt underwent emergent surgery- L2-L5 laminectomy and decompression . PEG tube placement 3/5/2021.   3/17/21-3/23/21: Radiation to LS spine  3/24/21: gamma knife (2000cGy in 5 fractions)  3/29/21- port placement  3/31/21: Cycle 1 Cisplatin+keytruda+5fu   4/21/21: Cycle 2 Cisplatin+keytruda+5fu  5/11/21- Ct CAP- Overall stable disease- mixed response- IN in 2 liver lesions while, stable disease to borderline progression in 1 liver lesions  5/12/21- Cycle 3 Cisplatin+keytruda+5fu  6/2/21- Cycle 4 Cisplatin+keytruda+5fu  6/21/21- CT CAP- Stable mild circumferential wall thickening of the distal esophagus (series 3, image 99) with small amount of fluid in the upper and mid esophagus. No lymphadenopathy. Stable to slight increased size of hepatic metastases. For example a 3.1 x 2.9 cm lesion in the left lobe (series 3, image 123) previously measured 2.9 x 2.6 cm, and a 2.7 x 2.9 cm peripheral right hepatic lobe metastasis previously measured 3.0 x 2.2  cm. A 1.0 cm lesion in the caudate lobe (series 3, image 138) is more prominent today/new. Stable small gastrohepatic ligament lymph nodes. No new or enlarging lymph nodes in the abdomen and pelvis. No ascites.Stable 5.1 x 4.6 cm left sacral mass. Stable cystic 4.9 x 3.5 cm subcutaneous lesion overlying the midsternum.  6/24/21- Cycle 5 Cisplatin+keytruda+5fu  7/14/21- Cycle 6 Cisplatin+keytruda+5fu  8/3/21- Ct CAP- Few small pulmonary nodules are stable. Stable mild circumferential wall thickening of the distal esophagus (series 3, image 97).  Increased size of hepatic metastases. For example a 3.5  x 3.5 cm left lobe metastasis (series 3, image 117) previously measured 2.9 x 3.1 cm, a 3.5 x 2.7 cm subcapsular right hepatic lobe metastasis (series 3, image 158) previously measured 2.7 x 2.9 cm and  a 1.7 x 1.5 cm lesion in the left lobe adjacent to the caudate (series 3, image 124) previously measured 1.0 cm.  Stable 5.2 x 4.9 cm lytic lesion in the left sacrum previously measuring 5.2 x 4.6 cm (3, image 238). Stable sclerotic lesion in the anterior aspect of the T4 vertebral body measuring 1.3 cm (series 3, image 39) and sclerosis of the posterior left third rib.. No new lesions. Stable 5.2 cm cystic subcutaneous lesion overlying the sternum  8/5, 8/26, 9/16- Pembrolizumab and 5-FU  9/10/21- Y90 delivery in the left hepatic lobe and segment 5 lesions.   9/13/21- Brain MRI: no progression or distal metatasis  9/28/21- Ct CAP- Multiple left and right hepatic nodular masses identified consistent with metastatic disease. Several appear to be new or are larger worrisome for progression. One lesion is slightly smaller along  the anterior aspect of hepatic segment 2. Stable destructive sacral bone lesion and sclerosis at T4 and left third rib. Stable distal esophageal wall thickening. Stable but indeterminant soft tissue surrounding portions of the left gastric artery at the upper midabdomen.  11/26/21- CT CAP-  Interval  progression of disease: Enlarging hepatic metastatic foci, Wall thickening involving the distal thoracic esophagus appears to involve a more proximal distal extent compared to prior,  Stable to mildly enlarged nodular thickening about the left gastric artery. New abnormal left periaortic retroperitoneal lymphadenopathy. Enlarging left lytic sacral mass.  12/15/21- 3 new Brain Mets identified, Rad/Onc recommends gamma knife scheduled for 12/23/21.     Interval history:   Junito is doing well today. Did pretty good with last infusion, states he had bleeding with the removal of the infusion needle that was short lasting. Additionally, reports sore tongue on the right side without lesions that flares on and off. Using magic mouth when it flares. No other bleeding or bruising.     Tried omeprazole with was helpful for gas and hiccups/burping. Occasional nausea but no need for antiemetics. Eating well, continues to work on increasing water intake. Continues to have intermittent mid to right mid-low abdominal pain that occurs at random times, stable. Constipation is improved this week, had some last week that he manages with Dulcolax prn. No vomiting or diarrhea.     He has stable mild low back pain with hip and top of butt pain, radiating down the left leg/foot. At baseline, he has constant numbness and occasional tingling of his left foot that has been stable. Not taking gabapentin currently. His mood has been stable. Good energy, sleeping well. He has occasional cough, in the morning, self limiting and stable.    No chest pain, no COLÓN/SOB. No fevers, chills, LE edema, urinary changes. No new weakness. No headaches, vision changes.     Comprehensive ROS was unremarkable except as detailed above.      Current Outpatient Medications   Medication Sig Dispense Refill     bacitracin 500 UNIT/GM external ointment Apply topically 2 times daily Until the sore are well healed (Patient not taking: Reported on 11/29/2021) 113.4 g 2      Calcium Carb-Cholecalciferol (CALCIUM-VITAMIN D) 600-400 MG-UNIT TABS Take 2 tablets by mouth daily 60 tablet 3     gabapentin (NEURONTIN) 250 MG/5ML solution Take 5 mLs (250 mg) by mouth At Bedtime (Patient not taking: Reported on 10/7/2021) 470 mL 1     LORazepam (ATIVAN) 0.5 MG tablet Take 1 tablet (0.5 mg) by mouth every 4 hours as needed (Anxiety, Nausea/Vomiting or Sleep) 30 tablet 2     multivitamin CF FORMULA (CHOICEFUL) chewable tablet Take 1 tablet by mouth daily       omeprazole (FIRST-OMEPRAZOLE) 2 MG/ML SUSP Take 10 mLs (20 mg) by mouth 2 times daily (Patient not taking: Reported on 9/29/2021) 300 mL 1     ondansetron (ZOFRAN) 4 MG tablet Take 1-2 tablets (4-8 mg) by mouth every 6 hours as needed for nausea (vomiting) (Patient not taking: Reported on 10/7/2021) 40 tablet 0     prochlorperazine (COMPAZINE) 10 MG tablet Take 1 tablet (10 mg) by mouth every 6 hours as needed (Nausea/Vomiting) 30 tablet 2      No Known Allergies    Exam:  There were no vitals taken for this visit.  Wt Readings from Last 4 Encounters:   12/16/21 75.2 kg (165 lb 12.8 oz)   12/15/21 74.8 kg (165 lb)   12/09/21 75.8 kg (167 lb 1.6 oz)   11/18/21 77.2 kg (170 lb 3.2 oz)     Video physical exam  General: Patient appears well in no acute distress.   Skin: No visualized rash or lesions on visualized skin  Eyes: EOMI, no erythema, sclera icterus or discharge noted  Resp: Appears to be breathing comfortably without accessory muscle usage, speaking in full sentences, no cough  MSK: Appears to have normal range of motion based on visualized movements  Neurologic: No apparent tremors, facial movements symmetric  Psych: affect good, alert and oriented    The rest of a comprehensive physical examination is deferred due to PHE (public health emergency) video restrictions    Labs:   Most Recent 3 CBC's:  Most Recent 3 CBC's:  Recent Labs   Lab Test 12/22/21  1245 12/16/21  1319 12/09/21  1019   WBC 2.8* 3.4* 4.8   HGB 12.7* 12.2* 12.3*    MCV 90 90 90    267 258     Most Recent 3 BMP's:  Recent Labs   Lab Test 11/18/21  0831 10/28/21  0846 10/07/21  0853    139 138   POTASSIUM 4.0 4.3 3.9   CHLORIDE 110* 108 107   CO2 26 27 27   BUN 16 14 14   CR 0.76 0.70 0.79   ANIONGAP 3 4 4   ARDEN 9.1 9.1 8.8   * 94 103*    Most Recent 2 LFT's:  Recent Labs   Lab Test 12/09/21  1019 11/18/21  0831   AST 32 24   ALT 18 21   ALKPHOS 114 129   BILITOTAL 0.6 0.7    Most Recent TSH and T4:  Recent Labs   Lab Test 03/02/21  1426   TSH 1.40     I reviewed the above labs today.    Imaging:   Brain MR 12/15/21  Impression:  1. Several new foci of enhancement involving the posterior fossa and  in the right temporal occipital region is suspicious for recurrent  metastasis. Several of these lesions are adjacent to CSF spaces,  concerning for CSF spread of malignancy. Attention is recommended on  follow-up imaging.  2. Previously seen choroid plexus lesion within the left temporal horn  has resolved.  3. Mild Leukoaraiosis.    MR Abdomen 12/15/21  IMPRESSION: Findings are compatible with progressive disease.  1. Increased metastatic lesions in hepatic segment 5 inferomedial to  the treatment zone and in hepatic segments 8 and 7, as described  above.   2. Persistent ill-defined soft tissue thickening about the left  gastric artery.  3. No significant change in metastatic retroperitoneal lymph nodes.    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 Restaging scan after 2 cycles showed overall stable disease with - mixed response- NC in esophgaus primary, and 2 liver lesions while, stable disease in spine, and stable to borderline progression in 1 liver  lesions. Scan after 4 cycles showing stable disease in esophagus and LN and a some liver lesion. 2 lesions in liver are slightly bigger, one is more prominent than before. CT CAP after 6 cycles done on 8/3/21 demonstrated continued interval increase in the size of 2 liver lesions, stable osseous lesions and continued inconspicuity of the primary esophageal mass. Given the liver-isolated progressive disease, we recommended liver directed local therapy and Y-90 was complete 9/10    Chemotherapy wise, he completed 6 cycles of induction cisplatin, 5-FU and pembrolizumab thus was swithced to 5-FLU and pembrolizumab maintenance (i.e. drop cisplatin) with cycle 7. He complted 6 cycels of maintainece pembro+5flu and restagnig CT CAP 11/26 concerning for multifocal progressive disease.    Plan to switch treatment to paclitaxel+ramicirumab based on the [place] trial. Ramucirumab 8 mg/kg  Intravenously is given on days 1 and 15, plus paclitaxel 80 mg/m2 intravenously on days 1, 8, and 15 of a 28-day cycle (3 weeks on, 1 week off). Dr. Steven discussed the data behind rainbow trial, overall survival was significantly longer in the ramucirumab plus paclitaxel group than in the placebo plus paclitaxel group (median 9 6 months [95% CI 8 5-10 8] vs 7 4 months [95% CI 6 3-8 4], hazard ratio 0 807 [95% CI 0 678-0 962]; p=0 017). Common toxicities including hemorrhage, stroke, HTN, proteinuria, neuropathy, etc were discussed.     Overall plan  - Tolerated C1 day 1 and day 8 well with no significant side effects. C1 D15 paclitaxel and ramicirumab scheduled for 12/24/21 with labs prior. Clinically appropriate to proceed with infusion as long as labs are within limits.    - Will request Ct CAP to be rescheduled at [place] or [place] prior to Dr. Steven visit on 2/9/22.   - Will request C3 infusions at [place].   - Virtual visit with Jaky Pugh on 01/5/22 as scheduled prior to cycle 2.      #Brain mets   Follows with   [facility]. MRI 12/15/21 with three new mets. Scheduled to begin gamma knife tomorrow, 12/23/2021.      #Bone mets  Bone biopsy of left pelvic mass 2/26/21 confirmed metastasis. Finished radiation 3/23 to lumbosacral spine.   While he has not seen dentist in 4 yrs, discusseed small risk of osteonecrosis, 3-5% risk, he does not have any ongoing dental issues and has no tooth pain or caries.  -Zometa next due 1/6/22, will administer every 4 weeks with the start of his treatment cycles.   -Continue Calcium and Vitamin D.  -Restart gabapentin 200 mg at bedtime for radicular pain. Prescription sent to pharmacy.   - Due for labs prior to visit with Dr. Meléndez 1/3/22. Will cancel 12/30/21 labs and reschedule for prior to visit with Dr. Meléndez on 1/3/22.     #Dypshagia, malnutrition  PEG tube placed in OR (3/5) for nutritional support. Now with good PO intake. Continue PO intake as tolerated. Previously declined SLP follow up, can revisit as needed though dysphagia has improved with treatment.   --Of note, consideration given to esophageal stent vs XRT for esophageal debulking to address dysphagia inpatient in Feb/March 2021; however given likely improvement with chemo-immunotherapy, as well as risks of worsening cytopenias, elected not to.      #Cauda Equina due to tumor compression s/p L2-L5 laminectomy and decompression   Followed by neurosurgery (now prn) with improved exam after surgery. He is now ambulating with or without a walker, strength nearly back to baseline. He is aware of his activity and lifting restrictions. Has some L radiculopathy that he uses robaxan prn.   Most recent CT scan showing mild increase in the size of rt sacral lesion, however has no new symptoms, asked him to be vigilant for new symptoms. Of note, he does have baseline L leg numbness.      #Constipation  Continue Dulcolax prn. May also use miralax, or MOM prn. Continue to avoid suppository while on chemo.    #GERD, improving  Improved with  omeprazole. Junito was using liquid omeprazole and requests capsule form today.   - New prescription send to pharmacy.     #neuropathy  Pre-dates taxol. Gabapentin changed to capsules from liquid. Monitor for worsening.    REBECA Aldridge     I, Jaky Pugh, was present with the DAVIDE student who participated in the service and in the documentation of the note.  I have verified the history and personally performed the physical exam and medical decision making.  I agree with the assessment and plan of care as documented in the note.      60 minutes spent on the date of the encounter doing chart review, review of test results, interpretation of tests, patient visit, documentation and discussion with other provider(s)     Jaky Pugh, CNP on 12/22/2021 at 1:33 PM

## 2021-12-23 NOTE — PROGRESS NOTES
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  After sedation and topical anesthetic, the head frame was put on by Dr. Baptiste.    The patient was then taken to the Department of Radiology where a stereotactic brain MRI was performed.  The patient was admitted to the [address] [place] [address] area while treatment planning was completed.      These Images were then sent to the [employer] Two [employer] Gamma Knife computer system where a three dimensional stereotactic plan was generated.   The patient was then treated on the [employer] Gamma Knife.  Treatment involved 3 targets    The [employer] Gamma Knife IconTM Plan software was used to create a highly conformal dose distribution using the number and size collimators detailed above.     TREATMENT:    The patient was brought to the Gamma Knife suite.  The patient was identified by 2 methods. A timeout was performed to confirm the correct patient and correct procedure. The site was identified by an MRI image and treatment planning software, which defined the treatment area.     A cone beam CT was done and compared to the MRI to look for frame motion  We evaluated the frame manually to ensure it was still secure.     The treatment was delivered using the [employer] Gamma Knife IconTM without complication.  The head frame was removed and the patient was discharged home in stable condition.  The patient tolerated the treatment well and  had no complications.    FOLLOW-UP PLANS:  The Gamma Knife Nurse Coordinator will call the patient tomorrow for short-term follow up.  Written discharge instructions were given to the patient. The patient will have a follow up brain MRI in 3 months.     Approved by:  Emilia Qureshi MD

## 2021-12-23 NOTE — LETTER
2021         RE: Junito Wang  [address] [facility] [address]        Dear Colleague,    Thank you for referring your patient, Junito Wang, to the [facility] RADIATION ONCOLOGY GAMMA KNIFE. Please see a copy of my visit note below.      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  After sedation and topical anesthetic, the head frame was put on by Dr. Baptiste.    The patient was then taken to the Department of Radiology where a stereotactic brain MRI was performed.  The patient was admitted to the [address] [place] [address] area while treatment planning was completed.      These Images were then sent to the [employer] Gamma Knife computer system where a three dimensional stereotactic plan was generated.   The patient was then treated on the [employer] Gamma Knife.  Treatment involved 3 targets    The [employer] Gamma Knife IconTM Plan software was used to create a highly conformal dose distribution using the number and size collimators detailed above.     TREATMENT:    The patient was brought to the Gamma Knife suite.  The patient was identified by 2 methods. A timeout was performed to confirm the correct patient and correct procedure. The site was identified by an MRI image and treatment planning software, which defined the treatment area.     A cone beam CT was done and compared to the MRI to look for frame motion  We evaluated  the frame manually to ensure it was still secure.     The treatment was delivered using the [employer] Gamma Knife IconTM without complication.  The head frame was removed and the patient was discharged home in stable condition.  The patient tolerated the treatment well and had no complications.    FOLLOW-UP PLANS:  The Gamma Knife Nurse Coordinator will call the patient tomorrow for short-term follow up.  Written discharge instructions were given to the patient. The patient will have a follow up brain MRI in 3 months.     Approved by:  Emilia Qureshi MD        Again, thank you for allowing me to participate in the care of your patient.        Sincerely,        Emilia Qureshi MD

## 2021-12-23 NOTE — PROGRESS NOTES
Pt arrived on 2a post head frame placement and MRI. No pain. Pt eating and drinking. Call light within pt reach.

## 2021-12-23 NOTE — LETTER
2021         RE: Junito Wang  [address] [facility] [address]        Dear Colleague,    Thank you for referring your patient, Junito Wang, to the [facility] RADIATION ONCOLOGY GAMMA KNIFE. Please see a copy of my visit note below.    Gamma Knife Operative Note    MRN: [id number]  Name: Junito Wang  : 1960    Date of procedure: 2021    Surgeon:  Chas Baptiste MD PhD    Pre-operative Diagnosis:   Brain metastases  Post-operative Diagnosis:  Same    Procedure: Gamma Knife Radiosurgery    Indication: This is a 61 y.o. M with stage IV esophageal cancer and three new  metastases to the brain. After case review in the brain tumor conference, the joint recommendation was to treat the patient with radiosurgery. Standard measurements were obtained for coordinate calculation to facilitate SRS delivery.    On the day of the procedure, I met with the patient to secure informed consent. The previous MRI was reviewed. The [employer] stereotactic frame was attached to the patient's cranium using standard technique. With the frame placed, the patient underwent an MRI of the head with contrast. No new additional lesions were identified on this MRI.    The treatment is planned on the Gamma plan system based on the MRI taken on the day of the procedure.  Treatment parameters were verified by myself, the treating radiation oncologist, and the physicist.     Three lesions were treated. The maximal diameter of the largest lesion was < 1 cm. Two occipital lesions were treated with 20 Gy in a single fraction. The midbrain lesion was treated with 16 Gray in a single fraction.      The dose was delivered in a highly conformal fashion. The treatment was performed at the [facility] gamma knife center.     I was present and performed the key portions of this procedure.    Chas Baptiste MD PhD

## 2021-12-23 NOTE — PROGRESS NOTES
Gamma Knife Operative Note    MRN: [id number]  Name: Junito Wang  : 1960    Date of procedure: 2021    Surgeon:  Chas Baptiste MD PhD    Pre-operative Diagnosis:   Brain metastases  Post-operative Diagnosis:  Same    Procedure: Gamma Knife Radiosurgery    Indication: This is a 61 y.o. M with stage IV esophageal cancer and three new  metastases to the brain. After case review in the brain tumor conference, the joint recommendation was to treat the patient with radiosurgery. Standard measurements were obtained for coordinate calculation to facilitate SRS delivery.    On the day of the procedure, I met with the patient to secure informed consent. The previous MRI was reviewed. The [place] stereotactic frame was attached to the patient's cranium using standard technique. With the frame placed, the patient underwent an MRI of the head with contrast. No new additional lesions were identified on this MRI.    The treatment is planned on the Gamma plan system based on the MRI taken on the day of the procedure.  Treatment parameters were verified by myself, the treating radiation oncologist, and the physicist.     Three lesions were treated. The maximal diameter of the largest lesion was < 1 cm. Two occipital lesions were treated with 20 Gy in a single fraction. The midbrain lesion was treated with 16 Gray in a single fraction.      The dose was delivered in a highly conformal fashion. The treatment was performed at the [facility] gamma knife center.     I was present and performed the key portions of this procedure.    Chas Baptiste MD PhD

## 2021-12-24 NOTE — NURSING NOTE
Chief Complaint   Patient presents with     Port Draw     labs drawn from port by rn.  vs taken     Good blood return noted from previously-accessed port; urine collected and sent as well.  Labs drawn from port by rn.  Port flushed with NS and heparin.  VS taken.  Pt tolerated well.  Checked in for next appt.    Oly Salgado RN

## 2021-12-24 NOTE — PROGRESS NOTES
Infusion Nursing Note:  Junito Wang presents today for Cycle 1, Day 15 Cyramza, Taxol, Zometa.    Patient seen by provider today: No   present during visit today: Not Applicable.    Note: Pt assessed upon arrival to infusion suite. Denies fever, chills, cough, SOB or other signs/symptoms of infection. No new issues or concerns to report.     Intravenous Access:  Implanted Port.    Treatment Conditions:  Lab Results   Component Value Date    HGB 12.5 (L) 12/24/2021    WBC 2.6 (L) 12/24/2021    ANEU 5.1 07/14/2021    ANEUTAUTO 1.5 (L) 12/24/2021     12/24/2021      Lab Results   Component Value Date     12/24/2021    POTASSIUM 4.0 12/24/2021    MAG 2.2 11/18/2021    CR 0.67 12/24/2021    ARDEN 8.7 12/24/2021    BILITOTAL 0.3 12/24/2021    ALBUMIN 3.8 12/24/2021    ALT 27 12/24/2021    AST 18 12/24/2021     Results reviewed, labs MET treatment parameters, ok to proceed with treatment.  Urine protein - Negative.    Post Infusion Assessment:  Patient tolerated infusion without incident.  Patient tolerated flu vaccination injection to right deltoid without incident.  Blood return noted pre and post infusion.  Site patent and intact, free from redness, edema or discomfort.  No evidence of extravasations.  Access discontinued per protocol.     Injectable Influenza Immunization Documentation    1.  Has the patient received the information for the injectable influenza vaccine? YES     2. Is the patient 6 months of age or older? YES     3. Does the patient have any of the following contraindications?         Severe allergy to eggs? No     Severe allergic reaction to previous influenza vaccines? No      Severe allergy to latex? No       History of Guillain-Tillatoba syndrome? No     Currently have a temperature greater than 100.4F? No        4.  Severely egg allergic patients should have flu vaccine eligibility assessed by an MD, RN, or pharmacist, and those who received flu vaccine should be observed for  "15 min by an MD, RN, Pharmacist, Medical Technician, or member of clinic staff.\": YES    5. Latex-allergic patients should be given latex-free influenza vaccine. Please reference the Vaccine latex table to determine if your clinic s product is latex-containing.       Vaccination given by Margy Moreno RN       Discharge Plan:   Patient declined prescription refills.  AVS to patient via Order [employer].  Patient will return 1/6/22 for next appointment.   Patient discharged in stable condition accompanied by: self.  Departure Mode: Ambulatory.      Kierra Moreno RN                      "

## 2022-01-01 ENCOUNTER — INFUSION THERAPY VISIT (OUTPATIENT)
Dept: INFUSION THERAPY | Facility: CLINIC | Age: 62
End: 2022-01-01
Payer: COMMERCIAL

## 2022-01-01 ENCOUNTER — LAB (OUTPATIENT)
Dept: ONCOLOGY | Facility: CLINIC | Age: 62
End: 2022-01-01
Payer: COMMERCIAL

## 2022-01-01 ENCOUNTER — OFFICE VISIT (OUTPATIENT)
Dept: SURGERY | Facility: CLINIC | Age: 62
End: 2022-01-01
Attending: CLINICAL NURSE SPECIALIST
Payer: COMMERCIAL

## 2022-01-01 ENCOUNTER — INFUSION THERAPY VISIT (OUTPATIENT)
Dept: INFUSION THERAPY | Facility: CLINIC | Age: 62
End: 2022-01-01
Attending: STUDENT IN AN ORGANIZED HEALTH CARE EDUCATION/TRAINING PROGRAM
Payer: COMMERCIAL

## 2022-01-01 ENCOUNTER — PATIENT OUTREACH (OUTPATIENT)
Dept: CARE COORDINATION | Facility: CLINIC | Age: 62
End: 2022-01-01

## 2022-01-01 ENCOUNTER — HOME INFUSION (PRE-WILLOW HOME INFUSION) (OUTPATIENT)
Dept: PHARMACY | Facility: CLINIC | Age: 62
End: 2022-01-01
Payer: COMMERCIAL

## 2022-01-01 ENCOUNTER — VIRTUAL VISIT (OUTPATIENT)
Dept: ONCOLOGY | Facility: CLINIC | Age: 62
End: 2022-01-01
Attending: NURSE PRACTITIONER
Payer: COMMERCIAL

## 2022-01-01 ENCOUNTER — VIRTUAL VISIT (OUTPATIENT)
Dept: FAMILY MEDICINE | Facility: CLINIC | Age: 62
End: 2022-01-01
Payer: COMMERCIAL

## 2022-01-01 ENCOUNTER — ANCILLARY PROCEDURE (OUTPATIENT)
Dept: MRI IMAGING | Facility: CLINIC | Age: 62
End: 2022-01-01
Attending: NURSE PRACTITIONER
Payer: COMMERCIAL

## 2022-01-01 ENCOUNTER — INFUSION THERAPY VISIT (OUTPATIENT)
Dept: INFUSION THERAPY | Facility: CLINIC | Age: 62
End: 2022-01-01

## 2022-01-01 ENCOUNTER — ONCOLOGY VISIT (OUTPATIENT)
Dept: ONCOLOGY | Facility: CLINIC | Age: 62
End: 2022-01-01
Attending: STUDENT IN AN ORGANIZED HEALTH CARE EDUCATION/TRAINING PROGRAM
Payer: COMMERCIAL

## 2022-01-01 ENCOUNTER — ANCILLARY PROCEDURE (OUTPATIENT)
Dept: GENERAL RADIOLOGY | Facility: CLINIC | Age: 62
End: 2022-01-01
Payer: COMMERCIAL

## 2022-01-01 ENCOUNTER — OFFICE VISIT (OUTPATIENT)
Dept: RADIATION ONCOLOGY | Facility: CLINIC | Age: 62
End: 2022-01-01
Payer: COMMERCIAL

## 2022-01-01 ENCOUNTER — ONCOLOGY VISIT (OUTPATIENT)
Dept: ONCOLOGY | Facility: CLINIC | Age: 62
End: 2022-01-01

## 2022-01-01 ENCOUNTER — APPOINTMENT (OUTPATIENT)
Dept: GENERAL RADIOLOGY | Facility: CLINIC | Age: 62
End: 2022-01-01
Payer: COMMERCIAL

## 2022-01-01 ENCOUNTER — APPOINTMENT (OUTPATIENT)
Dept: LAB | Facility: CLINIC | Age: 62
End: 2022-01-01
Attending: STUDENT IN AN ORGANIZED HEALTH CARE EDUCATION/TRAINING PROGRAM
Payer: COMMERCIAL

## 2022-01-01 ENCOUNTER — LAB (OUTPATIENT)
Dept: LAB | Facility: CLINIC | Age: 62
End: 2022-01-01
Attending: RADIOLOGY
Payer: COMMERCIAL

## 2022-01-01 ENCOUNTER — PRE VISIT (OUTPATIENT)
Dept: RADIATION ONCOLOGY | Facility: CLINIC | Age: 62
End: 2022-01-01

## 2022-01-01 ENCOUNTER — TELEPHONE (OUTPATIENT)
Dept: SURGERY | Facility: CLINIC | Age: 62
End: 2022-01-01
Payer: COMMERCIAL

## 2022-01-01 ENCOUNTER — IMMUNIZATION (OUTPATIENT)
Dept: NURSING | Facility: CLINIC | Age: 62
End: 2022-01-01
Payer: COMMERCIAL

## 2022-01-01 ENCOUNTER — APPOINTMENT (OUTPATIENT)
Dept: RADIATION ONCOLOGY | Facility: CLINIC | Age: 62
End: 2022-01-01
Payer: COMMERCIAL

## 2022-01-01 ENCOUNTER — TELEPHONE (OUTPATIENT)
Dept: ONCOLOGY | Facility: CLINIC | Age: 62
End: 2022-01-01
Payer: COMMERCIAL

## 2022-01-01 ENCOUNTER — PATIENT OUTREACH (OUTPATIENT)
Dept: RADIATION ONCOLOGY | Facility: CLINIC | Age: 62
End: 2022-01-01

## 2022-01-01 ENCOUNTER — DOCUMENTATION ONLY (OUTPATIENT)
Dept: RADIATION ONCOLOGY | Facility: CLINIC | Age: 62
End: 2022-01-01

## 2022-01-01 ENCOUNTER — ANCILLARY PROCEDURE (OUTPATIENT)
Dept: CT IMAGING | Facility: CLINIC | Age: 62
End: 2022-01-01
Attending: STUDENT IN AN ORGANIZED HEALTH CARE EDUCATION/TRAINING PROGRAM
Payer: COMMERCIAL

## 2022-01-01 ENCOUNTER — PATIENT OUTREACH (OUTPATIENT)
Dept: ONCOLOGY | Facility: CLINIC | Age: 62
End: 2022-01-01
Payer: COMMERCIAL

## 2022-01-01 ENCOUNTER — OFFICE VISIT (OUTPATIENT)
Dept: RADIATION ONCOLOGY | Facility: CLINIC | Age: 62
End: 2022-01-01
Attending: RADIOLOGY
Payer: COMMERCIAL

## 2022-01-01 ENCOUNTER — HOSPITAL ENCOUNTER (OUTPATIENT)
Dept: MRI IMAGING | Facility: CLINIC | Age: 62
Discharge: HOME | End: 2022-05-27
Attending: NURSE PRACTITIONER | Admitting: NURSE PRACTITIONER
Payer: COMMERCIAL

## 2022-01-01 ENCOUNTER — HOME INFUSION (PRE-WILLOW HOME INFUSION) (OUTPATIENT)
Dept: PHARMACY | Facility: CLINIC | Age: 62
End: 2022-01-01

## 2022-01-01 ENCOUNTER — HOSPITAL ENCOUNTER (OUTPATIENT)
Dept: MRI IMAGING | Facility: CLINIC | Age: 62
Discharge: HOME | End: 2022-06-27
Attending: RADIOLOGY | Admitting: RADIOLOGY
Payer: COMMERCIAL

## 2022-01-01 ENCOUNTER — OFFICE VISIT (OUTPATIENT)
Dept: RADIOLOGY | Facility: CLINIC | Age: 62
End: 2022-01-01
Attending: RADIOLOGY
Payer: COMMERCIAL

## 2022-01-01 ENCOUNTER — HEALTH MAINTENANCE LETTER (OUTPATIENT)
Age: 62
End: 2022-01-01

## 2022-01-01 ENCOUNTER — HOSPITAL ENCOUNTER (OUTPATIENT)
Dept: MRI IMAGING | Facility: CLINIC | Age: 62
Discharge: HOME | End: 2022-03-23
Attending: RADIOLOGY
Payer: COMMERCIAL

## 2022-01-01 VITALS
BODY MASS INDEX: 23.8 KG/M2 | DIASTOLIC BLOOD PRESSURE: 88 MMHG | HEART RATE: 98 BPM | SYSTOLIC BLOOD PRESSURE: 128 MMHG | WEIGHT: 165.9 LBS | RESPIRATION RATE: 16 BRPM | OXYGEN SATURATION: 98 % | TEMPERATURE: 97.6 F

## 2022-01-01 VITALS
DIASTOLIC BLOOD PRESSURE: 80 MMHG | BODY MASS INDEX: 21.38 KG/M2 | HEART RATE: 111 BPM | OXYGEN SATURATION: 98 % | TEMPERATURE: 98 F | SYSTOLIC BLOOD PRESSURE: 114 MMHG | WEIGHT: 149 LBS | RESPIRATION RATE: 16 BRPM

## 2022-01-01 VITALS
RESPIRATION RATE: 18 BRPM | OXYGEN SATURATION: 98 % | BODY MASS INDEX: 23.76 KG/M2 | DIASTOLIC BLOOD PRESSURE: 83 MMHG | TEMPERATURE: 98.3 F | WEIGHT: 164.7 LBS | TEMPERATURE: 98 F | SYSTOLIC BLOOD PRESSURE: 131 MMHG | SYSTOLIC BLOOD PRESSURE: 123 MMHG | HEART RATE: 106 BPM | RESPIRATION RATE: 16 BRPM | OXYGEN SATURATION: 97 % | HEART RATE: 106 BPM | BODY MASS INDEX: 23.63 KG/M2 | WEIGHT: 165.6 LBS | DIASTOLIC BLOOD PRESSURE: 84 MMHG

## 2022-01-01 VITALS
BODY MASS INDEX: 23.92 KG/M2 | RESPIRATION RATE: 16 BRPM | DIASTOLIC BLOOD PRESSURE: 92 MMHG | TEMPERATURE: 97.6 F | HEART RATE: 113 BPM | OXYGEN SATURATION: 98 % | SYSTOLIC BLOOD PRESSURE: 148 MMHG | WEIGHT: 166.7 LBS

## 2022-01-01 VITALS
WEIGHT: 162.7 LBS | OXYGEN SATURATION: 97 % | SYSTOLIC BLOOD PRESSURE: 124 MMHG | RESPIRATION RATE: 16 BRPM | DIASTOLIC BLOOD PRESSURE: 77 MMHG | TEMPERATURE: 97.5 F | HEART RATE: 108 BPM | BODY MASS INDEX: 23.35 KG/M2

## 2022-01-01 VITALS
TEMPERATURE: 97.8 F | WEIGHT: 166.6 LBS | OXYGEN SATURATION: 100 % | SYSTOLIC BLOOD PRESSURE: 125 MMHG | HEART RATE: 88 BPM | DIASTOLIC BLOOD PRESSURE: 88 MMHG | RESPIRATION RATE: 16 BRPM | BODY MASS INDEX: 23.9 KG/M2

## 2022-01-01 VITALS
TEMPERATURE: 98.6 F | SYSTOLIC BLOOD PRESSURE: 110 MMHG | BODY MASS INDEX: 21.36 KG/M2 | RESPIRATION RATE: 22 BRPM | HEART RATE: 119 BPM | WEIGHT: 148.9 LBS | DIASTOLIC BLOOD PRESSURE: 74 MMHG | OXYGEN SATURATION: 99 %

## 2022-01-01 VITALS
SYSTOLIC BLOOD PRESSURE: 140 MMHG | DIASTOLIC BLOOD PRESSURE: 90 MMHG | HEART RATE: 118 BPM | RESPIRATION RATE: 16 BRPM | OXYGEN SATURATION: 98 %

## 2022-01-01 VITALS
HEART RATE: 93 BPM | TEMPERATURE: 98.5 F | DIASTOLIC BLOOD PRESSURE: 82 MMHG | WEIGHT: 168.9 LBS | BODY MASS INDEX: 24.23 KG/M2 | RESPIRATION RATE: 16 BRPM | OXYGEN SATURATION: 98 % | SYSTOLIC BLOOD PRESSURE: 134 MMHG

## 2022-01-01 VITALS
SYSTOLIC BLOOD PRESSURE: 115 MMHG | WEIGHT: 152.5 LBS | RESPIRATION RATE: 16 BRPM | BODY MASS INDEX: 21.88 KG/M2 | TEMPERATURE: 98.3 F | HEART RATE: 103 BPM | DIASTOLIC BLOOD PRESSURE: 75 MMHG | OXYGEN SATURATION: 99 %

## 2022-01-01 VITALS
DIASTOLIC BLOOD PRESSURE: 82 MMHG | WEIGHT: 158.7 LBS | HEART RATE: 107 BPM | TEMPERATURE: 98.2 F | SYSTOLIC BLOOD PRESSURE: 118 MMHG | OXYGEN SATURATION: 97 % | BODY MASS INDEX: 22.77 KG/M2

## 2022-01-01 VITALS
BODY MASS INDEX: 24.41 KG/M2 | RESPIRATION RATE: 16 BRPM | OXYGEN SATURATION: 98 % | TEMPERATURE: 98 F | HEART RATE: 111 BPM | WEIGHT: 170.1 LBS | DIASTOLIC BLOOD PRESSURE: 89 MMHG | SYSTOLIC BLOOD PRESSURE: 138 MMHG

## 2022-01-01 VITALS
WEIGHT: 138 LBS | BODY MASS INDEX: 19.76 KG/M2 | HEIGHT: 70 IN | RESPIRATION RATE: 18 BRPM | OXYGEN SATURATION: 96 % | HEART RATE: 126 BPM | DIASTOLIC BLOOD PRESSURE: 60 MMHG | TEMPERATURE: 97.8 F | SYSTOLIC BLOOD PRESSURE: 83 MMHG

## 2022-01-01 VITALS
DIASTOLIC BLOOD PRESSURE: 90 MMHG | OXYGEN SATURATION: 98 % | SYSTOLIC BLOOD PRESSURE: 140 MMHG | HEART RATE: 118 BPM | HEIGHT: 70 IN | WEIGHT: 163.8 LBS | TEMPERATURE: 98.2 F | BODY MASS INDEX: 23.45 KG/M2 | RESPIRATION RATE: 16 BRPM

## 2022-01-01 VITALS
WEIGHT: 163 LBS | BODY MASS INDEX: 23.39 KG/M2 | SYSTOLIC BLOOD PRESSURE: 118 MMHG | RESPIRATION RATE: 18 BRPM | TEMPERATURE: 97.3 F | DIASTOLIC BLOOD PRESSURE: 76 MMHG | OXYGEN SATURATION: 99 % | HEART RATE: 120 BPM

## 2022-01-01 VITALS
OXYGEN SATURATION: 98 % | TEMPERATURE: 97.5 F | WEIGHT: 163.4 LBS | BODY MASS INDEX: 23.45 KG/M2 | SYSTOLIC BLOOD PRESSURE: 145 MMHG | RESPIRATION RATE: 18 BRPM | DIASTOLIC BLOOD PRESSURE: 94 MMHG | HEART RATE: 115 BPM

## 2022-01-01 VITALS
HEART RATE: 111 BPM | DIASTOLIC BLOOD PRESSURE: 77 MMHG | SYSTOLIC BLOOD PRESSURE: 112 MMHG | WEIGHT: 146.4 LBS | BODY MASS INDEX: 21.01 KG/M2 | OXYGEN SATURATION: 98 % | RESPIRATION RATE: 18 BRPM | TEMPERATURE: 98.4 F

## 2022-01-01 VITALS
WEIGHT: 156.8 LBS | HEART RATE: 99 BPM | SYSTOLIC BLOOD PRESSURE: 113 MMHG | TEMPERATURE: 98.6 F | RESPIRATION RATE: 18 BRPM | DIASTOLIC BLOOD PRESSURE: 78 MMHG | OXYGEN SATURATION: 99 % | BODY MASS INDEX: 22.5 KG/M2

## 2022-01-01 VITALS
DIASTOLIC BLOOD PRESSURE: 91 MMHG | HEART RATE: 107 BPM | OXYGEN SATURATION: 98 % | RESPIRATION RATE: 20 BRPM | SYSTOLIC BLOOD PRESSURE: 156 MMHG

## 2022-01-01 VITALS
RESPIRATION RATE: 16 BRPM | OXYGEN SATURATION: 97 % | SYSTOLIC BLOOD PRESSURE: 83 MMHG | HEART RATE: 115 BPM | DIASTOLIC BLOOD PRESSURE: 62 MMHG

## 2022-01-01 VITALS
SYSTOLIC BLOOD PRESSURE: 123 MMHG | DIASTOLIC BLOOD PRESSURE: 85 MMHG | RESPIRATION RATE: 18 BRPM | OXYGEN SATURATION: 99 % | WEIGHT: 162.9 LBS | TEMPERATURE: 98.1 F | BODY MASS INDEX: 23.37 KG/M2 | HEART RATE: 98 BPM

## 2022-01-01 VITALS
TEMPERATURE: 98.6 F | OXYGEN SATURATION: 96 % | DIASTOLIC BLOOD PRESSURE: 72 MMHG | SYSTOLIC BLOOD PRESSURE: 104 MMHG | BODY MASS INDEX: 19.77 KG/M2 | HEART RATE: 126 BPM | WEIGHT: 137.8 LBS

## 2022-01-01 VITALS
WEIGHT: 151.5 LBS | HEART RATE: 106 BPM | DIASTOLIC BLOOD PRESSURE: 74 MMHG | RESPIRATION RATE: 18 BRPM | BODY MASS INDEX: 21.74 KG/M2 | OXYGEN SATURATION: 98 % | SYSTOLIC BLOOD PRESSURE: 114 MMHG | TEMPERATURE: 97.8 F

## 2022-01-01 VITALS
BODY MASS INDEX: 23.8 KG/M2 | SYSTOLIC BLOOD PRESSURE: 139 MMHG | WEIGHT: 165.9 LBS | DIASTOLIC BLOOD PRESSURE: 83 MMHG | TEMPERATURE: 98.5 F | OXYGEN SATURATION: 98 % | RESPIRATION RATE: 16 BRPM | HEART RATE: 107 BPM

## 2022-01-01 VITALS — WEIGHT: 169.3 LBS | BODY MASS INDEX: 24.29 KG/M2

## 2022-01-01 DIAGNOSIS — C15.9 MALIGNANT NEOPLASM OF ESOPHAGUS, UNSPECIFIED LOCATION (H): ICD-10-CM

## 2022-01-01 DIAGNOSIS — R29.898 LEFT LEG WEAKNESS: ICD-10-CM

## 2022-01-01 DIAGNOSIS — C15.5 MALIGNANT NEOPLASM OF LOWER THIRD OF ESOPHAGUS (H): ICD-10-CM

## 2022-01-01 DIAGNOSIS — C15.5 MALIGNANT NEOPLASM OF LOWER THIRD OF ESOPHAGUS (H): Primary | ICD-10-CM

## 2022-01-01 DIAGNOSIS — C79.51 BONE METASTASIS: ICD-10-CM

## 2022-01-01 DIAGNOSIS — C78.7 LIVER METASTASES: ICD-10-CM

## 2022-01-01 DIAGNOSIS — C15.9 METASTASIS FROM ESOPHAGEAL CANCER (H): ICD-10-CM

## 2022-01-01 DIAGNOSIS — C79.51 BONE METASTASIS: Primary | ICD-10-CM

## 2022-01-01 DIAGNOSIS — Z43.1 PEG (PERCUTANEOUS ENDOSCOPIC GASTROSTOMY) ADJUSTMENT/REPLACEMENT/REMOVAL (H): Primary | ICD-10-CM

## 2022-01-01 DIAGNOSIS — C15.9 MALIGNANT NEOPLASM OF ESOPHAGUS, UNSPECIFIED LOCATION (H): Primary | ICD-10-CM

## 2022-01-01 DIAGNOSIS — I10 HYPERTENSION GOAL BP (BLOOD PRESSURE) < 140/90: ICD-10-CM

## 2022-01-01 DIAGNOSIS — C79.31 METASTASIS TO BRAIN (H): ICD-10-CM

## 2022-01-01 DIAGNOSIS — R39.9 LOWER URINARY TRACT SYMPTOMS (LUTS): Primary | ICD-10-CM

## 2022-01-01 DIAGNOSIS — D72.829 LEUKOCYTOSIS, UNSPECIFIED TYPE: Primary | ICD-10-CM

## 2022-01-01 DIAGNOSIS — R13.19 ESOPHAGEAL DYSPHAGIA: ICD-10-CM

## 2022-01-01 DIAGNOSIS — I95.9 HYPOTENSION, UNSPECIFIED HYPOTENSION TYPE: ICD-10-CM

## 2022-01-01 DIAGNOSIS — D72.829 LEUKOCYTOSIS, UNSPECIFIED TYPE: ICD-10-CM

## 2022-01-01 DIAGNOSIS — C79.31 METASTASIS TO BRAIN (H): Primary | ICD-10-CM

## 2022-01-01 DIAGNOSIS — R74.8 ELEVATED LIVER ENZYMES: ICD-10-CM

## 2022-01-01 DIAGNOSIS — K59.03 DRUG-INDUCED CONSTIPATION: Primary | ICD-10-CM

## 2022-01-01 DIAGNOSIS — C15.9 METASTASIS FROM ESOPHAGEAL CANCER (H): Primary | ICD-10-CM

## 2022-01-01 DIAGNOSIS — R00.0 TACHYCARDIA: ICD-10-CM

## 2022-01-01 DIAGNOSIS — C79.9 METASTASIS FROM ESOPHAGEAL CANCER (H): ICD-10-CM

## 2022-01-01 DIAGNOSIS — C79.9 METASTASIS FROM ESOPHAGEAL CANCER (H): Primary | ICD-10-CM

## 2022-01-01 LAB
ALBUMIN SERPL-MCNC: 2.9 G/DL (ref 3.4–5)
ALBUMIN SERPL-MCNC: 3.5 G/DL (ref 3.4–5)
ALBUMIN SERPL-MCNC: 3.6 G/DL (ref 3.4–5)
ALBUMIN SERPL-MCNC: 3.7 G/DL (ref 3.4–5)
ALBUMIN SERPL-MCNC: 3.8 G/DL (ref 3.4–5)
ALBUMIN SERPL-MCNC: 3.8 G/DL (ref 3.4–5)
ALBUMIN SERPL-MCNC: 3.9 G/DL (ref 3.4–5)
ALBUMIN SERPL-MCNC: 3.9 G/DL (ref 3.4–5)
ALBUMIN UR-MCNC: 10 MG/DL
ALBUMIN UR-MCNC: NEGATIVE MG/DL
ALP SERPL-CCNC: 107 U/L (ref 40–150)
ALP SERPL-CCNC: 113 U/L (ref 40–150)
ALP SERPL-CCNC: 114 U/L (ref 40–150)
ALP SERPL-CCNC: 141 U/L (ref 40–150)
ALP SERPL-CCNC: 144 U/L (ref 40–150)
ALP SERPL-CCNC: 168 U/L (ref 40–150)
ALP SERPL-CCNC: 175 U/L (ref 40–150)
ALP SERPL-CCNC: 298 U/L (ref 40–150)
ALP SERPL-CCNC: 751 U/L (ref 40–150)
ALT SERPL W P-5'-P-CCNC: 125 U/L (ref 0–70)
ALT SERPL W P-5'-P-CCNC: 20 U/L (ref 0–70)
ALT SERPL W P-5'-P-CCNC: 21 U/L (ref 0–70)
ALT SERPL W P-5'-P-CCNC: 24 U/L (ref 0–70)
ALT SERPL W P-5'-P-CCNC: 25 U/L (ref 0–70)
ALT SERPL W P-5'-P-CCNC: 25 U/L (ref 0–70)
ALT SERPL W P-5'-P-CCNC: 27 U/L (ref 0–70)
ALT SERPL W P-5'-P-CCNC: 30 U/L (ref 0–70)
ALT SERPL W P-5'-P-CCNC: 36 U/L (ref 0–70)
ANION GAP SERPL CALCULATED.3IONS-SCNC: 9 MMOL/L (ref 3–14)
AST SERPL W P-5'-P-CCNC: 20 U/L (ref 0–45)
AST SERPL W P-5'-P-CCNC: 20 U/L (ref 0–45)
AST SERPL W P-5'-P-CCNC: 25 U/L (ref 0–45)
AST SERPL W P-5'-P-CCNC: 26 U/L (ref 0–45)
AST SERPL W P-5'-P-CCNC: 29 U/L (ref 0–45)
AST SERPL W P-5'-P-CCNC: 311 U/L (ref 0–45)
AST SERPL W P-5'-P-CCNC: 35 U/L (ref 0–45)
AST SERPL W P-5'-P-CCNC: 46 U/L (ref 0–45)
AST SERPL W P-5'-P-CCNC: 75 U/L (ref 0–45)
BASOPHILS # BLD AUTO: 0 10E3/UL (ref 0–0.2)
BASOPHILS # BLD AUTO: 0.1 10E3/UL (ref 0–0.2)
BASOPHILS NFR BLD AUTO: 1 %
BASOPHILS NFR BLD AUTO: 2 %
BILIRUB DIRECT SERPL-MCNC: 0.1 MG/DL (ref 0–0.2)
BILIRUB DIRECT SERPL-MCNC: 0.2 MG/DL (ref 0–0.2)
BILIRUB DIRECT SERPL-MCNC: 1 MG/DL (ref 0–0.2)
BILIRUB DIRECT SERPL-MCNC: <0.1 MG/DL (ref 0–0.2)
BILIRUB DIRECT SERPL-MCNC: <0.1 MG/DL (ref 0–0.2)
BILIRUB SERPL-MCNC: 0.3 MG/DL (ref 0.2–1.3)
BILIRUB SERPL-MCNC: 0.4 MG/DL (ref 0.2–1.3)
BILIRUB SERPL-MCNC: 0.5 MG/DL (ref 0.2–1.3)
BILIRUB SERPL-MCNC: 0.5 MG/DL (ref 0.2–1.3)
BILIRUB SERPL-MCNC: 1.6 MG/DL (ref 0.2–1.3)
BUN SERPL-MCNC: 13 MG/DL (ref 7–30)
CALCIUM SERPL-MCNC: 8.7 MG/DL (ref 8.5–10.1)
CALCIUM SERPL-MCNC: 8.9 MG/DL (ref 8.5–10.1)
CALCIUM SERPL-MCNC: 9 MG/DL (ref 8.5–10.1)
CALCIUM SERPL-MCNC: 9.1 MG/DL (ref 8.5–10.1)
CALCIUM SERPL-MCNC: 9.4 MG/DL (ref 8.5–10.1)
CALCIUM SERPL-MCNC: 9.4 MG/DL (ref 8.5–10.1)
CALCIUM SERPL-MCNC: 9.7 MG/DL (ref 8.5–10.1)
CHLORIDE BLD-SCNC: 106 MMOL/L (ref 94–109)
CO2 SERPL-SCNC: 26 MMOL/L (ref 20–32)
CREAT SERPL-MCNC: 0.62 MG/DL (ref 0.66–1.25)
CREAT SERPL-MCNC: 0.64 MG/DL (ref 0.66–1.25)
CREAT SERPL-MCNC: 0.7 MG/DL (ref 0.66–1.25)
CREAT SERPL-MCNC: 0.71 MG/DL (ref 0.66–1.25)
CREAT SERPL-MCNC: 0.72 MG/DL (ref 0.66–1.25)
CREAT SERPL-MCNC: 0.78 MG/DL (ref 0.66–1.25)
CREAT SERPL-MCNC: 0.79 MG/DL (ref 0.66–1.25)
ELLIPTOCYTES BLD QL SMEAR: SLIGHT
EOSINOPHIL # BLD AUTO: 0 10E3/UL (ref 0–0.7)
EOSINOPHIL # BLD AUTO: 0.1 10E3/UL (ref 0–0.7)
EOSINOPHIL # BLD AUTO: 0.2 10E3/UL (ref 0–0.7)
EOSINOPHIL # BLD AUTO: 0.3 10E3/UL (ref 0–0.7)
EOSINOPHIL # BLD AUTO: 0.3 10E3/UL (ref 0–0.7)
EOSINOPHIL # BLD AUTO: 0.4 10E3/UL (ref 0–0.7)
EOSINOPHIL # BLD AUTO: 0.5 10E3/UL (ref 0–0.7)
EOSINOPHIL NFR BLD AUTO: 0 %
EOSINOPHIL NFR BLD AUTO: 2 %
EOSINOPHIL NFR BLD AUTO: 3 %
EOSINOPHIL NFR BLD AUTO: 4 %
EOSINOPHIL NFR BLD AUTO: 5 %
EOSINOPHIL NFR BLD AUTO: 6 %
EOSINOPHIL NFR BLD AUTO: 7 %
ERYTHROCYTE [DISTWIDTH] IN BLOOD BY AUTOMATED COUNT: 16.2 % (ref 10–15)
ERYTHROCYTE [DISTWIDTH] IN BLOOD BY AUTOMATED COUNT: 16.5 % (ref 10–15)
ERYTHROCYTE [DISTWIDTH] IN BLOOD BY AUTOMATED COUNT: 16.8 % (ref 10–15)
ERYTHROCYTE [DISTWIDTH] IN BLOOD BY AUTOMATED COUNT: 16.8 % (ref 10–15)
ERYTHROCYTE [DISTWIDTH] IN BLOOD BY AUTOMATED COUNT: 17.2 % (ref 10–15)
ERYTHROCYTE [DISTWIDTH] IN BLOOD BY AUTOMATED COUNT: 17.3 % (ref 10–15)
ERYTHROCYTE [DISTWIDTH] IN BLOOD BY AUTOMATED COUNT: 17.4 % (ref 10–15)
ERYTHROCYTE [DISTWIDTH] IN BLOOD BY AUTOMATED COUNT: 17.5 % (ref 10–15)
ERYTHROCYTE [DISTWIDTH] IN BLOOD BY AUTOMATED COUNT: 17.6 % (ref 10–15)
ERYTHROCYTE [DISTWIDTH] IN BLOOD BY AUTOMATED COUNT: 17.7 % (ref 10–15)
ERYTHROCYTE [DISTWIDTH] IN BLOOD BY AUTOMATED COUNT: 17.8 % (ref 10–15)
ERYTHROCYTE [DISTWIDTH] IN BLOOD BY AUTOMATED COUNT: 17.9 % (ref 10–15)
ERYTHROCYTE [DISTWIDTH] IN BLOOD BY AUTOMATED COUNT: 17.9 % (ref 10–15)
ERYTHROCYTE [DISTWIDTH] IN BLOOD BY AUTOMATED COUNT: 18.6 % (ref 10–15)
GFR SERPL CREATININE-BSD FRML MDRD: >90 ML/MIN/1.73M2
GLUCOSE BLD-MCNC: 103 MG/DL (ref 70–99)
HCT VFR BLD AUTO: 34.8 % (ref 40–53)
HCT VFR BLD AUTO: 36.8 % (ref 40–53)
HCT VFR BLD AUTO: 37.8 % (ref 40–53)
HCT VFR BLD AUTO: 38.8 % (ref 40–53)
HCT VFR BLD AUTO: 38.8 % (ref 40–53)
HCT VFR BLD AUTO: 39.1 % (ref 40–53)
HCT VFR BLD AUTO: 39.4 % (ref 40–53)
HCT VFR BLD AUTO: 39.6 % (ref 40–53)
HCT VFR BLD AUTO: 40.2 % (ref 40–53)
HCT VFR BLD AUTO: 40.6 % (ref 40–53)
HCT VFR BLD AUTO: 40.7 % (ref 40–53)
HCT VFR BLD AUTO: 40.7 % (ref 40–53)
HCT VFR BLD AUTO: 41 % (ref 40–53)
HCT VFR BLD AUTO: 41.8 % (ref 40–53)
HGB BLD-MCNC: 11.9 G/DL (ref 13.3–17.7)
HGB BLD-MCNC: 12.2 G/DL (ref 13.3–17.7)
HGB BLD-MCNC: 12.3 G/DL (ref 13.3–17.7)
HGB BLD-MCNC: 12.5 G/DL (ref 13.3–17.7)
HGB BLD-MCNC: 12.7 G/DL (ref 13.3–17.7)
HGB BLD-MCNC: 12.7 G/DL (ref 13.3–17.7)
HGB BLD-MCNC: 12.8 G/DL (ref 13.3–17.7)
HGB BLD-MCNC: 13 G/DL (ref 13.3–17.7)
HGB BLD-MCNC: 13 G/DL (ref 13.3–17.7)
HGB BLD-MCNC: 13.1 G/DL (ref 13.3–17.7)
HGB BLD-MCNC: 13.4 G/DL (ref 13.3–17.7)
HGB BLD-MCNC: 13.5 G/DL (ref 13.3–17.7)
HOLD SPECIMEN: NORMAL
IMM GRANULOCYTES # BLD: 0 10E3/UL
IMM GRANULOCYTES # BLD: 0.4 10E3/UL
IMM GRANULOCYTES NFR BLD: 0 %
IMM GRANULOCYTES NFR BLD: 1 %
IMM GRANULOCYTES NFR BLD: 2 %
LYMPHOCYTES # BLD AUTO: 0.5 10E3/UL (ref 0.8–5.3)
LYMPHOCYTES # BLD AUTO: 0.5 10E3/UL (ref 0.8–5.3)
LYMPHOCYTES # BLD AUTO: 0.6 10E3/UL (ref 0.8–5.3)
LYMPHOCYTES # BLD AUTO: 0.7 10E3/UL (ref 0.8–5.3)
LYMPHOCYTES # BLD AUTO: 0.8 10E3/UL (ref 0.8–5.3)
LYMPHOCYTES # BLD AUTO: 0.8 10E3/UL (ref 0.8–5.3)
LYMPHOCYTES # BLD AUTO: 0.9 10E3/UL (ref 0.8–5.3)
LYMPHOCYTES # BLD AUTO: 1.3 10E3/UL (ref 0.8–5.3)
LYMPHOCYTES NFR BLD AUTO: 10 %
LYMPHOCYTES NFR BLD AUTO: 12 %
LYMPHOCYTES NFR BLD AUTO: 13 %
LYMPHOCYTES NFR BLD AUTO: 14 %
LYMPHOCYTES NFR BLD AUTO: 16 %
LYMPHOCYTES NFR BLD AUTO: 21 %
LYMPHOCYTES NFR BLD AUTO: 21 %
LYMPHOCYTES NFR BLD AUTO: 22 %
LYMPHOCYTES NFR BLD AUTO: 24 %
LYMPHOCYTES NFR BLD AUTO: 24 %
LYMPHOCYTES NFR BLD AUTO: 5 %
LYMPHOCYTES NFR BLD AUTO: 8 %
LYMPHOCYTES NFR BLD AUTO: 8 %
MCH RBC QN AUTO: 28.3 PG (ref 26.5–33)
MCH RBC QN AUTO: 28.7 PG (ref 26.5–33)
MCH RBC QN AUTO: 29.1 PG (ref 26.5–33)
MCH RBC QN AUTO: 29.2 PG (ref 26.5–33)
MCH RBC QN AUTO: 29.3 PG (ref 26.5–33)
MCH RBC QN AUTO: 29.4 PG (ref 26.5–33)
MCH RBC QN AUTO: 29.4 PG (ref 26.5–33)
MCH RBC QN AUTO: 29.5 PG (ref 26.5–33)
MCH RBC QN AUTO: 29.8 PG (ref 26.5–33)
MCH RBC QN AUTO: 29.8 PG (ref 26.5–33)
MCH RBC QN AUTO: 29.9 PG (ref 26.5–33)
MCH RBC QN AUTO: 30.2 PG (ref 26.5–33)
MCH RBC QN AUTO: 30.4 PG (ref 26.5–33)
MCHC RBC AUTO-ENTMCNC: 31.9 G/DL (ref 31.5–36.5)
MCHC RBC AUTO-ENTMCNC: 32 G/DL (ref 31.5–36.5)
MCHC RBC AUTO-ENTMCNC: 32.1 G/DL (ref 31.5–36.5)
MCHC RBC AUTO-ENTMCNC: 32.2 G/DL (ref 31.5–36.5)
MCHC RBC AUTO-ENTMCNC: 32.3 G/DL (ref 31.5–36.5)
MCHC RBC AUTO-ENTMCNC: 32.3 G/DL (ref 31.5–36.5)
MCHC RBC AUTO-ENTMCNC: 32.5 G/DL (ref 31.5–36.5)
MCHC RBC AUTO-ENTMCNC: 32.7 G/DL (ref 31.5–36.5)
MCHC RBC AUTO-ENTMCNC: 32.7 G/DL (ref 31.5–36.5)
MCHC RBC AUTO-ENTMCNC: 33.2 G/DL (ref 31.5–36.5)
MCHC RBC AUTO-ENTMCNC: 34.1 G/DL (ref 31.5–36.5)
MCHC RBC AUTO-ENTMCNC: 34.2 G/DL (ref 31.5–36.5)
MCV RBC AUTO: 88 FL (ref 78–100)
MCV RBC AUTO: 89 FL (ref 78–100)
MCV RBC AUTO: 89 FL (ref 78–100)
MCV RBC AUTO: 90 FL (ref 78–100)
MCV RBC AUTO: 91 FL (ref 78–100)
MCV RBC AUTO: 92 FL (ref 78–100)
MONOCYTES # BLD AUTO: 0.3 10E3/UL (ref 0–1.3)
MONOCYTES # BLD AUTO: 0.4 10E3/UL (ref 0–1.3)
MONOCYTES # BLD AUTO: 0.5 10E3/UL (ref 0–1.3)
MONOCYTES # BLD AUTO: 0.6 10E3/UL (ref 0–1.3)
MONOCYTES # BLD AUTO: 1.1 10E3/UL (ref 0–1.3)
MONOCYTES NFR BLD AUTO: 10 %
MONOCYTES NFR BLD AUTO: 11 %
MONOCYTES NFR BLD AUTO: 12 %
MONOCYTES NFR BLD AUTO: 4 %
MONOCYTES NFR BLD AUTO: 7 %
MONOCYTES NFR BLD AUTO: 8 %
MONOCYTES NFR BLD AUTO: 9 %
MONOCYTES NFR BLD AUTO: 9 %
NEUTROPHILS # BLD AUTO: 1.8 10E3/UL (ref 1.6–8.3)
NEUTROPHILS # BLD AUTO: 2.1 10E3/UL (ref 1.6–8.3)
NEUTROPHILS # BLD AUTO: 2.1 10E3/UL (ref 1.6–8.3)
NEUTROPHILS # BLD AUTO: 2.2 10E3/UL (ref 1.6–8.3)
NEUTROPHILS # BLD AUTO: 2.2 10E3/UL (ref 1.6–8.3)
NEUTROPHILS # BLD AUTO: 2.5 10E3/UL (ref 1.6–8.3)
NEUTROPHILS # BLD AUTO: 2.8 10E3/UL (ref 1.6–8.3)
NEUTROPHILS # BLD AUTO: 24.4 10E3/UL (ref 1.6–8.3)
NEUTROPHILS # BLD AUTO: 3 10E3/UL (ref 1.6–8.3)
NEUTROPHILS # BLD AUTO: 3 10E3/UL (ref 1.6–8.3)
NEUTROPHILS # BLD AUTO: 3.1 10E3/UL (ref 1.6–8.3)
NEUTROPHILS # BLD AUTO: 3.3 10E3/UL (ref 1.6–8.3)
NEUTROPHILS # BLD AUTO: 3.7 10E3/UL (ref 1.6–8.3)
NEUTROPHILS # BLD AUTO: 5.3 10E3/UL (ref 1.6–8.3)
NEUTROPHILS # BLD AUTO: 6 10E3/UL (ref 1.6–8.3)
NEUTROPHILS NFR BLD AUTO: 59 %
NEUTROPHILS NFR BLD AUTO: 60 %
NEUTROPHILS NFR BLD AUTO: 61 %
NEUTROPHILS NFR BLD AUTO: 62 %
NEUTROPHILS NFR BLD AUTO: 62 %
NEUTROPHILS NFR BLD AUTO: 63 %
NEUTROPHILS NFR BLD AUTO: 64 %
NEUTROPHILS NFR BLD AUTO: 66 %
NEUTROPHILS NFR BLD AUTO: 69 %
NEUTROPHILS NFR BLD AUTO: 72 %
NEUTROPHILS NFR BLD AUTO: 73 %
NEUTROPHILS NFR BLD AUTO: 75 %
NEUTROPHILS NFR BLD AUTO: 77 %
NEUTROPHILS NFR BLD AUTO: 78 %
NEUTROPHILS NFR BLD AUTO: 88 %
NRBC # BLD AUTO: 0 10E3/UL
NRBC BLD AUTO-RTO: 0 /100
PHOSPHATE SERPL-MCNC: 2.7 MG/DL (ref 2.5–4.5)
PLAT MORPH BLD: ABNORMAL
PLATELET # BLD AUTO: 208 10E3/UL (ref 150–450)
PLATELET # BLD AUTO: 223 10E3/UL (ref 150–450)
PLATELET # BLD AUTO: 228 10E3/UL (ref 150–450)
PLATELET # BLD AUTO: 234 10E3/UL (ref 150–450)
PLATELET # BLD AUTO: 245 10E3/UL (ref 150–450)
PLATELET # BLD AUTO: 257 10E3/UL (ref 150–450)
PLATELET # BLD AUTO: 259 10E3/UL (ref 150–450)
PLATELET # BLD AUTO: 264 10E3/UL (ref 150–450)
PLATELET # BLD AUTO: 267 10E3/UL (ref 150–450)
PLATELET # BLD AUTO: 273 10E3/UL (ref 150–450)
PLATELET # BLD AUTO: 278 10E3/UL (ref 150–450)
PLATELET # BLD AUTO: 282 10E3/UL (ref 150–450)
PLATELET # BLD AUTO: 287 10E3/UL (ref 150–450)
PLATELET # BLD AUTO: 309 10E3/UL (ref 150–450)
PLATELET # BLD AUTO: 321 10E3/UL (ref 150–450)
PLATELET # BLD AUTO: 456 10E3/UL (ref 150–450)
POLYCHROMASIA BLD QL SMEAR: SLIGHT
POTASSIUM BLD-SCNC: 3.6 MMOL/L (ref 3.4–5.3)
PROT SERPL-MCNC: 6.4 G/DL (ref 6.8–8.8)
PROT SERPL-MCNC: 6.9 G/DL (ref 6.8–8.8)
PROT SERPL-MCNC: 7.1 G/DL (ref 6.8–8.8)
PROT SERPL-MCNC: 7.2 G/DL (ref 6.8–8.8)
PROT SERPL-MCNC: 7.2 G/DL (ref 6.8–8.8)
PROT SERPL-MCNC: 7.3 G/DL (ref 6.8–8.8)
PROT SERPL-MCNC: 7.6 G/DL (ref 6.8–8.8)
RADIOLOGIST FLAGS: ABNORMAL
RBC # BLD AUTO: 3.91 10E6/UL (ref 4.4–5.9)
RBC # BLD AUTO: 4.09 10E6/UL (ref 4.4–5.9)
RBC # BLD AUTO: 4.17 10E6/UL (ref 4.4–5.9)
RBC # BLD AUTO: 4.24 10E6/UL (ref 4.4–5.9)
RBC # BLD AUTO: 4.29 10E6/UL (ref 4.4–5.9)
RBC # BLD AUTO: 4.29 10E6/UL (ref 4.4–5.9)
RBC # BLD AUTO: 4.3 10E6/UL (ref 4.4–5.9)
RBC # BLD AUTO: 4.31 10E6/UL (ref 4.4–5.9)
RBC # BLD AUTO: 4.36 10E6/UL (ref 4.4–5.9)
RBC # BLD AUTO: 4.43 10E6/UL (ref 4.4–5.9)
RBC # BLD AUTO: 4.44 10E6/UL (ref 4.4–5.9)
RBC # BLD AUTO: 4.45 10E6/UL (ref 4.4–5.9)
RBC # BLD AUTO: 4.52 10E6/UL (ref 4.4–5.9)
RBC # BLD AUTO: 4.56 10E6/UL (ref 4.4–5.9)
RBC # BLD AUTO: 4.59 10E6/UL (ref 4.4–5.9)
RBC # BLD AUTO: 4.59 10E6/UL (ref 4.4–5.9)
RBC MORPH BLD: ABNORMAL
SODIUM SERPL-SCNC: 141 MMOL/L (ref 133–144)
WBC # BLD AUTO: 2.9 10E3/UL (ref 4–11)
WBC # BLD AUTO: 27.5 10E3/UL (ref 4–11)
WBC # BLD AUTO: 3.3 10E3/UL (ref 4–11)
WBC # BLD AUTO: 3.3 10E3/UL (ref 4–11)
WBC # BLD AUTO: 3.5 10E3/UL (ref 4–11)
WBC # BLD AUTO: 3.5 10E3/UL (ref 4–11)
WBC # BLD AUTO: 3.8 10E3/UL (ref 4–11)
WBC # BLD AUTO: 4 10E3/UL (ref 4–11)
WBC # BLD AUTO: 4.2 10E3/UL (ref 4–11)
WBC # BLD AUTO: 4.2 10E3/UL (ref 4–11)
WBC # BLD AUTO: 4.4 10E3/UL (ref 4–11)
WBC # BLD AUTO: 4.4 10E3/UL (ref 4–11)
WBC # BLD AUTO: 4.6 10E3/UL (ref 4–11)
WBC # BLD AUTO: 4.8 10E3/UL (ref 4–11)
WBC # BLD AUTO: 6.9 10E3/UL (ref 4–11)
WBC # BLD AUTO: 7.8 10E3/UL (ref 4–11)

## 2022-01-01 PROCEDURE — 96415 CHEMO IV INFUSION ADDL HR: CPT | Performed by: INTERNAL MEDICINE

## 2022-01-01 PROCEDURE — A9585 GADOBUTROL INJECTION: HCPCS | Performed by: RADIOLOGY

## 2022-01-01 PROCEDURE — 99215 OFFICE O/P EST HI 40 MIN: CPT | Performed by: STUDENT IN AN ORGANIZED HEALTH CARE EDUCATION/TRAINING PROGRAM

## 2022-01-01 PROCEDURE — 99207 PR NO CHARGE LOS: CPT

## 2022-01-01 PROCEDURE — 96413 CHEMO IV INFUSION 1 HR: CPT

## 2022-01-01 PROCEDURE — 82310 ASSAY OF CALCIUM: CPT | Performed by: STUDENT IN AN ORGANIZED HEALTH CARE EDUCATION/TRAINING PROGRAM

## 2022-01-01 PROCEDURE — 70553 MRI BRAIN STEM W/O & W/DYE: CPT | Mod: 26 | Performed by: STUDENT IN AN ORGANIZED HEALTH CARE EDUCATION/TRAINING PROGRAM

## 2022-01-01 PROCEDURE — 82565 ASSAY OF CREATININE: CPT | Performed by: STUDENT IN AN ORGANIZED HEALTH CARE EDUCATION/TRAINING PROGRAM

## 2022-01-01 PROCEDURE — 96413 CHEMO IV INFUSION 1 HR: CPT | Performed by: NURSE PRACTITIONER

## 2022-01-01 PROCEDURE — 99207 PR NO CHARGE NURSE ONLY: CPT

## 2022-01-01 PROCEDURE — G0463 HOSPITAL OUTPT CLINIC VISIT: HCPCS

## 2022-01-01 PROCEDURE — 82565 ASSAY OF CREATININE: CPT | Performed by: NURSE PRACTITIONER

## 2022-01-01 PROCEDURE — S0028 INJECTION, FAMOTIDINE, 20 MG: HCPCS | Performed by: NURSE PRACTITIONER

## 2022-01-01 PROCEDURE — A9585 GADOBUTROL INJECTION: HCPCS | Performed by: STUDENT IN AN ORGANIZED HEALTH CARE EDUCATION/TRAINING PROGRAM

## 2022-01-01 PROCEDURE — 99215 OFFICE O/P EST HI 40 MIN: CPT | Mod: 95 | Performed by: NURSE PRACTITIONER

## 2022-01-01 PROCEDURE — 80076 HEPATIC FUNCTION PANEL: CPT | Performed by: NURSE PRACTITIONER

## 2022-01-01 PROCEDURE — 82040 ASSAY OF SERUM ALBUMIN: CPT | Performed by: NURSE PRACTITIONER

## 2022-01-01 PROCEDURE — 96375 TX/PRO/DX INJ NEW DRUG ADDON: CPT | Performed by: NURSE PRACTITIONER

## 2022-01-01 PROCEDURE — 77295 3-D RADIOTHERAPY PLAN: CPT | Performed by: SURGERY

## 2022-01-01 PROCEDURE — 250N000011 HC RX IP 250 OP 636: Performed by: STUDENT IN AN ORGANIZED HEALTH CARE EDUCATION/TRAINING PROGRAM

## 2022-01-01 PROCEDURE — 85025 COMPLETE CBC W/AUTO DIFF WBC: CPT | Performed by: STUDENT IN AN ORGANIZED HEALTH CARE EDUCATION/TRAINING PROGRAM

## 2022-01-01 PROCEDURE — 91305 COVID-19,PF,PFIZER (12+ YRS): CPT

## 2022-01-01 PROCEDURE — 96360 HYDRATION IV INFUSION INIT: CPT | Performed by: NURSE PRACTITIONER

## 2022-01-01 PROCEDURE — 81003 URINALYSIS AUTO W/O SCOPE: CPT | Performed by: INTERNAL MEDICINE

## 2022-01-01 PROCEDURE — 81003 URINALYSIS AUTO W/O SCOPE: CPT | Performed by: STUDENT IN AN ORGANIZED HEALTH CARE EDUCATION/TRAINING PROGRAM

## 2022-01-01 PROCEDURE — 99215 OFFICE O/P EST HI 40 MIN: CPT | Mod: 25 | Performed by: NURSE PRACTITIONER

## 2022-01-01 PROCEDURE — 99213 OFFICE O/P EST LOW 20 MIN: CPT | Mod: 95 | Performed by: STUDENT IN AN ORGANIZED HEALTH CARE EDUCATION/TRAINING PROGRAM

## 2022-01-01 PROCEDURE — 85025 COMPLETE CBC W/AUTO DIFF WBC: CPT | Performed by: NURSE PRACTITIONER

## 2022-01-01 PROCEDURE — 84100 ASSAY OF PHOSPHORUS: CPT | Performed by: STUDENT IN AN ORGANIZED HEALTH CARE EDUCATION/TRAINING PROGRAM

## 2022-01-01 PROCEDURE — 96375 TX/PRO/DX INJ NEW DRUG ADDON: CPT

## 2022-01-01 PROCEDURE — 96367 TX/PROPH/DG ADDL SEQ IV INF: CPT | Performed by: NURSE PRACTITIONER

## 2022-01-01 PROCEDURE — 85014 HEMATOCRIT: CPT | Performed by: STUDENT IN AN ORGANIZED HEALTH CARE EDUCATION/TRAINING PROGRAM

## 2022-01-01 PROCEDURE — 77263 THER RADIOLOGY TX PLNG CPLX: CPT | Performed by: SURGERY

## 2022-01-01 PROCEDURE — 80053 COMPREHEN METABOLIC PANEL: CPT | Performed by: STUDENT IN AN ORGANIZED HEALTH CARE EDUCATION/TRAINING PROGRAM

## 2022-01-01 PROCEDURE — 258N000003 HC RX IP 258 OP 636: Performed by: STUDENT IN AN ORGANIZED HEALTH CARE EDUCATION/TRAINING PROGRAM

## 2022-01-01 PROCEDURE — 82310 ASSAY OF CALCIUM: CPT | Performed by: INTERNAL MEDICINE

## 2022-01-01 PROCEDURE — 81003 URINALYSIS AUTO W/O SCOPE: CPT | Performed by: NURSE PRACTITIONER

## 2022-01-01 PROCEDURE — A9585 GADOBUTROL INJECTION: HCPCS | Performed by: NURSE PRACTITIONER

## 2022-01-01 PROCEDURE — 71260 CT THORAX DX C+: CPT | Performed by: RADIOLOGY

## 2022-01-01 PROCEDURE — 96417 CHEMO IV INFUS EACH ADDL SEQ: CPT | Performed by: NURSE PRACTITIONER

## 2022-01-01 PROCEDURE — 250N000009 HC RX 250: Performed by: NURSE PRACTITIONER

## 2022-01-01 PROCEDURE — 250N000011 HC RX IP 250 OP 636: Performed by: NURSE PRACTITIONER

## 2022-01-01 PROCEDURE — 96375 TX/PRO/DX INJ NEW DRUG ADDON: CPT | Performed by: INTERNAL MEDICINE

## 2022-01-01 PROCEDURE — 70553 MRI BRAIN STEM W/O & W/DYE: CPT

## 2022-01-01 PROCEDURE — 74177 CT ABD & PELVIS W/CONTRAST: CPT | Performed by: RADIOLOGY

## 2022-01-01 PROCEDURE — 36415 COLL VENOUS BLD VENIPUNCTURE: CPT | Performed by: STUDENT IN AN ORGANIZED HEALTH CARE EDUCATION/TRAINING PROGRAM

## 2022-01-01 PROCEDURE — 96417 CHEMO IV INFUS EACH ADDL SEQ: CPT | Performed by: INTERNAL MEDICINE

## 2022-01-01 PROCEDURE — 80076 HEPATIC FUNCTION PANEL: CPT | Performed by: STUDENT IN AN ORGANIZED HEALTH CARE EDUCATION/TRAINING PROGRAM

## 2022-01-01 PROCEDURE — 36415 COLL VENOUS BLD VENIPUNCTURE: CPT

## 2022-01-01 PROCEDURE — 99215 OFFICE O/P EST HI 40 MIN: CPT | Mod: 25 | Performed by: SURGERY

## 2022-01-01 PROCEDURE — 96417 CHEMO IV INFUS EACH ADDL SEQ: CPT

## 2022-01-01 PROCEDURE — 70553 MRI BRAIN STEM W/O & W/DYE: CPT | Mod: 26 | Performed by: RADIOLOGY

## 2022-01-01 PROCEDURE — 96413 CHEMO IV INFUSION 1 HR: CPT | Performed by: INTERNAL MEDICINE

## 2022-01-01 PROCEDURE — 77334 RADIATION TREATMENT AID(S): CPT | Performed by: SURGERY

## 2022-01-01 PROCEDURE — S0028 INJECTION, FAMOTIDINE, 20 MG: HCPCS | Performed by: INTERNAL MEDICINE

## 2022-01-01 PROCEDURE — 82040 ASSAY OF SERUM ALBUMIN: CPT | Performed by: STUDENT IN AN ORGANIZED HEALTH CARE EDUCATION/TRAINING PROGRAM

## 2022-01-01 PROCEDURE — 77290 THER RAD SIMULAJ FIELD CPLX: CPT | Performed by: SURGERY

## 2022-01-01 PROCEDURE — 72158 MRI LUMBAR SPINE W/O & W/DYE: CPT | Mod: 26 | Performed by: RADIOLOGY

## 2022-01-01 PROCEDURE — 74177 CT ABD & PELVIS W/CONTRAST: CPT | Performed by: STUDENT IN AN ORGANIZED HEALTH CARE EDUCATION/TRAINING PROGRAM

## 2022-01-01 PROCEDURE — 255N000002 HC RX 255 OP 636: Performed by: NURSE PRACTITIONER

## 2022-01-01 PROCEDURE — 80076 HEPATIC FUNCTION PANEL: CPT | Performed by: INTERNAL MEDICINE

## 2022-01-01 PROCEDURE — 96367 TX/PROPH/DG ADDL SEQ IV INF: CPT | Performed by: INTERNAL MEDICINE

## 2022-01-01 PROCEDURE — 77300 RADIATION THERAPY DOSE PLAN: CPT | Performed by: SURGERY

## 2022-01-01 PROCEDURE — 96365 THER/PROPH/DIAG IV INF INIT: CPT | Performed by: NURSE PRACTITIONER

## 2022-01-01 PROCEDURE — 71260 CT THORAX DX C+: CPT | Performed by: STUDENT IN AN ORGANIZED HEALTH CARE EDUCATION/TRAINING PROGRAM

## 2022-01-01 PROCEDURE — 255N000002 HC RX 255 OP 636: Performed by: RADIOLOGY

## 2022-01-01 PROCEDURE — 85025 COMPLETE CBC W/AUTO DIFF WBC: CPT | Performed by: INTERNAL MEDICINE

## 2022-01-01 PROCEDURE — 96365 THER/PROPH/DIAG IV INF INIT: CPT | Performed by: INTERNAL MEDICINE

## 2022-01-01 PROCEDURE — 96415 CHEMO IV INFUSION ADDL HR: CPT | Performed by: NURSE PRACTITIONER

## 2022-01-01 PROCEDURE — 999N001193 HC VIDEO/TELEPHONE VISIT; NO CHARGE

## 2022-01-01 PROCEDURE — 0054A COVID-19,PF,PFIZER (12+ YRS): CPT

## 2022-01-01 PROCEDURE — 258N000003 HC RX IP 258 OP 636: Performed by: NURSE PRACTITIONER

## 2022-01-01 PROCEDURE — 99213 OFFICE O/P EST LOW 20 MIN: CPT | Mod: GC | Performed by: RADIOLOGY

## 2022-01-01 PROCEDURE — 82310 ASSAY OF CALCIUM: CPT | Performed by: NURSE PRACTITIONER

## 2022-01-01 PROCEDURE — G0463 HOSPITAL OUTPT CLINIC VISIT: HCPCS | Mod: 25

## 2022-01-01 PROCEDURE — 82565 ASSAY OF CREATININE: CPT | Performed by: INTERNAL MEDICINE

## 2022-01-01 PROCEDURE — 72158 MRI LUMBAR SPINE W/O & W/DYE: CPT

## 2022-01-01 PROCEDURE — 85041 AUTOMATED RBC COUNT: CPT

## 2022-01-01 PROCEDURE — 250N000009 HC RX 250: Performed by: STUDENT IN AN ORGANIZED HEALTH CARE EDUCATION/TRAINING PROGRAM

## 2022-01-01 PROCEDURE — 250N000013 HC RX MED GY IP 250 OP 250 PS 637: Performed by: NURSE PRACTITIONER

## 2022-01-01 PROCEDURE — 96367 TX/PROPH/DG ADDL SEQ IV INF: CPT

## 2022-01-01 PROCEDURE — 250N000013 HC RX MED GY IP 250 OP 250 PS 637: Performed by: STUDENT IN AN ORGANIZED HEALTH CARE EDUCATION/TRAINING PROGRAM

## 2022-01-01 PROCEDURE — 72158 MRI LUMBAR SPINE W/O & W/DYE: CPT | Mod: TC | Performed by: RADIOLOGY

## 2022-01-01 PROCEDURE — 99212 OFFICE O/P EST SF 10 MIN: CPT | Performed by: CLINICAL NURSE SPECIALIST

## 2022-01-01 PROCEDURE — G0463 HOSPITAL OUTPT CLINIC VISIT: HCPCS | Mod: 25 | Performed by: RADIOLOGY

## 2022-01-01 PROCEDURE — 255N000002 HC RX 255 OP 636: Performed by: STUDENT IN AN ORGANIZED HEALTH CARE EDUCATION/TRAINING PROGRAM

## 2022-01-01 RX ORDER — EPINEPHRINE 1 MG/ML
0.3 INJECTION, SOLUTION INTRAMUSCULAR; SUBCUTANEOUS EVERY 5 MIN PRN
Status: CANCELLED | OUTPATIENT
Start: 2022-01-01

## 2022-01-01 RX ORDER — DIPHENHYDRAMINE HCL 25 MG
25 CAPSULE ORAL ONCE
Status: CANCELLED
Start: 2022-01-01

## 2022-01-01 RX ORDER — HEPARIN SODIUM (PORCINE) LOCK FLUSH IV SOLN 100 UNIT/ML 100 UNIT/ML
5 SOLUTION INTRAVENOUS
Status: DISCONTINUED | OUTPATIENT
Start: 2022-01-01 | End: 2022-01-01 | Stop reason: HOSPADM

## 2022-01-01 RX ORDER — HEPARIN SODIUM (PORCINE) LOCK FLUSH IV SOLN 100 UNIT/ML 100 UNIT/ML
5 SOLUTION INTRAVENOUS
Status: CANCELLED | OUTPATIENT
Start: 2022-01-01

## 2022-01-01 RX ORDER — ALBUTEROL SULFATE 0.83 MG/ML
2.5 SOLUTION RESPIRATORY (INHALATION)
Status: CANCELLED | OUTPATIENT
Start: 2022-01-01

## 2022-01-01 RX ORDER — ZOLEDRONIC ACID 0.04 MG/ML
4 INJECTION, SOLUTION INTRAVENOUS ONCE
Status: CANCELLED
Start: 2022-01-01 | End: 2022-01-01

## 2022-01-01 RX ORDER — GADOBUTROL 604.72 MG/ML
7.5 INJECTION INTRAVENOUS ONCE
Status: COMPLETED | OUTPATIENT
Start: 2022-01-01 | End: 2022-01-01

## 2022-01-01 RX ORDER — CEPHALEXIN 500 MG/1
500 CAPSULE ORAL 4 TIMES DAILY
COMMUNITY
Start: 2022-01-01 | End: 2022-01-01

## 2022-01-01 RX ORDER — HEPARIN SODIUM (PORCINE) LOCK FLUSH IV SOLN 100 UNIT/ML 100 UNIT/ML
500 SOLUTION INTRAVENOUS
Status: DISCONTINUED | OUTPATIENT
Start: 2022-01-01 | End: 2022-01-01 | Stop reason: HOSPADM

## 2022-01-01 RX ORDER — DIPHENHYDRAMINE HYDROCHLORIDE 50 MG/ML
50 INJECTION INTRAMUSCULAR; INTRAVENOUS
Status: CANCELLED
Start: 2022-01-01

## 2022-01-01 RX ORDER — ALBUTEROL SULFATE 90 UG/1
1-2 AEROSOL, METERED RESPIRATORY (INHALATION)
Status: CANCELLED
Start: 2022-01-01

## 2022-01-01 RX ORDER — NALOXONE HYDROCHLORIDE 0.4 MG/ML
0.2 INJECTION, SOLUTION INTRAMUSCULAR; INTRAVENOUS; SUBCUTANEOUS
Status: CANCELLED | OUTPATIENT
Start: 2022-01-01

## 2022-01-01 RX ORDER — HEPARIN SODIUM,PORCINE 10 UNIT/ML
5 VIAL (ML) INTRAVENOUS
Status: CANCELLED | OUTPATIENT
Start: 2022-01-01

## 2022-01-01 RX ORDER — DIPHENHYDRAMINE HCL 25 MG
50 CAPSULE ORAL ONCE
Status: COMPLETED | OUTPATIENT
Start: 2022-01-01 | End: 2022-01-01

## 2022-01-01 RX ORDER — DEXTROSE, SODIUM CHLORIDE, SODIUM LACTATE, POTASSIUM CHLORIDE, AND CALCIUM CHLORIDE 5; .6; .31; .03; .02 G/100ML; G/100ML; G/100ML; G/100ML; G/100ML
1000 INJECTION, SOLUTION INTRAVENOUS ONCE
Status: CANCELLED | OUTPATIENT
Start: 2022-01-01 | End: 2022-01-01

## 2022-01-01 RX ORDER — LORAZEPAM 2 MG/ML
0.5 INJECTION INTRAMUSCULAR EVERY 4 HOURS PRN
Status: CANCELLED | OUTPATIENT
Start: 2022-01-01

## 2022-01-01 RX ORDER — MEPERIDINE HYDROCHLORIDE 25 MG/ML
25 INJECTION INTRAMUSCULAR; INTRAVENOUS; SUBCUTANEOUS EVERY 30 MIN PRN
Status: CANCELLED | OUTPATIENT
Start: 2022-01-01

## 2022-01-01 RX ORDER — HEPARIN SODIUM (PORCINE) LOCK FLUSH IV SOLN 100 UNIT/ML 100 UNIT/ML
5 SOLUTION INTRAVENOUS DAILY PRN
Status: DISCONTINUED | OUTPATIENT
Start: 2022-01-01 | End: 2022-01-01 | Stop reason: HOSPADM

## 2022-01-01 RX ORDER — HEPARIN SODIUM (PORCINE) LOCK FLUSH IV SOLN 100 UNIT/ML 100 UNIT/ML
500 SOLUTION INTRAVENOUS ONCE
Status: COMPLETED | OUTPATIENT
Start: 2022-01-01 | End: 2022-01-01

## 2022-01-01 RX ORDER — METHYLPREDNISOLONE SODIUM SUCCINATE 125 MG/2ML
125 INJECTION, POWDER, LYOPHILIZED, FOR SOLUTION INTRAMUSCULAR; INTRAVENOUS
Status: CANCELLED
Start: 2022-01-01

## 2022-01-01 RX ORDER — DIPHENHYDRAMINE HCL 25 MG
50 CAPSULE ORAL ONCE
Status: CANCELLED
Start: 2022-01-01

## 2022-01-01 RX ORDER — PROCHLORPERAZINE MALEATE 10 MG
10 TABLET ORAL EVERY 6 HOURS PRN
Qty: 30 TABLET | Refills: 2 | Status: SHIPPED | OUTPATIENT
Start: 2022-01-01 | End: 2022-01-01

## 2022-01-01 RX ORDER — AMOXICILLIN 250 MG
1 CAPSULE ORAL DAILY
Qty: 30 TABLET | Refills: 1 | Status: SHIPPED | OUTPATIENT
Start: 2022-01-01 | End: 2022-01-01

## 2022-01-01 RX ORDER — ZOLEDRONIC ACID 0.04 MG/ML
4 INJECTION, SOLUTION INTRAVENOUS ONCE
Status: COMPLETED | OUTPATIENT
Start: 2022-01-01 | End: 2022-01-01

## 2022-01-01 RX ORDER — ATROPINE SULFATE 0.4 MG/ML
0.4 AMPUL (ML) INJECTION
Status: CANCELLED | OUTPATIENT
Start: 2022-01-01

## 2022-01-01 RX ORDER — BISACODYL 5 MG/1
5 TABLET, DELAYED RELEASE ORAL DAILY PRN
COMMUNITY

## 2022-01-01 RX ORDER — HEPARIN SODIUM (PORCINE) LOCK FLUSH IV SOLN 100 UNIT/ML 100 UNIT/ML
500 SOLUTION INTRAVENOUS
Status: DISCONTINUED | OUTPATIENT
Start: 2022-01-01 | End: 2022-01-01

## 2022-01-01 RX ORDER — ATROPINE SULFATE 0.4 MG/ML
0.4 AMPUL (ML) INJECTION
Status: DISCONTINUED | OUTPATIENT
Start: 2022-01-01 | End: 2022-01-01 | Stop reason: HOSPADM

## 2022-01-01 RX ORDER — DIPHENHYDRAMINE HCL 25 MG
25 CAPSULE ORAL ONCE
Status: COMPLETED | OUTPATIENT
Start: 2022-01-01 | End: 2022-01-01

## 2022-01-01 RX ORDER — IOPAMIDOL 755 MG/ML
93 INJECTION, SOLUTION INTRAVASCULAR ONCE
Status: COMPLETED | OUTPATIENT
Start: 2022-01-01 | End: 2022-01-01

## 2022-01-01 RX ORDER — HEPARIN SODIUM (PORCINE) LOCK FLUSH IV SOLN 100 UNIT/ML 100 UNIT/ML
500 SOLUTION INTRAVENOUS EVERY 8 HOURS
Status: DISCONTINUED | OUTPATIENT
Start: 2022-01-01 | End: 2022-01-01 | Stop reason: HOSPADM

## 2022-01-01 RX ORDER — IOPAMIDOL 755 MG/ML
101 INJECTION, SOLUTION INTRAVASCULAR ONCE
Status: COMPLETED | OUTPATIENT
Start: 2022-01-01 | End: 2022-01-01

## 2022-01-01 RX ORDER — HEPARIN SODIUM (PORCINE) LOCK FLUSH IV SOLN 100 UNIT/ML 100 UNIT/ML
5 SOLUTION INTRAVENOUS ONCE
Status: DISCONTINUED | OUTPATIENT
Start: 2022-01-01 | End: 2022-01-01 | Stop reason: HOSPADM

## 2022-01-01 RX ORDER — HEPARIN SODIUM (PORCINE) LOCK FLUSH IV SOLN 100 UNIT/ML 100 UNIT/ML
500 SOLUTION INTRAVENOUS
Status: CANCELLED | OUTPATIENT
Start: 2022-01-01

## 2022-01-01 RX ORDER — HEPARIN SODIUM (PORCINE) LOCK FLUSH IV SOLN 100 UNIT/ML 100 UNIT/ML
5 SOLUTION INTRAVENOUS EVERY 8 HOURS
Status: DISCONTINUED | OUTPATIENT
Start: 2022-01-01 | End: 2022-01-01 | Stop reason: HOSPADM

## 2022-01-01 RX ORDER — TAMSULOSIN HYDROCHLORIDE 0.4 MG/1
0.4 CAPSULE ORAL DAILY
Qty: 30 CAPSULE | Refills: 1 | Status: SHIPPED | OUTPATIENT
Start: 2022-01-01 | End: 2022-01-01 | Stop reason: SINTOL

## 2022-01-01 RX ORDER — GADOBUTROL 604.72 MG/ML
7 INJECTION INTRAVENOUS ONCE
Status: COMPLETED | OUTPATIENT
Start: 2022-01-01 | End: 2022-01-01

## 2022-01-01 RX ORDER — LOPERAMIDE HCL 2 MG
CAPSULE ORAL
Qty: 30 CAPSULE | Refills: 0 | Status: SHIPPED | OUTPATIENT
Start: 2022-01-01 | End: 2022-01-01

## 2022-01-01 RX ORDER — DEXAMETHASONE 4 MG/1
8 TABLET ORAL 2 TIMES DAILY WITH MEALS
Qty: 6 TABLET | Refills: 0 | Status: SHIPPED | OUTPATIENT
Start: 2022-01-01 | End: 2022-01-01

## 2022-01-01 RX ORDER — HEPARIN SODIUM (PORCINE) LOCK FLUSH IV SOLN 100 UNIT/ML 100 UNIT/ML
5 SOLUTION INTRAVENOUS ONCE
Status: COMPLETED | OUTPATIENT
Start: 2022-01-01 | End: 2022-01-01

## 2022-01-01 RX ORDER — GABAPENTIN 100 MG/1
300 CAPSULE ORAL AT BEDTIME
Qty: 120 CAPSULE | Refills: 3 | Status: SHIPPED | OUTPATIENT
Start: 2022-01-01

## 2022-01-01 RX ADMIN — IOPAMIDOL 93 ML: 755 INJECTION, SOLUTION INTRAVASCULAR at 10:23

## 2022-01-01 RX ADMIN — SODIUM CHLORIDE 250 ML: 9 INJECTION, SOLUTION INTRAVENOUS at 11:43

## 2022-01-01 RX ADMIN — Medication 50 MG: at 08:51

## 2022-01-01 RX ADMIN — Medication 250 ML: at 08:24

## 2022-01-01 RX ADMIN — PACLITAXEL 156 MG: 6 INJECTION, SOLUTION INTRAVENOUS at 15:59

## 2022-01-01 RX ADMIN — Medication 0.4 MG: at 15:22

## 2022-01-01 RX ADMIN — Medication 5 ML: at 11:12

## 2022-01-01 RX ADMIN — Medication 250 ML: at 13:50

## 2022-01-01 RX ADMIN — HEPARIN SODIUM (PORCINE) LOCK FLUSH IV SOLN 100 UNIT/ML 5 ML: 100 SOLUTION at 08:44

## 2022-01-01 RX ADMIN — Medication 0.4 MG: at 11:55

## 2022-01-01 RX ADMIN — ZOLEDRONIC ACID 4 MG: 0.04 INJECTION, SOLUTION INTRAVENOUS at 08:24

## 2022-01-01 RX ADMIN — HEPARIN SODIUM (PORCINE) LOCK FLUSH IV SOLN 100 UNIT/ML 500 UNITS: 100 SOLUTION at 07:30

## 2022-01-01 RX ADMIN — HEPARIN SODIUM (PORCINE) LOCK FLUSH IV SOLN 100 UNIT/ML 5 ML: 100 SOLUTION at 10:33

## 2022-01-01 RX ADMIN — HEPARIN SODIUM (PORCINE) LOCK FLUSH IV SOLN 100 UNIT/ML 5 ML: 100 SOLUTION at 17:03

## 2022-01-01 RX ADMIN — GADOBUTROL 7 ML: 604.72 INJECTION INTRAVENOUS at 14:15

## 2022-01-01 RX ADMIN — HEPARIN SODIUM (PORCINE) LOCK FLUSH IV SOLN 100 UNIT/ML 5 ML: 100 SOLUTION at 08:00

## 2022-01-01 RX ADMIN — IOPAMIDOL 101 ML: 755 INJECTION, SOLUTION INTRAVASCULAR at 09:41

## 2022-01-01 RX ADMIN — Medication 5 ML: at 15:23

## 2022-01-01 RX ADMIN — DEXAMETHASONE SODIUM PHOSPHATE: 10 INJECTION, SOLUTION INTRAMUSCULAR; INTRAVENOUS at 13:44

## 2022-01-01 RX ADMIN — HEPARIN SODIUM (PORCINE) LOCK FLUSH IV SOLN 100 UNIT/ML 500 UNITS: 100 SOLUTION at 11:25

## 2022-01-01 RX ADMIN — HEPARIN SODIUM (PORCINE) LOCK FLUSH IV SOLN 100 UNIT/ML 500 UNITS: 100 SOLUTION at 07:52

## 2022-01-01 RX ADMIN — FAMOTIDINE 20 MG: 10 INJECTION INTRAVENOUS at 14:00

## 2022-01-01 RX ADMIN — HEPARIN SODIUM (PORCINE) LOCK FLUSH IV SOLN 100 UNIT/ML 5 ML: 100 SOLUTION at 13:58

## 2022-01-01 RX ADMIN — HEPARIN SODIUM (PORCINE) LOCK FLUSH IV SOLN 100 UNIT/ML 500 UNITS: 100 SOLUTION at 14:15

## 2022-01-01 RX ADMIN — Medication 250 ML: at 09:10

## 2022-01-01 RX ADMIN — DOCETAXEL 138 MG: 20 INJECTION, SOLUTION, CONCENTRATE INTRAVENOUS at 14:18

## 2022-01-01 RX ADMIN — IOPAMIDOL 101 ML: 755 INJECTION, SOLUTION INTRAVASCULAR at 10:20

## 2022-01-01 RX ADMIN — FAMOTIDINE 20 MG: 10 INJECTION INTRAVENOUS at 14:54

## 2022-01-01 RX ADMIN — HEPARIN SODIUM (PORCINE) LOCK FLUSH IV SOLN 100 UNIT/ML 5 ML: 100 SOLUTION at 11:08

## 2022-01-01 RX ADMIN — Medication 5 ML: at 17:02

## 2022-01-01 RX ADMIN — Medication 50 MG: at 09:49

## 2022-01-01 RX ADMIN — HEPARIN SODIUM (PORCINE) LOCK FLUSH IV SOLN 100 UNIT/ML 5 ML: 100 SOLUTION at 12:24

## 2022-01-01 RX ADMIN — Medication 50 MG: at 08:38

## 2022-01-01 RX ADMIN — Medication 50 MG: at 09:21

## 2022-01-01 RX ADMIN — HEPARIN SODIUM (PORCINE) LOCK FLUSH IV SOLN 100 UNIT/ML 500 UNITS: 100 SOLUTION at 08:38

## 2022-01-01 RX ADMIN — Medication 250 ML: at 08:39

## 2022-01-01 RX ADMIN — HEPARIN SODIUM (PORCINE) LOCK FLUSH IV SOLN 100 UNIT/ML 500 UNITS: 100 SOLUTION at 13:24

## 2022-01-01 RX ADMIN — Medication 250 ML: at 12:17

## 2022-01-01 RX ADMIN — Medication 25 MG: at 08:23

## 2022-01-01 RX ADMIN — ZOLEDRONIC ACID 4 MG: 0.04 INJECTION, SOLUTION INTRAVENOUS at 13:14

## 2022-01-01 RX ADMIN — FAMOTIDINE 20 MG: 10 INJECTION INTRAVENOUS at 11:46

## 2022-01-01 RX ADMIN — Medication 25 MG: at 08:17

## 2022-01-01 RX ADMIN — HEPARIN SODIUM (PORCINE) LOCK FLUSH IV SOLN 100 UNIT/ML 5 ML: 100 SOLUTION at 09:59

## 2022-01-01 RX ADMIN — Medication 250 ML: at 13:13

## 2022-01-01 RX ADMIN — HEPARIN SODIUM (PORCINE) LOCK FLUSH IV SOLN 100 UNIT/ML 5 ML: 100 SOLUTION at 11:03

## 2022-01-01 RX ADMIN — Medication 500 UNITS: at 11:02

## 2022-01-01 RX ADMIN — DIPHENHYDRAMINE HYDROCHLORIDE 50 MG: 25 CAPSULE ORAL at 11:42

## 2022-01-01 RX ADMIN — HEPARIN SODIUM (PORCINE) LOCK FLUSH IV SOLN 100 UNIT/ML 500 UNITS: 100 SOLUTION at 07:44

## 2022-01-01 RX ADMIN — HEPARIN SODIUM (PORCINE) LOCK FLUSH IV SOLN 100 UNIT/ML 5 ML: 100 SOLUTION at 13:33

## 2022-01-01 RX ADMIN — Medication 250 ML: at 08:16

## 2022-01-01 RX ADMIN — SODIUM CHLORIDE 600 MG: 9 INJECTION, SOLUTION INTRAVENOUS at 15:23

## 2022-01-01 RX ADMIN — DEXAMETHASONE SODIUM PHOSPHATE 16 MG: 10 INJECTION, SOLUTION INTRAMUSCULAR; INTRAVENOUS at 12:09

## 2022-01-01 RX ADMIN — DIPHENHYDRAMINE HYDROCHLORIDE 50 MG: 25 CAPSULE ORAL at 14:54

## 2022-01-01 RX ADMIN — Medication 250 ML: at 09:17

## 2022-01-01 RX ADMIN — Medication 0.4 MG: at 14:20

## 2022-01-01 RX ADMIN — PACLITAXEL 156 MG: 6 INJECTION, SOLUTION INTRAVENOUS at 13:13

## 2022-01-01 RX ADMIN — GADOBUTROL 6.25 ML: 604.72 INJECTION INTRAVENOUS at 12:56

## 2022-01-01 RX ADMIN — HEPARIN SODIUM (PORCINE) LOCK FLUSH IV SOLN 100 UNIT/ML 5 ML: 100 SOLUTION at 10:15

## 2022-01-01 RX ADMIN — SODIUM CHLORIDE 250 ML: 9 INJECTION, SOLUTION INTRAVENOUS at 13:44

## 2022-01-01 RX ADMIN — HEPARIN SODIUM (PORCINE) LOCK FLUSH IV SOLN 100 UNIT/ML 5 ML: 100 SOLUTION at 16:25

## 2022-01-01 RX ADMIN — Medication 25 MG: at 09:07

## 2022-01-01 RX ADMIN — SODIUM CHLORIDE 250 ML: 9 INJECTION, SOLUTION INTRAVENOUS at 14:55

## 2022-01-01 RX ADMIN — HEPARIN SODIUM (PORCINE) LOCK FLUSH IV SOLN 100 UNIT/ML 5 ML: 100 SOLUTION at 09:18

## 2022-01-01 RX ADMIN — ZOLEDRONIC ACID 4 MG: 0.04 INJECTION, SOLUTION INTRAVENOUS at 16:02

## 2022-01-01 RX ADMIN — Medication 1000 ML: at 15:00

## 2022-01-01 RX ADMIN — DEXAMETHASONE SODIUM PHOSPHATE 16 MG: 10 INJECTION, SOLUTION INTRAMUSCULAR; INTRAVENOUS at 14:54

## 2022-01-01 RX ADMIN — Medication 250 ML: at 14:54

## 2022-01-01 RX ADMIN — Medication 5 ML: at 12:53

## 2022-01-01 RX ADMIN — Medication 250 ML: at 08:47

## 2022-01-01 RX ADMIN — SODIUM CHLORIDE 600 MG: 9 INJECTION, SOLUTION INTRAVENOUS at 12:36

## 2022-01-01 RX ADMIN — Medication 250 ML: at 09:52

## 2022-01-01 RX ADMIN — Medication 5 ML: at 14:18

## 2022-01-01 RX ADMIN — Medication 250 ML: at 08:14

## 2022-01-01 RX ADMIN — GADOBUTROL 7.5 ML: 604.72 INJECTION INTRAVENOUS at 13:18

## 2022-01-01 RX ADMIN — ZOLEDRONIC ACID 4 MG: 0.04 INJECTION, SOLUTION INTRAVENOUS at 10:49

## 2022-01-01 RX ADMIN — GADOBUTROL 7 ML: 604.72 INJECTION INTRAVENOUS at 12:11

## 2022-01-01 RX ADMIN — HEPARIN SODIUM (PORCINE) LOCK FLUSH IV SOLN 100 UNIT/ML 500 UNITS: 100 SOLUTION at 07:38

## 2022-01-01 ASSESSMENT — PAIN SCALES - GENERAL
PAINLEVEL: MILD PAIN (2)
PAINLEVEL: NO PAIN (0)
PAINLEVEL: MILD PAIN (2)
PAINLEVEL: NO PAIN (0)
PAINLEVEL: MILD PAIN (2)
PAINLEVEL: MILD PAIN (3)
PAINLEVEL: MODERATE PAIN (5)
PAINLEVEL: MODERATE PAIN (4)
PAINLEVEL: MILD PAIN (3)
PAINLEVEL: MILD PAIN (2)
PAINLEVEL: MILD PAIN (3)
PAINLEVEL: NO PAIN (0)
PAINLEVEL: NO PAIN (0)
PAINLEVEL: MILD PAIN (2)
PAINLEVEL: MILD PAIN (2)

## 2022-01-03 NOTE — PROGRESS NOTES
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     Today, patient reports that he is doing relatively well. No new symptoms. He remains active and is happy to report he has been shoveling snow in his house. He is baking once more at home. He recently started brain radiation and is not having significant side effects from this.     ROS:  Negative unless otherwise stated in HPI.      Physical Examination:   VITALS:   There were no vitals taken for this visit.     Gen: NAD, AO  CV/Resp: breathing comfortably on room air.  Ext: WWP        Labs:  Lab Results   Component Value Date    BILITOTAL 0.3 12/24/2021    BILITOTAL 0.6 12/09/2021    BILITOTAL 0.7 11/18/2021    BILITOTAL 0.4 10/28/2021     Lab Results   Component Value Date    ALBUMIN 3.8 12/24/2021    ALBUMIN 3.6 06/24/2021       Diagnostic studies: Personally reviewed and interpreted.    MR Abdomen 12/15/21: Progressive disease in the treatment zone as seen by the peripherally enhancing and diffusion restricting lesions in segments 5, 8, and 7.     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 He has evidence of disease progression on his most recent abdominal MRI. He also has known leptomeningeal disease and has begun whole brain radiation with Dr. Velez which he is tolerating fairly well. Due to the extensive disease process, the question of additional liver directed therapy is nebulous. Treating his hepatic disease may not affect his overall disease course and outcome and may only make the  remaining time he has left more uncomfortable. However, if upon discussion with Dr. Steven and Dr. Velez, it seems that treating his hepatic disease would provide meaningful benefit, we could pursue re-treatment.    Plan:  -No immediate intervention will be planned at this time. However, will discuss with Abner Steven and Agustin before rendering a final decision.     I, Dr Rosales Meléndez, was present with the fellow during the history and exam. I discussed the case with the fellow and agree with the findings as documented in the assessment and plan.    A total of 20 minutes was spent in care for the patient, of which >50% was spent in counseling and co-ordination of care.      CC  Patient Care Team:  Negra Kim CNP as PCP - Roderick Stone RN as Specialty Care Coordinator (Oncology)  Jose Steven MD as MD (Hematology & Oncology)  Jon Conde MD as MD (Radiation Oncology)  Negra Kim CNP as Assigned PCP  Whit Hatfield CNP as Nurse Practitioner (Urology)  Trisha Garcia RN as Registered Nurse  Chas Baptiste MD as Assigned Neuroscience Provider  Angelica Lloyd PA-C as Assigned Surgical Provider  Jon Conde MD as Assigned Cancer Care Provider  JON CONDE

## 2022-01-03 NOTE — LETTER
1/3/2022         RE: Junito Wang  [address] [address] [facility] [id number]        Dear Colleague,    Thank you for referring your patient, Junito Wang, to the [facility]. Please see a copy of my visit note below.        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     Today, patient reports that he is doing relatively well. No new symptoms. He remains active and is happy to report he has been shoveling snow in his house. He is baking once more at home. He recently started brain radiation and is not having significant side effects from this.     ROS:  Negative unless otherwise stated in HPI.      Physical Examination:   VITALS:   There were no vitals taken for this visit.     Gen: NAD, AO  CV/Resp: breathing comfortably on room air.  Ext: WWP        Labs:  Lab Results   Component Value Date    BILITOTAL 0.3 12/24/2021    BILITOTAL 0.6 12/09/2021    BILITOTAL 0.7 11/18/2021    BILITOTAL 0.4 10/28/2021     Lab Results   Component Value Date    ALBUMIN 3.8 12/24/2021    ALBUMIN 3.6 06/24/2021       Diagnostic studies: Personally reviewed and interpreted.    MR Abdomen 12/15/21: Progressive disease in the treatment zone as seen by the peripherally enhancing and diffusion restricting lesions in segments 5, 8, and 7.     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 He has evidence of disease progression on his most recent abdominal MRI. He also has known leptomeningeal disease and has begun whole brain radiation with  Dr. Velez which he is tolerating fairly well. Due to the extensive disease process, the question of additional liver directed therapy is nebulous. Treating his hepatic disease may not affect his overall disease course and outcome and may only make the remaining time he has left more uncomfortable. However, if upon discussion with Dr. Steven and Dr. Velez, it seems that treating his hepatic disease would provide meaningful benefit, we could pursue re-treatment.    Plan:  -No immediate intervention will be planned at this time. However, will discuss with Abner Steven and Agustin before rendering a final decision.     I, Dr Roslaes Meléndez, was present with the fellow during the history and exam. I discussed the case with the fellow and agree with the findings as documented in the assessment and plan.    A total of 20 minutes was spent in care for the patient, of which >50% was spent in counseling and co-ordination of care.            Again, thank you for allowing me to participate in the care of your patient.      Sincerely,    Rosales Meléndez MD

## 2022-01-03 NOTE — NURSING NOTE
Chief Complaint   Patient presents with     Labs Only     venipuncture right antecub, labs drawn     Paul Craig RN on 1/3/2022 at 12:50 PM

## 2022-01-04 NOTE — PROGRESS NOTES
Junito is a 61 year old who is being evaluated via a billable video visit.    Patient reviewed medications and allergies via [employer].  How would you like to obtain your AVS? [employer]  If the video visit is dropped, the invitation should be resent by: Send to e-mail at: [email or web address]  Will anyone else be joining your video visit? No    Kari Echeverria VF    Video-Visit Details    Type of service:  Video Visit    Video Start Time: 12:07 PM    Video End Time:12:30 PM    Originating Location (pt. 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  12/9/21: Taxol and ramucirumab  12/23/21: GK    Treatment intent- Palliative    Oncology HPI:   August 2020- stomach discomfort, belching   October 2020- progressive dysphagia with solids   1/26/21- distal esophageal biopsy shows Moderately differentiated adenocarcinoma; MMR normal expression, PDL1 expression is low with TPS 2%, CPS 5-10, Her2 is pending  1/27/21- CT CAP- Enlarged  2 cm subcarinal lymph node and 1.4 cm short axis right subcarinal lymph node, focal thickening of the superior wall along the right side of the mid esophagus. Numerous small pulmonary nodules, 3 mm nodule in the superior segment of the left lower lobe, 3 mm nodule in  the anterior aspect of the right upper lobe , 4 mm nodule in the medial aspect of the right upper lobe and 4 mm right lower lobe nodule. Hypoattenuating foci in the liver, 1.6 cm hypoattenuating/hypoenhancing lesion in hepatic segment 8 and 1.4 cm lesion in hepatic segment 5 , indeterminate, likely metastatic.  The prostate gland is mildly enlarged measuring 5.3 cm in transverse diameter. Multiple prominent but not significantly enlarged retroperitoneal lymph nodes, indeterminate, could be reactive. 4.4 x 4.3 cm lytic lesion centered in the posterior aspect of the left sacral ala (series 3 image 243), 4.7 x 4.0 x 5.6 cm (axial and CC dimensions, respectively) hypoattenuating lesion in the subcutaneous tissue of the anterior chest wall just anterior to the mediastinum, indeterminate, could represent sebaceous cyst.  2/4/21- Saw Dr. Bourgeois ordered PET/CT  3/2/21-3/10/21- Admission to [facility]- <24h of inability to ambulate with acute onset left leg weakness/urinary retention with perianal sensory deficits, MRI showed extensive epidural signal change suggestive of tumor vs. Hemorrhage.Pt underwent emergent surgery- L2-L5 laminectomy and decompression . PEG tube placement 3/5/2021.   3/17/21-3/23/21: Radiation to LS spine  3/24/21: gamma knife (2000cGy in 5 fractions)  3/29/21- port placement  3/31/21: Cycle 1 Cisplatin+keytruda+5fu   4/21/21: Cycle 2 Cisplatin+keytruda+5fu  5/11/21- Ct CAP- Overall stable disease- mixed response- MT in 2 liver lesions while, stable disease to borderline progression in 1 liver lesions  5/12/21- Cycle 3 Cisplatin+keytruda+5fu  6/2/21- Cycle 4 Cisplatin+keytruda+5fu  6/21/21- CT CAP- Stable mild circumferential wall thickening of the distal esophagus (series 3, image 99) with small amount of fluid in the upper and mid esophagus. No lymphadenopathy. Stable to slight increased size of hepatic metastases. For example a 3.1 x 2.9 cm lesion in the left lobe (series 3, image 123) previously measured  2.9 x 2.6 cm, and a 2.7 x 2.9 cm peripheral right hepatic lobe metastasis previously measured 3.0 x 2.2 cm. A 1.0 cm lesion in the caudate lobe (series 3, image 138) is more prominent today/new. Stable small gastrohepatic ligament lymph nodes. No new or enlarging lymph nodes in the abdomen and pelvis. No ascites.Stable 5.1 x 4.6 cm left sacral mass. Stable cystic 4.9 x 3.5 cm subcutaneous lesion overlying the midsternum.  6/24/21- Cycle 5 Cisplatin+keytruda+5fu  7/14/21- Cycle 6 Cisplatin+keytruda+5fu  8/3/21- Ct CAP- Few small pulmonary nodules are stable. Stable mild circumferential wall thickening of the distal esophagus (series 3, image 97).  Increased size of hepatic metastases. For example a 3.5  x 3.5 cm left lobe metastasis (series 3, image 117) previously measured 2.9 x 3.1 cm, a 3.5 x 2.7 cm subcapsular right hepatic lobe metastasis (series 3, image 158) previously measured 2.7 x 2.9 cm and  a 1.7 x 1.5 cm lesion in the left lobe adjacent to the caudate (series 3, image 124) previously measured 1.0 cm.  Stable 5.2 x 4.9 cm lytic lesion in the left sacrum previously measuring 5.2 x 4.6 cm (3, image 238). Stable sclerotic lesion in the anterior aspect of the T4 vertebral body measuring 1.3 cm (series 3, image 39) and sclerosis of the posterior left third rib.. No new lesions. Stable 5.2 cm cystic subcutaneous lesion overlying the sternum  8/5, 8/26, 9/16- Pembrolizumab and 5-FU  9/10/21- Y90 delivery in the left hepatic lobe and segment 5 lesions.   9/13/21- Brain MRI: no progression or distal metatasis  9/28/21- Ct CAP- Multiple left and right hepatic nodular masses identified consistent with metastatic disease. Several appear to be new or are larger worrisome for progression. One lesion is slightly smaller along  the anterior aspect of hepatic segment 2. Stable destructive sacral bone lesion and sclerosis at T4 and left third rib. Stable distal esophageal wall thickening. Stable but indeterminant soft  tissue surrounding portions of the left gastric artery at the upper midabdomen.  11/26/21- CT CAP-  Interval progression of disease: Enlarging hepatic metastatic foci, Wall thickening involving the distal thoracic esophagus appears to involve a more proximal distal extent compared to prior,  Stable to mildly enlarged nodular thickening about the left gastric artery. New abnormal left periaortic retroperitoneal lymphadenopathy. Enlarging left lytic sacral mass.  12/15/21- 3 new Brain Mets identified, Rad/Onc recommends gamma knife scheduled for 12/23/21.     Interval history:   Junito is doing well today. Tolerating chemo. Eating and drinking okay. Still has intermittent abdominal pain. Constipation occasionally, dealt with dulcolax.     No chest pain, no COLÓN/SOB. No fevers, chills, LE edema, urinary changes. No new weakness. No headaches, vision changes.     Comprehensive ROS was unremarkable except as detailed above.      Current Outpatient Medications   Medication Sig Dispense Refill     bacitracin 500 UNIT/GM external ointment Apply topically 2 times daily Until the sore are well healed (Patient not taking: Reported on 11/29/2021) 113.4 g 2     Calcium Carb-Cholecalciferol (CALCIUM-VITAMIN D) 600-400 MG-UNIT TABS Take 2 tablets by mouth daily 60 tablet 3     gabapentin (NEURONTIN) 250 MG/5ML solution Take 5 mLs (250 mg) by mouth At Bedtime (Patient not taking: Reported on 10/7/2021) 470 mL 1     LORazepam (ATIVAN) 0.5 MG tablet Take 1 tablet (0.5 mg) by mouth every 4 hours as needed (Anxiety, Nausea/Vomiting or Sleep) 30 tablet 2     multivitamin CF FORMULA (CHOICEFUL) chewable tablet Take 1 tablet by mouth daily       omeprazole (FIRST-OMEPRAZOLE) 2 MG/ML SUSP Take 10 mLs (20 mg) by mouth 2 times daily (Patient not taking: Reported on 9/29/2021) 300 mL 1     ondansetron (ZOFRAN) 4 MG tablet Take 1-2 tablets (4-8 mg) by mouth every 6 hours as needed for nausea (vomiting) (Patient not taking: Reported on 10/7/2021)  40 tablet 0     prochlorperazine (COMPAZINE) 10 MG tablet Take 1 tablet (10 mg) by mouth every 6 hours as needed (Nausea/Vomiting) 30 tablet 2      No Known Allergies    Exam:  There were no vitals taken for this visit.  Wt Readings from Last 4 Encounters:   12/16/21 75.2 kg (165 lb 12.8 oz)   12/15/21 74.8 kg (165 lb)   12/09/21 75.8 kg (167 lb 1.6 oz)   11/18/21 77.2 kg (170 lb 3.2 oz)     Video physical exam  General: Patient appears well in no acute distress.   Skin: No visualized rash or lesions on visualized skin  Eyes: EOMI, no erythema, sclera icterus or discharge noted  Resp: Appears to be breathing comfortably without accessory muscle usage, speaking in full sentences, no cough  MSK: Appears to have normal range of motion based on visualized movements  Neurologic: No apparent tremors, facial movements symmetric  Psych: affect good, alert and oriented    The rest of a comprehensive physical examination is deferred due to PHE (public health emergency) video restrictions    Labs:   Most Recent 3 CBC's:  Most Recent 3 CBC's:  Recent Labs   Lab Test 01/03/22  1248 12/24/21  0856 12/22/21  1245   WBC 3.5* 2.6* 2.8*   HGB 13.4 12.5* 12.7*   MCV 91 90 90    272 277     Most Recent 3 BMP's:  Recent Labs   Lab Test 12/24/21  0856 11/18/21  0831 10/28/21  0846    139 139   POTASSIUM 4.0 4.0 4.3   CHLORIDE 109 110* 108   CO2 24 26 27   BUN 16 16 14   CR 0.67 0.76 0.70   ANIONGAP 6 3 4   ARDEN 8.7 9.1 9.1   * 103* 94    Most Recent 2 LFT's:  Recent Labs   Lab Test 12/24/21  0856 12/09/21  1019   AST 18 32   ALT 27 18   ALKPHOS 104 114   BILITOTAL 0.3 0.6    Most Recent TSH and T4:  Recent Labs   Lab Test 03/02/21  1426   TSH 1.40     I reviewed the above labs today.    Imaging:   Brain MRI with contrast primarily for the purposes of stereotactic  evaluation 12/23/2021.     History: Metastasis to brain (H)  ICD-10: Metastasis to brain (H)     Comparison: Brain MRI 12/15/2021. Stealth brain MRI  3/22/2021.     Technique: MR imaging performed with 3-dimensonally acquired  T1-weighted sequences performed with intravenous contrast.     Contrast: 7.5mL Gadavist     Findings: Limited imaging for stereotactic imaging demonstrates no  significant change in leptomeningeal enhancement along the ventral mid  brain since 12/15/2021, allowing for differences in technique (1.5 T  versus 3 T). Unchanged small focus of enhancement in the cortex of the  right occipital lobe. Unchanged linear leptomeningeal enhancement  along the superior aspect of the right middle cerebellar peduncle.  Decreased conspicuity of enhancement along the left inferior  colliculus. Left temporal parietal mass on 3/22/2021, remains occult  and unchanged since 12/15/2021.     No new abnormal enhancement.     No mass effect, midline shift or hydrocephalus.                                                                  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 No new metastatic  disease. Limited imaging primarily for the purposes of stereotactic  localization.    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 Restaging scan after 2 cycles showed overall stable disease with - mixed response- KS in esophgaus primary, and 2 liver lesions while, stable disease in spine, and stable to borderline progression in 1 liver lesions. Scan after 4 cycles showing stable disease in esophagus and LN and a some liver lesion. 2 lesions in liver are slightly bigger, one is more prominent than before. CT CAP after 6 cycles done on 8/3/21 demonstrated continued interval increase in the size of 2  liver lesions, stable osseous lesions and continued inconspicuity of the primary esophageal mass. Given the liver-isolated progressive disease, we recommended liver directed local therapy and Y-90 was complete 9/10    Chemotherapy wise, he completed 6 cycles of induction cisplatin, 5-FU and pembrolizumab thus was swithced to 5-FLU and pembrolizumab maintenance (i.e. drop cisplatin) with cycle 7. He complted 6 cycels of maintainece pembro+5flu and restagnig CT CAP 11/26 concerning for multifocal progressive disease.    Switched to paclitaxel+ramicirumab based on the Josephine trial. Ramucirumab 8 mg/kg  Intravenously is given on days 1 and 15, plus paclitaxel 80 mg/m2 intravenously on days 1, 8, and 15 of a 28-day cycle (3 weeks on, 1 week off). Tolerated cycle 1 well without concerns. Has mild cytopenias. BP sl high last check, will have to follow trend. Reducing dex pre-med to 16 given no reaction, could further reduce to 12 with next cycle along with benadryl 25 mg.    Overall plan  - Proceed with cycle 2 paclitaxel and ramicirumab pending labs  - Will again request CT CAP to be rescheduled at [place] or [place] prior to Dr. Steven 2/2  - Will request C3 and C4 infusions at [place].     #Brain mets   Follows with Dr. Velez. MRI 12/15/21 with three new mets s/p gamma knife tomorrow 12/23/2021. Repeat MRI 3 mo     #Bone mets  Bone biopsy of left pelvic mass 2/26/21 confirmed metastasis. Finished radiation 3/23 to lumbosacral spine.   While he has not seen dentist in 4 yrs, discusseed small risk of osteonecrosis, 3-5% risk, he does not have any ongoing dental issues and has no tooth pain or caries.  -Zometa every 4 weeks with the start of his treatment cycles.   -Continue Calcium and Vitamin D.  -Continue gabapentin 200 mg at bedtime for radicular pain.    #Dypshagia, malnutrition  PEG tube placed in OR (3/5) for nutritional support. Now with good PO intake. Continue PO intake as tolerated. Previously  declined SLP follow up, can revisit as needed though dysphagia has improved with treatment.   --Of note, consideration given to esophageal stent vs XRT for esophageal debulking to address dysphagia inpatient in Feb/March 2021; however given likely improvement with chemo-immunotherapy, as well as risks of worsening cytopenias, elected not to.      #Cauda Equina due to tumor compression s/p L2-L5 laminectomy and decompression   Followed by neurosurgery (now prn) with improved exam after surgery. He is now ambulating with or without a walker, strength nearly back to baseline. He is aware of his activity and lifting restrictions. Has some L radiculopathy that he uses robaxan prn.   Most recent CT scan showing mild increase in the size of rt sacral lesion, however has no new symptoms, asked him to be vigilant for new symptoms. Of note, he does have baseline L leg numbness.      #Constipation  Continue Dulcolax prn. May also use miralax, or MOM prn. Continue to avoid suppository while on chemo.    #GERD, improving  Improved with omeprazole, can use prn    #neuropathy  Pre-dates taxol. Monitor for worsening.    50 minutes spent on the date of the encounter doing chart review, review of test results, interpretation of tests, patient visit, documentation and discussion with other provider(s)     Jaky Pugh CNP on 1/5/2022 at 5:02 PM

## 2022-01-06 NOTE — PROGRESS NOTES
Infusion Nursing Note:  Junito Wang presents today for Cycle 2 Day 1 Cyramza and Taxol.  Patient seen by provider today: No   present during visit today: Not Applicable.    Note: Patient presents to infusion today doing wel. Denies any new changes or concerns since visit with Jaky Pugh NP yesterday 1/5. Baseline left hip pain rating 2/10 today, declines any pain interventions at this time.     Intravenous Access:  Implanted Port.    Treatment Conditions:  Lab Results   Component Value Date    HGB 13.1 (L) 01/06/2022    WBC 4.4 01/06/2022    ANEU 5.1 07/14/2021    ANEUTAUTO 3.0 01/06/2022     01/06/2022      Lab Results   Component Value Date     12/24/2021    POTASSIUM 4.0 12/24/2021    MAG 2.2 11/18/2021    CR 0.70 01/06/2022    ARDEN 9.1 01/06/2022    BILITOTAL 0.3 01/06/2022    ALBUMIN 3.8 01/06/2022    ALT 25 01/06/2022    AST 20 01/06/2022     Results reviewed, labs MET treatment parameters, ok to proceed with treatment.  BP WNL.  Urine negative.    Post Infusion Assessment:  Patient tolerated infusion without incident.  Blood return noted pre and post infusion.  Site patent and intact, free from redness, edema or discomfort.  No evidence of extravasations.  Access discontinued per protocol.     Discharge Plan:   Patient declined prescription refills.  Discharge instructions reviewed with: Patient.  Patient and/or family verbalized understanding of discharge instructions and all questions answered.  AVS to patient via [employer].  Patient will return 1/13/2022 for next appointment.   Patient discharged in stable condition accompanied by: self.  Departure Mode: Ambulatory with walker.      Cheryl Mckenzie RN

## 2022-01-06 NOTE — NURSING NOTE
Chief Complaint   Patient presents with     Chemotherapy     C2D1 Cryamza/Taxol     Port Draw     Labs drawn via port by RN in lab. VS taken      Port accessed with 20 gauge 3/4 in power needle by RN, labs collected, line flushed with saline and heparin.  Vitals taken. Pt checked in for next appointment.    Juana Jansen RN

## 2022-01-06 NOTE — PATIENT INSTRUCTIONS
Contact Numbers  [facility]: [phone] (for symptom and scheduling needs)    Please call the [facility] Triage line if you experience a temperature greater than or equal to 100.4, shaking chills, have uncontrolled nausea, vomiting and/or diarrhea, dizziness, shortness of breath, chest pain, bleeding, unexplained bruising, or if you have any other new/concerning symptoms, questions or concerns.     If you are having any concerning symptoms or wish to speak to a provider before your next infusion visit, please call your care coordinator or triage to notify them so we can adequately serve you.     If you need a refill on a narcotic prescription or other medication, please call triage before your infusion appointment.          January 2022 Sunday Monday Tuesday Wednesday Thursday Friday Saturday                                 1       2     3    LAB CENTRAL  12:15 PM   (15 min.)   [facility] LAB DRAW   [facility]    RETURN  12:45 PM   (20 min.)   Rosales Meléndez MD   [facility] 4     5    VIDEO VISIT RETURN   4:15 PM   (45 min.)   Jaky Pugh CNP   [facility] 6    LAB CENTRAL  10:30 AM   (15 min.)   [facility] LAB DRAW   [facility]    ONC INFUSION 2 HR (120 MIN)  11:00 AM   (120 min.)    ONC INFUSION NURSE   [facility] 7    RETURN  11:30 AM   (30 min.)   Angelica Lloyd PA-C   [facility] 8       9     10     11     12     13    LAB CENTRAL   7:45 AM   (15 min.)   NURSE ONLY CANCER CENTER   [facility]    INFUSION 1.5 HR (90 MIN)   8:30 AM   (90 min.)   BAY 9 INFUSION   [facility] 14     15       16     17     18     19    VIDEO VISIT RETURN   4:15 PM   (45 min.)   Jaky Pugh CNP   [facility] 20    LAB CENTRAL   7:45 AM   (15  min.)   NURSE ONLY CANCER CENTER   [facility]    INFUSION 1.5 HR (90 MIN)   8:30 AM   (90 min.)   BAY 4 INFUSION   [facility] 21     22       23     24    COVID-19 PFIZER ADDT'L DOSE  11:50 AM   (5 min.)   BK COVID VACCINE PFIZER   [facility] 25     26     27     28     29       30     31    CT CHEST/ABDOMEN/PELVIS W   9:40 AM   (20 min.)   MGCT1   [facility]                                      February 2022 Sunday Monday Tuesday Wednesday Thursday Friday Saturday             1     2    LAB CENTRAL  10:45 AM   (15 min.)   [facility] LAB DRAW   [facility]    RETURN  11:15 AM   (30 min.)   Jose Steven MD   [facility]    ONC INFUSION 2 HR (120 MIN)   2:30 PM   (120 min.)   [facility] ONC INFUSION NURSE   [facility] 3     4     5       6     7     8     9     10     11     12       13     14     15     16     17     18     19       20     21     22     23     24     25     26       27     28                                              Lab Results:  Recent Results (from the past 12 hour(s))   Hepatic panel    Collection Time: 01/06/22 11:07 AM   Result Value Ref Range    Bilirubin Total 0.3 0.2 - 1.3 mg/dL    Bilirubin Direct <0.1 0.0 - 0.2 mg/dL    Protein Total 7.2 6.8 - 8.8 g/dL    Albumin 3.8 3.4 - 5.0 g/dL    Alkaline Phosphatase 113 40 - 150 U/L    AST 20 0 - 45 U/L    ALT 25 0 - 70 U/L   Creatinine    Collection Time: 01/06/22 11:07 AM   Result Value Ref Range    Creatinine 0.70 0.66 - 1.25 mg/dL    GFR Estimate >90 >60 mL/min/1.73m2   Calcium    Collection Time: 01/06/22 11:07 AM   Result Value Ref Range    Calcium 9.1 8.5 - 10.1 mg/dL   CBC with platelets and differential    Collection Time: 01/06/22 11:07 AM   Result Value Ref Range    WBC Count 4.4 4.0 - 11.0 10e3/uL    RBC Count 4.56 4.40 - 5.90  10e6/uL    Hemoglobin 13.1 (L) 13.3 - 17.7 g/dL    Hematocrit 41.0 40.0 - 53.0 %    MCV 90 78 - 100 fL    MCH 28.7 26.5 - 33.0 pg    MCHC 32.0 31.5 - 36.5 g/dL    RDW 16.5 (H) 10.0 - 15.0 %    Platelet Count 278 150 - 450 10e3/uL    % Neutrophils 66 %    % Lymphocytes 16 %    % Monocytes 11 %    % Eosinophils 4 %    % Basophils 2 %    % Immature Granulocytes 1 %    NRBCs per 100 WBC 0 <1 /100    Absolute Neutrophils 3.0 1.6 - 8.3 10e3/uL    Absolute Lymphocytes 0.7 (L) 0.8 - 5.3 10e3/uL    Absolute Monocytes 0.5 0.0 - 1.3 10e3/uL    Absolute Eosinophils 0.2 0.0 - 0.7 10e3/uL    Absolute Basophils 0.1 0.0 - 0.2 10e3/uL    Absolute Immature Granulocytes 0.0 <=0.4 10e3/uL    Absolute NRBCs 0.0 10e3/uL   Protein qualitative urine    Collection Time: 01/06/22 11:09 AM   Result Value Ref Range    Protein Albumin Urine Negative Negative mg/dL

## 2022-01-13 NOTE — PROGRESS NOTES
Port site scrubbed with Chloraprep for 30 seconds. Accessed port using sterile technique. Purple and red tubes drawn. Double signed by patient and RN. See documentation flowsheet. Port needle left accessed for treatment. Tolerated port access and draw without complaint. Gala Cummins RN on 1/13/2022 at 8:04 AM

## 2022-01-13 NOTE — PROGRESS NOTES
Infusion Nursing Note:  Junito SHARP Felipe presents today for Taxol.    Patient seen by provider today: No   present during visit today: Not Applicable.    Note: patient reports no changes in health.      Intravenous Access:  Implanted Port.    Treatment Conditions:  Lab Results   Component Value Date    HGB 12.3 (L) 01/13/2022    WBC 3.5 (L) 01/13/2022    ANEU 5.1 07/14/2021    ANEUTAUTO 2.2 01/13/2022     01/13/2022      Results reviewed, labs MET treatment parameters, ok to proceed with treatment.  Blood pressure below 160/100 parameters.      Post Infusion Assessment:  Patient tolerated infusion without incident.  Blood return noted pre and post infusion.  Site patent and intact, free from redness, edema or discomfort.  No evidence of extravasations.  Access discontinued per protocol.       Discharge Plan:   AVS to patient via [employer].  Patient will return 1/20 for next appointment.   Patient discharged in stable condition accompanied by: self.  Departure Mode: Ambulatory.      Eliane Wheeler RN

## 2022-01-18 NOTE — PROGRESS NOTES
Junito is a 61 year old who is being evaluated via a billable video visit.      If the video visit is dropped, the invitation should be resent by: Send to e-mail at: [email or web address]  Will anyone else be joining your video visit? No        Video-Visit Details    Type of service:  Video Visit    Video Start Time: 4:43 PM    Video End Time:4:59 PM    Originating Location (pt. 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  12/9/21: Taxol and ramucirumab  12/23/21: GK    Treatment intent- Palliative    Oncology HPI:   August 2020- stomach discomfort, belching   October 2020- progressive dysphagia with solids   1/26/21- distal esophageal biopsy shows Moderately differentiated adenocarcinoma; MMR normal expression, PDL1 expression is low with TPS 2%, CPS 5-10, Her2 is pending  1/27/21- CT CAP- Enlarged  2 cm subcarinal lymph node and 1.4 cm short axis right subcarinal lymph node, focal thickening of the superior wall along the right side of the mid esophagus. Numerous small pulmonary nodules, 3 mm nodule in the superior segment of the left lower lobe, 3 mm nodule in the anterior aspect of the right upper lobe , 4 mm nodule in the medial aspect of the right upper lobe and 4 mm right lower  lobe nodule. Hypoattenuating foci in the liver, 1.6 cm hypoattenuating/hypoenhancing lesion in hepatic segment 8 and 1.4 cm lesion in hepatic segment 5 , indeterminate, likely metastatic.  The prostate gland is mildly enlarged measuring 5.3 cm in transverse diameter. Multiple prominent but not significantly enlarged retroperitoneal lymph nodes, indeterminate, could be reactive. 4.4 x 4.3 cm lytic lesion centered in the posterior aspect of the left sacral ala (series 3 image 243), 4.7 x 4.0 x 5.6 cm (axial and CC dimensions, respectively) hypoattenuating lesion in the subcutaneous tissue of the anterior chest wall just anterior to the mediastinum, indeterminate, could represent sebaceous cyst.  2/4/21- Saw Dr. Bourgeois ordered PET/CT  3/2/21-3/10/21- Admission to [facility]- <24h of inability to ambulate with acute onset left leg weakness/urinary retention with perianal sensory deficits, MRI showed extensive epidural signal change suggestive of tumor vs. Hemorrhage.Pt underwent emergent surgery- L2-L5 laminectomy and decompression . PEG tube placement 3/5/2021.   3/17/21-3/23/21: Radiation to LS spine  3/24/21: gamma knife (2000cGy in 5 fractions)  3/29/21- port placement  3/31/21: Cycle 1 Cisplatin+keytruda+5fu   4/21/21: Cycle 2 Cisplatin+keytruda+5fu  5/11/21- Ct CAP- Overall stable disease- mixed response- ME in 2 liver lesions while, stable disease to borderline progression in 1 liver lesions  5/12/21- Cycle 3 Cisplatin+keytruda+5fu  6/2/21- Cycle 4 Cisplatin+keytruda+5fu  6/21/21- CT CAP- Stable mild circumferential wall thickening of the distal esophagus (series 3, image 99) with small amount of fluid in the upper and mid esophagus. No lymphadenopathy. Stable to slight increased size of hepatic metastases. For example a 3.1 x 2.9 cm lesion in the left lobe (series 3, image 123) previously measured 2.9 x 2.6 cm, and a 2.7 x 2.9 cm peripheral right hepatic lobe metastasis previously measured 3.0 x 2.2 cm. A 1.0 cm lesion  in the caudate lobe (series 3, image 138) is more prominent today/new. Stable small gastrohepatic ligament lymph nodes. No new or enlarging lymph nodes in the abdomen and pelvis. No ascites.Stable 5.1 x 4.6 cm left sacral mass. Stable cystic 4.9 x 3.5 cm subcutaneous lesion overlying the midsternum.  6/24/21- Cycle 5 Cisplatin+keytruda+5fu  7/14/21- Cycle 6 Cisplatin+keytruda+5fu  8/3/21- Ct CAP- Few small pulmonary nodules are stable. Stable mild circumferential wall thickening of the distal esophagus (series 3, image 97).  Increased size of hepatic metastases. For example a 3.5  x 3.5 cm left lobe metastasis (series 3, image 117) previously measured 2.9 x 3.1 cm, a 3.5 x 2.7 cm subcapsular right hepatic lobe metastasis (series 3, image 158) previously measured 2.7 x 2.9 cm and  a 1.7 x 1.5 cm lesion in the left lobe adjacent to the caudate (series 3, image 124) previously measured 1.0 cm.  Stable 5.2 x 4.9 cm lytic lesion in the left sacrum previously measuring 5.2 x 4.6 cm (3, image 238). Stable sclerotic lesion in the anterior aspect of the T4 vertebral body measuring 1.3 cm (series 3, image 39) and sclerosis of the posterior left third rib.. No new lesions. Stable 5.2 cm cystic subcutaneous lesion overlying the sternum  8/5, 8/26, 9/16- Pembrolizumab and 5-FU  9/10/21- Y90 delivery in the left hepatic lobe and segment 5 lesions.   9/13/21- Brain MRI: no progression or distal metatasis  9/28/21- Ct CAP- Multiple left and right hepatic nodular masses identified consistent with metastatic disease. Several appear to be new or are larger worrisome for progression. One lesion is slightly smaller along  the anterior aspect of hepatic segment 2. Stable destructive sacral bone lesion and sclerosis at T4 and left third rib. Stable distal esophageal wall thickening. Stable but indeterminant soft tissue surrounding portions of the left gastric artery at the upper midabdomen.  11/26/21- CT CAP-  Interval progression of  "disease: Enlarging hepatic metastatic foci, Wall thickening involving the distal thoracic esophagus appears to involve a more proximal distal extent compared to prior,  Stable to mildly enlarged nodular thickening about the left gastric artery. New abnormal left periaortic retroperitoneal lymphadenopathy. Enlarging left lytic sacral mass.  12/15/21- 3 new Brain Mets identified, Rad/Onc recommends gamma knife scheduled for 12/23/21.     Interval history:   Junito is doing well today.   Chemo going fine, no new SE  Today he took omeprazole because he felt some GERD, does not take regularly   Bowels moving, hard stools at times. Takes dulcolax  Numbness in L leg (baseline), now a little bit in R leg. Hands fine  No new pain, L hip on-going \"nagging\" discomfort but not worse. Does not take tylenol  Breathing is good.  No neuro sx    No chest pain, no COLÓN/SOB. No fevers, chills, LE edema, urinary changes.    Comprehensive ROS was unremarkable except as detailed above.      Current Outpatient Medications   Medication Sig Dispense Refill     bacitracin 500 UNIT/GM external ointment Apply topically 2 times daily Until the sore are well healed (Patient not taking: Reported on 11/29/2021) 113.4 g 2     Calcium Carb-Cholecalciferol (CALCIUM-VITAMIN D) 600-400 MG-UNIT TABS Take 2 tablets by mouth daily 60 tablet 3     gabapentin (NEURONTIN) 250 MG/5ML solution Take 5 mLs (250 mg) by mouth At Bedtime (Patient not taking: Reported on 10/7/2021) 470 mL 1     LORazepam (ATIVAN) 0.5 MG tablet Take 1 tablet (0.5 mg) by mouth every 4 hours as needed (Anxiety, Nausea/Vomiting or Sleep) 30 tablet 2     multivitamin CF FORMULA (CHOICEFUL) chewable tablet Take 1 tablet by mouth daily       omeprazole (FIRST-OMEPRAZOLE) 2 MG/ML SUSP Take 10 mLs (20 mg) by mouth 2 times daily (Patient not taking: Reported on 9/29/2021) 300 mL 1     ondansetron (ZOFRAN) 4 MG tablet Take 1-2 tablets (4-8 mg) by mouth every 6 hours as needed for nausea " (vomiting) (Patient not taking: Reported on 10/7/2021) 40 tablet 0     prochlorperazine (COMPAZINE) 10 MG tablet Take 1 tablet (10 mg) by mouth every 6 hours as needed (Nausea/Vomiting) 30 tablet 2      No Known Allergies    Exam:  There were no vitals taken for this visit.  Wt Readings from Last 4 Encounters:   12/16/21 75.2 kg (165 lb 12.8 oz)   12/15/21 74.8 kg (165 lb)   12/09/21 75.8 kg (167 lb 1.6 oz)   11/18/21 77.2 kg (170 lb 3.2 oz)     Video physical exam  General: Patient appears well in no acute distress.   Skin: No visualized rash or lesions on visualized skin  Eyes: EOMI, no erythema, sclera icterus or discharge noted  Resp: Appears to be breathing comfortably without accessory muscle usage, speaking in full sentences, no cough  MSK: Appears to have normal range of motion based on visualized movements  Neurologic: No apparent tremors, facial movements symmetric  Psych: affect good, alert and oriented    The rest of a comprehensive physical examination is deferred due to PHE (public health emergency) video restrictions    Labs:   Most Recent 3 CBC's:  Most Recent 3 CBC's:  Recent Labs   Lab Test 01/13/22  0755 01/06/22  1107 01/03/22  1248   WBC 3.5* 4.4 3.5*   HGB 12.3* 13.1* 13.4   MCV 91 90 91    278 282     Most Recent 3 BMP's:  Recent Labs   Lab Test 01/06/22  1107 12/24/21  0856 11/18/21  0831 10/28/21  0846   NA  --  139 139 139   POTASSIUM  --  4.0 4.0 4.3   CHLORIDE  --  109 110* 108   CO2  --  24 26 27   BUN  --  16 16 14   CR 0.70 0.67 0.76 0.70   ANIONGAP  --  6 3 4   ARDEN 9.1 8.7 9.1 9.1   GLC  --  107* 103* 94    Most Recent 2 LFT's:  Recent Labs   Lab Test 01/06/22  1107 12/24/21  0856   AST 20 18   ALT 25 27   ALKPHOS 113 104   BILITOTAL 0.3 0.3    Most Recent TSH and T4:  Recent Labs   Lab Test 03/02/21  1426   TSH 1.40     I reviewed the above labs today.    Imaging:   Brain MRI with contrast primarily for the purposes of stereotactic  evaluation 12/23/2021.     History:  Metastasis to brain (H)  ICD-10: Metastasis to brain (H)     Comparison: Brain MRI 12/15/2021. Stealth brain MRI 3/22/2021.     Technique: MR imaging performed with 3-dimensonally acquired  T1-weighted sequences performed with intravenous contrast.     Contrast: 7.5mL Gadavist     Findings: Limited imaging for stereotactic imaging demonstrates no  significant change in leptomeningeal enhancement along the ventral mid  brain since 12/15/2021, allowing for differences in technique (1.5 T  versus 3 T). Unchanged small focus of enhancement in the cortex of the  right occipital lobe. Unchanged linear leptomeningeal enhancement  along the superior aspect of the right middle cerebellar peduncle.  Decreased conspicuity of enhancement along the left inferior  colliculus. Left temporal parietal mass on 3/22/2021, remains occult  and unchanged since 12/15/2021.     No new abnormal enhancement.     No mass effect, midline shift or hydrocephalus.                                                                  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 No new metastatic  disease. Limited imaging primarily for the purposes of stereotactic  localization.    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 Restaging scan after 2 cycles showed overall stable disease with - mixed response- KS in esophgaus primary, and 2 liver lesions while, stable disease in spine, and stable to borderline progression in 1 liver lesions. Scan after 4 cycles showing stable disease in esophagus and LN and a some liver lesion. 2 lesions in liver are slightly bigger, one is more  prominent than before. CT CAP after 6 cycles done on 8/3/21 demonstrated continued interval increase in the size of 2 liver lesions, stable osseous lesions and continued inconspicuity of the primary esophageal mass. Given the liver-isolated progressive disease, we recommended liver directed local therapy and Y-90 was complete 9/10    Chemotherapy wise, he completed 6 cycles of induction cisplatin, 5-FU and pembrolizumab thus was swithced to 5-FLU and pembrolizumab maintenance (i.e. drop cisplatin) with cycle 7. He complted 6 cycels of maintainece pembro+5flu and restagnig CT CAP 11/26 concerning for multifocal progressive disease.    Switched to paclitaxel+ramicirumab based on the [place] trial. Ramucirumab 8 mg/kg  Intravenously is given on days 1 and 15, plus paclitaxel 80 mg/m2 intravenously on days 1, 8, and 15 of a 28-day cycle (3 weeks on, 1 week off). Tolerating well without concerns. Has mild cytopenias. BP stable. Reducing dex pre-med to 16 given no reaction, could further reduce to 12 with next cycle along with benadryl 25 mg.    Overall plan  - Proceed with cycle 2, D15 paclitaxel and ramicirumab pending labs  - CT CAP prior to Dr. Steven 2/2  - Requested C3 and C4 infusions at [place].     #Brain mets   Follows with Dr. Velez. MRI 12/15/21 with three new mets s/p gamma knife 12/23/2021. Repeat MRI 3 mo     #Bone mets  Bone biopsy of left pelvic mass 2/26/21 confirmed metastasis. Finished radiation 3/23 to lumbosacral spine.   While he has not seen dentist in 4 yrs, discusseed small risk of osteonecrosis, 3-5% risk, he does not have any ongoing dental issues and has no tooth pain or caries.  -Zometa every 4 weeks with the start of his treatment cycles.   -Continue Calcium and Vitamin D.  -Continue gabapentin 200 mg at bedtime for radicular pain.    #Dypshagia, malnutrition  PEG tube placed in OR (3/5) for nutritional support. Now with good PO intake. Continue PO intake as tolerated. Previously  declined SLP follow up, can revisit as needed though dysphagia has improved with treatment.   --Of note, consideration given to esophageal stent vs XRT for esophageal debulking to address dysphagia inpatient in Feb/March 2021; however given likely improvement with chemo-immunotherapy, as well as risks of worsening cytopenias, elected not to.      #Cauda Equina due to tumor compression s/p L2-L5 laminectomy and decompression   Followed by neurosurgery (now prn) with improved exam after surgery. He is now ambulating with or without a walker, strength nearly back to baseline. He is aware of his activity and lifting restrictions. Has some L radiculopathy that he uses robaxan prn.   Most recent CT scan showing mild increase in the size of rt sacral lesion, however has no new symptoms, asked him to be vigilant for new symptoms. Of note, he does have baseline L leg numbness.      #Constipation  Continue Dulcolax prn. May also use miralax, or MOM prn. Continue to avoid suppository while on chemo.    #GERD, improving  Improved with omeprazole, can use prn    #neuropathy  Pre-dates taxol. Monitor for worsening.    40 minutes spent on the date of the encounter doing chart review, review of test results, interpretation of tests, patient visit, documentation and discussion with other provider(s)       Jaky Pugh CNP on 1/19/2022 at 5:09 PM

## 2022-01-19 NOTE — Clinical Note
1/19/2022         RE: Junito Wang  [id number] [facility] [id number]        Dear Colleague,    Thank you for referring your patient, Junito Wang, to the [facility] [place]. Please see a copy of my visit note below.    Junito is a 61 year old who is being evaluated via a billable video visit.      If the video visit is dropped, the invitation should be resent by: Send to e-mail at: [email or web address]  Will anyone else be joining your video visit? No  {If patient encounters technical issues they should call [phone] :[phone]}      Video-Visit Details    Type of service:  Video Visit    Video Start Time: 4:43 PM    Video End Time:4:59 PM    Originating Location (pt. Location): Home    Distant Location (provider location):  [facility] [place]     Platform used for Video Visit: [employer].    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  12/9/21: Taxol and ramucirumab  12/23/21: GK    Treatment intent- Palliative    Oncology HPI:   August 2020- stomach discomfort, belching   October 2020- progressive dysphagia with solids   1/26/21- distal esophageal biopsy shows Moderately differentiated adenocarcinoma; MMR normal expression, PDL1 expression is low with TPS 2%, CPS 5-10, Her2 is pending  1/27/21- CT CAP- Enlarged  2 cm subcarinal lymph node and 1.4 cm short axis  right subcarinal lymph node, focal thickening of the superior wall along the right side of the mid esophagus. Numerous small pulmonary nodules, 3 mm nodule in the superior segment of the left lower lobe, 3 mm nodule in the anterior aspect of the right upper lobe , 4 mm nodule in the medial aspect of the right upper lobe and 4 mm right lower lobe nodule. Hypoattenuating foci in the liver, 1.6 cm hypoattenuating/hypoenhancing lesion in hepatic segment 8 and 1.4 cm lesion in hepatic segment 5 , indeterminate, likely metastatic.  The prostate gland is mildly enlarged measuring 5.3 cm in transverse diameter. Multiple prominent but not significantly enlarged retroperitoneal lymph nodes, indeterminate, could be reactive. 4.4 x 4.3 cm lytic lesion centered in the posterior aspect of the left sacral ala (series 3 image 243), 4.7 x 4.0 x 5.6 cm (axial and CC dimensions, respectively) hypoattenuating lesion in the subcutaneous tissue of the anterior chest wall just anterior to the mediastinum, indeterminate, could represent sebaceous cyst.  2/4/21- Saw Dr. Bourgeois ordered PET/CT  3/2/21-3/10/21- [facility]- <24h of inability to ambulate with acute onset left leg weakness/urinary retention with perianal sensory deficits, MRI showed extensive epidural signal change suggestive of tumor vs. Hemorrhage.Pt underwent emergent surgery- L2-L5 laminectomy and decompression . PEG tube placement 3/5/2021.   3/17/21-3/23/21: Radiation to LS spine  3/24/21: gamma knife (2000cGy in 5 fractions)  3/29/21- port placement  3/31/21: Cycle 1 Cisplatin+keytruda+5fu   4/21/21: Cycle 2 Cisplatin+keytruda+5fu  5/11/21- Ct CAP- Overall stable disease- mixed response- HI in 2 liver lesions while, stable disease to borderline progression in 1 liver lesions  5/12/21- Cycle 3 Cisplatin+keytruda+5fu  6/2/21- Cycle 4 Cisplatin+keytruda+5fu  6/21/21- CT CAP- Stable mild circumferential wall thickening of the distal esophagus (series 3, image 99) with  small amount of fluid in the upper and mid esophagus. No lymphadenopathy. Stable to slight increased size of hepatic metastases. For example a 3.1 x 2.9 cm lesion in the left lobe (series 3, image 123) previously measured 2.9 x 2.6 cm, and a 2.7 x 2.9 cm peripheral right hepatic lobe metastasis previously measured 3.0 x 2.2 cm. A 1.0 cm lesion in the caudate lobe (series 3, image 138) is more prominent today/new. Stable small gastrohepatic ligament lymph nodes. No new or enlarging lymph nodes in the abdomen and pelvis. No ascites.Stable 5.1 x 4.6 cm left sacral mass. Stable cystic 4.9 x 3.5 cm subcutaneous lesion overlying the midsternum.  6/24/21- Cycle 5 Cisplatin+keytruda+5fu  7/14/21- Cycle 6 Cisplatin+keytruda+5fu  8/3/21- Ct CAP- Few small pulmonary nodules are stable. Stable mild circumferential wall thickening of the distal esophagus (series 3, image 97).  Increased size of hepatic metastases. For example a 3.5  x 3.5 cm left lobe metastasis (series 3, image 117) previously measured 2.9 x 3.1 cm, a 3.5 x 2.7 cm subcapsular right hepatic lobe metastasis (series 3, image 158) previously measured 2.7 x 2.9 cm and  a 1.7 x 1.5 cm lesion in the left lobe adjacent to the caudate (series 3, image 124) previously measured 1.0 cm.  Stable 5.2 x 4.9 cm lytic lesion in the left sacrum previously measuring 5.2 x 4.6 cm (3, image 238). Stable sclerotic lesion in the anterior aspect of the T4 vertebral body measuring 1.3 cm (series 3, image 39) and sclerosis of the posterior left third rib.. No new lesions. Stable 5.2 cm cystic subcutaneous lesion overlying the sternum  8/5, 8/26, 9/16- Pembrolizumab and 5-FU  9/10/21- Y90 delivery in the left hepatic lobe and segment 5 lesions.   9/13/21- Brain MRI: no progression or distal metatasis  9/28/21- Ct CAP- Multiple left and right hepatic nodular masses identified consistent with metastatic disease. Several appear to be new or are larger worrisome for progression. One lesion  is slightly smaller along  the anterior aspect of hepatic segment 2. Stable destructive sacral bone lesion and sclerosis at T4 and left third rib. Stable distal esophageal wall thickening. Stable but indeterminant soft tissue surrounding portions of the left gastric artery at the upper midabdomen.  11/26/21- CT CAP-  Interval progression of disease: Enlarging hepatic metastatic foci, Wall thickening involving the distal thoracic esophagus appears to involve a more proximal distal extent compared to prior,  Stable to mildly enlarged nodular thickening about the left gastric artery. New abnormal left periaortic retroperitoneal lymphadenopathy. Enlarging left lytic sacral mass.  12/15/21- 3 new Brain Mets identified, Rad/Onc recommends gamma knife scheduled for 12/23/21.     Interval history:   Junito is doing well today.   Chemo going fine  Today he took omeprazole because he felt some GERD  Bowels moving,    Numbness in L leg, now some in R. Hands fine  No new pain, L hip on-going but not worse. Does not take tylenol  Breathing is good.  No neuro  Dulcolax for constipation    No chest pain, no COLÓN/SOB. No fevers, chills, LE edema, urinary changes. No new weakness. No headaches, vision changes.     Comprehensive ROS was unremarkable except as detailed above.      Current Outpatient Medications   Medication Sig Dispense Refill     bacitracin 500 UNIT/GM external ointment Apply topically 2 times daily Until the sore are well healed (Patient not taking: Reported on 11/29/2021) 113.4 g 2     Calcium Carb-Cholecalciferol (CALCIUM-VITAMIN D) 600-400 MG-UNIT TABS Take 2 tablets by mouth daily 60 tablet 3     gabapentin (NEURONTIN) 250 MG/5ML solution Take 5 mLs (250 mg) by mouth At Bedtime (Patient not taking: Reported on 10/7/2021) 470 mL 1     LORazepam (ATIVAN) 0.5 MG tablet Take 1 tablet (0.5 mg) by mouth every 4 hours as needed (Anxiety, Nausea/Vomiting or Sleep) 30 tablet 2     multivitamin CF FORMULA (CHOICEFUL)  chewable tablet Take 1 tablet by mouth daily       omeprazole (FIRST-OMEPRAZOLE) 2 MG/ML SUSP Take 10 mLs (20 mg) by mouth 2 times daily (Patient not taking: Reported on 9/29/2021) 300 mL 1     ondansetron (ZOFRAN) 4 MG tablet Take 1-2 tablets (4-8 mg) by mouth every 6 hours as needed for nausea (vomiting) (Patient not taking: Reported on 10/7/2021) 40 tablet 0     prochlorperazine (COMPAZINE) 10 MG tablet Take 1 tablet (10 mg) by mouth every 6 hours as needed (Nausea/Vomiting) 30 tablet 2      No Known Allergies    Exam:  There were no vitals taken for this visit.  Wt Readings from Last 4 Encounters:   12/16/21 75.2 kg (165 lb 12.8 oz)   12/15/21 74.8 kg (165 lb)   12/09/21 75.8 kg (167 lb 1.6 oz)   11/18/21 77.2 kg (170 lb 3.2 oz)     Video physical exam  General: Patient appears well in no acute distress.   Skin: No visualized rash or lesions on visualized skin  Eyes: EOMI, no erythema, sclera icterus or discharge noted  Resp: Appears to be breathing comfortably without accessory muscle usage, speaking in full sentences, no cough  MSK: Appears to have normal range of motion based on visualized movements  Neurologic: No apparent tremors, facial movements symmetric  Psych: affect good, alert and oriented    The rest of a comprehensive physical examination is deferred due to PHE (public health emergency) video restrictions    Labs:   Most Recent 3 CBC's:  Most Recent 3 CBC's:  Recent Labs   Lab Test 01/13/22  0755 01/06/22  1107 01/03/22  1248   WBC 3.5* 4.4 3.5*   HGB 12.3* 13.1* 13.4   MCV 91 90 91    278 282     Most Recent 3 BMP's:  Recent Labs   Lab Test 01/06/22  1107 12/24/21  0856 11/18/21  0831 10/28/21  0846   NA  --  139 139 139   POTASSIUM  --  4.0 4.0 4.3   CHLORIDE  --  109 110* 108   CO2  --  24 26 27   BUN  --  16 16 14   CR 0.70 0.67 0.76 0.70   ANIONGAP  --  6 3 4   ARDEN 9.1 8.7 9.1 9.1   GLC  --  107* 103* 94    Most Recent 2 LFT's:  Recent Labs   Lab Test 01/06/22  1107 12/24/21  0856   AST  20 18   ALT 25 27   ALKPHOS 113 104   BILITOTAL 0.3 0.3    Most Recent TSH and T4:  Recent Labs   Lab Test 03/02/21  1426   TSH 1.40     I reviewed the above labs today.    Imaging:   Brain MRI with contrast primarily for the purposes of stereotactic  evaluation 12/23/2021.     History: Metastasis to brain (H)  ICD-10: Metastasis to brain (H)     Comparison: Brain MRI 12/15/2021. Stealth brain MRI 3/22/2021.     Technique: MR imaging performed with 3-dimensonally acquired  T1-weighted sequences performed with intravenous contrast.     Contrast: 7.5mL Gadavist     Findings: Limited imaging for stereotactic imaging demonstrates no  significant change in leptomeningeal enhancement along the ventral mid  brain since 12/15/2021, allowing for differences in technique (1.5 T  versus 3 T). Unchanged small focus of enhancement in the cortex of the  right occipital lobe. Unchanged linear leptomeningeal enhancement  along the superior aspect of the right middle cerebellar peduncle.  Decreased conspicuity of enhancement along the left inferior  colliculus. Left temporal parietal mass on 3/22/2021, remains occult  and unchanged since 12/15/2021.     No new abnormal enhancement.     No mass effect, midline shift or hydrocephalus.                                                                  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 No new metastatic  disease. Limited imaging primarily for the purposes of stereotactic  localization.    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 Restaging scan after 2 cycles showed overall stable disease with - mixed  response- AK in esophgaus primary, and 2 liver lesions while, stable disease in spine, and stable to borderline progression in 1 liver lesions. Scan after 4 cycles showing stable disease in esophagus and LN and a some liver lesion. 2 lesions in liver are slightly bigger, one is more prominent than before. CT CAP after 6 cycles done on 8/3/21 demonstrated continued interval increase in the size of 2 liver lesions, stable osseous lesions and continued inconspicuity of the primary esophageal mass. Given the liver-isolated progressive disease, we recommended liver directed local therapy and Y-90 was complete 9/10    Chemotherapy wise, he completed 6 cycles of induction cisplatin, 5-FU and pembrolizumab thus was swithced to 5-FLU and pembrolizumab maintenance (i.e. drop cisplatin) with cycle 7. He complted 6 cycels of maintainece pembro+5flu and restagnig CT CAP 11/26 concerning for multifocal progressive disease.    Switched to paclitaxel+ramicirumab based on the [place] trial. Ramucirumab 8 mg/kg  Intravenously is given on days 1 and 15, plus paclitaxel 80 mg/m2 intravenously on days 1, 8, and 15 of a 28-day cycle (3 weeks on, 1 week off). Tolerated cycle 1 well without concerns. Has mild cytopenias. BP sl high last check, will have to follow trend. Reducing dex pre-med to 16 given no reaction, could further reduce to 12 with next cycle along with benadryl 25 mg.    Overall plan  - Proceed with cycle 2 paclitaxel and ramicirumab pending labs  - Will again request CT CAP to be rescheduled at [facility] or [facility] prior to Dr. Steven 2/2  - Will request C3 and C4 infusions at [place].     #Brain mets   Follows with Dr. Velez. MRI 12/15/21 with three new mets s/p gamma knife tomorrow 12/23/2021. Repeat MRI 3 mo     #Bone mets  Bone biopsy of left pelvic mass 2/26/21 confirmed metastasis. Finished radiation 3/23 to lumbosacral spine.   While he has not seen dentist in 4 yrs, discusseed small risk of  osteonecrosis, 3-5% risk, he does not have any ongoing dental issues and has no tooth pain or caries.  -Zometa every 4 weeks with the start of his treatment cycles.   -Continue Calcium and Vitamin D.  -Continue gabapentin 200 mg at bedtime for radicular pain.    #Dypshagia, malnutrition  PEG tube placed in OR (3/5) for nutritional support. Now with good PO intake. Continue PO intake as tolerated. Previously declined SLP follow up, can revisit as needed though dysphagia has improved with treatment.   --Of note, consideration given to esophageal stent vs XRT for esophageal debulking to address dysphagia inpatient in Feb/March 2021; however given likely improvement with chemo-immunotherapy, as well as risks of worsening cytopenias, elected not to.      #Cauda Equina due to tumor compression s/p L2-L5 laminectomy and decompression   Followed by neurosurgery (now prn) with improved exam after surgery. He is now ambulating with or without a walker, strength nearly back to baseline. He is aware of his activity and lifting restrictions. Has some L radiculopathy that he uses robaxan prn.   Most recent CT scan showing mild increase in the size of rt sacral lesion, however has no new symptoms, asked him to be vigilant for new symptoms. Of note, he does have baseline L leg numbness.      #Constipation  Continue Dulcolax prn. May also use miralax, or MOM prn. Continue to avoid suppository while on chemo.    #GERD, improving  Improved with omeprazole, can use prn    #neuropathy  Pre-dates taxol. Monitor for worsening.        Again, thank you for allowing me to participate in the care of your patient.        Sincerely,        Jaky Pugh, CNP

## 2022-01-20 NOTE — PROGRESS NOTES
Infusion Nursing Note:  Junito Wang presents today for Cyramza and Taxol.    Patient seen by provider today: No   present during visit today: Not Applicable.    Note: Pt reports some chronic left sided lower extremity numbness and tingling but states now he is experiencing some on the right side. He reports it is manageable. He also states he discussed this with the MD yesterday during his virtual visit. He denies any other medical concerns. The pt reports he is othewise tolerating their treatments well.        Intravenous Access:  Implanted Port.    Treatment Conditions:  Lab Results   Component Value Date    HGB 13.1 (L) 01/20/2022    WBC 3.3 (L) 01/20/2022    ANEU 5.1 07/14/2021    ANEUTAUTO 2.1 01/20/2022     01/20/2022      Results reviewed, labs MET treatment parameters, ok to proceed with treatment.  Urine Protein Negative.      Post Infusion Assessment:  Patient tolerated infusion without incident.  Site patent and intact, free from redness, edema or discomfort.  No evidence of extravasations.  Access discontinued per protocol.       Discharge Plan:   AVS to patient via MYCHART.  Patient will return 2/2/22 for next appointment.   Patient discharged in stable condition accompanied by: self.  Departure Mode: Ambulatory with walker.      Viola Gutierrez RN

## 2022-01-27 NOTE — PROGRESS NOTES
This is a recent snapshot of the patient's [facility] medical record.  For current drug dose and complete information and questions, call [phone] or In Basket pool, [facility]v home infusion ([id number])  CSN Number:  [id number]

## 2022-02-01 NOTE — PROGRESS NOTES
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  12/9/21: to present Taxol and ramucirumab  12/23/21: gamma knife for 3 new brain mets    Treatment intent- Palliative    Oncology HPI:   August 2020- stomach discomfort, belching   October 2020- progressive dysphagia with solids   1/26/21- distal esophageal biopsy shows Moderately differentiated adenocarcinoma; MMR normal expression, PDL1 expression is low with TPS 2%, CPS 5-10, Her2 is pending  1/27/21- CT CAP- Enlarged  2 cm subcarinal lymph node and 1.4 cm short axis right subcarinal lymph node, focal thickening of the superior wall along the right side of the mid esophagus. Numerous small pulmonary nodules, 3 mm nodule in the superior segment of the left lower lobe, 3 mm nodule in the anterior aspect of the right upper lobe , 4 mm nodule in the medial aspect of the right upper lobe and 4 mm right lower lobe nodule. Hypoattenuating foci in the liver, 1.6 cm hypoattenuating/hypoenhancing lesion in hepatic segment 8 and 1.4 cm lesion in hepatic segment 5 , indeterminate, likely metastatic.  The prostate gland is mildly enlarged measuring 5.3 cm in transverse diameter. Multiple prominent but not significantly enlarged retroperitoneal lymph nodes, indeterminate, could be reactive. 4.4 x 4.3 cm lytic lesion centered in the posterior aspect of the left sacral ala (series 3 image  243), 4.7 x 4.0 x 5.6 cm (axial and CC dimensions, respectively) hypoattenuating lesion in the subcutaneous tissue of the anterior chest wall just anterior to the mediastinum, indeterminate, could represent sebaceous cyst.  2/4/21- Saw Dr. Bourgeois ordered PET/CT  3/2/21-3/10/21- Admission to [facility]- <24h of inability to ambulate with acute onset left leg weakness/urinary retention with perianal sensory deficits, MRI showed extensive epidural signal change suggestive of tumor vs. Hemorrhage.Pt underwent emergent surgery- L2-L5 laminectomy and decompression . PEG tube placement 3/5/2021.   3/17/21-3/23/21: Radiation to LS spine  3/24/21: gamma knife (2000cGy in 5 fractions)  3/29/21- port placement  3/31/21: Cycle 1 Cisplatin+keytruda+5fu   4/21/21: Cycle 2 Cisplatin+keytruda+5fu  5/11/21- Ct CAP- Overall stable disease- mixed response- WA in 2 liver lesions while, stable disease to borderline progression in 1 liver lesions  5/12/21- Cycle 3 Cisplatin+keytruda+5fu  6/2/21- Cycle 4 Cisplatin+keytruda+5fu  6/21/21- CT CAP- Stable mild circumferential wall thickening of the distal esophagus (series 3, image 99) with small amount of fluid in the upper and mid esophagus. No lymphadenopathy. Stable to slight increased size of hepatic metastases. For example a 3.1 x 2.9 cm lesion in the left lobe (series 3, image 123) previously measured 2.9 x 2.6 cm, and a 2.7 x 2.9 cm peripheral right hepatic lobe metastasis previously measured 3.0 x 2.2 cm. A 1.0 cm lesion in the caudate lobe (series 3, image 138) is more prominent today/new. Stable small gastrohepatic ligament lymph nodes. No new or enlarging lymph nodes in the abdomen and pelvis. No ascites.Stable 5.1 x 4.6 cm left sacral mass. Stable cystic 4.9 x 3.5 cm subcutaneous lesion overlying the midsternum.  6/24/21- Cycle 5 Cisplatin+keytruda+5fu  7/14/21- Cycle 6 Cisplatin+keytruda+5fu  8/3/21- Ct CAP- Few small pulmonary nodules are stable. Stable mild circumferential wall  thickening of the distal esophagus (series 3, image 97).  Increased size of hepatic metastases. For example a 3.5  x 3.5 cm left lobe metastasis (series 3, image 117) previously measured 2.9 x 3.1 cm, a 3.5 x 2.7 cm subcapsular right hepatic lobe metastasis (series 3, image 158) previously measured 2.7 x 2.9 cm and  a 1.7 x 1.5 cm lesion in the left lobe adjacent to the caudate (series 3, image 124) previously measured 1.0 cm.  Stable 5.2 x 4.9 cm lytic lesion in the left sacrum previously measuring 5.2 x 4.6 cm (3, image 238). Stable sclerotic lesion in the anterior aspect of the T4 vertebral body measuring 1.3 cm (series 3, image 39) and sclerosis of the posterior left third rib.. No new lesions. Stable 5.2 cm cystic subcutaneous lesion overlying the sternum  8/5, 8/26, 9/16- Pembrolizumab and 5-FU  9/10/21- Y90 delivery in the left hepatic lobe and segment 5 lesions.   9/13/21- Brain MRI: no progression or distal metatasis  9/28/21- Ct CAP- Multiple left and right hepatic nodular masses identified consistent with metastatic disease. Several appear to be new or are larger worrisome for progression. One lesion is slightly smaller along  the anterior aspect of hepatic segment 2. Stable destructive sacral bone lesion and sclerosis at T4 and left third rib. Stable distal esophageal wall thickening. Stable but indeterminant soft tissue surrounding portions of the left gastric artery at the upper midabdomen.  11/26/21- CT CAP-  Interval progression of disease: Enlarging hepatic metastatic foci, Wall thickening involving the distal thoracic esophagus appears to involve a more proximal distal extent compared to prior,  Stable to mildly enlarged nodular thickening about the left gastric artery. New abnormal left periaortic retroperitoneal lymphadenopathy. Enlarging left lytic sacral mass.  12/9/21- C1 Taxol and ramucirumab  12/15/21- 3 new Brain Mets identified,   12/23/21- Gamma Knife for new brain mets  1/6/22- C2 Taxol  and ramucirumab  1/31/22- Ct CAP-  Hepatic metastases and left para-aortic retroperitoneal lymphadenopathy have decreased consistent with response to interval chemotherapy. Bone lesions appear similar. No new disease identified.      Interval history:   Junito is here prior to cycle 3.  Overall he is doing well with minimal toxicities.  He had mild nausea and fatigue that got better after few days of chemotherapy.  Otherwise he has mild neuropathy in the right lower extremity that is stable.  He continues to be active, shoveling snow and doing all the household activities.  He continues to be very ambulatory at home and independent of ADL and IADL.  Uses a walker only when he has to walk long distances.  No chest pain, no COLÓN/SOB. No fevers, chills, LE edema, urinary changes.    ECOG performance status of 1    Comprehensive ROS was unremarkable except as detailed above.      Current Outpatient Medications   Medication Sig Dispense Refill     bacitracin 500 UNIT/GM external ointment Apply topically 2 times daily Until the sore are well healed (Patient not taking: Reported on 11/29/2021) 113.4 g 2     Calcium Carb-Cholecalciferol (CALCIUM-VITAMIN D) 600-400 MG-UNIT TABS Take 2 tablets by mouth daily 60 tablet 3     gabapentin (NEURONTIN) 250 MG/5ML solution Take 5 mLs (250 mg) by mouth At Bedtime (Patient not taking: Reported on 10/7/2021) 470 mL 1     LORazepam (ATIVAN) 0.5 MG tablet Take 1 tablet (0.5 mg) by mouth every 4 hours as needed (Anxiety, Nausea/Vomiting or Sleep) 30 tablet 2     multivitamin CF FORMULA (CHOICEFUL) chewable tablet Take 1 tablet by mouth daily       omeprazole (FIRST-OMEPRAZOLE) 2 MG/ML SUSP Take 10 mLs (20 mg) by mouth 2 times daily (Patient not taking: Reported on 9/29/2021) 300 mL 1     ondansetron (ZOFRAN) 4 MG tablet Take 1-2 tablets (4-8 mg) by mouth every 6 hours as needed for nausea (vomiting) (Patient not taking: Reported on 10/7/2021) 40 tablet 0     prochlorperazine (COMPAZINE) 10  MG tablet Take 1 tablet (10 mg) by mouth every 6 hours as needed (Nausea/Vomiting) 30 tablet 2      No Known Allergies    Exam:   BP (!) 148/92 (BP Location: Right arm)   Pulse 113   Temp 97.6  F (36.4  C) (Oral)   Resp 16   Wt 75.6 kg (166 lb 11.2 oz)   SpO2 98%   BMI 23.92 kg/m      Gen: Well built and nourished, not in any acute distress  HEENT: MMM, no oral lesions, no pallor, icterus  Neck: supple,   Lymph: no cervical, sc, axillary LAD   CV: RRR, no murmurs  Resp: CTA  Abd: Soft, NTND, no HSM   Ext: no edema   Neuro: AAOx4. Cranial nerves grossly intact. Strength 5/5 throughout.  Sensations grossly intact.     Labs:   Most Recent 3 CBC's:  Most Recent 3 CBC's:  Recent Labs   Lab Test 02/02/22  1113 01/20/22  0754 01/13/22  0755   WBC 4.8 3.3* 3.5*   HGB 13.0* 13.1* 12.3*   MCV 89 91 91    309 287     Most Recent 3 BMP's:  Recent Labs   Lab Test 02/02/22  1113 01/06/22  1107 12/24/21  0856 11/18/21  0831 10/28/21  0846   NA  --   --  139 139 139   POTASSIUM  --   --  4.0 4.0 4.3   CHLORIDE  --   --  109 110* 108   CO2  --   --  24 26 27   BUN  --   --  16 16 14   CR 0.78 0.70 0.67 0.76 0.70   ANIONGAP  --   --  6 3 4   ARDEN 9.0 9.1 8.7 9.1 9.1   GLC  --   --  107* 103* 94    Most Recent 2 LFT's:  Recent Labs   Lab Test 02/02/22  1113 01/06/22  1107   AST 20 20   ALT 21 25   ALKPHOS 107 113   BILITOTAL 0.3 0.3    Most Recent TSH and T4:  Recent Labs   Lab Test 03/02/21  1426   TSH 1.40     I reviewed the above labs today.    Imaging:     CT CAP (1/31/22):  FINDINGS:   LUNGS AND PLEURA: No new pulmonary nodules. A 2 mm right upper lobe nodule (image 85 of series 3), a 3 mm subpleural nodule right lower lobe (image 127), a 3 mm subpleural left upper lobe nodule (image 106) are unchanged. No pleural effusion.      MEDIASTINUM/AXILLAE: Small stable thoracic lymph nodes with no lymphadenopathy by size criteria. Circumferential mural thickening distal third of the esophagus has not changed significantly.  Heart size normal with no pericardial effusion. Port anterior   right chest wall with right IJ central venous catheter tip low SVC.     CORONARY ARTERY CALCIFICATION: Calcified atheromatous plaque throughout the LAD coronary artery.     HEPATOBILIARY: A lesion in the posterior inferior aspect hepatic segment III has decreased from 2.5 cm to 2.3 cm (image 266), a hepatic segment VII metastasis has decreased from 2.1 cm to 1.1 cm (image 288) and a lobulated metastasis spanning hepatic   segments IVb and V with maximal dimensions of 7.0 cm CC, 5.0 cm AP and 5.0 cm ML, previously 7.0 x 5.4 x 5.4 cm (series 3 image 380 and sagittal images 89-98). Two additional subcentimeter hepatic lesions (images 252 and 276) and the ill-defined   posttreatment change hepatic segment II have not changed significantly.   PANCREAS: Normal.  SPLEEN: Normal.  ADRENAL GLANDS: Normal.  KIDNEYS/BLADDER: Normal.   BOWEL: Ill-defined soft tissue density nodularity in the gastrohepatic ligament fat adjacent to the left gastric artery stable to slightly decreased. Percutaneous gastrostomy tube appears to be in good position. Previously seen mucosal hyperenhancement   and submucosal edema involving the colon and rectum has nearly completely resolved. Trace amount of free fluid again seen.  LYMPH NODES: Upper left para-aortic retroperitoneal lymphadenopathy has decreased. For example the diameter of the conglomerate lymphadenopathy just inferior to the renal vasculature has decreased from 2.4 cm to 1.5 cm (image 333 of series 3).  VASCULATURE: Unremarkable.PELVIC ORGANS: Normal.   BONES AND SOFT TISSUES: Laminectomies L2-L5. Destructive soft tissue metastasis left sacral ala is stable at 4.7 cm ML by 4.6 cm AP (image 469 of series 3 when using similar technique). Approximate 2 cm sclerotic lesion medial right iliac bone (image 490), a 1 cm sclerotic lesion anterior T4 vertebral body (image 73) and a 2 cm sclerotic lesion proximal right humerus are  unchanged. 6 x 5 x 3 cm circumscribed homogeneous low-attenuation mass in the presternal subcutaneous fat is unchanged could represent a sebaceous cyst.                                                                     IMPRESSION:  1.  Hepatic metastases and left para-aortic retroperitoneal lymphadenopathy have decreased consistent with response to interval chemotherapy.  2.  Bone lesions appear similar.  3.  No new disease identified.          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  CT scan after 2 cycles showing response in the liver and also in the left by Dr. Aragon.  The left sacral lesion is stable.  He is tolerating the treatment well without concerns. Has mild cytopenias. BP stable.  We will plan to continue this combination of treatment until progression on inability to tolerate.    Overall plan  Ok for infusion today pending labs   PEG tube removal by IR   Future infusion appts , prefer MG liocation   RTC with NP/PA prior to cycle 4   Ct CAP prior to cycle 5   RTC with me prior to cycle 5    #Brain mets   Follows with Dr. Velez. MRI 12/15/21 with three new mets s/p gamma knife 12/23/2021. Repeat MRI 3 mo     #Bone mets  Bone biopsy of left pelvic mass 2/26/21 confirmed metastasis. Finished radiation 3/23 to lumbosacral spine.   While he has not seen dentist in 4 yrs, discusseed small risk of osteonecrosis, 3-5% risk, he does not have any ongoing dental issues and has no tooth pain or caries.  -Zometa every 4 weeks , due today, pt was it on 2/10 which is fine  -Continue Calcium and Vitamin D.  -Continue gabapentin 200 mg at bedtime for radicular pain.    #Dypshagia,  malnutrition  PEG tube placed in OR (3/5) for nutritional support. Now with good PO intake. Continue PO intake as tolerated. Previously declined SLP follow up, can revisit as needed though dysphagia has improved with treatment.   --Of note, consideration given to esophageal stent vs XRT for esophageal debulking to address dysphagia inpatient in Feb/March 2021; however given likely improvement with chemo-immunotherapy, as well as risks of worsening cytopenias, elected not to.   - G tube clogged with debris, have placed IR order for G tube removal      #Cauda Equina due to tumor compression s/p L2-L5 laminectomy and decompression   Followed by neurosurgery (now prn) with improved exam after surgery. He is now ambulating with or without a walker, strength nearly back to baseline. He is aware of his activity and lifting restrictions. Has some L radiculopathy that he uses robaxan prn.   Most recent CT scan showing mild increase in the size of rt sacral lesion, however has no new symptoms, asked him to be vigilant for new symptoms. Of note, he does have baseline L leg numbness.      #Constipation  Continue Dulcolax prn. May also use miralax, or MOM prn. Continue to avoid suppository while on chemo.    #GERD, improving  Improved with omeprazole, can use prn    #neuropathy  Pre-dates taxol. Monitor for worsening.Gr 1.    On the day of service  Chart review: 5 minutes  Visit duration: 30 minutes  Care coordination: 5 minutes    Jose Steven MD    Hematology, Oncology and Transplantation

## 2022-02-02 NOTE — NURSING NOTE
"Chief Complaint   Patient presents with     Port Draw     Labs drawn via port by RN. Vitals taken.     Port accessed with 20G 3/4\" flat needle by RN, labs collected, line flushed with saline and heparin.  Vitals taken. Pt checked in for appointment(s).      Janet Man, RN    "

## 2022-02-02 NOTE — LETTER
2/2/2022         RE: Junito Wang  [id number] [facility] [id number]        Dear Colleague,    Thank you for referring your patient, Junito Wang, to the [facility]. Please see a copy of my visit note below.    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  12/9/21: to present Taxol and ramucirumab  12/23/21: gamma knife for 3 new brain mets    Treatment intent- Palliative    Oncology HPI:   August 2020- stomach discomfort, belching   October 2020- progressive dysphagia with solids   1/26/21- distal esophageal biopsy shows Moderately differentiated adenocarcinoma; MMR normal expression, PDL1 expression is low with TPS 2%, CPS 5-10, Her2 is pending  1/27/21- CT CAP- Enlarged  2 cm subcarinal lymph node and 1.4 cm short axis right subcarinal lymph node, focal thickening of the superior wall along the right side of the mid esophagus. Numerous small pulmonary nodules, 3 mm nodule in the superior segment of the left lower lobe, 3 mm nodule in the anterior aspect of the right upper lobe , 4 mm nodule in the medial aspect of the right upper lobe and 4 mm right lower lobe nodule. Hypoattenuating foci in the liver, 1.6 cm hypoattenuating/hypoenhancing lesion in hepatic segment 8 and 1.4 cm lesion in hepatic segment 5 , indeterminate, likely metastatic.  The prostate gland is mildly  enlarged measuring 5.3 cm in transverse diameter. Multiple prominent but not significantly enlarged retroperitoneal lymph nodes, indeterminate, could be reactive. 4.4 x 4.3 cm lytic lesion centered in the posterior aspect of the left sacral ala (series 3 image 243), 4.7 x 4.0 x 5.6 cm (axial and CC dimensions, respectively) hypoattenuating lesion in the subcutaneous tissue of the anterior chest wall just anterior to the mediastinum, indeterminate, could represent sebaceous cyst.  2/4/21- Saw Dr. Bourgeois ordered PET/CT  3/2/21-3/10/21- Admission to [facility]- <24h of inability to ambulate with acute onset left leg weakness/urinary retention with perianal sensory deficits, MRI showed extensive epidural signal change suggestive of tumor vs. Hemorrhage.Pt underwent emergent surgery- L2-L5 laminectomy and decompression . PEG tube placement 3/5/2021.   3/17/21-3/23/21: Radiation to LS spine  3/24/21: gamma knife (2000cGy in 5 fractions)  3/29/21- port placement  3/31/21: Cycle 1 Cisplatin+keytruda+5fu   4/21/21: Cycle 2 Cisplatin+keytruda+5fu  5/11/21- Ct CAP- Overall stable disease- mixed response- [place] in 2 liver lesions while, stable disease to borderline progression in 1 liver lesions  5/12/21- Cycle 3 Cisplatin+keytruda+5fu  6/2/21- Cycle 4 Cisplatin+keytruda+5fu  6/21/21- CT CAP- Stable mild circumferential wall thickening of the distal esophagus (series 3, image 99) with small amount of fluid in the upper and mid esophagus. No lymphadenopathy. Stable to slight increased size of hepatic metastases. For example a 3.1 x 2.9 cm lesion in the left lobe (series 3, image 123) previously measured 2.9 x 2.6 cm, and a 2.7 x 2.9 cm peripheral right hepatic lobe metastasis previously measured 3.0 x 2.2 cm. A 1.0 cm lesion in the caudate lobe (series 3, image 138) is more prominent today/new. Stable small gastrohepatic ligament lymph nodes. No new or enlarging lymph nodes in the abdomen and pelvis. No ascites.Stable 5.1 x 4.6 cm left  sacral mass. Stable cystic 4.9 x 3.5 cm subcutaneous lesion overlying the midsternum.  6/24/21- Cycle 5 Cisplatin+keytruda+5fu  7/14/21- Cycle 6 Cisplatin+keytruda+5fu  8/3/21- Ct CAP- Few small pulmonary nodules are stable. Stable mild circumferential wall thickening of the distal esophagus (series 3, image 97).  Increased size of hepatic metastases. For example a 3.5  x 3.5 cm left lobe metastasis (series 3, image 117) previously measured 2.9 x 3.1 cm, a 3.5 x 2.7 cm subcapsular right hepatic lobe metastasis (series 3, image 158) previously measured 2.7 x 2.9 cm and  a 1.7 x 1.5 cm lesion in the left lobe adjacent to the caudate (series 3, image 124) previously measured 1.0 cm.  Stable 5.2 x 4.9 cm lytic lesion in the left sacrum previously measuring 5.2 x 4.6 cm (3, image 238). Stable sclerotic lesion in the anterior aspect of the T4 vertebral body measuring 1.3 cm (series 3, image 39) and sclerosis of the posterior left third rib.. No new lesions. Stable 5.2 cm cystic subcutaneous lesion overlying the sternum  8/5, 8/26, 9/16- Pembrolizumab and 5-FU  9/10/21- Y90 delivery in the left hepatic lobe and segment 5 lesions.   9/13/21- Brain MRI: no progression or distal metatasis  9/28/21- Ct CAP- Multiple left and right hepatic nodular masses identified consistent with metastatic disease. Several appear to be new or are larger worrisome for progression. One lesion is slightly smaller along  the anterior aspect of hepatic segment 2. Stable destructive sacral bone lesion and sclerosis at T4 and left third rib. Stable distal esophageal wall thickening. Stable but indeterminant soft tissue surrounding portions of the left gastric artery at the upper midabdomen.  11/26/21- CT CAP-  Interval progression of disease: Enlarging hepatic metastatic foci, Wall thickening involving the distal thoracic esophagus appears to involve a more proximal distal extent compared to prior,  Stable to mildly enlarged nodular thickening about  the left gastric artery. New abnormal left periaortic retroperitoneal lymphadenopathy. Enlarging left lytic sacral mass.  12/9/21- C1 Taxol and ramucirumab  12/15/21- 3 new Brain Mets identified,   12/23/21- Gamma Knife for new brain mets  1/6/22- C2 Taxol and ramucirumab  1/31/22- Ct CAP-  Hepatic metastases and left para-aortic retroperitoneal lymphadenopathy have decreased consistent with response to interval chemotherapy. Bone lesions appear similar. No new disease identified.      Interval history:   Junito is here prior to cycle 3.  Overall he is doing well with minimal toxicities.  He had mild nausea and fatigue that got better after few days of chemotherapy.  Otherwise he has mild neuropathy in the right lower extremity that is stable.  He continues to be active, shoveling snow and doing all the household activities.  He continues to be very ambulatory at home and independent of ADL and IADL.  Uses a walker only when he has to walk long distances.  No chest pain, no COLÓN/SOB. No fevers, chills, LE edema, urinary changes.    ECOG performance status of 1    Comprehensive ROS was unremarkable except as detailed above.      Current Outpatient Medications   Medication Sig Dispense Refill     bacitracin 500 UNIT/GM external ointment Apply topically 2 times daily Until the sore are well healed (Patient not taking: Reported on 11/29/2021) 113.4 g 2     Calcium Carb-Cholecalciferol (CALCIUM-VITAMIN D) 600-400 MG-UNIT TABS Take 2 tablets by mouth daily 60 tablet 3     gabapentin (NEURONTIN) 250 MG/5ML solution Take 5 mLs (250 mg) by mouth At Bedtime (Patient not taking: Reported on 10/7/2021) 470 mL 1     LORazepam (ATIVAN) 0.5 MG tablet Take 1 tablet (0.5 mg) by mouth every 4 hours as needed (Anxiety, Nausea/Vomiting or Sleep) 30 tablet 2     multivitamin CF FORMULA (CHOICEFUL) chewable tablet Take 1 tablet by mouth daily       omeprazole (FIRST-OMEPRAZOLE) 2 MG/ML SUSP Take 10 mLs (20 mg) by mouth 2 times daily  (Patient not taking: Reported on 9/29/2021) 300 mL 1     ondansetron (ZOFRAN) 4 MG tablet Take 1-2 tablets (4-8 mg) by mouth every 6 hours as needed for nausea (vomiting) (Patient not taking: Reported on 10/7/2021) 40 tablet 0     prochlorperazine (COMPAZINE) 10 MG tablet Take 1 tablet (10 mg) by mouth every 6 hours as needed (Nausea/Vomiting) 30 tablet 2      No Known Allergies    Exam:   BP (!) 148/92 (BP Location: Right arm)   Pulse 113   Temp 97.6  F (36.4  C) (Oral)   Resp 16   Wt 75.6 kg (166 lb 11.2 oz)   SpO2 98%   BMI 23.92 kg/m      Gen: Well built and nourished, not in any acute distress  HEENT: MMM, no oral lesions, no pallor, icterus  Neck: supple,   Lymph: no cervical, sc, axillary LAD   CV: RRR, no murmurs  Resp: CTA  Abd: Soft, NTND, no HSM   Ext: no edema   Neuro: AAOx4. Cranial nerves grossly intact. Strength 5/5 throughout.  Sensations grossly intact.     Labs:   Most Recent 3 CBC's:  Most Recent 3 CBC's:  Recent Labs   Lab Test 02/02/22  1113 01/20/22  0754 01/13/22  0755   WBC 4.8 3.3* 3.5*   HGB 13.0* 13.1* 12.3*   MCV 89 91 91    309 287     Most Recent 3 BMP's:  Recent Labs   Lab Test 02/02/22  1113 01/06/22  1107 12/24/21  0856 11/18/21  0831 10/28/21  0846   NA  --   --  139 139 139   POTASSIUM  --   --  4.0 4.0 4.3   CHLORIDE  --   --  109 110* 108   CO2  --   --  24 26 27   BUN  --   --  16 16 14   CR 0.78 0.70 0.67 0.76 0.70   ANIONGAP  --   --  6 3 4   ARDEN 9.0 9.1 8.7 9.1 9.1   GLC  --   --  107* 103* 94    Most Recent 2 LFT's:  Recent Labs   Lab Test 02/02/22  1113 01/06/22  1107   AST 20 20   ALT 21 25   ALKPHOS 107 113   BILITOTAL 0.3 0.3    Most Recent TSH and T4:  Recent Labs   Lab Test 03/02/21  1426   TSH 1.40     I reviewed the above labs today.    Imaging:     CT CAP (1/31/22):  FINDINGS:   LUNGS AND PLEURA: No new pulmonary nodules. A 2 mm right upper lobe nodule (image 85 of series 3), a 3 mm subpleural nodule right lower lobe (image 127), a 3 mm subpleural left  upper lobe nodule (image 106) are unchanged. No pleural effusion.      MEDIASTINUM/AXILLAE: Small stable thoracic lymph nodes with no lymphadenopathy by size criteria. Circumferential mural thickening distal third of the esophagus has not changed significantly. Heart size normal with no pericardial effusion. Port anterior   right chest wall with right IJ central venous catheter tip low SVC.     CORONARY ARTERY CALCIFICATION: Calcified atheromatous plaque throughout the LAD coronary artery.     HEPATOBILIARY: A lesion in the posterior inferior aspect hepatic segment III has decreased from 2.5 cm to 2.3 cm (image 266), a hepatic segment VII metastasis has decreased from 2.1 cm to 1.1 cm (image 288) and a lobulated metastasis spanning hepatic   segments IVb and V with maximal dimensions of 7.0 cm CC, 5.0 cm AP and 5.0 cm ML, previously 7.0 x 5.4 x 5.4 cm (series 3 image 380 and sagittal images 89-98). Two additional subcentimeter hepatic lesions (images 252 and 276) and the ill-defined   posttreatment change hepatic segment II have not changed significantly.   PANCREAS: Normal.  SPLEEN: Normal.  ADRENAL GLANDS: Normal.  KIDNEYS/BLADDER: Normal.   BOWEL: Ill-defined soft tissue density nodularity in the gastrohepatic ligament fat adjacent to the left gastric artery stable to slightly decreased. Percutaneous gastrostomy tube appears to be in good position. Previously seen mucosal hyperenhancement   and submucosal edema involving the colon and rectum has nearly completely resolved. Trace amount of free fluid again seen.  LYMPH NODES: Upper left para-aortic retroperitoneal lymphadenopathy has decreased. For example the diameter of the conglomerate lymphadenopathy just inferior to the renal vasculature has decreased from 2.4 cm to 1.5 cm (image 333 of series 3).  VASCULATURE: Unremarkable.PELVIC ORGANS: Normal.   BONES AND SOFT TISSUES: Laminectomies L2-L5. Destructive soft tissue metastasis left sacral ala is stable at 4.7  cm ML by 4.6 cm AP (image 469 of series 3 when using similar technique). Approximate 2 cm sclerotic lesion medial right iliac bone (image 490), a 1 cm sclerotic lesion anterior T4 vertebral body (image 73) and a 2 cm sclerotic lesion proximal right humerus are unchanged. 6 x 5 x 3 cm circumscribed homogeneous low-attenuation mass in the presternal subcutaneous fat is unchanged could represent a sebaceous cyst.                                                                     IMPRESSION:  1.  Hepatic metastases and left para-aortic retroperitoneal lymphadenopathy have decreased consistent with response to interval chemotherapy.  2.  Bone lesions appear similar.  3.  No new disease identified.          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  CT scan after 2 cycles showing response in the liver and also in the left by Dr. Aragon.  The left sacral lesion is stable.  He is tolerating the treatment well without concerns. Has mild cytopenias. BP stable.  We will plan to continue this combination of treatment until progression on inability to tolerate.    Overall plan  Ok for infusion today pending labs   PEG tube removal by IR   Future infusion appts , prefer MG liocation   RTC with NP/PA prior to cycle 4   Ct CAP prior to cycle 5   RTC with me prior to cycle 5    #Brain mets   Follows with Dr. Velez. MRI 12/15/21 with three new mets s/p gamma knife 12/23/2021. Repeat MRI 3 mo     #Bone mets  Bone biopsy of left pelvic mass 2/26/21 confirmed metastasis. Finished radiation 3/23 to lumbosacral spine.   While he has not seen dentist in 4 yrs, discusseed small risk of osteonecrosis, 3-5% risk, he does  not have any ongoing dental issues and has no tooth pain or caries.  -Zometa every 4 weeks , due today, pt was it on 2/10 which is fine  -Continue Calcium and Vitamin D.  -Continue gabapentin 200 mg at bedtime for radicular pain.    #Dypshagia, malnutrition  PEG tube placed in OR (3/5) for nutritional support. Now with good PO intake. Continue PO intake as tolerated. Previously declined SLP follow up, can revisit as needed though dysphagia has improved with treatment.   --Of note, consideration given to esophageal stent vs XRT for esophageal debulking to address dysphagia inpatient in Feb/March 2021; however given likely improvement with chemo-immunotherapy, as well as risks of worsening cytopenias, elected not to.   - G tube clogged with debris, have placed IR order for G tube removal      #Cauda Equina due to tumor compression s/p L2-L5 laminectomy and decompression   Followed by neurosurgery (now prn) with improved exam after surgery. He is now ambulating with or without a walker, strength nearly back to baseline. He is aware of his activity and lifting restrictions. Has some L radiculopathy that he uses robaxan prn.   Most recent CT scan showing mild increase in the size of rt sacral lesion, however has no new symptoms, asked him to be vigilant for new symptoms. Of note, he does have baseline L leg numbness.      #Constipation  Continue Dulcolax prn. May also use miralax, or MOM prn. Continue to avoid suppository while on chemo.    #GERD, improving  Improved with omeprazole, can use prn    #neuropathy  Pre-dates taxol. Monitor for worsening.Gr 1.    On the day of service  Chart review: 5 minutes  Visit duration: 30 minutes  Care coordination: 5 minutes            Again, thank you for allowing me to participate in the care of your patient.      Sincerely,    Jose Steven MD

## 2022-02-02 NOTE — PATIENT INSTRUCTIONS
Dear Patients and Caregivers,    Each day we work diligently to eliminate any barriers to your care including working with your insurance coverage to preauthorize your facility administered medication treatment. Our goal is to be transparent with any anticipated concerns with coverage for your treatment. At the beginning of every year this goal is particularly challenging because of changes to insurance coverage.    How can you help?    Provide any new insurance card to the infusion nurse at your next appointment or enter the information into your [employer] account prior to January 1st 2022.     It is vital that if a  reaches out that you return their phone call in a timely manner. Due to regulations, the team is unable to leave detailed voicemails. When you return the finance teams call they will be able to inform you of the details so a decision about your appointment can be made.    Also please understand:    We go through this process each year and each year offers new challenges.    Insurance companies will not allow the reauthorization process to begin until 2022, not allowing us to work in advance on this process.    Even if you are not changing insurance plans, insurance companies often update their policies requiring all treatments to be reauthorized.    As institutions across the country work to reauthorize treatments, insurance companies sometimes have longer than usual wait times in their call centers and leads to delayed response to prior authorization requests.     Thank you for your patience as we work this process. We do strive to keep you updated throughout the process if there are any delays or if the process is taking longer than anticipated. Lastly please feel free to speak with one of our infusion finance specialists at your next appointment or via phone ([phone]) if you have any questions. Thank you for choosing [facility] for your care.    [facility] Triage and after  hours / weekends / holidays:  [phone]    Please call the triage or after hours line if you experience a temperature greater than or equal to 100.5, shaking chills, have uncontrolled nausea, vomiting and/or diarrhea, dizziness, shortness of breath, chest pain, bleeding, unexplained bruising, or if you have any other new/concerning symptoms, questions or concerns.      If you are having any concerning symptoms or wish to speak to a provider before your next infusion visit, please call your care coordinator or triage to notify them so we can adequately serve you.     If you need a refill on a narcotic prescription or other medication, please call before your infusion appointment.                 February 2022 Sunday Monday Tuesday Wednesday Thursday Friday Saturday             1     2    LAB CENTRAL  10:45 AM   (15 min.)   [facility] LAB DRAW   [facility]    RETURN  11:15 AM   (30 min.)   Jose Steven MD   [facility]    ONC INFUSION 2 HR (120 MIN)   2:30 PM   (120 min.)    ONC INFUSION NURSE   [facility] 3     4    TELEPHONE VISIT RETURN   9:00 AM   (30 min.)   Shavonne Rodriguez APRN CNP   [facility] 5       6     7     8     9     10    LAB CENTRAL   8:30 AM   (15 min.)   NURSE ONLY CANCER CENTER   [facility]    INFUSION 1.5 HR (90 MIN)   9:00 AM   (90 min.)   BAY 9 INFUSION   [facility] 11     12       13     14     15     16     17    LAB CENTRAL   9:00 AM   (15 min.)   NURSE ONLY CANCER CENTER   [facility]    INFUSION 2 HR (120 MIN)   9:30 AM   (120 min.)   BAY 4 INFUSION   [facility] 18     19       20     21     22     23     24     25     26       27     28                                          March 2022 Sunday Monday Tuesday  Wednesday Thursday Friday Saturday             1     2    VIDEO VISIT RETURN   3:15 PM   (45 min.)   Jaky Pugh CNP   [facility] 3    LAB CENTRAL   8:30 AM   (15 min.)   NURSE ONLY CANCER CENTER   [facility]    INFUSION 2 HR (120 MIN)   9:00 AM   (120 min.)   BAY 10 INFUSION   [facility] 4     5       6     7     8     9     10     11     12       13     14     15     16     17     18     19       20     21     22     23    MR BRAIN WWO   1:15 PM   (45 min.)   [email or web address]   [facility]    RETURN   2:00 PM   (30 min.)   Emilia Qureshi MD   [facility] 24     25     26       27     28     29     30     31                              Recent Results (from the past 24 hour(s))   Hepatic panel    Collection Time: 02/02/22 11:13 AM   Result Value Ref Range    Bilirubin Total 0.3 0.2 - 1.3 mg/dL    Bilirubin Direct 0.1 0.0 - 0.2 mg/dL    Protein Total 7.2 6.8 - 8.8 g/dL    Albumin 3.9 3.4 - 5.0 g/dL    Alkaline Phosphatase 107 40 - 150 U/L    AST 20 0 - 45 U/L    ALT 21 0 - 70 U/L   Protein qualitative urine    Collection Time: 02/02/22 11:13 AM   Result Value Ref Range    Protein Albumin Urine Negative Negative mg/dL   Creatinine    Collection Time: 02/02/22 11:13 AM   Result Value Ref Range    Creatinine 0.78 0.66 - 1.25 mg/dL    GFR Estimate >90 >60 mL/min/1.73m2   Calcium    Collection Time: 02/02/22 11:13 AM   Result Value Ref Range    Calcium 9.0 8.5 - 10.1 mg/dL   CBC with platelets and differential    Collection Time: 02/02/22 11:13 AM   Result Value Ref Range    WBC Count 4.8 4.0 - 11.0 10e3/uL    RBC Count 4.59 4.40 - 5.90 10e6/uL    Hemoglobin 13.0 (L) 13.3 - 17.7 g/dL    Hematocrit 40.7 40.0 - 53.0 %    MCV 89 78 - 100 fL    MCH 28.3 26.5 - 33.0 pg    MCHC 31.9 31.5 - 36.5 g/dL    RDW 17.4 (H) 10.0 - 15.0 %    Platelet Count 257 150 - 450 10e3/uL    % Neutrophils  75 %    % Lymphocytes 10 %    % Monocytes 10 %    % Eosinophils 2 %    % Basophils 2 %    % Immature Granulocytes 1 %    NRBCs per 100 WBC 0 <1 /100    Absolute Neutrophils 3.7 1.6 - 8.3 10e3/uL    Absolute Lymphocytes 0.5 (L) 0.8 - 5.3 10e3/uL    Absolute Monocytes 0.5 0.0 - 1.3 10e3/uL    Absolute Eosinophils 0.1 0.0 - 0.7 10e3/uL    Absolute Basophils 0.1 0.0 - 0.2 10e3/uL    Absolute Immature Granulocytes 0.0 <=0.4 10e3/uL    Absolute NRBCs 0.0 10e3/uL

## 2022-02-02 NOTE — PROGRESS NOTES
Infusion Nursing Note:  Smita Wang presents today for Cycle 3 Day 1 Taxol and Ramucirumab.    Patient seen by provider today: Yes: Dr. Steven    Note: Patient presents to the infusion center today after his provider appt denying any complaints.    Patient is due for Zometa 2/4, he requested to have it while in [place] next week on 2/10 during his infusion time due to a shorter infusion day and an earlier in the day appt time.     Intravenous Access:  Implanted Port.    Treatment Conditions:  Results for SMITA WANG (MRN [id number]) as of 2/2/2022 14:27   Ref. Range 2/2/2022 11:13   Creatinine Latest Ref Range: 0.66 - 1.25 mg/dL 0.78   GFR Estimate Latest Ref Range: >60 mL/min/1.73m2 >90   Calcium Latest Ref Range: 8.5 - 10.1 mg/dL 9.0   Albumin Latest Ref Range: 3.4 - 5.0 g/dL 3.9   Protein Total Latest Ref Range: 6.8 - 8.8 g/dL 7.2   Bilirubin Total Latest Ref Range: 0.2 - 1.3 mg/dL 0.3   Alkaline Phosphatase Latest Ref Range: 40 - 150 U/L 107   ALT Latest Ref Range: 0 - 70 U/L 21   AST Latest Ref Range: 0 - 45 U/L 20   Bilirubin Direct Latest Ref Range: 0.0 - 0.2 mg/dL 0.1   WBC Latest Ref Range: 4.0 - 11.0 10e3/uL 4.8   Hemoglobin Latest Ref Range: 13.3 - 17.7 g/dL 13.0 (L)   Hematocrit Latest Ref Range: 40.0 - 53.0 % 40.7   Platelet Count Latest Ref Range: 150 - 450 10e3/uL 257   RBC Count Latest Ref Range: 4.40 - 5.90 10e6/uL 4.59   MCV Latest Ref Range: 78 - 100 fL 89   MCH Latest Ref Range: 26.5 - 33.0 pg 28.3   MCHC Latest Ref Range: 31.5 - 36.5 g/dL 31.9   RDW Latest Ref Range: 10.0 - 15.0 % 17.4 (H)   % Neutrophils Latest Units: % 75   % Lymphocytes Latest Units: % 10   % Monocytes Latest Units: % 10   % Eosinophils Latest Units: % 2   % Basophils Latest Units: % 2   Absolute Basophils Latest Ref Range: 0.0 - 0.2 10e3/uL 0.1   Absolute Eosinophils Latest Ref Range: 0.0 - 0.7 10e3/uL 0.1   Absolute Immature Granulocytes Latest Ref Range: <=0.4 10e3/uL 0.0   Absolute Lymphocytes Latest Ref Range: 0.8 -  5.3 10e3/uL 0.5 (L)   Absolute Monocytes Latest Ref Range: 0.0 - 1.3 10e3/uL 0.5   % Immature Granulocytes Latest Units: % 1   Absolute Neutrophils Latest Ref Range: 1.6 - 8.3 10e3/uL 3.7   Absolute NRBCs Latest Units: 10e3/uL 0.0   NRBCs per 100 WBC Latest Ref Range: <1 /100 0   Protein Albumin Urine Latest Ref Range: Negative mg/dL Negative     BP: 148/92    Results reviewed, labs MET treatment parameters, ok to proceed with treatment.    Post Infusion Assessment:  Patient tolerated infusion without incident.  Blood return noted pre and post infusion.  No evidence of extravasations.  Access discontinued per protocol.     Discharge Plan:   Patient declined prescription refills.  Discharge instructions reviewed with: Patient.  Patient and/or family verbalized understanding of discharge instructions and all questions answered.  AVS to patient via [employer] Data[employer].  Patient will return 2/10 for next appointment.   Patient discharged in stable condition accompanied by: self.  Departure Mode: Ambulatory.      Leidy Watson RN

## 2022-02-02 NOTE — NURSING NOTE
"Oncology Rooming Note    February 2, 2022 11:19 AM   Junito Wang is a 61 year old male who presents for:    Chief Complaint   Patient presents with     Port Draw     Labs drawn via port by RN. Vitals taken.     Oncology Clinic Visit     Return; Espohageal Ca     Initial Vitals: BP (!) 148/92 (BP Location: Right arm)   Pulse 113   Temp 97.6  F (36.4  C) (Oral)   Resp 16   Wt 75.6 kg (166 lb 11.2 oz)   SpO2 98%   BMI 23.92 kg/m   Estimated body mass index is 23.92 kg/m  as calculated from the following:    Height as of 9/29/21: 1.778 m (5' 10\").    Weight as of this encounter: 75.6 kg (166 lb 11.2 oz). Body surface area is 1.93 meters squared.  Mild Pain (2) Comment: Data Unavailable   No LMP for male patient.  Allergies reviewed: Yes  Medications reviewed: Yes    Medications: Medication refills not needed today.  Pharmacy name entered into [employer]:    [id number] - [place], [place] [facility] [address] [facility] [place] [place] [facility] [phone]  [facility] - [place], [place] - [address] [address] [place] [address] [address], [id number]    Clinical concerns:  Patient states there are no new concerns to discuss with provider.  Dr Steven was not notified.         Shantel Mcintosh CMA              "

## 2022-02-10 NOTE — PROGRESS NOTES
Port site scrubbed with Chloraprep for 30 seconds. Accessed port using sterile technique. Green and purple tubes drawn. Double signed by patient and RN. See documentation flowsheet. Port needle left accessed for treatment. Tolerated port access and draw without complaint. Gala Cummins RN on 2/10/2022 at 8:49 AM

## 2022-02-10 NOTE — PROGRESS NOTES
Infusion Nursing Note:  Junito Wang presents today for Taxol and Zometa.    Patient seen by provider today: No   present during visit today: Not Applicable.    Note: Pt denies any medical concerns today. The pt reports tolerating his treatments well.     Patient denies dental issues at this time.  Patient will inform dentist that he got Zometa infusion, prior to any invasive dental work in the future.    Reminded patient to drink 6-8 glasses of water today, and tomorrow, to protect kidneys.       Intravenous Access:  Implanted Port.    Treatment Conditions:  Lab Results   Component Value Date    HGB 12.8 (L) 02/10/2022    WBC 4.0 02/10/2022    ANEU 5.1 07/14/2021    ANEUTAUTO 2.5 02/10/2022     02/10/2022      Lab Results   Component Value Date     12/24/2021    POTASSIUM 4.0 12/24/2021    MAG 2.2 11/18/2021    CR 0.71 02/10/2022    ARDEN 8.7 02/10/2022    BILITOTAL 0.3 02/02/2022    ALBUMIN 3.9 02/02/2022    ALT 21 02/02/2022    AST 20 02/02/2022     Results reviewed, labs MET treatment parameters, ok to proceed with treatment.      Post Infusion Assessment:  Patient tolerated infusion without incident.  Blood return noted pre and post infusion.  Site patent and intact, free from redness, edema or discomfort.  No evidence of extravasations.  Access discontinued per protocol.       Discharge Plan:   AVS to patient via MYCHART.  Patient will return 2/17/22 for next appointment.   Patient discharged in stable condition accompanied by: self.  Departure Mode: Ambulatory with walker.      Viola Gutierrez RN

## 2022-02-16 NOTE — NURSING NOTE
"Oncology Rooming Note    February 16, 2022 10:33 AM   Junito Wang is a 61 year old male who presents for:    Chief Complaint   Patient presents with     Oncology Clinic Visit     Peg removal     Initial Vitals: BP (!) 146/93   Pulse 120   Temp 97.3  F (36.3  C) (Oral)   Resp 18   Wt 73.9 kg (163 lb)   SpO2 99%   BMI 23.39 kg/m   Estimated body mass index is 23.39 kg/m  as calculated from the following:    Height as of 9/29/21: 1.778 m (5' 10\").    Weight as of this encounter: 73.9 kg (163 lb). Body surface area is 1.91 meters squared.  Mild Pain (2) Comment: Data Unavailable   No LMP for male patient.  Allergies reviewed: Yes  Medications reviewed: Yes    Medications: Medication refills not needed today.  Pharmacy name entered into threadsy:    [id number] - [place], [place] [facility] [address] [facility] [place] [place] - [address] [place] [address] [phone]  [facility] [place], [place] - [address] [address] [place] [address], [id number]    Clinical concerns: Patient states there are no new concerns to discuss with provider.  Domonique was not notified.         Shantel Mcintosh CMA              "

## 2022-02-16 NOTE — LETTER
2/16/2022         RE: Junito Wang  [id number] [id number]        Dear Colleague,    Thank you for referring your patient, Junito Wang, to the [facility]. Please see a copy of my visit note below.    REASON FOR VISIT:  PEG removal    PROCEDURES PERFORMED:     1. EGD  2. PEG insertion      DATE ABOVE PROCEDURES PERFORMED:  3/5/21    SURGEON:  Dr. Jose Curiel    History of Present Illness:  Junito has advanced esophageal cancer and is followed by Dr. Jose Steven.   He no longer uses his PEG tube and was given permission to have it removed.    Assessment:  After explaining the removal and after care needed, the PEG tube was removed.    Upon inspection, the inner bumper had detached and was not removed but remained in the stomach.   A gauze dressing was secured and all questions answered.       Plan:  I will call you next week to follow-up and make sure the site is healing nicely and bowel function normal- it is expected the inner bumper will pass in feces.    Total time:  30 minutes                  Again, thank you for allowing me to participate in the care of your patient.      Sincerely,    ALEXEI Clemens CNS

## 2022-02-16 NOTE — PATIENT INSTRUCTIONS
It will take 10-14 days for this to heal completely.    Keep a gauze dressing on and change it daily and as needed.    There are no dietary restrictions.    You may hear air gurgling noises coming from this site until it closes.    It is OK to shower but no tubs, pools, etc until healed.    If, after 3-4 weeks, you still have a little drainage and it hasn't closed, let us know.

## 2022-02-17 NOTE — PROGRESS NOTES
REASON FOR VISIT:  PEG removal    PROCEDURES PERFORMED:     1. EGD  2. PEG insertion      DATE ABOVE PROCEDURES PERFORMED:  3/5/21    SURGEON:  Dr. Jose Curiel    History of Present Illness:  Junito has advanced esophageal cancer and is followed by Dr. Jose Steven.   He no longer uses his PEG tube and was given permission to have it removed.    Assessment:  After explaining the removal and after care needed, the PEG tube was removed.    Upon inspection, the inner bumper had detached and was not removed but remained in the stomach.   A gauze dressing was secured and all questions answered.       Plan:  I will call you next week to follow-up and make sure the site is healing nicely and bowel function normal- it is expected the inner bumper will pass in feces.    Total time:  30 minutes    ALEXEI Narayanan, CNS

## 2022-02-17 NOTE — PROGRESS NOTES
Infusion Nursing Note:  Junito Wang presents today for Cyramza/Taxol.    Patient seen by provider today: No   present during visit today: Not Applicable.    Note: patient reports no changes.       Intravenous Access:  Implanted Port.    Treatment Conditions:  Lab Results   Component Value Date    HGB 12.7 (L) 02/17/2022    WBC 2.9 (L) 02/17/2022    ANEU 5.1 07/14/2021    ANEUTAUTO 1.8 02/17/2022     02/17/2022      Results reviewed, labs MET treatment parameters, ok to proceed with treatment.  Urine negative.      Post Infusion Assessment:  Patient tolerated infusion without incident.  Blood return noted pre and post infusion.  Site patent and intact, free from redness, edema or discomfort.  No evidence of extravasations.  Access discontinued per protocol.       Discharge Plan:   AVS to patient via MYCHART.  Patient will return 3/3 for next appointment.   Patient discharged in stable condition accompanied by: self.  Departure Mode: Ambulatory.      Eliane Wheeler RN

## 2022-02-22 NOTE — TELEPHONE ENCOUNTER
I called Junito to get an update on his PEG site healing and make sure his bowel function was normal because the inner bumper of the PEG was retained in his stomach when tube was removed.    He said he is doing well, has had a dry dressing since Saturday with no air noise from site.   He is eating well and having normal bowel function.

## 2022-02-25 NOTE — PROGRESS NOTES
This is a recent snapshot of the patient's [facility] medical record.  For current drug dose and complete information and questions, call [phone] or In Basket pool, [facility] home infusion ([id number])  CSN Number:  [id number]

## 2022-02-28 NOTE — PROGRESS NOTES
This is a recent snapshot of the patient's [facility] medical record.  For current drug dose and complete information and questions, call [phone] or In Basket pool, [facility] home infusion ([id number])  CSN Number:  [id number]

## 2022-03-01 NOTE — PROGRESS NOTES
Junito is a 61 year old who is being evaluated via a billable video visit.      How would you like to obtain your AVS? [employer]  If the video visit is dropped, the invitation should be resent by: Text to cell phone: [phone]  Will anyone else be joining your video visit? Rosa Isela Fam      Video-Visit Details    Type of service:  Video Visit    Video Start Time: 3:43 PM    Video End Time:4:00 PM    Originating Location (pt. Location): Home    Distant Location (provider location):  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  12/9/21: to present Taxol and ramucirumab  12/23/21: gamma knife for 3 new brain mets    Treatment intent- Palliative    Oncology HPI:   August 2020- stomach discomfort, belching   October 2020- progressive dysphagia with solids   1/26/21- distal esophageal biopsy shows Moderately differentiated adenocarcinoma; MMR normal expression, PDL1 expression is low with TPS 2%, CPS 5-10, Her2 is pending  1/27/21- CT CAP- Enlarged  2 cm subcarinal lymph node and 1.4 cm short axis right subcarinal lymph node, focal thickening of the superior wall along the right side of the mid esophagus. Numerous small pulmonary nodules, 3 mm nodule in the superior segment of the left lower lobe, 3 mm nodule in the anterior aspect of the  right upper lobe , 4 mm nodule in the medial aspect of the right upper lobe and 4 mm right lower lobe nodule. Hypoattenuating foci in the liver, 1.6 cm hypoattenuating/hypoenhancing lesion in hepatic segment 8 and 1.4 cm lesion in hepatic segment 5 , indeterminate, likely metastatic.  The prostate gland is mildly enlarged measuring 5.3 cm in transverse diameter. Multiple prominent but not significantly enlarged retroperitoneal lymph nodes, indeterminate, could be reactive. 4.4 x 4.3 cm lytic lesion centered in the posterior aspect of the left sacral ala (series 3 image 243), 4.7 x 4.0 x 5.6 cm (axial and CC dimensions, respectively) hypoattenuating lesion in the subcutaneous tissue of the anterior chest wall just anterior to the mediastinum, indeterminate, could represent sebaceous cyst.  2/4/21- Saw Dr. Bourgeois ordered PET/CT  3/2/21-3/10/21- Admission to [facility]- <24h of inability to ambulate with acute onset left leg weakness/urinary retention with perianal sensory deficits, MRI showed extensive epidural signal change suggestive of tumor vs. Hemorrhage.Pt underwent emergent surgery- L2-L5 laminectomy and decompression . PEG tube placement 3/5/2021.   3/17/21-3/23/21: Radiation to LS spine  3/24/21: gamma knife (2000cGy in 5 fractions)  3/29/21- port placement  3/31/21: Cycle 1 Cisplatin+keytruda+5fu   4/21/21: Cycle 2 Cisplatin+keytruda+5fu  5/11/21- Ct CAP- Overall stable disease- mixed response- NH in 2 liver lesions while, stable disease to borderline progression in 1 liver lesions  5/12/21- Cycle 3 Cisplatin+keytruda+5fu  6/2/21- Cycle 4 Cisplatin+keytruda+5fu  6/21/21- CT CAP- Stable mild circumferential wall thickening of the distal esophagus (series 3, image 99) with small amount of fluid in the upper and mid esophagus. No lymphadenopathy. Stable to slight increased size of hepatic metastases. For example a 3.1 x 2.9 cm lesion in the left lobe (series 3, image 123) previously measured 2.9 x 2.6 cm, and a 2.7 x  2.9 cm peripheral right hepatic lobe metastasis previously measured 3.0 x 2.2 cm. A 1.0 cm lesion in the caudate lobe (series 3, image 138) is more prominent today/new. Stable small gastrohepatic ligament lymph nodes. No new or enlarging lymph nodes in the abdomen and pelvis. No ascites.Stable 5.1 x 4.6 cm left sacral mass. Stable cystic 4.9 x 3.5 cm subcutaneous lesion overlying the midsternum.  6/24/21- Cycle 5 Cisplatin+keytruda+5fu  7/14/21- Cycle 6 Cisplatin+keytruda+5fu  8/3/21- Ct CAP- Few small pulmonary nodules are stable. Stable mild circumferential wall thickening of the distal esophagus (series 3, image 97).  Increased size of hepatic metastases. For example a 3.5  x 3.5 cm left lobe metastasis (series 3, image 117) previously measured 2.9 x 3.1 cm, a 3.5 x 2.7 cm subcapsular right hepatic lobe metastasis (series 3, image 158) previously measured 2.7 x 2.9 cm and  a 1.7 x 1.5 cm lesion in the left lobe adjacent to the caudate (series 3, image 124) previously measured 1.0 cm.  Stable 5.2 x 4.9 cm lytic lesion in the left sacrum previously measuring 5.2 x 4.6 cm (3, image 238). Stable sclerotic lesion in the anterior aspect of the T4 vertebral body measuring 1.3 cm (series 3, image 39) and sclerosis of the posterior left third rib.. No new lesions. Stable 5.2 cm cystic subcutaneous lesion overlying the sternum  8/5, 8/26, 9/16- Pembrolizumab and 5-FU  9/10/21- Y90 delivery in the left hepatic lobe and segment 5 lesions.   9/13/21- Brain MRI: no progression or distal metatasis  9/28/21- Ct CAP- Multiple left and right hepatic nodular masses identified consistent with metastatic disease. Several appear to be new or are larger worrisome for progression. One lesion is slightly smaller along  the anterior aspect of hepatic segment 2. Stable destructive sacral bone lesion and sclerosis at T4 and left third rib. Stable distal esophageal wall thickening. Stable but indeterminant soft tissue surrounding portions of  the left gastric artery at the upper midabdomen.  11/26/21- CT CAP-  Interval progression of disease: Enlarging hepatic metastatic foci, Wall thickening involving the distal thoracic esophagus appears to involve a more proximal distal extent compared to prior,  Stable to mildly enlarged nodular thickening about the left gastric artery. New abnormal left periaortic retroperitoneal lymphadenopathy. Enlarging left lytic sacral mass.  12/9/21- C1 Taxol and ramucirumab  12/15/21- 3 new Brain Mets identified,   12/23/21- Gamma Knife for new brain mets  1/6/22- C2 Taxol and ramucirumab  1/31/22- Ct CAP-  Hepatic metastases and left para-aortic retroperitoneal lymphadenopathy have decreased consistent with response to interval chemotherapy. Bone lesions appear similar. No new disease identified.      Interval history:   Junito is here prior to cycle 4.  He has ongoing left sacral nagging discomfort that has been present for many months. He does not have new radiculopathy or weakness. He takes gabapentin but does not feel tylenol would be helpful.     He ambulates without a walker and is hopeful to be more active outside once the weather warms.     PEG is out. He is eating and drinking without difficulty    Has been talking dulcolax prn, had BM today    Very mild nausea following chemo but main SE is fatigue that is better by week 2    Breathing is okay, chronic cough unchanged, no fevers    ECOG performance status of 1    Comprehensive ROS was unremarkable except as detailed above.      Current Outpatient Medications   Medication Sig Dispense Refill     bacitracin 500 UNIT/GM external ointment Apply topically 2 times daily Until the sore are well healed (Patient not taking: Reported on 11/29/2021) 113.4 g 2     Calcium Carb-Cholecalciferol (CALCIUM-VITAMIN D) 600-400 MG-UNIT TABS Take 2 tablets by mouth daily 60 tablet 3     gabapentin (NEURONTIN) 250 MG/5ML solution Take 5 mLs (250 mg) by mouth At Bedtime (Patient not  taking: Reported on 10/7/2021) 470 mL 1     LORazepam (ATIVAN) 0.5 MG tablet Take 1 tablet (0.5 mg) by mouth every 4 hours as needed (Anxiety, Nausea/Vomiting or Sleep) 30 tablet 2     multivitamin CF FORMULA (CHOICEFUL) chewable tablet Take 1 tablet by mouth daily       omeprazole (FIRST-OMEPRAZOLE) 2 MG/ML SUSP Take 10 mLs (20 mg) by mouth 2 times daily (Patient not taking: Reported on 9/29/2021) 300 mL 1     ondansetron (ZOFRAN) 4 MG tablet Take 1-2 tablets (4-8 mg) by mouth every 6 hours as needed for nausea (vomiting) (Patient not taking: Reported on 10/7/2021) 40 tablet 0     prochlorperazine (COMPAZINE) 10 MG tablet Take 1 tablet (10 mg) by mouth every 6 hours as needed (Nausea/Vomiting) 30 tablet 2      No Known Allergies    Exam:   There were no vitals taken for this visit.    Video physical exam  General: Patient appears well in no acute distress.   Skin: No visualized rash or lesions on visualized skin  Eyes: EOMI, no erythema, sclera icterus or discharge noted  Resp: Appears to be breathing comfortably without accessory muscle usage, speaking in full sentences, no cough  MSK: Appears to have normal range of motion based on visualized movements  Neurologic: No apparent tremors, facial movements symmetric  Psych: affect good, alert and oriented    The rest of a comprehensive physical examination is deferred due to PHE (public health emergency) video restrictions      Labs:   Most Recent 3 CBC's:  Recent Labs   Lab Test 02/17/22  0920 02/10/22  0837 02/02/22  1113   WBC 2.9* 4.0 4.8   HGB 12.7* 12.8* 13.0*   MCV 91 90 89    321 257     Most Recent 3 BMP's:  Recent Labs   Lab Test 02/10/22  0837 02/02/22  1113 01/06/22  1107 12/24/21  0856 11/18/21  0831 10/28/21  0846   NA  --   --   --  139 139 139   POTASSIUM  --   --   --  4.0 4.0 4.3   CHLORIDE  --   --   --  109 110* 108   CO2  --   --   --  24 26 27   BUN  --   --   --  16 16 14   CR 0.71 0.78 0.70 0.67 0.76 0.70   ANIONGAP  --   --   --  6 3 4    ARDEN 8.7 9.0 9.1 8.7 9.1 9.1   GLC  --   --   --  107* 103* 94    Most Recent 2 LFT's:  Recent Labs   Lab Test 02/02/22  1113 01/06/22  1107   AST 20 20   ALT 21 25   ALKPHOS 107 113   BILITOTAL 0.3 0.3    Most Recent TSH and T4:  Recent Labs   Lab Test 03/02/21  1426   TSH 1.40     I reviewed the above labs today.    Imaging:     CT CAP (1/31/22):  FINDINGS:   LUNGS AND PLEURA: No new pulmonary nodules. A 2 mm right upper lobe nodule (image 85 of series 3), a 3 mm subpleural nodule right lower lobe (image 127), a 3 mm subpleural left upper lobe nodule (image 106) are unchanged. No pleural effusion.      MEDIASTINUM/AXILLAE: Small stable thoracic lymph nodes with no lymphadenopathy by size criteria. Circumferential mural thickening distal third of the esophagus has not changed significantly. Heart size normal with no pericardial effusion. Port anterior   right chest wall with right IJ central venous catheter tip low SVC.     CORONARY ARTERY CALCIFICATION: Calcified atheromatous plaque throughout the LAD coronary artery.     HEPATOBILIARY: A lesion in the posterior inferior aspect hepatic segment III has decreased from 2.5 cm to 2.3 cm (image 266), a hepatic segment VII metastasis has decreased from 2.1 cm to 1.1 cm (image 288) and a lobulated metastasis spanning hepatic   segments IVb and V with maximal dimensions of 7.0 cm CC, 5.0 cm AP and 5.0 cm ML, previously 7.0 x 5.4 x 5.4 cm (series 3 image 380 and sagittal images 89-98). Two additional subcentimeter hepatic lesions (images 252 and 276) and the ill-defined   posttreatment change hepatic segment II have not changed significantly.   PANCREAS: Normal.  SPLEEN: Normal.  ADRENAL GLANDS: Normal.  KIDNEYS/BLADDER: Normal.   BOWEL: Ill-defined soft tissue density nodularity in the gastrohepatic ligament fat adjacent to the left gastric artery stable to slightly decreased. Percutaneous gastrostomy tube appears to be in good position. Previously seen mucosal  hyperenhancement   and submucosal edema involving the colon and rectum has nearly completely resolved. Trace amount of free fluid again seen.  LYMPH NODES: Upper left para-aortic retroperitoneal lymphadenopathy has decreased. For example the diameter of the conglomerate lymphadenopathy just inferior to the renal vasculature has decreased from 2.4 cm to 1.5 cm (image 333 of series 3).  VASCULATURE: Unremarkable.PELVIC ORGANS: Normal.   BONES AND SOFT TISSUES: Laminectomies L2-L5. Destructive soft tissue metastasis left sacral ala is stable at 4.7 cm ML by 4.6 cm AP (image 469 of series 3 when using similar technique). Approximate 2 cm sclerotic lesion medial right iliac bone (image 490), a 1 cm sclerotic lesion anterior T4 vertebral body (image 73) and a 2 cm sclerotic lesion proximal right humerus are unchanged. 6 x 5 x 3 cm circumscribed homogeneous low-attenuation mass in the presternal subcutaneous fat is unchanged could represent a sebaceous cyst.                                                                     IMPRESSION:  1.  Hepatic metastases and left para-aortic retroperitoneal lymphadenopathy have decreased consistent with response to interval chemotherapy.  2.  Bone lesions appear similar.  3.  No new disease identified.      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  CT scan after 2 cycles showed response in the liver and also in the left ymphadenopathy.  The left sacral lesion is stable.  He is tolerating the treatment well without concerns. Has mild cytopenias and fatigue. BP stable.  We will plan to continue this combination of treatment until progression on inability to tolerate.    Overall  plan  Ok for infusion tomorwo pending labs, reduce dex to 12 mg and benadryl to 25 given no reaction to taxol and this is cycle 4  Ct CAP prior to cycle 5 and visit with Dr. Steven end of March    #Brain mets   Follows with Dr. Velez. MRI 12/15/21 with three new mets s/p gamma knife 12/23/2021. Repeat MRI later this month     #Bone mets  Bone biopsy of left pelvic mass 2/26/21 confirmed metastasis. Finished radiation 3/23 to lumbosacral spine.   While he has not seen dentist in 4 yrs, discusseed small risk of osteonecrosis, 3-5% risk, he does not have any ongoing dental issues and has no tooth pain or caries.  -Zometa every 4 weeks  -Continue Calcium and Vitamin D.  -Continue gabapentin 200 mg at bedtime for radicular pain.    #Dypshagia, malnutrition  --Of note, consideration given to esophageal stent vs XRT for esophageal debulking to address dysphagia inpatient in Feb/March 2021; however given likely improvement with chemo-immunotherapy, as well as risks of worsening cytopenias, elected not to.   --PEG recently removed. Tolerating PO intake. Monitor weight and any recurrent dysphagia    #Cauda Equina due to tumor compression s/p L2-L5 laminectomy and decompression   Followed by neurosurgery (now prn) with improved exam after surgery. He is now ambulating with or without a walker, strength nearly back to baseline. He is aware of his activity and lifting restrictions. Has some L radiculopathy that he uses robaxan prn.   Most recent CT scan showing mild increase in the size of rt sacral lesion, however has no new symptoms, asked him to be vigilant for new symptoms. Of note, he does have baseline L leg numbness.      #Constipation  Continue Dulcolax prn. May also use miralax, or MOM prn. Continue to avoid suppository while on chemo.    #GERD, improving  Improved with omeprazole, can use prn    #neuropathy  Pre-dates taxol. Monitor for worsening.Gr 1.    50 minutes spent on the date of the encounter doing chart  review, review of test results, interpretation of tests, patient visit, documentation and discussion with other provider(s)     Jaky Pugh CNP on 3/2/2022 at 4:11 PM

## 2022-03-03 NOTE — PROGRESS NOTES
Infusion Nursing Note:  Junito Wang presents today for C4D1 .    Patient seen by provider today: No, 3/2 vv with NP   present during visit today: Not Applicable.    Note: no new symptoms per patient.  He feels he is tolerating regimen well.      Intravenous Access:  Implanted Port.    Treatment Conditions:  Results reviewed, labs MET treatment parameters, ok to proceed with treatment.      Post Infusion Assessment:  Patient tolerated infusion without incident.       Discharge Plan:   Patient has upcoming appt.  Reviewed with patient.      ERICKA MEADE RN

## 2022-03-03 NOTE — PROGRESS NOTES
Right port accessed with a 20 gauge 3/4 inch rizvi needle using sterile technique, labs drawn (geen/purple), pt gave a urine specimen, port flushed with saline and heparin, transparent dressing applied, see flow sheet.    Heike Nielson RN

## 2022-03-10 NOTE — PROGRESS NOTES
Patient's port accessed using sterile procedure. Blood return noted. Lab tubes drawn, labeled and sent to lab. Port flushed with saline and heparin. Patient tolerated lab draw well.       Eliane Wheeler RN

## 2022-03-10 NOTE — PROGRESS NOTES
Infusion Nursing Note:  Junito Wang presents today for C4D8 Taxol.    Patient seen by provider today: No   present during visit today: Not Applicable.    Note: N/A.    Intravenous Access:  Implanted Port.    Treatment Conditions:  Lab Results   Component Value Date    HGB 12.5 (L) 03/10/2022    WBC 4.2 03/10/2022    ANEU 5.1 07/14/2021    ANEUTAUTO 2.8 03/10/2022     03/10/2022      Results reviewed, labs MET treatment parameters, ok to proceed with treatment.    Post Infusion Assessment:  Patient tolerated infusion without incident.  Blood return noted pre and post infusion.  Site patent and intact, free from redness, edema or discomfort.  No evidence of extravasations.  Access discontinued per protocol.     Discharge Plan:   Patient will return 3/17/2022 for next appointment.   Patient discharged in stable condition accompanied by: self.  Departure Mode: Ambulatory.    Chelsey Hogan RN-BSN, PHN, OCN  [facility]

## 2022-03-14 NOTE — PROGRESS NOTES
This is a recent snapshot of the patient's [facility] medical record.  For current drug dose and complete information and questions, call [phone] or In Basket pool, fv home infusion ([id number])  CSN Number:  [id number]

## 2022-03-17 NOTE — PROGRESS NOTES
Infusion Nursing Note:  Junito Wang presents today for C4D15 Taxol/Cyramza.    Patient seen by provider today: No   present during visit today: Not Applicable.    Note: Patient reports feeling well overall today. States he has been tolerating treatments.      Intravenous Access:  Implanted Port.    Treatment Conditions:  Lab Results   Component Value Date    HGB 12.7 (L) 03/17/2022    WBC 3.3 (L) 03/17/2022    ANEU 5.1 07/14/2021    ANEUTAUTO 2.1 03/17/2022     03/17/2022      Results reviewed, labs MET treatment parameters, ok to proceed with treatment.  Urine protein negative.  BP </= 160/100.      Post Infusion Assessment:  Patient tolerated infusion without incident.  Blood return noted pre and post infusion.  Site patent and intact, free from redness, edema or discomfort.  No evidence of extravasations.  Access discontinued per protocol.       Discharge Plan:   AVS to patient via MYCHART.  Patient will return 3/31/2022 for next appointment.   Patient discharged in stable condition accompanied by: self.  Departure Mode: Ambulatory.      Silvana Christie RN

## 2022-03-22 NOTE — PROGRESS NOTES
FOLLOW-UP VISIT    Patient Name: Junito Wang      : 1960     Age: 61 year old        ______________________________________________________________________________     Chief Complaint   Patient presents with     Cancer     follow up to radiation therapy     BP (!) 140/90   Pulse 118   Resp 16   SpO2 98%      Date Radiation Completed:   3)  20 Gy GK SRS-- Rt occipital, midbrain and Rt occipital completed 2021  2) 20 Gy GK SRS -- left choroid plexus 3/24/21   1) 20 Gy in 5 fractions -- lumbar spine completed 3/23/21    Y-90 in September with Dr. Medhat Lewis  Current history of pain associated with this visit:   Intensity: 2/10  Current: dull  Location: left hip  Treatment: rest    Labs  Other Labs: Yes: CBC/CMP 3/17/22    Imaging  MRI: Brain 3/23/22; CT abd 3/21        Other Appointments:     MD Name: Maurizio Appointment Date: 3/23/22   MD Name: Appointment Date:   MD Name: Appointment Date:   Other Appointment Notes:     Residual Radiation side effect: denies     Additional Instructions:     Nurse face-to-face time: Level 2:  5 min face to face time

## 2022-03-22 NOTE — PROGRESS NOTES
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     RADIATION THERAPY DELIVERED:   3)  20 Gy GK SRS-- Rt occipital, midbrain and Rt occipital completed 2021  2) 20 Gy GK SRS -- left choroid plexus 3/24/21   1) 20 Gy in 5 fractions -- lumbar spine completed 3/23/21    INTERVAL SINCE COMPLETION OF RADIATION THERAPY:   3 months    SUBJECTIVE:   Junito Wang is a 61 year old male with a known diagnosis of widely metastatic adenocarcinoma of the esophagus with brain, liver and bone metastasis.    Initial PET/CT on 21 demonstrated hypermetabolic mass in the distal esophagus extending into the gastric cardia with an SUV max of 16.7.  There were multiple hypermetabolic mediastinal lymph nodes, liver mass, and multiple osseous metastases. MRI of the brain on 21 showed a 1.9 x 1.4 x 1.2 cm enhancing lesion in the left choroid plexus adjacent to the left mesial temporal lobe.   He underwent a biopsy of the left pelvic bone mass on 2021 that was consistent with metastatic adenocarcinoma, consistent with known esophageal primary.    Shortly thereafter, he presented to emergency department for evaluation of severe low back pain and difficulty urinating for which he had an MRI of the lumbar spine on 3/2/2021 that demonstrated a new posteriorly infiltrating T2 flair hyperintense appearance with internal septations within the lumbar spinal cord causing severe compression of the thecal sac and spinal canal extending from T12 down to the sacrum.  He underwent emergent laminectomy and decompression of the epidural hematoma.    He was referred to us and received a course of GK SRS to the solitary cranial metastasis as well  as a course of palliative radiation therapy to the lumbar spine as outlined above. After completion of radiation treatment, he began systemic therapy with cisplatin/5-FU/pembrolizumab under care of Dr. Steven, which was switched to 5-FU/pembroluzumab 8/5/21.  He then underwent radioembolization of metastatic hepatic lesions (Y90) on 9/10/2021 by Dr. Meléndez and Dr. Baptiste.    Systemic therapy was switched to paclitaxel / ramucirumab on 12/9/21 given interval disease progression seen on CT CAP. Brain MRI 2/4/2022 showed new lesions.  He underwent GK to 2 lesions on 12/23/2021.     His most recent systemic imaging with CT CAP on 3/21/2022 demonstrated concern for progressive disease with increased size of subcarinal lymphadenopathy, but otherwise he had relatively stable findings.  MRI brain earlier today on 3/23/2022 demonstrated complete resolution of the previously treated lesions.  He was noted to have slight increased conspicuity of a punctate enhancement in the right cerebellar peduncle    He reports for follow-up.  On interview, he says that he is overall doing well with no neurologic changes.  Specifically, he denies headaches, nausea, focal weakness, focal sensory changes, or changes in speech, cognition, vision, or hearing.  He does report ongoing left hip pain that radiates down his left lower extremity.    PHYSICAL EXAM:  Gen: Alert, in NAD  Eyes: EOMI, sclera anicteric  HENT      Head: NC/AT     Ears: No external auricular lesions     Nose/sinus: No rhinorrhea or epistaxis     Oral Cavity/Oropharynx: MMM, no thrush noted  Pulm: Breathing comfortably on room air, no audible wheezes or ronchi  CV: Well-perfused, no cyanosis  Abdominal: Soft, nondistended  Skin: Normal color and turgor  Neurologic/MSK: Cranial nerves II through XII intact, motor intact throughout, sensation intact to light touch throughout  Psych: Appropriate mood and affect    IMAGING:  MRI Brain 3/23/2022  IMPRESSION: Positive response to  treatment compared with December 23, 2021 and December 15, 2021. Complete resolution of right occipital and  interpedicular cistern lesions. Previous described left inferior  colliculus lesion is also not seen, could be treatment effect or  marked artifactual in the prior study. Note that there is persistent  linear enhancement in the right cerebellar peduncle, likely vascular  in nature. Medial to this, there is increased conspicuity of a  punctate enhancement, favored as vascular as well but still remains  indeterminate. Attention on future follow-up exams.    IMPRESSION:   Mr. Wang is a 61 year old male with widely metastatic adenocarcinoma of the esophagus with brain, liver and osseous metastasis. He is doing well 3 months post GK.    PLAN:   Patient will follow up with Dr. Qureshi in 3 months with MRI brain prior  Follow-up with Dr. Steven for systemic disease management    The patient was seen and examined with Dr. Qureshi.    Tom Frias MD PGY-5  Radiation Oncology, [facility]  [phone] clinic  Pager [phone]    I saw the patient with the resident.  I agree with the resident note and plan of care.      Emilia Qureshi MD

## 2022-03-23 NOTE — LETTER
3/23/2022         RE: Junito Wang  [address] [facility] [id number]        Dear Colleague,    Thank you for referring your patient, Junito Wang, to the [facility] RADIATION ONCOLOGY. Please see a copy of my visit note below.    FOLLOW-UP VISIT    Patient Name: Junito Wang      : 1960     Age: 61 year old        ______________________________________________________________________________     Chief Complaint   Patient presents with     Cancer     follow up to radiation therapy     BP (!) 140/90   Pulse 118   Resp 16   SpO2 98%      Date Radiation Completed:   3)  20 Gy GK SRS-- Rt occipital, midbrain and Rt occipital completed 2021  2) 20 Gy GK SRS -- left choroid plexus 3/24/21   1) 20 Gy in 5 fractions -- lumbar spine completed 3/23/21    Y-90 in September with Dr. Medhat Lewis  Current history of pain associated with this visit:   Intensity: 2/10  Current: dull  Location: left hip  Treatment: rest    Labs  Other Labs: Yes: CBC/CMP 3/17/22    Imaging  MRI: Brain 3/23/22; CT abd 3/21        Other Appointments:     MD Name: Maurizio Appointment Date: 3/23/22   MD Name: Appointment Date:   MD Name: Appointment Date:   Other Appointment Notes:     Residual Radiation side effect: denies     Additional Instructions:     Nurse face-to-face time: Level 2:  5 min face to face time               Department of Radiation Oncology  [facility]  [address] [place] [address]  [phone]       Radiation Oncology Follow-up Visit  Mar 23, 2022    Junito Wang  MRN: [id number]   : 1960     DISEASE TREATED:   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     RADIATION THERAPY DELIVERED:   3)  20 Gy GK SRS-- Rt occipital, midbrain and Rt occipital completed 2021  2) 20 Gy GK SRS -- left choroid plexus 3/24/21   1) 20 Gy in 5 fractions -- lumbar spine completed 3/23/21    INTERVAL SINCE  COMPLETION OF RADIATION THERAPY:   3 months    SUBJECTIVE:   Junito Wang is a 61 year old male with a known diagnosis of widely metastatic adenocarcinoma of the esophagus with brain, liver and bone metastasis.    Initial PET/CT on 2/12/21 demonstrated hypermetabolic mass in the distal esophagus extending into the gastric cardia with an SUV max of 16.7.  There were multiple hypermetabolic mediastinal lymph nodes, liver mass, and multiple osseous metastases. MRI of the brain on 2/25/21 showed a 1.9 x 1.4 x 1.2 cm enhancing lesion in the left choroid plexus adjacent to the left mesial temporal lobe.   He underwent a biopsy of the left pelvic bone mass on 2/26/2021 that was consistent with metastatic adenocarcinoma, consistent with known esophageal primary.    Shortly thereafter, he presented to emergency department for evaluation of severe low back pain and difficulty urinating for which he had an MRI of the lumbar spine on 3/2/2021 that demonstrated a new posteriorly infiltrating T2 flair hyperintense appearance with internal septations within the lumbar spinal cord causing severe compression of the thecal sac and spinal canal extending from T12 down to the sacrum.  He underwent emergent laminectomy and decompression of the epidural hematoma.    He was referred to us and received a course of GK SRS to the solitary cranial metastasis as well as a course of palliative radiation therapy to the lumbar spine as outlined above. After completion of radiation treatment, he began systemic therapy with cisplatin/5-FU/pembrolizumab under care of Dr. Steven, which was switched to 5-FU/pembroluzumab 8/5/21.  He then underwent radioembolization of metastatic hepatic lesions (Y90) on 9/10/2021 by Dr. Meléndez and Dr. Baptiste.    Systemic therapy was switched to paclitaxel / ramucirumab on 12/9/21 given interval disease progression seen on CT CAP. Brain MRI 2/4/2022 showed new lesions.  He underwent GK to 2 lesions on 12/23/2021.      His most recent systemic imaging with CT CAP on 3/21/2022 demonstrated concern for progressive disease with increased size of subcarinal lymphadenopathy, but otherwise he had relatively stable findings.  MRI brain earlier today on 3/23/2022 demonstrated complete resolution of the previously treated lesions.  He was noted to have slight increased conspicuity of a punctate enhancement in the right cerebellar peduncle    He reports for follow-up.  On interview, he says that he is overall doing well with no neurologic changes.  Specifically, he denies headaches, nausea, focal weakness, focal sensory changes, or changes in speech, cognition, vision, or hearing.  He does report ongoing left hip pain that radiates down his left lower extremity.    PHYSICAL EXAM:  Gen: Alert, in NAD  Eyes: EOMI, sclera anicteric  HENT      Head: NC/AT     Ears: No external auricular lesions     Nose/sinus: No rhinorrhea or epistaxis     Oral Cavity/Oropharynx: MMM, no thrush noted  Pulm: Breathing comfortably on room air, no audible wheezes or ronchi  CV: Well-perfused, no cyanosis  Abdominal: Soft, nondistended  Skin: Normal color and turgor  Neurologic/MSK: Cranial nerves II through XII intact, motor intact throughout, sensation intact to light touch throughout  Psych: Appropriate mood and affect    IMAGING:  MRI Brain 3/23/2022  IMPRESSION: Positive response to treatment compared with December 23, 2021 and December 15, 2021. Complete resolution of right occipital and  interpedicular cistern lesions. Previous described left inferior  colliculus lesion is also not seen, could be treatment effect or  marked artifactual in the prior study. Note that there is persistent  linear enhancement in the right cerebellar peduncle, likely vascular  in nature. Medial to this, there is increased conspicuity of a  punctate enhancement, favored as vascular as well but still remains  indeterminate. Attention on future follow-up exams.    IMPRESSION:    Mr. Wang is a 61 year old male with widely metastatic adenocarcinoma of the esophagus with brain, liver and osseous metastasis. He is doing well 3 months post GK.    PLAN:   Patient will follow up with Dr. Qureshi in 3 months with MRI brain prior  Follow-up with Dr. Steven for systemic disease management    The patient was seen and examined with Dr. Qureshi.    Tom Frias MD PGY-5  Radiation Oncology, [facility] [place]  [phone] clinic  Pager [phone]    I saw the patient with the resident.  I agree with the resident note and plan of care.      Emilia Qureshi MD

## 2022-03-23 NOTE — PROGRESS NOTES
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  12/23/21: gamma knife for 3 new brain mets    Treatment intent- Palliative    Oncology HPI:   August 2020- stomach discomfort, belching   October 2020- progressive dysphagia with solids   1/26/21- distal esophageal biopsy shows Moderately differentiated adenocarcinoma; MMR normal expression, PDL1 expression is low with TPS 2%, CPS 5-10, Her2 is pending  1/27/21- CT CAP- Enlarged  2 cm subcarinal lymph node and 1.4 cm short axis right subcarinal lymph node, focal thickening of the superior wall along the right side of the mid esophagus. Numerous small pulmonary nodules, 3 mm nodule in the superior segment of the left lower lobe, 3 mm nodule in the anterior aspect of the right upper lobe , 4 mm nodule in the medial aspect of the right upper lobe and 4 mm right lower lobe nodule. Hypoattenuating foci in the liver, 1.6 cm hypoattenuating/hypoenhancing lesion in hepatic segment 8 and 1.4 cm lesion in hepatic segment 5 , indeterminate, likely metastatic.  The prostate gland is mildly enlarged measuring 5.3 cm in transverse diameter. Multiple prominent but not significantly enlarged retroperitoneal lymph nodes, indeterminate, could be reactive. 4.4 x 4.3 cm lytic lesion centered in the posterior aspect of the  left sacral ala (series 3 image 243), 4.7 x 4.0 x 5.6 cm (axial and CC dimensions, respectively) hypoattenuating lesion in the subcutaneous tissue of the anterior chest wall just anterior to the mediastinum, indeterminate, could represent sebaceous cyst.  2/4/21- Saw Dr. Bourgeois ordered PET/CT  3/2/21-3/10/21- Admission to [facility]- <24h of inability to ambulate with acute onset left leg weakness/urinary retention with perianal sensory deficits, MRI showed extensive epidural signal change suggestive of tumor vs. Hemorrhage.Pt underwent emergent surgery- L2-L5 laminectomy and decompression . PEG tube placement 3/5/2021.   3/17/21-3/23/21: Radiation to LS spine  3/24/21: gamma knife (2000cGy in 5 fractions)  3/29/21- port placement  3/31/21: Cycle 1 Cisplatin+keytruda+5fu   4/21/21: Cycle 2 Cisplatin+keytruda+5fu  5/11/21- Ct CAP- Overall stable disease- mixed response- KS in 2 liver lesions while, stable disease to borderline progression in 1 liver lesions  5/12/21- Cycle 3 Cisplatin+keytruda+5fu  6/2/21- Cycle 4 Cisplatin+keytruda+5fu  6/21/21- CT CAP- Stable mild circumferential wall thickening of the distal esophagus (series 3, image 99) with small amount of fluid in the upper and mid esophagus. No lymphadenopathy. Stable to slight increased size of hepatic metastases. For example a 3.1 x 2.9 cm lesion in the left lobe (series 3, image 123) previously measured 2.9 x 2.6 cm, and a 2.7 x 2.9 cm peripheral right hepatic lobe metastasis previously measured 3.0 x 2.2 cm. A 1.0 cm lesion in the caudate lobe (series 3, image 138) is more prominent today/new. Stable small gastrohepatic ligament lymph nodes. No new or enlarging lymph nodes in the abdomen and pelvis. No ascites.Stable 5.1 x 4.6 cm left sacral mass. Stable cystic 4.9 x 3.5 cm subcutaneous lesion overlying the midsternum.  6/24/21- Cycle 5 Cisplatin+keytruda+5fu  7/14/21- Cycle 6 Cisplatin+keytruda+5fu  8/3/21- Ct CAP- Few small pulmonary nodules are stable. Stable  mild circumferential wall thickening of the distal esophagus (series 3, image 97).  Increased size of hepatic metastases. For example a 3.5  x 3.5 cm left lobe metastasis (series 3, image 117) previously measured 2.9 x 3.1 cm, a 3.5 x 2.7 cm subcapsular right hepatic lobe metastasis (series 3, image 158) previously measured 2.7 x 2.9 cm and  a 1.7 x 1.5 cm lesion in the left lobe adjacent to the caudate (series 3, image 124) previously measured 1.0 cm.  Stable 5.2 x 4.9 cm lytic lesion in the left sacrum previously measuring 5.2 x 4.6 cm (3, image 238). Stable sclerotic lesion in the anterior aspect of the T4 vertebral body measuring 1.3 cm (series 3, image 39) and sclerosis of the posterior left third rib.. No new lesions. Stable 5.2 cm cystic subcutaneous lesion overlying the sternum  8/5, 8/26, 9/16- Pembrolizumab and 5-FU  9/10/21- Y90 delivery in the left hepatic lobe and segment 5 lesions.   9/13/21- Brain MRI: no progression or distal metatasis  9/28/21- Ct CAP- Multiple left and right hepatic nodular masses identified consistent with metastatic disease. Several appear to be new or are larger worrisome for progression. One lesion is slightly smaller along  the anterior aspect of hepatic segment 2. Stable destructive sacral bone lesion and sclerosis at T4 and left third rib. Stable distal esophageal wall thickening. Stable but indeterminant soft tissue surrounding portions of the left gastric artery at the upper midabdomen.  11/26/21- CT CAP-  Interval progression of disease: Enlarging hepatic metastatic foci, Wall thickening involving the distal thoracic esophagus appears to involve a more proximal distal extent compared to prior,  Stable to mildly enlarged nodular thickening about the left gastric artery. New abnormal left periaortic retroperitoneal lymphadenopathy. Enlarging left lytic sacral mass.  12/9/21- C1 Taxol and ramucirumab  12/15/21- 3 new Brain Mets identified,   12/23/21- Gamma Knife for new  brain mets  1/6/22- C2 Taxol and ramucirumab  1/31/22- Ct CAP-  Hepatic metastases and left para-aortic retroperitoneal lymphadenopathy have decreased consistent with response to interval chemotherapy. Bone lesions appear similar. No new disease identified.  3/21/22- Ct CAP- Liver only progression- Posttreatment changes in the left hepatic lobe. Increase in size of few hypodense liver lesions with new lesion in segment 6 concerning for increased metastatic disease. Increased size of subcarinal lymph node.  Relatively unchanged retroperitoneal lymphadenopathy. Bony metastasis are not significantly changed. Mild diffuse hyperenhancement of the colon and rectum with moderate cecal stool burden, findings may represent proctocolitis.Persistent diffuse circumferential thickening of the distal thoracic esophagus  3/23/22- MRI brain- Positive response to treatment compared with December 23, 2021 and December 15, 2021. Complete resolution of right occipital and interpedicular cistern lesions. Previous described left inferior colliculus lesion is also not seen, could be treatment effect or marked artifactual in the prior study. Note that there is persistent linear enhancement in the right cerebellar peduncle, likely vascular in nature. Medial to this, there is increased conspicuity of a punctate enhancement, favored as vascular as well but still remains indeterminate. Attention on future follow-up exams.           Interval history:   Junito is here to discuss results of CT scan.  He is done with 4 cycles of Taxol and ramucirumab.  He overall tolerated the treatment pretty well.  He had mild nausea following chemotherapy and fatigue that is  better by week 2. He has ongoing left sacral nagging discomfort that has been present for many months. He does not have new radiculopathy or weakness. He takes gabapentin but does not feel tylenol would be helpful. Currently feels constipated. He ambulates without a walker at home and is  "hopeful to be more active outside once the weather warms.   PEG tube is out. He is eating and drinking without difficulty  Has been talking dulcolax prn, last bowel movement on Monday  Breathing is okay, chronic cough unchanged, no fevers    ECOG performance status of 1    Comprehensive ROS was unremarkable except as detailed above.        Current Outpatient Medications   Medication Sig Dispense Refill     bacitracin 500 UNIT/GM external ointment Apply topically 2 times daily Until the sore are well healed (Patient not taking: Reported on 11/29/2021) 113.4 g 2     Calcium Carb-Cholecalciferol (CALCIUM-VITAMIN D) 600-400 MG-UNIT TABS Take 2 tablets by mouth daily 60 tablet 3     gabapentin (NEURONTIN) 250 MG/5ML solution Take 5 mLs (250 mg) by mouth At Bedtime (Patient not taking: Reported on 10/7/2021) 470 mL 1     LORazepam (ATIVAN) 0.5 MG tablet Take 1 tablet (0.5 mg) by mouth every 4 hours as needed (Anxiety, Nausea/Vomiting or Sleep) 30 tablet 2     multivitamin CF FORMULA (CHOICEFUL) chewable tablet Take 1 tablet by mouth daily       omeprazole (FIRST-OMEPRAZOLE) 2 MG/ML SUSP Take 10 mLs (20 mg) by mouth 2 times daily (Patient not taking: Reported on 9/29/2021) 300 mL 1     ondansetron (ZOFRAN) 4 MG tablet Take 1-2 tablets (4-8 mg) by mouth every 6 hours as needed for nausea (vomiting) (Patient not taking: Reported on 10/7/2021) 40 tablet 0     prochlorperazine (COMPAZINE) 10 MG tablet Take 1 tablet (10 mg) by mouth every 6 hours as needed (Nausea/Vomiting) 30 tablet 2      No Known Allergies    Exam:   BP (!) 140/90   Pulse 118   Temp 98.2  F (36.8  C) (Oral)   Resp 16   Ht 1.778 m (5' 10\")   Wt 74.3 kg (163 lb 12.8 oz)   SpO2 98%   BMI 23.50 kg/m      Video physical exam  General: Patient appears well in no acute distress.   Skin: No visualized rash or lesions on visualized skin  Eyes: EOMI, no erythema, sclera icterus or discharge noted  Resp: Appears to be breathing comfortably without accessory " muscle usage, speaking in full sentences, no cough  MSK: Appears to have normal range of motion based on visualized movements  Neurologic: No apparent tremors, facial movements symmetric  Psych: affect good, alert and oriented    The rest of a comprehensive physical examination is deferred due to PHE (public health emergency) video restrictions      Labs:   Most Recent 3 CBC's:  Recent Labs   Lab Test 03/17/22  0729 03/10/22  0736 03/03/22  0849   WBC 3.3* 4.2 3.8*   HGB 12.7* 12.5* 13.1*   MCV 90 90 92    264 259     Most Recent 3 BMP's:  Recent Labs   Lab Test 02/10/22  0837 02/02/22  1113 01/06/22  1107 12/24/21  0856 11/18/21  0831 10/28/21  0846   NA  --   --   --  139 139 139   POTASSIUM  --   --   --  4.0 4.0 4.3   CHLORIDE  --   --   --  109 110* 108   CO2  --   --   --  24 26 27   BUN  --   --   --  16 16 14   CR 0.71 0.78 0.70 0.67 0.76 0.70   ANIONGAP  --   --   --  6 3 4   ARDEN 8.7 9.0 9.1 8.7 9.1 9.1   GLC  --   --   --  107* 103* 94    Most Recent 2 LFT's:  Recent Labs   Lab Test 03/03/22  0849 02/02/22  1113   AST 25 20   ALT 24 21   ALKPHOS 114 107   BILITOTAL 0.4 0.3    Most Recent TSH and T4:  Recent Labs   Lab Test 03/02/21  1426   TSH 1.40     I reviewed the above labs today.        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  CT scan after 2 cycles showed response in the liver and also in the left ymphadenopathy.  Unfortunately after 4 cycles, he has ongoing progression mostly in the liver.  The left sacral lesion is stable.  We discussed results of CT scan.  Due to progression we will discontinue the paclitaxel and ramucirumab.  We then discussed potential options including checking for  clinical trial eligibility versus standard of care, which are either 5-fluorouracil and irinotecan or irinotecan only, or Lonsurf.  He is open to exploring clinical trial options.  I will therefore talk to our phase 1 team to see if he would be eligible for any clinical trials.  Previously, has received Y 90 to the liver lesions.  I do not think he is a good candidate for further Y 90 treatment at this time.  I may consider discussing about ablation therapy to the liver lesions if local control is needed, however may not change the overall survival.  He is agreeable.    Overall plan   Cancel the taxol and remicirumab infusions   Ok to keep the zometa infusion   RTC with me on 4/6       #Brain mets   Follows with Dr. Velez. MRI 12/15/21 with three new mets s/p gamma knife 12/23/2021.  Most recent MRI showing treatment effect, per Dr. Velez note Next MRI is in 3 months in June 2020.     #Bone mets  Bone biopsy of left pelvic mass 2/26/21 confirmed metastasis. Finished radiation 3/23 to lumbosacral spine.   While he has not seen dentist in 4 yrs, discusseed small risk of osteonecrosis, 3-5% risk, he does not have any ongoing dental issues and has no tooth pain or caries.  -Zometa every 4 weeks  -Continue Calcium and Vitamin D.  -Continue gabapentin 200 mg at bedtime for radicular pain.    #Dypshagia, malnutrition-improved  --Of note, consideration given to esophageal stent vs XRT for esophageal debulking to address dysphagia inpatient in Feb/March 2021; however given likely improvement with systemic Rx elected not to. PEG recently removed. Tolerating PO intake. Monitor weight and any recurrent dysphagia    #Cauda Equina due to tumor compression s/p L2-L5 laminectomy and decompression   Followed by neurosurgery (now prn) with improved exam after surgery. He is now ambulating with or without a walker, strength nearly back to baseline. He is aware of his activity and lifting restrictions. Has some L  radiculopathy that he uses robaxan prn.   Most recent CT scan showing mild increase in the size of rt sacral lesion, however has no new symptoms, asked him to be vigilant for new symptoms. Of note, he does have baseline L leg numbness.      #Constipation  Added Senokot+docusate today. May also use miralax, or MOM prn. CT scan does show some ongoing proctocolitis, etiology not clear. Encouraged regular bowel regimen.    #GERD, improving  Improved with omeprazole, can use prn    #neuropathy  Pre-dates taxol. Monitor for worsening.Gr 1.      On the day of service  Chart review: 5 minutes  Visit duration: 30 minutes  Care coordination: 5 minutes    Jose Steven MD    Hematology, Oncology and Transplantation

## 2022-03-23 NOTE — LETTER
3/23/2022     RE: Junito Wang  [id number] [facility] [id number]    Dear Colleague,    Thank you for referring your patient, Junito Wang, to the [facility]. Please see a copy of my visit note below.    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  12/23/21: gamma knife for 3 new brain mets    Treatment intent- Palliative    Oncology HPI:   August 2020- stomach discomfort, belching   October 2020- progressive dysphagia with solids   1/26/21- distal esophageal biopsy shows Moderately differentiated adenocarcinoma; MMR normal expression, PDL1 expression is low with TPS 2%, CPS 5-10, Her2 is pending  1/27/21- CT CAP- Enlarged  2 cm subcarinal lymph node and 1.4 cm short axis right subcarinal lymph node, focal thickening of the superior wall along the right side of the mid esophagus. Numerous small pulmonary nodules, 3 mm nodule in the superior segment of the left lower lobe, 3 mm nodule in the anterior aspect of the right upper lobe , 4 mm nodule in the medial aspect of the right upper lobe and 4 mm right lower lobe nodule. Hypoattenuating foci in the liver, 1.6 cm hypoattenuating/hypoenhancing lesion in hepatic segment 8 and 1.4 cm lesion in hepatic segment 5 , indeterminate, likely metastatic.  The  prostate gland is mildly enlarged measuring 5.3 cm in transverse diameter. Multiple prominent but not significantly enlarged retroperitoneal lymph nodes, indeterminate, could be reactive. 4.4 x 4.3 cm lytic lesion centered in the posterior aspect of the left sacral ala (series 3 image 243), 4.7 x 4.0 x 5.6 cm (axial and CC dimensions, respectively) hypoattenuating lesion in the subcutaneous tissue of the anterior chest wall just anterior to the mediastinum, indeterminate, could represent sebaceous cyst.  2/4/21- Saw Dr. Bourgeois ordered PET/CT  3/2/21-3/10/21- Admission to [facility]- <24h of inability to ambulate with acute onset left leg weakness/urinary retention with perianal sensory deficits, MRI showed extensive epidural signal change suggestive of tumor vs. Hemorrhage.Pt underwent emergent surgery- L2-L5 laminectomy and decompression . PEG tube placement 3/5/2021.   3/17/21-3/23/21: Radiation to LS spine  3/24/21: gamma knife (2000cGy in 5 fractions)  3/29/21- port placement  3/31/21: Cycle 1 Cisplatin+keytruda+5fu   4/21/21: Cycle 2 Cisplatin+keytruda+5fu  5/11/21- Ct CAP- Overall stable disease- mixed response- NV in 2 liver lesions while, stable disease to borderline progression in 1 liver lesions  5/12/21- Cycle 3 Cisplatin+keytruda+5fu  6/2/21- Cycle 4 Cisplatin+keytruda+5fu  6/21/21- CT CAP- Stable mild circumferential wall thickening of the distal esophagus (series 3, image 99) with small amount of fluid in the upper and mid esophagus. No lymphadenopathy. Stable to slight increased size of hepatic metastases. For example a 3.1 x 2.9 cm lesion in the left lobe (series 3, image 123) previously measured 2.9 x 2.6 cm, and a 2.7 x 2.9 cm peripheral right hepatic lobe metastasis previously measured 3.0 x 2.2 cm. A 1.0 cm lesion in the caudate lobe (series 3, image 138) is more prominent today/new. Stable small gastrohepatic ligament lymph nodes. No new or enlarging lymph nodes in the abdomen and pelvis. No  ascites.Stable 5.1 x 4.6 cm left sacral mass. Stable cystic 4.9 x 3.5 cm subcutaneous lesion overlying the midsternum.  6/24/21- Cycle 5 Cisplatin+keytruda+5fu  7/14/21- Cycle 6 Cisplatin+keytruda+5fu  8/3/21- Ct CAP- Few small pulmonary nodules are stable. Stable mild circumferential wall thickening of the distal esophagus (series 3, image 97).  Increased size of hepatic metastases. For example a 3.5  x 3.5 cm left lobe metastasis (series 3, image 117) previously measured 2.9 x 3.1 cm, a 3.5 x 2.7 cm subcapsular right hepatic lobe metastasis (series 3, image 158) previously measured 2.7 x 2.9 cm and  a 1.7 x 1.5 cm lesion in the left lobe adjacent to the caudate (series 3, image 124) previously measured 1.0 cm.  Stable 5.2 x 4.9 cm lytic lesion in the left sacrum previously measuring 5.2 x 4.6 cm (3, image 238). Stable sclerotic lesion in the anterior aspect of the T4 vertebral body measuring 1.3 cm (series 3, image 39) and sclerosis of the posterior left third rib.. No new lesions. Stable 5.2 cm cystic subcutaneous lesion overlying the sternum  8/5, 8/26, 9/16- Pembrolizumab and 5-FU  9/10/21- Y90 delivery in the left hepatic lobe and segment 5 lesions.   9/13/21- Brain MRI: no progression or distal metatasis  9/28/21- Ct CAP- Multiple left and right hepatic nodular masses identified consistent with metastatic disease. Several appear to be new or are larger worrisome for progression. One lesion is slightly smaller along  the anterior aspect of hepatic segment 2. Stable destructive sacral bone lesion and sclerosis at T4 and left third rib. Stable distal esophageal wall thickening. Stable but indeterminant soft tissue surrounding portions of the left gastric artery at the upper midabdomen.  11/26/21- CT CAP-  Interval progression of disease: Enlarging hepatic metastatic foci, Wall thickening involving the distal thoracic esophagus appears to involve a more proximal distal extent compared to prior,  Stable to mildly  enlarged nodular thickening about the left gastric artery. New abnormal left periaortic retroperitoneal lymphadenopathy. Enlarging left lytic sacral mass.  12/9/21- C1 Taxol and ramucirumab  12/15/21- 3 new Brain Mets identified,   12/23/21- Gamma Knife for new brain mets  1/6/22- C2 Taxol and ramucirumab  1/31/22- Ct CAP-  Hepatic metastases and left para-aortic retroperitoneal lymphadenopathy have decreased consistent with response to interval chemotherapy. Bone lesions appear similar. No new disease identified.  3/21/22- Ct CAP- Liver only progression- Posttreatment changes in the left hepatic lobe. Increase in size of few hypodense liver lesions with new lesion in segment 6 concerning for increased metastatic disease. Increased size of subcarinal lymph node.  Relatively unchanged retroperitoneal lymphadenopathy. Bony metastasis are not significantly changed. Mild diffuse hyperenhancement of the colon and rectum with moderate cecal stool burden, findings may represent proctocolitis.Persistent diffuse circumferential thickening of the distal thoracic esophagus  3/23/22- MRI brain- Positive response to treatment compared with December 23, 2021 and December 15, 2021. Complete resolution of right occipital and interpedicular cistern lesions. Previous described left inferior colliculus lesion is also not seen, could be treatment effect or marked artifactual in the prior study. Note that there is persistent linear enhancement in the right cerebellar peduncle, likely vascular in nature. Medial to this, there is increased conspicuity of a punctate enhancement, favored as vascular as well but still remains indeterminate. Attention on future follow-up exams.           Interval history:   Junito is here to discuss results of CT scan.  He is done with 4 cycles of Taxol and ramucirumab.  He overall tolerated the treatment pretty well.  He had mild nausea following chemotherapy and fatigue that is  better by week 2. He has  "ongoing left sacral nagging discomfort that has been present for many months. He does not have new radiculopathy or weakness. He takes gabapentin but does not feel tylenol would be helpful. Currently feels constipated. He ambulates without a walker at home and is hopeful to be more active outside once the weather warms.   PEG tube is out. He is eating and drinking without difficulty  Has been talking dulcolax prn, last bowel movement on Monday  Breathing is okay, chronic cough unchanged, no fevers    ECOG performance status of 1    Comprehensive ROS was unremarkable except as detailed above.        Current Outpatient Medications   Medication Sig Dispense Refill     bacitracin 500 UNIT/GM external ointment Apply topically 2 times daily Until the sore are well healed (Patient not taking: Reported on 11/29/2021) 113.4 g 2     Calcium Carb-Cholecalciferol (CALCIUM-VITAMIN D) 600-400 MG-UNIT TABS Take 2 tablets by mouth daily 60 tablet 3     gabapentin (NEURONTIN) 250 MG/5ML solution Take 5 mLs (250 mg) by mouth At Bedtime (Patient not taking: Reported on 10/7/2021) 470 mL 1     LORazepam (ATIVAN) 0.5 MG tablet Take 1 tablet (0.5 mg) by mouth every 4 hours as needed (Anxiety, Nausea/Vomiting or Sleep) 30 tablet 2     multivitamin CF FORMULA (CHOICEFUL) chewable tablet Take 1 tablet by mouth daily       omeprazole (FIRST-OMEPRAZOLE) 2 MG/ML SUSP Take 10 mLs (20 mg) by mouth 2 times daily (Patient not taking: Reported on 9/29/2021) 300 mL 1     ondansetron (ZOFRAN) 4 MG tablet Take 1-2 tablets (4-8 mg) by mouth every 6 hours as needed for nausea (vomiting) (Patient not taking: Reported on 10/7/2021) 40 tablet 0     prochlorperazine (COMPAZINE) 10 MG tablet Take 1 tablet (10 mg) by mouth every 6 hours as needed (Nausea/Vomiting) 30 tablet 2      No Known Allergies    Exam:   BP (!) 140/90   Pulse 118   Temp 98.2  F (36.8  C) (Oral)   Resp 16   Ht 1.778 m (5' 10\")   Wt 74.3 kg (163 lb 12.8 oz)   SpO2 98%   BMI 23.50 " kg/m      Video physical exam  General: Patient appears well in no acute distress.   Skin: No visualized rash or lesions on visualized skin  Eyes: EOMI, no erythema, sclera icterus or discharge noted  Resp: Appears to be breathing comfortably without accessory muscle usage, speaking in full sentences, no cough  MSK: Appears to have normal range of motion based on visualized movements  Neurologic: No apparent tremors, facial movements symmetric  Psych: affect good, alert and oriented    The rest of a comprehensive physical examination is deferred due to PHE (public health emergency) video restrictions      Labs:   Most Recent 3 CBC's:  Recent Labs   Lab Test 03/17/22  0729 03/10/22  0736 03/03/22  0849   WBC 3.3* 4.2 3.8*   HGB 12.7* 12.5* 13.1*   MCV 90 90 92    264 259     Most Recent 3 BMP's:  Recent Labs   Lab Test 02/10/22  0837 02/02/22  1113 01/06/22  1107 12/24/21  0856 11/18/21  0831 10/28/21  0846   NA  --   --   --  139 139 139   POTASSIUM  --   --   --  4.0 4.0 4.3   CHLORIDE  --   --   --  109 110* 108   CO2  --   --   --  24 26 27   BUN  --   --   --  16 16 14   CR 0.71 0.78 0.70 0.67 0.76 0.70   ANIONGAP  --   --   --  6 3 4   ARDEN 8.7 9.0 9.1 8.7 9.1 9.1   GLC  --   --   --  107* 103* 94    Most Recent 2 LFT's:  Recent Labs   Lab Test 03/03/22  0849 02/02/22  1113   AST 25 20   ALT 24 21   ALKPHOS 114 107   BILITOTAL 0.4 0.3    Most Recent TSH and T4:  Recent Labs   Lab Test 03/02/21  1426   TSH 1.40     I reviewed the above labs today.        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  CT scan after 2 cycles showed response in the liver and also in the left ymphadenopathy.  Unfortunately after  4 cycles, he has ongoing progression mostly in the liver.  The left sacral lesion is stable.  We discussed results of CT scan.  Due to progression we will discontinue the paclitaxel and ramucirumab.  We then discussed potential options including checking for clinical trial eligibility versus standard of care, which are either 5-fluorouracil and irinotecan or irinotecan only, or Lonsurf.  He is open to exploring clinical trial options.  I will therefore talk to our phase 1 team to see if he would be eligible for any clinical trials.  Previously, has received Y 90 to the liver lesions.  I do not think he is a good candidate for further Y 90 treatment at this time.  I may consider discussing about ablation therapy to the liver lesions if local control is needed, however may not change the overall survival.  He is agreeable.    Overall plan   Cancel the taxol and remicirumab infusions   Ok to keep the zometa infusion   RTC with me on 4/6       #Brain mets   Follows with Dr. Velez. MRI 12/15/21 with three new mets s/p gamma knife 12/23/2021.  Most recent MRI showing treatment effect, per Dr. Velez note Next MRI is in 3 months in June 2020.     #Bone mets  Bone biopsy of left pelvic mass 2/26/21 confirmed metastasis. Finished radiation 3/23 to lumbosacral spine.   While he has not seen dentist in 4 yrs, discusseed small risk of osteonecrosis, 3-5% risk, he does not have any ongoing dental issues and has no tooth pain or caries.  -Zometa every 4 weeks  -Continue Calcium and Vitamin D.  -Continue gabapentin 200 mg at bedtime for radicular pain.    #Dypshagia, malnutrition-improved  --Of note, consideration given to esophageal stent vs XRT for esophageal debulking to address dysphagia inpatient in Feb/March 2021; however given likely improvement with systemic Rx elected not to. PEG recently removed. Tolerating PO intake. Monitor weight and any recurrent dysphagia    #Cauda Equina due to tumor compression s/p L2-L5  laminectomy and decompression   Followed by neurosurgery (now prn) with improved exam after surgery. He is now ambulating with or without a walker, strength nearly back to baseline. He is aware of his activity and lifting restrictions. Has some L radiculopathy that he uses robaxan prn.   Most recent CT scan showing mild increase in the size of rt sacral lesion, however has no new symptoms, asked him to be vigilant for new symptoms. Of note, he does have baseline L leg numbness.      #Constipation  Added Senokot+docusate today. May also use miralax, or MOM prn. CT scan does show some ongoing proctocolitis, etiology not clear. Encouraged regular bowel regimen.    #GERD, improving  Improved with omeprazole, can use prn    #neuropathy  Pre-dates taxol. Monitor for worsening.Gr 1.    On the day of service  Chart review: 5 minutes  Visit duration: 30 minutes  Care coordination: 5 minutes    Jose Steven MD    Hematology, Oncology and Transplantation

## 2022-03-23 NOTE — NURSING NOTE
"Oncology Rooming Note    March 23, 2022 3:51 PM   Junito Wang is a 61 year old male who presents for:    Chief Complaint   Patient presents with     Oncology Clinic Visit     [facility] RETURN - ADENOCARCINOMA OF ESOPHAGUS     Initial Vitals: BP (!) 140/90   Pulse 118   Temp 98.2  F (36.8  C) (Oral)   Resp 16   Ht 1.778 m (5' 10\")   Wt 74.3 kg (163 lb 12.8 oz)   SpO2 98%   BMI 23.50 kg/m   Estimated body mass index is 23.5 kg/m  as calculated from the following:    Height as of this encounter: 1.778 m (5' 10\").    Weight as of this encounter: 74.3 kg (163 lb 12.8 oz). Body surface area is 1.92 meters squared.  No Pain (0) Comment: Data Unavailable   No LMP for male patient.  Allergies reviewed: Yes  Medications reviewed: Yes    Medications: Medication refills not needed today.  Pharmacy name entered into [employer]:    [id number] - [place], [place] - [address] [facility] [place] [place] [facility] [address] [place] [address] [phone]  [facility] [place], [place] - [address] [address] [place] [address], [id number]    Clinical concerns: No new concerns. Maurizio was notified.      Luiz Kwan LPN              "

## 2022-03-28 NOTE — PROGRESS NOTES
This is a recent snapshot of the patient's [facility] medical record.  For current drug dose and complete information and questions, call [phone] or In Basket pool, fv home infusion ([id number])  CSN Number:  [id number]

## 2022-03-28 NOTE — PROGRESS NOTES
This is a recent snapshot of the patient's [facility] medical record.  For current drug dose and complete information and questions, call [phone] or In Basket pool, fv home infusion ([id number])  CSN Number:  [id number]

## 2022-03-29 NOTE — CONFIDENTIAL NOTE
Omeprazole refill 90 day refill request   Last prescribing provider: Jaky Pugh     Last clinic visit date: 3/23/22 Dr Steven     Any missed appointments or no-shows since last clinic visit?: No     Recommendations for requested medication (if none, N/A): Copied from chart note 3/23/22 Dr Steven   GERD, improving  Improved with omeprazole, can use prn      Next clinic visit date: 4/6/22 Dr Steven     Any other pertinent information (if none, N/A): N/A

## 2022-03-30 NOTE — TELEPHONE ENCOUNTER
Central Prior Authorization Team   Phone: [phone]      PA Initiation    Medication: OMEPRAZOLE DR 20 MG CAP -PA iniitated  Insurance Company: Blue Plus PMAP - Phone [phone] Fax [phone]  Pharmacy Filling the Rx: [id number] [place] - [address]   Filling Pharmacy Phone: [phone]  Filling Pharmacy Fax:    Start Date: 3/30/2022

## 2022-03-30 NOTE — TELEPHONE ENCOUNTER
Prior Authorization Retail Medication Request    Medication/Dose: OMEPRAZOLE DR 20 MG CAP   ICD code (if different than what is on RX):    Previously Tried and Failed: Max supply reached  Rationale:      Insurance Name:  Blue Plus  Insurance ID:  [id number]      Pharmacy Information (if different than what is on RX)  Name:    Phone:

## 2022-03-31 NOTE — PROGRESS NOTES
Infusion Nursing Note:  Junito Wang presents today for monthly Zometa   Patient seen by provider today: No   present during visit today: Not Applicable.    Note:Pt states he is doing well, denies any problems today, no changes since last appointment, tolerating Zometa without any side effects.       Intravenous Access:  Implanted Port.    Treatment Conditions:    Calcium 9.7  Creatinine 0.64    Results reviewed, labs MET treatment parameters, ok to proceed with treatment.      Post Infusion Assessment:  Patient tolerated infusion without incident.  Blood return noted pre and post infusion.  Site patent and intact, free from redness, edema or discomfort.  No evidence of extravasations.  Access discontinued per protocol.       Discharge Plan:   Return with Dr Steven on 04/06/2022 and Zometa infusion on 05/05/2022.  Discharge instructions reviewed with: Patient.  Patient and/or family verbalized understanding of discharge instructions and all questions answered.  Patient discharged in stable condition accompanied by: self.  Departure Mode: Ambulatory.      Heike Nielson RN

## 2022-03-31 NOTE — TELEPHONE ENCOUNTER
Prior Authorization Approval    Authorization Effective Date: 1/1/2022  Authorization Expiration Date: 3/31/2023  Medication: OMEPRAZOLE DR 20 MG CAP -PA approved  Approved Dose/Quantity:   Reference #:     Insurance Company: Blue Plus PMAP - Phone [phone] Fax [phone]  Expected CoPay:       CoPay Card Available:      Foundation Assistance Needed:    Which Pharmacy is filling the prescription (Not needed for infusion/clinic administered): [id number] - [place], [place] - [address]   Pharmacy Notified: Yes  Patient Notified: No

## 2022-04-05 NOTE — PROGRESS NOTES
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  12/23/21: gamma knife for 3 new brain mets  12/9/21 to 3/17/22- 4 cycels of Taxol and ramucirumab    Treatment intent- Palliative    Oncology HPI:   August 2020- stomach discomfort, belching   October 2020- progressive dysphagia with solids   1/26/21- distal esophageal biopsy shows Moderately differentiated adenocarcinoma; MMR normal expression, PDL1 expression is low with TPS 2%, CPS 5-10, Her2 is pending  1/27/21- CT CAP- Enlarged  2 cm subcarinal lymph node and 1.4 cm short axis right subcarinal lymph node, focal thickening of the superior wall along the right side of the mid esophagus. Numerous small pulmonary nodules, 3 mm nodule in the superior segment of the left lower lobe, 3 mm nodule in the anterior aspect of the right upper lobe , 4 mm nodule in the medial aspect of the right upper lobe and 4 mm right lower lobe nodule. Hypoattenuating foci in the liver, 1.6 cm hypoattenuating/hypoenhancing lesion in hepatic segment 8 and 1.4 cm lesion in hepatic segment 5 , indeterminate, likely metastatic.  The prostate gland is mildly enlarged measuring 5.3 cm in transverse diameter. Multiple prominent but not significantly enlarged retroperitoneal lymph nodes, indeterminate, could be reactive. 4.4 x 4.3 cm lytic  lesion centered in the posterior aspect of the left sacral ala (series 3 image 243), 4.7 x 4.0 x 5.6 cm (axial and CC dimensions, respectively) hypoattenuating lesion in the subcutaneous tissue of the anterior chest wall just anterior to the mediastinum, indeterminate, could represent sebaceous cyst.  2/4/21- Saw Dr. Bourgeois ordered PET/CT  3/2/21-3/10/21- Admission to [facility]- <24h of inability to ambulate with acute onset left leg weakness/urinary retention with perianal sensory deficits, MRI showed extensive epidural signal change suggestive of tumor vs. Hemorrhage.Pt underwent emergent surgery- L2-L5 laminectomy and decompression . PEG tube placement 3/5/2021.   3/17/21-3/23/21: Radiation to LS spine  3/24/21: gamma knife (2000cGy in 5 fractions)  3/29/21- port placement  3/31/21: Cycle 1 Cisplatin+keytruda+5fu   4/21/21: Cycle 2 Cisplatin+keytruda+5fu  5/11/21- Ct CAP- Overall stable disease- mixed response- CT in 2 liver lesions while, stable disease to borderline progression in 1 liver lesions  5/12/21- Cycle 3 Cisplatin+keytruda+5fu  6/2/21- Cycle 4 Cisplatin+keytruda+5fu  6/21/21- CT CAP- Stable mild circumferential wall thickening of the distal esophagus (series 3, image 99) with small amount of fluid in the upper and mid esophagus. No lymphadenopathy. Stable to slight increased size of hepatic metastases. For example a 3.1 x 2.9 cm lesion in the left lobe (series 3, image 123) previously measured 2.9 x 2.6 cm, and a 2.7 x 2.9 cm peripheral right hepatic lobe metastasis previously measured 3.0 x 2.2 cm. A 1.0 cm lesion in the caudate lobe (series 3, image 138) is more prominent today/new. Stable small gastrohepatic ligament lymph nodes. No new or enlarging lymph nodes in the abdomen and pelvis. No ascites.Stable 5.1 x 4.6 cm left sacral mass. Stable cystic 4.9 x 3.5 cm subcutaneous lesion overlying the midsternum.  6/24/21- Cycle 5 Cisplatin+keytruda+5fu  7/14/21- Cycle 6 Cisplatin+keytruda+5fu  8/3/21- Ct CAP-  Few small pulmonary nodules are stable. Stable mild circumferential wall thickening of the distal esophagus (series 3, image 97).  Increased size of hepatic metastases. For example a 3.5  x 3.5 cm left lobe metastasis (series 3, image 117) previously measured 2.9 x 3.1 cm, a 3.5 x 2.7 cm subcapsular right hepatic lobe metastasis (series 3, image 158) previously measured 2.7 x 2.9 cm and  a 1.7 x 1.5 cm lesion in the left lobe adjacent to the caudate (series 3, image 124) previously measured 1.0 cm.  Stable 5.2 x 4.9 cm lytic lesion in the left sacrum previously measuring 5.2 x 4.6 cm (3, image 238). Stable sclerotic lesion in the anterior aspect of the T4 vertebral body measuring 1.3 cm (series 3, image 39) and sclerosis of the posterior left third rib.. No new lesions. Stable 5.2 cm cystic subcutaneous lesion overlying the sternum  8/5, 8/26, 9/16- Pembrolizumab and 5-FU  9/10/21- Y90 delivery in the left hepatic lobe and segment 5 lesions.   9/13/21- Brain MRI: no progression or distal metatasis  9/28/21- Ct CAP- Multiple left and right hepatic nodular masses identified consistent with metastatic disease. Several appear to be new or are larger worrisome for progression. One lesion is slightly smaller along  the anterior aspect of hepatic segment 2. Stable destructive sacral bone lesion and sclerosis at T4 and left third rib. Stable distal esophageal wall thickening. Stable but indeterminant soft tissue surrounding portions of the left gastric artery at the upper midabdomen.  11/26/21- CT CAP-  Interval progression of disease: Enlarging hepatic metastatic foci, Wall thickening involving the distal thoracic esophagus appears to involve a more proximal distal extent compared to prior,  Stable to mildly enlarged nodular thickening about the left gastric artery. New abnormal left periaortic retroperitoneal lymphadenopathy. Enlarging left lytic sacral mass.  12/9/21- C1 Taxol and ramucirumab  12/15/21- 3 new Brain Mets  identified,   12/23/21- Gamma Knife for new brain mets  1/6/22- C2 Taxol and ramucirumab  1/31/22- Ct CAP-  Hepatic metastases and left para-aortic retroperitoneal lymphadenopathy have decreased consistent with response to interval chemotherapy. Bone lesions appear similar. No new disease identified.  3/21/22- Ct CAP- Liver only progression- Posttreatment changes in the left hepatic lobe. Increase in size of few hypodense liver lesions with new lesion in segment 6 concerning for increased metastatic disease. Increased size of subcarinal lymph node.  Relatively unchanged retroperitoneal lymphadenopathy. Bony metastasis are not significantly changed. Mild diffuse hyperenhancement of the colon and rectum with moderate cecal stool burden, findings may represent proctocolitis.Persistent diffuse circumferential thickening of the distal thoracic esophagus  3/23/22- MRI brain- Positive response to treatment compared with December 23, 2021 and December 15, 2021. Complete resolution of right occipital and interpedicular cistern lesions. Previous described left inferior colliculus lesion is also not seen, could be treatment effect or marked artifactual in the prior study. Note that there is persistent linear enhancement in the right cerebellar peduncle, likely vascular in nature. Medial to this, there is increased conspicuity of a punctate enhancement, favored as vascular as well but still remains indeterminate. Attention on future follow-up exams.       Interval history:   Junito is here to discuss next steps.     Reports continued constipation--primarily small, hard stools. Senna-docusate doesn't seem to be helping much. Did have a bowel movement this AM. Has tried metamucil, Miralax--these don't seem to help much. Dulcolax is the only one that seems to help, but does tend to cause diarrhea. He has been trying to drink more fluids but has not gotten to where he feels like he should be with this.   Frequent urination with  small volumes--started 4-5 days ago. No incontinence. No retention although notes he has to stand up to urinate.   PEG tube is out. He is eating and drinking without difficulty.    He has ongoing, waxing and waning left sacral and hip discomfort that has been present for many months--does not seem to be worsening over time. Also reports lower back pain that improves with sitting up straight.   He feels like his legs are strong--does not use walker at home at all, uses carts at stores. Does endorse sharp shooting pains in left leg and right knee, sometimes feels his legs might give out from underneath him but has not had any falls.   He takes gabapentin and does find this helpful, although states it only works for so long.   Hopeful to be more active outside once the weather warms and thinks this will help with his pain.   Denies any breathing issues. No fevers or chills. Does endorse occasional abdominal pain that he attributes to constipation and right-sdied pain that he thinks is from the known liver mets--this comes and goes.   Weight is stable.  No nausea or vomiting. Does not ever need to take prns he is prescribed.   Mood is doing good.     ECOG performance status of 1    Comprehensive ROS was unremarkable except as detailed above.        Current Outpatient Medications   Medication Sig Dispense Refill     bacitracin 500 UNIT/GM external ointment Apply topically 2 times daily Until the sore are well healed (Patient not taking: Reported on 11/29/2021) 113.4 g 2     Calcium Carb-Cholecalciferol (CALCIUM-VITAMIN D) 600-400 MG-UNIT TABS Take 2 tablets by mouth daily 60 tablet 3     gabapentin (NEURONTIN) 250 MG/5ML solution Take 5 mLs (250 mg) by mouth At Bedtime (Patient not taking: Reported on 10/7/2021) 470 mL 1     LORazepam (ATIVAN) 0.5 MG tablet Take 1 tablet (0.5 mg) by mouth every 4 hours as needed (Anxiety, Nausea/Vomiting or Sleep) 30 tablet 2     multivitamin CF FORMULA (CHOICEFUL) chewable tablet Take 1  tablet by mouth daily       omeprazole (FIRST-OMEPRAZOLE) 2 MG/ML SUSP Take 10 mLs (20 mg) by mouth 2 times daily (Patient not taking: Reported on 9/29/2021) 300 mL 1     ondansetron (ZOFRAN) 4 MG tablet Take 1-2 tablets (4-8 mg) by mouth every 6 hours as needed for nausea (vomiting) (Patient not taking: Reported on 10/7/2021) 40 tablet 0     prochlorperazine (COMPAZINE) 10 MG tablet Take 1 tablet (10 mg) by mouth every 6 hours as needed (Nausea/Vomiting) 30 tablet 2      No Known Allergies    Exam:   BP (!) 145/94   Pulse 115   Temp 97.5  F (36.4  C) (Oral)   Resp 18   Wt 74.1 kg (163 lb 6.4 oz)   SpO2 98%   BMI 23.45 kg/m      Physical exam  General: Patient appears well in no acute distress.   Skin: No visualized rash or lesions on visualized skin  Eyes: EOMI, no erythema, sclera icterus or discharge noted  CV: RRR, no murmur   Resp: Appears to be breathing comfortably without accessory muscle usage, speaking in full sentences, no cough, CTAB  MSK: Appears to have normal range of motion based on visualized movements  Neurologic: No apparent tremors, facial movements symmetric, AOx4, 4/5 strength with LLE hip flexion, knee extension, plantar and dorsiflexion   Psych: affect good      Labs:   Most Recent 3 CBC's:  Recent Labs   Lab Test 03/17/22  0729 03/10/22  0736 03/03/22  0849   WBC 3.3* 4.2 3.8*   HGB 12.7* 12.5* 13.1*   MCV 90 90 92    264 259     Most Recent 3 BMP's:  Recent Labs   Lab Test 03/31/22  0737 02/10/22  0837 02/02/22  1113 01/06/22  1107 12/24/21  0856 11/18/21  0831 10/28/21  0846   NA  --   --   --   --  139 139 139   POTASSIUM  --   --   --   --  4.0 4.0 4.3   CHLORIDE  --   --   --   --  109 110* 108   CO2  --   --   --   --  24 26 27   BUN  --   --   --   --  16 16 14   CR 0.64* 0.71 0.78   < > 0.67 0.76 0.70   ANIONGAP  --   --   --   --  6 3 4   ARDEN 9.7 8.7 9.0   < > 8.7 9.1 9.1   GLC  --   --   --   --  107* 103* 94    < > = values in this interval not displayed.    Most  Recent 2 LFT's:  Recent Labs   Lab Test 03/03/22  0849 02/02/22  1113   AST 25 20   ALT 24 21   ALKPHOS 114 107   BILITOTAL 0.4 0.3    Most Recent TSH and T4:  Recent Labs   Lab Test 03/02/21  1426   TSH 1.40     I reviewed the above labs today.        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  CT scan after 2 cycles showed response in the liver and also in the left ymphadenopathy.  Unfortunately after 4 cycles, he has ongoing progression mostly in the liver.  The left sacral lesion is stable.  We discussed results of CT scan.  Due to progression we will discontinue the paclitaxel and ramucirumab.  We then discussed potential options including checking for clinical trial eligibility versus standard of care, which are either 5-fluorouracil and irinotecan or irinotecan only, or Lonsurf.  He is open to exploring clinical trial options.  I will therefore talk to our phase 1 team to see if he would be eligible for any clinical trials.  Previously, has received Y 90 to the liver lesions.  I do not think he is a good candidate for further Y 90 treatment at this time.  I may consider discussing about ablation therapy to the liver lesions if local control is needed, however may not change the overall survival.  He is agreeable.  We discussed possibility of participation HCW clincial trial (TGFB trap+IL-15) vs stantard Rx, We are still pre-screening for HCW. If he is not eligible then we discussed possibility of Irinotecan vs Lonsurf vs FOLFIRI. We discused the risks and benefits of these approaches and he was agreeable to start irinotecan (assuming he is not eligible).  We will arrange for irinotecan next week, assuming he  is not eligible. If things change will let him know..We also briefly discussed expected toxicities from irinotecan.  He is agreeable    Overall plan  Ok for infusion next week pending labs, at [facility]   RTC with NP/PA prior to cycle 2, 3   CT CAP in 6 weeks prior to cycle 4   RTC with me after CT        #Brain mets   Follows with Dr. Velez. MRI 12/15/21 with three new mets s/p gamma knife 12/23/2021.  Most recent MRI showing treatment effect, per Dr. Velez note Next MRI is in 3 months in June 2020.     #Bone mets  Bone biopsy of left pelvic mass 2/26/21 confirmed metastasis. Finished radiation 3/23 to lumbosacral spine.   While he has not seen dentist in 4 yrs, discusseed small risk of osteonecrosis, 3-5% risk, he does not have any ongoing dental issues and has no tooth pain or caries.  -Zometa every 4 weeks  -Continue Calcium and Vitamin D.  -Will increase gabapentin to 300 mg at bedtime for radicular pain.  --can consider further increase in dose of gabapentine and consider more XRT in the future    #Dypshagia, malnutrition-improved  --Of note, consideration given to esophageal stent vs XRT for esophageal debulking to address dysphagia inpatient in Feb/March 2021; however given likely improvement with systemic Rx elected not to. PEG recently removed. Tolerating PO intake. Monitor weight and any recurrent dysphagia    #Cauda Equina due to tumor compression s/p L2-L5 laminectomy and decompression   Followed by neurosurgery (now prn) with improved exam after surgery. He is now ambulating with or without a walker, strength nearly back to baseline. He is aware of his activity and lifting restrictions. Has some L radiculopathy that he uses robaxan prn.   Most recent CT scan showing mild increase in the size of rt sacral lesion, however has no new symptoms, asked him to be vigilant for new symptoms. Of note, he does have baseline L leg numbness.      #Constipation  Added Senokot+docusate today. May also use miralax,  or MOM prn. CT scan does show some ongoing proctocolitis, etiology not clear. Encouraged regular bowel regimen.    #GERD, improving  Improved with omeprazole, can use prn    #neuropathy  Pre-dates taxol. Monitor for worsening.Gr 1.      On the day of service  Chart review: 5 minutes  Visit duration: 30 minutes  Care coordination: 5 minutes    Jose Steven MD    Hematology, Oncology and Transplantation

## 2022-04-06 NOTE — LETTER
4/6/2022         RE: Junito Wang  [id number] [facility] [address]        Dear Colleague,    Thank you for referring your patient, Junito Wang, to the [facility]. Please see a copy of my visit note below.    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  12/23/21: gamma knife for 3 new brain mets  12/9/21 to 3/17/22- 4 cycels of Taxol and ramucirumab    Treatment intent- Palliative    Oncology HPI:   August 2020- stomach discomfort, belching   October 2020- progressive dysphagia with solids   1/26/21- distal esophageal biopsy shows Moderately differentiated adenocarcinoma; MMR normal expression, PDL1 expression is low with TPS 2%, CPS 5-10, Her2 is pending  1/27/21- CT CAP- Enlarged  2 cm subcarinal lymph node and 1.4 cm short axis right subcarinal lymph node, focal thickening of the superior wall along the right side of the mid esophagus. Numerous small pulmonary nodules, 3 mm nodule in the superior segment of the left lower lobe, 3 mm nodule in the anterior aspect of the right upper lobe , 4 mm nodule in the medial aspect of the right upper lobe and 4 mm right lower lobe nodule. Hypoattenuating foci in the liver, 1.6 cm hypoattenuating/hypoenhancing lesion in hepatic segment 8 and 1.4 cm lesion in hepatic  segment 5 , indeterminate, likely metastatic.  The prostate gland is mildly enlarged measuring 5.3 cm in transverse diameter. Multiple prominent but not significantly enlarged retroperitoneal lymph nodes, indeterminate, could be reactive. 4.4 x 4.3 cm lytic lesion centered in the posterior aspect of the left sacral ala (series 3 image 243), 4.7 x 4.0 x 5.6 cm (axial and CC dimensions, respectively) hypoattenuating lesion in the subcutaneous tissue of the anterior chest wall just anterior to the mediastinum, indeterminate, could represent sebaceous cyst.  2/4/21- Saw Dr. Bourgeois ordered PET/CT  3/2/21-3/10/21- Admission to [facility]- <24h of inability to ambulate with acute onset left leg weakness/urinary retention with perianal sensory deficits, MRI showed extensive epidural signal change suggestive of tumor vs. Hemorrhage.Pt underwent emergent surgery- L2-L5 laminectomy and decompression . PEG tube placement 3/5/2021.   3/17/21-3/23/21: Radiation to LS spine  3/24/21: gamma knife (2000cGy in 5 fractions)  3/29/21- port placement  3/31/21: Cycle 1 Cisplatin+keytruda+5fu   4/21/21: Cycle 2 Cisplatin+keytruda+5fu  5/11/21- Ct CAP- Overall stable disease- mixed response- OH in 2 liver lesions while, stable disease to borderline progression in 1 liver lesions  5/12/21- Cycle 3 Cisplatin+keytruda+5fu  6/2/21- Cycle 4 Cisplatin+keytruda+5fu  6/21/21- CT CAP- Stable mild circumferential wall thickening of the distal esophagus (series 3, image 99) with small amount of fluid in the upper and mid esophagus. No lymphadenopathy. Stable to slight increased size of hepatic metastases. For example a 3.1 x 2.9 cm lesion in the left lobe (series 3, image 123) previously measured 2.9 x 2.6 cm, and a 2.7 x 2.9 cm peripheral right hepatic lobe metastasis previously measured 3.0 x 2.2 cm. A 1.0 cm lesion in the caudate lobe (series 3, image 138) is more prominent today/new. Stable small gastrohepatic ligament lymph nodes. No new or enlarging  lymph nodes in the abdomen and pelvis. No ascites.Stable 5.1 x 4.6 cm left sacral mass. Stable cystic 4.9 x 3.5 cm subcutaneous lesion overlying the midsternum.  6/24/21- Cycle 5 Cisplatin+keytruda+5fu  7/14/21- Cycle 6 Cisplatin+keytruda+5fu  8/3/21- Ct CAP- Few small pulmonary nodules are stable. Stable mild circumferential wall thickening of the distal esophagus (series 3, image 97).  Increased size of hepatic metastases. For example a 3.5  x 3.5 cm left lobe metastasis (series 3, image 117) previously measured 2.9 x 3.1 cm, a 3.5 x 2.7 cm subcapsular right hepatic lobe metastasis (series 3, image 158) previously measured 2.7 x 2.9 cm and  a 1.7 x 1.5 cm lesion in the left lobe adjacent to the caudate (series 3, image 124) previously measured 1.0 cm.  Stable 5.2 x 4.9 cm lytic lesion in the left sacrum previously measuring 5.2 x 4.6 cm (3, image 238). Stable sclerotic lesion in the anterior aspect of the T4 vertebral body measuring 1.3 cm (series 3, image 39) and sclerosis of the posterior left third rib.. No new lesions. Stable 5.2 cm cystic subcutaneous lesion overlying the sternum  8/5, 8/26, 9/16- Pembrolizumab and 5-FU  9/10/21- Y90 delivery in the left hepatic lobe and segment 5 lesions.   9/13/21- Brain MRI: no progression or distal metatasis  9/28/21- Ct CAP- Multiple left and right hepatic nodular masses identified consistent with metastatic disease. Several appear to be new or are larger worrisome for progression. One lesion is slightly smaller along  the anterior aspect of hepatic segment 2. Stable destructive sacral bone lesion and sclerosis at T4 and left third rib. Stable distal esophageal wall thickening. Stable but indeterminant soft tissue surrounding portions of the left gastric artery at the upper midabdomen.  11/26/21- CT CAP-  Interval progression of disease: Enlarging hepatic metastatic foci, Wall thickening involving the distal thoracic esophagus appears to involve a more proximal distal  extent compared to prior,  Stable to mildly enlarged nodular thickening about the left gastric artery. New abnormal left periaortic retroperitoneal lymphadenopathy. Enlarging left lytic sacral mass.  12/9/21- C1 Taxol and ramucirumab  12/15/21- 3 new Brain Mets identified,   12/23/21- Gamma Knife for new brain mets  1/6/22- C2 Taxol and ramucirumab  1/31/22- Ct CAP-  Hepatic metastases and left para-aortic retroperitoneal lymphadenopathy have decreased consistent with response to interval chemotherapy. Bone lesions appear similar. No new disease identified.  3/21/22- Ct CAP- Liver only progression- Posttreatment changes in the left hepatic lobe. Increase in size of few hypodense liver lesions with new lesion in segment 6 concerning for increased metastatic disease. Increased size of subcarinal lymph node.  Relatively unchanged retroperitoneal lymphadenopathy. Bony metastasis are not significantly changed. Mild diffuse hyperenhancement of the colon and rectum with moderate cecal stool burden, findings may represent proctocolitis.Persistent diffuse circumferential thickening of the distal thoracic esophagus  3/23/22- MRI brain- Positive response to treatment compared with December 23, 2021 and December 15, 2021. Complete resolution of right occipital and interpedicular cistern lesions. Previous described left inferior colliculus lesion is also not seen, could be treatment effect or marked artifactual in the prior study. Note that there is persistent linear enhancement in the right cerebellar peduncle, likely vascular in nature. Medial to this, there is increased conspicuity of a punctate enhancement, favored as vascular as well but still remains indeterminate. Attention on future follow-up exams.       Interval history:   Junito is here to discuss next steps.     Reports continued constipation--primarily small, hard stools. Senna-docusate doesn't seem to be helping much. Did have a bowel movement this AM. Has tried  metamucil, Miralax--these don't seem to help much. Dulcolax is the only one that seems to help, but does tend to cause diarrhea. He has been trying to drink more fluids but has not gotten to where he feels like he should be with this.   Frequent urination with small volumes--started 4-5 days ago. No incontinence. No retention although notes he has to stand up to urinate.   PEG tube is out. He is eating and drinking without difficulty.    He has ongoing, waxing and waning left sacral and hip discomfort that has been present for many months--does not seem to be worsening over time. Also reports lower back pain that improves with sitting up straight.   He feels like his legs are strong--does not use walker at home at all, uses carts at stores. Does endorse sharp shooting pains in left leg and right knee, sometimes feels his legs might give out from underneath him but has not had any falls.   He takes gabapentin and does find this helpful, although states it only works for so long.   Hopeful to be more active outside once the weather warms and thinks this will help with his pain.   Denies any breathing issues. No fevers or chills. Does endorse occasional abdominal pain that he attributes to constipation and right-sdied pain that he thinks is from the known liver mets--this comes and goes.   Weight is stable.  No nausea or vomiting. Does not ever need to take prns he is prescribed.   Mood is doing good.     ECOG performance status of 1    Comprehensive ROS was unremarkable except as detailed above.        Current Outpatient Medications   Medication Sig Dispense Refill     bacitracin 500 UNIT/GM external ointment Apply topically 2 times daily Until the sore are well healed (Patient not taking: Reported on 11/29/2021) 113.4 g 2     Calcium Carb-Cholecalciferol (CALCIUM-VITAMIN D) 600-400 MG-UNIT TABS Take 2 tablets by mouth daily 60 tablet 3     gabapentin (NEURONTIN) 250 MG/5ML solution Take 5 mLs (250 mg) by mouth At  Bedtime (Patient not taking: Reported on 10/7/2021) 470 mL 1     LORazepam (ATIVAN) 0.5 MG tablet Take 1 tablet (0.5 mg) by mouth every 4 hours as needed (Anxiety, Nausea/Vomiting or Sleep) 30 tablet 2     multivitamin CF FORMULA (CHOICEFUL) chewable tablet Take 1 tablet by mouth daily       omeprazole (FIRST-OMEPRAZOLE) 2 MG/ML SUSP Take 10 mLs (20 mg) by mouth 2 times daily (Patient not taking: Reported on 9/29/2021) 300 mL 1     ondansetron (ZOFRAN) 4 MG tablet Take 1-2 tablets (4-8 mg) by mouth every 6 hours as needed for nausea (vomiting) (Patient not taking: Reported on 10/7/2021) 40 tablet 0     prochlorperazine (COMPAZINE) 10 MG tablet Take 1 tablet (10 mg) by mouth every 6 hours as needed (Nausea/Vomiting) 30 tablet 2      No Known Allergies    Exam:   BP (!) 145/94   Pulse 115   Temp 97.5  F (36.4  C) (Oral)   Resp 18   Wt 74.1 kg (163 lb 6.4 oz)   SpO2 98%   BMI 23.45 kg/m      Physical exam  General: Patient appears well in no acute distress.   Skin: No visualized rash or lesions on visualized skin  Eyes: EOMI, no erythema, sclera icterus or discharge noted  CV: RRR, no murmur   Resp: Appears to be breathing comfortably without accessory muscle usage, speaking in full sentences, no cough, CTAB  MSK: Appears to have normal range of motion based on visualized movements  Neurologic: No apparent tremors, facial movements symmetric, AOx4, 4/5 strength with LLE hip flexion, knee extension, plantar and dorsiflexion   Psych: affect good      Labs:   Most Recent 3 CBC's:  Recent Labs   Lab Test 03/17/22  0729 03/10/22  0736 03/03/22  0849   WBC 3.3* 4.2 3.8*   HGB 12.7* 12.5* 13.1*   MCV 90 90 92    264 259     Most Recent 3 BMP's:  Recent Labs   Lab Test 03/31/22  0737 02/10/22  0837 02/02/22  1113 01/06/22  1107 12/24/21  0856 11/18/21  0831 10/28/21  0846   NA  --   --   --   --  139 139 139   POTASSIUM  --   --   --   --  4.0 4.0 4.3   CHLORIDE  --   --   --   --  109 110* 108   CO2  --   --   --    --  24 26 27   BUN  --   --   --   --  16 16 14   CR 0.64* 0.71 0.78   < > 0.67 0.76 0.70   ANIONGAP  --   --   --   --  6 3 4   ARDEN 9.7 8.7 9.0   < > 8.7 9.1 9.1   GLC  --   --   --   --  107* 103* 94    < > = values in this interval not displayed.    Most Recent 2 LFT's:  Recent Labs   Lab Test 03/03/22  0849 02/02/22  1113   AST 25 20   ALT 24 21   ALKPHOS 114 107   BILITOTAL 0.4 0.3    Most Recent TSH and T4:  Recent Labs   Lab Test 03/02/21  1426   TSH 1.40     I reviewed the above labs today.        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  CT scan after 2 cycles showed response in the liver and also in the left ymphadenopathy.  Unfortunately after 4 cycles, he has ongoing progression mostly in the liver.  The left sacral lesion is stable.  We discussed results of CT scan.  Due to progression we will discontinue the paclitaxel and ramucirumab.  We then discussed potential options including checking for clinical trial eligibility versus standard of care, which are either 5-fluorouracil and irinotecan or irinotecan only, or Lonsurf.  He is open to exploring clinical trial options.  I will therefore talk to our phase 1 team to see if he would be eligible for any clinical trials.  Previously, has received Y 90 to the liver lesions.  I do not think he is a good candidate for further Y 90 treatment at this time.  I may consider discussing about ablation therapy to the liver lesions if local control is needed, however may not change the overall survival.  He is agreeable.  We discussed possibility of participation HCW clincial trial (TGFB trap+IL-15) vs stantard Rx, We are still pre-screening for HCW. If he is  not eligible then we discussed possibility of Irinotecan vs Lonsurf vs FOLFIRI. We discused the risks and benefits of these approaches and he was agreeable to start irinotecan (assuming he is not eligible).  We will arrange for irinotecan next week, assuming he is not eligible. If things change will let him know..We also briefly discussed expected toxicities from irinotecan.  He is agreeable    Overall plan  Ok for infusion next week pending labs, at [facility]   RTC with NP/PA prior to cycle 2, 3   CT CAP in 6 weeks prior to cycle 4   RTC with me after CT        #Brain mets   Follows with Dr. Velez. MRI 12/15/21 with three new mets s/p gamma knife 12/23/2021.  Most recent MRI showing treatment effect, per Dr. Velez note Next MRI is in 3 months in June 2020.     #Bone mets  Bone biopsy of left pelvic mass 2/26/21 confirmed metastasis. Finished radiation 3/23 to lumbosacral spine.   While he has not seen dentist in 4 yrs, discusseed small risk of osteonecrosis, 3-5% risk, he does not have any ongoing dental issues and has no tooth pain or caries.  -Zometa every 4 weeks  -Continue Calcium and Vitamin D.  -Will increase gabapentin to 300 mg at bedtime for radicular pain.  --can consider further increase in dose of gabapentine and consider more XRT in the future    #Dypshagia, malnutrition-improved  --Of note, consideration given to esophageal stent vs XRT for esophageal debulking to address dysphagia inpatient in Feb/March 2021; however given likely improvement with systemic Rx elected not to. PEG recently removed. Tolerating PO intake. Monitor weight and any recurrent dysphagia    #Cauda Equina due to tumor compression s/p L2-L5 laminectomy and decompression   Followed by neurosurgery (now prn) with improved exam after surgery. He is now ambulating with or without a walker, strength nearly back to baseline. He is aware of his activity and lifting restrictions. Has some L radiculopathy that he uses robaxan prn.    Most recent CT scan showing mild increase in the size of rt sacral lesion, however has no new symptoms, asked him to be vigilant for new symptoms. Of note, he does have baseline L leg numbness.      #Constipation  Added Senokot+docusate today. May also use miralax, or MOM prn. CT scan does show some ongoing proctocolitis, etiology not clear. Encouraged regular bowel regimen.    #GERD, improving  Improved with omeprazole, can use prn    #neuropathy  Pre-dates taxol. Monitor for worsening.Gr 1.      On the day of service  Chart review: 5 minutes  Visit duration: 30 minutes  Care coordination: 5 minutes          Again, thank you for allowing me to participate in the care of your patient.      Sincerely,    Jose Steven MD

## 2022-04-06 NOTE — NURSING NOTE
"Oncology Rooming Note    April 6, 2022 9:26 AM   Junito Wang is a 61 year old male who presents for:    Chief Complaint   Patient presents with     Oncology Clinic Visit     Adenocarcinoma of esophagus      Initial Vitals: BP (!) 145/94   Pulse 115   Temp 97.5  F (36.4  C) (Oral)   Resp 18   Wt 74.1 kg (163 lb 6.4 oz)   SpO2 98%   BMI 23.45 kg/m   Estimated body mass index is 23.45 kg/m  as calculated from the following:    Height as of 3/23/22: 1.778 m (5' 10\").    Weight as of this encounter: 74.1 kg (163 lb 6.4 oz). Body surface area is 1.91 meters squared.  Mild Pain (2) Comment: Data Unavailable   No LMP for male patient.  Allergies reviewed: Yes  Medications reviewed: Yes    Medications: Medication refills not needed today.  Pharmacy name entered into [facility]:    [id number] - [place], [place] - [address] [facility] [facility] [place], [place] - [address] [place] [address] [phone]  [facility] [place], [place] - [id number]    Clinical concerns: None       Nuha Weir LPN            "

## 2022-04-12 NOTE — PROGRESS NOTES
This is a recent snapshot of the patient's [facility] medical record.  For current drug dose and complete information and questions, call [phone] or In Basket pool, [facility] home infusion ([id number])  CSN Number:  [id number]

## 2022-04-18 NOTE — PROGRESS NOTES
Infusion Nursing Note:  Junito Wang presents today for C1D1 Irinotecan.    Patient seen by provider today: No   present during visit today: Not Applicable.    Note: Pt c/o fatigue, BLE neuropathy in his feet, L>R, c/o L hip pain and feels overall weak in his legs.  States he feels insecure when walking, had a fall last week and since then has used his walker to help make him feel steadier.  Pt is wondering if there is anything that can be done to help his leg weakness. Pt is requesting this writer notify Dr Steven and see if he has any recommendations.  In-basket sent to Dr Steven.      Intravenous Access:  Implanted Port.    Treatment Conditions:  Lab Results   Component Value Date    HGB 13.5 04/18/2022    WBC 6.9 04/18/2022    ANEU 5.1 07/14/2021    ANEUTAUTO 5.3 04/18/2022     04/18/2022      Lab Results   Component Value Date     12/24/2021    POTASSIUM 4.0 12/24/2021    MAG 2.2 11/18/2021    CR 0.64 (L) 03/31/2022    ARDEN 9.7 03/31/2022    BILITOTAL 0.5 04/18/2022    ALBUMIN 3.9 04/18/2022    ALT 25 04/18/2022    AST 46 (H) 04/18/2022     Results reviewed, labs MET treatment parameters, ok to proceed with treatment.      Post Infusion Assessment:  Patient tolerated infusion without incident.  Blood return noted pre and post infusion.  Site patent and intact, free from redness, edema or discomfort.  No evidence of extravasations.  Access discontinued per protocol.       Discharge Plan:   Patient discharged in stable condition accompanied by: self.  Departure Mode: Ambulatory.  Pt will RTC 5/2/22 for C2D1 Irinotecan.      Vasile Cabello RN

## 2022-04-18 NOTE — PROGRESS NOTES
Social Work Intervention  [facility]    Data/Intervention:    Patient Name:  Junito Wang  /Age:  1960 (61 year old)    Visit Type: in person  Referral Source: N/A  Reason for Referral:  Psychosocial introduction    Collaborated With:    Junito    Psychosocial Information/Concerns:  Junito is here today for C1D1 Irinotecan, this is a new treatment for Junito    Intervention/Education/Resources Provided:  SW met with Junito in the infusion center this afternoon. SW introduced self and oriented Junito to  services. SW provided information and available resources. Junito states he is doing well. He reports having a strong support system and denies any practical resource needs at this time. SW provided her card and encouraged Junito to reach out at any time.    Assessment/Plan:  SW will remain available for any ongoing support or resource needs.     Provided patient/family with contact information and availability.    MARIBEL Wilson, [facility]  Clinical , Adult Oncology  Phone: [phone]

## 2022-04-27 NOTE — PROGRESS NOTES
Junito is a 61 year old who is being evaluated via a billable video visit.      How would you like to obtain your AVS? [employer]  If the video visit is dropped, the invitation should be resent by: Send to e-mail at: [email or web address]  Will anyone else be joining your video visit? No      Video Start Time: 12:17PM  Video-Visit Details    Type of service:  Video Visit    Video End Time:12:53 PM    Originating Location (pt. Location): Home    Distant Location (provider location):  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  12/23/21: gamma knife for 3 new brain mets  12/9/21 to 3/17/22- 4 cycels of Taxol and ramucirumab    Treatment intent- Palliative    Oncology HPI:   August 2020- stomach discomfort, belching   October 2020- progressive dysphagia with solids   1/26/21- distal esophageal biopsy shows Moderately differentiated adenocarcinoma; MMR normal expression, PDL1 expression is low with TPS 2%, CPS 5-10, Her2 is pending  1/27/21- CT CAP- Enlarged  2 cm subcarinal lymph node and 1.4 cm short axis right subcarinal lymph node, focal thickening of the superior wall along the right side of the mid esophagus. Numerous small pulmonary nodules, 3 mm nodule in  the superior segment of the left lower lobe, 3 mm nodule in the anterior aspect of the right upper lobe , 4 mm nodule in the medial aspect of the right upper lobe and 4 mm right lower lobe nodule. Hypoattenuating foci in the liver, 1.6 cm hypoattenuating/hypoenhancing lesion in hepatic segment 8 and 1.4 cm lesion in hepatic segment 5 , indeterminate, likely metastatic.  The prostate gland is mildly enlarged measuring 5.3 cm in transverse diameter. Multiple prominent but not significantly enlarged retroperitoneal lymph nodes, indeterminate, could be reactive. 4.4 x 4.3 cm lytic lesion centered in the posterior aspect of the left sacral ala (series 3 image 243), 4.7 x 4.0 x 5.6 cm (axial and CC dimensions, respectively) hypoattenuating lesion in the subcutaneous tissue of the anterior chest wall just anterior to the mediastinum, indeterminate, could represent sebaceous cyst.  2/4/21- Saw Dr. Bourgeois ordered PET/CT  3/2/21-3/10/21- Admission to [facility]- <24h of inability to ambulate with acute onset left leg weakness/urinary retention with perianal sensory deficits, MRI showed extensive epidural signal change suggestive of tumor vs. Hemorrhage.Pt underwent emergent surgery- L2-L5 laminectomy and decompression . PEG tube placement 3/5/2021.   3/17/21-3/23/21: Radiation to LS spine  3/24/21: gamma knife (2000cGy in 5 fractions)  3/29/21- port placement  3/31/21: Cycle 1 Cisplatin+keytruda+5fu   4/21/21: Cycle 2 Cisplatin+keytruda+5fu  5/11/21- Ct CAP- Overall stable disease- mixed response- OK in 2 liver lesions while, stable disease to borderline progression in 1 liver lesions  5/12/21- Cycle 3 Cisplatin+keytruda+5fu  6/2/21- Cycle 4 Cisplatin+keytruda+5fu  6/21/21- CT CAP- Stable mild circumferential wall thickening of the distal esophagus (series 3, image 99) with small amount of fluid in the upper and mid esophagus. No lymphadenopathy. Stable to slight increased size of hepatic metastases. For example a 3.1 x 2.9 cm lesion  in the left lobe (series 3, image 123) previously measured 2.9 x 2.6 cm, and a 2.7 x 2.9 cm peripheral right hepatic lobe metastasis previously measured 3.0 x 2.2 cm. A 1.0 cm lesion in the caudate lobe (series 3, image 138) is more prominent today/new. Stable small gastrohepatic ligament lymph nodes. No new or enlarging lymph nodes in the abdomen and pelvis. No ascites.Stable 5.1 x 4.6 cm left sacral mass. Stable cystic 4.9 x 3.5 cm subcutaneous lesion overlying the midsternum.  6/24/21- Cycle 5 Cisplatin+keytruda+5fu  7/14/21- Cycle 6 Cisplatin+keytruda+5fu  8/3/21- Ct CAP- Few small pulmonary nodules are stable. Stable mild circumferential wall thickening of the distal esophagus (series 3, image 97).  Increased size of hepatic metastases. For example a 3.5  x 3.5 cm left lobe metastasis (series 3, image 117) previously measured 2.9 x 3.1 cm, a 3.5 x 2.7 cm subcapsular right hepatic lobe metastasis (series 3, image 158) previously measured 2.7 x 2.9 cm and  a 1.7 x 1.5 cm lesion in the left lobe adjacent to the caudate (series 3, image 124) previously measured 1.0 cm.  Stable 5.2 x 4.9 cm lytic lesion in the left sacrum previously measuring 5.2 x 4.6 cm (3, image 238). Stable sclerotic lesion in the anterior aspect of the T4 vertebral body measuring 1.3 cm (series 3, image 39) and sclerosis of the posterior left third rib.. No new lesions. Stable 5.2 cm cystic subcutaneous lesion overlying the sternum  8/5, 8/26, 9/16- Pembrolizumab and 5-FU  9/10/21- Y90 delivery in the left hepatic lobe and segment 5 lesions.   9/13/21- Brain MRI: no progression or distal metatasis  9/28/21- Ct CAP- Multiple left and right hepatic nodular masses identified consistent with metastatic disease. Several appear to be new or are larger worrisome for progression. One lesion is slightly smaller along  the anterior aspect of hepatic segment 2. Stable destructive sacral bone lesion and sclerosis at T4 and left third rib. Stable distal  esophageal wall thickening. Stable but indeterminant soft tissue surrounding portions of the left gastric artery at the upper midabdomen.  11/26/21- CT CAP-  Interval progression of disease: Enlarging hepatic metastatic foci, Wall thickening involving the distal thoracic esophagus appears to involve a more proximal distal extent compared to prior,  Stable to mildly enlarged nodular thickening about the left gastric artery. New abnormal left periaortic retroperitoneal lymphadenopathy. Enlarging left lytic sacral mass.  12/9/21- C1 Taxol and ramucirumab  12/15/21- 3 new Brain Mets identified,   12/23/21- Gamma Knife for new brain mets  1/6/22- C2 Taxol and ramucirumab  1/31/22- Ct CAP-  Hepatic metastases and left para-aortic retroperitoneal lymphadenopathy have decreased consistent with response to interval chemotherapy. Bone lesions appear similar. No new disease identified.  3/21/22- Ct CAP- Liver only progression- Posttreatment changes in the left hepatic lobe. Increase in size of few hypodense liver lesions with new lesion in segment 6 concerning for increased metastatic disease. Increased size of subcarinal lymph node.  Relatively unchanged retroperitoneal lymphadenopathy. Bony metastasis are not significantly changed. Mild diffuse hyperenhancement of the colon and rectum with moderate cecal stool burden, findings may represent proctocolitis.Persistent diffuse circumferential thickening of the distal thoracic esophagus  3/23/22- MRI brain- Positive response to treatment compared with December 23, 2021 and December 15, 2021. Complete resolution of right occipital and interpedicular cistern lesions. Previous described left inferior colliculus lesion is also not seen, could be treatment effect or marked artifactual in the prior study. Note that there is persistent linear enhancement in the right cerebellar peduncle, likely vascular in nature. Medial to this, there is increased conspicuity of a punctate enhancement,  "favored as vascular as well but still remains indeterminate. Attention on future follow-up exams.       Interval history:   Junito has ongoing increased weakness of left leg. Is it perhaps a bit worse since his last visit with us. He denies new numbness or tingling. He is able to ambulate around his house without a walker but if he goes longer distances, he needs to use a walker. He feels steady with the walker but did have a fall earlier this month when hr was walking up on a curb. He continues to have \"zingers\" down his leg at night. He is taking 300 mg gabapentin at bedtime.     Has ongoing low back pain, better with standing. He also has some R knee pain since his fall and right-sided abdominal pain that he thinks is from the known liver mets--this comes and goes.     Denies diarrhea or nausea after infusion. He has ongoing constipation for which he takes dulcolax. Feels his appetite is okay, though weight is a bit lower last check.     He notes overall increased fatigue but nothing too limiting. Mood is okay, hoping to be more mobile soon with gardening season.       Denies any breathing issues. No fevers or chills.     ECOG performance status of 1    Comprehensive ROS was unremarkable except as detailed above.        Current Outpatient Medications   Medication Sig Dispense Refill     bacitracin 500 UNIT/GM external ointment Apply topically 2 times daily Until the sore are well healed (Patient not taking: Reported on 11/29/2021) 113.4 g 2     Calcium Carb-Cholecalciferol (CALCIUM-VITAMIN D) 600-400 MG-UNIT TABS Take 2 tablets by mouth daily 60 tablet 3     gabapentin (NEURONTIN) 250 MG/5ML solution Take 5 mLs (250 mg) by mouth At Bedtime (Patient not taking: Reported on 10/7/2021) 470 mL 1     LORazepam (ATIVAN) 0.5 MG tablet Take 1 tablet (0.5 mg) by mouth every 4 hours as needed (Anxiety, Nausea/Vomiting or Sleep) 30 tablet 2     multivitamin CF FORMULA (CHOICEFUL) chewable tablet Take 1 tablet by mouth daily  "      omeprazole (FIRST-OMEPRAZOLE) 2 MG/ML SUSP Take 10 mLs (20 mg) by mouth 2 times daily (Patient not taking: Reported on 9/29/2021) 300 mL 1     ondansetron (ZOFRAN) 4 MG tablet Take 1-2 tablets (4-8 mg) by mouth every 6 hours as needed for nausea (vomiting) (Patient not taking: Reported on 10/7/2021) 40 tablet 0     prochlorperazine (COMPAZINE) 10 MG tablet Take 1 tablet (10 mg) by mouth every 6 hours as needed (Nausea/Vomiting) 30 tablet 2      No Known Allergies    Exam:   There were no vitals taken for this visit.    Physical exam  Video physical exam  General: Patient appears well in no acute distress.   Skin: No visualized rash or lesions on visualized skin  Eyes: EOMI, no erythema, sclera icterus or discharge noted  Resp: Appears to be breathing comfortably without accessory muscle usage, speaking in full sentences, no cough  MSK: Appears to have normal range of motion based on visualized movements  Neurologic: No apparent tremors, facial movements symmetric  Psych: affect good, alert and oriented    The rest of a comprehensive physical examination is deferred due to PHE (public health emergency) video restrictions    Labs:   Most Recent 3 CBC's:  Recent Labs   Lab Test 04/18/22  1414 03/17/22  0729 03/10/22  0736   WBC 6.9 3.3* 4.2   HGB 13.5 12.7* 12.5*   MCV 88 90 90    267 264     Most Recent 3 BMP's:  Recent Labs   Lab Test 03/31/22  0737 02/10/22  0837 02/02/22  1113 01/06/22  1107 12/24/21  0856 11/18/21  0831 10/28/21  0846   NA  --   --   --   --  139 139 139   POTASSIUM  --   --   --   --  4.0 4.0 4.3   CHLORIDE  --   --   --   --  109 110* 108   CO2  --   --   --   --  24 26 27   BUN  --   --   --   --  16 16 14   CR 0.64* 0.71 0.78   < > 0.67 0.76 0.70   ANIONGAP  --   --   --   --  6 3 4   ARDEN 9.7 8.7 9.0   < > 8.7 9.1 9.1   GLC  --   --   --   --  107* 103* 94    < > = values in this interval not displayed.    Most Recent 2 LFT's:  Recent Labs   Lab Test 04/18/22  1414 03/03/22  0849    AST 46* 25   ALT 25 24   ALKPHOS 141 114   BILITOTAL 0.5 0.4    Most Recent TSH and T4:  Recent Labs   Lab Test 03/02/21  1426   TSH 1.40     I reviewed the above labs today.      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  CT scan after 2 cycles showed response in the liver and also in the left ymphadenopathy.  Unfortunately after 4 cycles, he has ongoing progression mostly in the liver.  The left sacral lesion is stable.  Due to progression, discontinued paclitaxel and ramucirumab and began irinotecan alone.  Clinical trials were discussed however today he tell me today he would decline phase 1 clinical trial options. Junito did tolerate irinotecan well, without acute diarrhea. He notes some ongoing fatigue and progressive weakness of his L leg.     Overall plan  Ok for infusion Monday pending labs  Message to rad onc about possible radiation therapy to L sacrum   I will see him prior to cycle 3  CT CAP and Dr. Steven prior to C4    #Brain mets   Follows with Dr. Velez. MRI 12/15/21 with three new mets s/p gamma knife 12/23/2021.  Most recent MRI showing treatment effect, per Dr. Velez note Next MRI is in 3 months in June 2022.     #Bone mets  Bone biopsy of left pelvic mass 2/26/21 confirmed metastasis. Finished radiation 3/23 to lumbosacral spine.   While he has not seen dentist in 4 yrs, discusseed small risk of osteonecrosis, 3-5% risk, he does not have any ongoing dental issues and has no tooth pain or caries.  -Zometa every 4 weeks  -Continue Calcium and Vitamin D.  -Cont gabapentin to 300 mg at bedtime for radicular pain--declined increase today.  --consider more XRT in the  future    #Dypshagia, malnutrition-improved  --Of note, consideration given to esophageal stent vs XRT for esophageal debulking to address dysphagia inpatient in Feb/March 2021; however given likely improvement with systemic Rx elected not to. PEG recently removed. Tolerating PO intake.   -weight down trending on last check, will need to monitor with more treatment     #Cauda Equina due to tumor compression s/p L2-L5 laminectomy and decompression   Followed by neurosurgery (now prn) with improved exam after surgery. Has some L radiculopathy and baseline L leg numbness that he uses robaxan prn and gabapentin at HS.   -Most recent CT scan showed mild increase in the size of left sacral lesion, now with slight increase of left leg weakness I will message rad onc to see if further radiation to the area is an option. We reviewed red flag symptoms for him to be cognizant of in the near future. Could also reach out to NS to see if surgery an option pending trend.      #Constipation  Again encouraged regular bowel regimen, continue dulcolax. Reviewed adding senna + miralax    #GERD, improving  Stable with omeprazole, can use prn    60 minutes spent on the date of the encounter doing chart review, review of test results, interpretation of tests, patient visit, documentation and discussion with other provider(s)     Jaky Pugh CNP on 4/29/2022

## 2022-04-29 NOTE — NURSING NOTE
Patient confirms medications and allergies are accurate via patients echeck in completion, and or denies any changes since last reviewed/verified.     Dwain Ladd, Virtual Facilitator

## 2022-05-02 NOTE — PROGRESS NOTES
Infusion Nursing Note:  Junito Wang presents today for C2D1 Irinotecan.    Patient seen by provider today: No   present during visit today: Not Applicable.    Note: Pt overall doing well. Has nagging left hip pain. Otherwise doing well. Atropine given x 1 due to patient having some cramping post his first infusion.      Intravenous Access:  Implanted Port.    Treatment Conditions:  Lab Results   Component Value Date    HGB 12.8 (L) 05/02/2022    WBC 4.6 05/02/2022    ANEU 5.1 07/14/2021    ANEUTAUTO 3.3 05/02/2022     05/02/2022      Lab Results   Component Value Date     12/24/2021    POTASSIUM 4.0 12/24/2021    MAG 2.2 11/18/2021    CR 0.62 (L) 05/02/2022    ARDEN 9.4 05/02/2022    BILITOTAL 0.4 05/02/2022    ALBUMIN 3.6 05/02/2022    ALT 27 05/02/2022    AST 29 05/02/2022     Results reviewed, labs MET treatment parameters, ok to proceed with treatment.      Post Infusion Assessment:  Patient tolerated infusion without incident.  Blood return noted pre and post infusion.  Site patent and intact, free from redness, edema or discomfort.  No evidence of extravasations.  Access discontinued per protocol.       Discharge Plan:   [facility]  Patient will return 5/16/22 for next appointment.   Patient discharged in stable condition accompanied by: self.  Departure Mode: Ambulatory.      Alecia Nagy RN

## 2022-05-02 NOTE — PROGRESS NOTES
Port accessed with no incidient. Site cleaned with chloraprep for 30 seconds. Site dry and accessed with appropriate port needle. Sterile dressing applied. Blood return noted and lab tubes drawn. Port flushed with saline and heparin. Patient tolerated port draw well.   Kelly Vazquez RN

## 2022-05-11 NOTE — PROGRESS NOTES
Junito is a 61 year old who is being evaluated via a billable video visit.      Pt has pain in left hip/butt area.    How would you like to obtain your AVS? MyChart  If the video visit is dropped, the invitation should be resent by: Send to e-mail at: [email or web address]  Will anyone else be joining your video visit? No       Bette Gimenez VF    Video Start Time: 12:10 pm  Video-Visit Details    Type of service:  Video Visit    Video End Time: 12:25    Originating Location (pt. 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  12/23/21: gamma knife for 3 new brain mets  12/9/21 to 3/17/22- 4 cycels of Taxol and ramucirumab    Treatment intent- Palliative    Oncology HPI:   August 2020- stomach discomfort, belching   October 2020- progressive dysphagia with solids   1/26/21- distal esophageal biopsy shows Moderately differentiated adenocarcinoma; MMR normal expression, PDL1 expression is low with TPS 2%, CPS 5-10, Her2 is pending  1/27/21- CT CAP- Enlarged  2 cm subcarinal lymph node and 1.4 cm short axis right subcarinal lymph node, focal thickening of the superior wall along the right side of the mid esophagus.  Numerous small pulmonary nodules, 3 mm nodule in the superior segment of the left lower lobe, 3 mm nodule in the anterior aspect of the right upper lobe , 4 mm nodule in the medial aspect of the right upper lobe and 4 mm right lower lobe nodule. Hypoattenuating foci in the liver, 1.6 cm hypoattenuating/hypoenhancing lesion in hepatic segment 8 and 1.4 cm lesion in hepatic segment 5 , indeterminate, likely metastatic.  The prostate gland is mildly enlarged measuring 5.3 cm in transverse diameter. Multiple prominent but not significantly enlarged retroperitoneal lymph nodes, indeterminate, could be reactive. 4.4 x 4.3 cm lytic lesion centered in the posterior aspect of the left sacral ala (series 3 image 243), 4.7 x 4.0 x 5.6 cm (axial and CC dimensions, respectively) hypoattenuating lesion in the subcutaneous tissue of the anterior chest wall just anterior to the mediastinum, indeterminate, could represent sebaceous cyst.  2/4/21- Saw Dr. Bourgeois ordered PET/CT  3/2/21-3/10/21- Admission to [facility]- <24h of inability to ambulate with acute onset left leg weakness/urinary retention with perianal sensory deficits, MRI showed extensive epidural signal change suggestive of tumor vs. Hemorrhage.Pt underwent emergent surgery- L2-L5 laminectomy and decompression . PEG tube placement 3/5/2021.   3/17/21-3/23/21: Radiation to LS spine  3/24/21: gamma knife (2000cGy in 5 fractions)  3/29/21- port placement  3/31/21: Cycle 1 Cisplatin+keytruda+5fu   4/21/21: Cycle 2 Cisplatin+keytruda+5fu  5/11/21- Ct CAP- Overall stable disease- mixed response- KY in 2 liver lesions while, stable disease to borderline progression in 1 liver lesions  5/12/21- Cycle 3 Cisplatin+keytruda+5fu  6/2/21- Cycle 4 Cisplatin+keytruda+5fu  6/21/21- CT CAP- Stable mild circumferential wall thickening of the distal esophagus (series 3, image 99) with small amount of fluid in the upper and mid esophagus. No lymphadenopathy. Stable to slight increased size of  hepatic metastases. For example a 3.1 x 2.9 cm lesion in the left lobe (series 3, image 123) previously measured 2.9 x 2.6 cm, and a 2.7 x 2.9 cm peripheral right hepatic lobe metastasis previously measured 3.0 x 2.2 cm. A 1.0 cm lesion in the caudate lobe (series 3, image 138) is more prominent today/new. Stable small gastrohepatic ligament lymph nodes. No new or enlarging lymph nodes in the abdomen and pelvis. No ascites.Stable 5.1 x 4.6 cm left sacral mass. Stable cystic 4.9 x 3.5 cm subcutaneous lesion overlying the midsternum.  6/24/21- Cycle 5 Cisplatin+keytruda+5fu  7/14/21- Cycle 6 Cisplatin+keytruda+5fu  8/3/21- Ct CAP- Few small pulmonary nodules are stable. Stable mild circumferential wall thickening of the distal esophagus (series 3, image 97).  Increased size of hepatic metastases. For example a 3.5  x 3.5 cm left lobe metastasis (series 3, image 117) previously measured 2.9 x 3.1 cm, a 3.5 x 2.7 cm subcapsular right hepatic lobe metastasis (series 3, image 158) previously measured 2.7 x 2.9 cm and  a 1.7 x 1.5 cm lesion in the left lobe adjacent to the caudate (series 3, image 124) previously measured 1.0 cm.  Stable 5.2 x 4.9 cm lytic lesion in the left sacrum previously measuring 5.2 x 4.6 cm (3, image 238). Stable sclerotic lesion in the anterior aspect of the T4 vertebral body measuring 1.3 cm (series 3, image 39) and sclerosis of the posterior left third rib.. No new lesions. Stable 5.2 cm cystic subcutaneous lesion overlying the sternum  8/5, 8/26, 9/16- Pembrolizumab and 5-FU  9/10/21- Y90 delivery in the left hepatic lobe and segment 5 lesions.   9/13/21- Brain MRI: no progression or distal metatasis  9/28/21- Ct CAP- Multiple left and right hepatic nodular masses identified consistent with metastatic disease. Several appear to be new or are larger worrisome for progression. One lesion is slightly smaller along  the anterior aspect of hepatic segment 2. Stable destructive sacral bone lesion and  sclerosis at T4 and left third rib. Stable distal esophageal wall thickening. Stable but indeterminant soft tissue surrounding portions of the left gastric artery at the upper midabdomen.  11/26/21- CT CAP-  Interval progression of disease: Enlarging hepatic metastatic foci, Wall thickening involving the distal thoracic esophagus appears to involve a more proximal distal extent compared to prior,  Stable to mildly enlarged nodular thickening about the left gastric artery. New abnormal left periaortic retroperitoneal lymphadenopathy. Enlarging left lytic sacral mass.  12/9/21- C1 Taxol and ramucirumab  12/15/21- 3 new Brain Mets identified,   12/23/21- Gamma Knife for new brain mets  1/6/22- C2 Taxol and ramucirumab  1/31/22- Ct CAP-  Hepatic metastases and left para-aortic retroperitoneal lymphadenopathy have decreased consistent with response to interval chemotherapy. Bone lesions appear similar. No new disease identified.  3/21/22- Ct CAP- Liver only progression- Posttreatment changes in the left hepatic lobe. Increase in size of few hypodense liver lesions with new lesion in segment 6 concerning for increased metastatic disease. Increased size of subcarinal lymph node.  Relatively unchanged retroperitoneal lymphadenopathy. Bony metastasis are not significantly changed. Mild diffuse hyperenhancement of the colon and rectum with moderate cecal stool burden, findings may represent proctocolitis.Persistent diffuse circumferential thickening of the distal thoracic esophagus  3/23/22- MRI brain- Positive response to treatment compared with December 23, 2021 and December 15, 2021. Complete resolution of right occipital and interpedicular cistern lesions. Previous described left inferior colliculus lesion is also not seen, could be treatment effect or marked artifactual in the prior study. Note that there is persistent linear enhancement in the right cerebellar peduncle, likely vascular in nature. Medial to this, there  is increased conspicuity of a punctate enhancement, favored as vascular as well but still remains indeterminate. Attention on future follow-up exams.       Interval history:   Junito has ongoing increased weakness of left leg, no worse since our last visit. He continues to ambulate with a walker if he is out but around the home he does not need one. His left hip and left buttock pain is the same, nagging but not needing intervention really. Getting out more with nicer weather, no more falls.      No diarrhea following infusion. Has ongoing constipation. Also notes ongoing burping, relieved with eating. No epigastric pain. Has right sided abdominal pain that has been there since y90.    Energy is OK. More motivated by the nice weather so starting to move more.       Denies any breathing issues. No fevers or chills.     ECOG performance status of 1    Comprehensive ROS was unremarkable except as detailed above.        Current Outpatient Medications   Medication Sig Dispense Refill     bacitracin 500 UNIT/GM external ointment Apply topically 2 times daily Until the sore are well healed (Patient not taking: Reported on 11/29/2021) 113.4 g 2     Calcium Carb-Cholecalciferol (CALCIUM-VITAMIN D) 600-400 MG-UNIT TABS Take 2 tablets by mouth daily 60 tablet 3     gabapentin (NEURONTIN) 250 MG/5ML solution Take 5 mLs (250 mg) by mouth At Bedtime (Patient not taking: Reported on 10/7/2021) 470 mL 1     LORazepam (ATIVAN) 0.5 MG tablet Take 1 tablet (0.5 mg) by mouth every 4 hours as needed (Anxiety, Nausea/Vomiting or Sleep) 30 tablet 2     multivitamin CF FORMULA (CHOICEFUL) chewable tablet Take 1 tablet by mouth daily       omeprazole (FIRST-OMEPRAZOLE) 2 MG/ML SUSP Take 10 mLs (20 mg) by mouth 2 times daily (Patient not taking: Reported on 9/29/2021) 300 mL 1     ondansetron (ZOFRAN) 4 MG tablet Take 1-2 tablets (4-8 mg) by mouth every 6 hours as needed for nausea (vomiting) (Patient not taking: Reported on 10/7/2021) 40  tablet 0     prochlorperazine (COMPAZINE) 10 MG tablet Take 1 tablet (10 mg) by mouth every 6 hours as needed (Nausea/Vomiting) 30 tablet 2      No Known Allergies    Exam:   There were no vitals taken for this visit.    Physical exam  Video physical exam  General: Patient appears well in no acute distress.   Skin: No visualized rash or lesions on visualized skin  Eyes: EOMI, no erythema, sclera icterus or discharge noted  Resp: Appears to be breathing comfortably without accessory muscle usage, speaking in full sentences, no cough  MSK: Appears to have normal range of motion based on visualized movements  Neurologic: No apparent tremors, facial movements symmetric  Psych: affect good, alert and oriented    The rest of a comprehensive physical examination is deferred due to PHE (public health emergency) video restrictions    Labs:   Most Recent 3 CBC's:  Recent Labs   Lab Test 05/02/22  1306 04/18/22  1414 03/17/22  0729   WBC 4.6 6.9 3.3*   HGB 12.8* 13.5 12.7*   MCV 92 88 90    245 267     Most Recent 3 BMP's:  Recent Labs   Lab Test 05/02/22  1306 03/31/22  0737 02/10/22  0837 01/06/22  1107 12/24/21  0856 11/18/21  0831 10/28/21  0846   NA  --   --   --   --  139 139 139   POTASSIUM  --   --   --   --  4.0 4.0 4.3   CHLORIDE  --   --   --   --  109 110* 108   CO2  --   --   --   --  24 26 27   BUN  --   --   --   --  16 16 14   CR 0.62* 0.64* 0.71   < > 0.67 0.76 0.70   ANIONGAP  --   --   --   --  6 3 4   ARDEN 9.4 9.7 8.7   < > 8.7 9.1 9.1   GLC  --   --   --   --  107* 103* 94    < > = values in this interval not displayed.    Most Recent 2 LFT's:  Recent Labs   Lab Test 05/02/22  1306 04/18/22  1414   AST 29 46*   ALT 27 25   ALKPHOS 144 141   BILITOTAL 0.4 0.5    Most Recent TSH and T4:  Recent Labs   Lab Test 03/02/21  1426   TSH 1.40     I reviewed the above labs today.      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  CT scan after 2 cycles showed response in the liver and also in the left ymphadenopathy.  Unfortunately after 4 cycles, he has ongoing progression mostly in the liver.  The left sacral lesion is stable.  Due to progression, discontinued paclitaxel and ramucirumab and began irinotecan alone.  Clinical trials were discussed however today he tell me today he would decline phase 1 clinical trial options. Junito is tolerating the irinotecan well, without acute diarrhea. He notes some ongoing fatigue and progressive weakness of his L leg.     Overall plan  Ok for infusion Monday pending labs  Sacral MRI next week, palliative RT pending  CT CAP and Dr. Steven prior to C4    #Brain mets   Follows with Dr. Velez. MRI 12/15/21 with three new mets s/p gamma knife 12/23/2021.  Most recent MRI showing treatment effect, per Dr. Velez note Next MRI is in 3 months in June 2022.     #Bone mets  Bone biopsy of left pelvic mass 2/26/21 confirmed metastasis. Finished radiation 3/23 to lumbosacral spine.   While he has not seen dentist in 4 yrs, discusseed small risk of osteonecrosis, 3-5% risk, he does not have any ongoing dental issues and has no tooth pain or caries.  -Zometa every 4 weeks  -Continue Calcium and Vitamin D.  -Cont gabapentin to 300 mg at bedtime for radicular pain--declined increase today.  --consider more XRT in the future    #Dypshagia, malnutrition-improved  --Of note, consideration given to esophageal stent vs XRT for esophageal debulking to address dysphagia inpatient in Feb/March 2021; however given likely improvement with systemic Rx elected not to. PEG removed. Tolerating PO intake.   -weight down trending, will need to monitor with more treatment     #Cauda Equina due to  tumor compression s/p L2-L5 laminectomy and decompression   Followed by neurosurgery (now prn) with improved exam after surgery. Has some L radiculopathy and baseline L leg numbness that he uses robaxan prn and gabapentin at HS.   -Most recent CT scan showed mild increase in the size of left sacral lesion, now with slight increase of left leg weakness, stable in the last couple weeks  -MRI next week to assess for palliative XRT      #Constipation  Again encouraged regular bowel regimen, continue dulcolax. Reviewed adding senna + miralax    #GERD, improving  Stable with omeprazole, could increase dose prn    40 minutes spent on the date of the encounter doing chart review, review of test results, interpretation of tests, patient visit, documentation and discussion with other provider(s)     Jaky Pugh CNP on 5/13/2022 at 12:33 PM

## 2022-05-12 NOTE — MR AVS SNAPSHOT
After Visit Summary   3/30/2017    Junito Wang    MRN: [id number]           Patient Information     Date Of Birth          1960        Visit Information        Provider Department      3/30/2017 10:40 AM Soy Han PA-C [facility]        Today's Diagnoses     ETD (eustachian tube dysfunction), left    -  1       Follow-ups after your visit        Additional Services     OTOLARYNGOLOGY REFERRAL       Your provider has referred you to: [facility]: [facility] [phone]   [email or web address]    Please be aware that coverage of these services is subject to the terms and limitations of your health insurance plan.  Call member services at your health plan with any benefit or coverage questions.      Please bring the following with you to your appointment:    (1) Any X-Rays, CTs or MRIs which have been performed.  Contact the facility where they were done to arrange for  prior to your scheduled appointment.   (2) List of current medications  (3) This referral request   (4) Any documents/labs given to you for this referral                  Who to contact     If you have questions or need follow up information about today's clinic visit or your schedule please contact [facility] directly at [phone].  Normal or non-critical lab and imaging results will be communicated to you by MyChart, letter or phone within 4 business days after the clinic has received the results. If you do not hear from us within 7 days, please contact the clinic through MyChart or phone. If you have a critical or abnormal lab result, we will notify you by phone as soon as possible.  Submit refill requests through [employer] or call your pharmacy and they will forward the refill request to us. Please allow 3 business days for your refill to be completed.          Additional Information About Your Visit        MyChart Information     [employer] [employer] gives you secure access to your electronic health record. If you see a primary care provider, you can also send messages to your care team and make appointments. If you have questions, please call your primary care clinic.  If you do not have a primary care provider, please call [phone] and they will assist you.        Care EveryWhere ID     This is your Care EveryWhere ID. This could be used by other organizations to access your [facility] medical records  [id number]        Your Vitals Were     Pulse Temperature Pulse Oximetry BMI (Body Mass Index)          120 97.8  F (36.6  C) (Oral) 96% 35.44 kg/m2         Blood Pressure from Last 3 Encounters:   03/30/17 133/88   03/01/17 136/74   02/03/17 142/86    Weight from Last 3 Encounters:   03/30/17 247 lb (112 kg)   02/03/17 249 lb (112.9 kg)   07/27/16 246 lb 6.4 oz (111.8 kg)              We Performed the Following     OTOLARYNGOLOGY REFERRAL          Today's Medication Changes          These changes are accurate as of: 3/30/17 10:43 AM.  If you have any questions, ask your nurse or doctor.               Start taking these medicines.        Dose/Directions    fluticasone 50 MCG/ACT spray   Commonly known as:  FLONASE   Used for:  ETD (eustachian tube dysfunction), left   Started by:  Soy Han PA-C        Dose:  1-2 spray   Spray 1-2 sprays into both nostrils daily   Quantity:  1 Bottle   Refills:  11            Where to get your medicines      These medications were sent to [id number] - [place] - [address]  [address] [place] [address] [address]    Hours:  test fax sent successfully 7/31/03  Phone:  [phone]     fluticasone 50 MCG/ACT spray                Primary Care Provider Office Phone # Fax #    Florian Wagner [phone]       [facility] [address] [address] [facility] [address]        Thank you!     Thank you for choosing [facility]  for your care.  Our goal is always to provide you with excellent care. Hearing back from our patients is one way we can continue to improve our services. Please take a few minutes to complete the written survey that you may receive in the mail after your visit with us. Thank you!             Your Updated Medication List - Protect others around you: Learn how to safely use, store and throw away your medicines at www.disposemymeds.org.          This list is accurate as of: 3/30/17 10:43 AM.  Always use your most recent med list.                   Brand Name Dispense Instructions for use    amLODIPine 10 MG tablet    NORVASC    90 tablet    Take 1 tablet (10 mg) by mouth daily       EXCEDRIN PO      PRN       fluticasone 50 MCG/ACT spray    FLONASE    1 Bottle    Spray 1-2 sprays into both nostrils daily       GAVISCON PO          hydrochlorothiazide 25 MG tablet    HYDRODIURIL    30 tablet    Take 1 tablet (25 mg) by mouth daily       lisinopril 10 MG tablet    PRINIVIL/ZESTRIL    60 tablet    Take 1 tablet (10 mg) by mouth 2 times daily       Multi-vitamin Tabs tablet   Generic drug:  multivitamin, therapeutic with minerals      1 TABLET DAILY

## 2022-05-16 NOTE — PROGRESS NOTES
"Patient's name and  were verified.  See Doc Flowsheet - IV assess for details.  IVAD accessed with 20G 3\" rizvi gripper plus needle  blood return positive: YES  Site without redness, tenderness or swelling: YES  Comments: Patient's port accessed using sterile procedure. Blood return noted. Lab tubes drawn, labeled and sent to lab. Patient tolerated lab draw well.     Infusion Nursing Note:  Junito Wang presents today for C3D1 Irinotecan.    Patient seen by provider today: No   present during visit today: Not Applicable.    Note: No new medical concerns reported by patient today. Atropine given prior to Irinotecan today per pt request.       Intravenous Access:  Implanted Port.    Treatment Conditions:  Lab Results   Component Value Date    HGB 12.2 (L) 2022    WBC 4.4 2022    ANEU 5.1 2021    ANEUTAUTO 3.0 2022     2022      Lab Results   Component Value Date     2021    POTASSIUM 4.0 2021    MAG 2.2 2021    CR 0.62 (L) 2022    ARDEN 9.4 2022    BILITOTAL 0.4 2022    ALBUMIN 3.7 2022    ALT 30 2022    AST 35 2022     Results reviewed, labs MET treatment parameters, ok to proceed with treatment.      Post Infusion Assessment:  Patient tolerated infusion without incident.  Blood return noted pre and post infusion.  Site patent and intact, free from redness, edema or discomfort.  No evidence of extravasations.  Access discontinued per protocol.       Discharge Plan:   AVS to patient via MYCHART.  Patient will return 2022 for next appointment.   Patient discharged in stable condition accompanied by: self.  Departure Mode: Ambulatory.      Silvana Christie RN                    "

## 2022-05-24 NOTE — PROGRESS NOTES
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  12/23/21: gamma knife for 3 new brain mets  12/9/21 to 3/17/22- 4 cycels of Taxol and ramucirumab    Treatment intent- Palliative    Oncology HPI:   August 2020- stomach discomfort, belching   October 2020- progressive dysphagia with solids   1/26/21- distal esophageal biopsy shows Moderately differentiated adenocarcinoma; MMR normal expression, PDL1 expression is low with TPS 2%, CPS 5-10, Her2 is pending  1/27/21- CT CAP- Enlarged  2 cm subcarinal lymph node and 1.4 cm short axis right subcarinal lymph node, focal thickening of the superior wall along the right side of the mid esophagus. Numerous small pulmonary nodules, 3 mm nodule in the superior segment of the left lower lobe, 3 mm nodule in the anterior aspect of the right upper lobe , 4 mm nodule in the medial aspect of the right upper lobe and 4 mm right lower lobe nodule. Hypoattenuating foci in the liver, 1.6 cm hypoattenuating/hypoenhancing lesion in hepatic segment 8 and 1.4 cm lesion in hepatic segment 5 , indeterminate, likely metastatic.  The prostate gland is mildly enlarged measuring 5.3 cm in transverse diameter. Multiple prominent but not significantly enlarged retroperitoneal lymph nodes, indeterminate, could be reactive. 4.4 x 4.3 cm lytic lesion  centered in the posterior aspect of the left sacral ala (series 3 image 243), 4.7 x 4.0 x 5.6 cm (axial and CC dimensions, respectively) hypoattenuating lesion in the subcutaneous tissue of the anterior chest wall just anterior to the mediastinum, indeterminate, could represent sebaceous cyst.  2/4/21- Saw Dr. Bourgeois ordered PET/CT  3/2/21-3/10/21- Admission to [facility]- <24h of inability to ambulate with acute onset left leg weakness/urinary retention with perianal sensory deficits, MRI showed extensive epidural signal change suggestive of tumor vs. Hemorrhage.Pt underwent emergent surgery- L2-L5 laminectomy and decompression . PEG tube placement 3/5/2021.   3/17/21-3/23/21: Radiation to LS spine  3/24/21: gamma knife (2000cGy in 5 fractions)  3/29/21- port placement  3/31/21: Cycle 1 Cisplatin+keytruda+5fu   4/21/21: Cycle 2 Cisplatin+keytruda+5fu  5/11/21- Ct CAP- Overall stable disease- mixed response- OK in 2 liver lesions while, stable disease to borderline progression in 1 liver lesions  5/12/21- Cycle 3 Cisplatin+keytruda+5fu  6/2/21- Cycle 4 Cisplatin+keytruda+5fu  6/21/21- CT CAP- Stable mild circumferential wall thickening of the distal esophagus (series 3, image 99) with small amount of fluid in the upper and mid esophagus. No lymphadenopathy. Stable to slight increased size of hepatic metastases. For example a 3.1 x 2.9 cm lesion in the left lobe (series 3, image 123) previously measured 2.9 x 2.6 cm, and a 2.7 x 2.9 cm peripheral right hepatic lobe metastasis previously measured 3.0 x 2.2 cm. A 1.0 cm lesion in the caudate lobe (series 3, image 138) is more prominent today/new. Stable small gastrohepatic ligament lymph nodes. No new or enlarging lymph nodes in the abdomen and pelvis. No ascites.Stable 5.1 x 4.6 cm left sacral mass. Stable cystic 4.9 x 3.5 cm subcutaneous lesion overlying the midsternum.  6/24/21- Cycle 5 Cisplatin+keytruda+5fu  7/14/21- Cycle 6 Cisplatin+keytruda+5fu  8/3/21- Ct CAP- Few  small pulmonary nodules are stable. Stable mild circumferential wall thickening of the distal esophagus (series 3, image 97).  Increased size of hepatic metastases. For example a 3.5  x 3.5 cm left lobe metastasis (series 3, image 117) previously measured 2.9 x 3.1 cm, a 3.5 x 2.7 cm subcapsular right hepatic lobe metastasis (series 3, image 158) previously measured 2.7 x 2.9 cm and  a 1.7 x 1.5 cm lesion in the left lobe adjacent to the caudate (series 3, image 124) previously measured 1.0 cm.  Stable 5.2 x 4.9 cm lytic lesion in the left sacrum previously measuring 5.2 x 4.6 cm (3, image 238). Stable sclerotic lesion in the anterior aspect of the T4 vertebral body measuring 1.3 cm (series 3, image 39) and sclerosis of the posterior left third rib.. No new lesions. Stable 5.2 cm cystic subcutaneous lesion overlying the sternum  8/5, 8/26, 9/16- Pembrolizumab and 5-FU  9/10/21- Y90 delivery in the left hepatic lobe and segment 5 lesions.   9/13/21- Brain MRI: no progression or distal metatasis  9/28/21- Ct CAP- Multiple left and right hepatic nodular masses identified consistent with metastatic disease. Several appear to be new or are larger worrisome for progression. One lesion is slightly smaller along  the anterior aspect of hepatic segment 2. Stable destructive sacral bone lesion and sclerosis at T4 and left third rib. Stable distal esophageal wall thickening. Stable but indeterminant soft tissue surrounding portions of the left gastric artery at the upper midabdomen.  11/26/21- CT CAP-  Interval progression of disease: Enlarging hepatic metastatic foci, Wall thickening involving the distal thoracic esophagus appears to involve a more proximal distal extent compared to prior,  Stable to mildly enlarged nodular thickening about the left gastric artery. New abnormal left periaortic retroperitoneal lymphadenopathy. Enlarging left lytic sacral mass.  12/9/21- C1 Taxol and ramucirumab  12/15/21- 3 new Brain Mets  identified,   12/23/21- Gamma Knife for new brain mets  1/6/22- C2 Taxol and ramucirumab  1/31/22- Ct CAP-  Hepatic metastases and left para-aortic retroperitoneal lymphadenopathy have decreased consistent with response to interval chemotherapy. Bone lesions appear similar. No new disease identified.  3/21/22- Ct CAP- Liver only progression- Posttreatment changes in the left hepatic lobe. Increase in size of few hypodense liver lesions with new lesion in segment 6 concerning for increased metastatic disease. Increased size of subcarinal lymph node.  Relatively unchanged retroperitoneal lymphadenopathy. Bony metastasis are not significantly changed. Mild diffuse hyperenhancement of the colon and rectum with moderate cecal stool burden, findings may represent proctocolitis.Persistent diffuse circumferential thickening of the distal thoracic esophagus  3/23/22- MRI brain- Positive response to treatment compared with December 23, 2021 and December 15, 2021. Complete resolution of right occipital and interpedicular cistern lesions. Previous described left inferior colliculus lesion is also not seen, could be treatment effect or marked artifactual in the prior study. Note that there is persistent linear enhancement in the right cerebellar peduncle, likely vascular in nature. Medial to this, there is increased conspicuity of a punctate enhancement, favored as vascular as well but still remains indeterminate. Attention on future follow-up exams.  4/18/21- Irinotecan initiated  5/24/22- CT CAP- Innumerable hypoenhancing liver metastases have increased in size and number compared to 3/21/2022. Retroperitoneal adenopathy has improved. Previously noted subcarinal lymph node is no longer present on today's exam. Bony metastases are not significantly changed. Mild circumferential thickening of the distal esophagus is unchanged. Small right adrenal nodule has increased in size, and metastatic disease is not excluded. Small amount  of ascites has increased.         Interval history:   Junito has ongoing increased weakness of left leg, no worse since our last visit. Getting out more with nicer weather, no more falls.      Continues to have intermittent weakness lt leg, overall stable over last month. He continues to ambulate with a walker if he is out but around the home he does not need one. His left hip and left buttock pain is the same, nagging but not needing intervention really. If he sits for long gets numb in left butt.   No headaches or vision problems. Over the last week had once lower abdominal pain for 2-3 days, may be related constipation, took dulcolax, not relived after 2-3 BM, resolved spontaneously. Has intermittent mild loose stools.  No nausea or vomiting. HAs gaseous distension. Eating and drinking well. Weight is slightly lower.  Energy is OK. More motivated by the nice weather so starting to move more.    Denies any breathing issues. No fevers or chills. No diarrhea following infusion.Has right sided mild intermittent abdominal pain that has been there since y90.    ECOG PS 1        Wt Readings from Last 4 Encounters:   05/25/22 68.7 kg (151 lb 8 oz)   05/16/22 69.2 kg (152 lb 8 oz)   05/02/22 71.1 kg (156 lb 12.8 oz)   04/18/22 72 kg (158 lb 11.2 oz)         ECOG performance status of 1    Comprehensive ROS was unremarkable except as detailed above.    Current Outpatient Medications   Medication     bisacodyl (DULCOLAX) 5 MG EC tablet     Calcium Carb-Cholecalciferol (CALCIUM-VITAMIN D) 600-400 MG-UNIT TABS     gabapentin (NEURONTIN) 100 MG capsule     multivitamin CF FORMULA (CHOICEFUL) chewable tablet     omeprazole (PRILOSEC) 20 MG DR capsule     ondansetron (ZOFRAN) 4 MG tablet     senna-docusate (SENOKOT-S/PERICOLACE) 8.6-50 MG tablet     No current facility-administered medications for this visit.     Facility-Administered Medications Ordered in Other Visits   Medication     sodium chloride (PF) 0.9% PF flush 74 mL         No Known Allergies    Exam:   /74 (BP Location: Right arm, Patient Position: Sitting, Cuff Size: Adult Regular)   Pulse 106   Temp 97.8  F (36.6  C) (Oral)   Resp 18   Wt 68.7 kg (151 lb 8 oz)   SpO2 98%   BMI 21.74 kg/m      Physical exam  General: Patient appears well in no acute distress.   Skin: No visualized rash or lesions on visualized skin  HEENT: MMM, no oral lesions, no pallor, icterus  Neck: supple,   Lymph: no cervical, sc, axillary LAD   CV: RRR, no murmurs  Resp: CTA  Abd: Soft, NTND, no HSM   Ext: no edema   Neuro: AAOx4. Cranial nerves grossly intact. Strength 5/5 throughout except left LE 4/5.Sensations grossly intact.     Labs:   Most Recent 3 CBC's:  Recent Labs   Lab Test 05/16/22  1115 05/02/22  1306 04/18/22  1414   WBC 4.4 4.6 6.9   HGB 12.2* 12.8* 13.5   MCV 90 92 88    208 245     Most Recent 3 BMP's:  Recent Labs   Lab Test 05/02/22  1306 03/31/22  0737 02/10/22  0837 01/06/22  1107 12/24/21  0856 11/18/21  0831 10/28/21  0846   NA  --   --   --   --  139 139 139   POTASSIUM  --   --   --   --  4.0 4.0 4.3   CHLORIDE  --   --   --   --  109 110* 108   CO2  --   --   --   --  24 26 27   BUN  --   --   --   --  16 16 14   CR 0.62* 0.64* 0.71   < > 0.67 0.76 0.70   ANIONGAP  --   --   --   --  6 3 4   ARDEN 9.4 9.7 8.7   < > 8.7 9.1 9.1   GLC  --   --   --   --  107* 103* 94    < > = values in this interval not displayed.    Most Recent 2 LFT's:  Recent Labs   Lab Test 05/16/22  1115 05/02/22  1306   AST 35 29   ALT 30 27   ALKPHOS 175* 144   BILITOTAL 0.4 0.4    Most Recent TSH and T4:  Recent Labs   Lab Test 03/02/21  1426   TSH 1.40     I reviewed the above labs today.      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 Patient has now progressed through 3 lines of therapy, recently on  irnotecan. Discussed pros and cons of further options include lonsurf and docetaxel as standard treatments vs clinical trial. He is not interested in Ph-1 study but may consider ph-2 study. We will pre-screen him for Enfortumab vedotin trial which is Ph-2.  If he is not eligible for clinical trial, we will cosnider docetaxel, although have to monitor his Gr 1 neuropathy. [facility]  Docetaxel- 75 mg/3 weeks for up to 6 cycles-  Overall survival- 5.2 months (95% CI 4.1-5.9) versus 3.6 months (3.3-4.4) in the active symptom control group (hazard ratio 0.67, 95% CI 0.49-0.92; p=0.01). Docetaxel was associated with higher incidence of grade 3-4 neutropenia (12 [15%] patients vs no patients), infection (15 [19%] patients vs two [3%] patients), and febrile neutropenia (six [7%] patients vs no patients). Patients receiving docetaxel reported less pain (p=0.0008) and less nausea and vomiting (p=0.02) and constipation (p=0.02). Global HRQoL was similar between the groups (p=0.53). Disease specific HRQoL measures also showed benefits for docetaxel in reducing dysphagia (p=0.02) and abdominal pain (p=0.01). st response to treatment was partial response in four (7%) patients, stable disease in 26 (46%) patients, and progressive disease in 24 (43%) patients. Response data were unavailable in two (4%) patients. Median time to progression was 12 2 weeks (95% CI 9 1-18 6) for patients in the docetaxel group. Progression-free survival at 6 weeks was 88% (79-93) and at 24 weeks was 29% (19-38).    At the end of the discussion patient was willing to consider the phase 2 trial of enfortumab vedotin.  I will contact my team to prescreen him.    Plan  Cancel irinotecan infusion on 5/31/22, ok to get Zometa on 5/31   MRI of L  spine (ordered by Jaky presviously)   RTC with me in 1 week      #Brain mets   Follows with Dr. Velez. MRI 12/15/21 with three new mets s/p gamma knife 12/23/2021.  Most recent MRI showing treatment effect, per Dr. Velez note Next MRI is in 3 months in June 2022.     #Bone mets  Bone biopsy of left pelvic mass 2/26/21 confirmed metastasis. Finished radiation 3/23 to lumbosacral spine.   While he has not seen dentist in 4 yrs, discusseed small risk of osteonecrosis, 3-5% risk, he does not have any ongoing dental issues and has no tooth pain or caries.  -Zometa every 4 weeks  -Continue Calcium and Vitamin D.  -Cont gabapentin to 300 mg at bedtime for radicular pain--declined increase today.  --consider more XRT in the future    #Dypshagia, malnutrition-improved  --Of note, consideration given to esophageal stent vs XRT for esophageal debulking to address dysphagia inpatient in Feb/March 2021; however given likely improvement with systemic Rx elected not to. PEG removed. Tolerating PO intake.   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 Has some L radiculopathy and baseline L leg numbness that he uses robaxan prn and gabapentin at HS.   -Most recent CT scan showed mild increase in the size of left sacral lesion, now with slight increase of left leg weakness, stable in the last couple weeks  He notes some ongoing fatigue and progressive weakness of his L leg. MRI Lumboscaral plexus showing: No definite progression of the large left sacral bone metastasis  that extends into the S1-S3 foramina encasing the nerves at the level of the sacrum versus the 3/21/2022 CT. The extraosseous soft tissue component of the mass is diminished versus the prior 3/2/2021 MRI. Round focus of abnormal marrow signal within the inferior right L4 endplate could  represent metastasis. This is new versus the prior MRI.  -Will get MRI of L spine to look at in detail and get in touch with Dr. Velez to assess for palliative XRT      #Constipation  Again encouraged regular bowel regimen, continue dulcolax. Reviewed adding senna + miralax    #GERD, improving  Stable with omeprazole, could increase dose prn    On the day of service  Chart review: 5 minutes  Visit duration: 30 minutes  Care coordination: 5 minutes    Jose Steven MD    Hematology, Oncology and Transplantation

## 2022-05-25 NOTE — NURSING NOTE
Chief Complaint   Patient presents with     Blood Draw     Labs drawn via  by RN in lab. VS taken.      Labs collected from venipuncture by RN. Vitals taken. Checked in for appointment(s).    Mary Kate Florez RN

## 2022-05-25 NOTE — NURSING NOTE
"Oncology Rooming Note    May 25, 2022 9:27 AM   Junito Wang is a 61 year old male who presents for:    Chief Complaint   Patient presents with     Blood Draw     Labs drawn via  by RN in lab. VS taken.      Oncology Clinic Visit      Esophageal Ca     Initial Vitals: /74 (BP Location: Right arm, Patient Position: Sitting, Cuff Size: Adult Regular)   Pulse 106   Temp 97.8  F (36.6  C) (Oral)   Resp 18   Wt 68.7 kg (151 lb 8 oz)   SpO2 98%   BMI 21.74 kg/m   Estimated body mass index is 21.74 kg/m  as calculated from the following:    Height as of 3/23/22: 1.778 m (5' 10\").    Weight as of this encounter: 68.7 kg (151 lb 8 oz). Body surface area is 1.84 meters squared.  Mild Pain (3) Comment: Data Unavailable   No LMP for male patient.  Allergies reviewed: Yes  Medications reviewed: Yes    Medications: Medication refills not needed today.  Pharmacy name entered into [employer]:    [id number] - [place], [place] - [address] [facility] [place], [place] - [phone]  [facility] - [place], [place] - [id number]    Clinical concerns: Pt here for results. Dr Steven was notified.      Shantel Mcintosh CMA              "

## 2022-05-25 NOTE — LETTER
5/25/2022         RE: Junito Wang  [address] [facility], [place] [address]        Dear Colleague,    Thank you for referring your patient, Junito Wang, to the [facility]. Please see a copy of my visit note below.        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  12/23/21: gamma knife for 3 new brain mets  12/9/21 to 3/17/22- 4 cycels of Taxol and ramucirumab    Treatment intent- Palliative    Oncology HPI:   August 2020- stomach discomfort, belching   October 2020- progressive dysphagia with solids   1/26/21- distal esophageal biopsy shows Moderately differentiated adenocarcinoma; MMR normal expression, PDL1 expression is low with TPS 2%, CPS 5-10, Her2 is pending  1/27/21- CT CAP- Enlarged  2 cm subcarinal lymph node and 1.4 cm short axis right subcarinal lymph node, focal thickening of the superior wall along the right side of the mid esophagus. Numerous small pulmonary nodules, 3 mm nodule in the superior segment of the left lower lobe, 3 mm nodule in the anterior aspect of the right upper lobe , 4 mm nodule in the medial aspect of the right upper lobe and 4 mm right lower lobe nodule. Hypoattenuating foci in the liver, 1.6 cm hypoattenuating/hypoenhancing lesion in hepatic segment 8 and 1.4 cm lesion in hepatic  segment 5 , indeterminate, likely metastatic.  The prostate gland is mildly enlarged measuring 5.3 cm in transverse diameter. Multiple prominent but not significantly enlarged retroperitoneal lymph nodes, indeterminate, could be reactive. 4.4 x 4.3 cm lytic lesion centered in the posterior aspect of the left sacral ala (series 3 image 243), 4.7 x 4.0 x 5.6 cm (axial and CC dimensions, respectively) hypoattenuating lesion in the subcutaneous tissue of the anterior chest wall just anterior to the mediastinum, indeterminate, could represent sebaceous cyst.  2/4/21- Saw Dr. Bourgeois ordered PET/CT  3/2/21-3/10/21- Admission to [facility]- <24h of inability to ambulate with acute onset left leg weakness/urinary retention with perianal sensory deficits, MRI showed extensive epidural signal change suggestive of tumor vs. Hemorrhage.Pt underwent emergent surgery- L2-L5 laminectomy and decompression . PEG tube placement 3/5/2021.   3/17/21-3/23/21: Radiation to LS spine  3/24/21: gamma knife (2000cGy in 5 fractions)  3/29/21- port placement  3/31/21: Cycle 1 Cisplatin+keytruda+5fu   4/21/21: Cycle 2 Cisplatin+keytruda+5fu  5/11/21- Ct CAP- Overall stable disease- mixed response- IA in 2 liver lesions while, stable disease to borderline progression in 1 liver lesions  5/12/21- Cycle 3 Cisplatin+keytruda+5fu  6/2/21- Cycle 4 Cisplatin+keytruda+5fu  6/21/21- CT CAP- Stable mild circumferential wall thickening of the distal esophagus (series 3, image 99) with small amount of fluid in the upper and mid esophagus. No lymphadenopathy. Stable to slight increased size of hepatic metastases. For example a 3.1 x 2.9 cm lesion in the left lobe (series 3, image 123) previously measured 2.9 x 2.6 cm, and a 2.7 x 2.9 cm peripheral right hepatic lobe metastasis previously measured 3.0 x 2.2 cm. A 1.0 cm lesion in the caudate lobe (series 3, image 138) is more prominent today/new. Stable small gastrohepatic ligament lymph nodes. No new or enlarging  lymph nodes in the abdomen and pelvis. No ascites.Stable 5.1 x 4.6 cm left sacral mass. Stable cystic 4.9 x 3.5 cm subcutaneous lesion overlying the midsternum.  6/24/21- Cycle 5 Cisplatin+keytruda+5fu  7/14/21- Cycle 6 Cisplatin+keytruda+5fu  8/3/21- Ct CAP- Few small pulmonary nodules are stable. Stable mild circumferential wall thickening of the distal esophagus (series 3, image 97).  Increased size of hepatic metastases. For example a 3.5  x 3.5 cm left lobe metastasis (series 3, image 117) previously measured 2.9 x 3.1 cm, a 3.5 x 2.7 cm subcapsular right hepatic lobe metastasis (series 3, image 158) previously measured 2.7 x 2.9 cm and  a 1.7 x 1.5 cm lesion in the left lobe adjacent to the caudate (series 3, image 124) previously measured 1.0 cm.  Stable 5.2 x 4.9 cm lytic lesion in the left sacrum previously measuring 5.2 x 4.6 cm (3, image 238). Stable sclerotic lesion in the anterior aspect of the T4 vertebral body measuring 1.3 cm (series 3, image 39) and sclerosis of the posterior left third rib.. No new lesions. Stable 5.2 cm cystic subcutaneous lesion overlying the sternum  8/5, 8/26, 9/16- Pembrolizumab and 5-FU  9/10/21- Y90 delivery in the left hepatic lobe and segment 5 lesions.   9/13/21- Brain MRI: no progression or distal metatasis  9/28/21- Ct CAP- Multiple left and right hepatic nodular masses identified consistent with metastatic disease. Several appear to be new or are larger worrisome for progression. One lesion is slightly smaller along  the anterior aspect of hepatic segment 2. Stable destructive sacral bone lesion and sclerosis at T4 and left third rib. Stable distal esophageal wall thickening. Stable but indeterminant soft tissue surrounding portions of the left gastric artery at the upper midabdomen.  11/26/21- CT CAP-  Interval progression of disease: Enlarging hepatic metastatic foci, Wall thickening involving the distal thoracic esophagus appears to involve a more proximal distal  extent compared to prior,  Stable to mildly enlarged nodular thickening about the left gastric artery. New abnormal left periaortic retroperitoneal lymphadenopathy. Enlarging left lytic sacral mass.  12/9/21- C1 Taxol and ramucirumab  12/15/21- 3 new Brain Mets identified,   12/23/21- Gamma Knife for new brain mets  1/6/22- C2 Taxol and ramucirumab  1/31/22- Ct CAP-  Hepatic metastases and left para-aortic retroperitoneal lymphadenopathy have decreased consistent with response to interval chemotherapy. Bone lesions appear similar. No new disease identified.  3/21/22- Ct CAP- Liver only progression- Posttreatment changes in the left hepatic lobe. Increase in size of few hypodense liver lesions with new lesion in segment 6 concerning for increased metastatic disease. Increased size of subcarinal lymph node.  Relatively unchanged retroperitoneal lymphadenopathy. Bony metastasis are not significantly changed. Mild diffuse hyperenhancement of the colon and rectum with moderate cecal stool burden, findings may represent proctocolitis.Persistent diffuse circumferential thickening of the distal thoracic esophagus  3/23/22- MRI brain- Positive response to treatment compared with December 23, 2021 and December 15, 2021. Complete resolution of right occipital and interpedicular cistern lesions. Previous described left inferior colliculus lesion is also not seen, could be treatment effect or marked artifactual in the prior study. Note that there is persistent linear enhancement in the right cerebellar peduncle, likely vascular in nature. Medial to this, there is increased conspicuity of a punctate enhancement, favored as vascular as well but still remains indeterminate. Attention on future follow-up exams.  4/18/21- Irinotecan initiated  5/24/22- CT CAP- Innumerable hypoenhancing liver metastases have increased in size and number compared to 3/21/2022. Retroperitoneal adenopathy has improved. Previously noted subcarinal lymph  node is no longer present on today's exam. Bony metastases are not significantly changed. Mild circumferential thickening of the distal esophagus is unchanged. Small right adrenal nodule has increased in size, and metastatic disease is not excluded. Small amount of ascites has increased.         Interval history:   Junito has ongoing increased weakness of left leg, no worse since our last visit. Getting out more with nicer weather, no more falls.      Continues to have intermittent weakness lt leg, overall stable over last month. He continues to ambulate with a walker if he is out but around the home he does not need one. His left hip and left buttock pain is the same, nagging but not needing intervention really. If he sits for long gets numb in left butt.   No headaches or vision problems. Over the last week had once lower abdominal pain for 2-3 days, may be related constipation, took dulcolax, not relived after 2-3 BM, resolved spontaneously. Has intermittent mild loose stools.  No nausea or vomiting. HAs gaseous distension. Eating and drinking well. Weight is slightly lower.  Energy is OK. More motivated by the nice weather so starting to move more.    Denies any breathing issues. No fevers or chills. No diarrhea following infusion.Has right sided mild intermittent abdominal pain that has been there since y90.    ECOG PS 1        Wt Readings from Last 4 Encounters:   05/25/22 68.7 kg (151 lb 8 oz)   05/16/22 69.2 kg (152 lb 8 oz)   05/02/22 71.1 kg (156 lb 12.8 oz)   04/18/22 72 kg (158 lb 11.2 oz)         ECOG performance status of 1    Comprehensive ROS was unremarkable except as detailed above.    Current Outpatient Medications   Medication     bisacodyl (DULCOLAX) 5 MG EC tablet     Calcium Carb-Cholecalciferol (CALCIUM-VITAMIN D) 600-400 MG-UNIT TABS     gabapentin (NEURONTIN) 100 MG capsule     multivitamin CF FORMULA (CHOICEFUL) chewable tablet     omeprazole (PRILOSEC) 20 MG DR capsule     ondansetron  (ZOFRAN) 4 MG tablet     senna-docusate (SENOKOT-S/PERICOLACE) 8.6-50 MG tablet     No current facility-administered medications for this visit.     Facility-Administered Medications Ordered in Other Visits   Medication     sodium chloride (PF) 0.9% PF flush 74 mL        No Known Allergies    Exam:   /74 (BP Location: Right arm, Patient Position: Sitting, Cuff Size: Adult Regular)   Pulse 106   Temp 97.8  F (36.6  C) (Oral)   Resp 18   Wt 68.7 kg (151 lb 8 oz)   SpO2 98%   BMI 21.74 kg/m      Physical exam  General: Patient appears well in no acute distress.   Skin: No visualized rash or lesions on visualized skin  HEENT: MMM, no oral lesions, no pallor, icterus  Neck: supple,   Lymph: no cervical, sc, axillary LAD   CV: RRR, no murmurs  Resp: CTA  Abd: Soft, NTND, no HSM   Ext: no edema   Neuro: AAOx4. Cranial nerves grossly intact. Strength 5/5 throughout except left LE 4/5.Sensations grossly intact.     Labs:   Most Recent 3 CBC's:  Recent Labs   Lab Test 05/16/22  1115 05/02/22  1306 04/18/22  1414   WBC 4.4 4.6 6.9   HGB 12.2* 12.8* 13.5   MCV 90 92 88    208 245     Most Recent 3 BMP's:  Recent Labs   Lab Test 05/02/22  1306 03/31/22  0737 02/10/22  0837 01/06/22  1107 12/24/21  0856 11/18/21  0831 10/28/21  0846   NA  --   --   --   --  139 139 139   POTASSIUM  --   --   --   --  4.0 4.0 4.3   CHLORIDE  --   --   --   --  109 110* 108   CO2  --   --   --   --  24 26 27   BUN  --   --   --   --  16 16 14   CR 0.62* 0.64* 0.71   < > 0.67 0.76 0.70   ANIONGAP  --   --   --   --  6 3 4   ARDEN 9.4 9.7 8.7   < > 8.7 9.1 9.1   GLC  --   --   --   --  107* 103* 94    < > = values in this interval not displayed.    Most Recent 2 LFT's:  Recent Labs   Lab Test 05/16/22  1115 05/02/22  1306   AST 35 29   ALT 30 27   ALKPHOS 175* 144   BILITOTAL 0.4 0.4    Most Recent TSH and T4:  Recent Labs   Lab Test 03/02/21  1426   TSH 1.40     I reviewed the above labs today.      Impression/plan:   Stage IV  adenocarcinoma of the GE junction (Siewet II),metastasis of liver, brain and spine  (AJCC 8th edition) diagnosed Jan 2021  -MMR proficient, HER2 by IHC is 2+, FISH neg  -PD-L1 CPS- 5-10%  -NGS by [facility]- No actionable mutations  # ECOG PS 1    Discussed reuslts of CTs can which show diffuse liver progression with some response in other LN, overall consistent with progression. Patient has now progressed through 3 lines of therapy, recently on  irnotecan. Discussed pros and cons of further options include lonsurf and docetaxel as standard treatments vs clinical trial. He is not interested in Ph-1 study but may consider ph-2 study. We will pre-screen him for Enfortumab vedotin trial which is Ph-2.  If he is not eligible for clinical trial, we will cosnider docetaxel, although have to monitor his Gr 1 neuropathy. Data for Docetaxel is from COUGAR-2 study-Ph 3  Docetaxel- 75 mg/3 weeks for up to 6 cycles-  Overall survival- 5.2 months (95% CI 4.1-5.9) versus 3.6 months (3.3-4.4) in the active symptom control group (hazard ratio 0.67, 95% CI 0.49-0.92; p=0.01). Docetaxel was associated with higher incidence of grade 3-4 neutropenia (12 [15%] patients vs no patients), infection (15 [19%] patients vs two [3%] patients), and febrile neutropenia (six [7%] patients vs no patients). Patients receiving docetaxel reported less pain (p=0.0008) and less nausea and vomiting (p=0.02) and constipation (p=0.02). Global HRQoL was similar between the groups (p=0.53). Disease specific HRQoL measures also showed benefits for docetaxel in reducing dysphagia (p=0.02) and abdominal pain (p=0.01). st response to treatment was partial response in four (7%) patients, stable disease in 26 (46%) patients, and progressive disease in 24 (43%) patients. Response data were unavailable in two (4%) patients. Median time to progression was 12 2 weeks (95% CI 9 1-18 6) for patients in the docetaxel group. Progression-free survival at 6 weeks was 88%  (79-93) and at 24 weeks was 29% (19-38).    At the end of the discussion patient was willing to consider the phase 2 trial of enfortumab vedotin.  I will contact my team to prescreen him.    Plan  Cancel irinotecan infusion on 5/31/22, ok to get Zometa on 5/31   MRI of L spine (ordered by Jaky zamarripa)   RTC with me in 1 week      #Brain mets   Follows with Dr. Velez. MRI 12/15/21 with three new mets s/p gamma knife 12/23/2021.  Most recent MRI showing treatment effect, per Dr. Velez note Next MRI is in 3 months in June 2022.     #Bone mets  Bone biopsy of left pelvic mass 2/26/21 confirmed metastasis. Finished radiation 3/23 to lumbosacral spine.   While he has not seen dentist in 4 yrs, discusseed small risk of osteonecrosis, 3-5% risk, he does not have any ongoing dental issues and has no tooth pain or caries.  -Zometa every 4 weeks  -Continue Calcium and Vitamin D.  -Cont gabapentin to 300 mg at bedtime for radicular pain--declined increase today.  --consider more XRT in the future    #Dypshagia, malnutrition-improved  --Of note, consideration given to esophageal stent vs XRT for esophageal debulking to address dysphagia inpatient in Feb/March 2021; however given likely improvement with systemic Rx elected not to. PEG removed. Tolerating PO intake.   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 Has some L radiculopathy and baseline L leg numbness that he uses robaxan prn and gabapentin at HS.   -Most recent CT scan showed mild increase in the size of left sacral lesion, now with slight increase of left leg weakness, stable in the last couple weeks  He notes some ongoing fatigue and progressive weakness of his L leg. MRI Lumboscaral plexus showing: No definite progression of the large left sacral bone metastasis  that extends  into the S1-S3 foramina encasing the nerves at the level of the sacrum versus the 3/21/2022 CT. The extraosseous soft tissue component of the mass is diminished versus the prior 3/2/2021 MRI. Round focus of abnormal marrow signal within the inferior right L4 endplate could represent metastasis. This is new versus the prior MRI.  -Will get MRI of L spine to look at in detail and get in touch with Dr. Velez to assess for palliative XRT      #Constipation  Again encouraged regular bowel regimen, continue dulcolax. Reviewed adding senna + miralax    #GERD, improving  Stable with omeprazole, could increase dose prn    On the day of service  Chart review: 5 minutes  Visit duration: 30 minutes  Care coordination: 5 minutes          Again, thank you for allowing me to participate in the care of your patient.      Sincerely,    Jose Steven MD

## 2022-05-31 NOTE — PROGRESS NOTES
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  12/23/21: gamma knife for 3 new brain mets  12/9/21 to 3/17/22- 4 cycels of Taxol and ramucirumab  4/18/22 to 5/16/22- 3 cycles of Ironotecan    Treatment intent- Palliative    Oncology HPI:   August 2020- stomach discomfort, belching   October 2020- progressive dysphagia with solids   1/26/21- distal esophageal biopsy shows Moderately differentiated adenocarcinoma; MMR normal expression, PDL1 expression is low with TPS 2%, CPS 5-10, Her2 is pending  1/27/21- CT CAP- Enlarged  2 cm subcarinal lymph node and 1.4 cm short axis right subcarinal lymph node, focal thickening of the superior wall along the right side of the mid esophagus. Numerous small pulmonary nodules, 3 mm nodule in the superior segment of the left lower lobe, 3 mm nodule in the anterior aspect of the right upper lobe , 4 mm nodule in the medial aspect of the right upper lobe and 4 mm right lower lobe nodule. Hypoattenuating foci in the liver, 1.6 cm hypoattenuating/hypoenhancing lesion in hepatic segment 8 and 1.4 cm lesion in hepatic segment 5 , indeterminate, likely metastatic.  The prostate gland is mildly enlarged measuring 5.3 cm in transverse diameter. Multiple prominent but not significantly enlarged retroperitoneal lymph nodes, indeterminate,  could be reactive. 4.4 x 4.3 cm lytic lesion centered in the posterior aspect of the left sacral ala (series 3 image 243), 4.7 x 4.0 x 5.6 cm (axial and CC dimensions, respectively) hypoattenuating lesion in the subcutaneous tissue of the anterior chest wall just anterior to the mediastinum, indeterminate, could represent sebaceous cyst.  2/4/21- Saw Dr. Bourgeois ordered PET/CT  3/2/21-3/10/21- Admission to [facility]- <24h of inability to ambulate with acute onset left leg weakness/urinary retention with perianal sensory deficits, MRI showed extensive epidural signal change suggestive of tumor vs. Hemorrhage.Pt underwent emergent surgery- L2-L5 laminectomy and decompression . PEG tube placement 3/5/2021.   3/17/21-3/23/21: Radiation to LS spine  3/24/21: gamma knife (2000cGy in 5 fractions)  3/29/21- port placement  3/31/21: Cycle 1 Cisplatin+keytruda+5fu   4/21/21: Cycle 2 Cisplatin+keytruda+5fu  5/11/21- Ct CAP- Overall stable disease- mixed response- NJ in 2 liver lesions while, stable disease to borderline progression in 1 liver lesions  5/12/21- Cycle 3 Cisplatin+keytruda+5fu  6/2/21- Cycle 4 Cisplatin+keytruda+5fu  6/21/21- CT CAP- Stable mild circumferential wall thickening of the distal esophagus (series 3, image 99) with small amount of fluid in the upper and mid esophagus. No lymphadenopathy. Stable to slight increased size of hepatic metastases. For example a 3.1 x 2.9 cm lesion in the left lobe (series 3, image 123) previously measured 2.9 x 2.6 cm, and a 2.7 x 2.9 cm peripheral right hepatic lobe metastasis previously measured 3.0 x 2.2 cm. A 1.0 cm lesion in the caudate lobe (series 3, image 138) is more prominent today/new. Stable small gastrohepatic ligament lymph nodes. No new or enlarging lymph nodes in the abdomen and pelvis. No ascites.Stable 5.1 x 4.6 cm left sacral mass. Stable cystic 4.9 x 3.5 cm subcutaneous lesion overlying the midsternum.  6/24/21- Cycle 5 Cisplatin+keytruda+5fu  7/14/21- Cycle 6  Cisplatin+keytruda+5fu  8/3/21- Ct CAP- Few small pulmonary nodules are stable. Stable mild circumferential wall thickening of the distal esophagus (series 3, image 97).  Increased size of hepatic metastases. For example a 3.5  x 3.5 cm left lobe metastasis (series 3, image 117) previously measured 2.9 x 3.1 cm, a 3.5 x 2.7 cm subcapsular right hepatic lobe metastasis (series 3, image 158) previously measured 2.7 x 2.9 cm and  a 1.7 x 1.5 cm lesion in the left lobe adjacent to the caudate (series 3, image 124) previously measured 1.0 cm.  Stable 5.2 x 4.9 cm lytic lesion in the left sacrum previously measuring 5.2 x 4.6 cm (3, image 238). Stable sclerotic lesion in the anterior aspect of the T4 vertebral body measuring 1.3 cm (series 3, image 39) and sclerosis of the posterior left third rib.. No new lesions. Stable 5.2 cm cystic subcutaneous lesion overlying the sternum  8/5, 8/26, 9/16- Pembrolizumab and 5-FU  9/10/21- Y90 delivery in the left hepatic lobe and segment 5 lesions.   9/13/21- Brain MRI: no progression or distal metatasis  9/28/21- Ct CAP- Multiple left and right hepatic nodular masses identified consistent with metastatic disease. Several appear to be new or are larger worrisome for progression. One lesion is slightly smaller along  the anterior aspect of hepatic segment 2. Stable destructive sacral bone lesion and sclerosis at T4 and left third rib. Stable distal esophageal wall thickening. Stable but indeterminant soft tissue surrounding portions of the left gastric artery at the upper midabdomen.  11/26/21- CT CAP-  Interval progression of disease: Enlarging hepatic metastatic foci, Wall thickening involving the distal thoracic esophagus appears to involve a more proximal distal extent compared to prior,  Stable to mildly enlarged nodular thickening about the left gastric artery. New abnormal left periaortic retroperitoneal lymphadenopathy. Enlarging left lytic sacral mass.  12/9/21- C1 Taxol and  ramucirumab  12/15/21- 3 new Brain Mets identified,   12/23/21- Gamma Knife for new brain mets  1/6/22- C2 Taxol and ramucirumab  1/31/22- Ct CAP-  Hepatic metastases and left para-aortic retroperitoneal lymphadenopathy have decreased consistent with response to interval chemotherapy. Bone lesions appear similar. No new disease identified.  3/21/22- Ct CAP- Liver only progression- Posttreatment changes in the left hepatic lobe. Increase in size of few hypodense liver lesions with new lesion in segment 6 concerning for increased metastatic disease. Increased size of subcarinal lymph node.  Relatively unchanged retroperitoneal lymphadenopathy. Bony metastasis are not significantly changed. Mild diffuse hyperenhancement of the colon and rectum with moderate cecal stool burden, findings may represent proctocolitis.Persistent diffuse circumferential thickening of the distal thoracic esophagus  3/23/22- MRI brain- Positive response to treatment compared with December 23, 2021 and December 15, 2021. Complete resolution of right occipital and interpedicular cistern lesions. Previous described left inferior colliculus lesion is also not seen, could be treatment effect or marked artifactual in the prior study. Note that there is persistent linear enhancement in the right cerebellar peduncle, likely vascular in nature. Medial to this, there is increased conspicuity of a punctate enhancement, favored as vascular as well but still remains indeterminate. Attention on future follow-up exams.  4/18/21- C1 Irinotecan initiated  5/2/22 and 5/16/22- Irinotecan  5/24/22- CT CAP- Innumerable hypoenhancing liver metastases have increased in size and number compared to 3/21/2022. Retroperitoneal adenopathy has improved. Previously noted subcarinal lymph node is no longer present on today's exam. Bony metastases are not significantly changed. Mild circumferential thickening of the distal esophagus is unchanged. Small right adrenal nodule  has increased in size, and metastatic disease is not excluded. Small amount of ascites has increased.         Interval history:   Junito has ongoing progressive numbness and weakness of his left leg. After a fall in April '22, he has been using a walker for most all activity except getting around the house. He does not tire with activity but instead uses the walker for increased steadiness. The numbness on his left leg extends up from his foot to his distal thigh whereas he only has right-sided numbness in his toes. He energy level has been generally ok and unchanged, and he is able to garden and do things he enjoys without tiring or requiring additional naps.     His appetite has been generally poor in the setting of feeling bloating from increased constipation. Dulcolax helps with his constipation and he took this yesterday with a BM yesterday evening and earlier this morning. Stools were hard and then loose later on. No nausea. Before that, his last BM had been 6 days prior. Today, when his bloating is lessened, he has only eaten oatmeal and a banana.    He has additional pain in his lower back near his coccyx mostly but sometimes more cranially at night. Additionally, he notes a new pain on his left midback that is worse when he leans back in his chair. He is not sure if he scraped it, as he cannot see that area but generally the pain is mild.    Denies any breathing issues. No fevers or chills.    ECOG PS 2    [place], MS4  [facility] [employer]      Wt Readings from Last 4 Encounters:   05/31/22 67.6 kg (149 lb)   05/25/22 68.7 kg (151 lb 8 oz)   05/16/22 69.2 kg (152 lb 8 oz)   05/02/22 71.1 kg (156 lb 12.8 oz)     Comprehensive ROS was unremarkable except as detailed above.    Current Outpatient Medications   Medication     bisacodyl (DULCOLAX) 5 MG EC tablet     Calcium Carb-Cholecalciferol (CALCIUM-VITAMIN D) 600-400 MG-UNIT TABS     gabapentin (NEURONTIN) 100 MG capsule     multivitamin  CF FORMULA (CHOICEFUL) chewable tablet     omeprazole (PRILOSEC) 20 MG DR capsule     ondansetron (ZOFRAN) 4 MG tablet     senna-docusate (SENOKOT-S/PERICOLACE) 8.6-50 MG tablet     No current facility-administered medications for this visit.     Facility-Administered Medications Ordered in Other Visits   Medication     heparin 100 UNIT/ML injection 5 mL     sodium chloride (PF) 0.9% PF flush 3-20 mL     sodium chloride (PF) 0.9% PF flush 74 mL        No Known Allergies    Exam:   /74 (BP Location: Right arm, Patient Position: Sitting, Cuff Size: Adult Regular)   Pulse 119   Temp 98.6  F (37  C) (Oral)   Resp 22   Wt 67.5 kg (148 lb 14.4 oz)   SpO2 99%   BMI 21.36 kg/m      Physical exam  General: Patient appears well in no acute distress.   Skin: No visualized rash or lesions on visualized skin  HEENT: MMM, no oral lesions, no pallor, icterus  Neck: supple,   Lymph: no cervical, sc, axillary LAD   CV: RRR, no murmurs  Resp: CTA  Abd: Soft, NTND, no HSM   Ext: no edema   Neuro: AAOx4. Cranial nerves grossly intact. Leg flexion, leg extension, foot plantar flexion, foot dorsiflexion 4/5 bilaterally. Thigh flexion 2/5 on left and 4/5 on right. Lack of sensation on dorsum of left foot, left leg, and left distal thigh but sensation intact on left medial thigh. Sensation intact throughout RLE. Negative babinski bilaterally. 2+ Achilles reflexes bilaterally, 2+ patellar reflex on right with 3+ patellar reflex on left.     Labs:   Most Recent 3 CBC's:  Recent Labs   Lab Test 05/25/22  0917 05/16/22  1115 05/02/22  1306   WBC 4.2 4.4 4.6   HGB 12.8* 12.2* 12.8*   MCV 92 90 92    228 208     Most Recent 3 BMP's:  Recent Labs   Lab Test 05/25/22  0917 05/02/22  1306 03/31/22  0737 01/06/22  1107 12/24/21  0856 11/18/21  0831     --   --   --  139 139   POTASSIUM 3.6  --   --   --  4.0 4.0   CHLORIDE 106  --   --   --  109 110*   CO2 26  --   --   --  24 26   BUN 13  --   --   --  16 16   CR 0.72 0.62*  0.64*   < > 0.67 0.76   ANIONGAP 9  --   --   --  6 3   ARDEN 9.4 9.4 9.7   < > 8.7 9.1   *  --   --   --  107* 103*    < > = values in this interval not displayed.    Most Recent 2 LFT's:  Recent Labs   Lab Test 05/25/22  0917 05/16/22  1115   AST 26 35   ALT 20 30   ALKPHOS 168* 175*   BILITOTAL 0.3 0.4    Most Recent TSH and T4:  Recent Labs   Lab Test 03/02/21  1426   TSH 1.40     I reviewed the above labs today.      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 Patient has now progressed through 3 lines of therapy, recently on  irnotecan. He was not eligible for Ph-2 enfortumab trial and he is not interested  in Ph-1 study. we will cosnider docetaxel, although have to monitor his Gr 1 neuropathy. Data for Docetaxel is from [place]-2 study-Ph 3.   Docetaxel- 75 mg/3 weeks for up to 6 cycles-  Overall survival- 5.2 months (95% CI 4.1-5.9) versus 3.6 months (3.3-4.4) in the active symptom control group (hazard ratio 0.67, 95% CI 0.49-0.92; p=0.01). Docetaxel was associated with higher incidence of grade 3-4 neutropenia (12 [15%] patients vs no patients), infection (15 [19%] patients vs two [3%] patients), and febrile neutropenia (six [7%] patients vs no patients). Patients receiving docetaxel reported less pain (p=0.0008) and less nausea and vomiting (p=0.02) and constipation (p=0.02). Global HRQoL was similar between the groups (p=0.53). Disease specific HRQoL measures also showed benefits for docetaxel in reducing dysphagia (p=0.02) and abdominal pain (p=0.01). st response to treatment was partial response in four (7%) patients, stable disease in 26  (46%) patients, and progressive disease in 24 (43%) patients. Response data were unavailable in two (4%) patients. Median time to progression was 12 2 weeks (95% CI 9 1-18 6) for patients in the docetaxel group. Progression-free survival at 6 weeks was 88% (79-93) and at 24 weeks was 29% (19-38).      Plan  Infusion appts for docetaxel in the next 1-2 weeks   Ok for C1 of decetaxel pending labs   RTC with Np/PA prior to cycle 2   Ct CAP prior to cycle 3   RTC with me after CT      #Brain mets   Follows with Dr. Velez. MRI 12/15/21 with three new mets s/p gamma knife 12/23/2021.  Most recent MRI showing treatment effect, per Dr. Velez note Next MRI is in June 2022.     #Bone mets  Bone biopsy of left pelvic mass 2/26/21 confirmed metastasis. Finished radiation 3/23 to lumbosacral spine.   While he has not seen dentist in 4 yrs, discusseed small risk of osteonecrosis, 3-5% risk, he does not have any ongoing dental issues and has no tooth pain or caries.  -Zometa every 4 weeks  -Continue Calcium and Vitamin D.  -Cont gabapentin to 300 mg at bedtime for radicular pain--declined increase today.  --consider more XRT in the future    #Dypshagia, malnutrition-improved  --Of note, consideration given to esophageal stent vs XRT for esophageal debulking to address dysphagia inpatient in Feb/March 2021; however given likely improvement with systemic Rx elected not to. PEG removed. Tolerating PO intake.   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 Has some L radiculopathy and baseline L leg numbness that he uses robaxan prn and gabapentin at HS.   -Most recent CT scan showed mild increase in the size of left sacral lesion, now with slight increase of left leg weakness, stable in the last couple weeks  He notes some ongoing fatigue and progressive  weakness of his L leg. MRI Lumboscaral plexus showing: No definite progression of the large left sacral bone metastasis  that extends into the S1-S3 foramina encasing the nerves at the level of the sacrum versus the 3/21/2022 CT. The extraosseous soft tissue component of the mass is diminished versus the prior 3/2/2021 MRI. Round focus of abnormal marrow signal within the inferior right L4 endplate could represent metastasis. This is new versus the prior MRI. MRI L spine showing: Persistent left sacral metastasis extending into the left S1 and S2 neural foramina. Multiple new lower thoracic and lumbar vertebral metastases.   Worsened leptomeningeal metastases.  - Wonder if he would benfit from more palliative XRT to the sacral mass, sent msg to Dr. Velez to assess for palliative XRT      #Constipation  Again encouraged regular bowel regimen, continue dulcolax. Reviewed adding senna + miralax    #GERD, improving  Stable with omeprazole, could increase dose prn    On the day of service  Chart review: 5 minutes  Visit duration: 30 minutes  Care coordination: 5 minutes    Jose Steven MD    Hematology, Oncology and Transplantation

## 2022-05-31 NOTE — PROGRESS NOTES
Infusion Nursing Note:  Junito Wang presents today for Zometa.    Patient seen by provider today: No   present during visit today: Not Applicable.    Note: Patient reports tolerating Zometa well in the past with no concerns today. Denies having any upcoming dental procedures. Also reminded patient the importance of increasing water intake today and tomorrow.      Intravenous Access:  Peripheral IV placed.    Treatment Conditions:  Lab Results   Component Value Date    HGB 12.8 (L) 05/25/2022    WBC 4.2 05/25/2022    ANEU 5.1 07/14/2021    ANEUTAUTO 3.1 05/25/2022     05/25/2022      Lab Results   Component Value Date     05/25/2022    POTASSIUM 3.6 05/25/2022    MAG 2.2 11/18/2021    CR 0.72 05/25/2022    ARDEN 9.4 05/25/2022    BILITOTAL 0.3 05/25/2022    ALBUMIN 3.6 05/25/2022    ALT 20 05/25/2022    AST 26 05/25/2022         Post Infusion Assessment:  Patient tolerated infusion without incident.  Blood return noted pre and post infusion.  Site patent and intact, free from redness, edema or discomfort.  No evidence of extravasations.  Access discontinued per protocol.       Discharge Plan:   AVS to patient via MYCHART.  Patient will return 6/13 for next appointment.   Patient discharged in stable condition accompanied by: self.  Departure Mode: Ambulatory.      Radha Duncan RN

## 2022-06-01 NOTE — NURSING NOTE
"Oncology Rooming Note    June 1, 2022 4:05 PM   Junito Wang is a 61 year old male who presents for:    Chief Complaint   Patient presents with     Oncology Clinic Visit     ADENOCARCINOMA OF ESOPHAGUS     Initial Vitals: /74 (BP Location: Right arm, Patient Position: Sitting, Cuff Size: Adult Regular)   Pulse 119   Temp 98.6  F (37  C) (Oral)   Resp 22   Wt 67.5 kg (148 lb 14.4 oz)   SpO2 99%   BMI 21.36 kg/m   Estimated body mass index is 21.36 kg/m  as calculated from the following:    Height as of 3/23/22: 1.778 m (5' 10\").    Weight as of this encounter: 67.5 kg (148 lb 14.4 oz). Body surface area is 1.83 meters squared.  Moderate Pain (4) Comment: Data Unavailable   No LMP for male patient.  Allergies reviewed: Yes  Medications reviewed: Yes    Medications: Medication refills not needed today.  Pharmacy name entered into [facility]:    [id number] - [place], [place] [address] [facility] [place], [place] - [address] [place] [address] [phone]  [facility] [place], [place] - [address] [address] [place] [address], [id number]    Clinical concerns: Spot on back.       Telma Louise CMA            "

## 2022-06-01 NOTE — LETTER
6/1/2022         RE: Junito Wang  [address] [facility] [address]        Dear Colleague,    Thank you for referring your patient, Junito Wang, to the [facility]. Please see a copy of my visit note below.        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  12/23/21: gamma knife for 3 new brain mets  12/9/21 to 3/17/22- 4 cycels of Taxol and ramucirumab  4/18/22 to 5/16/22- 3 cycles of Ironotecan    Treatment intent- Palliative    Oncology HPI:   August 2020- stomach discomfort, belching   October 2020- progressive dysphagia with solids   1/26/21- distal esophageal biopsy shows Moderately differentiated adenocarcinoma; MMR normal expression, PDL1 expression is low with TPS 2%, CPS 5-10, Her2 is pending  1/27/21- CT CAP- Enlarged  2 cm subcarinal lymph node and 1.4 cm short axis right subcarinal lymph node, focal thickening of the superior wall along the right side of the mid esophagus. Numerous small pulmonary nodules, 3 mm nodule in the superior segment of the left lower lobe, 3 mm nodule in the anterior aspect of the right upper lobe , 4 mm nodule in the medial aspect of the right upper lobe and 4 mm right lower lobe nodule. Hypoattenuating foci in the liver, 1.6 cm hypoattenuating/hypoenhancing lesion in hepatic  segment 8 and 1.4 cm lesion in hepatic segment 5 , indeterminate, likely metastatic.  The prostate gland is mildly enlarged measuring 5.3 cm in transverse diameter. Multiple prominent but not significantly enlarged retroperitoneal lymph nodes, indeterminate, could be reactive. 4.4 x 4.3 cm lytic lesion centered in the posterior aspect of the left sacral ala (series 3 image 243), 4.7 x 4.0 x 5.6 cm (axial and CC dimensions, respectively) hypoattenuating lesion in the subcutaneous tissue of the anterior chest wall just anterior to the mediastinum, indeterminate, could represent sebaceous cyst.  2/4/21- Saw Dr. Bourgeois ordered PET/CT  3/2/21-3/10/21- Admission to [facility]- <24h of inability to ambulate with acute onset left leg weakness/urinary retention with perianal sensory deficits, MRI showed extensive epidural signal change suggestive of tumor vs. Hemorrhage.Pt underwent emergent surgery- L2-L5 laminectomy and decompression . PEG tube placement 3/5/2021.   3/17/21-3/23/21: Radiation to LS spine  3/24/21: gamma knife (2000cGy in 5 fractions)  3/29/21- port placement  3/31/21: Cycle 1 Cisplatin+keytruda+5fu   4/21/21: Cycle 2 Cisplatin+keytruda+5fu  5/11/21- Ct CAP- Overall stable disease- mixed response- NC in 2 liver lesions while, stable disease to borderline progression in 1 liver lesions  5/12/21- Cycle 3 Cisplatin+keytruda+5fu  6/2/21- Cycle 4 Cisplatin+keytruda+5fu  6/21/21- CT CAP- Stable mild circumferential wall thickening of the distal esophagus (series 3, image 99) with small amount of fluid in the upper and mid esophagus. No lymphadenopathy. Stable to slight increased size of hepatic metastases. For example a 3.1 x 2.9 cm lesion in the left lobe (series 3, image 123) previously measured 2.9 x 2.6 cm, and a 2.7 x 2.9 cm peripheral right hepatic lobe metastasis previously measured 3.0 x 2.2 cm. A 1.0 cm lesion in the caudate lobe (series 3, image 138) is more prominent today/new. Stable small gastrohepatic  ligament lymph nodes. No new or enlarging lymph nodes in the abdomen and pelvis. No ascites.Stable 5.1 x 4.6 cm left sacral mass. Stable cystic 4.9 x 3.5 cm subcutaneous lesion overlying the midsternum.  6/24/21- Cycle 5 Cisplatin+keytruda+5fu  7/14/21- Cycle 6 Cisplatin+keytruda+5fu  8/3/21- Ct CAP- Few small pulmonary nodules are stable. Stable mild circumferential wall thickening of the distal esophagus (series 3, image 97).  Increased size of hepatic metastases. For example a 3.5  x 3.5 cm left lobe metastasis (series 3, image 117) previously measured 2.9 x 3.1 cm, a 3.5 x 2.7 cm subcapsular right hepatic lobe metastasis (series 3, image 158) previously measured 2.7 x 2.9 cm and  a 1.7 x 1.5 cm lesion in the left lobe adjacent to the caudate (series 3, image 124) previously measured 1.0 cm.  Stable 5.2 x 4.9 cm lytic lesion in the left sacrum previously measuring 5.2 x 4.6 cm (3, image 238). Stable sclerotic lesion in the anterior aspect of the T4 vertebral body measuring 1.3 cm (series 3, image 39) and sclerosis of the posterior left third rib.. No new lesions. Stable 5.2 cm cystic subcutaneous lesion overlying the sternum  8/5, 8/26, 9/16- Pembrolizumab and 5-FU  9/10/21- Y90 delivery in the left hepatic lobe and segment 5 lesions.   9/13/21- Brain MRI: no progression or distal metatasis  9/28/21- Ct CAP- Multiple left and right hepatic nodular masses identified consistent with metastatic disease. Several appear to be new or are larger worrisome for progression. One lesion is slightly smaller along  the anterior aspect of hepatic segment 2. Stable destructive sacral bone lesion and sclerosis at T4 and left third rib. Stable distal esophageal wall thickening. Stable but indeterminant soft tissue surrounding portions of the left gastric artery at the upper midabdomen.  11/26/21- CT CAP-  Interval progression of disease: Enlarging hepatic metastatic foci, Wall thickening involving the distal thoracic esophagus  appears to involve a more proximal distal extent compared to prior,  Stable to mildly enlarged nodular thickening about the left gastric artery. New abnormal left periaortic retroperitoneal lymphadenopathy. Enlarging left lytic sacral mass.  12/9/21- C1 Taxol and ramucirumab  12/15/21- 3 new Brain Mets identified,   12/23/21- Gamma Knife for new brain mets  1/6/22- C2 Taxol and ramucirumab  1/31/22- Ct CAP-  Hepatic metastases and left para-aortic retroperitoneal lymphadenopathy have decreased consistent with response to interval chemotherapy. Bone lesions appear similar. No new disease identified.  3/21/22- Ct CAP- Liver only progression- Posttreatment changes in the left hepatic lobe. Increase in size of few hypodense liver lesions with new lesion in segment 6 concerning for increased metastatic disease. Increased size of subcarinal lymph node.  Relatively unchanged retroperitoneal lymphadenopathy. Bony metastasis are not significantly changed. Mild diffuse hyperenhancement of the colon and rectum with moderate cecal stool burden, findings may represent proctocolitis.Persistent diffuse circumferential thickening of the distal thoracic esophagus  3/23/22- MRI brain- Positive response to treatment compared with December 23, 2021 and December 15, 2021. Complete resolution of right occipital and interpedicular cistern lesions. Previous described left inferior colliculus lesion is also not seen, could be treatment effect or marked artifactual in the prior study. Note that there is persistent linear enhancement in the right cerebellar peduncle, likely vascular in nature. Medial to this, there is increased conspicuity of a punctate enhancement, favored as vascular as well but still remains indeterminate. Attention on future follow-up exams.  4/18/21- C1 Irinotecan initiated  5/2/22 and 5/16/22- Irinotecan  5/24/22- CT CAP- Innumerable hypoenhancing liver metastases have increased in size and number compared to 3/21/2022.  Retroperitoneal adenopathy has improved. Previously noted subcarinal lymph node is no longer present on today's exam. Bony metastases are not significantly changed. Mild circumferential thickening of the distal esophagus is unchanged. Small right adrenal nodule has increased in size, and metastatic disease is not excluded. Small amount of ascites has increased.         Interval history:   Junito has ongoing progressive numbness and weakness of his left leg. After a fall in April '22, he has been using a walker for most all activity except getting around the house. He does not tire with activity but instead uses the walker for increased steadiness. The numbness on his left leg extends up from his foot to his distal thigh whereas he only has right-sided numbness in his toes. He energy level has been generally ok and unchanged, and he is able to garden and do things he enjoys without tiring or requiring additional naps.     His appetite has been generally poor in the setting of feeling bloating from increased constipation. Dulcolax helps with his constipation and he took this yesterday with a BM yesterday evening and earlier this morning. Stools were hard and then loose later on. No nausea. Before that, his last BM had been 6 days prior. Today, when his bloating is lessened, he has only eaten oatmeal and a banana.    He has additional pain in his lower back near his coccyx mostly but sometimes more cranially at night. Additionally, he notes a new pain on his left midback that is worse when he leans back in his chair. He is not sure if he scraped it, as he cannot see that area but generally the pain is mild.    Denies any breathing issues. No fevers or chills.    ECOG PS 2    [place], MS4  [facility] [employer]      Wt Readings from Last 4 Encounters:   05/31/22 67.6 kg (149 lb)   05/25/22 68.7 kg (151 lb 8 oz)   05/16/22 69.2 kg (152 lb 8 oz)   05/02/22 71.1 kg (156 lb 12.8 oz)     Comprehensive ROS  was unremarkable except as detailed above.    Current Outpatient Medications   Medication     bisacodyl (DULCOLAX) 5 MG EC tablet     Calcium Carb-Cholecalciferol (CALCIUM-VITAMIN D) 600-400 MG-UNIT TABS     gabapentin (NEURONTIN) 100 MG capsule     multivitamin CF FORMULA (CHOICEFUL) chewable tablet     omeprazole (PRILOSEC) 20 MG DR capsule     ondansetron (ZOFRAN) 4 MG tablet     senna-docusate (SENOKOT-S/PERICOLACE) 8.6-50 MG tablet     No current facility-administered medications for this visit.     Facility-Administered Medications Ordered in Other Visits   Medication     heparin 100 UNIT/ML injection 5 mL     sodium chloride (PF) 0.9% PF flush 3-20 mL     sodium chloride (PF) 0.9% PF flush 74 mL        No Known Allergies    Exam:   /74 (BP Location: Right arm, Patient Position: Sitting, Cuff Size: Adult Regular)   Pulse 119   Temp 98.6  F (37  C) (Oral)   Resp 22   Wt 67.5 kg (148 lb 14.4 oz)   SpO2 99%   BMI 21.36 kg/m      Physical exam  General: Patient appears well in no acute distress.   Skin: No visualized rash or lesions on visualized skin  HEENT: MMM, no oral lesions, no pallor, icterus  Neck: supple,   Lymph: no cervical, sc, axillary LAD   CV: RRR, no murmurs  Resp: CTA  Abd: Soft, NTND, no HSM   Ext: no edema   Neuro: AAOx4. Cranial nerves grossly intact. Leg flexion, leg extension, foot plantar flexion, foot dorsiflexion 4/5 bilaterally. Thigh flexion 2/5 on left and 4/5 on right. Lack of sensation on dorsum of left foot, left leg, and left distal thigh but sensation intact on left medial thigh. Sensation intact throughout RLE. Negative babinski bilaterally. 2+ Achilles reflexes bilaterally, 2+ patellar reflex on right with 3+ patellar reflex on left.     Labs:   Most Recent 3 CBC's:  Recent Labs   Lab Test 05/25/22  0917 05/16/22  1115 05/02/22  1306   WBC 4.2 4.4 4.6   HGB 12.8* 12.2* 12.8*   MCV 92 90 92    228 208     Most Recent 3 BMP's:  Recent Labs   Lab Test  05/25/22  0917 05/02/22  1306 03/31/22  0737 01/06/22  1107 12/24/21  0856 11/18/21  0831     --   --   --  139 139   POTASSIUM 3.6  --   --   --  4.0 4.0   CHLORIDE 106  --   --   --  109 110*   CO2 26  --   --   --  24 26   BUN 13  --   --   --  16 16   CR 0.72 0.62* 0.64*   < > 0.67 0.76   ANIONGAP 9  --   --   --  6 3   ARDEN 9.4 9.4 9.7   < > 8.7 9.1   *  --   --   --  107* 103*    < > = values in this interval not displayed.    Most Recent 2 LFT's:  Recent Labs   Lab Test 05/25/22  0917 05/16/22  1115   AST 26 35   ALT 20 30   ALKPHOS 168* 175*   BILITOTAL 0.3 0.4    Most Recent TSH and T4:  Recent Labs   Lab Test 03/02/21  1426   TSH 1.40     I reviewed the above labs today.      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 Patient has now progressed through 3 lines of therapy, recently on  irnotecan. He was not eligible for Ph-2 enfortumab trial and he is not interested  in Ph-1 study. we will cosnider docetaxel, although have to monitor his Gr 1 neuropathy. Data for Docetaxel is from COUGAR-2 study-Ph 3.   Docetaxel- 75 mg/3 weeks for up to 6 cycles-  Overall survival- 5.2 months (95% CI 4.1-5.9) versus 3.6 months (3.3-4.4) in the active symptom control group (hazard ratio 0.67, 95% CI 0.49-0.92; p=0.01). Docetaxel was associated with higher incidence of grade 3-4 neutropenia (12 [15%] patients vs no patients), infection (15 [19%] patients vs two [3%] patients), and febrile neutropenia (six [7%] patients vs no patients). Patients receiving docetaxel reported less pain (p=0.0008) and less nausea and vomiting (p=0.02) and constipation (p=0.02).  Global HRQoL was similar between the groups (p=0.53). Disease specific HRQoL measures also showed benefits for docetaxel in reducing dysphagia (p=0.02) and abdominal pain (p=0.01). st response to treatment was partial response in four (7%) patients, stable disease in 26 (46%) patients, and progressive disease in 24 (43%) patients. Response data were unavailable in two (4%) patients. Median time to progression was 12 2 weeks (95% CI 9 1-18 6) for patients in the docetaxel group. Progression-free survival at 6 weeks was 88% (79-93) and at 24 weeks was 29% (19-38).      Plan  Infusion appts for docetaxel in the next 1-2 weeks   Ok for C1 of decetaxel pending labs   RTC with Np/PA prior to cycle 2   Ct CAP prior to cycle 3   RTC with me after CT      #Brain mets   Follows with Dr. Velez. MRI 12/15/21 with three new mets s/p gamma knife 12/23/2021.  Most recent MRI showing treatment effect, per Dr. Velez note Next MRI is in June 2022.     #Bone mets  Bone biopsy of left pelvic mass 2/26/21 confirmed metastasis. Finished radiation 3/23 to lumbosacral spine.   While he has not seen dentist in 4 yrs, discusseed small risk of osteonecrosis, 3-5% risk, he does not have any ongoing dental issues and has no tooth pain or caries.  -Zometa every 4 weeks  -Continue Calcium and Vitamin D.  -Cont gabapentin to 300 mg at bedtime for radicular pain--declined increase today.  --consider more XRT in the future    #Dypshagia, malnutrition-improved  --Of note, consideration given to esophageal stent vs XRT for esophageal debulking to address dysphagia inpatient in Feb/March 2021; however given likely improvement with systemic Rx elected not to. PEG removed. Tolerating PO intake.   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 Has some L radiculopathy and  baseline L leg numbness that he uses robaxan prn and gabapentin at HS.   -Most recent CT scan showed mild increase in the size of left sacral lesion, now with slight increase of left leg weakness, stable in the last couple weeks  He notes some ongoing fatigue and progressive weakness of his L leg. MRI Lumboscaral plexus showing: No definite progression of the large left sacral bone metastasis  that extends into the S1-S3 foramina encasing the nerves at the level of the sacrum versus the 3/21/2022 CT. The extraosseous soft tissue component of the mass is diminished versus the prior 3/2/2021 MRI. Round focus of abnormal marrow signal within the inferior right L4 endplate could represent metastasis. This is new versus the prior MRI. MRI L spine showing: Persistent left sacral metastasis extending into the left S1 and S2 neural foramina. Multiple new lower thoracic and lumbar vertebral metastases.   Worsened leptomeningeal metastases.  - Wonder if he would benfit from more palliative XRT to the sacral mass, sent msg to Dr. Velez to assess for palliative XRT      #Constipation  Again encouraged regular bowel regimen, continue dulcolax. Reviewed adding senna + miralax    #GERD, improving  Stable with omeprazole, could increase dose prn    On the day of service  Chart review: 5 minutes  Visit duration: 30 minutes  Care coordination: 5 minutes    Jose Steven MD    Hematology, Oncology and Transplantation

## 2022-06-06 NOTE — PROGRESS NOTES
Called Pt for infusion education. Pt feels fairly well informed but would like information sheet. Sent via [employer] and given to infusion as well.

## 2022-06-07 NOTE — PROGRESS NOTES
Infusion Nursing Note:  Junito Wang presents today for Cycle 1 Day 1 Taxotere.    Patient seen by provider today: No   present during visit today: Not Applicable.    Note: Pt has ongoing chronic pain in left hip and sacrum with left sided weakness. Has prn tylenol and oxycodone at home for which he does not use. He has a radiation consult coming up at the end of the month. Requires no intervention in infusion. Written information given on Taxotere, side effects reviewed and all questions answered.       Intravenous Access:  Implanted Port.    Treatment Conditions:  Lab Results   Component Value Date    HGB 13.0 (L) 06/07/2022    WBC 7.8 06/07/2022    ANEU 5.1 07/14/2021    ANEUTAUTO 6.0 06/07/2022     06/07/2022      Lab Results   Component Value Date     05/25/2022    POTASSIUM 3.6 05/25/2022    MAG 2.2 11/18/2021    CR 0.72 05/25/2022    ARDEN 9.4 05/25/2022    BILITOTAL 0.5 06/07/2022    ALBUMIN 3.5 06/07/2022    ALT 36 06/07/2022    AST 75 (H) 06/07/2022     Results reviewed, labs MET treatment parameters, ok to proceed with treatment.      Post Infusion Assessment:  Patient tolerated infusion without incident.  Blood return noted pre and post infusion.  Access discontinued per protocol.       Discharge Plan:   Prescription refills given for decadron and compazine, counseling done, pt will  at [facility] on way home.  Discharge instructions reviewed with: Patient.  AVS to patient via [employer].  Patient will return 6/28 for next appointment for infusion in [place].   Patient discharged in stable condition accompanied by: self.  Departure Mode: walker.  Face to Face time: 0.      Amanda Hyman RN

## 2022-06-07 NOTE — NURSING NOTE
Chief Complaint   Patient presents with     Blood Draw     Port blood draw with heparin flush by lab RN. Vitals taken and appointment arrived     Lauren Harris RN

## 2022-06-11 NOTE — NURSING NOTE
"Chief Complaint   Patient presents with     Port Draw     port accessed and labs drawn by rn in lab. vital signs taken.     Port accessed by RN in lab with 20g 3/4\" gripper needle, labs drawn, port flushed with saline and heparin, vitals checked, pt checked in for next appointment.    Olga Herbert RN      " no

## 2022-06-17 NOTE — PROGRESS NOTES
MEDICAL RECORDS REQUEST   Radiation Oncology  [address] [place] [address]  PHONE: [phone]  Fax: [phone]        FUTURE VISIT INFORMATION                                                   Junito Wang : 1960 scheduled for future visit at [facility] Radiation Oncology    APPOINTMENT INFORMATION:    Date: 2022    Provider:Dr. Garcia     Reason for Visit/Diagnosis:sacral lesion    REFERRAL INFORMATION:    Referring provider: Ref: Maurizio    Specialty:     Referring providers clinic:      Clinic contact number:     RECORDS REQUESTED FOR VISIT                                                     Action    Action Taken 2022 9:43AM KEB   I called pt Junito - unavailable.     2022 2:03pm KEB   I tried calling pt Junito again- no answer.      SPINE    SPINAL CT AND REPORTS yes   CT Chest Abdomen Pelvis (scheduled), 2022 more in PACS   SPINAL MRI AND REPORTS yes     MRI Lumbar Spine 2022   SURGICAL REPORTS yes     3/2/2021  A. Posterior muscle mass:   - Benign skeletal muscle and mature adipose tissue     B. Epidural mass:   - Necrotic tissue consistent with metastatic adenocarcinoma    PATHOLOGY REPORTS yes   CHEMO &/OR MEDICAL ONCOLOGY NOTES yes   2022    Malignant neoplasm of lower third of esophagus (H)    More in EPIC   CURRENT MEDICATION LIST yes   PREVIOUS RADIATION  DATE REQUESTED DATE RECEIVED   WHAT HEALTHCARE FACILITY Radiation Oncology Treatment was done at [facility]    INITIAL HEALTH PROGRESS INTAKE     RADIATION ONCOLOGIST NOTES IN [facility]    RADIATION TREATMENT SUMMARY NOTES IN [facility]    RAD-TREATMENT PLAN     DVH = DOSE VOLUME HISTOGRAM     DICOM CD (TX PLANNING CT, TX PLAN, STRUCTURE SET)     *If no DICOM avail ask for DRRs          *Send all radiation Prior radiation records for both [facility] and [facility] Radiation Oncology patients to [facility] with  ATTN: SAHRA/Zohra Dosi/Physics     PRE-VISIT CHECKLIST      Record collection complete Yes  Patient was seeing Endocrinology.  They put him on this therapy.  His labs are normal on this therapy and I have refilled this.  I have discussed this with another provider and it is not typical but if labs are good they would also keep him on this therapy if labs are normal.  At this time, I have sent refills until October and he will follow-up for lab and med refill at that time.  Cici Simmons NP on 3/25/2022 at 10:13 AM       Appointment appropriately scheduled           (right time/right provider)

## 2022-06-17 NOTE — PROGRESS NOTES
Junito is a 61 year old who is being evaluated via a billable video visit.      How would you like to obtain your AVS? MyChart  If the video visit is dropped, the invitation should be resent by: Text to cell phone: [phone]  Will anyone else be joining your video visit? No        Assessment & Plan     61-year-old male with past medical history of malignant esophageal cancer who presents via virtual visit for lower urinary tract symptoms with urinary retention.  Previously urology who consider trial of alpha-blocker but did not start at the time.  We will trial him on this now.  Discussed that he should monitor for signs of hypotension with this.  If this does not improve symptoms I recommended he inform me and would send referral to urology to consider procedural treatments versus intermittent catheterization.    1. Lower urinary tract symptoms (LUTS)  - tamsulosin (FLOMAX) 0.4 MG capsule; Take 1 capsule (0.4 mg) by mouth daily  Dispense: 30 capsule; Refill: 1    Rad Hinson MD  [facility]   Junito is a 61 year old, presenting for the following health issues:  Urinary Problem    Chief Complaint   Patient presents with     Urinary Problem     HPI :  Patient tells me that he is having difficulty urinating. He feels a sensation to urinate and will have leaking before getting to the bathroom. Once actually getting to the bathroom will not urinate a lo and will feel he still needs to go. He has occasional incontinence as well. Worsening over about the last year after he had a hospitalization in 2021.  He did see urology after as below.  He also tells me he is currently on chemotherapy and has done radiation therapy for esophageal cancer that is malignant in his hip and liver.  Did have radiation to his hip at one time    Patient saw urology on 7/21 and had urodynamics showing:    -Large bladder capacity (741 mL) with delayed filling sensations.  -Good bladder compliance with  DO/DOI.  -No JOSE.  -Patient makes initial attempt to void from a seated position at fill volume of 566 mL, but unable to void any urine, and no appreciable detrusor contraction. He reported a reduced urge to void, and filling was therefore resumed. At fill volume of 741 mL, he was then assisted to a standing position for a second attempt to void, which yielded a brief rise in detrusor pressure to 45 cm of H2O pressure, during which time he passes some urine. However, this pressure quickly drops, and he attempts to void primarily through Valsalva effort. He was then allowed to finish voiding in private bathroom, and voided an additional 120 mL.   -Reduced flow rate (Qmean 4.2 mL/s) with intermittent flow curve and incomplete bladder emptying (final  mL).   -Increased EMG activity during voiding, which likely correlates with Valsalva effort  -BOOI is -33.4 which is negative for bladder outlet obstruction.  -Fluoroscopy reveals a smooth bladder wall without diverticulae or cellules.  No vesicoureteral reflux was observed.  The bladder neck was closed during filling and during voiding.    Plan at that time was consideration of alpha-blocker but concern may lower patient's blood pressure.  Patient did not want to consider intermittent catheterization at the time.        Objective           Vitals:  No vitals were obtained today due to virtual visit.    Physical Exam   GENERAL: Healthy, alert and no distress  EYES: Eyes grossly normal to inspection.  No discharge or erythema, or obvious scleral/conjunctival abnormalities.  RESP: No audible wheeze, cough, or visible cyanosis.  No visible retractions or increased work of breathing.    SKIN: Visible skin clear. No significant rash, abnormal pigmentation or lesions.  NEURO: Cranial nerves grossly intact.  Mentation and speech appropriate for age.  PSYCH: Mentation appears normal, affect normal/bright, judgement and insight intact, normal speech and appearance  well-groomed.          Video-Visit Details    Video Start Time: 2:09    Type of service:  Video Visit    Video End Time:2:21 PM    Originating Location (pt. Location): Home    Distant Location (provider location):  [facility]     Platform used for Video Visit: Dar Campos

## 2022-06-21 NOTE — PROGRESS NOTES
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, liver and bone metastases    RADIATION THERAPY DELIVERED:   3) 20 Gy GK SRS-- Rt occipital, midbrain and Rt occipital completed 21  2) 20 Gy GK SRS -- Lt choroid plexus 3/24/21   1) 20 Gy in 5 fractions -- lumbar spine completed 3/23/21    INTERVAL SINCE COMPLETION OF RADIATION THERAPY:   3 and 6 months from his GK SRS treatments    SUBJECTIVE:   Junito Wang is a 61 year old male well acquainted to our clinic and has received rounds of radiation treatment as outlined above for his known diagnosis of widely metastatic adenocarcinoma of the esophagus with disease involvements in the brain, liver and bone.     His disease was diffusely metastatic upon initial presentation, and was confirmed by biopsy of a left pelvic bone mass on 21. Shortly thereafter, he was found with severe compression of the thecal sac and spinal canal extending from T12 down to the sacrum due to disease involvement, and underwent emergent L2-L5 laminectomy and decompression of the epidural hematoma. He was referred to us and received a course of GK SRS to the solitary cranial metastasis as well as a course of palliative radiation therapy to the lumbar spine as outlined above. After completion of radiation treatment, he began systemic therapy under care of Dr. Steven. He underwent radioembolization of metastatic hepatic lesions (Y90) on 9/10/21 by Dr. Meléndez and Dr. Baptiste. He also underwent GK to 2 new brain lesions on 21. Repeat MRI brain on 3/23/22 demonstrated complete resolution of the previously treated lesions. He was noted to have slight increased conspicuity of a punctate enhancement in the right  cerebellar peduncle.    Overtimes, his cancer has progressed through lines of systemic therapy. He was more recently on Irinotecan but restaging CT CAP on 5/24/22 showed mixed response. In particular, there is a bulky, lytic left sacral disease that measured 4.9 cm in size. His regimen was switched to Docetaxel on 6/7/22.  He saw Dr. Garcia in clinic on 6/23/22 to discuss radiation targeting his lytic disease, and is currently on treatment.    Mr. Wang presents today in clinic for follow up after a repeat brain MRI earlier today. On interview, he endorses left lower back pain. He denies headache, change in vision/hearing/speech, nausea/vomiting, lightheadedness or dizziness. In the past few weeks, he began to notice dyspnea with mild exertion. He needs to stop for rest after walking for a block.      PHYSICAL EXAM:  VITALS: BP (!) 83/62   Pulse 115   Resp 16   SpO2 97%    GEN: Appears catchexia, alert, oriented, and in no acute distress, ambulating with a roller walker  HEENT: Extraocular muscles intact, pale conjunctiva  NECK: Full range of motion  CV: Good distal perfusion  RESP: Breathing comfortably on room air  ABDOMEN: Soft, non-tender, non-distended  SKIN: Normal color and turgor  NEURO: No focal deficits, CN III-XII grossly intact, normal and symmetric motor exam in bilateral upper and lower extremities, gait deferred  PSYCH: Appropriate mood and affect    LABS AND IMAGING:  Brain MRI 6/27/22, awaits final radiology interpretation; per our independent review, there is a new, small enhancing lesion in the left occipital region.         IMPRESSION:   Mr. 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 He is being seen in clinic 3 months out from his most recent course of GK SRS. He is found to have hypotension and tachycardia. Per chart review, he had a similar episode on 6/23/22 during his visit with [facility] radiation clinic. His  hypotension and tachycardia persisted today after re check following oral hydration.    On brain MRI he is found to have a new, small left occipital lesion, but we are waiting for final radiology interpretation at the time of his follow up. We discussed his options of repeat GK SRS right now vs. repeat brain MRI in 2 months, and Mr. Wang opted for the latter.    PLAN:   -We reached out to [facility] in regards to his need for bolus hydration tomorrow when he receives his chemotherapy.  -Await final radiology interpretation, tentatively plan to repeat brain MRI in 2 months and follow up afterwards.    The patient was seen and discussed with staff, Dr. Qureshi.    Ole Dumas MD  Resident, PGY-4  Department of Radiation Oncology  [facility]    I saw the patient with the resident.  I agree with the resident note and plan of care.      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   I discussed this with Dr Garcia and sent and in basket message to Dr Steven.  Whole brain irraditaion could be considered, but his performance status is poor and he is not having headaches or other CNS symptoms.    I will leave that bart Qureshi MD

## 2022-06-23 NOTE — NURSING NOTE
INITIAL PATIENT ASSESSMENT      Diagnosis:Esophageal Cancer with metastatic Disease    Prior radiation therapy:   Site Treated: lumbosacral spine  Facility: [facility]   Dates: 3/17/2021-3/23/21  Dose: 2000 cGy    Site Treated: Brain- Gamma Knife  Facility: [facility]  Dates: 12/23/2021  Dose: 2000 cGy    Site Treated: Brain- Gamma Knife  Facility: [facility]  Dates: 3/24/2021  Dose: 2000 cGy      Prior chemotherapy:   Protocol: Cisplatin + Keytruda + 5FU  Dates: 3/31/2021-7/14/2021    Protocol: Keytruda +5 FU  Dates: 8/5/2021-11/18/2021    Protocol:Taxol = cyramza  Dates: 12/9/2021-3/17/2022    Protocol: Irinotecan  Dates: 4/18/2022-5/16/2022      Prior hormonal therapy:No    Pain Eval:  Current history of pain associated with this visit:   Intensity: 5/10  Current: sharp  Location: lower back  Treatment: tylenol PRN      Psychosocial  Living arrangements: lives with Brother Miguelangel in [place]  Fall Risk: ambulates with assistive device  MIPS Falls Risk Screening Completed: Yes Result: Positive   referral needs: Not needed    Advanced Directive: No  Implantable Cardiac Device: No  Authorization To Share Protected Health Information: UNKNOWN    Reproductive note: Pt does not have any children      Review of Systems   Constitutional: Negative.    HENT: Negative.    Eyes: Negative.    Respiratory: Positive for shortness of breath. Negative for cough, hemoptysis, sputum production and wheezing.         Shortness of breath with activity   Cardiovascular: Negative.    Gastrointestinal: Positive for constipation and diarrhea. Negative for abdominal pain, blood in stool, heartburn, melena, nausea and vomiting.        Pt experiences ongoing diarrhea and constipation issues, currently not experiencing either diarrhea or constipation.    Genitourinary: Positive for hematuria and urgency. Negative for dysuria, flank pain and frequency.        Pt diagnosed at outside facility with UTI after having blood in his urine.  Urgency and then issues to start voiding.   Musculoskeletal: Positive for back pain. Negative for falls, joint pain, myalgias and neck pain.        5/10 pain in lower back   Skin: Negative.    Neurological: Negative.    Endo/Heme/Allergies: Negative.    Psychiatric/Behavioral: Negative.      Chela Bernal RN    Nurse face-to-face time: Level 5:  over 15 min face to face time

## 2022-06-23 NOTE — LETTER
2022         RE: Junito Wang  [address] [facility] [id number]        Dear Colleague,    Thank you for referring your patient, Junito Wang, to the [facility] RADIATION ONCOLOGY [place]. Please see a copy of my visit note below.       Department of Radiation Oncology  [facility]  [facility]  [address] [address] [place] [id number]  [phone]       Consultation Note    Name: Junito Wang MRN: [id number]   : 1960   Date of Service: 2022  Referring: Dr. Steven     Reason for consultation: sacral pain    History of Present Illness     Mr. 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 He has a lesion in the sacrum which is causing pain and has neurologic symptoms due to this. Of note, he has had prior RT to L/S spine in 3/2021 (20Gy/5fx) as well as prior GK-SRS to brain metastasis.    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  He has had prior radiation to the lumbar sacral spine completed in 2021 (20 Gy/5 fractions).  He is also had Y 92 liver lesions in 2021 as well as gamma knife to brain metastases in 2021.    With systemic therapy perspective he was initiated on cisplatin 5-FU/Keytruda for a period of 2021 to 2021 every 3 weeks.  He has been on additional various regimens including Taxol and irinotecan, most recently irinotecan completed 2022.  He has most recently switched back to Taxotere which will be given every 3 weeks with first dose on 2022 and next dose pending on 2022.    However, most recently he has developed worsening pain symptoms in the left sacral location, with associated numbness and some slight weakness in the left leg, prompting further evaluation.      CT chest abdomen pelvis was performed on 5/23/2022.  This demonstrated innumerable liver metastases which have increased in size and as well as bony metastasis,.  There was also progression of a lytic lesion in the left sacrum abutting the sacroiliac joint as well as a unchanged T4 vertebral body lesion.    MRI was obtained on 5/27/2022 demonstrating persistent left sacral metastasis extending in the S1 and S2 neural foramen on the left-hand side as well as multiple new thoracic and lumbar vertebral metastasis and worsening of leptomeningeal metastatic disease.    Today, he presents for consideration of palliative reirradiation to sacrum.  He states that overall he feels weak and has had pain in the left sacral area approximately 3-4 out of 10, associated with some numbness and some weakness on the left lower extremity.  He recently had a urinary tract infection and has been placed on antibiotics with a Andrews catheter in place.  He is also using a walker which is recent for him.  He denies any headaches, upper extremity weakness, or GI/ incontinence or retention.    Past Medical History:   Past Medical History:   Diagnosis Date     Arthritis      Hypertension      Malignant neoplasm of lower third of esophagus (H) 01/26/2021     Obesity (BMI 35.0-39.9) with comorbidity (H) 1/31/2019       Past Surgical History:   Past Surgical History:   Procedure Laterality Date     ABDOMEN SURGERY  1992    fatty mass removed     APPENDECTOMY  1987     BIOPSY Left 02/26/2021    Left pelvic mass bone biopsy     COLONOSCOPY WITH CO2 INSUFFLATION N/A 01/26/2021    Procedure: COLONOSCOPY, WITH CO2 INSUFFLATION;  Surgeon: Nain Dominguez MD;  Location:  OR     COMBINED ESOPHAGOSCOPY, GASTROSCOPY, DUODENOSCOPY (EGD) WITH CO2 INSUFFLATION N/A 01/26/2021    Procedure: ESOPHAGOGASTRODUODENOSCOPY, WITH CO2 INSUFFLATION;  Surgeon: Nain Dominguez MD;  Location:  OR     ENDOSCOPIC INSERTION TUBE GASTROSTOMY N/A  2021    Procedure: Percutaneous endoscopic gastrostomy tube placement;  Surgeon: Jose Curiel MD;  Location: [facility] OR     ESOPHAGOSCOPY, GASTROSCOPY, DUODENOSCOPY (EGD), COMBINED N/A 2021    Procedure: Esophagogastroduodenoscopy, With Biopsy;  Surgeon: Nain Dominguez MD;  Location: [facility] OR     GI SURGERY      Upper Endoscopy     INSERT PORT VASCULAR ACCESS Right 3/29/2021    Procedure: CHEST INSERTION, VASCULAR ACCESS PORT @0900;  Surgeon: Roderick Prakash MD;  Location: [facility] OR     IR CHEST PORT PLACEMENT > 5 YRS OF AGE  3/29/2021     IR SIRT (SELECTIVE INTERNAL RADIO THERAPY)  2021     IR VISCERAL ANGIOGRAM  2021     IR VISCERAL EMBOLIZATION  9/10/2021     LAMINECTOMY LUMBAR POSTERIOR MICROSCOPIC THREE+ LEVELS N/A 2021    Procedure: Lumbar 2 to Lumbar 5 Laminectomy;  Surgeon: Soy Gusman MD;  Location: [facility] OR     TONSILLECTOMY         Chemotherapy History:  Per HPI    Radiation History:  Yes, per HPI    Pregnant: No  Implanted Cardiac Devices: No    Medications:  Current Outpatient Medications   Medication     bisacodyl (DULCOLAX) 5 MG EC tablet     Calcium Carb-Cholecalciferol (CALCIUM-VITAMIN D) 600-400 MG-UNIT TABS     dexamethasone (DECADRON) 4 MG tablet     gabapentin (NEURONTIN) 100 MG capsule     multivitamin CF FORMULA (CHOICEFUL) chewable tablet     omeprazole (PRILOSEC) 20 MG DR capsule     No current facility-administered medications for this visit.     Facility-Administered Medications Ordered in Other Visits   Medication     sodium chloride (PF) 0.9% PF flush 74 mL         Allergies:   No Known Allergies    Social History:  Tobacco: Smoking currently 10 cigarettes/day   Alcohol: No alcohol  Employment: Retired    Family History:  Family History   Problem Relation Age of Onset     Hypertension Mother      Neurologic Disorder Mother         stroke     Hyperlipidemia Mother      Cerebrovascular Disease Mother          of stroke     Thyroid Disease  Mother      Heart Disease Father      Heart Failure Father      Coronary Artery Disease Father          of heart attack     Hypertension Brother      Coronary Artery Disease Brother         Quadruple Bi-pass/Quadruple Bi-pass     Hypertension Sister      Coronary Artery Disease Sister         Stent/Stent     Hypertension Brother      Hyperlipidemia Brother      Hypertension Sister      Hyperlipidemia Sister      Cancer No family hx of        Review of Systems   A 10-point review of systems was performed. Pertinent findings are noted in the HPI.    Physical Exam   ECOG Status: 2    Vitals:  /72   Pulse (!) 126   Temp 98.6  F (37  C)   Wt 62.5 kg (137 lb 12.8 oz)   SpO2 96%   BMI 19.77 kg/m      Gen: Alert, in NAD, using walker  Head: NC/AT  Eyes: PERRL, EOMI, sclera anicteric  Ears: No external auricular lesions  Nose/sinus: No rhinorrhea or epistaxis  Oral cavity/oropharynx: MMM, no visible oral cavity lesions, FOM and BOT are soft to palpation  Neck: Full ROM, supple, no palpable adenopathy  Pulm: No wheezing, stridor or respiratory distress  CV: Extremities are warm and well-perfused, no cyanosis, no pedal edema  Abdominal: Normal bowel sounds, soft, nontender, no masses  Musculoskeletal: Normal bulk and tone, tender to palpation over left sacrum  Skin: Normal color and turgor  Neuro: A/Ox3, CN II-XII intact, using walker, mild left lower extremity weakness 4/5 , remainder of extremities are 5/5, mild numbness in the left leg and left buttock region    Imaging/Path/Labs   Imaging: Per HPI, reviewed and in agreement    Path: Per HPI, reviewed and in agreement    Labs: Per HPI, reviewed and in agreement    Assessment      Mr. Wang is a 61YM with metastatic esophageal cancer to bone. He has a lesion in the sacrum which is causing pain and has neurologic symptoms due to this. Of note, he ha had prior RT to L/S spine in 3/2021 (20Gy/5fx).     We discussed options of continued medical management versus  palliative radiotherapy.  Given that patient is having pain as well as some weakness and some numbness in the left sacral location, we discussed options of palliative radiotherapy.  He is already had prior radiation to this location in March 2021.  Thus we discussed the possible risks and benefits of palliative reirradiation in this setting.    Plan     1. After extensive discussion, patient wishes to pursue palliative reirradiation to the left sacral metastasis.    2. CT simulation today, plan for 20Gy/5fx. Discussed possibility of start on Friday, 6/24/2022 but patient is recovering from a UTI and is on antibiotics and wishes to pursue palliative radiation next week.       All benefits and risks discussed, and patient is in agreement with the oncologic plan discussed above.     Thank you for allowing me to participate in your patient's care.  If you should require any additional information, please do not hesitate in contacting me.     Jose Garcia MD  Department of Radiation Oncology  [facility]         Again, thank you for allowing me to participate in the care of your patient.        Sincerely,        Jose Garcia MD

## 2022-06-23 NOTE — PATIENT INSTRUCTIONS
Please contact [facility] RN with questions or concerns following today's appointment: [phone].    Thank you!

## 2022-06-23 NOTE — NURSING NOTE
Pt had a low SBP of 88/63, . Dr. Garcia recommended that he drink water and recheck a SBP. After 5 20 oz water bottles /72,  with the current SBP Dr. Garcia felt comfortable sending the patient home.

## 2022-06-24 NOTE — PROGRESS NOTES
Department of Radiation Oncology  [facility]  [facility]  [address] [address] [place] [id number]  [phone]       Consultation Note    Name: Junito Wang MRN: [id number]   : 1960   Date of Service: 2022  Referring: Dr. Steven     Reason for consultation: sacral pain    History of Present Illness     Mr. 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 He has a lesion in the sacrum which is causing pain and has neurologic symptoms due to this. Of note, he has had prior RT to L/S spine in 3/2021 (20Gy/5fx) as well as prior GK-SRS to brain metastasis.    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  He has had prior radiation to the lumbar sacral spine completed in 2021 (20 Gy/5 fractions).  He is also had Y 92 liver lesions in 2021 as well as gamma knife to brain metastases in 2021.    With systemic therapy perspective he was initiated on cisplatin 5-FU/Keytruda for a period of 2021 to 2021 every 3 weeks.  He has been on additional various regimens including Taxol and irinotecan, most recently irinotecan completed 2022.  He has most recently switched back to Taxotere which will be given every 3 weeks with first dose on 2022 and next dose pending on 2022.    However, most recently he has developed worsening pain symptoms in the left sacral location, with associated numbness and some slight weakness in the left leg, prompting further evaluation.     CT chest abdomen pelvis was performed on 2022.  This demonstrated innumerable liver metastases which have increased in size and as well as bony metastasis,.  There was also progression of a lytic lesion in the left sacrum abutting the sacroiliac joint as well as  a unchanged T4 vertebral body lesion.    MRI was obtained on 5/27/2022 demonstrating persistent left sacral metastasis extending in the S1 and S2 neural foramen on the left-hand side measuring 4.9 cm in size. There were also multiple new thoracic and lumbar vertebral metastasis and worsening of leptomeningeal metastatic disease, without evidence of cord compression.    Today, he presents for consideration of palliative reirradiation to sacrum.  He states that overall he feels weak and has had pain in the left sacral area approximately 3-4 out of 10, associated with some numbness and some weakness on the left lower extremity.  He recently had a urinary tract infection and has been placed on antibiotics with a Andrews catheter in place.  He is also using a walker which is recent for him.  He denies any headaches, upper extremity weakness, or GI/ incontinence or retention.    Past Medical History:   Past Medical History:   Diagnosis Date     Arthritis      Hypertension      Malignant neoplasm of lower third of esophagus (H) 01/26/2021     Obesity (BMI 35.0-39.9) with comorbidity (H) 1/31/2019       Past Surgical History:   Past Surgical History:   Procedure Laterality Date     ABDOMEN SURGERY  1992    fatty mass removed     APPENDECTOMY  1987     BIOPSY Left 02/26/2021    Left pelvic mass bone biopsy     COLONOSCOPY WITH CO2 INSUFFLATION N/A 01/26/2021    Procedure: COLONOSCOPY, WITH CO2 INSUFFLATION;  Surgeon: Nain Dominguez MD;  Location: [facility] OR     COMBINED ESOPHAGOSCOPY, GASTROSCOPY, DUODENOSCOPY (EGD) WITH CO2 INSUFFLATION N/A 01/26/2021    Procedure: ESOPHAGOGASTRODUODENOSCOPY, WITH CO2 INSUFFLATION;  Surgeon: Nain Dominguez MD;  Location: [facility] OR     ENDOSCOPIC INSERTION TUBE GASTROSTOMY N/A 03/05/2021    Procedure: Percutaneous endoscopic gastrostomy tube placement;  Surgeon: Jose Curiel MD;  Location: [facility] OR     ESOPHAGOSCOPY, GASTROSCOPY, DUODENOSCOPY (EGD), COMBINED N/A 01/26/2021     Procedure: Esophagogastroduodenoscopy, With Biopsy;  Surgeon: Nain Dominguez MD;  Location: [facility] OR     GI SURGERY      Upper Endoscopy     INSERT PORT VASCULAR ACCESS Right 3/29/2021    Procedure: CHEST INSERTION, VASCULAR ACCESS PORT @0900;  Surgeon: Roderick Prakash MD;  Location: [facility] OR     IR CHEST PORT PLACEMENT > 5 YRS OF AGE  3/29/2021     IR SIRT (SELECTIVE INTERNAL RADIO THERAPY)  2021     IR VISCERAL ANGIOGRAM  2021     IR VISCERAL EMBOLIZATION  9/10/2021     LAMINECTOMY LUMBAR POSTERIOR MICROSCOPIC THREE+ LEVELS N/A 2021    Procedure: Lumbar 2 to Lumbar 5 Laminectomy;  Surgeon: Soy Gusman MD;  Location: [facility] OR     TONSILLECTOMY         Chemotherapy History:  Per HPI    Radiation History:  Yes, per HPI    Pregnant: No  Implanted Cardiac Devices: No    Medications:  Current Outpatient Medications   Medication     bisacodyl (DULCOLAX) 5 MG EC tablet     Calcium Carb-Cholecalciferol (CALCIUM-VITAMIN D) 600-400 MG-UNIT TABS     dexamethasone (DECADRON) 4 MG tablet     gabapentin (NEURONTIN) 100 MG capsule     multivitamin CF FORMULA (CHOICEFUL) chewable tablet     omeprazole (PRILOSEC) 20 MG DR capsule     No current facility-administered medications for this visit.     Facility-Administered Medications Ordered in Other Visits   Medication     sodium chloride (PF) 0.9% PF flush 74 mL         Allergies:   No Known Allergies    Social History:  Tobacco: Smoking currently 10 cigarettes/day   Alcohol: No alcohol  Employment: Retired    Family History:  Family History   Problem Relation Age of Onset     Hypertension Mother      Neurologic Disorder Mother         stroke     Hyperlipidemia Mother      Cerebrovascular Disease Mother          of stroke     Thyroid Disease Mother      Heart Disease Father      Heart Failure Father      Coronary Artery Disease Father          of heart attack     Hypertension Brother      Coronary Artery Disease Brother         Quadruple  Bi-pass/Quadruple Bi-pass     Hypertension Sister      Coronary Artery Disease Sister         Stent/Stent     Hypertension Brother      Hyperlipidemia Brother      Hypertension Sister      Hyperlipidemia Sister      Cancer No family hx of        Review of Systems   A 10-point review of systems was performed. Pertinent findings are noted in the HPI.    Physical Exam   ECOG Status: 2    Vitals:  /72   Pulse (!) 126   Temp 98.6  F (37  C)   Wt 62.5 kg (137 lb 12.8 oz)   SpO2 96%   BMI 19.77 kg/m      Gen: Alert, in NAD, using walker  Head: NC/AT  Eyes: PERRL, EOMI, sclera anicteric  Ears: No external auricular lesions  Nose/sinus: No rhinorrhea or epistaxis  Oral cavity/oropharynx: MMM, no visible oral cavity lesions, FOM and BOT are soft to palpation  Neck: Full ROM, supple, no palpable adenopathy  Pulm: No wheezing, stridor or respiratory distress  CV: Extremities are warm and well-perfused, no cyanosis, no pedal edema  Abdominal: Normal bowel sounds, soft, nontender, no masses  Musculoskeletal: Normal bulk and tone, tender to palpation over left sacrum  Skin: Normal color and turgor  Neuro: A/Ox3, CN II-XII intact, using walker, mild left lower extremity weakness 4/5 , remainder of extremities are 5/5, mild numbness in the left leg and left buttock region    Imaging/Path/Labs   Imaging: Per HPI, reviewed and in agreement    Path: Per HPI, reviewed and in agreement    Labs: Per HPI, reviewed and in agreement    Assessment      Mr. Wang is a 61YM with metastatic esophageal cancer to bone. He has a lesion in the sacrum which is causing pain and has neurologic symptoms due to this. Of note, he has had prior RT to L/S spine in 3/2021 (20Gy/5fx).     We discussed options of continued medical management versus palliative radiotherapy.  Given that patient is having pain as well as some weakness and some numbness in the left sacral location, we discussed options of palliative radiotherapy.  He is already had  prior radiation to this location in March 2021.  Thus we discussed the possible risks and benefits of palliative reirradiation in this setting.    Plan     1. After extensive discussion, patient wishes to pursue palliative reirradiation to the left sacral metastasis.    2. CT simulation today, plan for 20Gy/5fx. Discussed possibility of start on Friday, 6/24/2022 but patient is recovering from a UTI and is on antibiotics and wishes to pursue palliative radiation next week.       All benefits and risks discussed, and patient is in agreement with the oncologic plan discussed above.     Thank you for allowing me to participate in your patient's care.  If you should require any additional information, please do not hesitate in contacting me.     Jose Garcia MD  Department of Radiation Oncology  [facility]

## 2022-06-27 NOTE — LETTER
2022         RE: Junito Wang  [address] [facility] [id number]        Dear Colleague,    Thank you for referring your patient, Junito Wang, to the [facility] RADIATION ONCOLOGY. Please see a copy of my visit note below.       Department of Radiation Oncology  [facility]  [address] [place] [address]  [phone]       Radiation Oncology Follow-up Visit  2022      Junito Wang  MRN: [id number]   : 1960     DISEASE TREATED:   Stage IV adenocarcinoma of the esophagus with brain, liver and bone metastases    RADIATION THERAPY DELIVERED:   3) 20 Gy GK SRS-- Rt occipital, midbrain and Rt occipital completed 21  2) 20 Gy GK SRS -- Lt choroid plexus 3/24/21   1) 20 Gy in 5 fractions -- lumbar spine completed 3/23/21    INTERVAL SINCE COMPLETION OF RADIATION THERAPY:   3 and 6 months from his GK SRS treatments    SUBJECTIVE:   Junito Wang is a 61 year old male well acquainted to our clinic and has received rounds of radiation treatment as outlined above for his known diagnosis of widely metastatic adenocarcinoma of the esophagus with disease involvements in the brain, liver and bone.     His disease was diffusely metastatic upon initial presentation, and was confirmed by biopsy of a left pelvic bone mass on 21. Shortly thereafter, he was found with severe compression of the thecal sac and spinal canal extending from T12 down to the sacrum due to disease involvement, and underwent emergent L2-L5 laminectomy and decompression of the epidural hematoma. He was referred to us and received a course of GK SRS to the solitary cranial metastasis as well as a course of palliative radiation therapy to the lumbar spine as outlined above. After completion of radiation treatment, he began systemic therapy under care of Dr. Steven. He underwent radioembolization of metastatic hepatic lesions (Y90) on 9/10/21 by Dr. Meléndez  and Dr. Baptiste. He also underwent GK to 2 new brain lesions on 12/23/21. Repeat MRI brain on 3/23/22 demonstrated complete resolution of the previously treated lesions. He was noted to have slight increased conspicuity of a punctate enhancement in the right cerebellar peduncle.    Overtimes, his cancer has progressed through lines of systemic therapy. He was more recently on Irinotecan but restaging CT CAP on 5/24/22 showed mixed response. In particular, there is a bulky, lytic left sacral disease that measured 4.9 cm in size. His regimen was switched to Docetaxel on 6/7/22.  He saw Dr. Garcia in clinic on 6/23/22 to discuss radiation targeting his lytic disease, and is currently on treatment.    Mr. Wang presents today in clinic for follow up after a repeat brain MRI earlier today. On interview, he endorses left lower back pain. He denies headache, change in vision/hearing/speech, nausea/vomiting, lightheadedness or dizziness. In the past few weeks, he began to notice dyspnea with mild exertion. He needs to stop for rest after walking for a block.      PHYSICAL EXAM:  VITALS: BP (!) 83/62   Pulse 115   Resp 16   SpO2 97%    GEN: Appears catchexia, alert, oriented, and in no acute distress, ambulating with a roller walker  HEENT: Extraocular muscles intact, pale conjunctiva  NECK: Full range of motion  CV: Good distal perfusion  RESP: Breathing comfortably on room air  ABDOMEN: Soft, non-tender, non-distended  SKIN: Normal color and turgor  NEURO: No focal deficits, CN III-XII grossly intact, normal and symmetric motor exam in bilateral upper and lower extremities, gait deferred  PSYCH: Appropriate mood and affect    LABS AND IMAGING:  Brain MRI 6/27/22, awaits final radiology interpretation; per our independent review, there is a new, small enhancing lesion in the left occipital region.         IMPRESSION:   Mr. Wang is a 61 year old male with Stage IV adenocarcinoma of the esophagus with brain, liver and bone  metastases. He is being seen in clinic 3 months out from his most recent course of GK SRS. He is found to have hypotension and tachycardia. Per chart review, he had a similar episode on 22 during his visit with [facility] radiation [facility]. His hypotension and tachycardia persisted today after re check following oral hydration.    On brain MRI he is found to have a new, small left occipital lesion, but we are waiting for final radiology interpretation at the time of his follow up. We discussed his options of repeat GK SRS right now vs. repeat brain MRI in 2 months, and Mr. Wang opted for the latter.    PLAN:   -We reached out to [facility] in regards to his need for bolus hydration tomorrow when he receives his chemotherapy.  -Await final radiology interpretation, tentatively plan to repeat brain MRI in 2 months and follow up afterwards.    The patient was seen and discussed with staff, Dr. Qureshi.    Ole Dumas MD  Resident, PGY-4  Department of Radiation Oncology  [facility]    I saw the patient with the resident.  I agree with the resident note and plan of care.      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   I discussed this with Dr Garcia and sent and in basket message to Dr Steven.  Whole brain irraditaion could be considered, but his performance status is poor and he is not having headaches or other CNS symptoms.    I will leave that bart Qureshi MD        FOLLOW-UP VISIT    Patient Name: Junito Wang      : 1960     Age: 61 year old        ______________________________________________________________________________     Chief Complaint   Patient presents with     Cancer     Follow up to GK radiation      There were no vitals taken for this visit.     Date Radiation Completed:   3)  20 Gy GK SRS-- Rt occipital, midbrain and Rt occipital completed 2021  2) 20 Gy GK SRS -- left choroid plexus 3/24/21   1) 20  Gy in 5 fractions -- lumbar spine completed 3/23/21    Y-90 in 9/2021 with Dr. Meléndez     Pain  Denies       Imaging  MRI: Brain 6/27/22        Other Appointments:     MD Name: Chemo Appointment Date: 6/28/22   MD Name: Appointment Date:   MD Name: Appointment Date:   Other Appointment Notes: recent bladder infection, on cephlax-garza in place, no longer voiding blood.     Residual Radiation side effect: denies     Additional Instructions:     Nurse face-to-face time: Level 2:  5 min face to face time        PT hypotensive and tachycardic upon arrival to department, similar to 6/23 visit with Dr. Garcia. Encouraged fluid intake during visit. Remained hypotensive-denied symptoms, pulse down to 115. Has chemo infusion tomorrow- Dr. Qureshi requested RN to reach out to med onc and see if extra fluids could be given as patient was eager to leave clinic. Patient verbalized plan to continue to push fluids this evening.       Again, thank you for allowing me to participate in the care of your patient.        Sincerely,        Emilia Qureshi MD

## 2022-06-27 NOTE — PROGRESS NOTES
FOLLOW-UP VISIT    Patient Name: Junito Wang      : 1960     Age: 61 year old        ______________________________________________________________________________     Chief Complaint   Patient presents with     Cancer     Follow up to GK radiation      There were no vitals taken for this visit.     Date Radiation Completed:   3)  20 Gy GK SRS-- Rt occipital, midbrain and Rt occipital completed 2021  2) 20 Gy GK SRS -- left choroid plexus 3/24/21   1) 20 Gy in 5 fractions -- lumbar spine completed 3/23/21    Y-90 in 2021 with Dr. Meléndez     Pain  Denies       Imaging  MRI: Brain 22        Other Appointments:     MD Name: Chemo Appointment Date: 22   MD Name: Appointment Date:   MD Name: Appointment Date:   Other Appointment Notes: recent bladder infection, on cephlax-garza in place, no longer voiding blood.     Residual Radiation side effect: denies     Additional Instructions:     Nurse face-to-face time: Level 2:  5 min face to face time        PT hypotensive and tachycardic upon arrival to department, similar to  visit with Dr. Garcia. Encouraged fluid intake during visit. Remained hypotensive-denied symptoms, pulse down to 115. Has chemo infusion tomorrow- Dr. Qureshi requested RN to reach out to med onc and see if extra fluids could be given as patient was eager to leave clinic. Patient verbalized plan to continue to push fluids this evening.

## 2022-06-28 NOTE — NURSING NOTE
"Oncology Rooming Note    June 28, 2022 1:40 PM   Junito Wang is a 61 year old male who presents for:    Chief Complaint   Patient presents with     Oncology Clinic Visit     Prior to treatment       Initial Vitals: BP (!) 83/60 (BP Location: Right arm)   Pulse (!) 126   Temp 97.8  F (36.6  C) (Oral)   Resp 18   Ht 1.778 m (5' 10\")   Wt 62.6 kg (138 lb)   SpO2 96%   BMI 19.80 kg/m   Estimated body mass index is 19.8 kg/m  as calculated from the following:    Height as of this encounter: 1.778 m (5' 10\").    Weight as of this encounter: 62.6 kg (138 lb). Body surface area is 1.76 meters squared.  Mild Pain (3) Comment: Data Unavailable   No LMP for male patient.  Allergies reviewed: Yes  Medications reviewed: Yes    Medications: Medication refills not needed today.  Pharmacy name entered into [employer]:    [id number] - [place], [place] - [address] [facility] [place], [place] - [address] [place] [address] [phone]  [facility] [place], [place] - [address] [address] [place] [address], [id number]    Clinical concerns: SOB with walking, hips and low back pain remain.        Kayy Webstre LPN              "

## 2022-06-28 NOTE — PROGRESS NOTES
"Patient's name and  were verified.  See Doc Flowsheet - IV assess for details.  IVAD accessed with 20G 3\" rizvi gripper plus needle  blood return positive: YES  Site without redness, tenderness or swelling: YES  flushed with 20cc NS and 5cc 100u/ml heparin  Needle: Left accessed for infusion.  Comments: Labs drawn.  Patient tolerated procedure without incident.    Radha Foreman RN, BSN, OCN    "

## 2022-06-28 NOTE — PROGRESS NOTES
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  October 2020- progressive dysphagia with solids   1/26/21- distal esophageal biopsy shows Moderately differentiated adenocarcinoma; MMR normal expression, PDL1 expression is low with TPS 2%, CPS 5-10, Her2 is pending  1/27/21- CT CAP- Enlarged  2 cm subcarinal lymph node and 1.4 cm short axis right subcarinal lymph node, focal thickening of the superior wall along the right side of the mid esophagus. Numerous small pulmonary nodules, 3 mm nodule in the superior segment of the left lower lobe, 3 mm nodule in the anterior aspect of the right upper lobe , 4 mm nodule in the medial aspect of the right upper lobe and 4 mm right lower lobe nodule. Hypoattenuating foci in the liver, 1.6 cm hypoattenuating/hypoenhancing lesion in hepatic segment 8 and 1.4 cm lesion in hepatic segment 5 , indeterminate, likely metastatic.  The prostate gland is mildly enlarged measuring 5.3 cm in transverse diameter. Multiple prominent but not significantly enlarged retroperitoneal lymph nodes, indeterminate, could be reactive. 4.4 x 4.3 cm lytic lesion centered in the posterior aspect of the left sacral ala (series 3 image 243), 4.7 x 4.0 x 5.6 cm (axial and CC dimensions, respectively) hypoattenuating lesion in the subcutaneous tissue of the anterior chest wall just anterior to the mediastinum, indeterminate, could represent sebaceous cyst.  2/4/21- Saw Dr. Bourgeois ordered PET/CT  3/2/21-3/10/21- Admission to [facility]- <24h of inability to ambulate with acute onset left leg weakness/urinary retention with perianal sensory deficits, MRI showed extensive epidural signal change suggestive of tumor vs. Hemorrhage.Pt underwent  emergent surgery- L2-L5 laminectomy and decompression . PEG tube placement 3/5/2021.   3/17/21-3/23/21: Radiation to LS spine  3/24/21: gamma knife (2000cGy in 5 fractions)  3/29/21- port placement  3/31/21: Cycle 1 Cisplatin+keytruda+5fu   4/21/21: Cycle 2 Cisplatin+keytruda+5fu  5/11/21- Ct CAP- Overall stable disease- mixed response- AZ in 2 liver lesions while, stable disease to borderline progression in 1 liver lesions  5/12/21- Cycle 3 Cisplatin+keytruda+5fu  6/2/21- Cycle 4 Cisplatin+keytruda+5fu  6/21/21- CT CAP- Stable mild circumferential wall thickening of the distal esophagus (series 3, image 99) with small amount of fluid in the upper and mid esophagus. No lymphadenopathy. Stable to slight increased size of hepatic metastases. For example a 3.1 x 2.9 cm lesion in the left lobe (series 3, image 123) previously measured 2.9 x 2.6 cm, and a 2.7 x 2.9 cm peripheral right hepatic lobe metastasis previously measured 3.0 x 2.2 cm. A 1.0 cm lesion in the caudate lobe (series 3, image 138) is more prominent today/new. Stable small gastrohepatic ligament lymph nodes. No new or enlarging lymph nodes in the abdomen and pelvis. No ascites.Stable 5.1 x 4.6 cm left sacral mass. Stable cystic 4.9 x 3.5 cm subcutaneous lesion overlying the midsternum.  6/24/21- Cycle 5 Cisplatin+keytruda+5fu  7/14/21- Cycle 6 Cisplatin+keytruda+5fu  8/3/21- Ct CAP- Few small pulmonary nodules are stable. Stable mild circumferential wall thickening of the distal esophagus (series 3, image 97).  Increased size of hepatic metastases. For example a 3.5  x 3.5 cm left lobe metastasis (series 3, image 117) previously measured 2.9 x 3.1 cm, a 3.5 x 2.7 cm subcapsular right hepatic lobe metastasis (series 3, image 158) previously measured 2.7 x 2.9 cm and  a 1.7 x 1.5 cm lesion in the left lobe adjacent to the caudate (series 3, image 124) previously measured 1.0 cm.  Stable 5.2 x 4.9 cm lytic lesion in the left sacrum previously measuring 5.2  x 4.6 cm (3, image 238). Stable sclerotic lesion in the anterior aspect of the T4 vertebral body measuring 1.3 cm (series 3, image 39) and sclerosis of the posterior left third rib.. No new lesions. Stable 5.2 cm cystic subcutaneous lesion overlying the sternum  8/5, 8/26, 9/16- Pembrolizumab and 5-FU  9/10/21- Y90 delivery in the left hepatic lobe and segment 5 lesions.   9/13/21- Brain MRI: no progression or distal metatasis  9/28/21- Ct CAP- Multiple left and right hepatic nodular masses identified consistent with metastatic disease. Several appear to be new or are larger worrisome for progression. One lesion is slightly smaller along  the anterior aspect of hepatic segment 2. Stable destructive sacral bone lesion and sclerosis at T4 and left third rib. Stable distal esophageal wall thickening. Stable but indeterminant soft tissue surrounding portions of the left gastric artery at the upper midabdomen.  11/26/21- CT CAP-  Interval progression of disease: Enlarging hepatic metastatic foci, Wall thickening involving the distal thoracic esophagus appears to involve a more proximal distal extent compared to prior,  Stable to mildly enlarged nodular thickening about the left gastric artery. New abnormal left periaortic retroperitoneal lymphadenopathy. Enlarging left lytic sacral mass.  12/9/21- C1 Taxol and ramucirumab  12/15/21- 3 new Brain Mets identified,   12/23/21- Gamma Knife for new brain mets  1/6/22- C2 Taxol and ramucirumab  1/31/22- Ct CAP-  Hepatic metastases and left para-aortic retroperitoneal lymphadenopathy have decreased consistent with response to interval chemotherapy. Bone lesions appear similar. No new disease identified.  3/21/22- Ct CAP- Liver only progression- Posttreatment changes in the left hepatic lobe. Increase in size of few hypodense liver lesions with new lesion in segment 6 concerning for increased metastatic disease. Increased size of subcarinal lymph node.  Relatively unchanged  retroperitoneal lymphadenopathy. Bony metastasis are not significantly changed. Mild diffuse hyperenhancement of the colon and rectum with moderate cecal stool burden, findings may represent proctocolitis.Persistent diffuse circumferential thickening of the distal thoracic esophagus  3/23/22- MRI brain- Positive response to treatment compared with December 23, 2021 and December 15, 2021. Complete resolution of right occipital and interpedicular cistern lesions. Previous described left inferior colliculus lesion is also not seen, could be treatment effect or marked artifactual in the prior study. Note that there is persistent linear enhancement in the right cerebellar peduncle, likely vascular in nature. Medial to this, there is increased conspicuity of a punctate enhancement, favored as vascular as well but still remains indeterminate. Attention on future follow-up exams.  4/18/21- C1 Irinotecan initiated  5/2/22 and 5/16/22- Irinotecan  5/24/22- CT CAP- Innumerable hypoenhancing liver metastases have increased in size and number compared to 3/21/2022. Retroperitoneal adenopathy has improved. Previously noted subcarinal lymph node is no longer present on today's exam. Bony metastases are not significantly changed. Mild circum I ferential thickening of the distal esophagus is unchanged. Small right adrenal nodule has increased in size, and metastatic disease is not excluded. Small amount of ascites has increased.    Interval History: Mr. Wang was sent to clinic after being seen with hypotension, tachycardia yesterday by radiation oncology. Pt is set to be having radiation therapy in the coming days to his back. Pt does relate he was diagnosed with UTI on 6/18 and just finished last Keflex that was ordered x 10 days. He has been taking fluids well and shares he had 80 ounces yesterday. He is not eating as well he admits. He denies any fevers, shivers, cough or increased SOB. He does admit to more of a struggle  "walking last couple days and being diaphoretic with any exertion. He has some abdominal pain to LUQ and mid right side-  has not had a BM in over 3 days.   Rest of comprehensive and complete ROS is reviewed and is negative.   Past Medical History:   Diagnosis Date     Arthritis      Hypertension      Malignant neoplasm of lower third of esophagus (H) 01/26/2021     Obesity (BMI 35.0-39.9) with comorbidity (H) 1/31/2019     Current Outpatient Medications   Medication     bisacodyl (DULCOLAX) 5 MG EC tablet     Calcium Carb-Cholecalciferol (CALCIUM-VITAMIN D) 600-400 MG-UNIT TABS     gabapentin (NEURONTIN) 100 MG capsule     multivitamin CF FORMULA (CHOICEFUL) chewable tablet     omeprazole (PRILOSEC) 20 MG DR capsule     cephALEXin (KEFLEX) 500 MG capsule     dexamethasone (DECADRON) 4 MG tablet     No current facility-administered medications for this visit.     Facility-Administered Medications Ordered in Other Visits   Medication     heparin 100 UNIT/ML injection 500 Units     sodium chloride (PF) 0.9% PF flush 74 mL     No Known Allergies    Physical Exam:BP (!) 83/60 (BP Location: Right arm)   Pulse (!) 126   Temp 97.8  F (36.6  C) (Oral)   Resp 18   Ht 1.778 m (5' 10\")   Wt 62.6 kg (138 lb)   SpO2 96%   BMI 19.80 kg/m     Constitutional: Alert and cooperative. Using walker to get around.   ENT: Eyes bright- no jaundice , No mouth sores. Does have a clear nasal drainage today - states it started a couple days ago.  Neck: Supple, No adenopathy.  Cardiac: Heart rate and rhythm is regular and strong without murmur  Respiratory: Breathing easy. Lung sounds clear to auscultation- no cough noted  GI: Abdomen has some firmness to LUQ and is mildly tender, BS noted.   Urine is clear in catheter.  MS: Muscle tone fair- moving on own with walker, extremities normal with no edema.   Skin: diaphoretic, no open sores.   Neuro: Sensory grossly WNL, gait normal with walker.   Lymph: Normal ant/post cervical, axillary, " supraclavicular nodes  Psych: Mentation appears normal and affect normal/bright and smiling    Laboratory Results:   Results for orders placed or performed in visit on 06/28/22   Hepatic panel     Status: Abnormal   Result Value Ref Range    Bilirubin Total 1.6 (H) 0.2 - 1.3 mg/dL    Bilirubin Direct 1.0 (H) 0.0 - 0.2 mg/dL    Protein Total 6.4 (L) 6.8 - 8.8 g/dL    Albumin 2.9 (L) 3.4 - 5.0 g/dL    Alkaline Phosphatase 751 (H) 40 - 150 U/L     (H) 0 - 45 U/L     (H) 0 - 70 U/L   Creatinine     Status: Normal   Result Value Ref Range    Creatinine 0.79 0.66 - 1.25 mg/dL    GFR Estimate >90 >60 mL/min/1.73m2   Calcium     Status: Normal   Result Value Ref Range    Calcium 8.9 8.5 - 10.1 mg/dL   CBC with platelets and differential     Status: Abnormal   Result Value Ref Range    WBC Count 27.5 (HH) 4.0 - 11.0 10e3/uL    RBC Count 3.91 (L) 4.40 - 5.90 10e6/uL    Hemoglobin 11.9 (L) 13.3 - 17.7 g/dL    Hematocrit 34.8 (L) 40.0 - 53.0 %    MCV 89 78 - 100 fL    MCH 30.4 26.5 - 33.0 pg    MCHC 34.2 31.5 - 36.5 g/dL    RDW 18.6 (H) 10.0 - 15.0 %    Platelet Count 456 (H) 150 - 450 10e3/uL    % Neutrophils 88 %    % Lymphocytes 5 %    % Monocytes 4 %    % Eosinophils 0 %    % Basophils 1 %    % Immature Granulocytes 2 %    NRBCs per 100 WBC 0 <1 /100    Absolute Neutrophils 24.4 (H) 1.6 - 8.3 10e3/uL    Absolute Lymphocytes 1.3 0.8 - 5.3 10e3/uL    Absolute Monocytes 1.1 0.0 - 1.3 10e3/uL    Absolute Eosinophils 0.0 0.0 - 0.7 10e3/uL    Absolute Basophils 0.1 0.0 - 0.2 10e3/uL    Absolute Immature Granulocytes 0.4 <=0.4 10e3/uL    Absolute NRBCs 0.0 10e3/uL   RBC and Platelet Morphology     Status: Abnormal   Result Value Ref Range    Platelet Assessment  Automated Count Confirmed. Platelet morphology is normal.     Automated Count Confirmed. Platelet morphology is normal.    Elliptocytes Slight (A) None Seen    Polychromasia Slight (A) None Seen    RBC Morphology Confirmed RBC Indices    CBC with platelets  differential     Status: Abnormal    Narrative    The following orders were created for panel order CBC with platelets differential.  Procedure                               Abnormality         Status                     ---------                               -----------         ------                     CBC with platelets and d...[[id number]]  Abnormal            Final result               RBC and Platelet Morphology[[id number]]  Abnormal            Final result                 Please view results for these tests on the individual orders.     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   - new hypotension, tachycardia, diaphoresis and elevated WBC=27.5  - recent history of UTI diagnosed at [facility] ER on 6/18 with pt reporting completion of 10 days of Keflex today  - Began 1 liter NS per IV   - Transferred by ambulance to [facility], call made to ER for handoff since no beds available at [facility].   - Noted elevated liver enzymes and bili levels today- known liver metastasis.  The total time of this encounter amounted to  40 minutes. This time included face to face time spent with the patient, prep work, ordering tests, coordination for hospital, discussion with Dr Steven and performing post visit documentation.  Diana Siegel,Cnp

## 2022-06-28 NOTE — PROGRESS NOTES
Infusion Nursing Note:  Junito Wang presents today for IVF.    Patient seen by provider today: Yes: JUAN Ortiz   present during visit today: Not Applicable.    Note: Per JUAN Ortiz no Taxotere or Zometa treatment today. Sending patient to ED due to symptoms. IV fluids ordered and started prior to going to hospital. See NP note for full assessment.    Intravenous Access:  Implanted Port.    Treatment Conditions:  Lab Results   Component Value Date    HGB 11.9 (L) 06/28/2022    WBC 27.5 (HH) 06/28/2022    ANEU 5.1 07/14/2021    ANEUTAUTO 24.4 (H) 06/28/2022     (H) 06/28/2022      Lab Results   Component Value Date     05/25/2022    POTASSIUM 3.6 05/25/2022    MAG 2.2 11/18/2021    CR 0.79 06/28/2022    ARDEN 8.9 06/28/2022    BILITOTAL 1.6 (H) 06/28/2022    ALBUMIN 2.9 (L) 06/28/2022     (H) 06/28/2022     (H) 06/28/2022       Post Infusion Assessment:  Patient tolerated infusion without incident.  Site patent and intact, free from redness, edema or discomfort.  No evidence of extravasations.   Patient picked up by ambulance to go to [facility]. Port left accessed for use.    Discharge Plan:   Departure Mode: Cart via ambulence.      Radha Duncan RN

## 2022-06-28 NOTE — LETTER
6/28/2022         RE: Junito Wang  [id number] [facility] [id number]        Dear Colleague,    Thank you for referring your patient, Junito Wang, to the [facility]. Please see a copy of my visit note below.    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  October 2020- progressive dysphagia with solids   1/26/21- distal esophageal biopsy shows Moderately differentiated adenocarcinoma; MMR normal expression, PDL1 expression is low with TPS 2%, CPS 5-10, Her2 is pending  1/27/21- CT CAP- Enlarged  2 cm subcarinal lymph node and 1.4 cm short axis right subcarinal lymph node, focal thickening of the superior wall along the right side of the mid esophagus. Numerous small pulmonary nodules, 3 mm nodule in the superior segment of the left lower lobe, 3 mm nodule in the anterior aspect of the right upper lobe , 4 mm nodule in the medial aspect of the right upper lobe and 4 mm right lower lobe nodule. Hypoattenuating foci in the liver, 1.6 cm hypoattenuating/hypoenhancing lesion in hepatic segment 8 and 1.4 cm lesion in hepatic segment 5 , indeterminate, likely metastatic.  The prostate gland is mildly enlarged measuring 5.3 cm in transverse diameter. Multiple prominent but not significantly enlarged retroperitoneal lymph nodes, indeterminate, could be reactive. 4.4 x 4.3 cm lytic lesion centered in the posterior aspect of the left sacral ala (series 3 image 243), 4.7 x 4.0 x 5.6 cm (axial and CC dimensions, respectively) hypoattenuating lesion in the subcutaneous tissue of the anterior chest wall just anterior to the mediastinum, indeterminate, could represent sebaceous cyst.  2/4/21- Saw  Dr. Bourgeois ordered PET/CT  3/2/21-3/10/21- Admission to [facility]- <24h of inability to ambulate with acute onset left leg weakness/urinary retention with perianal sensory deficits, MRI showed extensive epidural signal change suggestive of tumor vs. Hemorrhage.Pt underwent emergent surgery- L2-L5 laminectomy and decompression . PEG tube placement 3/5/2021.   3/17/21-3/23/21: Radiation to LS spine  3/24/21: gamma knife (2000cGy in 5 fractions)  3/29/21- port placement  3/31/21: Cycle 1 Cisplatin+keytruda+5fu   4/21/21: Cycle 2 Cisplatin+keytruda+5fu  5/11/21- Ct CAP- Overall stable disease- mixed response- MS in 2 liver lesions while, stable disease to borderline progression in 1 liver lesions  5/12/21- Cycle 3 Cisplatin+keytruda+5fu  6/2/21- Cycle 4 Cisplatin+keytruda+5fu  6/21/21- CT CAP- Stable mild circumferential wall thickening of the distal esophagus (series 3, image 99) with small amount of fluid in the upper and mid esophagus. No lymphadenopathy. Stable to slight increased size of hepatic metastases. For example a 3.1 x 2.9 cm lesion in the left lobe (series 3, image 123) previously measured 2.9 x 2.6 cm, and a 2.7 x 2.9 cm peripheral right hepatic lobe metastasis previously measured 3.0 x 2.2 cm. A 1.0 cm lesion in the caudate lobe (series 3, image 138) is more prominent today/new. Stable small gastrohepatic ligament lymph nodes. No new or enlarging lymph nodes in the abdomen and pelvis. No ascites.Stable 5.1 x 4.6 cm left sacral mass. Stable cystic 4.9 x 3.5 cm subcutaneous lesion overlying the midsternum.  6/24/21- Cycle 5 Cisplatin+keytruda+5fu  7/14/21- Cycle 6 Cisplatin+keytruda+5fu  8/3/21- Ct CAP- Few small pulmonary nodules are stable. Stable mild circumferential wall thickening of the distal esophagus (series 3, image 97).  Increased size of hepatic metastases. For example a 3.5  x 3.5 cm left lobe metastasis (series 3, image 117) previously measured 2.9 x 3.1 cm, a 3.5 x 2.7 cm subcapsular right hepatic  lobe metastasis (series 3, image 158) previously measured 2.7 x 2.9 cm and  a 1.7 x 1.5 cm lesion in the left lobe adjacent to the caudate (series 3, image 124) previously measured 1.0 cm.  Stable 5.2 x 4.9 cm lytic lesion in the left sacrum previously measuring 5.2 x 4.6 cm (3, image 238). Stable sclerotic lesion in the anterior aspect of the T4 vertebral body measuring 1.3 cm (series 3, image 39) and sclerosis of the posterior left third rib.. No new lesions. Stable 5.2 cm cystic subcutaneous lesion overlying the sternum  8/5, 8/26, 9/16- Pembrolizumab and 5-FU  9/10/21- Y90 delivery in the left hepatic lobe and segment 5 lesions.   9/13/21- Brain MRI: no progression or distal metatasis  9/28/21- Ct CAP- Multiple left and right hepatic nodular masses identified consistent with metastatic disease. Several appear to be new or are larger worrisome for progression. One lesion is slightly smaller along  the anterior aspect of hepatic segment 2. Stable destructive sacral bone lesion and sclerosis at T4 and left third rib. Stable distal esophageal wall thickening. Stable but indeterminant soft tissue surrounding portions of the left gastric artery at the upper midabdomen.  11/26/21- CT CAP-  Interval progression of disease: Enlarging hepatic metastatic foci, Wall thickening involving the distal thoracic esophagus appears to involve a more proximal distal extent compared to prior,  Stable to mildly enlarged nodular thickening about the left gastric artery. New abnormal left periaortic retroperitoneal lymphadenopathy. Enlarging left lytic sacral mass.  12/9/21- C1 Taxol and ramucirumab  12/15/21- 3 new Brain Mets identified,   12/23/21- Gamma Knife for new brain mets  1/6/22- C2 Taxol and ramucirumab  1/31/22- Ct CAP-  Hepatic metastases and left para-aortic retroperitoneal lymphadenopathy have decreased consistent with response to interval chemotherapy. Bone lesions appear similar. No new disease identified.  3/21/22- Ct  CAP- Liver only progression- Posttreatment changes in the left hepatic lobe. Increase in size of few hypodense liver lesions with new lesion in segment 6 concerning for increased metastatic disease. Increased size of subcarinal lymph node.  Relatively unchanged retroperitoneal lymphadenopathy. Bony metastasis are not significantly changed. Mild diffuse hyperenhancement of the colon and rectum with moderate cecal stool burden, findings may represent proctocolitis.Persistent diffuse circumferential thickening of the distal thoracic esophagus  3/23/22- MRI brain- Positive response to treatment compared with December 23, 2021 and December 15, 2021. Complete resolution of right occipital and interpedicular cistern lesions. Previous described left inferior colliculus lesion is also not seen, could be treatment effect or marked artifactual in the prior study. Note that there is persistent linear enhancement in the right cerebellar peduncle, likely vascular in nature. Medial to this, there is increased conspicuity of a punctate enhancement, favored as vascular as well but still remains indeterminate. Attention on future follow-up exams.  4/18/21- C1 Irinotecan initiated  5/2/22 and 5/16/22- Irinotecan  5/24/22- CT CAP- Innumerable hypoenhancing liver metastases have increased in size and number compared to 3/21/2022. Retroperitoneal adenopathy has improved. Previously noted subcarinal lymph node is no longer present on today's exam. Bony metastases are not significantly changed. Mild circum I ferential thickening of the distal esophagus is unchanged. Small right adrenal nodule has increased in size, and metastatic disease is not excluded. Small amount of ascites has increased.    Interval History: Mr. Wang was sent to clinic after being seen with hypotension, tachycardia yesterday by radiation oncology. Pt is set to be having radiation therapy in the coming days to his back. Pt does relate he was diagnosed with UTI on  "6/18 and just finished last Keflex that was ordered x 10 days. He has been taking fluids well and shares he had 80 ounces yesterday. He is not eating as well he admits. He denies any fevers, shivers, cough or increased SOB. He does admit to more of a struggle walking last couple days and being diaphoretic with any exertion. He has some abdominal pain to LUQ and mid right side-  has not had a BM in over 3 days.   Rest of comprehensive and complete ROS is reviewed and is negative.   Past Medical History:   Diagnosis Date     Arthritis      Hypertension      Malignant neoplasm of lower third of esophagus (H) 01/26/2021     Obesity (BMI 35.0-39.9) with comorbidity (H) 1/31/2019     Current Outpatient Medications   Medication     bisacodyl (DULCOLAX) 5 MG EC tablet     Calcium Carb-Cholecalciferol (CALCIUM-VITAMIN D) 600-400 MG-UNIT TABS     gabapentin (NEURONTIN) 100 MG capsule     multivitamin CF FORMULA (CHOICEFUL) chewable tablet     omeprazole (PRILOSEC) 20 MG DR capsule     cephALEXin (KEFLEX) 500 MG capsule     dexamethasone (DECADRON) 4 MG tablet     No current facility-administered medications for this visit.     Facility-Administered Medications Ordered in Other Visits   Medication     heparin 100 UNIT/ML injection 500 Units     sodium chloride (PF) 0.9% PF flush 74 mL     No Known Allergies    Physical Exam:BP (!) 83/60 (BP Location: Right arm)   Pulse (!) 126   Temp 97.8  F (36.6  C) (Oral)   Resp 18   Ht 1.778 m (5' 10\")   Wt 62.6 kg (138 lb)   SpO2 96%   BMI 19.80 kg/m     Constitutional: Alert and cooperative. Using walker to get around.   ENT: Eyes bright- no jaundice , No mouth sores. Does have a clear nasal drainage today - states it started a couple days ago.  Neck: Supple, No adenopathy.  Cardiac: Heart rate and rhythm is regular and strong without murmur  Respiratory: Breathing easy. Lung sounds clear to auscultation- no cough noted  GI: Abdomen has some firmness to LUQ and is mildly tender, " BS noted.   Urine is clear in catheter.  MS: Muscle tone fair- moving on own with walker, extremities normal with no edema.   Skin: diaphoretic, no open sores.   Neuro: Sensory grossly WNL, gait normal with walker.   Lymph: Normal ant/post cervical, axillary, supraclavicular nodes  Psych: Mentation appears normal and affect normal/bright and smiling    Laboratory Results:   Results for orders placed or performed in visit on 06/28/22   Hepatic panel     Status: Abnormal   Result Value Ref Range    Bilirubin Total 1.6 (H) 0.2 - 1.3 mg/dL    Bilirubin Direct 1.0 (H) 0.0 - 0.2 mg/dL    Protein Total 6.4 (L) 6.8 - 8.8 g/dL    Albumin 2.9 (L) 3.4 - 5.0 g/dL    Alkaline Phosphatase 751 (H) 40 - 150 U/L     (H) 0 - 45 U/L     (H) 0 - 70 U/L   Creatinine     Status: Normal   Result Value Ref Range    Creatinine 0.79 0.66 - 1.25 mg/dL    GFR Estimate >90 >60 mL/min/1.73m2   Calcium     Status: Normal   Result Value Ref Range    Calcium 8.9 8.5 - 10.1 mg/dL   CBC with platelets and differential     Status: Abnormal   Result Value Ref Range    WBC Count 27.5 (HH) 4.0 - 11.0 10e3/uL    RBC Count 3.91 (L) 4.40 - 5.90 10e6/uL    Hemoglobin 11.9 (L) 13.3 - 17.7 g/dL    Hematocrit 34.8 (L) 40.0 - 53.0 %    MCV 89 78 - 100 fL    MCH 30.4 26.5 - 33.0 pg    MCHC 34.2 31.5 - 36.5 g/dL    RDW 18.6 (H) 10.0 - 15.0 %    Platelet Count 456 (H) 150 - 450 10e3/uL    % Neutrophils 88 %    % Lymphocytes 5 %    % Monocytes 4 %    % Eosinophils 0 %    % Basophils 1 %    % Immature Granulocytes 2 %    NRBCs per 100 WBC 0 <1 /100    Absolute Neutrophils 24.4 (H) 1.6 - 8.3 10e3/uL    Absolute Lymphocytes 1.3 0.8 - 5.3 10e3/uL    Absolute Monocytes 1.1 0.0 - 1.3 10e3/uL    Absolute Eosinophils 0.0 0.0 - 0.7 10e3/uL    Absolute Basophils 0.1 0.0 - 0.2 10e3/uL    Absolute Immature Granulocytes 0.4 <=0.4 10e3/uL    Absolute NRBCs 0.0 10e3/uL   RBC and Platelet Morphology     Status: Abnormal   Result Value Ref Range    Platelet Assessment   Automated Count Confirmed. Platelet morphology is normal.     Automated Count Confirmed. Platelet morphology is normal.    Elliptocytes Slight (A) None Seen    Polychromasia Slight (A) None Seen    RBC Morphology Confirmed RBC Indices    CBC with platelets differential     Status: Abnormal    Narrative    The following orders were created for panel order CBC with platelets differential.  Procedure                               Abnormality         Status                     ---------                               -----------         ------                     CBC with platelets and d...[[id number]]  Abnormal            Final result               RBC and Platelet Morphology[[id number]]  Abnormal            Final result                 Please view results for these tests on the individual orders.     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   - new hypotension, tachycardia, diaphoresis and elevated WBC=27.5  - recent history of UTI diagnosed at [facility] ER on 6/18 with pt reporting completion of 10 days of Keflex today  - Began 1 liter NS per IV   - Transferred by ambulance to [facility], call made to ER for handoff since no beds available at [place].   - Noted elevated liver enzymes and bili levels today- known liver metastasis.  The total time of this encounter amounted to  40 minutes. This time included face to face time spent with the patient, prep work, ordering tests, coordination for hospital, discussion with Dr Steven and performing post visit documentation.  Diana Siegel Cnp        Again, thank you for allowing me to participate in the care of your patient.        Sincerely,        Diana Siegel, NP, APRN CNP

## 2022-06-29 NOTE — PROGRESS NOTES
Patient scheduled to start radiation treatment today under the care of Dr. Jose Garcia at [facility].  Patient presented to ED yesterday, 6/28/2022 at [facility] in [place], [place].  This RN contacted patient today, 6/29/2022 at 1350 and patient reports he remains in the hospital, he is waiting for a liver US for possible stent placement and possible MRI.  Patient believes he will be admitted overnight and reports he will not make his radiation treatment today, 6/29/2022.  Informed patient that this RN would notify Dr. Garcia and follow-up with patient regarding discharge plans.    Radiation therapists and Dr. Garcia updated.    Elizabeth Amezcua, RN BSN OCN CBCN

## 2022-06-30 NOTE — TELEPHONE ENCOUNTER
This RN contacted patient, patient reports he is being discharged from the hospital this afternoon.  Informed patient that plan of care was reviewed with Dr. Garcia, no plans for radiation treatment today, 6/30/2022 or tomorrow, 7/1/2022.  Patient to return to clinic for a follow-up visit with Dr. Garcia on Tuesday, 7/5/2022 at 1130 to review proceeding with radiation treatment and establish radiation plan of care with possible radiation treatment on 7/5/2022 at 1200.  Patient verbalized understanding of information and confirmed appointments, had no questions at this time.    Elizabeth Amezcua, RN BSN OCN CBCN

## 2022-07-05 NOTE — NURSING NOTE
Took a phone call from Miguelangel Wang Junito's brother at 10:45. Junito had been readmitted to [facility] this morning. So he would not be present for his appointment today. His brother call us with an updated status later today or tomorrow.

## 2023-04-03 PROBLEM — C79.51 MALIGNANT NEOPLASM METASTATIC TO BONE (H): Status: ACTIVE | Noted: 2021-05-12

## 2023-04-03 PROBLEM — C78.7 MALIGNANT NEOPLASM METASTATIC TO LIVER (H): Status: ACTIVE | Noted: 2021-01-01

## 2024-02-20 NOTE — PROGRESS NOTES
"Patient's name and  were verified.  See Doc Flowsheet - IV assess for details.  IVAD accessed with 20G  3/4\" rizvi gripper plus needle  blood return positive: YES  Site without redness, tenderness or swelling: YES  flushed with 30cc NS and 5cc 100u/ml heparin  Needle: Left accessed for infusion    Comments: Labs drawn.  Patient tolerated procedure without incident.    Belén Patel  RN, BSN, OCN        " unknown No

## 2024-03-05 NOTE — PRE-PROCEDURE
GENERAL PRE-PROCEDURE:   Procedure:  Ct guided sacral mass biopsy  Date/Time:  2/26/2021 12:12 PM    Written consent obtained?: Yes    Risks and benefits: Risks, benefits and alternatives were discussed    Consent given by:  Patient  Patient states understanding of procedure being performed: Yes    Patient's understanding of procedure matches consent: Yes    Procedure consent matches procedure scheduled: Yes    Expected level of sedation:  Moderate  Appropriately NPO:  Yes  ASA Class:  Class 2- mild systemic disease, no acute problems, no functional limitations  Mallampati  :  Grade 2- soft palate, base of uvula, tonsillar pillars, and portion of posterior pharyngeal wall visible  Lungs:  Lungs clear with good breath sounds bilaterally  Heart:  Normal heart sounds and rate  History & Physical reviewed:  History and physical reviewed and no updates needed  Statement of review:  I have reviewed the lab findings, diagnostic data, medications, and the plan for sedation     5 (moderate pain)

## 2024-06-17 PROBLEM — Z71.89 ADVANCED DIRECTIVES, COUNSELING/DISCUSSION: Status: RESOLVED | Noted: 2017-10-30 | Resolved: 2024-06-17

## 2024-08-13 NOTE — PATIENT INSTRUCTIONS
Increase water through G Tube slightly since your heart rate is a little elevated and urine is ji in color today.   Aim for additional 200 ml daily.   Let us know if you if you develop any fevers, chills, chest pain, palpitations, dizziness, shortness of breath, decreased urine in you garza bag or very dark urine.       Blood pressure medication is still not needed, I will take these meds of your list.       Follow-up with specialists as planned.           .

## (undated) DEVICE — PITCHER STERILE 1000ML  SSK9004A

## (undated) DEVICE — GLOVE PROTEXIS MICRO 7.5  2D73PM75

## (undated) DEVICE — DRAPE MAYO STAND 23X54 8337

## (undated) DEVICE — RX SURGIFLO HEMOSTATIC MATRIX W/THROMBIN 8ML 2994

## (undated) DEVICE — ADH SKIN CLOSURE PREMIERPRO EXOFIN 1.0ML 3470

## (undated) DEVICE — ENDO SYSTEM WATER BOTTLE & TUBING W/CO2 FILTER 00711549

## (undated) DEVICE — BUR STRK CARBIDE MATCH HEAD 3.0MM 5820-107-530C

## (undated) DEVICE — JELLY LUBRICATING SURGILUBE 2OZ TUBE

## (undated) DEVICE — TUBE TRANS GASTROJEJUNOSTOMY ENFIT 22FRX45CM

## (undated) DEVICE — ESU ELEC BLADE 2.75" COATED/INSULATED E1455

## (undated) DEVICE — SOL ADH LIQUID BENZOIN SWAB 0.6ML C1544

## (undated) DEVICE — DECANTER BAG 2002S

## (undated) DEVICE — GLOVE PROTEXIS POWDER FREE SMT 7.5  2D72PT75X

## (undated) DEVICE — DRSG TELFA 3X8" 1238

## (undated) DEVICE — DRAPE SHEET MED 44X70" 9355

## (undated) DEVICE — SPONGE SURGIFOAM 12 1972

## (undated) DEVICE — DRSG DRAIN 2X2" 7087

## (undated) DEVICE — ENDO SNARE POLYPECTOMY PEDIATRIC WIDE OVAL 27MM LOOP

## (undated) DEVICE — Device

## (undated) DEVICE — CONNECTOR MALE TO MALE LL

## (undated) DEVICE — LINEN TOWEL PACK X5 5464

## (undated) DEVICE — SU VICRYL 2-0 CT-2 CR 8X18" J726D

## (undated) DEVICE — SOL WATER IRRIG 1000ML BOTTLE 07139-09

## (undated) DEVICE — KIT ENDO FIRST STEP DISINFECTANT 200ML W/POUCH EP-4

## (undated) DEVICE — BUR STRK 2.3MM TAPERED ROUTER - FA2 5407-FA2-023

## (undated) DEVICE — DRAPE POUCH INSTRUMENT 1018

## (undated) DEVICE — SU VICRYL 3-0 SH 27" J316H

## (undated) DEVICE — WIRE GUIDE 0.035"X260CM NAVIPRO ANG 5625 M00556251

## (undated) DEVICE — LINEN TOWEL PACK X6 WHITE 5487

## (undated) DEVICE — SYR 30ML LL W/O NDL 302832

## (undated) DEVICE — NDL SPINAL 18GA 3.5" 405184

## (undated) DEVICE — NDL COUNTER 20CT 31142493

## (undated) DEVICE — PACK NEURO MINOR UMMC SNE32MNMU4

## (undated) DEVICE — COVER ULTRASOUND PROBE W/GEL FLEXI-FEEL 6"X58" LF  25-FF658

## (undated) DEVICE — DRSG STERI STRIP 1/2X4" R1547

## (undated) DEVICE — NDL BLUNT 17GA 1.5" 8881202330

## (undated) DEVICE — DRSG MEDIPORE 3 1/2X13 3/4" 3573

## (undated) DEVICE — KNIFE HANDLE W/15 BLADE 371615

## (undated) DEVICE — DRAIN HEMOVAC RESERVOIR KIT 10FR 1/8" MED 00-2550-002-10

## (undated) DEVICE — TUBE GASTROSTOMY PONSKY PULL DELUXE 20FR 000792

## (undated) DEVICE — SYR 30ML SLIP TIP W/O NDL 302833

## (undated) DEVICE — SPONGE COTTONOID 1/2X1" 80-1402

## (undated) DEVICE — BAG URINARY DRAIN 4000ML LF 153509

## (undated) DEVICE — KIT INTRODUCER FLUENT MICRO 5FRX10CM ECHO TIP KIT-038-04

## (undated) DEVICE — PREP CHLORAPREP CLEAR 3ML 260400

## (undated) DEVICE — SUCTION MANIFOLD NEPTUNE 2 SYS 4 PORT 0702-020-000

## (undated) DEVICE — ESU PENCIL W/COATED BLADE E2450H

## (undated) DEVICE — DRAPE STERI TOWEL LG 1010

## (undated) DEVICE — LINEN GOWN XLG 5407

## (undated) DEVICE — DEVICE CATH STABILIZATION STATLOCK FOLEY 3-WAY FOL0105

## (undated) DEVICE — PREP CHLORAPREP 26ML TINTED ORANGE  260815

## (undated) DEVICE — DRAPE C-ARM W/STRAPS 42X72" 07-CA104

## (undated) DEVICE — SU MONOCRYL 4-0 PS-2 18" UND Y496G

## (undated) DEVICE — SU VICRYL 0 CT-1 CR 8X18" J740D

## (undated) DEVICE — TUBING SUCTION 10'X3/16" N510

## (undated) DEVICE — KIT CONNECTOR FOR OLYMPUS ENDOSCOPES DEFENDO 100310

## (undated) DEVICE — SOL WATER IRRIG 1000ML BOTTLE 2F7114

## (undated) DEVICE — SU ETHILON 3-0 PS-1 18" 1663H

## (undated) DEVICE — BUR STRK 1.1MM STRAIGHT ROUTER 5820-070-011

## (undated) DEVICE — PACK CENTRAL LINE INSERTION SAN32CLFCG

## (undated) DEVICE — GOWN XLG DISP 9545

## (undated) RX ORDER — LIDOCAINE HYDROCHLORIDE 10 MG/ML
INJECTION, SOLUTION EPIDURAL; INFILTRATION; INTRACAUDAL; PERINEURAL
Status: DISPENSED
Start: 2021-02-26

## (undated) RX ORDER — SULFAMETHOXAZOLE/TRIMETHOPRIM 800-160 MG
TABLET ORAL
Status: DISPENSED
Start: 2021-04-12

## (undated) RX ORDER — CEFAZOLIN SODIUM 2 G/50ML
SOLUTION INTRAVENOUS
Status: DISPENSED
Start: 2021-03-29

## (undated) RX ORDER — SODIUM CHLORIDE 9 MG/ML
INJECTION, SOLUTION INTRAVENOUS
Status: DISPENSED
Start: 2021-02-26

## (undated) RX ORDER — HEPARIN SODIUM 200 [USP'U]/100ML
INJECTION, SOLUTION INTRAVENOUS
Status: DISPENSED
Start: 2021-01-01

## (undated) RX ORDER — LIDOCAINE 40 MG/G
CREAM TOPICAL
Status: DISPENSED
Start: 2021-01-01

## (undated) RX ORDER — SODIUM CHLORIDE 9 MG/ML
INJECTION, SOLUTION INTRAVENOUS
Status: DISPENSED
Start: 2021-01-01

## (undated) RX ORDER — FENTANYL CITRATE 50 UG/ML
INJECTION, SOLUTION INTRAMUSCULAR; INTRAVENOUS
Status: DISPENSED
Start: 2021-03-02

## (undated) RX ORDER — LIDOCAINE HYDROCHLORIDE AND EPINEPHRINE 10; 10 MG/ML; UG/ML
INJECTION, SOLUTION INFILTRATION; PERINEURAL
Status: DISPENSED
Start: 2021-03-02

## (undated) RX ORDER — BUPIVACAINE HYDROCHLORIDE 2.5 MG/ML
INJECTION, SOLUTION EPIDURAL; INFILTRATION; INTRACAUDAL
Status: DISPENSED
Start: 2021-03-02

## (undated) RX ORDER — HYDROMORPHONE HYDROCHLORIDE 1 MG/ML
INJECTION, SOLUTION INTRAMUSCULAR; INTRAVENOUS; SUBCUTANEOUS
Status: DISPENSED
Start: 2021-03-02

## (undated) RX ORDER — FENTANYL CITRATE 50 UG/ML
INJECTION, SOLUTION INTRAMUSCULAR; INTRAVENOUS
Status: DISPENSED
Start: 2021-03-05

## (undated) RX ORDER — HEPARIN SODIUM (PORCINE) LOCK FLUSH IV SOLN 100 UNIT/ML 100 UNIT/ML
SOLUTION INTRAVENOUS
Status: DISPENSED
Start: 2021-01-01

## (undated) RX ORDER — FENTANYL CITRATE 50 UG/ML
INJECTION, SOLUTION INTRAMUSCULAR; INTRAVENOUS
Status: DISPENSED
Start: 2021-02-26

## (undated) RX ORDER — DEXAMETHASONE SODIUM PHOSPHATE 4 MG/ML
INJECTION, SOLUTION INTRA-ARTICULAR; INTRALESIONAL; INTRAMUSCULAR; INTRAVENOUS; SOFT TISSUE
Status: DISPENSED
Start: 2021-03-05

## (undated) RX ORDER — FENTANYL CITRATE 50 UG/ML
INJECTION, SOLUTION INTRAMUSCULAR; INTRAVENOUS
Status: DISPENSED
Start: 2021-01-01

## (undated) RX ORDER — LIDOCAINE HYDROCHLORIDE 10 MG/ML
INJECTION, SOLUTION EPIDURAL; INFILTRATION; INTRACAUDAL; PERINEURAL
Status: DISPENSED
Start: 2021-01-01

## (undated) RX ORDER — PANTOPRAZOLE SODIUM 40 MG/10ML
INJECTION, POWDER, LYOPHILIZED, FOR SOLUTION INTRAVENOUS
Status: DISPENSED
Start: 2021-01-01

## (undated) RX ORDER — CIPROFLOXACIN 500 MG/1
TABLET, FILM COATED ORAL
Status: DISPENSED
Start: 2021-04-02

## (undated) RX ORDER — LORAZEPAM 0.5 MG/1
TABLET ORAL
Status: DISPENSED
Start: 2021-01-01

## (undated) RX ORDER — HEPARIN SODIUM,PORCINE 10 UNIT/ML
VIAL (ML) INTRAVENOUS
Status: DISPENSED
Start: 2021-01-01

## (undated) RX ORDER — SIMETHICONE 40MG/0.6ML
SUSPENSION, DROPS(FINAL DOSAGE FORM)(ML) ORAL
Status: DISPENSED
Start: 2021-01-26

## (undated) RX ORDER — FENTANYL CITRATE 50 UG/ML
INJECTION, SOLUTION INTRAMUSCULAR; INTRAVENOUS
Status: DISPENSED
Start: 2021-01-26

## (undated) RX ORDER — ACETAMINOPHEN 325 MG/1
TABLET ORAL
Status: DISPENSED
Start: 2021-03-29

## (undated) RX ORDER — BUPIVACAINE HYDROCHLORIDE 2.5 MG/ML
INJECTION, SOLUTION EPIDURAL; INFILTRATION; INTRACAUDAL
Status: DISPENSED
Start: 2021-03-05

## (undated) RX ORDER — AMPICILLIN AND SULBACTAM 2; 1 G/1; G/1
INJECTION, POWDER, FOR SOLUTION INTRAMUSCULAR; INTRAVENOUS
Status: DISPENSED
Start: 2021-01-01

## (undated) RX ORDER — ONDANSETRON 2 MG/ML
INJECTION INTRAMUSCULAR; INTRAVENOUS
Status: DISPENSED
Start: 2021-01-01